# Patient Record
Sex: FEMALE | Race: WHITE | Employment: OTHER | URBAN - METROPOLITAN AREA
[De-identification: names, ages, dates, MRNs, and addresses within clinical notes are randomized per-mention and may not be internally consistent; named-entity substitution may affect disease eponyms.]

---

## 2018-05-23 ENCOUNTER — HOSPITAL ENCOUNTER (OUTPATIENT)
Dept: MRI IMAGING | Age: 73
Discharge: HOME | End: 2018-05-23
Attending: ORTHOPAEDIC SURGERY
Payer: MEDICARE

## 2018-05-23 DIAGNOSIS — M54.50 ACUTE BILATERAL LOW BACK PAIN: ICD-10-CM

## 2018-05-23 DIAGNOSIS — M54.10 RADICULOPATHY: ICD-10-CM

## 2018-05-23 PROCEDURE — 72148 MRI LUMBAR SPINE W/O DYE: CPT

## 2018-07-23 ENCOUNTER — OFFICE VISIT (OUTPATIENT)
Dept: ORTHOPEDIC SURGERY | Age: 73
End: 2018-07-23

## 2018-07-23 VITALS
WEIGHT: 148 LBS | TEMPERATURE: 98.1 F | HEIGHT: 65 IN | HEART RATE: 89 BPM | BODY MASS INDEX: 24.66 KG/M2 | RESPIRATION RATE: 16 BRPM | DIASTOLIC BLOOD PRESSURE: 85 MMHG | OXYGEN SATURATION: 95 % | SYSTOLIC BLOOD PRESSURE: 164 MMHG

## 2018-07-23 DIAGNOSIS — M47.816 LUMBAR FACET ARTHROPATHY: ICD-10-CM

## 2018-07-23 DIAGNOSIS — M48.062 SPINAL STENOSIS OF LUMBAR REGION WITH NEUROGENIC CLAUDICATION: Primary | ICD-10-CM

## 2018-07-23 DIAGNOSIS — M62.830 MUSCLE SPASM OF BACK: ICD-10-CM

## 2018-07-23 DIAGNOSIS — M43.25 FUSION OF SPINE, THORACOLUMBAR REGION: ICD-10-CM

## 2018-07-23 DIAGNOSIS — M79.2 NEURITIS: ICD-10-CM

## 2018-07-23 RX ORDER — CALCIUM CARBONATE 600 MG
600 TABLET ORAL DAILY
COMMUNITY
End: 2022-06-10

## 2018-07-23 RX ORDER — MELOXICAM 15 MG/1
TABLET ORAL
COMMUNITY
Start: 2018-06-15 | End: 2020-10-16 | Stop reason: ALTCHOICE

## 2018-07-23 RX ORDER — ATORVASTATIN CALCIUM 10 MG/1
10 TABLET, FILM COATED ORAL DAILY
COMMUNITY
Start: 2017-09-04

## 2018-07-23 RX ORDER — DICLOFENAC SODIUM 75 MG/1
75 TABLET, DELAYED RELEASE ORAL 2 TIMES DAILY
Qty: 60 TAB | Refills: 2 | Status: SHIPPED | OUTPATIENT
Start: 2018-07-23 | End: 2020-10-16 | Stop reason: ALTCHOICE

## 2018-07-23 RX ORDER — GABAPENTIN 300 MG/1
CAPSULE ORAL
Qty: 90 CAP | Refills: 1 | Status: SHIPPED | OUTPATIENT
Start: 2018-07-23 | End: 2018-10-15 | Stop reason: SDUPTHER

## 2018-07-23 RX ORDER — LOSARTAN POTASSIUM 25 MG/1
25 TABLET ORAL DAILY
COMMUNITY
Start: 2018-05-25 | End: 2022-06-17

## 2018-07-23 RX ORDER — OMEGA-3-ACID ETHYL ESTERS 1 G/1
1 CAPSULE, LIQUID FILLED ORAL
COMMUNITY
End: 2022-03-03 | Stop reason: ALTCHOICE

## 2018-07-23 RX ORDER — LORAZEPAM 1 MG/1
1 TABLET ORAL
COMMUNITY
Start: 2018-05-25

## 2018-07-23 RX ORDER — HYDROCHLOROTHIAZIDE 25 MG/1
25 TABLET ORAL DAILY
COMMUNITY
End: 2022-07-02

## 2018-07-23 RX ORDER — OMEPRAZOLE 40 MG/1
40 CAPSULE, DELAYED RELEASE ORAL DAILY
COMMUNITY
Start: 2018-07-09

## 2018-07-23 NOTE — PATIENT INSTRUCTIONS
Low Back Arthritis: Exercises  Your Care Instructions  Here are some examples of typical rehabilitation exercises for your condition. Start each exercise slowly. Ease off the exercise if you start to have pain. Your doctor or physical therapist will tell you when you can start these exercises and which ones will work best for you. When you are not being active, find a comfortable position for rest. Some people are comfortable on the floor or a medium-firm bed with a small pillow under their head and another under their knees. Some people prefer to lie on their side with a pillow between their knees. Don't stay in one position for too long. Take short walks (10 to 20 minutes) every 2 to 3 hours. Avoid slopes, hills, and stairs until you feel better. Walk only distances you can manage without pain, especially leg pain. How to do the exercises  Pelvic tilt    1. Lie on your back with your knees bent. 2. \"Brace\" your stomach-tighten your muscles by pulling in and imagining your belly button moving toward your spine. 3. Press your lower back into the floor. You should feel your hips and pelvis rock back. 4. Hold for 6 seconds while breathing smoothly. 5. Relax and allow your pelvis and hips to rock forward. 6. Repeat 8 to 12 times. Back stretches    1. Get down on your hands and knees on the floor. 2. Relax your head and allow it to droop. Round your back up toward the ceiling until you feel a nice stretch in your upper, middle, and lower back. Hold this stretch for as long as it feels comfortable, or about 15 to 30 seconds. 3. Return to the starting position with a flat back while you are on your hands and knees. 4. Let your back sway by pressing your stomach toward the floor. Lift your buttocks toward the ceiling. 5. Hold this position for 15 to 30 seconds. 6. Repeat 2 to 4 times. Follow-up care is a key part of your treatment and safety.  Be sure to make and go to all appointments, and call your doctor if you are having problems. It's also a good idea to know your test results and keep a list of the medicines you take. Where can you learn more? Go to http://maggie-rae.info/. Enter M504 in the search box to learn more about \"Low Back Arthritis: Exercises. \"  Current as of: November 29, 2017  Content Version: 11.7  © 8841-5477 [employer]. Care instructions adapted under license by [employer] (which disclaims liability or warranty for this information). If you have questions about a medical condition or this instruction, always ask your healthcare professional. [place] [address] any warranty or liability for your use of this information. Lumbar Spinal Stenosis: Care Instructions  Your Care Instructions    Stenosis in the spine is a narrowing of the canal that is around the spinal cord and nerve roots in your back. It can happen as part of aging. Sometimes bone and other tissue grow into this canal and press on the nerves that branch out from the spinal cord. This can cause pain, numbness, and weakness. When it happens in the lower part of your back, it is called lumbar spinal stenosis. It can cause problems in the legs, feet, and rear end (buttocks). You may be able to get relief from the symptoms of spinal stenosis by taking pain medicine. Your doctor may suggest physical therapy and exercises to keep your spine strong and flexible. Some people try steroid shots to reduce swelling. If pain and numbness in your legs are still so bad that you cannot do your normal activities, you may need surgery. Follow-up care is a key part of your treatment and safety. Be sure to make and go to all appointments, and call your doctor if you are having problems. It's also a good idea to know your test results and keep a list of the medicines you take. How can you care for yourself at home? · Take an over-the-counter pain medicine.  Nonsteroidal anti-inflammatory drugs (NSAIDs) such as ibuprofen or naproxen seem to work best. But if you can't take NSAIDs, you can try acetaminophen. Be safe with medicines. Read and follow all instructions on the label. · Do not take two or more pain medicines at the same time unless the doctor told you to. Many pain medicines have acetaminophen, which is Tylenol. Too much acetaminophen (Tylenol) can be harmful. · Stay at a healthy weight. Being overweight puts extra strain on your spine. · Change positions often when you sit or stand. This can ease pain. It may also reduce pressure on the spinal cord and its nerves. · Avoid doing things that make your symptoms worse. Walking downhill and standing for a long time may cause pain. · Stretch and strengthen your back muscles as your doctor or physical therapist recommends. If your doctor says it is okay to do them, these exercises may help. ¨ Lie on your back with your knees bent. Gently pull one bent knee to your chest. Put that foot back on the floor, and then pull the other knee to your chest.  ¨ Do pelvic tilts. Lie on your back with your knees bent. Tighten your stomach muscles. Pull your belly button (navel) in and up toward your ribs. You should feel like your back is pressing to the floor and your hips and pelvis are slightly lifting off the floor. Hold for 6 seconds while breathing smoothly. ¨ Stand with your back flat against a wall. Slowly slide down until your knees are slightly bent. Hold for 10 seconds, then slide back up the wall. · Remove or change anything in your house that may cause you to fall. Keep walkways clear of clutter, electrical cords, and throw rugs. When should you call for help? Call 911 anytime you think you may need emergency care. For example, call if:    · You are unable to move a leg at all.   [facility] your doctor now or seek immediate medical care if:    · You have new or worse symptoms in your legs, belly, or buttocks.  Symptoms may include:  ¨ Numbness or tingling. ¨ Weakness. ¨ Pain.     · You lose bladder or bowel control.    Watch closely for changes in your health, and be sure to contact your doctor if:    · You have a fever, lose weight, or don't feel well.     · You are not getting better as expected. Where can you learn more? Go to http://maggie-rae.info/. George Alvarez in the search box to learn more about \"Lumbar Spinal Stenosis: Care Instructions. \"  Current as of: November 29, 2017  Content Version: 11.7  © 9767-3612 [email or web address]. Care instructions adapted under license by [employer] (which disclaims liability or warranty for this information). If you have questions about a medical condition or this instruction, always ask your healthcare professional. [address] any warranty or liability for your use of this information.

## 2018-07-23 NOTE — PROGRESS NOTES
[facility]  [address] [place], [address]  Phone: [phone]  Fax: [phone]    NEW PATIENT  Pt's YOB: 1945    ASSESSMENT   Diagnoses and all orders for this visit:    1. Spinal stenosis of lumbar region with neurogenic claudication  -     gabapentin (NEURONTIN) 300 mg capsule; 1 in the am and 2 in the evening  Indications: NEUROPATHIC PAIN    2. Lumbar facet arthropathy (HCC)  -     diclofenac EC (VOLTAREN) 75 mg EC tablet; Take 1 Tab by mouth two (2) times a day. 3. Muscle spasm of back    4. Fusion of spine, thoracolumbar region    5. Neuritis  -     gabapentin (NEURONTIN) 300 mg capsule; 1 in the am and 2 in the evening  Indications: NEUROPATHIC PAIN       IMPRESSION AND PLAN:  Tu Bennett is a 68 y.o. female with history of low back pain x 1 year. She complains of progressive pain in the lower back, right hip, and right thigh since 07/2017. Of note, she fractured her back 30 years ago and had a Moreland geoff placed. She has tried physical therapy. 1) Pt was given information on lumbar arthritis exercises and spinal stenosis. 2) Discussed treatment options with the Pt, including medication, aqua physical therapy, steroid injections, and surgical intervention. 3) Pt was prescribed Neurontin 300 mg 1 tab QAM and 2 tabs QHS, tapering up as directed. 4) She was offered a referral to aqua physical therapy but she wishes to proceed with home exercises first. Pt will contact the office if she wishes to try aqua physical therapy. 5) Pt was prescribed Voltaren 75 mg 1 tab BID prn with food. 6) Ms. Halima Huggins has a reminder for a \"due or due soon\" health maintenance. I have asked that she contact her primary care provider, Lety Saab MD, for follow-up on this health maintenance. 7)  demonstrated consistency with prescribing. 8) Pt will follow-up in 4-6 weeks or sooner if needed.       HISTORY OF PRESENT ILLNESS:  Ekta Gimenez is a 68 y.o. female with history of low back pain x 1 year. Pt presents to the office today as a new patient. She complains of progressive pain in the lower back, right hip, and right thigh since 07/2017. Pt denies any weakness but admits to increased pain when climbing stairs. She notes that she woke up with right hip and thigh pain this morning. Pt states that she generally puts an ice pack on her right thigh when she comes down to make coffee in the morning. Of note, she fractured her back 30 years ago and had a Moreland geoff placed. She tried land physical therapy and notes that it was suggested she go to pain management. She states she had deferred getting treatment for herself because her  had cardiac surgery  She takes ibuprofen as needed and has taken Mobic in the past as prescribed by her podiatrist. She would prefer non-surgical and non-invasive options at this time. Pt at this time desires to proceed with medication evaluation. Pt has a history of acid reflux and hypertension. She denies any history of diabetes. 45 minutes of face to face contact was spent with the patient and her  discussing her previous surgery (thoracolumbar fusion), his previous lumbar problems and subsequent surgery, medications, therapy, injections, and indications for surgery during today's office visit. Pain Scale: 7/10     PCP: Deetta Primrose, MD    Past Medical History:   Diagnosis Date    Acid reflux     Hypertension         Social History     Social History    Marital status:      Spouse name: N/A    Number of children: N/A    Years of education: N/A     Occupational History    Not on file.      Social History Main Topics    Smoking status: Never Smoker    Smokeless tobacco: Never Used    Alcohol use 0.6 oz/week     1 Glasses of wine per week    Drug use: No    Sexual activity: Yes     Partners: Male     Birth control/ protection: None     Other Topics Concern  Not on file     Social History Narrative    No narrative on file           No Known Allergies    REVIEW OF SYSTEMS    Constitutional: Negative for fever, chills, or weight change. Respiratory: Negative for cough or shortness of breath. Cardiovascular: Negative for chest pain or palpitations. Gastrointestinal: Negative for acid reflux, change in bowel habits, or constipation. Genitourinary: Negative for dysuria and flank pain. Musculoskeletal: Positive for lumbar pain. Skin: Negative for rash. Neurological: Negative for headaches, dizziness, or numbness. Endo/Heme/Allergies: Negative for increased bruising. Psychiatric/Behavioral: Negative for difficulty with sleep. PHYSICAL EXAMINATION  Visit Vitals    /85 (BP 1 Location: Left arm, BP Patient Position: Sitting)    Pulse 89    Temp 98.1 °F (36.7 °C) (Oral)    Resp 16    Ht 5' 5\" (1.651 m)    Wt 148 lb (67.1 kg)    SpO2 95%    BMI 24.63 kg/m2       Constitutional: Awake, alert, and in no acute distress. HEENT: Normocephalic. Atraumatic. Oropharynx is moist and clear. PERRL. EOMI. Sclerae are nonicteric  Heart: Regular rate and rhythm  Lungs: Clear to auscultation bilaterally  Abdomen: Soft and nontender. Bowel sounds are present  Neurological: 1+ symmetrical DTRs in the upper extremities. 1+ symmetrical DTRs in the lower extremities. Sensation to light touch is intact. Negative Alfonzo's sign bilaterally. Skin: warm, dry, and intact. Musculoskeletal: Decreased range of motion with side to side cervical flexion. Minimal tenderness to palpation in the lower lumbar region and over the sacroiliac joints. Moderate pain with extension and axial loading. No pain with internal or external rotation of her hips. Negative straight leg raise bilaterally.        Biceps  Triceps Deltoids Wrist Ext Wrist Flex Hand Intrin   Right +4/5 +4/5 +4/5 +4/5 +4/5 +4/5   Left +4/5 +4/5 +4/5 +4/5 +4/5 +4/5      Hip Flex  Quads Hamstrings Ankle DF EHL Ankle PF   Right +4/5 +4/5 +4/5 +4/5 +4/5 +4/5   Left +4/5 +4/5 +4/5 +4/5 +4/5 +4/5     IMAGING:     Lumbar spine MRI from 05/23/2018 was personally reviewed with the patient and demonstrated:    Results from [place] encounter on 05/23/18   MRI LUMB SPINE WO CONT   Narrative History: Remote placement of Moreland rods due to traumatic injury, with  approximately one year of back and right leg pain    Prior studies not available for comparison    PROCEDURE:    Sagittal spin echo T1 and T2 weighted and STIR sequences, and axial spin echo T1  and NASH T2 weighted sequences were obtained of the lumbar spine without  gadolinium. FINDINGS:      There is chronic appearing superior endplate fracture A93 vertebral body with  slight posterior chronic retropulsion but no significant stenosis. No other  evidence of fracture. Asymmetric right-sided degenerative endplate marrow edema  N2/9. No other evidence of marrow edema or neoplastic marrow signal. Extensive  artifact from posterior instrumentation from the visualized lower thoracic level  down to the L2 level. The conus medullaris is located at the L1 level and has a  normal appearance and signal.     Visualized retroperitoneum and sacrum unremarkable. L1/2 level: Dorsal artifact but no evidence of significant stenosis or disc  abnormality. L2/3 level: Extensive dorsal artifact especially on axial sequences. There is  disc bulge and suggestion of moderate to severe canal stenosis with AP diameter  canal 5 mm, limited evaluation. L3/4 level: Degenerative spondylosis asymmetric to the right with disc bulge and  right posterior paracentral disc extrusion extending cephalad in the right  anterior epidural space and extending into the proximal foramen. There is  prominent dorsal epidural fat with AP diameter canal 4 mm with minimal residual  CSF surrounding crowded cauda equina.  Severe right lateral recess stenosis and  moderate to severe right-sided subarticular foraminal stenosis with flattening  right foraminal L3 nerve root. L4/5 level: Moderate to severe bilateral facet arthropathy and hypertrophy with  small right-sided facet joint effusion. Mild disc bulge and prominent dorsal  epidural fat with AP diameter canal 6 mm but with plenty of CSF surrounding  cauda equina, without significant lateral recess stenosis. There is small  synovial cyst along the ventral aspect right-sided facet joint which contributes  to flattening and mild anterior displacement of the right foraminal L4 nerve  root. Mild to moderate degenerative left foraminal stenosis. L5/S1 level: Mild bilateral facet arthropathy with no disc abnormality or  significant stenosis. Impression IMPRESSION:    1. Previous dorsal instrumentation lower thoracic down to the L2 level causing  associated artifact. Chronic appearing superior endplate fracture with mild loss  of height T12 vertebral body. 2. Asymmetric right-sided degenerative spondylosis L3/4 with right posterior and  foraminal disc extrusion with severe canal, severe right lateral recess, and  moderate to severe right foraminal stenosis. 3. Moderate to severe facet arthropathy with small right-sided facet joint  effusion L4/5, with small ventrally located right-sided facet synovial cyst  contributing to flattening and possible impingement of the right foraminal L4  nerve root. Mild canal stenosis. 4. Disc bulge L2/3 with possible moderate to severe canal stenosis at this level  but suboptimal evaluation of the canal at this level due to artifact. Written by Dave Lockett, as dictated by Chandler Grajeda MD.  I, Dr. Chandler Grajeda confirm that all documentation is accurate.

## 2018-07-23 NOTE — MR AVS SNAPSHOT
[address] [address] [address] [place] [address] 
[phone] Patient: Fanny Pulido MRN: [id number] GBI:[id number] Visit Information Date & Time Provider Department Dept. Phone Encounter #  
 7/23/2018 11:00 AM Leonides Hester, 27 [facility] [phone] [id number] Follow-up Instructions Return in about 6 weeks (around 9/3/2018) for Medication follow up. Upcoming Health Maintenance Date Due Hepatitis C Screening 1945 DTaP/Tdap/Td series (1 - Tdap) 4/18/1966 FOBT Q 1 YEAR AGE 50-75 4/18/1995 ZOSTER VACCINE AGE 60> 2/18/2005 GLAUCOMA SCREENING Q2Y 4/18/2010 Pneumococcal 65+ Low/Medium Risk (1 of 2 - PCV13) 4/18/2010 BREAST CANCER SCRN MAMMOGRAM 6/1/2013 MEDICARE YEARLY EXAM 5/17/2018 Influenza Age 5 to Adult 8/1/2018 Allergies as of 7/23/2018  Review Complete On: 7/23/2018 By: Kylie Cerda LPN No Known Allergies Current Immunizations  Never Reviewed No immunizations on file. Not reviewed this visit You Were Diagnosed With   
  
 Codes Comments Spinal stenosis of lumbar region with neurogenic claudication    -  Primary ICD-10-CM: B11.411 
ICD-9-CM: 724.03 Lumbar facet arthropathy (HCC)     ICD-10-CM: M46.96 
ICD-9-CM: 721.3 Muscle spasm of back     ICD-10-CM: J46.582 ICD-9-CM: 724.8 Neuritis     ICD-10-CM: M79.2 ICD-9-CM: 729.2 Vitals BP Pulse Temp Resp Height(growth percentile) Weight(growth percentile) 164/85 (BP 1 Location: Left arm, BP Patient Position: Sitting) 89 98.1 °F (36.7 °C) (Oral) 16 5' 5\" (1.651 m) 148 lb (67.1 kg) SpO2 BMI OB Status Smoking Status 95% 24.63 kg/m2 Postmenopausal Never Smoker BMI and BSA Data Body Mass Index Body Surface Area  
 24.63 kg/m 2 1.75 m 2 Your Updated Medication List  
  
   
 This list is accurate as of 18 12:12 PM.  Always use your most recent med list.  
  
  
  
  
 atorvastatin 10 mg tablet Commonly known as:  LIPITOR Take  by mouth.  
  
 calcium carbonate 600 mg calcium (1,500 mg) tablet Commonly known as:  Eleanora Salk Take  by mouth. diclofenac EC 75 mg EC tablet Commonly known as:  VOLTAREN Take 1 Tab by mouth two (2) times a day.  
  
 gabapentin 300 mg capsule Commonly known as:  NEURONTIN  
1 in the am and 2 in the evening  Indications: NEUROPATHIC PAIN  
  
 hydroCHLOROthiazide 25 mg tablet Commonly known as:  HYDRODIURIL Take 25 mg by mouth. LORazepam 1 mg tablet Commonly known as:  ATIVAN  
  
 losartan 50 mg tablet Commonly known as:  COZAAR  
  
 meloxicam 15 mg tablet Commonly known as:  MOBIC  
  
 MULTIVITAMIN PO Take  by mouth. omega-3 acid ethyl esters 1 gram capsule Commonly known as:  Zenaida Beaverdam Take  by mouth. omeprazole 40 mg capsule Commonly known as:  PRILOSEC Prescriptions Printed Refills  
 gabapentin (NEURONTIN) 300 mg capsule 1 Si in the am and 2 in the evening  Indications: NEUROPATHIC PAIN Class: Print  
 diclofenac EC (VOLTAREN) 75 mg EC tablet 2 Sig: Take 1 Tab by mouth two (2) times a day. Class: Print Route: Oral  
  
Follow-up Instructions Return in about 6 weeks (around 9/3/2018) for Medication follow up. Patient Instructions Low Back Arthritis: Exercises Your Care Instructions Here are some examples of typical rehabilitation exercises for your condition. Start each exercise slowly. Ease off the exercise if you start to have pain. Your doctor or physical therapist will tell you when you can start these exercises and which ones will work best for you.  
When you are not being active, find a comfortable position for rest. Some people are comfortable on the floor or a medium-firm bed with a small pillow under their head and another under their knees. Some people prefer to lie on their side with a pillow between their knees. Don't stay in one position for too long. Take short walks (10 to 20 minutes) every 2 to 3 hours. Avoid slopes, hills, and stairs until you feel better. Walk only distances you can manage without pain, especially leg pain. How to do the exercises Pelvic tilt 1. Lie on your back with your knees bent. 2. \"Brace\" your stomach-tighten your muscles by pulling in and imagining your belly button moving toward your spine. 3. Press your lower back into the floor. You should feel your hips and pelvis rock back. 4. Hold for 6 seconds while breathing smoothly. 5. Relax and allow your pelvis and hips to rock forward. 6. Repeat 8 to 12 times. Back stretches 1. Get down on your hands and knees on the floor. 2. Relax your head and allow it to droop. Round your back up toward the ceiling until you feel a nice stretch in your upper, middle, and lower back. Hold this stretch for as long as it feels comfortable, or about 15 to 30 seconds. 3. Return to the starting position with a flat back while you are on your hands and knees. 4. Let your back sway by pressing your stomach toward the floor. Lift your buttocks toward the ceiling. 5. Hold this position for 15 to 30 seconds. 6. Repeat 2 to 4 times. Follow-up care is a key part of your treatment and safety. Be sure to make and go to all appointments, and call your doctor if you are having problems. It's also a good idea to know your test results and keep a list of the medicines you take. Where can you learn more? Go to http://maggie-rae.info/. Enter Z380 in the search box to learn more about \"Low Back Arthritis: Exercises. \" Current as of: November 29, 2017 Content Version: 11.7 © 8977-3344 [employer].  Care instructions adapted under license by [address] (which disclaims liability or warranty for this information). If you have questions about a medical condition or this instruction, always ask your healthcare professional. [address] any warranty or liability for your use of this information. Lumbar Spinal Stenosis: Care Instructions Your Care Instructions Stenosis in the spine is a narrowing of the canal that is around the spinal cord and nerve roots in your back. It can happen as part of aging. Sometimes bone and other tissue grow into this canal and press on the nerves that branch out from the spinal cord. This can cause pain, numbness, and weakness. When it happens in the lower part of your back, it is called lumbar spinal stenosis. It can cause problems in the legs, feet, and rear end (buttocks). You may be able to get relief from the symptoms of spinal stenosis by taking pain medicine. Your doctor may suggest physical therapy and exercises to keep your spine strong and flexible. Some people try steroid shots to reduce swelling. If pain and numbness in your legs are still so bad that you cannot do your normal activities, you may need surgery. Follow-up care is a key part of your treatment and safety. Be sure to make and go to all appointments, and call your doctor if you are having problems. It's also a good idea to know your test results and keep a list of the medicines you take. How can you care for yourself at home? · Take an over-the-counter pain medicine. Nonsteroidal anti-inflammatory drugs (NSAIDs) such as ibuprofen or naproxen seem to work best. But if you can't take NSAIDs, you can try acetaminophen. Be safe with medicines. Read and follow all instructions on the label. · Do not take two or more pain medicines at the same time unless the doctor told you to. Many pain medicines have acetaminophen, which is Tylenol. Too much acetaminophen (Tylenol) can be harmful. · Stay at a healthy weight. Being overweight puts extra strain on your spine. · Change positions often when you sit or stand. This can ease pain. It may also reduce pressure on the spinal cord and its nerves. · Avoid doing things that make your symptoms worse. Walking downhill and standing for a long time may cause pain. · Stretch and strengthen your back muscles as your doctor or physical therapist recommends. If your doctor says it is okay to do them, these exercises may help. ¨ Lie on your back with your knees bent. Gently pull one bent knee to your chest. Put that foot back on the floor, and then pull the other knee to your chest. 
¨ Do pelvic tilts. Lie on your back with your knees bent. Tighten your stomach muscles. Pull your belly button (navel) in and up toward your ribs. You should feel like your back is pressing to the floor and your hips and pelvis are slightly lifting off the floor. Hold for 6 seconds while breathing smoothly. ¨ Stand with your back flat against a wall. Slowly slide down until your knees are slightly bent. Hold for 10 seconds, then slide back up the wall. · Remove or change anything in your house that may cause you to fall. Keep walkways clear of clutter, electrical cords, and throw rugs. When should you call for help? Call 911 anytime you think you may need emergency care. For example, call if: 
  · You are unable to move a leg at all.  
[facility] your doctor now or seek immediate medical care if: 
  · You have new or worse symptoms in your legs, belly, or buttocks. Symptoms may include: ¨ Numbness or tingling. ¨ Weakness. ¨ Pain.  
  · You lose bladder or bowel control.  
 Watch closely for changes in your health, and be sure to contact your doctor if: 
  · You have a fever, lose weight, or don't feel well.  
  · You are not getting better as expected. Where can you learn more? Go to http://maggie-rae.info/. Tashi Sullivan in the search box to learn more about \"Lumbar Spinal Stenosis: Care Instructions. \" Current as of: November 29, 2017 Content Version: 11.7 © 0270-2927 [employer]. Care instructions adapted under license by [employer] (which disclaims liability or warranty for this information). If you have questions about a medical condition or this instruction, always ask your healthcare professional. [address] any warranty or liability for your use of this information. Introducing [facility]! [facility] introduces OurStory patient portal. Now you can access parts of your medical record, email your doctor's office, and request medication refills online. 1. In your internet browser, go to [email or web address]. [employer] 2. Click on the First Time User? Click Here link in the Sign In box. You will see the New Member Sign Up page. 3. Enter your OurStory Access Code exactly as it appears below. You will not need to use this code after youve completed the sign-up process. If you do not sign up before the expiration date, you must request a new code. · OurStory Access Code: [email or web address] Expires: 8/15/2018 12:04 PM 
 
4. Enter the last four digits of your Social Security Number (xxxx) and Date of Birth (mm/dd/yyyy) as indicated and click Submit. You will be taken to the next sign-up page. 5. Create a OurStory ID. This will be your OurStory login ID and cannot be changed, so think of one that is secure and easy to remember. 6. Create a OurStory password. You can change your password at any time. 7. Enter your Password Reset Question and Answer. This can be used at a later time if you forget your password. 8. Enter your e-mail address. You will receive e-mail notification when new information is available in [address] 9. Click Sign Up. You can now view and download portions of your medical record. 10. Click the Download Summary menu link to download a portable copy of your medical information. If you have questions, please visit the Frequently Asked Questions section of the [employer] website. Remember, [employer] is NOT to be used for urgent needs. For medical emergencies, dial 911. Now available from your iPhone and Android! Please provide this summary of care documentation to your next provider. Your primary care clinician is listed as Glenn Cruz. If you have any questions after today's visit, please call [phone].

## 2018-08-01 ENCOUNTER — TELEPHONE (OUTPATIENT)
Dept: ORTHOPEDIC SURGERY | Age: 73
End: 2018-08-01

## 2018-08-01 NOTE — TELEPHONE ENCOUNTER
Patient states the Gabapentin Dr. Cliffton Rubinstein gave her is too strong. She states she is still having the leg pain and inflammation and wanted to get something else called in. Patient can be reached at [phone] and would like it to be called in to [facility] [address] at [phone]. I added it to her pharmacies.

## 2018-08-01 NOTE — TELEPHONE ENCOUNTER
Please verify she does not have a history of glaucoma or kidney stones. We could try 25 mg Topamax 2 tabs QHS tapering up, #60 if she would like.

## 2018-08-02 RX ORDER — TOPIRAMATE 25 MG/1
50 TABLET ORAL
Qty: 60 TAB | Refills: 1 | Status: SHIPPED | OUTPATIENT
Start: 2018-08-02 | End: 2021-05-26

## 2018-08-02 NOTE — TELEPHONE ENCOUNTER
Returned call to patient, verified , informed patient of below. Patient denies having a history of kidney stones or glaucoma. Patient instructed how to slowly taper up on medication. Med sent to pharmacy. No further action required at this time.

## 2018-09-18 ENCOUNTER — OFFICE VISIT (OUTPATIENT)
Dept: ORTHOPEDIC SURGERY | Age: 73
End: 2018-09-18

## 2018-09-18 VITALS
BODY MASS INDEX: 23.99 KG/M2 | OXYGEN SATURATION: 98 % | SYSTOLIC BLOOD PRESSURE: 135 MMHG | HEIGHT: 65 IN | TEMPERATURE: 97.3 F | WEIGHT: 144 LBS | HEART RATE: 86 BPM | DIASTOLIC BLOOD PRESSURE: 81 MMHG | RESPIRATION RATE: 16 BRPM

## 2018-09-18 DIAGNOSIS — M48.062 SPINAL STENOSIS OF LUMBAR REGION WITH NEUROGENIC CLAUDICATION: Primary | ICD-10-CM

## 2018-09-18 DIAGNOSIS — M54.16 RIGHT LUMBAR RADICULOPATHY: ICD-10-CM

## 2018-09-18 DIAGNOSIS — M47.816 LUMBAR FACET ARTHROPATHY: ICD-10-CM

## 2018-09-18 NOTE — PATIENT INSTRUCTIONS

## 2018-09-18 NOTE — PROGRESS NOTES
Azra Drummond [facility] [address]. 
[address] [place], [address] [place] [address] Street Phone: [phone] Fax: [phone] Pt's YOB: 1945 ASSESSMENT Diagnoses and all orders for this visit: 1. Spinal stenosis of lumbar region with neurogenic claudication 2. Right lumbar radiculopathy 3. Lumbar facet arthropathy ([address] IMPRESSION AND PLAN: 
Asim Freeman is a 68 y.o. female with history of lumbar pain. She complains of intermittent pain in the lower back that radiates down the right thigh. Pt notes that her lower back pain is severe in the morning and when changing positions after sitting for prolonged periods of time. She reports lightheadedness with Neurontin 300 mg and experienced minimal relief with Voltaren 75 mg. 
 
1) Pt was given information on lumbar arthritis exercises. 2) Discussed treatment options with the Pt, including medication and steroid injections, and surgical intervention. 3) She will try Neurontin 300 mg regularly for 1 week. Pt may then try Topamax 25 mg as prescribed if she cannot tolerate the Neurontin. 4) I would recommend a right L3 and right L4 SNRB if needed. 5) I recommended the patient try chair yoga, water exercise, and brett chi. 6) Discussed trying Lyrica if needed. 7) Ms. Ramila Jones has a reminder for a \"due or due soon\" health maintenance. I have asked that she contact her primary care provider, Yessy Brandon MD, for follow-up on this health maintenance. 8)  demonstrated consistency with prescribing. 9) Pt will follow-up in 3 weeks or sooner if needed. HISTORY OF PRESENT ILLNESS: 
Asim Freeman is a 68 y.o. female with history of lumbar pain. She complains of intermittent pain in the lower back that radiates down the right thigh. Pt notes that her lower back pain is severe in the morning and when changing positions after sitting for prolonged periods of time. She notes that she generally uses an ice pack on the right leg when she has difficulty getting out of bed or transitioning from sit to stand. Pt notes that she has not been taking the Neurontin 300 mg regularly since she experienced lightheadedness with the medication. She states that she was taking Voltaren 75 mg but discontinued this when she had a renal infection and was placed on Cipro. Pt states that she has not experienced a significant change in her symptoms since discontinuing the Voltaren. She denies any history or renal stones or glaucoma. Pt admits that she has experienced recurring UTI's and notes that her PCP will refer her to the nephrologist if she has another UTI. She contacted the office when she could not tolerate the Neurontin and was prescribed Topamax 25 mg 2 tabs QHS by AMAIRANI Wilhelm. Pt denies previous steroid injections and had a prior thoracolumbar fusion. Of note, she is not diabetic. Pt at this time desires to proceed with medication evaluation. 25 minutes of face to face contact was spent with the patient during today's office visit extensively discussing her symptoms and treatment plan. Pain Scale: 5/10 PCP: Gold Veliz MD 
  
Past Medical History:  
Diagnosis Date  Acid reflux  Hypertension Social History Social History  Marital status:  Spouse name: N/A  
 Number of children: N/A  
 Years of education: N/A Occupational History  Not on file. Social History Main Topics  Smoking status: Never Smoker  Smokeless tobacco: Never Used  Alcohol use 0.6 oz/week 1 Glasses of wine per week  Drug use: No  
 Sexual activity: Yes  
  Partners: Male Birth control/ protection: None Other Topics Concern  Not on file Social History Narrative Current Outpatient Prescriptions Medication Sig Dispense Refill  omeprazole (PRILOSEC) 40 mg capsule  losartan (COZAAR) 50 mg tablet  hydroCHLOROthiazide (HYDRODIURIL) 25 mg tablet Take 25 mg by mouth.  LORazepam (ATIVAN) 1 mg tablet  atorvastatin (LIPITOR) 10 mg tablet Take  by mouth.  MULTIVITAMIN PO Take  by mouth.  calcium carbonate (CALTREX) 600 mg calcium (1,500 mg) tablet Take  by mouth.  omega-3 acid ethyl esters (LOVAZA) 1 gram capsule Take  by mouth.  diclofenac EC (VOLTAREN) 75 mg EC tablet Take 1 Tab by mouth two (2) times a day. 60 Tab 2  
 topiramate (TOPAMAX) 25 mg tablet Take 2 Tabs by mouth nightly. 60 Tab 1  
 meloxicam (MOBIC) 15 mg tablet  gabapentin (NEURONTIN) 300 mg capsule 1 in the am and 2 in the evening  Indications: NEUROPATHIC PAIN 90 Cap 1 No Known Allergies REVIEW OF SYSTEMS Constitutional: Negative for fever, chills, or weight change. Respiratory: Negative for cough or shortness of breath. Cardiovascular: Negative for chest pain or palpitations. Gastrointestinal: Negative for acid reflux, change in bowel habits, or constipation. Genitourinary: Negative for dysuria and flank pain. Musculoskeletal: Positive for lumbar pain. Skin: Negative for rash. Neurological: Negative for headaches, dizziness, or numbness. Endo/Heme/Allergies: Negative for increased bruising. Psychiatric/Behavioral: Negative for difficulty with sleep. PHYSICAL EXAMINATION Visit Vitals  /81 (BP 1 Location: Left arm, BP Patient Position: Sitting)  Pulse 86  Temp 97.3 °F (36.3 °C) (Oral)  Resp 16  
 Ht 5' 5\" (1.651 m)  Wt 144 lb (65.3 kg)  SpO2 98%  BMI 23.96 kg/m2 Constitutional: Awake, alert, and in no acute distress. Neurological: 1+ symmetrical DTRs in the upper extremities. 1+ symmetrical DTRs in the lower extremities. Sensation to light touch is intact. Negative Alfonzo's sign bilaterally. Skin: warm, dry, and intact. Musculoskeletal: Tenderness to palpation in the lower lumbar region. Moderate pain with extension and axial loading. Improvement with forward flexion. No pain with internal or external rotation of her hips. Negative straight leg raise bilaterally. Biceps  Triceps Deltoids Wrist Ext Wrist Flex Hand Intrin Right +4/5 +4/5 +4/5 +4/5 +4/5 +4/5 Left +4/5 +4/5 +4/5 +4/5 +4/5 +4/5 Hip Flex  Quads Hamstrings Ankle DF EHL Ankle PF Right +4/5 +4/5 +4/5 +4/5 +4/5 +4/5 Left +4/5 +4/5 +4/5 +4/5 +4/5 +4/5 IMAGING: 
 
Lumbar spine MRI from 05/23/2018 was personally reviewed with the patient and demonstrated:  
Results from [facility] on 05/23/18 MRI LUMB SPINE WO CONT 
  Narrative History: Remote placement of [place] rods due to traumatic injury, with 
approximately one year of back and right leg pain Prior studies not available for comparison PROCEDURE: 
 
Sagittal spin echo T1 and T2 weighted and STIR sequences, and axial spin echo T1 
and NASH T2 weighted sequences were obtained of the lumbar spine without 
gadolinium. FINDINGS:   
 
There is chronic appearing superior endplate fracture K15 vertebral body with 
slight posterior chronic retropulsion but no significant stenosis. No other 
evidence of fracture. Asymmetric right-sided degenerative endplate marrow edema L3/4. No other evidence of marrow edema or neoplastic marrow signal. Extensive 
artifact from posterior instrumentation from the visualized lower thoracic level 
down to the L2 level. The conus medullaris is located at the L1 level and has a 
normal appearance and signal.  
 
Visualized retroperitoneum and sacrum unremarkable. L1/2 level: Dorsal artifact but no evidence of significant stenosis or disc 
abnormality. L2/3 level: Extensive dorsal artifact especially on axial sequences. There is 
disc bulge and suggestion of moderate to severe canal stenosis with AP diameter 
canal 5 mm, limited evaluation.  
 
L3/4 level: Degenerative spondylosis asymmetric to the right with disc bulge and 
right posterior paracentral disc extrusion extending cephalad in the right 
anterior epidural space and extending into the proximal foramen. There is 
prominent dorsal epidural fat with AP diameter canal 4 mm with minimal residual 
CSF surrounding crowded cauda equina. Severe right lateral recess stenosis and 
moderate to severe right-sided subarticular foraminal stenosis with flattening 
right foraminal L3 nerve root. L4/5 level: Moderate to severe bilateral facet arthropathy and hypertrophy with 
small right-sided facet joint effusion. Mild disc bulge and prominent dorsal 
epidural fat with AP diameter canal 6 mm but with plenty of CSF surrounding 
cauda equina, without significant lateral recess stenosis. There is small 
synovial cyst along the ventral aspect right-sided facet joint which contributes 
to flattening and mild anterior displacement of the right foraminal L4 nerve 
root. Mild to moderate degenerative left foraminal stenosis. L5/S1 level: Mild bilateral facet arthropathy with no disc abnormality or 
significant stenosis.    
  
  Impression IMPRESSION: 
 
1. Previous dorsal instrumentation lower thoracic down to the L2 level causing 
associated artifact. Chronic appearing superior endplate fracture with mild loss 
of height T12 vertebral body. 2. Asymmetric right-sided degenerative spondylosis L3/4 with right posterior and 
foraminal disc extrusion with severe canal, severe right lateral recess, and 
moderate to severe right foraminal stenosis. 3. Moderate to severe facet arthropathy with small right-sided facet joint 
effusion L4/5, with small ventrally located right-sided facet synovial cyst 
contributing to flattening and possible impingement of the right foraminal L4 
nerve root. Mild canal stenosis. 4. Disc bulge L2/3 with possible moderate to severe canal stenosis at this level 
but suboptimal evaluation of the canal at this level due to artifact. Written by Raman Chahal, as dictated by Rodrigo Bajwa MD. 
I, Dr. Rodrigo Bajwa confirm that all documentation is accurate.

## 2018-10-15 ENCOUNTER — OFFICE VISIT (OUTPATIENT)
Dept: ORTHOPEDIC SURGERY | Age: 73
End: 2018-10-15

## 2018-10-15 VITALS
SYSTOLIC BLOOD PRESSURE: 122 MMHG | TEMPERATURE: 97.7 F | OXYGEN SATURATION: 94 % | RESPIRATION RATE: 16 BRPM | WEIGHT: 149.8 LBS | DIASTOLIC BLOOD PRESSURE: 81 MMHG | HEIGHT: 65 IN | BODY MASS INDEX: 24.96 KG/M2 | HEART RATE: 44 BPM

## 2018-10-15 DIAGNOSIS — M48.061 LUMBAR FORAMINAL STENOSIS: Primary | ICD-10-CM

## 2018-10-15 DIAGNOSIS — M48.062 SPINAL STENOSIS OF LUMBAR REGION WITH NEUROGENIC CLAUDICATION: ICD-10-CM

## 2018-10-15 DIAGNOSIS — M79.2 NEURITIS: ICD-10-CM

## 2018-10-15 DIAGNOSIS — M54.16 LUMBAR RADICULAR PAIN: ICD-10-CM

## 2018-10-15 RX ORDER — GABAPENTIN 300 MG/1
CAPSULE ORAL
Qty: 90 CAP | Refills: 3 | Status: SHIPPED | OUTPATIENT
Start: 2018-10-15 | End: 2018-12-18 | Stop reason: SDUPTHER

## 2018-10-15 NOTE — PATIENT INSTRUCTIONS
Low Back Arthritis: Exercises  Your Care Instructions  Here are some examples of typical rehabilitation exercises for your condition. Start each exercise slowly. Ease off the exercise if you start to have pain. Your doctor or physical therapist will tell you when you can start these exercises and which ones will work best for you. When you are not being active, find a comfortable position for rest. Some people are comfortable on the floor or a medium-firm bed with a small pillow under their head and another under their knees. Some people prefer to lie on their side with a pillow between their knees. Don't stay in one position for too long. Take short walks (10 to 20 minutes) every 2 to 3 hours. Avoid slopes, hills, and stairs until you feel better. Walk only distances you can manage without pain, especially leg pain. How to do the exercises  Pelvic tilt    1. Lie on your back with your knees bent. 2. \"Brace\" your stomach--tighten your muscles by pulling in and imagining your belly button moving toward your spine. 3. Press your lower back into the floor. You should feel your hips and pelvis rock back. 4. Hold for 6 seconds while breathing smoothly. 5. Relax and allow your pelvis and hips to rock forward. 6. Repeat 8 to 12 times. Back stretches    1. Get down on your hands and knees on the floor. 2. Relax your head and allow it to droop. Round your back up toward the ceiling until you feel a nice stretch in your upper, middle, and lower back. Hold this stretch for as long as it feels comfortable, or about 15 to 30 seconds. 3. Return to the starting position with a flat back while you are on your hands and knees. 4. Let your back sway by pressing your stomach toward the floor. Lift your buttocks toward the ceiling. 5. Hold this position for 15 to 30 seconds. 6. Repeat 2 to 4 times. Follow-up care is a key part of your treatment and safety.  Be sure to make and go to all appointments, and call your doctor if you are having problems. It's also a good idea to know your test results and keep a list of the medicines you take. Where can you learn more? Go to http://maggie-rae.info/. Enter O150 in the search box to learn more about \"Low Back Arthritis: Exercises. \"  Current as of: November 29, 2017  Content Version: 11.8  © 3285-0180 [email or web address]. Care instructions adapted under license by [employer] (which disclaims liability or warranty for this information). If you have questions about a medical condition or this instruction, always ask your healthcare professional. [address] any warranty or liability for your use of this information.

## 2018-10-15 NOTE — MR AVS SNAPSHOT
[address] [place] [address] [address] [place] [address] 
[phone] Patient: Victor Hugo Craft MRN: [id number] RFO:[id number] Visit Information Date & Time Provider Department Dept. Phone Encounter #  
 10/15/2018 12:50 PM Cally Guzman MD [facility] [phone] Follow-up Instructions Return in about 3 months (around 1/15/2019) for Medication follow up. Upcoming Health Maintenance Date Due Hepatitis C Screening 1945 DTaP/Tdap/Td series (1 - Tdap) 4/18/1966 Shingrix Vaccine Age 50> (1 of 2) 4/18/1995 FOBT Q 1 YEAR AGE 50-75 4/18/1995 GLAUCOMA SCREENING Q2Y 4/18/2010 Pneumococcal 65+ Low/Medium Risk (1 of 2 - PCV13) 4/18/2010 BREAST CANCER SCRN MAMMOGRAM 6/1/2013 MEDICARE YEARLY EXAM 5/17/2018 Influenza Age 5 to Adult 8/1/2018 Allergies as of 10/15/2018  Review Complete On: 10/15/2018 By: Cally Guzman MD  
 No Known Allergies Current Immunizations  Never Reviewed No immunizations on file. Not reviewed this visit You Were Diagnosed With   
  
 Codes Comments Lumbar foraminal stenosis    -  Primary ICD-10-CM: M99.83 ICD-9-CM: 724.02 Spinal stenosis of lumbar region with neurogenic claudication     ICD-10-CM: M48.062 
ICD-9-CM: 724.03 Lumbar radicular pain     ICD-10-CM: M54.16 
ICD-9-CM: 724.4 Neuritis     ICD-10-CM: M79.2 ICD-9-CM: 729.2 Vitals BP Pulse Temp Resp Height(growth percentile) Weight(growth percentile) 122/81 (!) 44 97.7 °F (36.5 °C) (Oral) 16 5' 5\" (1.651 m) 149 lb 12.8 oz (67.9 kg) SpO2 BMI OB Status Smoking Status 94% 24.93 kg/m2 Postmenopausal Never Smoker Vitals History BMI and BSA Data Body Mass Index Body Surface Area 24.93 kg/m 2 1.76 m 2 Preferred Pharmacy Pharmacy Name Phone [phone] [place] [address] [phone] Your Updated Medication List  
  
   
This list is accurate as of 10/15/18  1:46 PM.  Always use your most recent med list.  
  
  
  
  
 atorvastatin 10 mg tablet Commonly known as:  LIPITOR Take  by mouth.  
  
 calcium carbonate 600 mg calcium (1,500 mg) tablet Commonly known as:  Candi Starling Take  by mouth. diclofenac EC 75 mg EC tablet Commonly known as:  VOLTAREN Take 1 Tab by mouth two (2) times a day.  
  
 gabapentin 300 mg capsule Commonly known as:  NEURONTIN  
1 in the am and 2 in the evening  Indications: NEUROPATHIC PAIN  
  
 hydroCHLOROthiazide 25 mg tablet Commonly known as:  HYDRODIURIL Take 25 mg by mouth. LORazepam 1 mg tablet Commonly known as:  ATIVAN  
  
 losartan 50 mg tablet Commonly known as:  COZAAR  
  
 meloxicam 15 mg tablet Commonly known as:  MOBIC  
  
 MULTIVITAMIN PO Take  by mouth. omega-3 acid ethyl esters 1 gram capsule Commonly known as:  Delta Raghav Take  by mouth. omeprazole 40 mg capsule Commonly known as:  PRILOSEC  
  
 topiramate 25 mg tablet Commonly known as:  TOPAMAX Take 2 Tabs by mouth nightly. Prescriptions Printed Refills  
 gabapentin (NEURONTIN) 300 mg capsule 3 Si in the am and 2 in the evening  Indications: NEUROPATHIC PAIN Class: Print Follow-up Instructions Return in about 3 months (around 1/15/2019) for Medication follow up. Patient Instructions Low Back Arthritis: Exercises Your Care Instructions Here are some examples of typical rehabilitation exercises for your condition. Start each exercise slowly. Ease off the exercise if you start to have pain. Your doctor or physical therapist will tell you when you can start these exercises and which ones will work best for you.  
When you are not being active, find a comfortable position for rest. Some people are comfortable on the floor or a medium-firm bed with a small pillow under their head and another under their knees. Some people prefer to lie on their side with a pillow between their knees. Don't stay in one position for too long. Take short walks (10 to 20 minutes) every 2 to 3 hours. Avoid slopes, hills, and stairs until you feel better. Walk only distances you can manage without pain, especially leg pain. How to do the exercises Pelvic tilt 1. Lie on your back with your knees bent. 2. \"Brace\" your stomachtighten your muscles by pulling in and imagining your belly button moving toward your spine. 3. Press your lower back into the floor. You should feel your hips and pelvis rock back. 4. Hold for 6 seconds while breathing smoothly. 5. Relax and allow your pelvis and hips to rock forward. 6. Repeat 8 to 12 times. Back stretches 1. Get down on your hands and knees on the floor. 2. Relax your head and allow it to droop. Round your back up toward the ceiling until you feel a nice stretch in your upper, middle, and lower back. Hold this stretch for as long as it feels comfortable, or about 15 to 30 seconds. 3. Return to the starting position with a flat back while you are on your hands and knees. 4. Let your back sway by pressing your stomach toward the floor. Lift your buttocks toward the ceiling. 5. Hold this position for 15 to 30 seconds. 6. Repeat 2 to 4 times. Follow-up care is a key part of your treatment and safety. Be sure to make and go to all appointments, and call your doctor if you are having problems. It's also a good idea to know your test results and keep a list of the medicines you take. Where can you learn more? Go to http://maggie-rae.info/. Enter M181 in the search box to learn more about \"Low Back Arthritis: Exercises. \" Current as of: November 29, 2017 Content Version: 11.8 © 1806-8730 [employer].  Care instructions adapted under license by [address] (which disclaims liability or warranty for this information). If you have questions about a medical condition or this instruction, always ask your healthcare professional. [address] any warranty or liability for your use of this information. Introducing \[facility]\"" & HEALTH SERVICES! Edi Eli introduces [employer] patient portal. Now you can access parts of your medical record, email your doctor's office, and request medication refills online. 1. In your internet browser, go to [email or web address] 2. Click on the First Time User? Click Here link in the Sign In box. You will see the New Member Sign Up page. 3. Enter your [employer] Access Code exactly as it appears below. You will not need to use this code after youve completed the sign-up process. If you do not sign up before the expiration date, you must request a new code. · [employer] Access Code: [email or web address] Expires: 12/17/2018 10:46 AM 
 
4. Enter the last four digits of your Social Security Number (xxxx) and Date of Birth (mm/dd/yyyy) as indicated and click Submit. You will be taken to the next sign-up page. 5. Create a [employer] ID. This will be your [employer] login ID and cannot be changed, so think of one that is secure and easy to remember. 6. Create a [employer] password. You can change your password at any time. 7. Enter your Password Reset Question and Answer. This can be used at a later time if you forget your password. 8. Enter your e-mail address. You will receive e-mail notification when new information is available in [address] 9. Click Sign Up. You can now view and download portions of your medical record. 10. Click the Download Summary menu link to download a portable copy of your medical information. If you have questions, please visit the Frequently Asked Questions section of the [employer] website.  Remember, [employer] is NOT to be used for urgent needs. For medical emergencies, dial 911. Now available from your iPhone and Android! Please provide this summary of care documentation to your next provider. Your primary care clinician is listed as Otilia Enrique. If you have any questions after today's visit, please call [phone].

## 2018-10-15 NOTE — PROGRESS NOTES
[facility]  [address] [place], [facility] [address]  Phone: [phone]  Fax: [phone]    Pt's YOB: 1945    ASSESSMENT   Diagnoses and all orders for this visit:    1. Lumbar foraminal stenosis    2. Spinal stenosis of lumbar region with neurogenic claudication  -     gabapentin (NEURONTIN) 300 mg capsule; 1 in the am and 2 in the evening  Indications: NEUROPATHIC PAIN    3. Lumbar radicular pain    4. Neuritis  -     gabapentin (NEURONTIN) 300 mg capsule; 1 in the am and 2 in the evening  Indications: NEUROPATHIC PAIN         IMPRESSION AND PLAN:  Rebecca Mack is a 68 y.o. female with history of lumbar pain. Pt complains of pain in the lower back that radiates down the lateral aspect of the right thigh. She has been prescribed Neurontin 300 mg and takes 1 tab after dinner with minimal relief. 1) Pt was given information on lumbar arthritis exercises. 2) She will increase her Neurontin 300 mg 1 tab QAM and 2 tabs QHS to better manage her symptoms. 3) Pt will try increasing her Neurontin first and when she returns home from her trip she will contact the office if she wishes to schedule an injection. I recommend a right L3 SNRB if her symptoms do not improve. 4) Ms. Rowan Cowden has a reminder for a \"due or due soon\" health maintenance. I have asked that she contact her primary care provider, Issac Killian MD, for follow-up on this health maintenance. 5)  demonstrated consistency with prescribing. 6) Pt will follow-up in 3 months or sooner if needed. HISTORY OF PRESENT ILLNESS:  Rebecca Mack is a 68 y.o. female with history of lumbar pain. Pt complains of pain in the lower back that radiates down the lateral aspect of the right thigh. . She admits that recently her pian has started to radiate down the left thigh but denies any pain radiating past the knees.  Pt has been prescribed Neurontin 300 mg and takes 1 tab after dinner with minimal relief. She reports lightheadedness when taking Neurontin in the past but states that this has improved. Pt notes that occasionally her pian wakes her up at night but this improves when she uses a heating pad. She is not currently on blood thinners. She takes ibuprofen as needed. Of note, she is going on a trip for Thanksgiving and is hesitant about trying steroid injections prior to the trip. Pt at this time desires to proceed with medication evaluation. More than half of the 25 minutes+ of face to face contact was spent with the patient during today's office visit extensively discussing her symptoms, medications, spinal injections and treatment plan. Pain Scale: 6/10    PCP: Baltazar Meadows MD     Past Medical History:   Diagnosis Date    Acid reflux     Hypertension         Social History     Social History    Marital status:      Spouse name: N/A    Number of children: N/A    Years of education: N/A     Occupational History    Not on file. Social History Main Topics    Smoking status: Never Smoker    Smokeless tobacco: Never Used    Alcohol use 0.6 oz/week     1 Glasses of wine per week    Drug use: No    Sexual activity: Yes     Partners: Male     Birth control/ protection: None     Other Topics Concern    Not on file     Social History Narrative       Current Outpatient Prescriptions   Medication Sig Dispense Refill    gabapentin (NEURONTIN) 300 mg capsule 1 in the am and 2 in the evening  Indications: NEUROPATHIC PAIN 90 Cap 3    omeprazole (PRILOSEC) 40 mg capsule       losartan (COZAAR) 50 mg tablet       hydroCHLOROthiazide (HYDRODIURIL) 25 mg tablet Take 25 mg by mouth.  LORazepam (ATIVAN) 1 mg tablet       atorvastatin (LIPITOR) 10 mg tablet Take  by mouth.  MULTIVITAMIN PO Take  by mouth.  calcium carbonate (CALTREX) 600 mg calcium (1,500 mg) tablet Take  by mouth.       omega-3 acid ethyl esters (LOVAZA) 1 gram capsule Take  by mouth.      diclofenac EC (VOLTAREN) 75 mg EC tablet Take 1 Tab by mouth two (2) times a day. 60 Tab 2    topiramate (TOPAMAX) 25 mg tablet Take 2 Tabs by mouth nightly. (Patient not taking: Reported on 10/15/2018) 60 Tab 1    meloxicam (MOBIC) 15 mg tablet          No Known Allergies      REVIEW OF SYSTEMS    Constitutional: Negative for fever, chills, or weight change. Respiratory: Negative for cough or shortness of breath. Cardiovascular: Negative for chest pain or palpitations. Gastrointestinal: Negative for acid reflux, change in bowel habits, or constipation. Genitourinary: Negative for dysuria and flank pain. Musculoskeletal: Positive for lumbar pain. Skin: Negative for rash. Neurological: Negative for headaches, dizziness, or numbness. Endo/Heme/Allergies: Negative for increased bruising. Psychiatric/Behavioral: Negative for difficulty with sleep. PHYSICAL EXAMINATION  Visit Vitals    /81    Pulse (!) 44    Temp 97.7 °F (36.5 °C) (Oral)    Resp 16    Ht 5' 5\" (1.651 m)    Wt 149 lb 12.8 oz (67.9 kg)    SpO2 94%    BMI 24.93 kg/m2       Constitutional: Awake, alert, and in no acute distress. Neurological: 1+ symmetrical DTRs in the lower extremities. Sensation to light touch is intact. Skin: warm, dry, and intact. Musculoskeletal: Tenderness to palpation in the lower lumbar region. Moderate pain with extension and axial loading. Improved with forward flexion. Moderate tenderness over the right greater trochanter. No pain with internal or external rotation of her hips. Negative straight leg raise bilaterally.      Hip Flex  Quads Hamstrings Ankle DF EHL Ankle PF   Right +4/5 +4/5 +4/5 +4/5 +4/5 +4/5   Left +4/5 +4/5 +4/5 +4/5 +4/5 +4/5     IMAGING:    Lumbar spine MRI from 05/23/2018 was personally reviewed with the patient and demonstrated:    Results from [place] encounter on 05/23/18   MRI LUMB SPINE WO CONT   Narrative History: Remote placement of [place] rods due to traumatic injury, with  approximately one year of back and right leg pain    Prior studies not available for comparison    PROCEDURE:    Sagittal spin echo T1 and T2 weighted and STIR sequences, and axial spin echo T1  and NASH T2 weighted sequences were obtained of the lumbar spine without  gadolinium. FINDINGS:      There is chronic appearing superior endplate fracture O33 vertebral body with  slight posterior chronic retropulsion but no significant stenosis. No other  evidence of fracture. Asymmetric right-sided degenerative endplate marrow edema  L1/4. No other evidence of marrow edema or neoplastic marrow signal. Extensive  artifact from posterior instrumentation from the visualized lower thoracic level  down to the L2 level. The conus medullaris is located at the L1 level and has a  normal appearance and signal.     Visualized retroperitoneum and sacrum unremarkable. L1/2 level: Dorsal artifact but no evidence of significant stenosis or disc  abnormality. L2/3 level: Extensive dorsal artifact especially on axial sequences. There is  disc bulge and suggestion of moderate to severe canal stenosis with AP diameter  canal 5 mm, limited evaluation. L3/4 level: Degenerative spondylosis asymmetric to the right with disc bulge and  right posterior paracentral disc extrusion extending cephalad in the right  anterior epidural space and extending into the proximal foramen. There is  prominent dorsal epidural fat with AP diameter canal 4 mm with minimal residual  CSF surrounding crowded cauda equina. Severe right lateral recess stenosis and  moderate to severe right-sided subarticular foraminal stenosis with flattening  right foraminal L3 nerve root. L4/5 level: Moderate to severe bilateral facet arthropathy and hypertrophy with  small right-sided facet joint effusion.  Mild disc bulge and prominent dorsal  epidural fat with AP diameter canal 6 mm but with plenty of CSF surrounding  cauda equina, without significant lateral recess stenosis. There is small  synovial cyst along the ventral aspect right-sided facet joint which contributes  to flattening and mild anterior displacement of the right foraminal L4 nerve  root. Mild to moderate degenerative left foraminal stenosis. L5/S1 level: Mild bilateral facet arthropathy with no disc abnormality or  significant stenosis. Impression IMPRESSION:    1. Previous dorsal instrumentation lower thoracic down to the L2 level causing  associated artifact. Chronic appearing superior endplate fracture with mild loss  of height T12 vertebral body. 2. Asymmetric right-sided degenerative spondylosis L3/4 with right posterior and  foraminal disc extrusion with severe canal, severe right lateral recess, and  moderate to severe right foraminal stenosis. 3. Moderate to severe facet arthropathy with small right-sided facet joint  effusion L4/5, with small ventrally located right-sided facet synovial cyst  contributing to flattening and possible impingement of the right foraminal L4  nerve root. Mild canal stenosis. 4. Disc bulge L2/3 with possible moderate to severe canal stenosis at this level  but suboptimal evaluation of the canal at this level due to artifact. Written by Meredith Bourgeois, as dictated by Daniel Herrera MD.  I, Dr. Daniel Herrera confirm that all documentation is accurate.

## 2018-12-18 DIAGNOSIS — M48.062 SPINAL STENOSIS OF LUMBAR REGION WITH NEUROGENIC CLAUDICATION: ICD-10-CM

## 2018-12-18 DIAGNOSIS — M79.2 NEURITIS: ICD-10-CM

## 2018-12-18 RX ORDER — GABAPENTIN 300 MG/1
CAPSULE ORAL
Qty: 90 CAP | Refills: 0 | Status: SHIPPED | OUTPATIENT
Start: 2018-12-18 | End: 2019-02-20

## 2018-12-18 NOTE — TELEPHONE ENCOUNTER
The patient is unable to locate the prescription for Neurontin. Bernadine Acuna, with [facility] reports the patient does not have a prescription/refills on file.      Last Visit: 10/15/2018 with MD Juan Carlos Hassan  Next Appointment: 2019 with MD Juan Carlos Hassan    Requested Prescriptions     Pending Prescriptions Disp Refills    gabapentin (NEURONTIN) 300 mg capsule 90 Cap 0     Si in the am and 2 in the evening

## 2019-02-20 ENCOUNTER — OFFICE VISIT (OUTPATIENT)
Dept: ORTHOPEDIC SURGERY | Age: 74
End: 2019-02-20

## 2019-02-20 VITALS
TEMPERATURE: 97.9 F | HEIGHT: 65 IN | DIASTOLIC BLOOD PRESSURE: 74 MMHG | WEIGHT: 147.8 LBS | HEART RATE: 79 BPM | SYSTOLIC BLOOD PRESSURE: 124 MMHG | OXYGEN SATURATION: 94 % | BODY MASS INDEX: 24.62 KG/M2 | RESPIRATION RATE: 18 BRPM

## 2019-02-20 DIAGNOSIS — M48.061 LUMBAR FORAMINAL STENOSIS: ICD-10-CM

## 2019-02-20 DIAGNOSIS — M79.2 NEURITIS: Primary | ICD-10-CM

## 2019-02-20 DIAGNOSIS — M47.816 LUMBAR FACET ARTHROPATHY: ICD-10-CM

## 2019-02-20 DIAGNOSIS — M48.062 SPINAL STENOSIS OF LUMBAR REGION WITH NEUROGENIC CLAUDICATION: ICD-10-CM

## 2019-02-20 DIAGNOSIS — M54.16 LUMBAR RADICULAR PAIN: ICD-10-CM

## 2019-02-20 RX ORDER — PREGABALIN 75 MG/1
75 CAPSULE ORAL 2 TIMES DAILY
Qty: 28 CAP | Refills: 0 | Status: SHIPPED | OUTPATIENT
Start: 2019-02-20 | End: 2019-04-03 | Stop reason: ALTCHOICE

## 2019-02-20 RX ORDER — METHYLPREDNISOLONE 4 MG/1
TABLET ORAL
Qty: 1 DOSE PACK | Refills: 1 | Status: SHIPPED | OUTPATIENT
Start: 2019-02-20 | End: 2019-04-03 | Stop reason: ALTCHOICE

## 2019-02-20 RX ORDER — GABAPENTIN 300 MG/1
CAPSULE ORAL
Qty: 120 CAP | Refills: 2 | Status: SHIPPED | OUTPATIENT
Start: 2019-02-20 | End: 2019-05-15 | Stop reason: SDUPTHER

## 2019-02-20 NOTE — PROGRESS NOTES
[address] 
[address] Phone: [phone] Fax: [phone] Pt's YOB: 1945 ASSESSMENT Diagnoses and all orders for this visit: 1. Neuritis 
-     gabapentin (NEURONTIN) 300 mg capsule; 1 in the am 1 in the afternoon and 2 in the evening -     pregabalin (LYRICA) 75 mg capsule; Take 1 Cap by mouth two (2) times a day. Max Daily Amount: 150 mg. 
-     SCHEDULE SURGERY 2. Spinal stenosis of lumbar region with neurogenic claudication 
-     gabapentin (NEURONTIN) 300 mg capsule; 1 in the am 1 in the afternoon and 2 in the evening 
-     SCHEDULE SURGERY 3. Lumbar radicular pain 
-     gabapentin (NEURONTIN) 300 mg capsule; 1 in the am 1 in the afternoon and 2 in the evening -     pregabalin (LYRICA) 75 mg capsule; Take 1 Cap by mouth two (2) times a day. Max Daily Amount: 150 mg. 
-     SCHEDULE SURGERY 4. Lumbar foraminal stenosis 
-     gabapentin (NEURONTIN) 300 mg capsule; 1 in the am 1 in the afternoon and 2 in the evening 5. Lumbar facet arthropathy 
-     methylPREDNISolone (MEDROL DOSEPACK) 4 mg tablet; Per dose pack instructions IMPRESSION AND PLAN: 
Myriam Jordan is a 68 y.o. female with history of lumbar pain. She reports increased lower back and right leg pain since her last office visit. Pt also complains of burning/tingling pain in the anterior aspect of the right thigh which wakes her up at night. She reports no relief with Neurontin 300 mg 2 tabs QHS. 1) Pt was given information on lumbar arthritis exercises. 2) She was prescribed a Medrol Dosepak. 3) Pt will increase her Neurontin 300 mg to 1 tab QAM, 1 tab in the afternoon, and 2 tabs QHS to better manage her symptoms. 4) If the Neurontin is not effective she will discontinue the medication and start Lyrica 75 mg 1 tab QAM and 2 tabs QHS, tapering up as directed. 5) Pt was scheduled for a right L2 and right L3 SNRB. 6) Ms. Marisol Harkins has a reminder for a \"due or due soon\" health maintenance. I have asked that she contact her primary care provider, Holden Lee MD, for follow-up on this health maintenance. 7)  demonstrated consistency with prescribing. 8) Pt will follow-up in 1 month or sooner if needed. 9) Recommend surgical evaluation for worsening or persistent symptoms HISTORY OF PRESENT ILLNESS: 
Binu Davenport is a 68 y.o. female with history of lumbar pain and presents to the office today for follow up. She reports increased lower back and right leg pain since her last office visit. Pt also reports burning/tingling pain in the anterior aspect of the right thigh which wakes her up at night. She admits to occasional sleepless nights due to pain. Pt denies any difficulty climbing stairs but admits that she often leans forward when walking. She reports some difficulty transitioning from sit to stand. Pt started Neurontin 300 mg 2 tabs QHS but reports no relief in her pain or tingling with the medication. She admits that she ran out of the Neurontin yesterday. Pt also takes antiinflammatories during the day. Pt did not tolerate Topamax but notes that she has never tried Lyrica. She reports no relief when taking Tylenol and has not recently had oral steroids. Pt at this time desires to proceed with medication evaluation. Of note, she is not diabetic. 30 minutes of face to face contact was spent with the patient during today's office visit extensively discussing her symptoms, medications, injections, therapy, indications for surgery and treatment plan. Pain Scale: 7/10 PCP: Holden Lee MD 
  
Past Medical History:  
Diagnosis Date  Acid reflux  Hypertension Social History Socioeconomic History  Marital status:  Spouse name: Not on file  Number of children: Not on file  Years of education: Not on file  Highest education level: Not on file Social Needs  Financial resource strain: Not on file  Food insecurity - worry: Not on file  Food insecurity - inability: Not on file  Transportation needs - medical: Not on file  Transportation needs - non-medical: Not on file Occupational History  Not on file Tobacco Use  Smoking status: Never Smoker  Smokeless tobacco: Never Used Substance and Sexual Activity  Alcohol use: Yes Alcohol/week: 0.6 oz Types: 1 Glasses of wine per week  Drug use: No  
 Sexual activity: Yes  
  Partners: Male Birth control/protection: None Other Topics Concern  Not on file Social History Narrative  Not on file Current Outpatient Medications Medication Sig Dispense Refill  gabapentin (NEURONTIN) 300 mg capsule 1 in the am 1 in the afternoon and 2 in the evening 120 Cap 2  
 methylPREDNISolone (MEDROL DOSEPACK) 4 mg tablet Per dose pack instructions 1 Dose Pack 1  pregabalin (LYRICA) 75 mg capsule Take 1 Cap by mouth two (2) times a day. Max Daily Amount: 150 mg. 28 Cap 0  
 omeprazole (PRILOSEC) 40 mg capsule  losartan (COZAAR) 50 mg tablet  hydroCHLOROthiazide (HYDRODIURIL) 25 mg tablet Take 25 mg by mouth.  LORazepam (ATIVAN) 1 mg tablet  atorvastatin (LIPITOR) 10 mg tablet Take  by mouth.  MULTIVITAMIN PO Take  by mouth.  calcium carbonate (CALTREX) 600 mg calcium (1,500 mg) tablet Take  by mouth.  omega-3 acid ethyl esters (LOVAZA) 1 gram capsule Take  by mouth.  diclofenac EC (VOLTAREN) 75 mg EC tablet Take 1 Tab by mouth two (2) times a day. 60 Tab 2  
 MAGNESIUM OXIDE PO Take  by Mouth.  topiramate (TOPAMAX) 25 mg tablet Take 2 Tabs by mouth nightly. (Patient not taking: Reported on 10/15/2018) 60 Tab 1  
 meloxicam (MOBIC) 15 mg tablet No Known Allergies REVIEW OF SYSTEMS Constitutional: Negative for fever, chills, or weight change. Respiratory: Negative for cough or shortness of breath. Cardiovascular: Negative for chest pain or palpitations. Gastrointestinal: Negative for acid reflux, change in bowel habits, or constipation. Genitourinary: Negative for dysuria and flank pain. Musculoskeletal: Positive for lumbar pain. Skin: Negative for rash. Neurological: Negative for headaches or dizziness. Positive for numbness in the right thigh. Endo/Heme/Allergies: Negative for increased bruising. Psychiatric/Behavioral: Positive for difficulty with sleep secondary to pain. PHYSICAL EXAMINATION Visit Vitals /74 (BP 1 Location: Left arm, BP Patient Position: Sitting) Pulse 79 Temp 97.9 °F (36.6 °C) (Oral) Resp 18 Ht 5' 5\" (1.651 m) Wt 147 lb 12.8 oz (67 kg) LMP  (Exact Date) SpO2 94% BMI 24.60 kg/m² Constitutional: Awake, alert, and in no acute distress. Neurological: 1+ symmetrical DTRs in the lower extremities. Sensation to light touch is intact. Skin: warm, dry, and intact. Musculoskeletal: Tenderness to palpation in the lower lumbar region. Moderate pain with extension and axial loading. Improved with forward flexion. Tenderness to palpation over the right greater trochanter. No pain with internal or external rotation of her hips. Negative straight leg raise bilaterally. Hip Flex  Quads Hamstrings Ankle DF EHL Ankle PF Right  4/5  4/5 +4/5 +4/5 +4/5 +4/5 Left +4/5 +4/5 +4/5 +4/5 +4/5 +4/5 IMAGING: 
 
Lumbar spine MRI from 05/23/2018 was personally reviewed with the patient and demonstrated:  
Results from [facility] on 05/23/18 MRI LUMB SPINE WO CONT 
  Narrative History: Remote placement of [place] rods due to traumatic injury, with 
approximately one year of back and right leg pain Prior studies not available for comparison PROCEDURE: 
 
Sagittal spin echo T1 and T2 weighted and STIR sequences, and axial spin echo T1 
 and NASH T2 weighted sequences were obtained of the lumbar spine without 
gadolinium. FINDINGS:   
 
There is chronic appearing superior endplate fracture K82 vertebral body with 
slight posterior chronic retropulsion but no significant stenosis. No other 
evidence of fracture. Asymmetric right-sided degenerative endplate marrow edema L3/4. No other evidence of marrow edema or neoplastic marrow signal. Extensive 
artifact from posterior instrumentation from the visualized lower thoracic level 
down to the L2 level. The conus medullaris is located at the L1 level and has a 
normal appearance and signal.  
 
Visualized retroperitoneum and sacrum unremarkable. L1/2 level: Dorsal artifact but no evidence of significant stenosis or disc 
abnormality. L2/3 level: Extensive dorsal artifact especially on axial sequences. There is 
disc bulge and suggestion of moderate to severe canal stenosis with AP diameter 
canal 5 mm, limited evaluation. L3/4 level: Degenerative spondylosis asymmetric to the right with disc bulge and 
right posterior paracentral disc extrusion extending cephalad in the right 
anterior epidural space and extending into the proximal foramen. There is 
prominent dorsal epidural fat with AP diameter canal 4 mm with minimal residual 
CSF surrounding crowded cauda equina. Severe right lateral recess stenosis and 
moderate to severe right-sided subarticular foraminal stenosis with flattening 
right foraminal L3 nerve root. L4/5 level: Moderate to severe bilateral facet arthropathy and hypertrophy with 
small right-sided facet joint effusion. Mild disc bulge and prominent dorsal 
epidural fat with AP diameter canal 6 mm but with plenty of CSF surrounding 
cauda equina, without significant lateral recess stenosis. There is small 
synovial cyst along the ventral aspect right-sided facet joint which contributes 
to flattening and mild anterior displacement of the right foraminal L4 nerve root. Mild to moderate degenerative left foraminal stenosis. L5/S1 level: Mild bilateral facet arthropathy with no disc abnormality or 
significant stenosis.      
  
  Impression IMPRESSION: 
 
1. Previous dorsal instrumentation lower thoracic down to the L2 level causing 
associated artifact. Chronic appearing superior endplate fracture with mild loss 
of height T12 vertebral body. 2. Asymmetric right-sided degenerative spondylosis L3/4 with right posterior and 
foraminal disc extrusion with severe canal, severe right lateral recess, and 
moderate to severe right foraminal stenosis. 3. Moderate to severe facet arthropathy with small right-sided facet joint 
effusion L4/5, with small ventrally located right-sided facet synovial cyst 
contributing to flattening and possible impingement of the right foraminal L4 
nerve root. Mild canal stenosis. 4. Disc bulge L2/3 with possible moderate to severe canal stenosis at this level 
but suboptimal evaluation of the canal at this level due to artifact. Written by Virginia Carmichael MD, [address], as dictated by Jocelyn Ervin MD. 
I, Dr. Jocelyn Ervin confirm that all documentation is accurate.

## 2019-02-20 NOTE — PATIENT INSTRUCTIONS
Low Back Arthritis: Exercises Your Care Instructions Here are some examples of typical rehabilitation exercises for your condition. Start each exercise slowly. Ease off the exercise if you start to have pain. Your doctor or physical therapist will tell you when you can start these exercises and which ones will work best for you. When you are not being active, find a comfortable position for rest. Some people are comfortable on the floor or a medium-firm bed with a small pillow under their head and another under their knees. Some people prefer to lie on their side with a pillow between their knees. Don't stay in one position for too long. Take short walks (10 to 20 minutes) every 2 to 3 hours. Avoid slopes, hills, and stairs until you feel better. Walk only distances you can manage without pain, especially leg pain. How to do the exercises Pelvic tilt 1. Lie on your back with your knees bent. 2. \"Brace\" your stomachtighten your muscles by pulling in and imagining your belly button moving toward your spine. 3. Press your lower back into the floor. You should feel your hips and pelvis rock back. 4. Hold for 6 seconds while breathing smoothly. 5. Relax and allow your pelvis and hips to rock forward. 6. Repeat 8 to 12 times. Back stretches 1. Get down on your hands and knees on the floor. 2. Relax your head and allow it to droop. Round your back up toward the ceiling until you feel a nice stretch in your upper, middle, and lower back. Hold this stretch for as long as it feels comfortable, or about 15 to 30 seconds. 3. Return to the starting position with a flat back while you are on your hands and knees. 4. Let your back sway by pressing your stomach toward the floor. Lift your buttocks toward the ceiling. 5. Hold this position for 15 to 30 seconds. 6. Repeat 2 to 4 times. Follow-up care is a key part of your treatment and safety.  Be sure to make and go to all appointments, and call your doctor if you are having problems. It's also a good idea to know your test results and keep a list of the medicines you take. Where can you learn more? Go to http://maggie-rae.info/. Enter E498 in the search box to learn more about \"Low Back Arthritis: Exercises. \" Current as of: September 20, 2018 Content Version: 11.9 © 0242-1219 [employer]. Care instructions adapted under license by [employer] (which disclaims liability or warranty for this information). If you have questions about a medical condition or this instruction, always ask your healthcare professional. [address] any warranty or liability for your use of this information.

## 2019-03-06 ENCOUNTER — HOSPITAL ENCOUNTER (OUTPATIENT)
Age: 74
Setting detail: OUTPATIENT SURGERY
Discharge: HOME | End: 2019-03-06
Attending: PHYSICAL MEDICINE & REHABILITATION | Admitting: PHYSICAL MEDICINE & REHABILITATION
Payer: MEDICARE

## 2019-03-06 ENCOUNTER — APPOINTMENT (OUTPATIENT)
Dept: GENERAL RADIOLOGY | Age: 74
End: 2019-03-06
Attending: PHYSICAL MEDICINE & REHABILITATION
Payer: MEDICARE

## 2019-03-06 VITALS
HEIGHT: 65 IN | TEMPERATURE: 98 F | DIASTOLIC BLOOD PRESSURE: 72 MMHG | SYSTOLIC BLOOD PRESSURE: 142 MMHG | OXYGEN SATURATION: 99 % | HEART RATE: 85 BPM | RESPIRATION RATE: 16 BRPM | BODY MASS INDEX: 24.49 KG/M2 | WEIGHT: 147 LBS

## 2019-03-06 PROBLEM — M48.062 SPINAL STENOSIS OF LUMBAR REGION WITH NEUROGENIC CLAUDICATION: Status: ACTIVE | Noted: 2019-03-06

## 2019-03-06 PROBLEM — M47.816 LUMBAR FACET ARTHROPATHY: Status: ACTIVE | Noted: 2019-03-06

## 2019-03-06 PROCEDURE — 74011250636 HC RX REV CODE- 250/636: Performed by: PHYSICAL MEDICINE & REHABILITATION

## 2019-03-06 PROCEDURE — 74011250637 HC RX REV CODE- 250/637: Performed by: PHYSICAL MEDICINE & REHABILITATION

## 2019-03-06 PROCEDURE — 77030003676 HC NDL SPN MPRI -A: Performed by: PHYSICAL MEDICINE & REHABILITATION

## 2019-03-06 PROCEDURE — 77030039433 HC TY MYLEOGRAM BD -B: Performed by: PHYSICAL MEDICINE & REHABILITATION

## 2019-03-06 PROCEDURE — 77030003669 HC NDL SPN COOK -B: Performed by: PHYSICAL MEDICINE & REHABILITATION

## 2019-03-06 PROCEDURE — 74011636320 HC RX REV CODE- 636/320: Performed by: PHYSICAL MEDICINE & REHABILITATION

## 2019-03-06 PROCEDURE — 76010000009 HC PAIN MGT 0 TO 30 MIN PROC: Performed by: PHYSICAL MEDICINE & REHABILITATION

## 2019-03-06 RX ORDER — LIDOCAINE HYDROCHLORIDE 10 MG/ML
INJECTION, SOLUTION EPIDURAL; INFILTRATION; INTRACAUDAL; PERINEURAL AS NEEDED
Status: DISCONTINUED | OUTPATIENT
Start: 2019-03-06 | End: 2019-03-06 | Stop reason: HOSPADM

## 2019-03-06 RX ORDER — DEXAMETHASONE SODIUM PHOSPHATE 100 MG/10ML
INJECTION INTRAMUSCULAR; INTRAVENOUS AS NEEDED
Status: DISCONTINUED | OUTPATIENT
Start: 2019-03-06 | End: 2019-03-06 | Stop reason: HOSPADM

## 2019-03-06 RX ORDER — DIAZEPAM 5 MG/1
5-20 TABLET ORAL ONCE
Status: COMPLETED | OUTPATIENT
Start: 2019-03-06 | End: 2019-03-06

## 2019-03-06 RX ADMIN — DIAZEPAM 10 MG: 5 TABLET ORAL at 13:04

## 2019-03-06 NOTE — H&P
Date of Surgery Update:  Melvi Navarro was seen and examined. History and physical has been reviewed. The patient has been examined. There have been no significant clinical changes since the last office visit.       Signed By: Sara Houser MD     March 6, 2019 1:23 PM

## 2019-03-06 NOTE — DISCHARGE INSTRUCTIONS
[facility]   ([place])  Dr. Geoff Ramirez, Dr. Huston Or, Dr. Kathe Mendieta not drive a car, operate heavy machinery or dangerous equipment for 24 hours. * Activity as tolerated; rest for the remainder of the day. * Resume pre-block medications including those for your family doctor. * Do not drink alcoholic beverages for 24 hours. Alcohol and the medications you have received may interact and cause an adverse reaction. * You may feel better this evening and worse tomorrow, as the numbing medications wears off and the steroid has yet to begin to work. After 48 hrs the steroid should begin to release bringing you relief. * You may shower this evening and remove any bandages. * Avoid hot tubs and heating pads for 24 hours. You may use cold packs on the procedure site as tolerated for the first 24 hours. * If a headache develops, drink plenty of fluids and rest.  Take over the counter medications for headache if needed. If the headache continues longer than 24 hours, call MD at the [address] [place] [address]. [phone]    * Continue taking pain medications as needed. * You may resume your regular diet if tolerated. Otherwise, start with sips of water and advance slowly. * If Diabetic: check your blood sugar three times a day for the next 3 days. If your sugar is greater than 300 call your family doctor. If your sugar is greater than 400, have someone transport you to the nearest Emergency Room. * If you experience any of the following problems, Please Call the [address] [place] [address] at [phone].         * Shortness of Breath    * Fever of 101 or higher    * Nausea / Vomiting    * Severe Headache    * Weakness or numbness in arms or legs that is not      resolving    * Prolonged increase in pain greater than 4 days      DISCHARGE SUMMARY from Nurse      PATIENT INSTRUCTIONS:    After oral sedation, for 24 hours or while taking prescription Narcotics:  · Limit your activities  · Do not drive and operate hazardous machinery  · Do not make important personal or business decisions  · Do  not drink alcoholic beverages  · If you have not urinated within 8 hours after discharge, please contact your surgeon on call. Report the following to your surgeon:  · Excessive pain, swelling, redness or odor of or around the surgical area  · Temperature over 101  · Nausea and vomiting lasting longer than 4 hours or if unable to take medications  · Any signs of decreased circulation or nerve impairment to extremity: change in color, persistent  numbness, tingling, coldness or increase pain  · Any questions            What to do at Home:  Recommended activity: Activity as tolerated, NO DRIVING FOR 12 Hours post injection          *  Please give a list of your current medications to your Primary Care Provider. *  Please update this list whenever your medications are discontinued, doses are      changed, or new medications (including over-the-counter products) are added. *  Please carry medication information at all times in case of emergency situations. These are general instructions for a healthy lifestyle:    No smoking/ No tobacco products/ Avoid exposure to second hand smoke    Surgeon General's Warning:  Quitting smoking now greatly reduces serious risk to your health. Obesity, smoking, and sedentary lifestyle greatly increases your risk for illness    A healthy diet, regular physical exercise & weight monitoring are important for maintaining a healthy lifestyle    You may be retaining fluid if you have a history of heart failure or if you experience any of the following symptoms:  Weight gain of 3 pounds or more overnight or 5 pounds in a week, increased swelling in our hands or feet or shortness of breath while lying flat in bed.   Please call your doctor as soon as you notice any of these symptoms; do not wait until your next office visit. Recognize signs and symptoms of STROKE:    F-face looks uneven    A-arms unable to move or move unevenly    S-speech slurred or non-existent    T-time-call 911 as soon as signs and symptoms begin-DO NOT go       Back to bed or wait to see if you get better-TIME IS BRAIN.

## 2019-03-06 NOTE — PROCEDURES
PROCEDURE NOTE      Patient Name: Mike Thomas    Date of Procedure: March 6, 2019    Preoperative Diagnosis:  SPINAL STENOSIS OF LUMBAR REGION WITH NEUROGENIC CLAUDICATION, LUMBAR RADICULOPATHY, NEURITIS    Post Operative Diagnosis:  SPINAL STENOSIS OF LUMBAR REGION WITH NEUROGENIC CLAUDICATION, LUMBAR RADICULOPATHY, NEURITIS    Procedure :    right L2 Selective Nerve Root Block  right L3 Selective Nerve Root Block    Consent:  Informed consent was obtained prior to the procedure. The patient was given the opportunity to ask questions regarding the procedure and its associated risks. In addition to the potential risks associated with the procedure itself, the patient was informed both verbally and in writing of the potential side effects of the use of glucocorticoid. The patient appeared to comprehend the informed consent and desired to have the procedure performed. Procedure: The patient was placed in the prone position on the fluoroscopy table and the back was prepped and draped in the usual sterile manner. The exact spinal level was  identified using fluoroscopy, and Lidocaine 1 % was injected locally, a # 22 gauge spinal needle was passed to the transverse process. The depth was noted and the needle redirected to pass inferior and approximately one cm anterior to the transverse process. YES  1 cc of Isovue M-200 was used to verify positioning in the epidural and paravertebral space and outlined the course of the spinal nerve into the epidural space. The same procedure was repeated at each spinal level indicated above. A total of 30 mg of preservative free Dexamethasone and 5 cc of Lidocaine was slowly injected. The patient tolerated the procedure well. The injection area was cleaned and bandaids applied. Not excessive bleeding was noted. Patient dressed and discharged to home with instructions. Discussion: The patient tolerated the procedure well. Severino Cui MD  March 6, 2019

## 2019-04-03 ENCOUNTER — OFFICE VISIT (OUTPATIENT)
Dept: ORTHOPEDIC SURGERY | Age: 74
End: 2019-04-03

## 2019-04-03 VITALS
HEIGHT: 65 IN | WEIGHT: 147.8 LBS | DIASTOLIC BLOOD PRESSURE: 80 MMHG | SYSTOLIC BLOOD PRESSURE: 144 MMHG | OXYGEN SATURATION: 96 % | TEMPERATURE: 97.7 F | BODY MASS INDEX: 24.62 KG/M2 | HEART RATE: 84 BPM | RESPIRATION RATE: 17 BRPM

## 2019-04-03 DIAGNOSIS — M48.062 SPINAL STENOSIS OF LUMBAR REGION WITH NEUROGENIC CLAUDICATION: Primary | ICD-10-CM

## 2019-04-03 DIAGNOSIS — M54.16 LUMBAR RADICULAR PAIN: ICD-10-CM

## 2019-04-03 DIAGNOSIS — M47.816 LUMBAR FACET ARTHROPATHY: ICD-10-CM

## 2019-04-03 DIAGNOSIS — M48.061 LUMBAR FORAMINAL STENOSIS: ICD-10-CM

## 2019-04-03 RX ORDER — METHYLPREDNISOLONE 4 MG/1
TABLET ORAL
Qty: 1 DOSE PACK | Refills: 0 | Status: SHIPPED | OUTPATIENT
Start: 2019-04-03 | End: 2019-05-15 | Stop reason: ALTCHOICE

## 2019-04-03 NOTE — PROGRESS NOTES
[address] 
[address] [place], [address] [place] [address] Street Phone: [phone] Fax: [phone] Pt's YOB: 1945 ASSESSMENT Diagnoses and all orders for this visit: 1. Spinal stenosis of lumbar region with neurogenic claudication 
-     REFERRAL TO PHYSICAL THERAPY 2. Lumbar radicular pain 
-     REFERRAL TO PHYSICAL THERAPY 3. Lumbar foraminal stenosis -     REFERRAL TO PHYSICAL THERAPY 4. Lumbar facet arthropathy 
-     methylPREDNISolone (MEDROL DOSEPACK) 4 mg tablet; Per dose pack instructions 
-     REFERRAL TO PHYSICAL THERAPY IMPRESSION AND PLAN: 
Fanny Pulido is a 68 y.o. female with history of lumbar pain. Pt complains of burning/tingling pain in the anterior aspect of the right thigh which wakes her up at night. She notes relief lasting only 1 day with a right L2 and right L3 SNRB on 03/06/2019. Pt reports minimal relief with Lyrica 75 mg and restarted Neurontin 300 mg and is taking 1 tab QAM, 1 tab in the afternoon, and 2-3 tabs QHS. She reports significant with a Medrol Dosepak. 1) Pt was given information on lumbar arthritis exercises. 2) I recommended the Pt try water exercise. 3) She was prescribed a Medrol Dosepak. 4) Pt was referred to [facility] physical therapy. 5) Ms. Yevgeniy Portillo has a reminder for a \"due or due soon\" health maintenance. I have asked that she contact her primary care provider, Philippe Meza MD, for follow-up on this health maintenance. 6)  demonstrated consistency with prescribing. 7) Pt will follow-up in 6 weeks or sooner if needed. HISTORY OF PRESENT ILLNESS: 
Fanny Pulido is a 68 y.o. female with history of lumbar pain and presents to the office today for follow up. Pt complains of burning/tingling pain in the anterior aspect of the right thigh which wakes her up at night.  Pt admits to some weakness in the legs when climbing stairs. Her symptoms are worse when she is more active and by the end of the day. She notes relief lasting only 1 day with a right L2 and right L3 SNRB on 03/06/2019. Pt started Lyrica 75 mg since her last office visit but notes minimal improvement with the medication. She notes that a refill of the Lyrica would cost her $270+ so she discontinued the medication. Pt restarted Neurontin 300 mg and is taking 1 tab QAM, 1 tab in the afternoon, and 2-3 tabs QHS. She notes significant relief with a Medrol Dosepak. Pt at this time desires to proceed with aqua physical therapy. Pain Scale: 7/10 PCP: Moraima Braswell MD 
  
Past Medical History:  
Diagnosis Date  Acid reflux  Hypertension Social History Socioeconomic History  Marital status:  Spouse name: Not on file  Number of children: Not on file  Years of education: Not on file  Highest education level: Not on file Occupational History  Not on file Social Needs  Financial resource strain: Not on file  Food insecurity:  
  Worry: Not on file Inability: Not on file  Transportation needs:  
  Medical: Not on file Non-medical: Not on file Tobacco Use  Smoking status: Never Smoker  Smokeless tobacco: Never Used Substance and Sexual Activity  Alcohol use: Yes Alcohol/week: 0.6 oz Types: 1 Glasses of wine per week  Drug use: No  
 Sexual activity: Yes  
  Partners: Male Birth control/protection: None Lifestyle  Physical activity:  
  Days per week: Not on file Minutes per session: Not on file  Stress: Not on file Relationships  Social connections:  
  Talks on phone: Not on file Gets together: Not on file Attends Advent service: Not on file Active member of club or organization: Not on file Attends meetings of clubs or organizations: Not on file Relationship status: Not on file  Intimate partner violence: Fear of current or ex partner: Not on file Emotionally abused: Not on file Physically abused: Not on file Forced sexual activity: Not on file Other Topics Concern  Not on file Social History Narrative  Not on file Current Outpatient Medications Medication Sig Dispense Refill  methylPREDNISolone (MEDROL DOSEPACK) 4 mg tablet Per dose pack instructions 1 Dose Pack 0  
 MAGNESIUM OXIDE PO Take  by Mouth.  gabapentin (NEURONTIN) 300 mg capsule 1 in the am 1 in the afternoon and 2 in the evening 120 Cap 2  
 topiramate (TOPAMAX) 25 mg tablet Take 2 Tabs by mouth nightly. 60 Tab 1  
 omeprazole (PRILOSEC) 40 mg capsule  losartan (COZAAR) 50 mg tablet  hydroCHLOROthiazide (HYDRODIURIL) 25 mg tablet Take 25 mg by mouth.  LORazepam (ATIVAN) 1 mg tablet  atorvastatin (LIPITOR) 10 mg tablet Take  by mouth.  MULTIVITAMIN PO Take  by mouth.  calcium carbonate (CALTREX) 600 mg calcium (1,500 mg) tablet Take  by mouth.  meloxicam (MOBIC) 15 mg tablet  omega-3 acid ethyl esters (LOVAZA) 1 gram capsule Take  by mouth.  diclofenac EC (VOLTAREN) 75 mg EC tablet Take 1 Tab by mouth two (2) times a day. 60 Tab 2 No Known Allergies REVIEW OF SYSTEMS Constitutional: Negative for fever, chills, or weight change. Respiratory: Negative for cough or shortness of breath. Cardiovascular: Negative for chest pain or palpitations. Gastrointestinal: Negative for acid reflux, change in bowel habits, or constipation. Genitourinary: Negative for dysuria and flank pain. Musculoskeletal: Positive for lumbar pain. Skin: Negative for rash. Neurological: Negative for headaches or dizziness. Positive for numbness in the right leg. Endo/Heme/Allergies: Negative for increased bruising. Psychiatric/Behavioral: Negative for difficulty with sleep. PHYSICAL EXAMINATION Visit Vitals /80 Pulse 84 Temp 97.7 °F (36.5 °C) (Oral) Resp 17 Ht 5' 5\" (1.651 m) Wt 147 lb 12.8 oz (67 kg) SpO2 96% BMI 24.60 kg/m² Constitutional: Awake, alert, and in no acute distress. Neurological: 1+ symmetrical DTRs in the lower extremities. Sensation to light touch is intact. Skin: warm, dry, and intact. Musculoskeletal: Tenderness to palpation in the lower lumbar region. Moderate pain with extension and axial loading. Improved with forward flexion. No tenderness to palpation over the greater trochanter bilaterally. No pain with internal or external rotation of her hips. Mild decreased range of motion with internal rotation of the left hip. Negative straight leg raise bilaterally. Hip Flex  Quads Hamstrings Ankle DF EHL Ankle PF Right +4/5 +4/5 +4/5 +4/5 +4/5 +4/5 Left +4/5 +4/5 +4/5 +4/5 +4/5 +4/5 IMAGING: 
 
Lumbar spine MRI from 05/23/2018 was personally reviewed with the patient and demonstrated:  
Results from [facility] on 05/23/18 MRI LUMB SPINE WO CONT 
  Narrative History: Remote placement of Moreland rods due to traumatic injury, with 
approximately one year of back and right leg pain Prior studies not available for comparison PROCEDURE: 
 
Sagittal spin echo T1 and T2 weighted and STIR sequences, and axial spin echo T1 
and NASH T2 weighted sequences were obtained of the lumbar spine without 
gadolinium. FINDINGS:   
 
There is chronic appearing superior endplate fracture L08 vertebral body with 
slight posterior chronic retropulsion but no significant stenosis. No other 
evidence of fracture. Asymmetric right-sided degenerative endplate marrow edema L3/4. No other evidence of marrow edema or neoplastic marrow signal. Extensive 
artifact from posterior instrumentation from the visualized lower thoracic level 
down to the L2 level. The conus medullaris is located at the L1 level and has a 
normal appearance and signal.  
 
Visualized retroperitoneum and sacrum unremarkable. L1/2 level: Dorsal artifact but no evidence of significant stenosis or disc 
abnormality. L2/3 level: Extensive dorsal artifact especially on axial sequences. There is 
disc bulge and suggestion of moderate to severe canal stenosis with AP diameter 
canal 5 mm, limited evaluation. L3/4 level: Degenerative spondylosis asymmetric to the right with disc bulge and 
right posterior paracentral disc extrusion extending cephalad in the right 
anterior epidural space and extending into the proximal foramen. There is 
prominent dorsal epidural fat with AP diameter canal 4 mm with minimal residual 
CSF surrounding crowded cauda equina. Severe right lateral recess stenosis and 
moderate to severe right-sided subarticular foraminal stenosis with flattening 
right foraminal L3 nerve root. L4/5 level: Moderate to severe bilateral facet arthropathy and hypertrophy with 
small right-sided facet joint effusion. Mild disc bulge and prominent dorsal 
epidural fat with AP diameter canal 6 mm but with plenty of CSF surrounding 
cauda equina, without significant lateral recess stenosis. There is small 
synovial cyst along the ventral aspect right-sided facet joint which contributes 
to flattening and mild anterior displacement of the right foraminal L4 nerve 
root. Mild to moderate degenerative left foraminal stenosis. L5/S1 level: Mild bilateral facet arthropathy with no disc abnormality or 
significant stenosis.      
  
  Impression IMPRESSION: 
 
1. Previous dorsal instrumentation lower thoracic down to the L2 level causing 
associated artifact. Chronic appearing superior endplate fracture with mild loss 
of height T12 vertebral body. 2. Asymmetric right-sided degenerative spondylosis L3/4 with right posterior and 
foraminal disc extrusion with severe canal, severe right lateral recess, and 
moderate to severe right foraminal stenosis. 3. Moderate to severe facet arthropathy with small right-sided facet joint effusion L4/5, with small ventrally located right-sided facet synovial cyst 
contributing to flattening and possible impingement of the right foraminal L4 
nerve root. Mild canal stenosis. 4. Disc bulge L2/3 with possible moderate to severe canal stenosis at this level 
but suboptimal evaluation of the canal at this level due to artifact.  
  
 
Written by Fer Santana, as dictated by Bella Pulido MD. 
I, Dr. Bella Pulido confirm that all documentation is accurate.

## 2019-04-03 NOTE — LETTER
4/8/19 Patient: Jaelyn March YOB: 1945 Date of Visit: 4/3/2019 Nahum Hope MD 
[address] [facility] [address] [facility] [address] [id number] [place] [address] VIA Facsimile: [phone] Dear Nahum Hope MD, Thank you for referring Ms. Christy Geller to [address] [facility] for evaluation. My notes for this consultation are attached. If you have questions, please do not hesitate to call me. I look forward to following your patient along with you. Sincerely, Sherron Banuelos MD

## 2019-04-03 NOTE — PATIENT INSTRUCTIONS
Low Back Arthritis: Exercises Your Care Instructions Here are some examples of typical rehabilitation exercises for your condition. Start each exercise slowly. Ease off the exercise if you start to have pain. Your doctor or physical therapist will tell you when you can start these exercises and which ones will work best for you. When you are not being active, find a comfortable position for rest. Some people are comfortable on the floor or a medium-firm bed with a small pillow under their head and another under their knees. Some people prefer to lie on their side with a pillow between their knees. Don't stay in one position for too long. Take short walks (10 to 20 minutes) every 2 to 3 hours. Avoid slopes, hills, and stairs until you feel better. Walk only distances you can manage without pain, especially leg pain. How to do the exercises Pelvic tilt 1. Lie on your back with your knees bent. 2. \"Brace\" your stomachtighten your muscles by pulling in and imagining your belly button moving toward your spine. 3. Press your lower back into the floor. You should feel your hips and pelvis rock back. 4. Hold for 6 seconds while breathing smoothly. 5. Relax and allow your pelvis and hips to rock forward. 6. Repeat 8 to 12 times. Back stretches 1. Get down on your hands and knees on the floor. 2. Relax your head and allow it to droop. Round your back up toward the ceiling until you feel a nice stretch in your upper, middle, and lower back. Hold this stretch for as long as it feels comfortable, or about 15 to 30 seconds. 3. Return to the starting position with a flat back while you are on your hands and knees. 4. Let your back sway by pressing your stomach toward the floor. Lift your buttocks toward the ceiling. 5. Hold this position for 15 to 30 seconds. 6. Repeat 2 to 4 times. Follow-up care is a key part of your treatment and safety.  Be sure to make and go to all appointments, and call your doctor if you are having problems. It's also a good idea to know your test results and keep a list of the medicines you take. Where can you learn more? Go to http://maggie-rae.info/. Enter X620 in the search box to learn more about \"Low Back Arthritis: Exercises. \" Current as of: September 20, 2018 Content Version: 11.9 © 5940-1532 [employer]. Care instructions adapted under license by [facility] (which disclaims liability or warranty for this information). If you have questions about a medical condition or this instruction, always ask your healthcare professional. [address] any warranty or liability for your use of this information.

## 2019-04-10 ENCOUNTER — HOSPITAL ENCOUNTER (OUTPATIENT)
Dept: PHYSICAL THERAPY | Age: 74
Discharge: HOME | End: 2019-04-10
Payer: MEDICARE

## 2019-04-10 PROCEDURE — 97162 PT EVAL MOD COMPLEX 30 MIN: CPT

## 2019-04-10 PROCEDURE — 97110 THERAPEUTIC EXERCISES: CPT

## 2019-04-10 NOTE — PROGRESS NOTES
PT DAILY TREATMENT NOTE 10-18    Patient Name: Camille Grossman  Date:4/10/2019  : 1945  [x]  Patient  Verified  Payor: [place] MEDICARE / Plan: [place] MEDICARE PART A & B / Product Type: Medicare /    In time:9:51am  Out time:10:30am  Total Treatment Time (min): 39  Visit #: 1 of 16    Medicare/BCBS Only   Total Timed Codes (min):  39 1:1 Treatment Time:  44       Treatment Area: Spinal stenosis, lumbar region with neurogenic claudication [M48.062]  Radiculopathy, lumbar region [M54.16]  Other biomechanical lesions of lumbar region [M99.83]  Spondylosis without myelopathy or radiculopathy, lumbar region [M47.816]  Neuralgia and neuritis, unspecified [M79.2]    SUBJECTIVE  Pain Level (0-10 scale): 10  Any medication changes, allergies to medications, adverse drug reactions, diagnosis change, or new procedure performed?: [x] No    [] Yes (see summary sheet for update)  Subjective functional status/changes:   [] No changes reported      OBJECTIVE    29 min [x]Eval                  []Re-Eval       10 min Therapeutic Exercise:  [] See flow sheet : HEP   Rationale: increase ROM and increase strength to improve the patients ability to perform ADLs and functional mobility tasks with improved ease and decreased pain. With   [] TE   [] TA   [] neuro   [] other: Patient Education: [x] Review HEP    [] Progressed/Changed HEP based on:   [] positioning   [] body mechanics   [] transfers   [] heat/ice application    [] other:      Other Objective/Functional Measures:      Pain Level (0-10 scale) post treatment: 5/10    ASSESSMENT/Changes in Function:     Patient will continue to benefit from skilled PT services to modify and progress therapeutic interventions, address functional mobility deficits, address ROM deficits, address strength deficits, analyze and cue movement patterns, analyze and modify body mechanics/ergonomics and assess and modify postural abnormalities to attain remaining goals.      [x]  See Plan of Care  []  See progress note/recertification  []  See Discharge Summary         Progress towards goals / Updated goals:  Per POC    PLAN  []  Upgrade activities as tolerated     []  Continue plan of care  []  Update interventions per flow sheet       []  Discharge due to:_  []  Other:_      Bakari Yan, PT 4/10/2019  1:55 PM    Future Appointments   Date Time Provider Sean Kaur   4/30/2019 12:45 PM Chuyita Cortez, PT [facility]   5/2/2019  9:45 AM Chuyita Cortez, PT [facility]   5/7/2019  9:45 AM Chuyita Cortez, PT [facility]   5/9/2019 11:15 AM Chuyita Cortez, PT [facility]   5/14/2019 10:30 AM Chuyita Cortez, PT [facility]   5/15/2019  9:15 AM Solange Pimentel  [address]   5/16/2019 10:30 AM Chuyita Cortez, PT [facility]   5/21/2019 10:30 AM Chuyita Cortez, PT [facility]

## 2019-04-10 NOTE — PROGRESS NOTES
[facility]  [address] [facility] [address]  [place], [address]  [phone] fax    Plan of Care/ Statement of Necessity for Physical Therapy Services    Patient name: Rashid Wright Start of Care: 4/10/2019   Referral source: Mary Saldana MD : 1945    Medical Diagnosis: Spinal stenosis, lumbar region with neurogenic claudication [M48.062]  Radiculopathy, lumbar region [M54.16]  Other biomechanical lesions of lumbar region [M99.83]  Spondylosis without myelopathy or radiculopathy, lumbar region [M47.816]  Neuralgia and neuritis, unspecified [M79.2]  Payor: [place] MEDICARE / Plan: VA MEDICARE PART A & B / Product Type: Medicare /  Onset Date:2 years ago    Treatment Diagnosis: LB pain   Prior Hospitalization: see medical history Provider#: [id number]   Medications: Verified on Patient summary List    Comorbidities: arthritis, HTN, back surgery 30 years ago   Prior Level of Function: Able to perform all ADLs and functional mobility tasks without pain. The Plan of Care and following information is based on the information from the initial evaluation. Assessment/ key information: Pt is a 67 y/o female who presents with c/o right LB pain and right lateral thigh pain. She states that approximately 2 years ago she started having \"aching\" in her back that felt like a pulled muscle and this has progressively become worse since that time. Pt states that 30 years ago, she fell and \"broke\" her back and had surgery (Moreland rods put in) and \"I was told that I'd have arthritis in my back later in life\". Pt has not had any PT treatment for her back. She had a MRI approximately one year ago and was told she has arthritis in her spine. She had a nerve block a few weeks ago and states \"it did no good\". Pt lives with her spouse in a 2 story home (bedroom/bathroom on second level) but states she doesn't have difficulty with stairs.   She states she doesn't feel \"limited\" in doing things because of her pain, but states \"I used to be able to do things without pain\". Pt presents with decreased trunk flexion, decreased strength, occasional difficulty with transfers and limited activity tolerance. Pt would benefit from skilled PT to address the aforementioned impairments. Plan to begin with aquatic therapy and transition to land-based PT if/when appropriate. Evaluation Complexity History MEDIUM  Complexity : 1-2 comorbidities / personal factors will impact the outcome/ POC ; Examination MEDIUM Complexity : 3 Standardized tests and measures addressing body structure, function, activity limitation and / or participation in recreation  ;Presentation LOW Complexity : Stable, uncomplicated  ;Clinical Decision Making MEDIUM Complexity : FOTO score of 26-74  Overall Complexity Rating: MEDIUM  Problem List: pain affecting function, decrease ROM, decrease strength, decrease ADL/ functional abilitiies, decrease activity tolerance, decrease flexibility/ joint mobility and decrease transfer abilities   Treatment Plan may include any combination of the following: Therapeutic exercise, Therapeutic activities, Neuromuscular re-education, Physical agent/modality, Manual therapy, Aquatic therapy, Patient education and Functional mobility training  Patient / Family readiness to learn indicated by: interest  Persons(s) to be included in education: patient (P)  Barriers to Learning/Limitations: None  Patient Goal (s): less discomfort  Patient Self Reported Health Status: fair  Rehabilitation Potential: good    Short Term Goals: To be accomplished in 2 weeks:   1. Pt will be independent and compliant with HEP to maximize therapeutic benefit. 2. Pt will tolerate at least 30 minutes of aquatic exercise void of pain increase, to improve activity tolerance. Long Term Goals: To be accomplished in 8 weeks:   1.  Pt will have improved FOTO score to 68 or greater, to indicate improved functional activity tolerance. 2. Pt will demonstrate increased B LE strength by 1/2-1 MMT grade grossly throughout to improve standing activity tolerance. 3. Pt will perform 10 sit <-> stand transfers in 30 seconds without UE assist, to indicate improved LE strength. 4. Pt will report having no difficulty performing usual housework duties. Frequency / Duration: Patient to be seen 2 times per week for 8 weeks. Patient/ Caregiver education and instruction: Diagnosis, prognosis, exercises   [x]  Plan of care has been reviewed with PTA    Certification Period: 4/10/19 - 6/9/19    Mercedez Kimball, PT 4/10/2019 2:37 PM    ________________________________________________________________________    I certify that the above Therapy Services are being furnished while the patient is under my care. I agree with the treatment plan and certify that this therapy is necessary.     [de-identified] Signature:____________________  Date:____________Time: _________    Please sign and return to [facility]  [address]  [place], [address]  [phone] fax

## 2019-04-30 ENCOUNTER — HOSPITAL ENCOUNTER (OUTPATIENT)
Dept: PHYSICAL THERAPY | Age: 74
Discharge: HOME | End: 2019-04-30
Payer: MEDICARE

## 2019-04-30 PROCEDURE — 97113 AQUATIC THERAPY/EXERCISES: CPT

## 2019-04-30 NOTE — PROGRESS NOTES
PT DAILY TREATMENT NOTE 10-18    Patient Name: Ayaka Mejía   Date:2019  : 1945  [x]  Patient  Verified  Payor: [place] MEDICARE / Plan: [place] MEDICARE PART A & B / Product Type: Medicare /    In time:12:45pm  Out time:1:29pm  Total Treatment Time (min): 44  Visit #: 2 of 16    Medicare/BCBS Only   Total Timed Codes (min):  44 1:1 Treatment Time:  44       Treatment Area: Low back pain [M54.5]    SUBJECTIVE  Pain Level (0-10 scale): 0/10  Any medication changes, allergies to medications, adverse drug reactions, diagnosis change, or new procedure performed?: [x] No    [] Yes (see summary sheet for update)  Subjective functional status/changes:   [] No changes reported  Pt states she's \"feeling good\" and has been doing her HEP daily and has walked with her  3 days (approximately 2 miles). She states, \"It's not really my back it's more my right leg\". OBJECTIVE      44 min Therapeutic Exercise:  [x] See flow sheet : Aquatic therapy    Rationale: increase ROM and increase strength to improve the patients ability to perform ADLs and functional mobility tasks with improved ease and decreased pain. With   [] TE   [] TA   [] neuro   [] other: Patient Education: [x] Review HEP    [] Progressed/Changed HEP based on:   [] positioning   [] body mechanics   [] transfers   [] heat/ice application    [] other:      Other Objective/Functional Measures: Initiated aquatic exercises (first f/u visit). Pain Level (0-10 scale) post treatment: 0/10    ASSESSMENT/Changes in Function: Pt tolerated first aquatic session well. Required max VCs for proper form/postural awareness throughout all exercises.       Patient will continue to benefit from skilled PT services to modify and progress therapeutic interventions, address functional mobility deficits, address ROM deficits, address strength deficits, analyze and cue movement patterns, analyze and modify body mechanics/ergonomics and assess and modify postural abnormalities to attain remaining goals. []  See Plan of Care  []  See progress note/recertification   []  See Discharge Summary         Progress towards goals / Updated goals:  Short Term Goals: To be accomplished in 2 weeks:              1. Pt will be independent and compliant with HEP to maximize therapeutic benefit. Goal met: Pt reports daily compliance with HEP. (4/30/19). 2. Pt will tolerate at least 30 minutes of aquatic exercise void of pain increase, to improve activity tolerance. Long Term Goals: To be accomplished in 8 weeks:              1. Pt will have improved FOTO score to 68 or greater, to indicate improved functional activity tolerance. 2. Pt will demonstrate increased B LE strength by 1/2-1 MMT grade grossly throughout to improve standing activity tolerance. 3. Pt will perform 10 sit <-> stand transfers in 30 seconds without UE assist, to indicate improved LE strength. 4. Pt will report having no difficulty performing usual housework duties.          PLAN  []  Upgrade activities as tolerated     [x]  Continue plan of care  []  Update interventions per flow sheet       []  Discharge due to:_  []  Other:_      Carrie Contreras, PT 4/30/2019  12:57 PM    Future Appointments   Date Time Provider Sean Kaur   5/2/2019  9:45 AM Odelia Claude, PT [facility]   5/7/2019  9:45 AM Odelia Claude, PT [facility]   5/9/2019 11:15 AM Odelia Claude, PT [facility]   5/15/2019  9:15 AM Alejandra Perez  [address]   5/16/2019 10:30 AM Odelia Claude, PT [facility]   5/21/2019 10:30 AM Odelia Claude, PT [facility]   5/23/2019 10:30 AM Odelia Claude, PT [facility]

## 2019-05-02 ENCOUNTER — HOSPITAL ENCOUNTER (OUTPATIENT)
Dept: PHYSICAL THERAPY | Age: 74
Discharge: HOME | End: 2019-05-02
Payer: MEDICARE

## 2019-05-02 PROCEDURE — 97113 AQUATIC THERAPY/EXERCISES: CPT

## 2019-05-02 NOTE — PROGRESS NOTES
PT DAILY TREATMENT NOTE 10-18    Patient Name: Cayetano Alves  Date:2019  : 1945  [x]  Patient  Verified  Payor: [place] MEDICARE / Plan: VA MEDICARE PART A & B / Product Type: Medicare /    In time:9:45am  Out time:10:30am  Total Treatment Time (min): 45  Visit #: 3 of 16    Medicare/BCBS Only   Total Timed Codes (min):  45 1:1 Treatment Time:  45       Treatment Area: Low back pain [M54.5]    SUBJECTIVE  Pain Level (0-10 scale): 5/10  Any medication changes, allergies to medications, adverse drug reactions, diagnosis change, or new procedure performed?: [x] No    [] Yes (see summary sheet for update)  Subjective functional status/changes:   [] No changes reported  Pt states, \"I feel stiff in the mornings but it feels better once I move around\". She states she continues to be compliant with HEP and states, \"I'm trying to walk more too\". OBJECTIVE      45 min Therapeutic Exercise:  [x] See flow sheet : Aquatic therapy    Rationale: increase ROM and increase strength to improve the patients ability to perform ADLs and functional mobility tasks with improved ease and decreased pain. With   [] TE   [] TA   [] neuro   [] other: Patient Education: [x] Review HEP    [] Progressed/Changed HEP based on:   [] positioning   [] body mechanics   [] transfers   [] heat/ice application    [] other:      Other Objective/Functional Measures: Added LE circles (CW/CCW) and forward step ups. Pain Level (0-10 scale) post treatment: 0/10    ASSESSMENT/Changes in Function: Pt reports no pain during or following treatment and reports feeling \"less stiff\" following aquatic exercises. Pt requires max VCs and demonstration for each exercise and has difficulty maintaining proper form with each exercise.       Patient will continue to benefit from skilled PT services to modify and progress therapeutic interventions, address functional mobility deficits, address ROM deficits, address strength deficits, analyze and cue movement patterns, analyze and modify body mechanics/ergonomics and assess and modify postural abnormalities to attain remaining goals. []  See Plan of Care  []  See progress note/recertification  []  See Discharge Summary         Progress towards goals / Updated goals:  Short Term Goals: To be accomplished in 2 weeks:              1. Pt will be independent and compliant with HEP to maximize therapeutic benefit.  Goal met: Pt reports daily compliance with HEP. (4/30/19).             2. Pt will tolerate at least 30 minutes of aquatic exercise void of pain increase, to improve activity tolerance. Goal met: Pt reports no pain during or following aquatic exercises and is able to tolerate 45 minutes of exercise without pain. (5/2/19). Long Term Goals: To be accomplished in 8 weeks:              1.  Pt will have improved FOTO score to 68 or greater, to indicate improved functional activity tolerance.              2. Pt will demonstrate increased B LE strength by 1/2-1 MMT grade grossly throughout to improve standing activity tolerance.               3. Pt will perform 10 sit <-> stand transfers in 30 seconds without UE assist, to indicate improved LE strength.              4. Pt will report having no difficulty performing usual housework duties.          PLAN  [x]  Upgrade activities as tolerated     [x]  Continue plan of care  []  Update interventions per flow sheet       []  Discharge due to:_  []  Other:_      Curry Wang, PT 5/2/2019  10:36 AM    Future Appointments   Date Time Provider Sean Kaur   5/7/2019  9:45 AM [facility]   5/9/2019 11:15 AM Shemar Chong PT [facility]   5/15/2019  9:15 AM Easton Sparrow  [address]   5/16/2019 10:30 AM Shemar Chong PT [facility]   5/21/2019 10:30 AM Shemar Chong PT [facility]   5/23/2019 10:30 AM [facility]

## 2019-05-07 ENCOUNTER — HOSPITAL ENCOUNTER (OUTPATIENT)
Dept: PHYSICAL THERAPY | Age: 74
Discharge: HOME | End: 2019-05-07
Payer: MEDICARE

## 2019-05-07 PROCEDURE — 97113 AQUATIC THERAPY/EXERCISES: CPT

## 2019-05-07 NOTE — PROGRESS NOTES
PT DAILY TREATMENT NOTE 10-18    Patient Name: Jaelyn March  Date:2019  : 1945  [x]  Patient  Verified  Payor: VA MEDICARE / Plan: [place] MEDICARE PART A & B / Product Type: Medicare /    In time:9:45am  Out time:10:30am  Total Treatment Time (min): 45  Visit #: 4 of 16    Medicare/BCBS Only   Total Timed Codes (min):  45 1:1 Treatment Time:  45       Treatment Area: Low back pain [M54.5]    SUBJECTIVE  Pain Level (0-10 scale): 5/10  Any medication changes, allergies to medications, adverse drug reactions, diagnosis change, or new procedure performed?: [x] No    [] Yes (see summary sheet for update)  Subjective functional status/changes:   [] No changes reported  Pt states that she had pain since Thursday after PT and had  \"rough weekend\". \"I think I had a stomach bug too so maybe that was why I had pain\". OBJECTIVE      45 min Therapeutic Exercise:  [x] See flow sheet : Aquatic therapy    Rationale: increase ROM and increase strength to improve the patients ability to perform ADLs and functional mobility tasks with improved ease and decreased pain. With   [] TE   [] TA   [] neuro   [] other: Patient Education: [x] Review HEP    [] Progressed/Changed HEP based on:   [] positioning   [] body mechanics   [] transfers   [] heat/ice application    [] other:      Other Objective/Functional Measures: Increased bilateral forward step ups to 10 reps. Pain Level (0-10 scale) post treatment: 0/10    ASSESSMENT/Changes in Function: Pt reports no pain during or immediately following treatment session. Patient will continue to benefit from skilled PT services to modify and progress therapeutic interventions, address functional mobility deficits, address ROM deficits, address strength deficits, analyze and cue movement patterns, analyze and modify body mechanics/ergonomics and assess and modify postural abnormalities to attain remaining goals.      []  See Plan of Care  []  See progress note/recertification  []  See Discharge Summary         Progress towards goals / Updated goals:  Short Term Goals: To be accomplished in 2 weeks:              0. Pt will be independent and compliant with HEP to maximize therapeutic benefit.  Goal met: Pt reports daily compliance with HEP. (4/30/19).                 2. Pt will tolerate at least 30 minutes of aquatic exercise void of pain increase, to improve activity tolerance. Goal met: Pt reports no pain during or following aquatic exercises and is able to tolerate 45 minutes of exercise without pain. (5/2/19). Long Term Goals: To be accomplished in 8 weeks:              1.  Pt will have improved FOTO score to 68 or greater, to indicate improved functional activity tolerance.              2. Pt will demonstrate increased B LE strength by 1/2-1 MMT grade grossly throughout to improve standing activity tolerance.               3. Pt will perform 10 sit <-> stand transfers in 30 seconds without UE assist, to indicate improved LE strength.              4. Pt will report having no difficulty performing usual housework duties.         PLAN  []  Upgrade activities as tolerated     [x]  Continue plan of care  []  Update interventions per flow sheet       []  Discharge due to:_  []  Other:_      Meri Bonner, PT 5/7/2019  9:49 AM    Future Appointments   Date Time Provider Sean Kaur   5/9/2019 11:15 AM Des Ellis PT [facility]   5/15/2019  9:15 AM Lavern Kovacs  [address]   5/16/2019 10:30 AM Des Ellis PT [facility]   5/21/2019 10:30 AM Des Ellis PT [facility]   5/23/2019 10:30 AM Des Ellis, PT [facility]

## 2019-05-09 ENCOUNTER — HOSPITAL ENCOUNTER (OUTPATIENT)
Dept: PHYSICAL THERAPY | Age: 74
Discharge: HOME | End: 2019-05-09
Payer: MEDICARE

## 2019-05-09 PROCEDURE — 97113 AQUATIC THERAPY/EXERCISES: CPT

## 2019-05-09 NOTE — PROGRESS NOTES
PT DAILY TREATMENT NOTE 10-18    Patient Name: Judge Simmons  Date:2019  : 1945  [x]  Patient  Verified  Payor: [place] MEDICARE / Plan: [place] MEDICARE PART A & B / Product Type: Medicare /    In time:11:20am  Out time:12:01pm  Total Treatment Time (min): 41  Visit #: 5 of 16    Medicare/BCBS Only   Total Timed Codes (min):  41 1:1 Treatment Time:  41       Treatment Area: Low back pain [M54.5]    SUBJECTIVE  Pain Level (0-10 scale): 0/10  Any medication changes, allergies to medications, adverse drug reactions, diagnosis change, or new procedure performed?: [x] No    [] Yes (see summary sheet for update)  Subjective functional status/changes:   [] No changes reported  Pt reports feeling \"50% better\" since starting PT. She states she's walking more and \"this has helped me to start exercising regularly\". OBJECTIVE      41 min Therapeutic Exercise:  [x] See flow sheet : Aquatic therapy   Rationale: increase ROM and increase strength to improve the patients ability to perform ADLs and functional mobility. With   [] TE   [] TA   [] neuro   [] other: Patient Education: [x] Review HEP    [] Progressed/Changed HEP based on:   [] positioning   [] body mechanics   [] transfers   [] heat/ice application    [] other:      Other Objective/Functional Measures: FOTO score of 70. MMT: B hip flexion 4-/5, B knee flex/ext 5/5. Pain Level (0-10 scale) post treatment: 0/10    ASSESSMENT/Changes in Function: Pt progressing well with aquatic therapy. FOTO goal met. Improved LE strength, but continues to have B hip weakness. Patient will continue to benefit from skilled PT services to modify and progress therapeutic interventions, address functional mobility deficits, address ROM deficits, address strength deficits, analyze and cue movement patterns, analyze and modify body mechanics/ergonomics and assess and modify postural abnormalities to attain remaining goals.      []  See Plan of Care  [x]  See progress note/recertification  []  See Discharge Summary         Progress towards goals / Updated goals:  Short Term Goals: To be accomplished in 2 weeks:              3. Pt will be independent and compliant with HEP to maximize therapeutic benefit.  Goal met: Pt reports daily compliance with HEP. (4/30/19).                 2. Pt will tolerate at least 30 minutes of aquatic exercise void of pain increase, to improve activity tolerance. Goal met: Pt reports no pain during or following aquatic exercises and is able to tolerate 45 minutes of exercise without pain. (5/2/19).    Long Term Goals: To be accomplished in 8 weeks:              1. Pt will have improved FOTO score to 68 or greater, to indicate improved functional activity tolerance. Goal met: FOTO score of 70. (5/9/19).             2. Pt will demonstrate increased B LE strength by 1/2-1 MMT grade grossly throughout to improve standing activity tolerance. Progressing: MMT: B hip flexion 4-/5, B knee flex/ext 5/5.  (5/9/19).                 3. Pt will perform 10 sit <-> stand transfers in 30 seconds without UE assist, to indicate improved LE strength.              4. Pt will report having no difficulty performing usual housework duties. Goal met: Pt reports having no difficulty performing usual housework. (5/9/19).           PLAN  [x]  Upgrade activities as tolerated     [x]  Continue plan of care  []  Update interventions per flow sheet       []  Discharge due to:_  []  Other:_      Mercedez Kimball, PT 5/9/2019  11:34 AM    Future Appointments   Date Time Provider Sean Kaur   5/15/2019  9:15 AM Leandra Hough  [address]   5/16/2019 10:30 AM Ольга Vidal, PT [facility]   5/21/2019 10:30 AM Ольга Vidal, PT [facility]   5/23/2019 10:30 AM Ольга Vidal, PT [facility]

## 2019-05-09 NOTE — PROGRESS NOTES
[facility]  [address]  [place], [address]  [phone] fax    Medicare Progress Report    Patient name: Rashid Wright Start of Care: 4/10/2019   Referral source: Mary Saldana MD : 1945               Medical Diagnosis: Spinal stenosis, lumbar region with neurogenic claudication [M48.062]  Radiculopathy, lumbar region [M54.16]  Other biomechanical lesions of lumbar region [M99.83]  Spondylosis without myelopathy or radiculopathy, lumbar region [M47.816]  Neuralgia and neuritis, unspecified [M79.2]  Payor: VA MEDICARE / Plan: VA MEDICARE PART A & B / Product Type: Medicare /  Onset Date:2 years ago               Treatment Diagnosis: LB pain   Prior Hospitalization: see medical history Provider#: [id number]   Medications: Verified on Patient summary List    Comorbidities: arthritis, HTN, back surgery 30 years ago   Prior Level of Function: Able to perform all ADLs and functional mobility tasks without pain. Visits from Start of Care: 5    Missed Visits: 0    Reporting Period: 4/10/19 to 19    Subjective Reports: Pt reports feeling \"50% better\" since starting PT. She states she's walking more and \"this has helped me to start exercising regularly\". Progress towards goals:   Short Term Goals:               1. Pt will be independent and compliant with HEP to maximize therapeutic benefit.  Goal met: Pt reports daily compliance with HEP. (19).                 2. Pt will tolerate at least 30 minutes of aquatic exercise void of pain increase, to improve activity tolerance. Goal met: Pt reports no pain during or following aquatic exercises and is able to tolerate 45 minutes of exercise without pain. (19).    Long Term Goals:               1. Pt will have improved FOTO score to 68 or greater, to indicate improved functional activity tolerance. Goal met: FOTO score of 70. (19).                 2. Pt will demonstrate increased B LE strength by 1/2-1 MMT grade grossly throughout to improve standing activity tolerance. Progressing: MMT: B hip flexion 4-/5, B knee flex/ext 5/5.  (5/9/19).             3. Pt will perform 10 sit <-> stand transfers in 30 seconds without UE assist, to indicate improved LE strength.              4. Pt will report having no difficulty performing usual housework duties. Goal met: Pt reports having no difficulty performing usual housework. (5/9/19).         Key functional changes: FOTO goal met. Improved quad/HS strength. Problems/ barriers to goal attainment: None      Assessment / Recommendations:Pt progressing well with aquatic therapy. FOTO goal met. Improved LE strength, but continues to have B hip weakness. Plan to continue aquatic therapy for 2 weeks to focus on LE strength.        Problem List: pain affecting function, decrease ROM, decrease strength, decrease ADL/ functional abilitiies, decrease activity tolerance and decrease flexibility/ joint mobility   Treatment Plan: Therapeutic exercise, Therapeutic activities, Neuromuscular re-education, Physical agent/modality, Manual therapy, Aquatic therapy, Patient education and Functional mobility training      Updated Goals to be accomplished in 2 weeks:               1. Pt will demonstrate increased B LE strength by 1/2-1 MMT grade grossly throughout to improve standing activity tolerance. Progressing: MMT: B hip flexion 4-/5, B knee flex/ext 5/5.  (5/9/19).                 2. Pt will perform 10 sit <-> stand transfers in 30 seconds without UE assist, to indicate improved LE strength.                  Frequency / Duration: Patient to be seen 2 times per week for 2 weeks:      Max Congress, PT 5/9/2019 11:45 AM

## 2019-05-14 ENCOUNTER — APPOINTMENT (OUTPATIENT)
Dept: PHYSICAL THERAPY | Age: 74
End: 2019-05-14
Payer: MEDICARE

## 2019-05-15 ENCOUNTER — OFFICE VISIT (OUTPATIENT)
Dept: ORTHOPEDIC SURGERY | Age: 74
End: 2019-05-15

## 2019-05-15 VITALS
HEART RATE: 79 BPM | HEIGHT: 65 IN | SYSTOLIC BLOOD PRESSURE: 102 MMHG | DIASTOLIC BLOOD PRESSURE: 61 MMHG | TEMPERATURE: 98.1 F | OXYGEN SATURATION: 96 % | BODY MASS INDEX: 24.12 KG/M2 | RESPIRATION RATE: 14 BRPM | WEIGHT: 144.8 LBS

## 2019-05-15 DIAGNOSIS — M54.16 LUMBAR RADICULAR PAIN: ICD-10-CM

## 2019-05-15 DIAGNOSIS — M48.061 LUMBAR FORAMINAL STENOSIS: ICD-10-CM

## 2019-05-15 DIAGNOSIS — M79.2 NEURITIS: ICD-10-CM

## 2019-05-15 DIAGNOSIS — M48.062 SPINAL STENOSIS OF LUMBAR REGION WITH NEUROGENIC CLAUDICATION: ICD-10-CM

## 2019-05-15 RX ORDER — GABAPENTIN 300 MG/1
CAPSULE ORAL
Qty: 360 CAP | Refills: 1 | Status: SHIPPED | OUTPATIENT
Start: 2019-05-15 | End: 2019-07-23 | Stop reason: SDUPTHER

## 2019-05-15 RX ORDER — METHYLPREDNISOLONE 4 MG/1
TABLET ORAL
Qty: 1 DOSE PACK | Refills: 0 | Status: SHIPPED | OUTPATIENT
Start: 2019-05-15 | End: 2019-06-10 | Stop reason: ALTCHOICE

## 2019-05-15 NOTE — PATIENT INSTRUCTIONS
Low Back Arthritis: Exercises Your Care Instructions Here are some examples of typical rehabilitation exercises for your condition. Start each exercise slowly. Ease off the exercise if you start to have pain. Your doctor or physical therapist will tell you when you can start these exercises and which ones will work best for you. When you are not being active, find a comfortable position for rest. Some people are comfortable on the floor or a medium-firm bed with a small pillow under their head and another under their knees. Some people prefer to lie on their side with a pillow between their knees. Don't stay in one position for too long. Take short walks (10 to 20 minutes) every 2 to 3 hours. Avoid slopes, hills, and stairs until you feel better. Walk only distances you can manage without pain, especially leg pain. How to do the exercises Pelvic tilt 1. Lie on your back with your knees bent. 2. \"Brace\" your stomachtighten your muscles by pulling in and imagining your belly button moving toward your spine. 3. Press your lower back into the floor. You should feel your hips and pelvis rock back. 4. Hold for 6 seconds while breathing smoothly. 5. Relax and allow your pelvis and hips to rock forward. 6. Repeat 8 to 12 times. Back stretches 1. Get down on your hands and knees on the floor. 2. Relax your head and allow it to droop. Round your back up toward the ceiling until you feel a nice stretch in your upper, middle, and lower back. Hold this stretch for as long as it feels comfortable, or about 15 to 30 seconds. 3. Return to the starting position with a flat back while you are on your hands and knees. 4. Let your back sway by pressing your stomach toward the floor. Lift your buttocks toward the ceiling. 5. Hold this position for 15 to 30 seconds. 6. Repeat 2 to 4 times. Follow-up care is a key part of your treatment and safety.  Be sure to make and go to all appointments, and call your doctor if you are having problems. It's also a good idea to know your test results and keep a list of the medicines you take. Where can you learn more? Go to http://maggie-rae.info/. Enter V621 in the search box to learn more about \"Low Back Arthritis: Exercises. \" Current as of: September 20, 2018 Content Version: 11.9 © 7867-0573 [employer], [employer]. Care instructions adapted under license by [employer] (which disclaims liability or warranty for this information). If you have questions about a medical condition or this instruction, always ask your healthcare professional. [address] any warranty or liability for your use of this information.

## 2019-05-15 NOTE — LETTER
5/16/19 Patient: Tu Bennett YOB: 1945 Date of Visit: 5/15/2019 Adeola Jay MD 
[address] [facility] [address] [id number] [place] [address] VIA Facsimile: [phone] Dear Adeola Jay MD, Thank you for referring Ms. Summer Blankenship to [address] [facility] for evaluation. My notes for this consultation are attached. If you have questions, please do not hesitate to call me. I look forward to following your patient along with you. Sincerely, Lisbeth Fernandez MD

## 2019-05-15 NOTE — PROGRESS NOTES
[address] 
[address] Phone: [phone] Fax: [phone] Pt's YOB: 1945 ASSESSMENT Diagnoses and all orders for this visit: 1. Spinal stenosis of lumbar region with neurogenic claudication 
-     gabapentin (NEURONTIN) 300 mg capsule; 1 in the am 1 in the afternoon and 2 in the evening  Indications: Neuropathic Pain 
-     methylPREDNISolone (MEDROL DOSEPACK) 4 mg tablet; Per dose pack instructions -     MRI LUMB SPINE W WO CONT; Future 2. Neuritis 
-     gabapentin (NEURONTIN) 300 mg capsule; 1 in the am 1 in the afternoon and 2 in the evening  Indications: Neuropathic Pain -     MRI LUMB SPINE W WO CONT; Future 3. Lumbar radicular pain 
-     gabapentin (NEURONTIN) 300 mg capsule; 1 in the am 1 in the afternoon and 2 in the evening  Indications: Neuropathic Pain -     MRI LUMB SPINE W WO CONT; Future 4. Lumbar foraminal stenosis 
-     gabapentin (NEURONTIN) 300 mg capsule; 1 in the am 1 in the afternoon and 2 in the evening  Indications: Neuropathic Pain -     MRI LUMB SPINE W WO CONT; Future IMPRESSION AND PLAN: 
Judge Simmons is a 76 y.o. female with history of lumbar pain. She complains of burning/tingling pain in the anterior aspect of the right thigh which wakes her up at night. Pt notes minimal improvement with physical therapy. She takes Neurontin 300 mg 1 tab QAM, 1 tab in the afternoon, and 2 tabs QHS and notes improvement with a Medrol Dosepak. 1) Pt was given information on lumbar arthritis exercises. 2) She received a refill of Neurontin 300 mg 1 tab QAM, 1 tab in the afternoon, and 2 tabs QHS. 3) A lumbar MRI was ordered. She has progressive right radicular symptoms emperatriz history of spinal stenosis. 4) I encouraged the patient to exercise regularly. 5) Ms. Ingris Villarreal has a reminder for a \"due or due soon\" health maintenance.  I have asked that she contact her primary care provider, Beatrice Swanson MD, for follow-up on this health maintenance. 6)  demonstrated consistency with prescribing. 7) Pt will follow-up in 3 weeks or sooner if needed. HISTORY OF PRESENT ILLNESS: 
Abbie Ryan is a 76 y.o. female with history of lumbar pain and presents to the office today for follow up. She complains of burning/tingling pain in the anterior aspect of the right thigh which wakes her up at night. Pt denies any pain in the lower back at this time. She notes minimal improvement since attending physical therapy. Pt has started to perform exercises at home and states that she has 3 sessions of physical therapy left. Pt notes that she generally uses heat when her right thigh pain wakes her up at night. She denies any weakness in the right leg at this time. Pt reports minimal relief with a right L2-3 SNRB on 03/06/2019. Of note, she had a previous lumbar fusion about 30 years ago. Pt takes Neurontin 300 mg 1 tab QAM, 1 tab in the afternoon, and 2 tabs QHS. She notes improvement with a Medrol Dosepak. Pt at this time desires to proceed with a lumbar MRI. Pain Scale: /10 PCP: Beatrice Swanson MD 
  
Past Medical History:  
Diagnosis Date  Acid reflux  Hypertension Social History Socioeconomic History  Marital status:  Spouse name: Not on file  Number of children: Not on file  Years of education: Not on file  Highest education level: Not on file Occupational History  Not on file Social Needs  Financial resource strain: Not on file  Food insecurity:  
  Worry: Not on file Inability: Not on file  Transportation needs:  
  Medical: Not on file Non-medical: Not on file Tobacco Use  Smoking status: Never Smoker  Smokeless tobacco: Never Used Substance and Sexual Activity  Alcohol use: Yes Alcohol/week: 0.6 oz Types: 1 Glasses of wine per week  Drug use: No  
 Sexual activity: Yes  
  Partners: Male Birth control/protection: None Lifestyle  Physical activity:  
  Days per week: Not on file Minutes per session: Not on file  Stress: Not on file Relationships  Social connections:  
  Talks on phone: Not on file Gets together: Not on file Attends Denominational service: Not on file Active member of club or organization: Not on file Attends meetings of clubs or organizations: Not on file Relationship status: Not on file  Intimate partner violence:  
  Fear of current or ex partner: Not on file Emotionally abused: Not on file Physically abused: Not on file Forced sexual activity: Not on file Other Topics Concern  Not on file Social History Narrative  Not on file Current Outpatient Medications Medication Sig Dispense Refill  gabapentin (NEURONTIN) 300 mg capsule 1 in the am 1 in the afternoon and 2 in the evening  Indications: Neuropathic Pain 360 Cap 1  
 methylPREDNISolone (MEDROL DOSEPACK) 4 mg tablet Per dose pack instructions 1 Dose Pack 0  
 MAGNESIUM OXIDE PO Take  by Mouth.  omeprazole (PRILOSEC) 40 mg capsule  losartan (COZAAR) 50 mg tablet  hydroCHLOROthiazide (HYDRODIURIL) 25 mg tablet Take 25 mg by mouth.  LORazepam (ATIVAN) 1 mg tablet  atorvastatin (LIPITOR) 10 mg tablet Take  by mouth.  MULTIVITAMIN PO Take  by mouth.  calcium carbonate (CALTREX) 600 mg calcium (1,500 mg) tablet Take  by mouth.  omega-3 acid ethyl esters (LOVAZA) 1 gram capsule Take  by mouth.  topiramate (TOPAMAX) 25 mg tablet Take 2 Tabs by mouth nightly. (Patient not taking: Reported on 5/15/2019) 60 Tab 1  
 meloxicam (MOBIC) 15 mg tablet  diclofenac EC (VOLTAREN) 75 mg EC tablet Take 1 Tab by mouth two (2) times a day. (Patient not taking: Reported on 5/15/2019) 60 Tab 2 No Known Allergies REVIEW OF SYSTEMS 
 
 Constitutional: Negative for fever, chills, or weight change. Respiratory: Negative for cough or shortness of breath. Cardiovascular: Negative for chest pain or palpitations. Gastrointestinal: Negative for acid reflux, change in bowel habits, or constipation. Genitourinary: Negative for dysuria and flank pain. Musculoskeletal: Positive for lumbar pain. Skin: Negative for rash. Neurological: Negative for headaches, dizziness, or numbness. Endo/Heme/Allergies: Negative for increased bruising. Psychiatric/Behavioral: Negative for difficulty with sleep. PHYSICAL EXAMINATION Visit Vitals /61 Pulse 79 Temp 98.1 °F (36.7 °C) (Oral) Resp 14 Ht 5' 5\" (1.651 m) Wt 144 lb 12.8 oz (65.7 kg) SpO2 96% BMI 24.10 kg/m² Constitutional: Awake, alert, and in no acute distress. Neurological: 1+ symmetrical DTRs in the upper extremities. 1+ symmetrical DTRs in the lower extremities. Sensation to light touch is intact. Negative Alfonzo's sign bilaterally. Skin: warm, dry, and intact. Musculoskeletal: No tenderness to palpation in the lower lumbar region. Moderate pain with extension and axial loading. Improvement with forward flexion. No pain with internal or external rotation of her hips. Mild decreased range of motion with internal rotation of the left hip. Negative straight leg raise bilaterally. Biceps  Triceps Deltoids Wrist Ext Wrist Flex Hand Intrin Right +4/5 +4/5 +4/5 +4/5 +4/5 +4/5 Left +4/5 +4/5 +4/5 +4/5 +4/5 +4/5 Hip Flex  Quads Hamstrings Ankle DF EHL Ankle PF Right +4/5 +4/5 +4/5 +4/5 +4/5 +4/5 Left +4/5 +4/5 +4/5 +4/5 +4/5 +4/5 IMAGING: 
 
Lumbar spine MRI from 05/23/2018 was personally reviewed with the patient and demonstrated:  
Results from [facility] on 05/23/18 MRI LUMB SPINE WO CONT 
  Narrative History: Remote placement of [place] rods due to traumatic injury, with 
approximately one year of back and right leg pain Prior studies not available for comparison PROCEDURE: 
 
Sagittal spin echo T1 and T2 weighted and STIR sequences, and axial spin echo T1 
and NASH T2 weighted sequences were obtained of the lumbar spine without 
gadolinium. FINDINGS:   
 
There is chronic appearing superior endplate fracture V93 vertebral body with 
slight posterior chronic retropulsion but no significant stenosis. No other 
evidence of fracture. Asymmetric right-sided degenerative endplate marrow edema L3/4. No other evidence of marrow edema or neoplastic marrow signal. Extensive 
artifact from posterior instrumentation from the visualized lower thoracic level 
down to the L2 level. The conus medullaris is located at the L1 level and has a 
normal appearance and signal.  
 
Visualized retroperitoneum and sacrum unremarkable. L1/2 level: Dorsal artifact but no evidence of significant stenosis or disc 
abnormality. L2/3 level: Extensive dorsal artifact especially on axial sequences. There is 
disc bulge and suggestion of moderate to severe canal stenosis with AP diameter 
canal 5 mm, limited evaluation. L3/4 level: Degenerative spondylosis asymmetric to the right with disc bulge and 
right posterior paracentral disc extrusion extending cephalad in the right 
anterior epidural space and extending into the proximal foramen. There is 
prominent dorsal epidural fat with AP diameter canal 4 mm with minimal residual 
CSF surrounding crowded cauda equina. Severe right lateral recess stenosis and 
moderate to severe right-sided subarticular foraminal stenosis with flattening 
right foraminal L3 nerve root. L4/5 level: Moderate to severe bilateral facet arthropathy and hypertrophy with 
small right-sided facet joint effusion. Mild disc bulge and prominent dorsal 
epidural fat with AP diameter canal 6 mm but with plenty of CSF surrounding 
cauda equina, without significant lateral recess stenosis. There is small synovial cyst along the ventral aspect right-sided facet joint which contributes 
to flattening and mild anterior displacement of the right foraminal L4 nerve 
root. Mild to moderate degenerative left foraminal stenosis. L5/S1 level: Mild bilateral facet arthropathy with no disc abnormality or 
significant stenosis.      
  
  Impression IMPRESSION: 
 
1. Previous dorsal instrumentation lower thoracic down to the L2 level causing 
associated artifact. Chronic appearing superior endplate fracture with mild loss 
of height T12 vertebral body. 2. Asymmetric right-sided degenerative spondylosis L3/4 with right posterior and 
foraminal disc extrusion with severe canal, severe right lateral recess, and 
moderate to severe right foraminal stenosis. 3. Moderate to severe facet arthropathy with small right-sided facet joint 
effusion L4/5, with small ventrally located right-sided facet synovial cyst 
contributing to flattening and possible impingement of the right foraminal L4 
nerve root. Mild canal stenosis. 4. Disc bulge L2/3 with possible moderate to severe canal stenosis at this level 
but suboptimal evaluation of the canal at this level due to artifact. Written by Curtis Givens, as dictated by Douglas Jacques MD. 
I, Dr. Douglas Jacques confirm that all documentation is accurate.

## 2019-05-16 ENCOUNTER — HOSPITAL ENCOUNTER (OUTPATIENT)
Dept: PHYSICAL THERAPY | Age: 74
Discharge: HOME | End: 2019-05-16
Payer: MEDICARE

## 2019-05-16 PROCEDURE — 97113 AQUATIC THERAPY/EXERCISES: CPT

## 2019-05-16 NOTE — PROGRESS NOTES
PT DAILY TREATMENT NOTE 10-18    Patient Name: Oneyda Ivan   Date:2019  : 1945  [x]  Patient  Verified  [place]or: [place] MED[place]ARE / Plan: [place] MEDICARE PART [place] & B / Product Type: Medicare /    In time:10:30am  Out time:11:13am  Total Treatment Time (min): 43  Visit #: 1 of 4    Medicare/BCBS Only   Total Timed Codes (min):  43 1:1 Treatment Time:  43       Treatment Area: Low back pain [M54.5]    SUBJECTIVE  Pain Level (0-10 scale): 0/10  Any medication changes, allergies to medications, adverse drug reactions, diagnosis change, or new procedure performed?: [x] No    [] Yes (see summary sheet for update)  Subjective functional status/changes:   [] No changes reported  Pt states she saw Dr. Kasia Jennings yesterday \"and it was very discouraging\". The doctor ordered an MRI (pt is waiting to hear from the ) and states that she was told she will probably need surgery. She has a f/u appt with her doctor to further discuss this on 6/10/19. Pt states that she's not interested in joining a community pool or fitness center to continue aquatic exercises on her own; \"I'll just walk\". OBJECTIVE      43 min Therapeutic Exercise:  [x] See flow sheet : Aquatic therapy   Rationale: increase ROM and increase strength to improve the patients ability to perform ADLs and functional mobility tasks with improved ease and decreased pain. With   [] TE   [] TA   [] neuro   [] other: Patient Education: [x] Review HEP    [] Progressed/Changed HEP based on:   [] positioning   [] body mechanics   [] transfers   [] heat/ice application    [] other:      Other Objective/Functional Measures: 30 second sit <-> stand test: 11 reps without UE use. Pain Level (0-10 scale) post treatment: 0/10    ASSESSMENT/Changes in Function: Improved B LE strength noted as evidenced by improved sit <-> stand ability.      Patient will continue to benefit from skilled PT services to modify and progress therapeutic interventions, address functional mobility deficits, address ROM deficits, address strength deficits, analyze and cue movement patterns, analyze and modify body mechanics/ergonomics and assess and modify postural abnormalities to attain remaining goals. []  See Plan of Care  []  See progress note/recertification  []  See Discharge Summary         Progress towards goals / Updated goals:  Updated Goals to be accomplished in 2 weeks:               5. Pt will demonstrate increased B LE strength by 1/2-1 MMT grade grossly throughout to improve standing activity tolerance.  Progressing: MMT: B hip flexion 4-/5, B knee flex/ext 5/5.  (5/9/19).                2. Pt will perform 10 sit <-> stand transfers in 30 seconds without UE assist, to indicate improved LE strength. Goal met: Pt performs 11 sit <-> stand transfers in 30 seconds without UE assist. (5/16/19).                    PLAN  []  Upgrade activities as tolerated     [x]  Continue plan of care  []  Update interventions per flow sheet       []  Discharge due to:_  []  Other:_      Collin Gomez PT 5/16/2019  10:35 AM    Future Appointments   Date Time Provider Sean Kaur   5/21/2019 10:30 AM Jose Luis Siddiqui, PT [facility]   5/23/2019 10:30 AM Jose Luis Siddiqui PT [facility]   6/10/2019 10:45 AM Malcolm Garcia  [address]

## 2019-05-21 ENCOUNTER — HOSPITAL ENCOUNTER (OUTPATIENT)
Dept: PHYSICAL THERAPY | Age: 74
Discharge: HOME | End: 2019-05-21
Payer: MEDICARE

## 2019-05-21 PROCEDURE — 97113 AQUATIC THERAPY/EXERCISES: CPT

## 2019-05-21 NOTE — PROGRESS NOTES
PT DAILY TREATMENT NOTE 10-18    Patient Name: Fanny Pulido  Date:2019  : 1945  [x]  Patient  Verified  Payor: Barrington Donnelly / Plan: [place] MEDICARE PART A & B / Product Type: Medicare /    In time:10:20am  Out time:11:06am  Total Treatment Time (min): 55  Visit #: 2 of 4    Medicare/BCBS Only   Total Timed Codes (min):  46 1:1 Treatment Time:  46       Treatment Area: Low back pain [M54.5]    SUBJECTIVE  Pain Level (0-10 scale): 3-4/10  Any medication changes, allergies to medications, adverse drug reactions, diagnosis change, or new procedure performed?: [x] No    [] Yes (see summary sheet for update)  Subjective functional status/changes:   [] No changes reported  Pt states that she has an MRI scheduled for 19. She c/o right hip pain today and states that she walked a mile yesterday and is trying to walk a mile several times per week. OBJECTIVE      46 min Therapeutic Exercise:  [x] See flow sheet : Aquatic therapy    Rationale: increase ROM and increase strength to improve the patients ability to perform ADLs and functional mobility tasks with improved ease and decreased pain. With   [] TE   [] TA   [] neuro   [] other: Patient Education: [x] Review HEP    [] Progressed/Changed HEP based on:   [] positioning   [] body mechanics   [] transfers   [] heat/ice application    [] other:      Other Objective/Functional Measures: Added 1 lb ankle weights to all LE exercises. Increased squats to 15 reps. Pain Level (0-10 scale) post treatment: 0/10    ASSESSMENT/Changes in Function: Pt tolerated all aquatic exercises void of pain. Patient will continue to benefit from skilled PT services to modify and progress therapeutic interventions, address functional mobility deficits, address ROM deficits, address strength deficits, analyze and cue movement patterns, analyze and modify body mechanics/ergonomics and assess and modify postural abnormalities to attain remaining goals.      [] See Plan of Care  []  See progress note/recertification  []  See Discharge Summary         Progress towards goals / Updated goals:  Updated Goals to be accomplished in 2 weeks:               1. Pt will demonstrate increased B LE strength by 1/2-1 MMT grade grossly throughout to improve standing activity tolerance.  Progressing: MMT: B hip flexion 4-/5, B knee flex/ext 5/5.  (5/9/19).                2. Pt will perform 10 sit <-> stand transfers in 30 seconds without UE assist, to indicate improved LE strength. Goal met: Pt performs 11 sit <-> stand transfers in 30 seconds without UE assist. (5/16/19).                     PLAN  []  Upgrade activities as tolerated     [x]  Continue plan of care  []  Update interventions per flow sheet       []  Discharge due to:_  []  Other:_      Flaco Calabrese, PT 5/21/2019  10:24 AM    Future Appointments   Date Time Provider Sean Kaur   5/21/2019 10:30 AM Roxanna Blandon PT [facility]   5/23/2019 10:30 AM Roxanna Blandon PT [facility]   5/29/2019 11:45 AM [facility] RM 2 [facility]   6/10/2019 10:45 AM Misha Garner  [address]

## 2019-05-23 ENCOUNTER — HOSPITAL ENCOUNTER (OUTPATIENT)
Dept: PHYSICAL THERAPY | Age: 74
Discharge: HOME | End: 2019-05-23
Payer: MEDICARE

## 2019-05-23 PROCEDURE — 97113 AQUATIC THERAPY/EXERCISES: CPT

## 2019-05-23 NOTE — PROGRESS NOTES
PT DAILY TREATMENT NOTE 10-18    Patient Name: Jaelyn March  Date:2019  : 1945  [x]  Patient  Verified  Payor: [place] MEDICARE / Plan: [place] MEDICARE PART A & B / Product Type: Medicare /    In time:10:32am  Out time:11:17am  Total Treatment Time (min): 45  Visit #: 3 of 4    Medicare/BCBS Only   Total Timed Codes (min):  45 1:1 Treatment Time:  45       Treatment Area: Low back pain [M54.5]    SUBJECTIVE  Pain Level (0-10 scale): 0/10  Any medication changes, allergies to medications, adverse drug reactions, diagnosis change, or new procedure performed?: [x] No    [] Yes (see summary sheet for update)  Subjective functional status/changes:   [] No changes reported  Pt reports feeling 50% better overall since beginning PT. She states, \"This has been good for me mentally too and has gotten me walking again and doing some exercise\". OBJECTIVE      45 min Therapeutic Exercise:  [x] See flow sheet : Aquatic therapy    Rationale: increase ROM and increase strength to improve the patients ability to perform ADLs and functional mobility tasks with improved ease and decreased pain. With   [] TE   [] TA   [] neuro   [] other: Patient Education: [x] Review HEP    [] Progressed/Changed HEP based on:   [] positioning   [] body mechanics   [] transfers   [] heat/ice application    [] other:      Other Objective/Functional Measures: MMT: B hip flexion 4+/5, B knee flex/ext 5/5. Issued pt additional HEP with use of theraband. Pain Level (0-10 scale) post treatment: 0/10    ASSESSMENT/Changes in Function: Pt has met goals and is independent with HEP to continue for self-management. []  See Plan of Care  []  See progress note/recertification  [x]  See Discharge Summary         Progress towards goals / Updated goals:  Updated Goals to be accomplished in 2 weeks:               1.  Pt will demonstrate increased B LE strength by 1/2-1 MMT grade grossly throughout to improve standing activity tolerance.  Progressing: MMT: B hip flexion 4-/5, B knee flex/ext 5/5.  (5/9/19).  Goal met: MMT: B hip flexion 4+/5, B knee flex/ext 5/5              2. Pt will perform 10 sit <-> stand transfers in 30 seconds without UE assist, to indicate improved LE strength. Goal met: Pt performs 11 sit <-> stand transfers in 30 seconds without UE assist. (5/16/19).                     PLAN  []  Upgrade activities as tolerated     []  Continue plan of care  []  Update interventions per flow sheet       [x]  Discharge due to:_goals met  []  Other:_      Leonidas Grider, PT 5/23/2019  10:36 AM    Future Appointments   Date Time Provider Sean Kaur   5/29/2019 11:45 AM [facility] RM 2 [facility]   6/10/2019 10:45 AM Nunu Ontiveros  [address]

## 2019-05-23 NOTE — PROGRESS NOTES
[facility]  [address]  [place], [address]  [phone] fax    Physical Therapy Discharge Summary    Patient name: Tra Dexter Start of Care: 4/10/2019   Referral source: Alejandra Perez MD : 1945               Medical Diagnosis: Spinal stenosis, lumbar region with neurogenic claudication [M48.062]  Radiculopathy, lumbar region [M54.16]  Other biomechanical lesions of lumbar region [M99.83]  Spondylosis without myelopathy or radiculopathy, lumbar region [M47.816]  Neuralgia and neuritis, unspecified [M79.2]  Payor: [place] MEDICARE / Plan: [place] MEDICARE PART A & B / Product Type: Medicare /  Onset Date:2 years ago               Treatment Diagnosis: LB pain   Prior Hospitalization: see medical history Provider#: [id number]   Medications: Verified on Patient summary List    Comorbidities: arthritis, HTN, back surgery 30 years ago   Prior Level of Function: Able to perform all ADLs and functional mobility tasks without pain. Visits from Start of Care: 8    Missed Visits: 0  Reporting Period : 4/10/19 to 19    Summary of Care: Pt reports feeling 50% better overall since beginning PT. She states, \"This has been good for me mentally too and has gotten me walking again and doing some exercise\". Progress towards goals:               1. Pt will demonstrate increased B LE strength by 1/2-1 MMT grade grossly throughout to improve standing activity tolerance.  Progressing: MMT: B hip flexion 4-/5, B knee flex/ext 5/5.  (19).  Goal met: MMT: B hip flexion 4+/5, B knee flex/ext 5/5              2. Pt will perform 10 sit <-> stand transfers in 30 seconds without UE assist, to indicate improved LE strength. Goal met: Pt performs 11 sit <-> stand transfers in 30 seconds without UE assist. (19).                   ASSESSMENT/RECOMMENDATIONS:  Pt has met goals and is independent with HEP to continue for self-management. [x]Discontinue therapy: [x]Patient has reached or is progressing toward set goals      []Patient is non-compliant or has abdicated      []Due to lack of appreciable progress towards set Sean Avila PT 5/23/2019 10:57 AM

## 2019-05-29 ENCOUNTER — HOSPITAL ENCOUNTER (OUTPATIENT)
Age: 74
Discharge: HOME | End: 2019-05-29
Attending: PHYSICAL MEDICINE & REHABILITATION
Payer: MEDICARE

## 2019-05-29 DIAGNOSIS — M54.16 LUMBAR RADICULAR PAIN: ICD-10-CM

## 2019-05-29 DIAGNOSIS — M48.062 SPINAL STENOSIS OF LUMBAR REGION WITH NEUROGENIC CLAUDICATION: ICD-10-CM

## 2019-05-29 DIAGNOSIS — M48.061 LUMBAR FORAMINAL STENOSIS: ICD-10-CM

## 2019-05-29 DIAGNOSIS — M79.2 NEURITIS: ICD-10-CM

## 2019-05-29 LAB — CREAT UR-MCNC: 0.8 MG/DL (ref 0.6–1.3)

## 2019-05-29 PROCEDURE — A9575 INJ GADOTERATE MEGLUMI 0.1ML: HCPCS | Performed by: PHYSICAL MEDICINE & REHABILITATION

## 2019-05-29 PROCEDURE — 72158 MRI LUMBAR SPINE W/O & W/DYE: CPT

## 2019-05-29 PROCEDURE — 82565 ASSAY OF CREATININE: CPT

## 2019-05-29 PROCEDURE — 74011636320 HC RX REV CODE- 636/320: Performed by: PHYSICAL MEDICINE & REHABILITATION

## 2019-05-29 RX ADMIN — GADOTERATE MEGLUMINE 15 ML: 376.9 INJECTION INTRAVENOUS at 13:00

## 2019-06-10 ENCOUNTER — OFFICE VISIT (OUTPATIENT)
Dept: ORTHOPEDIC SURGERY | Age: 74
End: 2019-06-10

## 2019-06-10 VITALS
HEIGHT: 65 IN | HEART RATE: 73 BPM | DIASTOLIC BLOOD PRESSURE: 63 MMHG | SYSTOLIC BLOOD PRESSURE: 126 MMHG | BODY MASS INDEX: 23.66 KG/M2 | WEIGHT: 142 LBS

## 2019-06-10 DIAGNOSIS — M54.16 LUMBAR RADICULAR PAIN: ICD-10-CM

## 2019-06-10 DIAGNOSIS — M79.2 NEURITIS: ICD-10-CM

## 2019-06-10 DIAGNOSIS — G47.9 SLEEP DISORDER: ICD-10-CM

## 2019-06-10 DIAGNOSIS — M70.61 TROCHANTERIC BURSITIS, RIGHT HIP: ICD-10-CM

## 2019-06-10 DIAGNOSIS — M48.062 SPINAL STENOSIS OF LUMBAR REGION WITH NEUROGENIC CLAUDICATION: Primary | ICD-10-CM

## 2019-06-10 RX ORDER — NORTRIPTYLINE HYDROCHLORIDE 25 MG/1
25-50 CAPSULE ORAL
Qty: 60 CAP | Refills: 1 | Status: SHIPPED | OUTPATIENT
Start: 2019-06-10 | End: 2020-10-21 | Stop reason: DRUGHIGH

## 2019-06-10 NOTE — PROGRESS NOTES
[facility]  [address], [facility] [address]  Phone: [phone]  Fax: [phone]    Pt's YOB: 1945    ASSESSMENT   Diagnoses and all orders for this visit:    1. Spinal stenosis of lumbar region with neurogenic claudication    2. Lumbar radicular pain  -     nortriptyline (PAMELOR) 25 mg capsule; Take 1-2 Caps by mouth nightly. 3. Trochanteric bursitis, right hip    4. Neuritis  -     nortriptyline (PAMELOR) 25 mg capsule; Take 1-2 Caps by mouth nightly. 5. Sleep disorder         IMPRESSION AND PLAN:  Dayana Goncalves is a 76 y.o. female with history of lumbar pain. She complains of burning/tingling pain in the anterior aspect of the right thigh which wakes her up at night. Pt attended [facility] physical therapy and admits that she has been more consistent with her exercises since completed sessions. Pt takes Neurontin 300 mg 1 tab QAM, 1 tab in the afternoon, and 2 tabs QHS. 1) Pt was given information on lumbar arthritis and trochanteric bursitis exercises. 2) She was prescribed Pamelor 25 mg 1-2 tabs QHS to help with sleep. 3) Pt will discuss starting sleep medication with her PCP, Juve Esquviel MD.  4) Ms. Marybel Charlton has a reminder for a \"due or due soon\" health maintenance. I have asked that she contact her primary care provider, Juve Esquivel MD, for follow-up on this health maintenance. 5)  demonstrated consistency with prescribing. 6) Pt offered a trochanteric bursa injection but defers at this time and will try the Pamelor  Follow-up and Dispositions    · Return in about 6 weeks (around 7/22/2019) for Medication follow up. HISTORY OF PRESENT ILLNESS:  Dayana Goncalves is a 76 y.o. female with history of lumbar pain and presents to the office today for MRI follow up. She complains of burning/tingling pain in the anterior aspect of the right thigh which wakes her up at night.  Pt notes that she uses a heating pad and tries to sleep with her leg elevated. She denies any weakness in the legs at this time. Pt notes that she stays active and likes to walk for exercise. She attended [facility] physical therapy and admits that she has been more consistent with her exercises since completed sessions. Pt has been prescribed Neurontin 300 mg and takes 1 tab QAM, 1 tab in the afternoon, and 2 tabs QHS. She notes minimal relief when she increases the  Neurontin 300 mg to 3 tabs QHS. Pt wishes to try a sleep medication at this time. She states she has used her 's Ambien in the past and this has been helpful. She notes minimal improvement with trazodone and denies ever taking Pamelor. Pt notes benefit when using Vicks Vapor Rub on her right thigh. Pt at this time desires to proceed with medication evaluation. Pain Scale: 5/10    PCP: Cailin Daniel MD     Past Medical History:   Diagnosis Date    Acid reflux     Hypertension         Social History     Socioeconomic History    Marital status:      Spouse name: Not on file    Number of children: Not on file    Years of education: Not on file    Highest education level: Not on file   Occupational History    Not on file   Social Needs    Financial resource strain: Not on file    Food insecurity:     Worry: Not on file     Inability: Not on file    Transportation needs:     Medical: Not on file     Non-medical: Not on file   Tobacco Use    Smoking status: Never Smoker    Smokeless tobacco: Never Used   Substance and Sexual Activity    Alcohol use:  Yes     Alcohol/week: 0.6 oz     Types: 1 Glasses of wine per week    Drug use: No    Sexual activity: Yes     Partners: Male     Birth control/protection: None   Lifestyle    Physical activity:     Days per week: Not on file     Minutes per session: Not on file    Stress: Not on file   Relationships    Social connections:     Talks on phone: Not on file     Gets together: Not on file Attends Sikh service: Not on file     Active member of club or organization: Not on file     Attends meetings of clubs or organizations: Not on file     Relationship status: Not on file    Intimate partner violence:     Fear of current or ex partner: Not on file     Emotionally abused: Not on file     Physically abused: Not on file     Forced sexual activity: Not on file   Other Topics Concern    Not on file   Social History Narrative    Not on file       Current Outpatient Medications   Medication Sig Dispense Refill    nortriptyline (PAMELOR) 25 mg capsule Take 1-2 Caps by mouth nightly. 60 Cap 1    gabapentin (NEURONTIN) 300 mg capsule 1 in the am 1 in the afternoon and 2 in the evening  Indications: Neuropathic Pain 360 Cap 1    MAGNESIUM OXIDE PO Take  by Mouth.  omeprazole (PRILOSEC) 40 mg capsule       losartan (COZAAR) 50 mg tablet       hydroCHLOROthiazide (HYDRODIURIL) 25 mg tablet Take 25 mg by mouth.  LORazepam (ATIVAN) 1 mg tablet       atorvastatin (LIPITOR) 10 mg tablet Take  by mouth.  MULTIVITAMIN PO Take  by mouth.  calcium carbonate (CALTREX) 600 mg calcium (1,500 mg) tablet Take  by mouth.  omega-3 acid ethyl esters (LOVAZA) 1 gram capsule Take  by mouth.  topiramate (TOPAMAX) 25 mg tablet Take 2 Tabs by mouth nightly. (Patient not taking: Reported on 5/15/2019) 60 Tab 1    meloxicam (MOBIC) 15 mg tablet       diclofenac EC (VOLTAREN) 75 mg EC tablet Take 1 Tab by mouth two (2) times a day. (Patient not taking: Reported on 5/15/2019) 60 Tab 2       No Known Allergies      REVIEW OF SYSTEMS    Constitutional: Negative for fever, chills, or weight change. Respiratory: Negative for cough or shortness of breath. Cardiovascular: Negative for chest pain or palpitations. Gastrointestinal: Negative for acid reflux, change in bowel habits, or constipation. Genitourinary: Negative for dysuria and flank pain.    Musculoskeletal: Positive for lumbar and right leg pain. Skin: Negative for rash. Neurological: Negative for headaches, dizziness, or numbness. Endo/Heme/Allergies: Negative for increased bruising. Psychiatric/Behavioral: Positive for difficulty with sleep. PHYSICAL EXAMINATION  Visit Vitals  /63   Pulse 73   Ht 5' 5\" (1.651 m)   Wt 142 lb (64.4 kg)   BMI 23.63 kg/m²       Constitutional: Awake, alert, and in no acute distress. Neurological: 1+ symmetrical DTRs in the upper extremities. 1+ symmetrical DTRs in the lower extremities. Sensation to light touch is intact. Negative Alfonzo's sign bilaterally. Skin: warm, dry, and intact. Musculoskeletal: No tenderness to palpation in the lower lumbar region. Moderate pain with extension and axial loading. Improvement with forward flexion. Tenderness to palpation over the right greater trochanter. No pain with internal or external rotation of her hips. Mild decreased range of motion with internal rotation of the left hip. Negative straight leg raise bilaterally. Biceps  Triceps Deltoids Wrist Ext Wrist Flex Hand Intrin   Right +4/5 +4/5 +4/5 +4/5 +4/5 +4/5   Left +4/5 +4/5 +4/5 +4/5 +4/5 +4/5      Hip Flex  Quads Hamstrings Ankle DF EHL Ankle PF   Right +4/5 +4/5 +4/5 +4/5 +4/5 +4/5   Left +4/5 +4/5 +4/5 +4/5 +4/5 +4/5     IMAGING:    Lumbar spine MRI from 05/2/2019 was personally reviewed with the patient and demonstrated:  Results from [place] encounter on 05/29/19   MRI LUMB SPINE W WO CONT    Narrative EXAMINATION: MRI lumbar spine with and without contrast    INDICATION: History of lumbar fusion, right lower extremity radiculopathy,  spinal canal stenosis    COMPARISON: 5/23/2018    TECHNIQUE: Sagittal and axial multiecho MRI sequences of the lumbar spine  performed before and after 15 cc IV Dotarem, including utilization of hardware  artifact limiting techniques.     FINDINGS:    Postsurgical: Status post posterior fixation/stabilization hardware spanning at  least levels T10-L2, but superior extent not completely clear. Extensive  hardware-related artifact limits evaluation. No obvious postoperative fluid  collection in the imaged levels. Mild lower lumbar paraspinous fatty  infiltration. General: Mild T12 superior endplate depression, chronic appearing. Suggestion of  T10 and T11 congenital interbody fusion, evaluated sagittal plane only. Advanced  disc space loss at L3-L4 eccentric to the right with chronic-appearing endplate  changes. Elsewhere disc space heights are preserved. No focal listhesis. Lumbar  lordosis maintained. Conus ends at roughly L1 level. Distal cord or evaluated  due to artifact. No obvious suspicious epidural collection. L3-L4 endplate  edema, likely due to active disc disease. No suspicious marrow signal.    Miscellaneous: Cholelithiasis noted. Sigmoid diverticulosis. Levels:    T8-T9, T9-T10, T10-T11: Evaluated sagittal plane only. Evaluation essentially  nondiagnostic due to artifact. T10-T11 likely congenital interbody fusion level. T11-T12: Artifact limits evaluation. No significant posterior disc herniation. Facets not well evaluated. Spinal canal felt to be largely patent, but not  optimally evaluated. Left foramen patent. Right foramen poorly evaluated. T12-L1: Artifact limits evaluation. No significant disc herniation. Facets  poorly evaluated. Spinal canal patent. Left foramen patent. Right foramen  probably patent, but not adequately evaluated. L1-L2: Artifact limits evaluation. No significant disc herniation. Facets not  assessable. Spinal canal poorly assessed, felt to be largely patent. Left  foramen patent. Right foramen poorly assessed. L2-L3: Evaluation is essentially nondiagnostic at this level due to significant  artifact. L3-L4 level: Artifact limits evaluation. Eccentric to the left moderate  disc/osteophyte bulge. Moderate facet arthropathy ligamentum flavum bulge.   Suspected moderate spinal canal stenosis with possible abutment of the crossing  right L4 nerve. Moderate right and mild left foraminal stenoses with probable  abutment of the exiting right L3 nerve. L4-L5: Trace anterolisthesis with mild to moderate disc bulge. Severe facet  arthropathy with ligamentum flavum bulge. Moderate spinal canal stenosis with  possible abutment of the crossing L5 nerves. Moderate bilateral foraminal  stenoses with probable abutment of bilateral exiting L4 nerves. L5-S1: Right lateral focal disc/osteophyte protrusion. Moderate bilateral facet  arthropathy. Spinal canal patent. Foramina patent. Imaged sacrum: Unremarkable. Impression IMPRESSION:    Posterior fixation/stabilization hardware from L2 level to at least T10 level.  -Hardware-related artifact significantly limits evaluation as detailed above. Artifact related limitations are most pronounced at T9-T11, and L2-L3. At L3-L4 level, degenerative changes characterized by a notable disc/osteophyte  bulge results in moderate spinal canal stenosis and mild to moderate foraminal  stenoses.  -At L4-L5 level, degenerative changes characterized by severe facet arthropathy  and mild anterolisthesis results in moderate spinal canal and foraminal  stenoses. -Degenerative change and stenosis at these levels are similar to 2018 MRI. See additional details above. Cholelithiasis and sigmoid diverticulosis  incidentally noted. Written by Josephine Hyde, as dictated by Roberto Montgomery MD.  I, Dr. Roberto Montgomery confirm that all documentation is accurate.

## 2019-06-10 NOTE — PATIENT INSTRUCTIONS
Low Back Arthritis: Exercises  Your Care Instructions  Here are some examples of typical rehabilitation exercises for your condition. Start each exercise slowly. Ease off the exercise if you start to have pain. Your doctor or physical therapist will tell you when you can start these exercises and which ones will work best for you. When you are not being active, find a comfortable position for rest. Some people are comfortable on the floor or a medium-firm bed with a small pillow under their head and another under their knees. Some people prefer to lie on their side with a pillow between their knees. Don't stay in one position for too long. Take short walks (10 to 20 minutes) every 2 to 3 hours. Avoid slopes, hills, and stairs until you feel better. Walk only distances you can manage without pain, especially leg pain. How to do the exercises  Pelvic tilt    1. Lie on your back with your knees bent. 2. \"Brace\" your stomach--tighten your muscles by pulling in and imagining your belly button moving toward your spine. 3. Press your lower back into the floor. You should feel your hips and pelvis rock back. 4. Hold for 6 seconds while breathing smoothly. 5. Relax and allow your pelvis and hips to rock forward. 6. Repeat 8 to 12 times. Back stretches    1. Get down on your hands and knees on the floor. 2. Relax your head and allow it to droop. Round your back up toward the ceiling until you feel a nice stretch in your upper, middle, and lower back. Hold this stretch for as long as it feels comfortable, or about 15 to 30 seconds. 3. Return to the starting position with a flat back while you are on your hands and knees. 4. Let your back sway by pressing your stomach toward the floor. Lift your buttocks toward the ceiling. 5. Hold this position for 15 to 30 seconds. 6. Repeat 2 to 4 times. Follow-up care is a key part of your treatment and safety.  Be sure to make and go to all appointments, and call your doctor if you are having problems. It's also a good idea to know your test results and keep a list of the medicines you take. Where can you learn more? Go to http://maggie-rae.info/. Enter V703 in the search box to learn more about \"Low Back Arthritis: Exercises. \"  Current as of: September 20, 2018  Content Version: 11.9  © 1893-2410 [employer]. Care instructions adapted under license by [employer] (which disclaims liability or warranty for this information). If you have questions about a medical condition or this instruction, always ask your healthcare professional. [address] any warranty or liability for your use of this information. Trochanteric Bursitis: Exercises  Your Care Instructions  Here are some examples of typical rehabilitation exercises for your condition. Start each exercise slowly. Ease off the exercise if you start to have pain. Your doctor or physical therapist will tell you when you can start these exercises and which ones will work best for you. How to do the exercises  Hamstring wall stretch    7. Lie on your back in a doorway, with your good leg through the open door. 8. Slide your affected leg up the wall to straighten your knee. You should feel a gentle stretch down the back of your leg. 1. Do not arch your back. 2. Do not bend either knee. 3. Keep one heel touching the floor and the other heel touching the wall. Do not point your toes. 9. Hold the stretch for at least 1 minute to begin. Then try to lengthen the time you hold the stretch to as long as 6 minutes. 10. Repeat 2 to 4 times. 11. If you do not have a place to do this exercise in a doorway, there is another way to do it:  12. Lie on your back, and bend the knee of your affected leg. 13. Loop a towel under the ball and toes of that foot, and hold the ends of the towel in your hands.   14. Straighten your knee, and slowly pull back on the towel. You should feel a gentle stretch down the back of your leg. 15. Hold the stretch for 15 to 30 seconds. Or even better, hold the stretch for 1 minute if you can. 16. Repeat 2 to 4 times. Straight-leg raises to the outside    7. Lie on your side, with your affected leg on top. 8. Tighten the front thigh muscles of your top leg to keep your knee straight. 9. Keep your hip and your leg straight in line with the rest of your body, and keep your knee pointing forward. Do not drop your hip back. 10. Lift your top leg straight up toward the ceiling, about 12 inches off the floor. Hold for about 6 seconds, then slowly lower your leg. 11. Repeat 8 to 12 times. Clamshell    1. Lie on your side, with your affected leg on top and your head propped on a pillow. Keep your feet and knees together and your knees bent. 2. Raise your top knee, but keep your feet together. Do not let your hips roll back. Your legs should open up like a clamshell. 3. Hold for 6 seconds. 4. Slowly lower your knee back down. Rest for 10 seconds. 5. Repeat 8 to 12 times. Standing quadriceps stretch    1. If you are not steady on your feet, hold on to a chair, counter, or wall. You can also lie on your stomach or your side to do this exercise. 2. Bend the knee of the leg you want to stretch, and reach behind you to grab the front of your foot or ankle with the hand on the same side. For example, if you are stretching your right leg, use your right hand. 3. Keeping your knees next to each other, pull your foot toward your buttock until you feel a gentle stretch across the front of your hip and down the front of your thigh. Your knee should be pointed directly to the ground, and not out to the side. 4. Hold the stretch for 15 to 30 seconds. 5. Repeat 2 to 4 times. Piriformis stretch    1. Lie on your back with your legs straight. 2. Lift your affected leg and bend your knee.  With your opposite hand, reach across your body, and then gently pull your knee toward your opposite shoulder. 3. Hold the stretch for 15 to 30 seconds. 4. Repeat 2 to 4 times. Double knee-to-chest    1. Lie on your back with your knees bent and your feet flat on the floor. You can put a small pillow under your head and neck if it is more comfortable. 2. Bring both knees to your chest.  3. Keep your lower back pressed to the floor. Hold for 15 to 30 seconds. 4. Relax, and lower your knees to the starting position. 5. Repeat 2 to 4 times. Follow-up care is a key part of your treatment and safety. Be sure to make and go to all appointments, and call your doctor if you are having problems. It's also a good idea to know your test results and keep a list of the medicines you take. Where can you learn more? Go to http://maggie-rae.info/. Enter W386 in the search box to learn more about \"Trochanteric Bursitis: Exercises. \"  Current as of: September 20, 2018  Content Version: 11.9  © 7970-7146 [employer]. Care instructions adapted under license by [employer] (which disclaims liability or warranty for this information). If you have questions about a medical condition or this instruction, always ask your healthcare professional. [address] any warranty or liability for your use of this information.

## 2019-06-10 NOTE — LETTER
6/10/19 Patient: Ashli James YOB: 1945 Date of Visit: 6/10/2019 Esperanza Celis MD 
[address] [facility], [facility] [id number] [place] [address] VIA Facsimile: [phone] Dear Esperanza Celis MD, Thank you for referring Ms. Jose Rafael Dunn to [address] [facility] for evaluation. My notes for this consultation are attached. If you have questions, please do not hesitate to call me. I look forward to following your patient along with you. Sincerely, Es Robertson MD

## 2019-07-23 ENCOUNTER — OFFICE VISIT (OUTPATIENT)
Dept: ORTHOPEDIC SURGERY | Age: 74
End: 2019-07-23

## 2019-07-23 VITALS
HEIGHT: 65 IN | RESPIRATION RATE: 14 BRPM | SYSTOLIC BLOOD PRESSURE: 115 MMHG | DIASTOLIC BLOOD PRESSURE: 67 MMHG | BODY MASS INDEX: 23.13 KG/M2 | TEMPERATURE: 97.6 F | OXYGEN SATURATION: 96 % | HEART RATE: 75 BPM | WEIGHT: 138.8 LBS

## 2019-07-23 DIAGNOSIS — M48.062 SPINAL STENOSIS OF LUMBAR REGION WITH NEUROGENIC CLAUDICATION: Primary | ICD-10-CM

## 2019-07-23 DIAGNOSIS — G47.9 SLEEP DISORDER: ICD-10-CM

## 2019-07-23 DIAGNOSIS — M54.16 LUMBAR RADICULAR PAIN: ICD-10-CM

## 2019-07-23 DIAGNOSIS — M48.061 LUMBAR FORAMINAL STENOSIS: ICD-10-CM

## 2019-07-23 DIAGNOSIS — M79.2 NEURITIS: ICD-10-CM

## 2019-07-23 RX ORDER — GABAPENTIN 300 MG/1
CAPSULE ORAL
Qty: 450 CAP | Refills: 1 | Status: SHIPPED | OUTPATIENT
Start: 2019-07-23 | End: 2020-01-22 | Stop reason: SDUPTHER

## 2019-07-23 RX ORDER — ZOLPIDEM TARTRATE 10 MG/1
5-10 TABLET ORAL
Qty: 30 TAB | Refills: 0 | Status: SHIPPED | OUTPATIENT
Start: 2019-07-23 | End: 2021-05-26

## 2019-07-23 RX ORDER — GLUCOSAMINE SULFATE 1500 MG
1 POWDER IN PACKET (EA) ORAL DAILY
COMMUNITY
End: 2022-06-10

## 2019-07-23 RX ORDER — ZOLPIDEM TARTRATE 10 MG/1
5-10 TABLET ORAL
Qty: 30 TAB | Refills: 1 | Status: SHIPPED | OUTPATIENT
Start: 2019-07-23 | End: 2019-07-23 | Stop reason: SDUPTHER

## 2019-07-23 NOTE — PROGRESS NOTES
Francisco Javier Ramos presents today for   Chief Complaint   Patient presents with    Back Pain     lower    Follow-up       Is someone accompanying this pt? no    Is the patient using any DME equipment during OV? no    Depression Screening:  3 most recent PHQ Screens 7/23/2019   Little interest or pleasure in doing things Not at all   Feeling down, depressed, irritable, or hopeless Not at all   Total Score PHQ 2 0       Learning Assessment:  Learning Assessment 7/23/2019   PRIMARY LEARNER Patient   HIGHEST LEVEL OF EDUCATION - PRIMARY LEARNER  GRADUATED HIGH SCHOOL OR GED   BARRIERS PRIMARY LEARNER NONE   CO-LEARNER CAREGIVER No   PRIMARY LANGUAGE ENGLISH   LEARNER PREFERENCE PRIMARY DEMONSTRATION     READING     VIDEOS   ANSWERED BY patient   RELATIONSHIP SELF       Abuse Screening:  Abuse Screening Questionnaire 7/23/2019   Do you ever feel afraid of your partner? N   Are you in a relationship with someone who physically or mentally threatens you? N   Is it safe for you to go home? Y       Fall Risk  Fall Risk Assessment, last 12 mths 7/23/2019   Able to walk? Yes   Fall in past 12 months? No       OPIOID RISK TOOL  No flowsheet data found. Pt currently taking Antiplatelet therapy? no    Coordination of Care:  1. Have you been to the ER, urgent care clinic since your last visit? no  Hospitalized since your last visit? no    2. Have you seen or consulted any other health care providers outside of the [address] [place] [address] since your last visit? no Include any pap smears or colon screening.  no    Last  Checked 07/23/19

## 2019-07-23 NOTE — LETTER
7/25/19 Patient: Hermes Reeder YOB: 1945 Date of Visit: 7/23/2019 Matthias Rolle MD 
[address] [facility] [address] [address] [place] [address] VIA Facsimile: [phone] Dear Matthias Rolle MD, Thank you for referring Ms. Brisa Chou to [address] [facility] for evaluation. My notes for this consultation are attached. If you have questions, please do not hesitate to call me. I look forward to following your patient along with you. Sincerely, Re Devine MD

## 2019-07-23 NOTE — PROGRESS NOTES
[facility]  [address] [place] [address], [facility] [address]  Phone: [phone]  Fax: [phone]    Pt's YOB: 1945    ASSESSMENT   Diagnoses and all orders for this visit:    1. Spinal stenosis of lumbar region with neurogenic claudication  -     gabapentin (NEURONTIN) 300 mg capsule; 1 in the am, 1 in the afternoon, and 3 in the evening as directed  Indications: Neuropathic Pain    2. Neuritis  -     gabapentin (NEURONTIN) 300 mg capsule; 1 in the am, 1 in the afternoon, and 3 in the evening as directed  Indications: Neuropathic Pain    3. Lumbar radicular pain  -     gabapentin (NEURONTIN) 300 mg capsule; 1 in the am, 1 in the afternoon, and 3 in the evening as directed  Indications: Neuropathic Pain    4. Lumbar foraminal stenosis  -     gabapentin (NEURONTIN) 300 mg capsule; 1 in the am, 1 in the afternoon, and 3 in the evening as directed  Indications: Neuropathic Pain    5. Sleep disorder  -     zolpidem (AMBIEN) 10 mg tablet; Take 0.5-1 Tabs by mouth nightly as needed for Sleep. Max Daily Amount: 10 mg.         IMPRESSION AND PLAN:  Olman Judd is a 76 y.o. female with history of lumbar pain. She notes improvement since her last office visit. Pt reports benefit when taking Neurontin 300 mg 1 tab QAM, 1 tab in the afternoon, and 3 tabs QHS. She notes minimal relief when taking Pamelor 25 mg 1-2 tabs QHS. 1) Pt was given information on lumbar arthritis exercises. 2) She received a refill of Neurontin 300 mg 1 tab QAM, 1 tab in the afternoon, and 3 tabs QHS. 3) Pt was prescribed Ambien 10 mg 1/2-1 tab at night as needed-- pt reports this medication is used intermittently and helps her to be able to sleep at least 3-4 x / week when she takes this;.   4) Ms. Brittany Yarbrough has a reminder for a \"due or due soon\" health maintenance.  I have asked that she contact her primary care provider, Altagracia Hammonds MD, for follow-up on this health maintenance; pt also encouraged to discuss sleep issues with Dr. Coby Desir for further evaluation. ; she also was given information about sleep evaluation at [facility]  5)  demonstrated consistency with prescribing. Follow-up and Dispositions    · Return in about 5 months (around 12/23/2019) for Medication follow up. HISTORY OF PRESENT ILLNESS:  Jake Hoang is a 76 y.o. female with history of lumbar pain and presents to the office today for follow up. She notes improvement since her last office visit. Pt reports benefit when taking Neurontin 300 mg 1 tab QAM, 1 tab in the afternoon, and 3 tabs QHS. She notes that her pain is most severe at night. Pt notes minimal relief when taking Pamelor 25 mg 1-2 tabs QHS. She admits to difficulty with sleep and reports that she sleeps better at night when taking Ambien 10 mg. Pt notes that she has never undergone a sleep study. She admits that she has lost 10 lbs since her last office visit and notes that she has cut back on how much she is eating. Pt at this time desires to proceed with medication evaluation. Pain Scale: /10    PCP: Tyler Scott MD     Past Medical History:   Diagnosis Date    Acid reflux     Hypertension         Social History     Socioeconomic History    Marital status:      Spouse name: Not on file    Number of children: Not on file    Years of education: Not on file    Highest education level: Not on file   Occupational History    Not on file   Social Needs    Financial resource strain: Not on file    Food insecurity:     Worry: Not on file     Inability: Not on file    Transportation needs:     Medical: Not on file     Non-medical: Not on file   Tobacco Use    Smoking status: Never Smoker    Smokeless tobacco: Never Used   Substance and Sexual Activity    Alcohol use:  Yes     Alcohol/week: 1.0 standard drinks     Types: 1 Glasses of wine per week    Drug use: No    Sexual activity: Yes     Partners: Male     Birth control/protection: None   Lifestyle    Physical activity:     Days per week: Not on file     Minutes per session: Not on file    Stress: Not on file   Relationships    Social connections:     Talks on phone: Not on file     Gets together: Not on file     Attends Latter-day service: Not on file     Active member of club or organization: Not on file     Attends meetings of clubs or organizations: Not on file     Relationship status: Not on file    Intimate partner violence:     Fear of current or ex partner: Not on file     Emotionally abused: Not on file     Physically abused: Not on file     Forced sexual activity: Not on file   Other Topics Concern    Not on file   Social History Narrative    Not on file       Current Outpatient Medications   Medication Sig Dispense Refill    cholecalciferol (VITAMIN D3) 1,000 unit cap Take 1 Cap by mouth daily.  gabapentin (NEURONTIN) 300 mg capsule 1 in the am, 1 in the afternoon, and 3 in the evening as directed  Indications: Neuropathic Pain 450 Cap 1    zolpidem (AMBIEN) 10 mg tablet Take 0.5-1 Tabs by mouth nightly as needed for Sleep. Max Daily Amount: 10 mg. 30 Tab 0    MAGNESIUM OXIDE PO Take 1 Tab by mouth daily.  omeprazole (PRILOSEC) 40 mg capsule Take 40 mg by mouth daily.  losartan (COZAAR) 50 mg tablet Take 25 mg by mouth daily.  hydroCHLOROthiazide (HYDRODIURIL) 25 mg tablet Take 25 mg by mouth daily.  LORazepam (ATIVAN) 1 mg tablet Take 1 mg by mouth every eight (8) hours as needed.  atorvastatin (LIPITOR) 10 mg tablet Take 10 mg by mouth daily.  calcium carbonate (CALTREX) 600 mg calcium (1,500 mg) tablet Take 600 mg by mouth daily.  omega-3 acid ethyl esters (LOVAZA) 1 gram capsule Take 1 g by mouth daily (with breakfast).  nortriptyline (PAMELOR) 25 mg capsule Take 1-2 Caps by mouth nightly.  (Patient not taking: Reported on 7/23/2019) 60 Cap 1    topiramate (TOPAMAX) 25 mg tablet Take 2 Tabs by mouth nightly. (Patient not taking: Reported on 5/15/2019) 60 Tab 1    meloxicam (MOBIC) 15 mg tablet       diclofenac EC (VOLTAREN) 75 mg EC tablet Take 1 Tab by mouth two (2) times a day. (Patient not taking: Reported on 5/15/2019) 60 Tab 2       No Known Allergies      REVIEW OF SYSTEMS    Constitutional: Negative for fever or chills. Positive for 10 lb weight loss. Respiratory: Negative for cough or shortness of breath. Cardiovascular: Negative for chest pain or palpitations. Gastrointestinal: Negative for acid reflux, change in bowel habits, or constipation. Genitourinary: Negative for dysuria and flank pain. Musculoskeletal: Positive for lumbar pain. Skin: Negative for rash. Neurological: Negative for headaches, dizziness, or numbness. Endo/Heme/Allergies: Negative for increased bruising. Psychiatric/Behavioral: Positive for difficulty with sleep. PHYSICAL EXAMINATION  Visit Vitals  /67   Pulse 75   Temp 97.6 °F (36.4 °C) (Oral)   Resp 14   Ht 5' 5\" (1.651 m)   Wt 138 lb 12.8 oz (63 kg)   SpO2 96%   BMI 23.10 kg/m²       Constitutional: Awake, alert, and in no acute distress. Neurological: 1+ symmetrical DTRs in the upper extremities. 1+ symmetrical DTRs in the lower extremities. Sensation to light touch is intact. Negative Mckeon's sign bilaterally. Skin: warm, dry, and intact. Musculoskeletal: Tenderness to palpation in the lower lumbar region. Moderate pain with extension and axial loading. No pain with internal or external rotation of her hips. Negative straight leg raise bilaterally.      Hip Flex  Quads Hamstrings Ankle DF EHL Ankle PF   Right +4/5 +4/5 +4/5 +4/5 +4/5 +4/5   Left +4/5 +4/5 +4/5 +4/5 +4/5 +4/5     IMAGING:    Lumbar spine MRI from 05/2/2019 was personally reviewed with the patient and demonstrated:       Results from [facility] on 05/29/19   MRI LUMB SPINE W WO CONT     Narrative EXAMINATION: MRI lumbar spine with and without contrast     INDICATION: History of lumbar fusion, right lower extremity radiculopathy,  spinal canal stenosis     COMPARISON: 5/23/2018     TECHNIQUE: Sagittal and axial multiecho MRI sequences of the lumbar spine  performed before and after 15 cc IV Dotarem, including utilization of hardware  artifact limiting techniques.     FINDINGS:     Postsurgical: Status post posterior fixation/stabilization hardware spanning at  least levels T10-L2, but superior extent not completely clear. Extensive  hardware-related artifact limits evaluation. No obvious postoperative fluid  collection in the imaged levels. Mild lower lumbar paraspinous fatty  infiltration.     General: Mild T12 superior endplate depression, chronic appearing. Suggestion of  T10 and T11 congenital interbody fusion, evaluated sagittal plane only. Advanced  disc space loss at L3-L4 eccentric to the right with chronic-appearing endplate  changes. Elsewhere disc space heights are preserved. No focal listhesis. Lumbar  lordosis maintained. Conus ends at roughly L1 level. Distal cord or evaluated  due to artifact. No obvious suspicious epidural collection. L3-L4 endplate  edema, likely due to active disc disease. No suspicious marrow signal.     Miscellaneous: Cholelithiasis noted. Sigmoid diverticulosis.     Levels:     T8-T9, T9-T10, T10-T11: Evaluated sagittal plane only. Evaluation essentially  nondiagnostic due to artifact. T10-T11 likely congenital interbody fusion level.     T11-T12: Artifact limits evaluation. No significant posterior disc herniation. Facets not well evaluated. Spinal canal felt to be largely patent, but not  optimally evaluated. Left foramen patent. Right foramen poorly evaluated.     T12-L1: Artifact limits evaluation. No significant disc herniation. Facets  poorly evaluated. Spinal canal patent. Left foramen patent. Right foramen  probably patent, but not adequately evaluated.     L1-L2: Artifact limits evaluation. No significant disc herniation.  Facets not  assessable. Spinal canal poorly assessed, felt to be largely patent. Left  foramen patent. Right foramen poorly assessed.     L2-L3: Evaluation is essentially nondiagnostic at this level due to significant  artifact.     L3-L4 level: Artifact limits evaluation. Eccentric to the left moderate  disc/osteophyte bulge. Moderate facet arthropathy ligamentum flavum bulge. Suspected moderate spinal canal stenosis with possible abutment of the crossing  right L4 nerve. Moderate right and mild left foraminal stenoses with probable  abutment of the exiting right L3 nerve.     L4-L5: Trace anterolisthesis with mild to moderate disc bulge. Severe facet  arthropathy with ligamentum flavum bulge. Moderate spinal canal stenosis with  possible abutment of the crossing L5 nerves. Moderate bilateral foraminal  stenoses with probable abutment of bilateral exiting L4 nerves.     L5-S1: Right lateral focal disc/osteophyte protrusion. Moderate bilateral facet  arthropathy. Spinal canal patent. Foramina patent.     Imaged sacrum: Unremarkable.        Impression IMPRESSION:     Posterior fixation/stabilization hardware from L2 level to at least T10 level.  -Hardware-related artifact significantly limits evaluation as detailed above. Artifact related limitations are most pronounced at T9-T11, and L2-L3.     At L3-L4 level, degenerative changes characterized by a notable disc/osteophyte  bulge results in moderate spinal canal stenosis and mild to moderate foraminal  stenoses.  -At L4-L5 level, degenerative changes characterized by severe facet arthropathy  and mild anterolisthesis results in moderate spinal canal and foraminal  stenoses. -Degenerative change and stenosis at these levels are similar to 2018 MRI.     See additional details above. Cholelithiasis and sigmoid diverticulosis  incidentally noted.        Written by Kate Zavala, as dictated by Zoila Her MD.  I, Dr. Zoila Her confirm that all documentation is accurate.

## 2019-07-23 NOTE — PATIENT INSTRUCTIONS
Low Back Arthritis: Exercises  Introduction  Here are some examples of typical rehabilitation exercises for your condition. Start each exercise slowly. Ease off the exercise if you start to have pain. Your doctor or physical therapist will tell you when you can start these exercises and which ones will work best for you. When you are not being active, find a comfortable position for rest. Some people are comfortable on the floor or a medium-firm bed with a small pillow under their head and another under their knees. Some people prefer to lie on their side with a pillow between their knees. Don't stay in one position for too long. Take short walks (10 to 20 minutes) every 2 to 3 hours. Avoid slopes, hills, and stairs until you feel better. Walk only distances you can manage without pain, especially leg pain. How to do the exercises  Pelvic tilt    1. Lie on your back with your knees bent. 2. \"Brace\" your stomach--tighten your muscles by pulling in and imagining your belly button moving toward your spine. 3. Press your lower back into the floor. You should feel your hips and pelvis rock back. 4. Hold for 6 seconds while breathing smoothly. 5. Relax and allow your pelvis and hips to rock forward. 6. Repeat 8 to 12 times. Back stretches    1. Get down on your hands and knees on the floor. 2. Relax your head and allow it to droop. Round your back up toward the ceiling until you feel a nice stretch in your upper, middle, and lower back. Hold this stretch for as long as it feels comfortable, or about 15 to 30 seconds. 3. Return to the starting position with a flat back while you are on your hands and knees. 4. Let your back sway by pressing your stomach toward the floor. Lift your buttocks toward the ceiling. 5. Hold this position for 15 to 30 seconds. 6. Repeat 2 to 4 times. Follow-up care is a key part of your treatment and safety.  Be sure to make and go to all appointments, and call your doctor if you are having problems. It's also a good idea to know your test results and keep a list of the medicines you take. Where can you learn more? Go to http://maggie-rae.info/. Enter Q583 in the search box to learn more about \"Low Back Arthritis: Exercises. \"  Current as of: September 20, 2018  Content Version: 12.1  © 6988-9282 [employer], [employer]. Care instructions adapted under license by [employer] (which disclaims liability or warranty for this information). If you have questions about a medical condition or this instruction, always ask your healthcare professional. [address] any warranty or liability for your use of this information.

## 2019-07-24 ENCOUNTER — TELEPHONE (OUTPATIENT)
Dept: ORTHOPEDIC SURGERY | Age: 74
End: 2019-07-24

## 2019-07-24 NOTE — TELEPHONE ENCOUNTER
PA submitted  The patients plan does not cover Ambien. Ama Matias Melton: [id number] - PA Case ID: [id number] - Rx #: [id number] Need help?  Call us at [phone]   Status   Sent to [facility]   DrugZolpidem Tartrate 10MG tablets   [employer] Electronic PA Form   Original Claim [id number] IF LEVEL OF CARE CHANGE, CALL HELP DESK   Health Plans Preferred Product   ZALEPLON

## 2019-07-24 NOTE — TELEPHONE ENCOUNTER
Please use Key: [id number] to initiate a prior authorization for Zolpidem through Cover My Meds or contact the pharmacy to change the medication.

## 2019-07-25 NOTE — TELEPHONE ENCOUNTER
PA DENIED see below for preferred    Anna Pruitt Key: [id number] - PA Case ID: [id number] - Rx #: [id number] Need help? Call us at [phone]   Outcome   Deniedtoday   CaseId:[id number];Status:Denied; Review Type:Prior Auth; Appeal Information: [address] [place] [address] [id number]. Lendia Balloon Fax:[phone] WebAddress:[email or web address];   DrugZolpidem Tartrate 10MG tablets   [employer] Electronic PA Form   Original Claim [id number] IF LEVEL OF CARE CHANGE, CALL HELP DESK   Health Plans Preferred Product   ZALEPLON

## 2020-01-22 DIAGNOSIS — M48.062 SPINAL STENOSIS OF LUMBAR REGION WITH NEUROGENIC CLAUDICATION: ICD-10-CM

## 2020-01-22 DIAGNOSIS — M48.061 LUMBAR FORAMINAL STENOSIS: ICD-10-CM

## 2020-01-22 DIAGNOSIS — M79.2 NEURITIS: ICD-10-CM

## 2020-01-22 DIAGNOSIS — M54.16 LUMBAR RADICULAR PAIN: ICD-10-CM

## 2020-01-22 RX ORDER — GABAPENTIN 300 MG/1
CAPSULE ORAL
Qty: 20 CAP | Refills: 0 | Status: SHIPPED | OUTPATIENT
Start: 2020-01-24 | End: 2020-01-28 | Stop reason: SDUPTHER

## 2020-01-22 RX ORDER — GABAPENTIN 300 MG/1
CAPSULE ORAL
Qty: 450 CAP | Refills: 1 | OUTPATIENT
Start: 2020-01-22

## 2020-01-22 NOTE — TELEPHONE ENCOUNTER
Patient called and made appt for the first available w/ Dr. Mir Can not till 01/28/2020. Patient only has enough for the next 3 day. Could a short term prescription be called in till patient is seen next Tues 01/28 as she does not to have any withdrawal side affects from not taking the Gabapentin? Please call patient back at [phone].  Her pharmacy is "[facility] [phone]

## 2020-01-22 NOTE — TELEPHONE ENCOUNTER
Last Visit: 19 with MD Kasia Jennings  Next Appointment: return in 5 months  Previous Refill Encounter(s): 19 #450 with 1 refill    Requested Prescriptions     Pending Prescriptions Disp Refills    gabapentin (NEURONTIN) 300 mg capsule 450 Cap 1     Si in the am, 1 in the afternoon, and 3 in the evening as directed  Indications: neuropathic pain

## 2020-01-22 NOTE — TELEPHONE ENCOUNTER
Returned call to patient, Reached voicemail identified as \"Iliana\", left message, identified myself/facility/callback number, informed of 4 days worth of refill sent to pharmacy to get patient through to next appointment, requested return call to facility with any further questions/concerns. No further action required at this time.

## 2020-01-27 NOTE — PROGRESS NOTES
[facility]  Susan Thurman[address] [place], [facility] [address]  Phone: [phone]  Fax: [phone]    Pt's YOB: 1945    ASSESSMENT   Diagnoses and all orders for this visit:    1. Spinal stenosis of lumbar region with neurogenic claudication  -     gabapentin (NEURONTIN) 300 mg capsule; 1 in the am, 1 in the afternoon, and 3 in the evening as directed  Indications: neuropathic pain    2. Neuritis  -     gabapentin (NEURONTIN) 300 mg capsule; 1 in the am, 1 in the afternoon, and 3 in the evening as directed  Indications: neuropathic pain    3. Lumbar radicular pain  -     gabapentin (NEURONTIN) 300 mg capsule; 1 in the am, 1 in the afternoon, and 3 in the evening as directed  Indications: neuropathic pain    4. Lumbar foraminal stenosis  -     gabapentin (NEURONTIN) 300 mg capsule; 1 in the am, 1 in the afternoon, and 3 in the evening as directed  Indications: neuropathic pain         IMPRESSION AND PLAN:  Tu Bennett is a 76 y.o. female with history of lumbar pain. She denies any change in her symptoms since her last office visit. Pt continues to take Neurontin 300 mg 1 tab QAM, 1 tab in the afternoon, and 3 tabs QHS. 1) Pt was given information on lumbar arthritis exercises. 2) She received a refill of Neurontin 300 mg 1 tab QAM, 1 tab in the afternoon, and 3 tabs QHS. 3) I recommended the patient try water exercise, brett chi, and yoga. Jimi Flowers 4) Ms. Halima Huggins has a reminder for a \"due or due soon\" health maintenance. I have asked that she contact her primary care provider, Mliler Abad MD, for follow-up on this health maintenance. 5)  demonstrated consistency with prescribing. Follow-up and Dispositions    · Return in about 24 weeks (around 7/14/2020) for Medication follow up. HISTORY OF PRESENT ILLNESS:  Tu Bennett is a 76 y.o. female with history of lumbar pain and presents to the office today for follow up.  Pt denies any change in her symptoms since her last office visit. She reports continued soreness in the right knee. Pt continues to take Neurontin 300 mg 1 tab QAM, 1 tab in the afternoon, and 3 tabs QHS. She admits to significant relief when taking the Neurontin. She does not have any side effects with the medication and she is more functional with it. Pt at this time desires to continue with current care. Pt notes that her  fell last week when he was playing with his daughter's dog on the patio. She reports that he has a black eye but luckily he did not fracture anything. Pain Scale: 0 - No pain/10    PCP: Zoila Morillo MD     Past Medical History:   Diagnosis Date    Acid reflux     Hypertension         Social History     Socioeconomic History    Marital status:      Spouse name: Not on file    Number of children: Not on file    Years of education: Not on file    Highest education level: Not on file   Occupational History    Not on file   Social Needs    Financial resource strain: Not on file    Food insecurity:     Worry: Not on file     Inability: Not on file    Transportation needs:     Medical: Not on file     Non-medical: Not on file   Tobacco Use    Smoking status: Never Smoker    Smokeless tobacco: Never Used   Substance and Sexual Activity    Alcohol use:  Yes     Alcohol/week: 1.0 standard drinks     Types: 1 Glasses of wine per week    Drug use: No    Sexual activity: Yes     Partners: Male     Birth control/protection: None   Lifestyle    Physical activity:     Days per week: Not on file     Minutes per session: Not on file    Stress: Not on file   Relationships    Social connections:     Talks on phone: Not on file     Gets together: Not on file     Attends Christianity service: Not on file     Active member of club or organization: Not on file     Attends meetings of clubs or organizations: Not on file     Relationship status: Not on file    Intimate partner violence:     Fear of current or ex partner: Not on file     Emotionally abused: Not on file     Physically abused: Not on file     Forced sexual activity: Not on file   Other Topics Concern    Not on file   Social History Narrative    Not on file       Current Outpatient Medications   Medication Sig Dispense Refill    gabapentin (NEURONTIN) 300 mg capsule 1 in the am, 1 in the afternoon, and 3 in the evening as directed  Indications: neuropathic pain 450 Cap 1    cholecalciferol (VITAMIN D3) 1,000 unit cap Take 1 Cap by mouth daily.  MAGNESIUM OXIDE PO Take 1 Tab by mouth daily.  omeprazole (PRILOSEC) 40 mg capsule Take 40 mg by mouth daily.  losartan (COZAAR) 50 mg tablet Take 25 mg by mouth daily.  hydroCHLOROthiazide (HYDRODIURIL) 25 mg tablet Take 25 mg by mouth daily.  LORazepam (ATIVAN) 1 mg tablet Take 1 mg by mouth every eight (8) hours as needed.  atorvastatin (LIPITOR) 10 mg tablet Take 10 mg by mouth daily.  calcium carbonate (CALTREX) 600 mg calcium (1,500 mg) tablet Take 600 mg by mouth daily.  zolpidem (AMBIEN) 10 mg tablet Take 0.5-1 Tabs by mouth nightly as needed for Sleep. Max Daily Amount: 10 mg. 30 Tab 0    nortriptyline (PAMELOR) 25 mg capsule Take 1-2 Caps by mouth nightly. (Patient not taking: Reported on 7/23/2019) 60 Cap 1    topiramate (TOPAMAX) 25 mg tablet Take 2 Tabs by mouth nightly. (Patient not taking: Reported on 5/15/2019) 60 Tab 1    meloxicam (MOBIC) 15 mg tablet       omega-3 acid ethyl esters (LOVAZA) 1 gram capsule Take 1 g by mouth daily (with breakfast).  diclofenac EC (VOLTAREN) 75 mg EC tablet Take 1 Tab by mouth two (2) times a day. (Patient not taking: Reported on 5/15/2019) 60 Tab 2       No Known Allergies      REVIEW OF SYSTEMS    Constitutional: Negative for fever, chills, or weight change. Respiratory: Negative for cough or shortness of breath.      Cardiovascular: Negative for chest pain or palpitations. Gastrointestinal: Negative for acid reflux, change in bowel habits, or constipation. Genitourinary: Negative for dysuria and flank pain. Musculoskeletal: Positive for lumbar pain. Skin: Negative for rash. Neurological: Negative for headaches, dizziness, or numbness. Endo/Heme/Allergies: Negative for increased bruising. Psychiatric/Behavioral: Negative for difficulty with sleep. PHYSICAL EXAMINATION  Visit Vitals  /70   Pulse 78   Temp 98.2 °F (36.8 °C)   Resp 18   Ht 5' 5\" (1.651 m)   Wt 134 lb (60.8 kg)   SpO2 96%   BMI 22.30 kg/m²       Constitutional: Awake, alert, and in no acute distress. Neurological: 1+ symmetrical DTRs in the lower extremities. Sensation to light touch is intact. Negative Mckeon's sign bilaterally. Skin: warm, dry, and intact. Musculoskeletal: Tenderness to palpation in the lower lumbar region. Moderate pain with extension and axial loading. No pain with internal or external rotation of her hips. Negative straight leg raise bilaterally. Hip Flex  Quads Hamstrings Ankle DF EHL Ankle PF   Right +4/5 +4/5 +4/5 +4/5 +4/5 +4/5   Left +4/5 +4/5 +4/5 +4/5 +4/5 +4/5     IMAGING:    Lumbar spine MRI from 05/2/2019 was personally reviewed with the patient and demonstrated:          Results from [facility] on 05/29/19   MRI LUMB SPINE W WO CONT     Narrative EXAMINATION: MRI lumbar spine with and without contrast     INDICATION: History of lumbar fusion, right lower extremity radiculopathy,  spinal canal stenosis     COMPARISON: 5/23/2018     TECHNIQUE: Sagittal and axial multiecho MRI sequences of the lumbar spine  performed before and after 15 cc IV Dotarem, including utilization of hardware  artifact limiting techniques.     FINDINGS:     Postsurgical: Status post posterior fixation/stabilization hardware spanning at  least levels T10-L2, but superior extent not completely clear. Extensive  hardware-related artifact limits evaluation.  No obvious postoperative fluid  collection in the imaged levels. Mild lower lumbar paraspinous fatty  infiltration.     General: Mild T12 superior endplate depression, chronic appearing. Suggestion of  T10 and T11 congenital interbody fusion, evaluated sagittal plane only. Advanced  disc space loss at L3-L4 eccentric to the right with chronic-appearing endplate  changes. Elsewhere disc space heights are preserved. No focal listhesis. Lumbar  lordosis maintained. Conus ends at roughly L1 level. Distal cord or evaluated  due to artifact. No obvious suspicious epidural collection. L3-L4 endplate  edema, likely due to active disc disease. No suspicious marrow signal.     Miscellaneous: Cholelithiasis noted. Sigmoid diverticulosis.     Levels:     T8-T9, T9-T10, T10-T11: Evaluated sagittal plane only. Evaluation essentially  nondiagnostic due to artifact. T10-T11 likely congenital interbody fusion level.     T11-T12: Artifact limits evaluation. No significant posterior disc herniation. Facets not well evaluated. Spinal canal felt to be largely patent, but not  optimally evaluated. Left foramen patent. Right foramen poorly evaluated.     T12-L1: Artifact limits evaluation. No significant disc herniation. Facets  poorly evaluated. Spinal canal patent. Left foramen patent. Right foramen  probably patent, but not adequately evaluated.     L1-L2: Artifact limits evaluation. No significant disc herniation. Facets not  assessable. Spinal canal poorly assessed, felt to be largely patent. Left  foramen patent. Right foramen poorly assessed.     L2-L3: Evaluation is essentially nondiagnostic at this level due to significant  artifact.     L3-L4 level: Artifact limits evaluation. Eccentric to the left moderate  disc/osteophyte bulge. Moderate facet arthropathy ligamentum flavum bulge. Suspected moderate spinal canal stenosis with possible abutment of the crossing  right L4 nerve.  Moderate right and mild left foraminal stenoses with probable  abutment of the exiting right L3 nerve.     L4-L5: Trace anterolisthesis with mild to moderate disc bulge. Severe facet  arthropathy with ligamentum flavum bulge. Moderate spinal canal stenosis with  possible abutment of the crossing L5 nerves. Moderate bilateral foraminal  stenoses with probable abutment of bilateral exiting L4 nerves.     L5-S1: Right lateral focal disc/osteophyte protrusion. Moderate bilateral facet  arthropathy. Spinal canal patent. Foramina patent.     Imaged sacrum: Unremarkable.        Impression IMPRESSION:     Posterior fixation/stabilization hardware from L2 level to at least T10 level.  -Hardware-related artifact significantly limits evaluation as detailed above. Artifact related limitations are most pronounced at T9-T11, and L2-L3.     At L3-L4 level, degenerative changes characterized by a notable disc/osteophyte  bulge results in moderate spinal canal stenosis and mild to moderate foraminal  stenoses.  -At L4-L5 level, degenerative changes characterized by severe facet arthropathy  and mild anterolisthesis results in moderate spinal canal and foraminal  stenoses. -Degenerative change and stenosis at these levels are similar to 2018 MRI.     See additional details above. Cholelithiasis and sigmoid diverticulosis  incidentally noted. Written by Dave Lockett, as dictated by Chandler Grajeda MD.  I, Dr. Chandler Grajeda confirm that all documentation is accurate.

## 2020-01-28 ENCOUNTER — OFFICE VISIT (OUTPATIENT)
Dept: ORTHOPEDIC SURGERY | Age: 75
End: 2020-01-28

## 2020-01-28 VITALS
OXYGEN SATURATION: 96 % | RESPIRATION RATE: 18 BRPM | BODY MASS INDEX: 22.33 KG/M2 | WEIGHT: 134 LBS | SYSTOLIC BLOOD PRESSURE: 116 MMHG | DIASTOLIC BLOOD PRESSURE: 70 MMHG | HEART RATE: 78 BPM | HEIGHT: 65 IN | TEMPERATURE: 98.2 F

## 2020-01-28 DIAGNOSIS — M79.2 NEURITIS: ICD-10-CM

## 2020-01-28 DIAGNOSIS — M48.062 SPINAL STENOSIS OF LUMBAR REGION WITH NEUROGENIC CLAUDICATION: ICD-10-CM

## 2020-01-28 DIAGNOSIS — M54.16 LUMBAR RADICULAR PAIN: ICD-10-CM

## 2020-01-28 DIAGNOSIS — M48.061 LUMBAR FORAMINAL STENOSIS: ICD-10-CM

## 2020-01-28 RX ORDER — GABAPENTIN 300 MG/1
CAPSULE ORAL
Qty: 450 CAP | Refills: 1 | Status: SHIPPED | OUTPATIENT
Start: 2020-01-28 | End: 2020-07-14 | Stop reason: SDUPTHER

## 2020-01-28 NOTE — PATIENT INSTRUCTIONS
Low Back Arthritis: Exercises  Introduction  Here are some examples of typical rehabilitation exercises for your condition. Start each exercise slowly. Ease off the exercise if you start to have pain. Your doctor or physical therapist will tell you when you can start these exercises and which ones will work best for you. When you are not being active, find a comfortable position for rest. Some people are comfortable on the floor or a medium-firm bed with a small pillow under their head and another under their knees. Some people prefer to lie on their side with a pillow between their knees. Don't stay in one position for too long. Take short walks (10 to 20 minutes) every 2 to 3 hours. Avoid slopes, hills, and stairs until you feel better. Walk only distances you can manage without pain, especially leg pain. How to do the exercises  Pelvic tilt    1. Lie on your back with your knees bent. 2. \"Brace\" your stomach--tighten your muscles by pulling in and imagining your belly button moving toward your spine. 3. Press your lower back into the floor. You should feel your hips and pelvis rock back. 4. Hold for 6 seconds while breathing smoothly. 5. Relax and allow your pelvis and hips to rock forward. 6. Repeat 8 to 12 times. Back stretches    1. Get down on your hands and knees on the floor. 2. Relax your head and allow it to droop. Round your back up toward the ceiling until you feel a nice stretch in your upper, middle, and lower back. Hold this stretch for as long as it feels comfortable, or about 15 to 30 seconds. 3. Return to the starting position with a flat back while you are on your hands and knees. 4. Let your back sway by pressing your stomach toward the floor. Lift your buttocks toward the ceiling. 5. Hold this position for 15 to 30 seconds. 6. Repeat 2 to 4 times. Follow-up care is a key part of your treatment and safety.  Be sure to make and go to all appointments, and call your doctor if you are having problems. It's also a good idea to know your test results and keep a list of the medicines you take. Where can you learn more? Go to http://maggie-rae.info/. Enter E571 in the search box to learn more about \"Low Back Arthritis: Exercises. \"  Current as of: June 26, 2019  Content Version: 12.2  © 0333-9506 [employer]. Care instructions adapted under license by [employer] (which disclaims liability or warranty for this information). If you have questions about a medical condition or this instruction, always ask your healthcare professional. [address] any warranty or liability for your use of this information.

## 2020-01-28 NOTE — LETTER
1/29/20 Patient: Tu Bennett YOB: 1945 Date of Visit: 1/28/2020 Adeola Jay MD 
[address] [facility] [address] [id number] [place] [address] VIA Facsimile: [phone] Dear Adeola Jay MD, Thank you for referring Ms. Summer Blankenship to [address] [facility] for evaluation. My notes for this consultation are attached. If you have questions, please do not hesitate to call me. I look forward to following your patient along with you. Sincerely, Lisbeth Fernandez MD

## 2020-07-14 ENCOUNTER — OFFICE VISIT (OUTPATIENT)
Dept: ORTHOPEDIC SURGERY | Age: 75
End: 2020-07-14

## 2020-07-14 VITALS
WEIGHT: 137.2 LBS | DIASTOLIC BLOOD PRESSURE: 62 MMHG | TEMPERATURE: 97.8 F | RESPIRATION RATE: 14 BRPM | BODY MASS INDEX: 22.86 KG/M2 | SYSTOLIC BLOOD PRESSURE: 109 MMHG | HEIGHT: 65 IN | HEART RATE: 85 BPM | OXYGEN SATURATION: 97 %

## 2020-07-14 DIAGNOSIS — M48.061 LUMBAR FORAMINAL STENOSIS: ICD-10-CM

## 2020-07-14 DIAGNOSIS — M48.062 SPINAL STENOSIS OF LUMBAR REGION WITH NEUROGENIC CLAUDICATION: ICD-10-CM

## 2020-07-14 DIAGNOSIS — M79.2 NEURITIS: ICD-10-CM

## 2020-07-14 DIAGNOSIS — M54.16 LUMBAR RADICULAR PAIN: ICD-10-CM

## 2020-07-14 RX ORDER — GABAPENTIN 300 MG/1
CAPSULE ORAL
Qty: 360 CAP | Refills: 1 | Status: SHIPPED | OUTPATIENT
Start: 2020-07-14 | End: 2021-01-27 | Stop reason: SDUPTHER

## 2020-07-14 NOTE — PROGRESS NOTES
[facility]  [address]man[address], [facility] [address]  Phone: [phone]  Fax: [phone]    Pt's YOB: 1945    ASSESSMENT   Diagnoses and all orders for this visit:    1. Spinal stenosis of lumbar region with neurogenic claudication  -     gabapentin (Neurontin) 300 mg capsule; 1 in the am and 3 in the evening as directed  Indications: neuropathic pain    2. Neuritis  -     gabapentin (Neurontin) 300 mg capsule; 1 in the am and 3 in the evening as directed  Indications: neuropathic pain    3. Lumbar radicular pain  -     gabapentin (Neurontin) 300 mg capsule; 1 in the am and 3 in the evening as directed  Indications: neuropathic pain    4. Lumbar foraminal stenosis  -     gabapentin (Neurontin) 300 mg capsule; 1 in the am and 3 in the evening as directed  Indications: neuropathic pain         IMPRESSION AND PLAN:  Sabrina Albarran is a 76 y.o. female with history of lumbar pain. She denies any change in symptoms since her last office visit. Pt takes Neurontin 300 mg 1 cap QAM and 3 caps QHS with benefit. 1) Pt was given information on lumbar arthritis exercises. 2) She received a refill of Neurontin 300 mg 1 cap QAM and 3 caps QHS. 3) I recommended the patient try Walk Away the Pounds exercise videos. 4) Ms. Frandy Muhammad has a reminder for a \"due or due soon\" health maintenance. I have asked that she contact her primary care provider, Bernadette Ray MD, for follow-up on this health maintenance. 5)  demonstrated consistency with prescribing. Follow-up and Dispositions    · Return in about 6 months (around 1/14/2021) for Medication follow up. HISTORY OF PRESENT ILLNESS:  Sabrina Albarran is a 76 y.o. female with history of lumbar pain and presents to the office today for follow up. She denies any change in symptoms since her last office visit.  Pt has been prescribed Neurontin 300 mg and takes 1 cap QAM and 3 caps QHS with benefit. Pt notes that she no longer experiences pain in her right leg at night  She reports relief when she previously attended physical therapy. Pt at this time desires to continue with current care. Pain Scale: 0 - No pain/10    PCP: Kami Cazares MD     Past Medical History:   Diagnosis Date    Acid reflux     Hypertension         Social History     Socioeconomic History    Marital status:      Spouse name: Not on file    Number of children: Not on file    Years of education: Not on file    Highest education level: Not on file   Occupational History    Not on file   Social Needs    Financial resource strain: Not on file    Food insecurity     Worry: Not on file     Inability: Not on file    Transportation needs     Medical: Not on file     Non-medical: Not on file   Tobacco Use    Smoking status: Never Smoker    Smokeless tobacco: Never Used   Substance and Sexual Activity    Alcohol use:  Yes     Alcohol/week: 1.0 standard drinks     Types: 1 Glasses of wine per week    Drug use: No    Sexual activity: Yes     Partners: Male     Birth control/protection: None   Lifestyle    Physical activity     Days per week: Not on file     Minutes per session: Not on file    Stress: Not on file   Relationships    Social connections     Talks on phone: Not on file     Gets together: Not on file     Attends Anabaptism service: Not on file     Active member of club or organization: Not on file     Attends meetings of clubs or organizations: Not on file     Relationship status: Not on file    Intimate partner violence     Fear of current or ex partner: Not on file     Emotionally abused: Not on file     Physically abused: Not on file     Forced sexual activity: Not on file   Other Topics Concern    Not on file   Social History Narrative    Not on file       Current Outpatient Medications   Medication Sig Dispense Refill    gabapentin (Neurontin) 300 mg capsule 1 in the am and 3 in the evening as directed  Indications: neuropathic pain 360 Cap 1    cholecalciferol (VITAMIN D3) 1,000 unit cap Take 1 Cap by mouth daily.  zolpidem (AMBIEN) 10 mg tablet Take 0.5-1 Tabs by mouth nightly as needed for Sleep. Max Daily Amount: 10 mg. 30 Tab 0    MAGNESIUM OXIDE PO Take 1 Tab by mouth daily.  omeprazole (PRILOSEC) 40 mg capsule Take 40 mg by mouth daily.  losartan (COZAAR) 50 mg tablet Take 25 mg by mouth daily.  hydroCHLOROthiazide (HYDRODIURIL) 25 mg tablet Take 25 mg by mouth daily.  LORazepam (ATIVAN) 1 mg tablet Take 1 mg by mouth every eight (8) hours as needed.  atorvastatin (LIPITOR) 10 mg tablet Take 10 mg by mouth daily.  calcium carbonate (CALTREX) 600 mg calcium (1,500 mg) tablet Take 600 mg by mouth daily.  meloxicam (MOBIC) 15 mg tablet       omega-3 acid ethyl esters (LOVAZA) 1 gram capsule Take 1 g by mouth daily (with breakfast).  nortriptyline (PAMELOR) 25 mg capsule Take 1-2 Caps by mouth nightly. (Patient not taking: Reported on 7/23/2019) 60 Cap 1    topiramate (TOPAMAX) 25 mg tablet Take 2 Tabs by mouth nightly. (Patient not taking: Reported on 5/15/2019) 60 Tab 1    diclofenac EC (VOLTAREN) 75 mg EC tablet Take 1 Tab by mouth two (2) times a day. (Patient not taking: Reported on 5/15/2019) 60 Tab 2       No Known Allergies      REVIEW OF SYSTEMS    Constitutional: Negative for fever, chills, or weight change. Respiratory: Negative for cough or shortness of breath. Cardiovascular: Negative for chest pain or palpitations. Gastrointestinal: Negative for acid reflux, change in bowel habits, or constipation. Genitourinary: Negative for dysuria and flank pain. Musculoskeletal: Positive for lumbar and leg pain. Neurological: Negative for headaches, dizziness, or numbness. Psychiatric/Behavioral: Negative for difficulty with sleep.    As per HPI     PHYSICAL EXAMINATION  Visit Vitals  /62   Pulse 85   Temp 97.8 °F (36.6 °C) (Oral) Resp 14   Ht 5' 5\" (1.651 m)   Wt 137 lb 3.2 oz (62.2 kg)   SpO2 97%   BMI 22.83 kg/m²       Constitutional: Awake, alert, and in no acute distress. Neurological: 1+ symmetrical DTRs in the lower extremities. Sensation to light touch is intact. Skin: warm, dry, and intact. Musculoskeletal: Tenderness to palpation in the lower lumbar region. No pain with extension, axial loading, or forward flexion. No pain with internal or external rotation of her hips. Negative straight leg raise bilaterally. Hip Flex  Quads Hamstrings Ankle DF EHL Ankle PF   Right +4/5 +4/5 +4/5 +4/5 +4/5 +4/5   Left +4/5 +4/5 +4/5 +4/5 +4/5 +4/5     IMAGING:    Lumbar spine MRI from 05/2/2019 was personally reviewed with the patient and demonstrated:          Results from [facility] [place] on 05/29/19   MRI LUMB SPINE W WO CONT     Narrative EXAMINATION: MRI lumbar spine with and without contrast     INDICATION: History of lumbar fusion, right lower extremity radiculopathy,  spinal canal stenosis     COMPARISON: 5/23/2018     TECHNIQUE: Sagittal and axial multiecho MRI sequences of the lumbar spine  performed before and after 15 cc IV Dotarem, including utilization of hardware  artifact limiting techniques.     FINDINGS:     Postsurgical: Status post posterior fixation/stabilization hardware spanning at  least levels T10-L2, but superior extent not completely clear. Extensive  hardware-related artifact limits evaluation. No obvious postoperative fluid  collection in the imaged levels. Mild lower lumbar paraspinous fatty  infiltration.     General: Mild T12 superior endplate depression, chronic appearing. Suggestion of  T10 and T11 congenital interbody fusion, evaluated sagittal plane only. Advanced  disc space loss at L3-L4 eccentric to the right with chronic-appearing endplate  changes. Elsewhere disc space heights are preserved. No focal listhesis. Lumbar  lordosis maintained. Conus ends at roughly L1 level.  Distal cord or evaluated  due to artifact. No obvious suspicious epidural collection. L3-L4 endplate  edema, likely due to active disc disease. No suspicious marrow signal.     Miscellaneous: Cholelithiasis noted. Sigmoid diverticulosis.     Levels:     T8-T9, T9-T10, T10-T11: Evaluated sagittal plane only. Evaluation essentially  nondiagnostic due to artifact. T10-T11 likely congenital interbody fusion level.     T11-T12: Artifact limits evaluation. No significant posterior disc herniation. Facets not well evaluated. Spinal canal felt to be largely patent, but not  optimally evaluated. Left foramen patent. Right foramen poorly evaluated.     T12-L1: Artifact limits evaluation. No significant disc herniation. Facets  poorly evaluated. Spinal canal patent. Left foramen patent. Right foramen  probably patent, but not adequately evaluated.     L1-L2: Artifact limits evaluation. No significant disc herniation. Facets not  assessable. Spinal canal poorly assessed, felt to be largely patent. Left  foramen patent. Right foramen poorly assessed.     L2-L3: Evaluation is essentially nondiagnostic at this level due to significant  artifact.     L3-L4 level: Artifact limits evaluation. Eccentric to the left moderate  disc/osteophyte bulge. Moderate facet arthropathy ligamentum flavum bulge. Suspected moderate spinal canal stenosis with possible abutment of the crossing  right L4 nerve. Moderate right and mild left foraminal stenoses with probable  abutment of the exiting right L3 nerve.     L4-L5: Trace anterolisthesis with mild to moderate disc bulge. Severe facet  arthropathy with ligamentum flavum bulge. Moderate spinal canal stenosis with  possible abutment of the crossing L5 nerves. Moderate bilateral foraminal  stenoses with probable abutment of bilateral exiting L4 nerves.     L5-S1: Right lateral focal disc/osteophyte protrusion. Moderate bilateral facet  arthropathy. Spinal canal patent.  Foramina patent.     Imaged sacrum: Unremarkable.        Impression IMPRESSION:     Posterior fixation/stabilization hardware from L2 level to at least T10 level.  -Hardware-related artifact significantly limits evaluation as detailed above. Artifact related limitations are most pronounced at T9-T11, and L2-L3.     At L3-L4 level, degenerative changes characterized by a notable disc/osteophyte  bulge results in moderate spinal canal stenosis and mild to moderate foraminal  stenoses.  -At L4-L5 level, degenerative changes characterized by severe facet arthropathy  and mild anterolisthesis results in moderate spinal canal and foraminal  stenoses. -Degenerative change and stenosis at these levels are similar to 2018 MRI.     See additional details above. Cholelithiasis and sigmoid diverticulosis  incidentally noted. Written by Gisselle Moody, as dictated by Buzz Payton MD.  I, Dr. Buzz Payton confirm that all documentation is accurate.

## 2020-07-14 NOTE — LETTER
7/15/20 Patient: Asia Carreno YOB: 1945 Date of Visit: 7/14/2020 Mic Anand MD 
[address] [facility] [address] [facility] [id number] [place] [address] VIA Facsimile: [phone] Dear Mic Anand MD, Thank you for referring Ms. Xavi Garcia to [address] [facility] for evaluation. My notes for this consultation are attached. If you have questions, please do not hesitate to call me. I look forward to following your patient along with you. Sincerely, Abena Mclain MD

## 2020-07-14 NOTE — PATIENT INSTRUCTIONS
Low Back Arthritis: Exercises  Introduction  Here are some examples of typical rehabilitation exercises for your condition. Start each exercise slowly. Ease off the exercise if you start to have pain. Your doctor or physical therapist will tell you when you can start these exercises and which ones will work best for you. When you are not being active, find a comfortable position for rest. Some people are comfortable on the floor or a medium-firm bed with a small pillow under their head and another under their knees. Some people prefer to lie on their side with a pillow between their knees. Don't stay in one position for too long. Take short walks (10 to 20 minutes) every 2 to 3 hours. Avoid slopes, hills, and stairs until you feel better. Walk only distances you can manage without pain, especially leg pain. How to do the exercises  Pelvic tilt   1. Lie on your back with your knees bent. 2. \"Brace\" your stomachtighten your muscles by pulling in and imagining your belly button moving toward your spine. 3. Press your lower back into the floor. You should feel your hips and pelvis rock back. 4. Hold for 6 seconds while breathing smoothly. 5. Relax and allow your pelvis and hips to rock forward. 6. Repeat 8 to 12 times. Back stretches   1. Get down on your hands and knees on the floor. 2. Relax your head and allow it to droop. Round your back up toward the ceiling until you feel a nice stretch in your upper, middle, and lower back. Hold this stretch for as long as it feels comfortable, or about 15 to 30 seconds. 3. Return to the starting position with a flat back while you are on your hands and knees. 4. Let your back sway by pressing your stomach toward the floor. Lift your buttocks toward the ceiling. 5. Hold this position for 15 to 30 seconds. 6. Repeat 2 to 4 times. Follow-up care is a key part of your treatment and safety.  Be sure to make and go to all appointments, and call your doctor if you are having problems. It's also a good idea to know your test results and keep a list of the medicines you take. Where can you learn more? Go to http://maggie-rae.info/  Enter T094 in the search box to learn more about \"Low Back Arthritis: Exercises. \"  Current as of: March 2, 2020               Content Version: 12.5  © 9265-2213 [employer], [employer]. Care instructions adapted under license by [employer] (which disclaims liability or warranty for this information). If you have questions about a medical condition or this instruction, always ask your healthcare professional. [place] [address] any warranty or liability for your use of this information.

## 2020-10-14 ENCOUNTER — TELEPHONE (OUTPATIENT)
Dept: ORTHOPEDIC SURGERY | Age: 75
End: 2020-10-14

## 2020-10-14 NOTE — TELEPHONE ENCOUNTER
Patient called and said that this past Monday she was walking from the store and her leg , and her back is hurting. That she can hardly walk and has had to use a cane. Patient is asking if their is any way  could see her , and if possible give her an injection. Patient  tel. [phone]. Note : patient requested to be seen only by Aung Lubin. Last seen 07/14/2020.

## 2020-10-16 ENCOUNTER — OFFICE VISIT (OUTPATIENT)
Dept: ORTHOPEDIC SURGERY | Age: 75
End: 2020-10-16
Payer: MEDICARE

## 2020-10-16 VITALS
SYSTOLIC BLOOD PRESSURE: 135 MMHG | BODY MASS INDEX: 23.63 KG/M2 | TEMPERATURE: 97.7 F | HEART RATE: 92 BPM | WEIGHT: 142 LBS | DIASTOLIC BLOOD PRESSURE: 87 MMHG | RESPIRATION RATE: 15 BRPM

## 2020-10-16 DIAGNOSIS — M48.062 SPINAL STENOSIS OF LUMBAR REGION WITH NEUROGENIC CLAUDICATION: Primary | ICD-10-CM

## 2020-10-16 DIAGNOSIS — M47.816 LUMBAR FACET ARTHROPATHY: ICD-10-CM

## 2020-10-16 PROCEDURE — G8536 NO DOC ELDER MAL SCRN: HCPCS | Performed by: NURSE PRACTITIONER

## 2020-10-16 PROCEDURE — 99213 OFFICE O/P EST LOW 20 MIN: CPT | Performed by: NURSE PRACTITIONER

## 2020-10-16 PROCEDURE — G8420 CALC BMI NORM PARAMETERS: HCPCS | Performed by: NURSE PRACTITIONER

## 2020-10-16 PROCEDURE — 3017F COLORECTAL CA SCREEN DOC REV: CPT | Performed by: NURSE PRACTITIONER

## 2020-10-16 PROCEDURE — G8427 DOCREV CUR MEDS BY ELIG CLIN: HCPCS | Performed by: NURSE PRACTITIONER

## 2020-10-16 PROCEDURE — G8432 DEP SCR NOT DOC, RNG: HCPCS | Performed by: NURSE PRACTITIONER

## 2020-10-16 PROCEDURE — G8399 PT W/DXA RESULTS DOCUMENT: HCPCS | Performed by: NURSE PRACTITIONER

## 2020-10-16 PROCEDURE — 1101F PT FALLS ASSESS-DOCD LE1/YR: CPT | Performed by: NURSE PRACTITIONER

## 2020-10-16 PROCEDURE — 1090F PRES/ABSN URINE INCON ASSESS: CPT | Performed by: NURSE PRACTITIONER

## 2020-10-16 PROCEDURE — 96372 THER/PROPH/DIAG INJ SC/IM: CPT | Performed by: NURSE PRACTITIONER

## 2020-10-16 RX ORDER — KETOROLAC TROMETHAMINE 30 MG/ML
30 INJECTION, SOLUTION INTRAMUSCULAR; INTRAVENOUS
Status: COMPLETED | OUTPATIENT
Start: 2020-10-16 | End: 2020-10-16

## 2020-10-16 RX ORDER — METHYLPREDNISOLONE 4 MG/1
TABLET ORAL
Qty: 1 DOSE PACK | Refills: 0 | Status: SHIPPED | OUTPATIENT
Start: 2020-10-16 | End: 2020-11-23 | Stop reason: ALTCHOICE

## 2020-10-16 RX ADMIN — KETOROLAC TROMETHAMINE 30 MG: 30 INJECTION, SOLUTION INTRAMUSCULAR; INTRAVENOUS at 10:55

## 2020-10-16 NOTE — PATIENT INSTRUCTIONS
Sciatica: Exercises Introduction Here are some examples of typical rehabilitation exercises for your condition. Start each exercise slowly. Ease off the exercise if you start to have pain. Your doctor or physical therapist will tell you when you can start these exercises and which ones will work best for you. When you are not being active, find a comfortable position for rest. Some people are comfortable on the floor or a medium-firm bed with a small pillow under their head and another under their knees. Some people prefer to lie on their side with a pillow between their knees. Don't stay in one position for too long. Take short walks (10 to 20 minutes) every 2 to 3 hours. Avoid slopes, hills, and stairs until you feel better. Walk only distances you can manage without pain, especially leg pain. How to do the exercises Back stretches 1. Get down on your hands and knees on the floor. 2. Relax your head and allow it to droop. Round your back up toward the ceiling until you feel a nice stretch in your upper, middle, and lower back. Hold this stretch for as long as it feels comfortable, or about 15 to 30 seconds. 3. Return to the starting position with a flat back while you are on your hands and knees. 4. Let your back sway by pressing your stomach toward the floor. Lift your buttocks toward the ceiling. 5. Hold this position for 15 to 30 seconds. 6. Repeat 2 to 4 times. Follow-up care is a key part of your treatment and safety. Be sure to make and go to all appointments, and call your doctor if you are having problems. It's also a good idea to know your test results and keep a list of the medicines you take. Where can you learn more? Go to http://www.gray.com/ Enter P906 in the search box to learn more about \"Sciatica: Exercises. \" Current as of: March 2, 2020               Content Version: 12.6 © 9543-8784 [employer]. Care instructions adapted under license by [employer] (which disclaims liability or warranty for this information). If you have questions about a medical condition or this instruction, always ask your healthcare professional. [address] any warranty or liability for your use of this information.

## 2020-10-16 NOTE — PROGRESS NOTES
Consent explained to and signed by the pt. No questions or concerns from the pt at this time. Pt given 30mg/1ml of toradol IM in right gluteus. No sign or symptoms of infection noted at injection site. There was no bleeding, swelling or leaking noted after injection. Pt handed injection information sheet to take home. Mrs. Melinda Vizcarra did not want to wait in the exam room for 10 minutes after the injection for observation. She states that her  is waiting for her in the car. She tolerated the injection well and ambulated to check out.

## 2020-10-16 NOTE — PROGRESS NOTES
[facility]  [address] [facility], [facility] [address]  Phone: [phone]  Fax: [phone]  PROGRESS NOTE  Patient: Lady Knight                MRN: [id number]       SSN: [id number]  YOB: 1945        AGE: 76 y.o. SEX: female  Body mass index is 23.63 kg/m². PCP: Clayborn Sever, MD  10/16/20    No chief complaint on file. HISTORY OF PRESENT ILLNESS:  Lady Knight is a 76 y.o.  female with history of chronic back pain. Prior history of back problems: previous spinal surgery - Thoracic to lumbar fusion w/ More[place] geoff 40 yrs ago from a big injury from a fall. Last L MRI 2019: Multi-level degenerative changes w/ moderate canal stenosis. . At last OV she saw Dr. Hailey Ramirez and was doing well. . Today, she comes in some acute R-sided sciatica pain in the L4-5 distribution going down the entire leg that started a wk ago w/out any reported injuries. Her pain is aching, burning and numbing, pain is worse with walking and affects sleep and recreational activities. Pain is better with nothing. She has good strength on exam and normal reflexes. Denies bladder/bowel dysfunction, saddle paresthesia, weakness, gait disturbance, or other neurological deficits. Medications: Gabapentin 300/900 with minimal, moderate, benefit    PMHx: healthy      ASSESSMENT   Diagnoses and all orders for this visit:    1. Spinal stenosis of lumbar region with neurogenic claudication  -     ketorolac (TORADOL) injection 30 mg  -     methylPREDNISolone (Medrol, Raghu,) 4 mg tablet; Per dose pack instructions    2. Lumbar facet arthropathy  -     ketorolac (TORADOL) injection 30 mg  -     methylPREDNISolone (Medrol, Raghu,) 4 mg tablet; Per dose pack instructions         IMPRESSION AND PLAN:  This is a pt with stenosis who is having a flare of some R-sided sciatica. She is miserable, she maintains good strength.  We discussed options and she would like to try a Toradol inj foll by a MDP.      > Pt was given information on [facility]ICA   > Toradol foll MDP  > Increase Gabapentin 600/900  > May need SNRB if not improved  > Ms. Leatha Al has a reminder for a \"due or due soon\" health maintenance. I have asked that she contact her primary care provider, Dwight Stover MD, for follow-up on this health maintenance.  > We have informed patient to notify us for immediate appointment if he has any worsening neurogical symptoms or if an emergency situation presents, then call 911  >  has been reviewed and is appropriate  > Pt will follow-up in 2-3 wks w/ Dr. Judith Barrera or me. Subjective  Pain Scale: 8/10    Pain Assessment  10/16/2020   Location of Pain Leg;Back   Location Modifiers Right   Severity of Pain 8   Quality of Pain Aching   Quality of Pain Comment -   Duration of Pain Persistent   Frequency of Pain Constant   Aggravating Factors Walking;Standing   Aggravating Factors Comment -   Limiting Behavior Some   Relieving Factors Nothing   Relieving Factors Comment -   Result of Injury -         REVIEW OF SYSTEMS  Constitutional: Negative for fever, chills, or weight change. Respiratory: Negative for cough or shortness of breath. Cardiovascular: Negative for chest pain or palpitations. Gastrointestinal: Negative for incontinence, acid reflux, change in bowel habits, or constipation. Genitourinary: Negative for incontinence, dysuria and flank pain. Musculoskeletal: Positive for back and RLE pain. See HPI. Skin: Negative for rash. Neurological:RLE  radiculopathy. See HPI. Endo/Heme/Allergies: Negative. Psychiatric/Behavioral: Negative. PHYSICAL EXAMINATION  Visit Vitals  /87 (BP 1 Location: Left arm, BP Patient Position: Sitting)   Pulse 92   Temp 97.7 °F (36.5 °C) (Temporal)   Resp 15   Wt 142 lb (64.4 kg)   BMI 23.63 kg/m²         Accompanied by Self. Constitutional:  Well developed, well nourished, in no acute distress.    Psychiatric: Affect and mood are appropriate. Integumentary: No rashes or abrasions noted on exposed areas. Cardiovascular/Peripheral Vascular: +2 radial & pedal pulses. No peripheral edema is noted. Lymphatic:  No evidence of lymphedema. No cervical lymphadenopathy. SPINE/MUSCULOSKELETAL EXAM     Lumbar spine:  No rash, ecchymosis, or gross obliquity. No fasciculations. No focal atrophy is noted. Range of motion is decreased, no pain with flexion, extension. Tenderness to palpation none. No tenderness to palpation at the sciatic notch. SI joints non-tender. Trochanters non tender. Straight leg raise neg  Hip Impingement neg    Sensation grossly intact to light touch. MOTOR:     Hip Flex Quads Hamstrings Ankle DF EHL Ankle PF   Right +4/5 +4/5 +4/5 +4/5 +4/5 +4/5   Left +4/5 +4/5 +4/5 +4/5 +4/5 +4/5        Ambulation without assistive device. FWB. RLE limp        PAST MEDICAL HISTORY   Past Medical History:   Diagnosis Date    Acid reflux     Hypertension        Past Surgical History:   Procedure Laterality Date    HX BACK SURGERY     . MEDICATIONS      Current Outpatient Medications   Medication Sig Dispense Refill    methylPREDNISolone (Medrol, Raghu,) 4 mg tablet Per dose pack instructions 1 Dose Pack 0    gabapentin (Neurontin) 300 mg capsule 1 in the am and 3 in the evening as directed  Indications: neuropathic pain 360 Cap 1    cholecalciferol (VITAMIN D3) 1,000 unit cap Take 1 Cap by mouth daily.  zolpidem (AMBIEN) 10 mg tablet Take 0.5-1 Tabs by mouth nightly as needed for Sleep. Max Daily Amount: 10 mg. 30 Tab 0    MAGNESIUM OXIDE PO Take 1 Tab by mouth daily.  omeprazole (PRILOSEC) 40 mg capsule Take 40 mg by mouth daily.  losartan (COZAAR) 50 mg tablet Take 25 mg by mouth daily.  hydroCHLOROthiazide (HYDRODIURIL) 25 mg tablet Take 25 mg by mouth daily.  LORazepam (ATIVAN) 1 mg tablet Take 1 mg by mouth every eight (8) hours as needed.       atorvastatin (LIPITOR) 10 mg tablet Take 10 mg by mouth daily.  calcium carbonate (CALTREX) 600 mg calcium (1,500 mg) tablet Take 600 mg by mouth daily.  omega-3 acid ethyl esters (LOVAZA) 1 gram capsule Take 1 g by mouth daily (with breakfast).  nortriptyline (PAMELOR) 25 mg capsule Take 1-2 Caps by mouth nightly. (Patient not taking: Reported on 7/23/2019) 60 Cap 1    topiramate (TOPAMAX) 25 mg tablet Take 2 Tabs by mouth nightly. (Patient not taking: Reported on 5/15/2019) 60 Tab 1     Current Facility-Administered Medications   Medication Dose Route Frequency Provider Last Rate Last Dose    ketorolac (TORADOL) injection 30 mg  30 mg IntraMUSCular NOW Farrel Clause, NP            ALLERGIES  No Known Allergies       SOCIAL HISTORY    Social History     Socioeconomic History    Marital status:      Spouse name: Not on file    Number of children: Not on file    Years of education: Not on file    Highest education level: Not on file   Occupational History    Not on file   Social Needs    Financial resource strain: Not on file    Food insecurity     Worry: Not on file     Inability: Not on file    Transportation needs     Medical: Not on file     Non-medical: Not on file   Tobacco Use    Smoking status: Never Smoker    Smokeless tobacco: Never Used   Substance and Sexual Activity    Alcohol use:  Yes     Alcohol/week: 1.0 standard drinks     Types: 1 Glasses of wine per week    Drug use: No    Sexual activity: Yes     Partners: Male     Birth control/protection: None   Lifestyle    Physical activity     Days per week: Not on file     Minutes per session: Not on file    Stress: Not on file   Relationships    Social connections     Talks on phone: Not on file     Gets together: Not on file     Attends Yarsanism service: Not on file     Active member of club or organization: Not on file     Attends meetings of clubs or organizations: Not on file     Relationship status: Not on file    Intimate partner violence     Fear of current or ex partner: Not on file     Emotionally abused: Not on file     Physically abused: Not on file     Forced sexual activity: Not on file   Other Topics Concern    Not on file   Social History Narrative    Not on file       FAMILY HISTORY    Family History   Problem Relation Age of Onset    Heart Attack Mother     Cancer Father          Otto Spivey, AMAIRANI

## 2020-10-20 ENCOUNTER — TELEPHONE (OUTPATIENT)
Dept: ORTHOPEDIC SURGERY | Age: 75
End: 2020-10-20

## 2020-10-20 NOTE — TELEPHONE ENCOUNTER
Patient called and is asking for a refill on Methyl Prednisolone Raghu from [facility]. Shiv Parkinson on [address] [facility] [address]   Tel. [phone]. Patient tel. [phone].

## 2020-10-20 NOTE — TELEPHONE ENCOUNTER
I would not recommend a repeat of this as she may need an injection later at the hospital, I would increase the Gabapentin

## 2020-10-21 RX ORDER — NORTRIPTYLINE HYDROCHLORIDE 10 MG/1
40 CAPSULE ORAL
Qty: 120 CAP | Refills: 1 | Status: SHIPPED | OUTPATIENT
Start: 2020-10-21 | End: 2020-12-14 | Stop reason: SDUPTHER

## 2020-10-21 NOTE — TELEPHONE ENCOUNTER
Attempted to contact the pt. She was not able to be reached. Message could not be left. Will attempt to call pt tomorrow.

## 2020-10-21 NOTE — TELEPHONE ENCOUNTER
Rx sent order for R L4-5 SNRB placed, Annie pls assist w/ getting this scheduled w/ Dr Judith Barrera

## 2020-10-21 NOTE — TELEPHONE ENCOUNTER
Ms. Shelby Sorensen was contacted. She was given the provider's recommendations for her current pain. The pt still does not want to increase her gabapentin. She would like to try the pamelor and get the nerve block. She would like Dr. Radha Madsen to do it since she has had one by her before. Please send medication to [facility] on [place], [place]. Pt will be contacted once provider responds to know what is being ordered.

## 2020-10-21 NOTE — TELEPHONE ENCOUNTER
I called and spoke to Ms. Tse. The pt was identified using 2 pt identifiers. She was notified of the provider's response to her recent request. The pt states that she does not think that the increase in gabapentin is going to help. She is on a lot of pain and does not think she can go without anything until she can have her injection. She would like to know if there is anything else that can be suggested for her pain. Message will be routed to provider for review. Pt will be contacted once provider has a chance to review message. Pt aware.

## 2020-10-21 NOTE — TELEPHONE ENCOUNTER
Her pain is radicular, I would increase Gabapentin or Pamelor.  We can order a nerve block for her leg pain if no recent infections, BP and BS are ok

## 2020-10-22 NOTE — TELEPHONE ENCOUNTER
Pt was contacted and notified of the new orders. The pt was identified using 2 pt identifiers. She was also made aware of which pharmacy the medication was sent to. She verbalized understanding and will contact the office if she has any issues.

## 2020-11-04 ENCOUNTER — APPOINTMENT (OUTPATIENT)
Dept: GENERAL RADIOLOGY | Age: 75
End: 2020-11-04
Attending: PHYSICAL MEDICINE & REHABILITATION
Payer: MEDICARE

## 2020-11-04 ENCOUNTER — HOSPITAL ENCOUNTER (OUTPATIENT)
Age: 75
Setting detail: OUTPATIENT SURGERY
Discharge: HOME | End: 2020-11-04
Attending: PHYSICAL MEDICINE & REHABILITATION | Admitting: PHYSICAL MEDICINE & REHABILITATION
Payer: MEDICARE

## 2020-11-04 VITALS
DIASTOLIC BLOOD PRESSURE: 70 MMHG | OXYGEN SATURATION: 94 % | HEART RATE: 94 BPM | TEMPERATURE: 97.7 F | SYSTOLIC BLOOD PRESSURE: 115 MMHG | RESPIRATION RATE: 20 BRPM

## 2020-11-04 PROCEDURE — 77030039433 HC TY MYLEOGRAM BD -B: Performed by: PHYSICAL MEDICINE & REHABILITATION

## 2020-11-04 PROCEDURE — 2709999900 HC NON-CHARGEABLE SUPPLY: Performed by: PHYSICAL MEDICINE & REHABILITATION

## 2020-11-04 PROCEDURE — 74011000250 HC RX REV CODE- 250: Performed by: PHYSICAL MEDICINE & REHABILITATION

## 2020-11-04 PROCEDURE — 76010000009 HC PAIN MGT 0 TO 30 MIN PROC: Performed by: PHYSICAL MEDICINE & REHABILITATION

## 2020-11-04 PROCEDURE — 74011250636 HC RX REV CODE- 250/636: Performed by: PHYSICAL MEDICINE & REHABILITATION

## 2020-11-04 PROCEDURE — 77030003669 HC NDL SPN COOK -B: Performed by: PHYSICAL MEDICINE & REHABILITATION

## 2020-11-04 PROCEDURE — 74011000636 HC RX REV CODE- 636: Performed by: PHYSICAL MEDICINE & REHABILITATION

## 2020-11-04 PROCEDURE — 74011250637 HC RX REV CODE- 250/637: Performed by: PHYSICAL MEDICINE & REHABILITATION

## 2020-11-04 RX ORDER — DEXAMETHASONE SODIUM PHOSPHATE 100 MG/10ML
INJECTION INTRAMUSCULAR; INTRAVENOUS AS NEEDED
Status: DISCONTINUED | OUTPATIENT
Start: 2020-11-04 | End: 2020-11-04 | Stop reason: HOSPADM

## 2020-11-04 RX ORDER — DIAZEPAM 5 MG/1
5-20 TABLET ORAL ONCE
Status: COMPLETED | OUTPATIENT
Start: 2020-11-04 | End: 2020-11-04

## 2020-11-04 RX ORDER — LIDOCAINE HYDROCHLORIDE 10 MG/ML
INJECTION, SOLUTION EPIDURAL; INFILTRATION; INTRACAUDAL; PERINEURAL AS NEEDED
Status: DISCONTINUED | OUTPATIENT
Start: 2020-11-04 | End: 2020-11-04 | Stop reason: HOSPADM

## 2020-11-04 RX ADMIN — DIAZEPAM 10 MG: 5 TABLET ORAL at 13:30

## 2020-11-04 NOTE — PROCEDURES
PROCEDURE NOTE      Patient Name: Britton Munguia    Date of Procedure: November 4, 2020    Preoperative Diagnosis:  Spinal stenosis, lumbar region with neurogenic claudication [M48.062]    Post Operative Diagnosis:  Spinal stenosis, lumbar region with neurogenic claudication [M48.062]    Procedure :    right L4 Selective Nerve Root Block  right L5 Selective Nerve Root Block    Consent:  Informed consent was obtained prior to the procedure. The patient was given the opportunity to ask questions regarding the procedure and its associated risks. In addition to the potential risks associated with the procedure itself, the patient was informed both verbally and in writing of the potential side effects of the use of glucocorticoid. The patient appeared to comprehend the informed consent and desired to have the procedure performed. Procedure: The patient was placed in the prone position on the fluoroscopy table and the back was prepped and draped in the usual sterile manner. The exact spinal level was  identified using fluoroscopy, and Lidocaine 1 % was injected locally, a # 22 gauge spinal needle was passed to the transverse process. The depth was noted and the needle redirected to pass inferior and approximately one cm anterior to the transverse process. YES  1 cc of Isovue M-200 was used to verify positioning in the epidural and paravertebral space and outlined the course of the spinal nerve into the epidural space. The same procedure was repeated at each spinal level indicated above. A total of 10 mg of preservative free Dexamethasone and 5 cc of Lidocaine was slowly injected. The patient tolerated the procedure well. The injection area was cleaned and bandaids applied. Not excessive bleeding was noted. Patient dressed and discharged to home with instructions. Discussion: The patient tolerated the procedure well.                                               Kevin Cosby MD  November 4, 2020

## 2020-11-04 NOTE — H&P
Date of Surgery Update:  Cande Grimm was seen and examined. History and physical has been reviewed. The patient has been examined. There have been no significant clinical changes since the last office visit. The patient was counseled at length about the risks of jesus Covid-19 during their perioperative period and any recovery window from their procedure. The patient was made aware that jesus Covid-19  may worsen their prognosis for recovering from their procedure and lend to a higher morbidity and/or mortality risk. All material risks, benefits, and reasonable alternatives including postponing the procedure were discussed. The patient does  wish to proceed with the procedure at this time.         Signed By: Hazel Arambula MD     November 4, 2020 1:47 PM

## 2020-11-04 NOTE — DISCHARGE INSTRUCTIONS
[facility]   ([place])  Dr. Francy Blackman, Dr. Quinton Gomez, Dr. Too Botello Spinal Procedure (Block) Instructions    * Do not drive a car, operate heavy machinery or dangerous equipment, or make important decisions for 12-24 hours. * Light activity as tolerated; may rest for the remainder of the day. * Resume pre-block medications including those from your other doctors. * Do not drink alcoholic beverages for 24 hours. Alcohol and the medications you have received may interact and cause an adverse reaction. * You may feel better this evening and worse tomorrow, as the numbing medications wears off and the steroid has yet to begin to work. After 48-72 hrs the steroid should begin to release bringing you relief. If you had a medial branch block, no steroids were used. The medial branch block is a test to see if you are a candidate for radiofrequency ablation (RFA). The anesthetic (numbing medicine)  will wear off by the next day. * You may shower this evening and remove any bandages. * Avoid hot tubs/pools/tub soaks and heating pads for 24 hours. You may use cold packs on the procedure site as tolerated for the first 24 hours. * If a headache develops, drink plenty of fluids and rest.  Take over the counter medications for headache if needed. If the headache continues longer than 24 hours, call MD at the [address] [place] [address] Avenue. [phone]    * Continue taking pain medications as needed. * You may resume your regular diet if tolerated. Otherwise, start with sips of water and advance slowly. * If Diabetic: check your blood sugar three times a day for the next 3 days. If your sugar is greater than 300 call your family doctor. If your sugar is greater than 400, have someone transport you to the nearest Emergency Room. * If you experience any of the following problems, Please Call the [address] [place] [address] Avenue at [phone].         * Excessive pain, swelling, redness or odor at or around the surgical area    * Fever of 101 or higher    * Nausea / Vomiting lasting longer than 4 hours or if unable to take medications. * Severe Headache    * Weakness or numbness in arms or legs that is not      resolving   * Any NEW signs of decreased circulation or nerve impairment in leg: change in color, swelling, persistent numbness, tingling                    * Prolonged increase in pain greater than 4 days      PATIENT INSTRUCTIONS:    After oral sedation, for 12-24 hours or while taking prescription Narcotics:  · Limit your activities  · Do not drive and operate hazardous machinery  · Do not make important personal or business decisions  · Do  not drink alcoholic beverages  · If you have not urinated within 8 hours after discharge, please contact your surgeon on call. *  Please give a list of your current medications to your Primary Care Provider. *  Please update this list whenever your medications are discontinued, doses are      changed, or new medications (including over-the-counter products) are added. *  Please carry medication information at all times in case of emergency situations. These are general instructions for a healthy lifestyle:    No smoking/ No tobacco products/ Avoid exposure to second hand smoke    Surgeon General's Warning:  Quitting smoking now greatly reduces serious risk to your health. Obesity, smoking, and sedentary lifestyle greatly increases your risk for illness    A healthy diet, regular physical exercise & weight monitoring are important for maintaining a healthy lifestyle    You may be retaining fluid if you have a history of heart failure or if you experience any of the following symptoms:  Weight gain of 3 pounds or more overnight or 5 pounds in a week, increased swelling in our hands or feet or shortness of breath while lying flat in bed.   Please call your doctor as soon as you notice any of these symptoms; do not wait until your next office visit. Recognize signs and symptoms of STROKE:    F-face looks uneven    A-arms unable to move or move unevenly    S-speech slurred or non-existent    T-time-call 911 as soon as signs and symptoms begin-DO NOT go       Back to bed or wait to see if you get better-TIME IS BRAIN.

## 2020-11-11 ENCOUNTER — TELEPHONE (OUTPATIENT)
Dept: ORTHOPEDIC SURGERY | Age: 75
End: 2020-11-11

## 2020-11-11 DIAGNOSIS — M47.816 LUMBAR FACET ARTHROPATHY: Primary | ICD-10-CM

## 2020-11-11 RX ORDER — METHYLPREDNISOLONE 4 MG/1
TABLET ORAL
Qty: 1 DOSE PACK | Refills: 0 | Status: SHIPPED | OUTPATIENT
Start: 2020-11-11 | End: 2020-11-23 | Stop reason: ALTCHOICE

## 2020-11-11 NOTE — TELEPHONE ENCOUNTER
Patient called stating the injection she received on 11/04 is not working, and she is still in pain. She is requesting a refill for rx methylPREDNISolone (Medrol, Raghu,) 4 mg tablet until her follow up on 11/23. She is also requesting a callback for advice on what to do for the pain until her follow up. Please advise patient: [phone].

## 2020-11-23 ENCOUNTER — OFFICE VISIT (OUTPATIENT)
Dept: ORTHOPEDIC SURGERY | Age: 75
End: 2020-11-23
Payer: MEDICARE

## 2020-11-23 VITALS
TEMPERATURE: 97.7 F | DIASTOLIC BLOOD PRESSURE: 61 MMHG | OXYGEN SATURATION: 96 % | WEIGHT: 147.8 LBS | SYSTOLIC BLOOD PRESSURE: 95 MMHG | RESPIRATION RATE: 16 BRPM | BODY MASS INDEX: 23.75 KG/M2 | HEIGHT: 66 IN | HEART RATE: 93 BPM

## 2020-11-23 DIAGNOSIS — M43.25 FUSION OF SPINE OF THORACOLUMBAR REGION: ICD-10-CM

## 2020-11-23 DIAGNOSIS — M47.816 LUMBAR FACET ARTHROPATHY: ICD-10-CM

## 2020-11-23 DIAGNOSIS — R29.898 RIGHT LEG WEAKNESS: ICD-10-CM

## 2020-11-23 DIAGNOSIS — M54.50 LUMBAR PAIN: ICD-10-CM

## 2020-11-23 DIAGNOSIS — M54.6 MIDLINE THORACIC BACK PAIN, UNSPECIFIED CHRONICITY: ICD-10-CM

## 2020-11-23 DIAGNOSIS — M54.16 LUMBAR RADICULOPATHY: ICD-10-CM

## 2020-11-23 DIAGNOSIS — R29.898 RIGHT LEG WEAKNESS: Primary | ICD-10-CM

## 2020-11-23 PROCEDURE — G8510 SCR DEP NEG, NO PLAN REQD: HCPCS | Performed by: PHYSICAL MEDICINE & REHABILITATION

## 2020-11-23 PROCEDURE — 99214 OFFICE O/P EST MOD 30 MIN: CPT | Performed by: PHYSICAL MEDICINE & REHABILITATION

## 2020-11-23 PROCEDURE — G8427 DOCREV CUR MEDS BY ELIG CLIN: HCPCS | Performed by: PHYSICAL MEDICINE & REHABILITATION

## 2020-11-23 PROCEDURE — 1101F PT FALLS ASSESS-DOCD LE1/YR: CPT | Performed by: PHYSICAL MEDICINE & REHABILITATION

## 2020-11-23 PROCEDURE — 1090F PRES/ABSN URINE INCON ASSESS: CPT | Performed by: PHYSICAL MEDICINE & REHABILITATION

## 2020-11-23 PROCEDURE — 72110 X-RAY EXAM L-2 SPINE 4/>VWS: CPT | Performed by: PHYSICAL MEDICINE & REHABILITATION

## 2020-11-23 PROCEDURE — 3017F COLORECTAL CA SCREEN DOC REV: CPT | Performed by: PHYSICAL MEDICINE & REHABILITATION

## 2020-11-23 PROCEDURE — G8420 CALC BMI NORM PARAMETERS: HCPCS | Performed by: PHYSICAL MEDICINE & REHABILITATION

## 2020-11-23 PROCEDURE — 72070 X-RAY EXAM THORAC SPINE 2VWS: CPT | Performed by: PHYSICAL MEDICINE & REHABILITATION

## 2020-11-23 PROCEDURE — G8399 PT W/DXA RESULTS DOCUMENT: HCPCS | Performed by: PHYSICAL MEDICINE & REHABILITATION

## 2020-11-23 PROCEDURE — G8536 NO DOC ELDER MAL SCRN: HCPCS | Performed by: PHYSICAL MEDICINE & REHABILITATION

## 2020-11-23 RX ORDER — METHYLPREDNISOLONE 4 MG/1
TABLET ORAL
Qty: 1 DOSE PACK | Refills: 0 | Status: SHIPPED | OUTPATIENT
Start: 2020-11-23 | End: 2020-12-14 | Stop reason: ALTCHOICE

## 2020-11-23 RX ORDER — ACETAMINOPHEN AND CODEINE PHOSPHATE 300; 30 MG/1; MG/1
1 TABLET ORAL
Qty: 21 TAB | Refills: 0 | Status: SHIPPED | OUTPATIENT
Start: 2020-11-23 | End: 2020-11-30

## 2020-11-23 RX ORDER — CEPHRADINE 500 MG
1 CAPSULE ORAL DAILY
COMMUNITY
End: 2022-06-10

## 2020-11-23 NOTE — PROGRESS NOTES
[facility]  [address], [facility] [address]  Phone: [phone]  Fax: [phone]    Pt's YOB: 1945    ASSESSMENT   Diagnoses and all orders for this visit:    1. Right leg weakness  -     MRI LUMB SPINE WO CONT; Future  -     CREATININE; Future  -     BUN; Future    2. Lumbar radiculopathy  -     MRI LUMB SPINE WO CONT; Future  -     CREATININE; Future  -     BUN; Future    3. Lumbar facet arthropathy  -     MRI LUMB SPINE WO CONT; Future  -     acetaminophen-codeine (Tylenol-Codeine #3) 300-30 mg per tablet; Take 1 Tab by mouth every eight (8) hours as needed for Pain for up to 7 days. Max Daily Amount: 3 Tabs. -     methylPREDNISolone (MEDROL DOSEPACK) 4 mg tablet; Per dose pack instructions  -     CREATININE; Future  -     BUN; Future    4. Fusion of spine of thoracolumbar region  -     MRI LUMB SPINE WO CONT; Future  -     CREATININE; Future  -     BUN; Future    5. Lumbar pain  -     methylPREDNISolone (MEDROL DOSEPACK) 4 mg tablet; Per dose pack instructions  -     AMB POC XRAY, SPINE, LUMBOSACRAL; 4+  -     CREATININE; Future  -     BUN; Future    6. Midline thoracic back pain, unspecified chronicity  -     methylPREDNISolone (MEDROL DOSEPACK) 4 mg tablet; Per dose pack instructions  -     AMB POC XRAY, SPINE; THORACIC, 2 VIEW  -     CREATININE; Future  -     BUN; Future         IMPRESSION AND PLAN:  Parrish Zacarias is a 76 y.o. female with history of lumbar pain. Pt complains of progressive pain that radiates down the right leg. She notes that she often leads with the left leg when climbing stairs and reports difficulty with right hip flexion. Pt reports minimal relief with a right L4-5 SNRB on 11/4/2020. He also takes Neurontin 300 mg 1 cap QAM and 3 caps QHS and Pamelor 10 mg 4 caps QHS which helps with sleep. 1) Pt was given information on lumbar arthritis exercises.    2) She was prescribed Tylenol #3 1 tab Q8 hours prn pain. 3) Pt will continue taking Neurontin 300 mg 1 cap QAM and 3 caps QHS. 4) A lumbar MRI was ordered. She has progressive lumbar pain with right radicular symptoms and leg weakness despite NSAID's and prednisone. 5) Ms. Trena Gregory has a reminder for a \"due or due soon\" health maintenance. I have asked that she contact her primary care provider, Latricia Zacarias MD, for follow-up on this health maintenance. 6)  demonstrated consistency with prescribing. Follow-up and Dispositions    · Return in about 3 weeks (around 12/14/2020) for Diagnostic Test follow up, Medication follow up. HISTORY OF PRESENT ILLNESS:  Leslie Shah is a 76 y.o. female with history of lumbar pain and presents to the office today for follow up. She complains of progressive pain that radiates down the right leg. Pt reports that she was walking out of [employer] at the end of 10/2020 when her right leg gave out. She notes that she often leads with the left leg when climbing stairs and reports difficulty with right hip flexion. Pt denies dragging the right foot with ambulation. She reports minimal relief with a right L4-5 SNRB on 11/4/2020. Pt has been prescribed Neurontin 300 mg and takes 1 cap QAM and 3 caps QHS. She was prescribed Pamelor 10 mg at her last office visit with Marisol Basilio NP, and takes 4 caps QHS. Pt admits to difficulty with sleep but notes that she sleeps better when taking the Pamelor and Neurontin. Pt notes temporary relief when taking Medrol Dosepak since her last office visit. She takes Advil during the day as needed. Pt at this time desires to proceed with a lumbar MRI and medication evaluation.     Pain Scale: 7/10    PCP: Latricia Zacarias MD     Past Medical History:   Diagnosis Date    Acid reflux     Hypertension         Social History     Socioeconomic History    Marital status:      Spouse name: Not on file    Number of children: Not on file    Years of education: Not on file    Highest education level: Not on file   Occupational History    Not on file   Social Needs    Financial resource strain: Not on file    Food insecurity     Worry: Not on file     Inability: Not on file    Transportation needs     Medical: Not on file     Non-medical: Not on file   Tobacco Use    Smoking status: Never Smoker    Smokeless tobacco: Never Used   Substance and Sexual Activity    Alcohol use: Yes     Alcohol/week: 1.0 standard drinks     Types: 1 Glasses of wine per week    Drug use: No    Sexual activity: Yes     Partners: Male     Birth control/protection: None   Lifestyle    Physical activity     Days per week: Not on file     Minutes per session: Not on file    Stress: Not on file   Relationships    Social connections     Talks on phone: Not on file     Gets together: Not on file     Attends Spiritism service: Not on file     Active member of club or organization: Not on file     Attends meetings of clubs or organizations: Not on file     Relationship status: Not on file    Intimate partner violence     Fear of current or ex partner: Not on file     Emotionally abused: Not on file     Physically abused: Not on file     Forced sexual activity: Not on file   Other Topics Concern    Not on file   Social History Narrative    Not on file       Current Outpatient Medications   Medication Sig Dispense Refill    alpha lipoic acid 200 mg cap Take 1 Cap by mouth daily.  docosahexanoic acid-eicosapent 120-180 mg cap Take 1 Cap by mouth daily.  acetaminophen-codeine (Tylenol-Codeine #3) 300-30 mg per tablet Take 1 Tab by mouth every eight (8) hours as needed for Pain for up to 7 days. Max Daily Amount: 3 Tabs. 21 Tab 0    methylPREDNISolone (MEDROL DOSEPACK) 4 mg tablet Per dose pack instructions 1 Dose Pack 0    nortriptyline (PAMELOR) 10 mg capsule Take 4 Caps by mouth nightly.  120 Cap 1    gabapentin (Neurontin) 300 mg capsule 1 in the am and 3 in the evening as directed Indications: neuropathic pain 360 Cap 1    cholecalciferol (VITAMIN D3) 1,000 unit cap Take 1 Cap by mouth daily.  MAGNESIUM OXIDE PO Take 1 Tab by mouth daily.  omeprazole (PRILOSEC) 40 mg capsule Take 40 mg by mouth daily.  losartan (COZAAR) 50 mg tablet Take 25 mg by mouth daily.  hydroCHLOROthiazide (HYDRODIURIL) 25 mg tablet Take 25 mg by mouth daily.  LORazepam (ATIVAN) 1 mg tablet Take 1 mg by mouth every eight (8) hours as needed.  atorvastatin (LIPITOR) 10 mg tablet Take 10 mg by mouth daily.  calcium carbonate (CALTREX) 600 mg calcium (1,500 mg) tablet Take 600 mg by mouth daily.  omega-3 acid ethyl esters (LOVAZA) 1 gram capsule Take 1 g by mouth daily (with breakfast).  zolpidem (AMBIEN) 10 mg tablet Take 0.5-1 Tabs by mouth nightly as needed for Sleep. Max Daily Amount: 10 mg. (Patient not taking: Reported on 11/23/2020) 30 Tab 0    topiramate (TOPAMAX) 25 mg tablet Take 2 Tabs by mouth nightly. (Patient not taking: Reported on 5/15/2019) 60 Tab 1       No Known Allergies      REVIEW OF SYSTEMS    Constitutional: Negative for fever, chills, or weight change. Respiratory: Negative for cough or shortness of breath. Cardiovascular: Negative for chest pain or palpitations. Gastrointestinal: Negative for acid reflux, change in bowel habits, or constipation. Genitourinary: Negative for dysuria and flank pain. Musculoskeletal: Positive for lumbar pain and leg weakness  Neurological: Negative for headaches, dizziness, or numbness. Psychiatric/Behavioral: Positive for difficulty with sleep. As per HPI    PHYSICAL EXAMINATION  Visit Vitals  BP 95/61   Pulse 93   Temp 97.7 °F (36.5 °C) (Temporal)   Resp 16   Ht 5' 5.5\" (1.664 m)   Wt 147 lb 12.8 oz (67 kg)   SpO2 96%   BMI 24.22 kg/m²       Constitutional: Awake, alert, and in no acute distress. Neurological: 1+ symmetrical DTRs in the upper extremities. 1+ symmetrical DTRs in the lower extremities. Sensation to light touch is intact. Negative Mckeon's sign bilaterally. Skin: warm, dry, and intact. Musculoskeletal: Tenderness to palpation in the lower lumbar region. Moderate pain with extension and axial loading. No pain with internal or external rotation of her hips. Negative straight leg raise bilaterally. Hip Flex  Quads Hamstrings Ankle DF EHL Ankle PF   Right +4/5 +4/5 +4/5 +4/5 +4/5 +4/5   Left +4/5 +4/5 +4/5 +4/5 +4/5 +4/5     IMAGING:    Thoracic spine 2V x-rays from 11/23/2020 were personally reviewed with the patient and demonstrated:  Thoracolumbar fusion. Lumbar spine 4V x-rays from 11/23/2020 were personally reviewed with the patient and demonstrated:  Thoracolumbar fusion. Multilevel degenerative facets. Severe degenerative disc at L3-4. Listhesis at L4-5. Degenerative disc at L2-3    Lumbar spine MRI from 05/2/2019 was personally reviewed with the patient and demonstrated:          Results from [facility] on 05/29/19   MRI LUMB SPINE W WO CONT     Narrative EXAMINATION: MRI lumbar spine with and without contrast     INDICATION: History of lumbar fusion, right lower extremity radiculopathy,  spinal canal stenosis     COMPARISON: 5/23/2018     TECHNIQUE: Sagittal and axial multiecho MRI sequences of the lumbar spine  performed before and after 15 cc IV Dotarem, including utilization of hardware  artifact limiting techniques.     FINDINGS:     Postsurgical: Status post posterior fixation/stabilization hardware spanning at  least levels T10-L2, but superior extent not completely clear. Extensive  hardware-related artifact limits evaluation. No obvious postoperative fluid  collection in the imaged levels. Mild lower lumbar paraspinous fatty  infiltration.     General: Mild T12 superior endplate depression, chronic appearing. Suggestion of  T10 and T11 congenital interbody fusion, evaluated sagittal plane only.  Advanced  disc space loss at L3-L4 eccentric to the right with chronic-appearing endplate  changes. Elsewhere disc space heights are preserved. No focal listhesis. Lumbar  lordosis maintained. Conus ends at roughly L1 level. Distal cord or evaluated  due to artifact. No obvious suspicious epidural collection. L3-L4 endplate  edema, likely due to active disc disease. No suspicious marrow signal.     Miscellaneous: Cholelithiasis noted. Sigmoid diverticulosis.     Levels:     T8-T9, T9-T10, T10-T11: Evaluated sagittal plane only. Evaluation essentially  nondiagnostic due to artifact. T10-T11 likely congenital interbody fusion level.     T11-T12: Artifact limits evaluation. No significant posterior disc herniation. Facets not well evaluated. Spinal canal felt to be largely patent, but not  optimally evaluated. Left foramen patent. Right foramen poorly evaluated.     T12-L1: Artifact limits evaluation. No significant disc herniation. Facets  poorly evaluated. Spinal canal patent. Left foramen patent. Right foramen  probably patent, but not adequately evaluated.     L1-L2: Artifact limits evaluation. No significant disc herniation. Facets not  assessable. Spinal canal poorly assessed, felt to be largely patent. Left  foramen patent. Right foramen poorly assessed.     L2-L3: Evaluation is essentially nondiagnostic at this level due to significant  artifact.     L3-L4 level: Artifact limits evaluation. Eccentric to the left moderate  disc/osteophyte bulge. Moderate facet arthropathy ligamentum flavum bulge. Suspected moderate spinal canal stenosis with possible abutment of the crossing  right L4 nerve. Moderate right and mild left foraminal stenoses with probable  abutment of the exiting right L3 nerve.     L4-L5: Trace anterolisthesis with mild to moderate disc bulge. Severe facet  arthropathy with ligamentum flavum bulge. Moderate spinal canal stenosis with  possible abutment of the crossing L5 nerves.  Moderate bilateral foraminal  stenoses with probable abutment of bilateral exiting L4 nerves.     L5-S1: Right lateral focal disc/osteophyte protrusion. Moderate bilateral facet  arthropathy. Spinal canal patent. Foramina patent.     Imaged sacrum: Unremarkable.        Impression IMPRESSION:     Posterior fixation/stabilization hardware from L2 level to at least T10 level.  -Hardware-related artifact significantly limits evaluation as detailed above. Artifact related limitations are most pronounced at T9-T11, and L2-L3.     At L3-L4 level, degenerative changes characterized by a notable disc/osteophyte  bulge results in moderate spinal canal stenosis and mild to moderate foraminal  stenoses.  -At L4-L5 level, degenerative changes characterized by severe facet arthropathy  and mild anterolisthesis results in moderate spinal canal and foraminal  stenoses. -Degenerative change and stenosis at these levels are similar to 2018 MRI.     See additional details above. Cholelithiasis and sigmoid diverticulosis  incidentally noted. Written by John Thakur, as dictated by Maddie Ashley MD.  I, Dr. Maddie Ashley confirm that all documentation is accurate.

## 2020-11-23 NOTE — PATIENT INSTRUCTIONS
Low Back Arthritis: Exercises  Introduction  Here are some examples of typical rehabilitation exercises for your condition. Start each exercise slowly. Ease off the exercise if you start to have pain. Your doctor or physical therapist will tell you when you can start these exercises and which ones will work best for you. When you are not being active, find a comfortable position for rest. Some people are comfortable on the floor or a medium-firm bed with a small pillow under their head and another under their knees. Some people prefer to lie on their side with a pillow between their knees. Don't stay in one position for too long. Take short walks (10 to 20 minutes) every 2 to 3 hours. Avoid slopes, hills, and stairs until you feel better. Walk only distances you can manage without pain, especially leg pain. How to do the exercises  Pelvic tilt   1. Lie on your back with your knees bent. 2. \"Brace\" your stomach--tighten your muscles by pulling in and imagining your belly button moving toward your spine. 3. Press your lower back into the floor. You should feel your hips and pelvis rock back. 4. Hold for 6 seconds while breathing smoothly. 5. Relax and allow your pelvis and hips to rock forward. 6. Repeat 8 to 12 times. Back stretches   1. Get down on your hands and knees on the floor. 2. Relax your head and allow it to droop. Round your back up toward the ceiling until you feel a nice stretch in your upper, middle, and lower back. Hold this stretch for as long as it feels comfortable, or about 15 to 30 seconds. 3. Return to the starting position with a flat back while you are on your hands and knees. 4. Let your back sway by pressing your stomach toward the floor. Lift your buttocks toward the ceiling. 5. Hold this position for 15 to 30 seconds. 6. Repeat 2 to 4 times. Follow-up care is a key part of your treatment and safety.  Be sure to make and go to all appointments, and call your doctor if you are having problems. It's also a good idea to know your test results and keep a list of the medicines you take. Where can you learn more? Go to http://www.[employer].com/  Enter T094 in the search box to learn more about \"Low Back Arthritis: Exercises. \"  Current as of: March 2, 2020               Content Version: 12.6  © 6326-1519 [employer], [employer]. Care instructions adapted under license by [employer] (which disclaims liability or warranty for this information). If you have questions about a medical condition or this instruction, always ask your healthcare professional. [place] [address] any warranty or liability for your use of this information.

## 2020-11-23 NOTE — LETTER
11/24/20 Patient: Norm Mckeon YOB: 1945 Date of Visit: 11/23/2020 Alden Harada, MD 
[address] [facility] [id number] [place] [address] VIA Facsimile: [phone] Dear Alden Harada, MD, Thank you for referring Ms. Jac Carvalho to [address] [facility] for evaluation. My notes for this consultation are attached. If you have questions, please do not hesitate to call me. I look forward to following your patient along with you. Sincerely, Lydia Ang MD

## 2020-11-23 NOTE — PROGRESS NOTES
Melanie Calderon presents today for   Chief Complaint   Patient presents with    LOW BACK PAIN    Leg Pain     right    Follow-up     right L4/L5 Selective Nerve Root Block on 11/04/2020       Is someone accompanying this pt? no    Is the patient using any DME equipment during [address]? no    Depression Screening:  3 most recent PHQ Screens 11/23/2020   Little interest or pleasure in doing things Not at all   Feeling down, depressed, irritable, or hopeless Not at all   Total Score PHQ 2 0       Learning Assessment:  Learning Assessment 7/23/2019   PRIMARY LEARNER Patient   HIGHEST LEVEL OF EDUCATION - PRIMARY LEARNER  GRADUATED HIGH SCHOOL OR GED   BARRIERS PRIMARY LEARNER NONE   CO-LEARNER CAREGIVER No   PRIMARY LANGUAGE ENGLISH   LEARNER PREFERENCE PRIMARY DEMONSTRATION     READING     VIDEOS   ANSWERED BY patient   RELATIONSHIP SELF       Abuse Screening:  Abuse Screening Questionnaire 11/23/2020   Do you ever feel afraid of your partner? N   Are you in a relationship with someone who physically or mentally threatens you? N   Is it safe for you to go home? Y       Fall Risk  Fall Risk Assessment, last 12 mths 11/23/2020   Able to walk? Yes   Fall in past 12 months? No       OPIOID RISK TOOL  No flowsheet data found. Pt currently taking Antiplatelet therapy? no    Coordination of Care:  1. Have you been to the ER, urgent care clinic since your last visit? no  Hospitalized since your last visit? no    2. Have you seen or consulted any other health care providers outside of the [address] [place] [address] since your last visit? no Include any pap smears or colon screening.  no

## 2020-11-30 DIAGNOSIS — M47.816 LUMBAR FACET ARTHROPATHY: ICD-10-CM

## 2020-11-30 DIAGNOSIS — R29.898 RIGHT LEG WEAKNESS: ICD-10-CM

## 2020-11-30 DIAGNOSIS — M43.25 FUSION OF SPINE OF THORACOLUMBAR REGION: ICD-10-CM

## 2020-11-30 DIAGNOSIS — M54.16 LUMBAR RADICULOPATHY: ICD-10-CM

## 2020-12-10 ENCOUNTER — HOSPITAL ENCOUNTER (OUTPATIENT)
Dept: LAB | Age: 75
Discharge: HOME | End: 2020-12-10
Payer: MEDICARE

## 2020-12-10 ENCOUNTER — HOSPITAL ENCOUNTER (OUTPATIENT)
Age: 75
End: 2020-12-10
Attending: PHYSICAL MEDICINE & REHABILITATION
Payer: MEDICARE

## 2020-12-10 ENCOUNTER — HOSPITAL ENCOUNTER (OUTPATIENT)
Age: 75
Discharge: HOME | End: 2020-12-10
Attending: PHYSICAL MEDICINE & REHABILITATION
Payer: MEDICARE

## 2020-12-10 DIAGNOSIS — M47.816 LUMBAR FACET ARTHROPATHY: ICD-10-CM

## 2020-12-10 DIAGNOSIS — M43.25 FUSION OF SPINE OF THORACOLUMBAR REGION: ICD-10-CM

## 2020-12-10 DIAGNOSIS — R29.898 RIGHT LEG WEAKNESS: ICD-10-CM

## 2020-12-10 DIAGNOSIS — M54.16 LUMBAR RADICULOPATHY: ICD-10-CM

## 2020-12-10 LAB
BUN SERPL-MCNC: 18 MG/DL (ref 7–18)
CREAT SERPL-MCNC: 0.74 MG/DL (ref 0.6–1.3)

## 2020-12-10 PROCEDURE — 72148 MRI LUMBAR SPINE W/O DYE: CPT

## 2020-12-10 PROCEDURE — 84520 ASSAY OF UREA NITROGEN: CPT

## 2020-12-10 PROCEDURE — 36415 COLL VENOUS BLD VENIPUNCTURE: CPT

## 2020-12-10 PROCEDURE — 82565 ASSAY OF CREATININE: CPT

## 2020-12-10 NOTE — PROGRESS NOTES
[facility]  Susan Thurman., [address] [place] [address], [facility] [address]  Phone: [phone]  Fax: [phone]    Pt's YOB: 1945    ASSESSMENT   Diagnoses and all orders for this visit:    1. Spinal stenosis of lumbar region with neurogenic claudication  -     nortriptyline (PAMELOR) 10 mg capsule; Take 4 Caps by mouth nightly.  -     SCHEDULE SURGERY    2. Right leg weakness  -     SCHEDULE SURGERY    3. Lumbar radiculopathy  -     nortriptyline (PAMELOR) 10 mg capsule; Take 4 Caps by mouth nightly.  -     SCHEDULE SURGERY    4. Lumbar facet arthropathy  -     SCHEDULE SURGERY  -     predniSONE (DELTASONE) 10 mg tablet; 2 pills in am for 3 days, then 1 pill in am for 3 days and 1/2 pill in am for remainder    5. Fusion of spine of thoracolumbar region         IMPRESSION AND PLAN:  Margarita Singleton is a 76 y.o. female with history of lumbar pain. She complains of progressive burning and stabbing pain that radiates down the right leg with weakness. Pt takes Neurontin 300 mg 1 cap QAM and 3 caps QHS and  Pamelor 10 mg 4 tabs QHS with benefit. 1) Pt was given information on lumbar arthritis exercises. 2) She received a refill of Elavil 10 mg 4 caps QHS. 3) Pt will continue taking Neurontin 300 mg as prescribed and does not need a refill at this time. 4) She was scheduled for a right L3 and right L4 SNRB,   5) Discussed referral to Dr. Anabella Pineda for surgical evaluation at her next office visit if needed. 6) Pt was prescribed a small prednisone taper. 7) Ms. Emiliano Yan has a reminder for a \"due or due soon\" health maintenance. I have asked that she contact her primary care provider, Luciano Li MD, for follow-up on this health maintenance. 8)  demonstrated consistency with prescribing. Follow-up and Dispositions    · Return in about 6 weeks (around 1/25/2021) for Medication follow up and injection follow up.              HISTORY OF PRESENT ILLNESS:  Stephanie Steward Flash Juárez is a 76 y.o. female with history of lumbar pain and presents to the office today for follow up. Pt complains of progressive burning and stabbing pain that radiates down the right leg to the knee. She also reports weakness in the right leg and notes that she generally leads with her left leg when climbing stairs. Pt admits to increased pain when changing positions and when she is more active. She reports relief in her pain when taking prednisone. Pt takes Neurontin 300 mg 1 cap QAM and 3 caps QHS. She also takes Pamelor 10 mg 4 tabs QHS with benefit. Pt notes that her pain was well managed for some time with medication until it recently worsened. She does have more pain also at night and some difficulty with sleeping at times. She has considered surgical intervention. Pt at this time desires to proceed with steroid injections. Pain Scale: 8/10    PCP: Popeye Marie MD     Past Medical History:   Diagnosis Date    Acid reflux     Hypertension         Social History     Socioeconomic History    Marital status:      Spouse name: Not on file    Number of children: Not on file    Years of education: Not on file    Highest education level: Not on file   Occupational History    Not on file   Social Needs    Financial resource strain: Not on file    Food insecurity     Worry: Not on file     Inability: Not on file    Transportation needs     Medical: Not on file     Non-medical: Not on file   Tobacco Use    Smoking status: Never Smoker    Smokeless tobacco: Never Used   Substance and Sexual Activity    Alcohol use:  Yes     Alcohol/week: 1.0 standard drinks     Types: 1 Glasses of wine per week    Drug use: No    Sexual activity: Yes     Partners: Male     Birth control/protection: None   Lifestyle    Physical activity     Days per week: Not on file     Minutes per session: Not on file    Stress: Not on file   Relationships    Social connections     Talks on phone: Not on file Gets together: Not on file     Attends Jainism service: Not on file     Active member of club or organization: Not on file     Attends meetings of clubs or organizations: Not on file     Relationship status: Not on file    Intimate partner violence     Fear of current or ex partner: Not on file     Emotionally abused: Not on file     Physically abused: Not on file     Forced sexual activity: Not on file   Other Topics Concern    Not on file   Social History Narrative    Not on file       Current Outpatient Medications   Medication Sig Dispense Refill    nortriptyline (PAMELOR) 10 mg capsule Take 4 Caps by mouth nightly. 120 Cap 2    predniSONE (DELTASONE) 10 mg tablet 2 pills in am for 3 days, then 1 pill in am for 3 days and 1/2 pill in am for remainder 11 Tab 0    alpha lipoic acid 200 mg cap Take 1 Cap by mouth daily.  docosahexanoic acid-eicosapent 120-180 mg cap Take 1 Cap by mouth daily.  gabapentin (Neurontin) 300 mg capsule 1 in the am and 3 in the evening as directed  Indications: neuropathic pain 360 Cap 1    cholecalciferol (VITAMIN D3) 1,000 unit cap Take 1 Cap by mouth daily.  MAGNESIUM OXIDE PO Take 1 Tab by mouth daily.  omeprazole (PRILOSEC) 40 mg capsule Take 40 mg by mouth daily.  losartan (COZAAR) 25 mg tablet Take 25 mg by mouth daily.  hydroCHLOROthiazide (HYDRODIURIL) 25 mg tablet Take 25 mg by mouth daily.  LORazepam (ATIVAN) 1 mg tablet Take 1 mg by mouth every eight (8) hours as needed.  atorvastatin (LIPITOR) 10 mg tablet Take 10 mg by mouth daily.  calcium carbonate (CALTREX) 600 mg calcium (1,500 mg) tablet Take 600 mg by mouth daily.  omega-3 acid ethyl esters (LOVAZA) 1 gram capsule Take 1 g by mouth daily (with breakfast).  zolpidem (AMBIEN) 10 mg tablet Take 0.5-1 Tabs by mouth nightly as needed for Sleep. Max Daily Amount: 10 mg.  (Patient not taking: Reported on 11/23/2020) 30 Tab 0    topiramate (TOPAMAX) 25 mg tablet Take 2 Tabs by mouth nightly. (Patient not taking: Reported on 12/14/2020) 60 Tab 1       No Known Allergies      REVIEW OF SYSTEMS    Constitutional: Negative for fever, chills, or weight change. Respiratory: Negative for cough or shortness of breath. Cardiovascular: Negative for chest pain or palpitations. Gastrointestinal: Negative for acid reflux, change in bowel habits, or constipation. Genitourinary: Negative for dysuria and flank pain. Musculoskeletal: Positive for lumbar pain and right leg weakness. Neurological: Negative for headaches, dizziness; positive for numbness. Psychiatric/Behavioral: Positive for difficulty with sleep. As per HPI    PHYSICAL EXAMINATION  Visit Vitals  /71   Pulse 92   Temp 97.4 °F (36.3 °C) (Temporal)   Resp 18   Ht 5' 5\" (1.651 m)   Wt 145 lb 12.8 oz (66.1 kg)   SpO2 95%   BMI 24.26 kg/m²       Constitutional: Awake, alert, and in no acute distress. Neurological: 1+ symmetrical DTRs in the upper extremities. 1+ symmetrical DTRs in the lower extremities. Sensation to light touch is intact. Negative Mckeon's sign bilaterally. Skin: warm, dry, and intact. Musculoskeletal: Tenderness to palpation in the lower lumbar region. Moderate pain with extension and axial loading. Improvement with forward flexion. No pain with internal or external rotation of her hips. Negative straight leg raise bilaterally.      Hip Flex  Quads Hamstrings Ankle DF EHL Ankle PF   Right  4/5  4/5  4/5  4/5  4/5 +4/5   Left +4/5 +4/5 +4/5 +4/5 +4/5 +4/5     IMAGING:    Lumbar spine MRI from 12/20/2020 was personally reviewed with the patient and demonstrated:  Results from [place] encounter on 12/10/20   MRI LUMB SPINE WO CONT    Narrative EXAM: MRI LUMB SPINE WO CONT    CLINICAL INDICATIONS/HISTORY: Right leg pain walking difficulties prior back  surgeries    COMPARISON: MRI performed May, 2019    Technique: Multi-sequence multiplanar T1, T2, STIR imaging with and without fat  saturation obtained centered on the lumbar spine. FINDINGS:   Large metal susceptibility artifacts at T10 and L2 consistent with extensive  posterior spinal fixation at these levels. Alignment: Intact lordosis  Vertebral body height: Normal  Marrow signal: Unremarkable  Disc spaces: Preserved height and signal intensity  Conus: Terminates at T12-L1    Axial imaging correlation:        L1-2: Patent canal and foramina. L2-3: There is a mild annular bulge across the midline. The level is severely  affected by susceptibility artifact secondary to posterior fixation devices. Doubt any high-grade stenosis is present. L3-4: Central moderate sized protrusion herniation extends into the caudal  aspect of the neuroforamina. There is compression of the exiting right L3 nerve  root. Central canal is borderline stenotic 0.94 cm. Left neuroforamina nonstenotic. Facets reflect mild arthritic changes more  accelerated on the right than left side. L4-5: High-grade canal stenosis AP dimension of the canal 0.78 cm secondary to  facet degenerative changes and hypertrophic response. There is also subluxation  of L4 on 5 of about 5 mm. There is encroachment into the right neuroforamina because of the hypertrophic  changes in the facet. Similar changes seen on the left. Exiting L4 roots may be  intermittently compressed. The anterior subluxation has increased in severity at  this level from the 2019 exam    L5-S1: Patent canal and foramina. Other structures: Unremarkable. Impression IMPRESSION:    There is central canal stenosis at L4-5. Some of the degenerative change  including anterior subluxation has progressed from 2019    L3-4 demonstrates herniation with borderline central canal stenosis  Right neuroforamina demonstrate compression of the exiting L4 and L3 nerve roots           Thoracic spine 2V x-rays from 11/23/2020 were personally reviewed with the patient and demonstrated:  Thoracolumbar fusion. Written by Jose Hummel, as dictated by Sharon Hess MD.  I, Dr. Sharon Hess confirm that all documentation is accurate.

## 2020-12-14 ENCOUNTER — OFFICE VISIT (OUTPATIENT)
Dept: ORTHOPEDIC SURGERY | Age: 75
End: 2020-12-14
Payer: MEDICARE

## 2020-12-14 VITALS
SYSTOLIC BLOOD PRESSURE: 113 MMHG | TEMPERATURE: 97.4 F | DIASTOLIC BLOOD PRESSURE: 71 MMHG | BODY MASS INDEX: 24.29 KG/M2 | RESPIRATION RATE: 18 BRPM | HEART RATE: 92 BPM | WEIGHT: 145.8 LBS | OXYGEN SATURATION: 95 % | HEIGHT: 65 IN

## 2020-12-14 DIAGNOSIS — R29.898 RIGHT LEG WEAKNESS: ICD-10-CM

## 2020-12-14 DIAGNOSIS — M48.062 SPINAL STENOSIS OF LUMBAR REGION WITH NEUROGENIC CLAUDICATION: Primary | ICD-10-CM

## 2020-12-14 DIAGNOSIS — M47.816 LUMBAR FACET ARTHROPATHY: ICD-10-CM

## 2020-12-14 DIAGNOSIS — M54.16 LUMBAR RADICULOPATHY: ICD-10-CM

## 2020-12-14 DIAGNOSIS — M43.25 FUSION OF SPINE OF THORACOLUMBAR REGION: ICD-10-CM

## 2020-12-14 PROCEDURE — 1090F PRES/ABSN URINE INCON ASSESS: CPT | Performed by: PHYSICAL MEDICINE & REHABILITATION

## 2020-12-14 PROCEDURE — G8427 DOCREV CUR MEDS BY ELIG CLIN: HCPCS | Performed by: PHYSICAL MEDICINE & REHABILITATION

## 2020-12-14 PROCEDURE — 3017F COLORECTAL CA SCREEN DOC REV: CPT | Performed by: PHYSICAL MEDICINE & REHABILITATION

## 2020-12-14 PROCEDURE — 99214 OFFICE O/P EST MOD 30 MIN: CPT | Performed by: PHYSICAL MEDICINE & REHABILITATION

## 2020-12-14 PROCEDURE — G8510 SCR DEP NEG, NO PLAN REQD: HCPCS | Performed by: PHYSICAL MEDICINE & REHABILITATION

## 2020-12-14 PROCEDURE — G8399 PT W/DXA RESULTS DOCUMENT: HCPCS | Performed by: PHYSICAL MEDICINE & REHABILITATION

## 2020-12-14 PROCEDURE — 1101F PT FALLS ASSESS-DOCD LE1/YR: CPT | Performed by: PHYSICAL MEDICINE & REHABILITATION

## 2020-12-14 PROCEDURE — G8536 NO DOC ELDER MAL SCRN: HCPCS | Performed by: PHYSICAL MEDICINE & REHABILITATION

## 2020-12-14 PROCEDURE — G8420 CALC BMI NORM PARAMETERS: HCPCS | Performed by: PHYSICAL MEDICINE & REHABILITATION

## 2020-12-14 RX ORDER — PREDNISONE 10 MG/1
TABLET ORAL
Qty: 11 TAB | Refills: 0 | Status: SHIPPED | OUTPATIENT
Start: 2020-12-14 | End: 2021-01-27 | Stop reason: SDUPTHER

## 2020-12-14 RX ORDER — NORTRIPTYLINE HYDROCHLORIDE 10 MG/1
40 CAPSULE ORAL
Qty: 120 CAP | Refills: 2 | Status: SHIPPED | OUTPATIENT
Start: 2020-12-14 | End: 2021-02-15 | Stop reason: SDUPTHER

## 2020-12-14 NOTE — PATIENT INSTRUCTIONS
Low Back Arthritis: Exercises Introduction Here are some examples of typical rehabilitation exercises for your condition. Start each exercise slowly. Ease off the exercise if you start to have pain. Your doctor or physical therapist will tell you when you can start these exercises and which ones will work best for you. When you are not being active, find a comfortable position for rest. Some people are comfortable on the floor or a medium-firm bed with a small pillow under their head and another under their knees. Some people prefer to lie on their side with a pillow between their knees. Don't stay in one position for too long. Take short walks (10 to 20 minutes) every 2 to 3 hours. Avoid slopes, hills, and stairs until you feel better. Walk only distances you can manage without pain, especially leg pain. How to do the exercises Pelvic tilt 1. Lie on your back with your knees bent. 2. \"Brace\" your stomachtighten your muscles by pulling in and imagining your belly button moving toward your spine. 3. Press your lower back into the floor. You should feel your hips and pelvis rock back. 4. Hold for 6 seconds while breathing smoothly. 5. Relax and allow your pelvis and hips to rock forward. 6. Repeat 8 to 12 times. Back stretches 1. Get down on your hands and knees on the floor. 2. Relax your head and allow it to droop. Round your back up toward the ceiling until you feel a nice stretch in your upper, middle, and lower back. Hold this stretch for as long as it feels comfortable, or about 15 to 30 seconds. 3. Return to the starting position with a flat back while you are on your hands and knees. 4. Let your back sway by pressing your stomach toward the floor. Lift your buttocks toward the ceiling. 5. Hold this position for 15 to 30 seconds. 6. Repeat 2 to 4 times. Follow-up care is a key part of your treatment and safety.  Be sure to make and go to all appointments, and call your doctor if you are having problems. It's also a good idea to know your test results and keep a list of the medicines you take. Where can you learn more? Go to http://www.gray.com/ Enter T013 in the search box to learn more about \"Low Back Arthritis: Exercises. \" Current as of: March 2, 2020               Content Version: 12.6 © 2006-2020 [employer]. Care instructions adapted under license by [employer] (which disclaims liability or warranty for this information). If you have questions about a medical condition or this instruction, always ask your healthcare professional. [address] any warranty or liability for your use of this information.

## 2020-12-14 NOTE — PROGRESS NOTES
Charlotte Valle presents today for   Chief Complaint   Patient presents with    LOW BACK PAIN    Leg Pain     right, anterior    Follow-up     MRI Lumbar @ [facility]       Is someone accompanying this pt? no    Is the patient using any DME equipment during OV? no    Depression Screening:  3 most recent PHQ Screens 12/14/2020   Little interest or pleasure in doing things Not at all   Feeling down, depressed, irritable, or hopeless Not at all   Total Score PHQ 2 0       Learning Assessment:  Learning Assessment 7/23/2019   PRIMARY LEARNER Patient   HIGHEST LEVEL OF EDUCATION - PRIMARY LEARNER  GRADUATED HIGH SCHOOL OR GED   BARRIERS PRIMARY LEARNER NONE   CO-LEARNER CAREGIVER No   PRIMARY LANGUAGE ENGLISH   LEARNER PREFERENCE PRIMARY DEMONSTRATION     READING     VIDEOS   ANSWERED BY patient   RELATIONSHIP SELF       Abuse Screening:  Abuse Screening Questionnaire 11/23/2020   Do you ever feel afraid of your partner? N   Are you in a relationship with someone who physically or mentally threatens you? N   Is it safe for you to go home? Y       Fall Risk  Fall Risk Assessment, last 12 mths 12/14/2020   Able to walk? Yes   Fall in past 12 months? No       OPIOID RISK TOOL  No flowsheet data found. Pt currently taking Antiplatelet therapy? no    Coordination of Care:  1. Have you been to the ER, urgent care clinic since your last visit? no  Hospitalized since your last visit? no    2. Have you seen or consulted any other health care providers outside of the [address] [place] [address] since your last visit? no Include any pap smears or colon screening.  no

## 2020-12-14 NOTE — LETTER
12/14/20 Patient: Melanie Calderon YOB: 1945 Date of Visit: 12/14/2020 Nyla Silverman MD 
[facility] [address] [id number] [place] [address] VIA Facsimile: [phone] Dear Nyla Silverman MD, Thank you for referring Ms. Breanna Sosa to [address] [facility] for evaluation. My notes for this consultation are attached. If you have questions, please do not hesitate to call me. I look forward to following your patient along with you. Sincerely, Almita Mejia MD

## 2021-01-06 ENCOUNTER — APPOINTMENT (OUTPATIENT)
Dept: GENERAL RADIOLOGY | Age: 76
End: 2021-01-06
Attending: PHYSICAL MEDICINE & REHABILITATION
Payer: MEDICARE

## 2021-01-06 ENCOUNTER — HOSPITAL ENCOUNTER (OUTPATIENT)
Age: 76
Setting detail: OUTPATIENT SURGERY
Discharge: HOME | End: 2021-01-06
Attending: PHYSICAL MEDICINE & REHABILITATION | Admitting: PHYSICAL MEDICINE & REHABILITATION
Payer: MEDICARE

## 2021-01-06 VITALS
DIASTOLIC BLOOD PRESSURE: 80 MMHG | OXYGEN SATURATION: 99 % | HEART RATE: 96 BPM | TEMPERATURE: 98.4 F | SYSTOLIC BLOOD PRESSURE: 120 MMHG | RESPIRATION RATE: 18 BRPM

## 2021-01-06 DIAGNOSIS — M48.062 SPINAL STENOSIS OF LUMBAR REGION WITH NEUROGENIC CLAUDICATION: ICD-10-CM

## 2021-01-06 PROCEDURE — 76010000009 HC PAIN MGT 0 TO 30 MIN PROC: Performed by: PHYSICAL MEDICINE & REHABILITATION

## 2021-01-06 PROCEDURE — 74011250636 HC RX REV CODE- 250/636: Performed by: PHYSICAL MEDICINE & REHABILITATION

## 2021-01-06 PROCEDURE — 77030039433 HC TY MYLEOGRAM BD -B: Performed by: PHYSICAL MEDICINE & REHABILITATION

## 2021-01-06 PROCEDURE — 77030003676 HC NDL SPN MPRI -A: Performed by: PHYSICAL MEDICINE & REHABILITATION

## 2021-01-06 PROCEDURE — 64483 NJX AA&/STRD TFRM EPI L/S 1: CPT | Performed by: PHYSICAL MEDICINE & REHABILITATION

## 2021-01-06 PROCEDURE — 77030003669 HC NDL SPN COOK -B: Performed by: PHYSICAL MEDICINE & REHABILITATION

## 2021-01-06 PROCEDURE — 74011250637 HC RX REV CODE- 250/637: Performed by: PHYSICAL MEDICINE & REHABILITATION

## 2021-01-06 PROCEDURE — 74011000636 HC RX REV CODE- 636: Performed by: PHYSICAL MEDICINE & REHABILITATION

## 2021-01-06 PROCEDURE — 74011000250 HC RX REV CODE- 250: Performed by: PHYSICAL MEDICINE & REHABILITATION

## 2021-01-06 PROCEDURE — 64484 NJX AA&/STRD TFRM EPI L/S EA: CPT | Performed by: PHYSICAL MEDICINE & REHABILITATION

## 2021-01-06 PROCEDURE — 2709999900 HC NON-CHARGEABLE SUPPLY: Performed by: PHYSICAL MEDICINE & REHABILITATION

## 2021-01-06 RX ORDER — DIAZEPAM 5 MG/1
5-20 TABLET ORAL ONCE
Status: COMPLETED | OUTPATIENT
Start: 2021-01-06 | End: 2021-01-06

## 2021-01-06 RX ORDER — LIDOCAINE HYDROCHLORIDE 10 MG/ML
INJECTION, SOLUTION EPIDURAL; INFILTRATION; INTRACAUDAL; PERINEURAL AS NEEDED
Status: DISCONTINUED | OUTPATIENT
Start: 2021-01-06 | End: 2021-01-06 | Stop reason: HOSPADM

## 2021-01-06 RX ORDER — DEXAMETHASONE SODIUM PHOSPHATE 100 MG/10ML
INJECTION INTRAMUSCULAR; INTRAVENOUS AS NEEDED
Status: DISCONTINUED | OUTPATIENT
Start: 2021-01-06 | End: 2021-01-06 | Stop reason: HOSPADM

## 2021-01-06 RX ADMIN — DIAZEPAM 10 MG: 5 TABLET ORAL at 13:10

## 2021-01-06 NOTE — H&P
Date of Surgery Update:  Yaritza Martinez was seen and examined. History and physical has been reviewed. The patient has been examined. There have been no significant clinical changes since the last office visit. The patient was counseled at length about the risks of jesus Covid-19 during their perioperative period and any recovery window from their procedure. The patient was made aware that jesus Covid-19  may worsen their prognosis for recovering from their procedure and lend to a higher morbidity and/or mortality risk. All material risks, benefits, and reasonable alternatives including postponing the procedure were discussed. The patient does  wish to proceed with the procedure at this time.         Signed By: Rajesh Garber MD     January 6, 2021 12:42 PM

## 2021-01-06 NOTE — PROCEDURES
PROCEDURE NOTE      Patient Name: Fuad Walker    Date of Procedure: January 6, 2021    Preoperative Diagnosis:  Lumbar radiculopathy [M54.16]    Post Operative Diagnosis:  Lumbar radiculopathy [M54.16]    Procedure :    right L3 Selective Nerve Root Block  right L4 Selective Nerve Root Block    Consent:  Informed consent was obtained prior to the procedure. The patient was given the opportunity to ask questions regarding the procedure and its associated risks. In addition to the potential risks associated with the procedure itself, the patient was informed both verbally and in writing of the potential side effects of the use of glucocorticoid. The patient appeared to comprehend the informed consent and desired to have the procedure performed. Procedure: The patient was placed in the prone position on the fluoroscopy table and the back was prepped and draped in the usual sterile manner. The exact spinal level was  identified using fluoroscopy, and Lidocaine 1 % was injected locally, a # 22 gauge spinal needle was passed to the transverse process. The depth was noted and the needle redirected to pass inferior and approximately one cm anterior to the transverse process. YES  1 cc of Isovue M-200 was used to verify positioning in the epidural and paravertebral space and outlined the course of the spinal nerve into the epidural space. The same procedure was repeated at each spinal level indicated above. A total of 10 mg of preservative free Dexamethasone and 5 cc of Lidocaine was slowly injected. The patient tolerated the procedure well. The injection area was cleaned and bandaids applied. Not excessive bleeding was noted. Patient dressed and discharged to home with instructions. Discussion: The patient tolerated the procedure well.                                               Aston Emanuel MD  January 6, 2021

## 2021-01-06 NOTE — DISCHARGE INSTRUCTIONS
[facility]   ([place])  Dr. Jose Villar, Dr. Arianne Platt, Dr. Tam Bryant Spinal Procedure (Block) Instructions    * Do not drive a car, operate heavy machinery or dangerous equipment, or make important decisions for 12-24 hours. * Light activity as tolerated; may rest for the remainder of the day. * Resume pre-block medications including those from your other doctors. * Do not drink alcoholic beverages for 24 hours. Alcohol and the medications you have received may interact and cause an adverse reaction. * You may feel better this evening and worse tomorrow, as the numbing medications wears off and the steroid has yet to begin to work. After 48-72 hrs the steroid should begin to release bringing you relief. If you had a medial branch block, no steroids were used. The medial branch block is a test to see if you are a candidate for radiofrequency ablation (RFA). The anesthetic (numbing medicine)  will wear off by the next day. * You may shower this evening and remove any bandages. * Avoid hot tubs/pools/tub soaks and heating pads for 24 hours. You may use cold packs on the procedure site as tolerated for the first 24 hours. * If a headache develops, drink plenty of fluids and rest.  Take over the counter medications for headache if needed. If the headache continues longer than 24 hours, call MD at the [address] [place] [address] Avenue. [phone]    * Continue taking pain medications as needed. * You may resume your regular diet if tolerated. Otherwise, start with sips of water and advance slowly. * If Diabetic: check your blood sugar three times a day for the next 3 days. If your sugar is greater than 300 call your family doctor. If your sugar is greater than 400, have someone transport you to the nearest Emergency Room. * If you experience any of the following problems, Please Call the [address] [place] [address] Avenue at [phone].         * Excessive pain, swelling, redness or odor at or around the surgical area    * Fever of 101 or higher    * Nausea / Vomiting lasting longer than 4 hours or if unable to take medications. * Severe Headache    * Weakness or numbness in arms or legs that is not      resolving   * Any NEW signs of decreased circulation or nerve impairment in leg: change in color, swelling, persistent numbness, tingling                    * Prolonged increase in pain greater than 4 days      PATIENT INSTRUCTIONS:    After oral sedation, for 12-24 hours or while taking prescription Narcotics:  · Limit your activities  · Do not drive and operate hazardous machinery  · Do not make important personal or business decisions  · Do  not drink alcoholic beverages  · If you have not urinated within 8 hours after discharge, please contact your surgeon on call. *  Please give a list of your current medications to your Primary Care Provider. *  Please update this list whenever your medications are discontinued, doses are      changed, or new medications (including over-the-counter products) are added. *  Please carry medication information at all times in case of emergency situations. These are general instructions for a healthy lifestyle:    No smoking/ No tobacco products/ Avoid exposure to second hand smoke    Surgeon General's Warning:  Quitting smoking now greatly reduces serious risk to your health. Obesity, smoking, and sedentary lifestyle greatly increases your risk for illness    A healthy diet, regular physical exercise & weight monitoring are important for maintaining a healthy lifestyle    You may be retaining fluid if you have a history of heart failure or if you experience any of the following symptoms:  Weight gain of 3 pounds or more overnight or 5 pounds in a week, increased swelling in our hands or feet or shortness of breath while lying flat in bed.   Please call your doctor as soon as you notice any of these symptoms; do not wait until your next office visit. Recognize signs and symptoms of STROKE:    F-face looks uneven    A-arms unable to move or move unevenly    S-speech slurred or non-existent    T-time-call 911 as soon as signs and symptoms begin-DO NOT go       Back to bed or wait to see if you get better-TIME IS BRAIN.

## 2021-01-06 NOTE — PERIOP NOTES
Patient tolerated procedure well. No complications noted. VS WNL. No redness, swelling, or bleeding from injection site. Dressing dry and intact. Armband removed and shredded. Patient ambulated to main entrance and discharged alive and well, in stable condition.

## 2021-01-25 ENCOUNTER — TELEPHONE (OUTPATIENT)
Dept: ORTHOPEDIC SURGERY | Age: 76
End: 2021-01-25

## 2021-01-25 NOTE — TELEPHONE ENCOUNTER
Patient is requesting a refill for Gabapentin and Prednisone. Please advise.       [address] [facility]  Patient [phone]

## 2021-01-27 DIAGNOSIS — M48.061 LUMBAR FORAMINAL STENOSIS: ICD-10-CM

## 2021-01-27 DIAGNOSIS — M79.2 NEURITIS: ICD-10-CM

## 2021-01-27 DIAGNOSIS — M47.816 LUMBAR FACET ARTHROPATHY: ICD-10-CM

## 2021-01-27 DIAGNOSIS — M54.16 LUMBAR RADICULAR PAIN: ICD-10-CM

## 2021-01-27 DIAGNOSIS — M48.062 SPINAL STENOSIS OF LUMBAR REGION WITH NEUROGENIC CLAUDICATION: ICD-10-CM

## 2021-01-27 RX ORDER — GABAPENTIN 300 MG/1
CAPSULE ORAL
Qty: 360 CAP | Refills: 1 | Status: SHIPPED | OUTPATIENT
Start: 2021-01-27 | End: 2021-05-24 | Stop reason: SDUPTHER

## 2021-01-27 RX ORDER — PREDNISONE 10 MG/1
TABLET ORAL
Qty: 11 TAB | Refills: 0 | Status: SHIPPED | OUTPATIENT
Start: 2021-01-27 | End: 2021-02-15 | Stop reason: ALTCHOICE

## 2021-02-15 ENCOUNTER — OFFICE VISIT (OUTPATIENT)
Dept: ORTHOPEDIC SURGERY | Age: 76
End: 2021-02-15
Payer: MEDICARE

## 2021-02-15 VITALS
OXYGEN SATURATION: 96 % | HEART RATE: 68 BPM | DIASTOLIC BLOOD PRESSURE: 60 MMHG | TEMPERATURE: 96.9 F | HEIGHT: 65 IN | BODY MASS INDEX: 24.62 KG/M2 | SYSTOLIC BLOOD PRESSURE: 96 MMHG | RESPIRATION RATE: 16 BRPM | WEIGHT: 147.8 LBS

## 2021-02-15 DIAGNOSIS — M47.816 LUMBAR FACET ARTHROPATHY: ICD-10-CM

## 2021-02-15 DIAGNOSIS — R29.898 RIGHT LEG WEAKNESS: ICD-10-CM

## 2021-02-15 DIAGNOSIS — M48.062 SPINAL STENOSIS OF LUMBAR REGION WITH NEUROGENIC CLAUDICATION: Primary | ICD-10-CM

## 2021-02-15 DIAGNOSIS — M43.25 FUSION OF SPINE OF THORACOLUMBAR REGION: ICD-10-CM

## 2021-02-15 DIAGNOSIS — M54.16 LUMBAR RADICULOPATHY: ICD-10-CM

## 2021-02-15 PROCEDURE — 3017F COLORECTAL CA SCREEN DOC REV: CPT | Performed by: PHYSICAL MEDICINE & REHABILITATION

## 2021-02-15 PROCEDURE — G8420 CALC BMI NORM PARAMETERS: HCPCS | Performed by: PHYSICAL MEDICINE & REHABILITATION

## 2021-02-15 PROCEDURE — 1090F PRES/ABSN URINE INCON ASSESS: CPT | Performed by: PHYSICAL MEDICINE & REHABILITATION

## 2021-02-15 PROCEDURE — G8536 NO DOC ELDER MAL SCRN: HCPCS | Performed by: PHYSICAL MEDICINE & REHABILITATION

## 2021-02-15 PROCEDURE — 1101F PT FALLS ASSESS-DOCD LE1/YR: CPT | Performed by: PHYSICAL MEDICINE & REHABILITATION

## 2021-02-15 PROCEDURE — G8432 DEP SCR NOT DOC, RNG: HCPCS | Performed by: PHYSICAL MEDICINE & REHABILITATION

## 2021-02-15 PROCEDURE — G8399 PT W/DXA RESULTS DOCUMENT: HCPCS | Performed by: PHYSICAL MEDICINE & REHABILITATION

## 2021-02-15 PROCEDURE — 99214 OFFICE O/P EST MOD 30 MIN: CPT | Performed by: PHYSICAL MEDICINE & REHABILITATION

## 2021-02-15 PROCEDURE — G8427 DOCREV CUR MEDS BY ELIG CLIN: HCPCS | Performed by: PHYSICAL MEDICINE & REHABILITATION

## 2021-02-15 RX ORDER — NORTRIPTYLINE HYDROCHLORIDE 10 MG/1
40 CAPSULE ORAL
Qty: 120 CAP | Refills: 3 | Status: SHIPPED | OUTPATIENT
Start: 2021-02-15 | End: 2021-05-24 | Stop reason: SDUPTHER

## 2021-02-15 NOTE — LETTER
2/16/2021 Patient: Esther Santillan YOB: 1945 Date of Visit: 2/15/2021 Ely Schuster MD 
[address] [facility], [facility] [address] [address] [place] [address] Via Fax: [phone] Dear Ely Schuster MD, Thank you for referring Ms. Demetra Pereyra to [address] [facility] for evaluation. My notes for this consultation are attached. If you have questions, please do not hesitate to call me. I look forward to following your patient along with you. Sincerely, Ingrid Skiff, MD

## 2021-02-15 NOTE — PROGRESS NOTES
Judith Kaur presents today for   Chief Complaint   Patient presents with    LOW BACK PAIN    Buttocks pain     right    Leg Pain     right    Follow-up     right L3 & L4 Selective Nerve Root Block on 01/06/21       Is someone accompanying this pt? no    Is the patient using any DME equipment during [address]? no    Depression Screening:  3 most recent PHQ Screens 12/14/2020   Little interest or pleasure in doing things Not at all   Feeling down, depressed, irritable, or hopeless Not at all   Total Score PHQ 2 0       Learning Assessment:  Learning Assessment 7/23/2019   PRIMARY LEARNER Patient   HIGHEST LEVEL OF EDUCATION - PRIMARY LEARNER  GRADUATED HIGH SCHOOL OR GED   BARRIERS PRIMARY LEARNER NONE   CO-LEARNER CAREGIVER No   PRIMARY LANGUAGE ENGLISH   LEARNER PREFERENCE PRIMARY DEMONSTRATION     READING     VIDEOS   ANSWERED BY patient   RELATIONSHIP SELF       Abuse Screening:  Abuse Screening Questionnaire 11/23/2020   Do you ever feel afraid of your partner? N   Are you in a relationship with someone who physically or mentally threatens you? N   Is it safe for you to go home? Y       Fall Risk  Fall Risk Assessment, last 12 mths 2/15/2021   Able to walk? Yes   Fall in past 12 months? 0   Do you feel unsteady? 0   Are you worried about falling 0       OPIOID RISK TOOL  No flowsheet data found. Pt currently taking Antiplatelet therapy? No    Coordination of Care:  1. Have you been to the ER, urgent care clinic since your last visit? no  Hospitalized since your last visit? no    2. Have you seen or consulted any other health care providers outside of the [address] [place] [address] since your last visit? no Include any pap smears or colon screening.  no

## 2021-02-15 NOTE — PROGRESS NOTES
[facility]  [address] [place], [address]  Phone: [phone]  Fax: [phone]    Pt's YOB: 1945    ASSESSMENT   Diagnoses and all orders for this visit:    1. Spinal stenosis of lumbar region with neurogenic claudication  -     REFERRAL TO SPINE SURGERY  -     nortriptyline (PAMELOR) 10 mg capsule; Take 4 Caps by mouth nightly. 2. Right leg weakness  -     REFERRAL TO SPINE SURGERY    3. Lumbar radiculopathy  -     REFERRAL TO SPINE SURGERY  -     nortriptyline (PAMELOR) 10 mg capsule; Take 4 Caps by mouth nightly. 4. Lumbar facet arthropathy  -     REFERRAL TO SPINE SURGERY    5. Fusion of spine of thoracolumbar region  -     REFERRAL TO SPINE SURGERY         IMPRESSION AND PLAN:  Tee Corley is a 76 y.o. female with history of lumbar pain and spinal stenosis. She complains of progressive burning and stabbing pain that radiates down the right leg with weakness. Pt reports minimal relief with a right L3 and right L4 SNRB on 1/6/2021. She continues to take Pamelor 10 mg 4 caps QHS which helps with sleep. 1) Pt was given information on lumbar arthritis exercises. 2) She was referred to Dr. Rhea Jin for surgical evaluation of her spinal stenosis, chronic pain, and leg weakness. 3) Pt received a refill of Pamelor 10 mg 4 caps QHS. 4) She will continue taking Neurontin 300 mg as prescribed and does not need a refill at this time. 5) Ms. Haresh Rodriguez has a reminder for a \"due or due soon\" health maintenance. I have asked that she contact her primary care provider, Meryle Daft, MD, for follow-up on this health maintenance. 6)  demonstrated consistency with prescribing. Follow-up and Dispositions    · Return in about 3 months (around 5/15/2021) for Medication follow up.              HISTORY OF PRESENT ILLNESS:  Tee Corley is a 76 y.o. female with history of lumbar pain and presents to the office today for follow up. She complains of progressive burning and stabbing pain that radiates down the right leg with weakness. Pt reports minimal relief with a right L3 and right L4 SNRB on 1/6/2021. She notes after the injection she started experiencing aching pain in the left shin, but notes that this does not interfere with ambulation. Pt takes Neurontin 300 mg 1 cap QAM and 3 caps QHS and Pamelor 10 mg and takes 4 caps QHS which helps with sleep. She notes that she occasionally wakes up with pain in the right leg. Pt reports temporary relief when taking a large prednisone taper since her last office visit. Pt notes that she received her first dose of the COVID-19 vaccine a couple weeks ago. Pt at this time desires to proceed with surgical evaluation by Dr. Tin Headley. Pain Scale: 5/10    PCP: Lou Doherty MD     Past Medical History:   Diagnosis Date    Acid reflux     Hypertension         Social History     Socioeconomic History    Marital status:      Spouse name: Not on file    Number of children: Not on file    Years of education: Not on file    Highest education level: Not on file   Occupational History    Not on file   Social Needs    Financial resource strain: Not on file    Food insecurity     Worry: Not on file     Inability: Not on file    Transportation needs     Medical: Not on file     Non-medical: Not on file   Tobacco Use    Smoking status: Never Smoker    Smokeless tobacco: Never Used   Substance and Sexual Activity    Alcohol use:  Yes     Alcohol/week: 1.0 standard drinks     Types: 1 Glasses of wine per week    Drug use: No    Sexual activity: Yes     Partners: Male     Birth control/protection: None   Lifestyle    Physical activity     Days per week: Not on file     Minutes per session: Not on file    Stress: Not on file   Relationships    Social connections     Talks on phone: Not on file     Gets together: Not on file     Attends Anabaptism service: Not on file     Active member of club or organization: Not on file     Attends meetings of clubs or organizations: Not on file     Relationship status: Not on file    Intimate partner violence     Fear of current or ex partner: Not on file     Emotionally abused: Not on file     Physically abused: Not on file     Forced sexual activity: Not on file   Other Topics Concern    Not on file   Social History Narrative    Not on file       Current Outpatient Medications   Medication Sig Dispense Refill    nortriptyline (PAMELOR) 10 mg capsule Take 4 Caps by mouth nightly. 120 Cap 3    gabapentin (Neurontin) 300 mg capsule 1 in the am and 3 in the evening as directed  Indications: neuropathic pain 360 Cap 1    alpha lipoic acid 200 mg cap Take 1 Cap by mouth daily.  docosahexanoic acid-eicosapent 120-180 mg cap Take 1 Cap by mouth daily.  cholecalciferol (VITAMIN D3) 1,000 unit cap Take 1 Cap by mouth daily.  MAGNESIUM OXIDE PO Take 1 Tab by mouth daily.  omeprazole (PRILOSEC) 40 mg capsule Take 40 mg by mouth daily.  losartan (COZAAR) 25 mg tablet Take 25 mg by mouth daily.  hydroCHLOROthiazide (HYDRODIURIL) 25 mg tablet Take 25 mg by mouth daily.  LORazepam (ATIVAN) 1 mg tablet Take 1 mg by mouth every eight (8) hours as needed.  atorvastatin (LIPITOR) 10 mg tablet Take 10 mg by mouth daily.  calcium carbonate (CALTREX) 600 mg calcium (1,500 mg) tablet Take 600 mg by mouth daily.  omega-3 acid ethyl esters (LOVAZA) 1 gram capsule Take 1 g by mouth daily (with breakfast).  zolpidem (AMBIEN) 10 mg tablet Take 0.5-1 Tabs by mouth nightly as needed for Sleep. Max Daily Amount: 10 mg. (Patient not taking: Reported on 11/23/2020) 30 Tab 0    topiramate (TOPAMAX) 25 mg tablet Take 2 Tabs by mouth nightly. (Patient not taking: Reported on 12/14/2020) 60 Tab 1       No Known Allergies      REVIEW OF SYSTEMS    Constitutional: Negative for fever, chills, or weight change.    Respiratory: Negative for cough or shortness of breath. Cardiovascular: Negative for chest pain or palpitations. Gastrointestinal: Negative for acid reflux, change in bowel habits, or constipation. Genitourinary: Negative for dysuria and flank pain. Musculoskeletal: Positive for lumbar pain and right leg weakness  Neurological: Negative for headaches or dizziness. Positive for numbness. Endo/Heme/Allergies: Negative for increased bruising. Psychiatric/Behavioral: Positive for difficulty with sleep. As per HPI    PHYSICAL EXAMINATION  Visit Vitals  BP 96/60   Pulse 68   Temp 96.9 °F (36.1 °C) (Temporal)   Resp 16   Ht 5' 5\" (1.651 m)   Wt 147 lb 12.8 oz (67 kg)   SpO2 96%   BMI 24.60 kg/m²       Constitutional: Awake, alert, and in no acute distress. Neurological: 1+ symmetrical DTRs in the upper extremities. 1+ symmetrical DTRs in the lower extremities. Sensation to light touch is intact. Negative Mckeon's sign bilaterally. Skin: warm, dry, and intact. Musculoskeletal: No tenderness to palpation in the lower lumbar region. Moderate pain with extension and axial loading. Improvement with forward flexion. No pain with internal or external rotation of her hips. Negative straight leg raise bilaterally.      Hip Flex  Quads Hamstrings Ankle DF EHL Ankle PF   Right  4/5  4/5  4/5 +4/5 +4/5 +4/5   Left +4/5 +4/5 +4/5 +4/5 +4/5 +4/5     IMAGING:    Lumbar spine MRI from 12/20/2020 was personally reviewed with the patient and demonstrated:       Results from [place] encounter on 12/10/20   MRI LUMB SPINE WO CONT     Narrative EXAM: MRI LUMB SPINE WO CONT     CLINICAL INDICATIONS/HISTORY: Right leg pain walking difficulties prior back  surgeries     COMPARISON: MRI performed May, 2019     Technique: Multi-sequence multiplanar T1, T2, STIR imaging with and without fat  saturation obtained centered on the lumbar spine.      FINDINGS:   Large metal susceptibility artifacts at T10 and L2 consistent with extensive  posterior spinal fixation at these levels. Alignment: Intact lordosis  Vertebral body height: Normal  Marrow signal: Unremarkable  Disc spaces: Preserved height and signal intensity  Conus: Terminates at T12-L1     Axial imaging correlation:           L1-2: Patent canal and foramina.     L2-3: There is a mild annular bulge across the midline. The level is severely  affected by susceptibility artifact secondary to posterior fixation devices. Doubt any high-grade stenosis is present.     L3-4: Central moderate sized protrusion herniation extends into the caudal  aspect of the neuroforamina. There is compression of the exiting right L3 nerve  root. Central canal is borderline stenotic 0.94 cm. Left neuroforamina nonstenotic. Facets reflect mild arthritic changes more  accelerated on the right than left side.     L4-5: High-grade canal stenosis AP dimension of the canal 0.78 cm secondary to  facet degenerative changes and hypertrophic response. There is also subluxation  of L4 on 5 of about 5 mm. There is encroachment into the right neuroforamina because of the hypertrophic  changes in the facet. Similar changes seen on the left. Exiting L4 roots may be  intermittently compressed. The anterior subluxation has increased in severity at  this level from the 2019 exam     L5-S1: Patent canal and foramina.     Other structures: Unremarkable.           Impression IMPRESSION:     There is central canal stenosis at L4-5. Some of the degenerative change  including anterior subluxation has progressed from 2019     L3-4 demonstrates herniation with borderline central canal stenosis  Right neuroforamina demonstrate compression of the exiting L4 and L3 nerve roots               Thoracic spine 2V x-rays from 11/23/2020 were personally reviewed with the patient and demonstrated:  Thoracolumbar fusion.      Written by Jessica Bui, as dictated by Stone Wells MD.  I, Dr. Stone Wells confirm that all documentation is accurate.

## 2021-05-24 ENCOUNTER — OFFICE VISIT (OUTPATIENT)
Dept: ORTHOPEDIC SURGERY | Age: 76
End: 2021-05-24
Payer: MEDICARE

## 2021-05-24 VITALS
BODY MASS INDEX: 24.3 KG/M2 | WEIGHT: 151.2 LBS | DIASTOLIC BLOOD PRESSURE: 62 MMHG | HEIGHT: 66 IN | OXYGEN SATURATION: 96 % | HEART RATE: 108 BPM | SYSTOLIC BLOOD PRESSURE: 125 MMHG | RESPIRATION RATE: 16 BRPM | TEMPERATURE: 97.1 F

## 2021-05-24 DIAGNOSIS — M48.062 SPINAL STENOSIS OF LUMBAR REGION WITH NEUROGENIC CLAUDICATION: Primary | ICD-10-CM

## 2021-05-24 DIAGNOSIS — M54.16 LUMBAR RADICULOPATHY: ICD-10-CM

## 2021-05-24 DIAGNOSIS — R26.9 GAIT ABNORMALITY: ICD-10-CM

## 2021-05-24 DIAGNOSIS — M48.061 LUMBAR FORAMINAL STENOSIS: ICD-10-CM

## 2021-05-24 DIAGNOSIS — M43.25 FUSION OF SPINE OF THORACOLUMBAR REGION: ICD-10-CM

## 2021-05-24 DIAGNOSIS — R29.898 RIGHT LEG WEAKNESS: ICD-10-CM

## 2021-05-24 DIAGNOSIS — M47.816 LUMBAR FACET ARTHROPATHY: ICD-10-CM

## 2021-05-24 PROBLEM — M54.31 SCIATICA OF RIGHT SIDE: Status: ACTIVE | Noted: 2018-02-07

## 2021-05-24 PROBLEM — D03.61 MELANOMA IN SITU OF RIGHT UPPER ARM (HCC): Status: ACTIVE | Noted: 2017-02-10

## 2021-05-24 PROBLEM — K58.0 IRRITABLE BOWEL SYNDROME WITH DIARRHEA: Status: ACTIVE | Noted: 2017-09-26

## 2021-05-24 PROBLEM — K21.9 GASTROESOPHAGEAL REFLUX DISEASE WITHOUT ESOPHAGITIS: Status: ACTIVE | Noted: 2017-02-10

## 2021-05-24 PROCEDURE — 99213 OFFICE O/P EST LOW 20 MIN: CPT | Performed by: PHYSICAL MEDICINE & REHABILITATION

## 2021-05-24 PROCEDURE — G8754 DIAS BP LESS 90: HCPCS | Performed by: PHYSICAL MEDICINE & REHABILITATION

## 2021-05-24 PROCEDURE — G8420 CALC BMI NORM PARAMETERS: HCPCS | Performed by: PHYSICAL MEDICINE & REHABILITATION

## 2021-05-24 PROCEDURE — G8427 DOCREV CUR MEDS BY ELIG CLIN: HCPCS | Performed by: PHYSICAL MEDICINE & REHABILITATION

## 2021-05-24 PROCEDURE — G8432 DEP SCR NOT DOC, RNG: HCPCS | Performed by: PHYSICAL MEDICINE & REHABILITATION

## 2021-05-24 PROCEDURE — G8536 NO DOC ELDER MAL SCRN: HCPCS | Performed by: PHYSICAL MEDICINE & REHABILITATION

## 2021-05-24 PROCEDURE — 1090F PRES/ABSN URINE INCON ASSESS: CPT | Performed by: PHYSICAL MEDICINE & REHABILITATION

## 2021-05-24 PROCEDURE — 1101F PT FALLS ASSESS-DOCD LE1/YR: CPT | Performed by: PHYSICAL MEDICINE & REHABILITATION

## 2021-05-24 PROCEDURE — G8399 PT W/DXA RESULTS DOCUMENT: HCPCS | Performed by: PHYSICAL MEDICINE & REHABILITATION

## 2021-05-24 PROCEDURE — G8752 SYS BP LESS 140: HCPCS | Performed by: PHYSICAL MEDICINE & REHABILITATION

## 2021-05-24 RX ORDER — NORTRIPTYLINE HYDROCHLORIDE 10 MG/1
40 CAPSULE ORAL
Qty: 120 CAPSULE | Refills: 3 | Status: SHIPPED | OUTPATIENT
Start: 2021-05-24 | End: 2021-10-11 | Stop reason: SDUPTHER

## 2021-05-24 RX ORDER — GABAPENTIN 300 MG/1
CAPSULE ORAL
Qty: 360 CAPSULE | Refills: 1 | Status: SHIPPED | OUTPATIENT
Start: 2021-07-25 | End: 2021-07-23 | Stop reason: SDUPTHER

## 2021-05-24 RX ORDER — METHYLPREDNISOLONE 4 MG/1
TABLET ORAL
Qty: 1 DOSE PACK | Refills: 1 | Status: SHIPPED | OUTPATIENT
Start: 2021-05-24 | End: 2021-08-06 | Stop reason: ALTCHOICE

## 2021-05-24 NOTE — PROGRESS NOTES
[facility]  [address] [place] [address], [facility] [address]  Phone: [phone]  Fax: [phone]    Pt's YOB: 1945    ASSESSMENT   Diagnoses and all orders for this visit:    1. Spinal stenosis of lumbar region with neurogenic claudication  -     nortriptyline (PAMELOR) 10 mg capsule; Take 4 Capsules by mouth nightly. -     methylPREDNISolone (MEDROL DOSEPACK) 4 mg tablet; Per dose pack instructions  -     gabapentin (Neurontin) 300 mg capsule; 1 in the am and 3 in the evening as directed  Indications: neuropathic pain    2. Lumbar radiculopathy  -     nortriptyline (PAMELOR) 10 mg capsule; Take 4 Capsules by mouth nightly. -     methylPREDNISolone (MEDROL DOSEPACK) 4 mg tablet; Per dose pack instructions    3. Lumbar facet arthropathy  -     methylPREDNISolone (MEDROL DOSEPACK) 4 mg tablet; Per dose pack instructions    4. Lumbar foraminal stenosis  -     gabapentin (Neurontin) 300 mg capsule; 1 in the am and 3 in the evening as directed  Indications: neuropathic pain    5. Gait abnormality    6. Right leg weakness    7. Fusion of spine of thoracolumbar region         IMPRESSION AND PLAN:  Symone Alberto is a 76 y.o. female with history of lumbar pain and presents to the office today for follow up. Pt takes Neurontin 300 mg 1 cap QAM and 3 caps QHS and Pamelor 10 mg and takes 4 caps QHS which helps with sleep. 1) Pt was given information on low back exercises. 2) I recommended the patient use a single-point cane or walking sticks to give her more stability  3) I Will refill her Pamelor and Gabapentin for neuropathic symptoms and prescribe her a MDP for inflammatory symptoms -- she is inquiring about taking prednisone on a daily basis. 4) Ms. Nathaly Prieto has a reminder for a \"due or due soon\" health maintenance. I have asked that she contact her primary care provider, Chad Bowen MD, for follow-up on this health maintenance.   5)  demonstrated consistency with prescribing. Follow-up and Dispositions    · Return in about 3 months (around 8/24/2021) for Medication follow up. HISTORY OF PRESENT ILLNESS:  Humza Moreno is a 76 y.o. female with history of lumbar pain and presents to the office today for follow up. She complains of progressive burning and stabbing lower back pain. She denies back pain radiating down the right leg. Pt reports minimal relief with a right L3 and right L4 SNRB on 1/6/2021. She notes after the injection she started experiencing aching pain in the left shin, but notes that this does not interfere with ambulation. Pt takes Neurontin 300 mg 1 cap QAM and 3 caps QHS and Pamelor 10 mg and takes 4 caps QHS which helps with sleep. She notes that she occasionally wakes up with pain in the right leg. Pt reports temporary relief when taking a large prednisone taper since her last office visit. Pt notes that she received her first dose of the COVID-19 vaccine a couple weeks ago. Pt was seen for surgical evaluation by Dr. Yadira Ball; pt states she is not sure about having surgery at this time and he stated she would probably need another diagnostic test (possible CT myelogram) to better visualize the spine due to artifact from the previous thoracolumbar fusion. She does try to walk regularly and has difficulty with this but does not use any type of assistive device. Pt at this time desires to continue with current care with the addition of a MDP.     Pain Scale: 6/10    PCP: Miah Rosado MD     Past Medical History:   Diagnosis Date    Acid reflux     Hypertension         Social History     Socioeconomic History    Marital status:      Spouse name: Not on file    Number of children: Not on file    Years of education: Not on file    Highest education level: Not on file   Occupational History    Not on file   Tobacco Use    Smoking status: Never Smoker    Smokeless tobacco: Never Used   Substance and Sexual Activity    Alcohol use: Yes     Alcohol/week: 1.0 standard drinks     Types: 1 Glasses of wine per week    Drug use: No    Sexual activity: Yes     Partners: Male     Birth control/protection: None   Other Topics Concern    Not on file   Social History Narrative    Not on file     Social Determinants of Health     Financial Resource Strain:     Difficulty of Paying Living Expenses:    Food Insecurity:     Worried About Running Out of Food in the Last Year:     [address] in the Last Year:    Transportation Needs:     Lack of Transportation (Medical):  Lack of Transportation (Non-Medical):    Physical Activity:     Days of Exercise per Week:     Minutes of Exercise per Session:    Stress:     Feeling of Stress :    Social Connections:     Frequency of Communication with Friends and Family:     Frequency of Social Gatherings with Friends and Family:     Attends Alevism Services:     Active Member of Clubs or Organizations:     Attends Club or Organization Meetings:     Marital Status:    Intimate Partner Violence:     Fear of Current or Ex-Partner:     Emotionally Abused:     Physically Abused:     Sexually Abused:        Current Outpatient Medications   Medication Sig Dispense Refill    nortriptyline (PAMELOR) 10 mg capsule Take 4 Capsules by mouth nightly. 120 Capsule 3    methylPREDNISolone (MEDROL DOSEPACK) 4 mg tablet Per dose pack instructions 1 Dose Pack 1    [START ON 7/25/2021] gabapentin (Neurontin) 300 mg capsule 1 in the am and 3 in the evening as directed  Indications: neuropathic pain 360 Capsule 1    alpha lipoic acid 200 mg cap Take 1 Cap by mouth daily.  docosahexanoic acid-eicosapent 120-180 mg cap Take 1 Cap by mouth daily.  cholecalciferol (VITAMIN D3) 1,000 unit cap Take 1 Cap by mouth daily.  MAGNESIUM OXIDE PO Take 1 Tab by mouth daily.  omeprazole (PRILOSEC) 40 mg capsule Take 40 mg by mouth daily.       losartan (COZAAR) 25 mg tablet Take 25 mg by mouth daily.  hydroCHLOROthiazide (HYDRODIURIL) 25 mg tablet Take 25 mg by mouth daily.  LORazepam (ATIVAN) 1 mg tablet Take 1 mg by mouth every eight (8) hours as needed.  atorvastatin (LIPITOR) 10 mg tablet Take 10 mg by mouth daily.  calcium carbonate (CALTREX) 600 mg calcium (1,500 mg) tablet Take 600 mg by mouth daily.  omega-3 acid ethyl esters (LOVAZA) 1 gram capsule Take 1 g by mouth daily (with breakfast).  zolpidem (AMBIEN) 10 mg tablet Take 0.5-1 Tabs by mouth nightly as needed for Sleep. Max Daily Amount: 10 mg. (Patient not taking: Reported on 5/24/2021) 30 Tab 0    topiramate (TOPAMAX) 25 mg tablet Take 2 Tabs by mouth nightly. (Patient not taking: Reported on 12/14/2020) 60 Tab 1       No Known Allergies      REVIEW OF SYSTEMS    Constitutional: Negative for fever, chills, or weight change. Respiratory: Negative for cough or shortness of breath. Cardiovascular: Negative for chest pain or palpitations. Gastrointestinal: Negative for acid reflux, change in bowel habits, or constipation. Genitourinary: Negative for dysuria and flank pain. Musculoskeletal: Positive for low back and leg pain. Neurological: Negative for headaches, dizziness, or numbness. Endo/Heme/Allergies: Negative for increased bruising. Psychiatric/Behavioral: Negative for difficulty with sleep. As per HPI    PHYSICAL EXAMINATION  Visit Vitals  /62   Pulse (!) 108   Temp 97.1 °F (36.2 °C) (Temporal)   Resp 16   Ht 5' 5.5\" (1.664 m)   Wt 151 lb 3.2 oz (68.6 kg)   SpO2 96%   BMI 24.78 kg/m²       Constitutional: Awake, alert, and in no acute distress. Neurological: 1+ symmetrical DTRs in the upper extremities. 1+ symmetrical DTRs in the lower extremities. Sensation to light touch is intact. Negative Mckeon's sign bilaterally. Skin: warm, dry, and intact. Musculoskeletal: No pain with extension, axial loading, or forward flexion.  No pain with internal or external rotation of her hips. Negative straight leg raise bilaterally. Hip Flex  Quads Hamstrings Ankle DF EHL Ankle PF   Right +4/5 +4/5 +4/5 +4/5 +4/5 +4/5   Left +4/5 +4/5 +4/5 +4/5 +4/5 +4/5     IMAGING:    Lumbar spine MRI from 12/20/2020 was personally reviewed with the patient and demonstrated:          Results from [place] encounter on 12/10/20   MRI LUMB SPINE WO CONT     Narrative EXAM: MRI LUMB SPINE WO CONT     CLINICAL INDICATIONS/HISTORY: Right leg pain walking difficulties prior back  surgeries     COMPARISON: MRI performed May, 2019     Technique: Multi-sequence multiplanar T1, T2, STIR imaging with and without fat  saturation obtained centered on the lumbar spine.      FINDINGS:   Large metal susceptibility artifacts at T10 and L2 consistent with extensive  posterior spinal fixation at these levels. Alignment: Intact lordosis  Vertebral body height: Normal  Marrow signal: Unremarkable  Disc spaces: Preserved height and signal intensity  Conus: Terminates at T12-L1     Axial imaging correlation:           L1-2: Patent canal and foramina.     L2-3: There is a mild annular bulge across the midline. The level is severely  affected by susceptibility artifact secondary to posterior fixation devices. Doubt any high-grade stenosis is present.     L3-4: Central moderate sized protrusion herniation extends into the caudal  aspect of the neuroforamina. There is compression of the exiting right L3 nerve  root. Central canal is borderline stenotic 0.94 cm. Left neuroforamina nonstenotic. Facets reflect mild arthritic changes more  accelerated on the right than left side.     L4-5: High-grade canal stenosis AP dimension of the canal 0.78 cm secondary to  facet degenerative changes and hypertrophic response. There is also subluxation  of L4 on 5 of about 5 mm. There is encroachment into the right neuroforamina because of the hypertrophic  changes in the facet. Similar changes seen on the left. Exiting L4 roots may be  intermittently compressed. The anterior subluxation has increased in severity at  this level from the 2019 exam     L5-S1: Patent canal and foramina.     Other structures: Unremarkable.           Impression IMPRESSION:     There is central canal stenosis at L4-5. Some of the degenerative change  including anterior subluxation has progressed from 2019     L3-4 demonstrates herniation with borderline central canal stenosis  Right neuroforamina demonstrate compression of the exiting L4 and L3 nerve roots               Thoracic spine 2V x-rays from 11/23/2020 were personally reviewed with the patient and demonstrated:  Thoracolumbar fusion.     Written by Maxine Hernandez, as dictated by Shirlene Bernard MD.  I, Dr. Shirlene Bernard confirm that all documentation is accurate.

## 2021-05-24 NOTE — PATIENT INSTRUCTIONS
Low Back Arthritis: Exercises Introduction Here are some examples of typical rehabilitation exercises for your condition. Start each exercise slowly. Ease off the exercise if you start to have pain. Your doctor or physical therapist will tell you when you can start these exercises and which ones will work best for you. When you are not being active, find a comfortable position for rest. Some people are comfortable on the floor or a medium-firm bed with a small pillow under their head and another under their knees. Some people prefer to lie on their side with a pillow between their knees. Don't stay in one position for too long. Take short walks (10 to 20 minutes) every 2 to 3 hours. Avoid slopes, hills, and stairs until you feel better. Walk only distances you can manage without pain, especially leg pain. How to do the exercises Pelvic tilt 1. Lie on your back with your knees bent. 2. \"Brace\" your stomachtighten your muscles by pulling in and imagining your belly button moving toward your spine. 3. Press your lower back into the floor. You should feel your hips and pelvis rock back. 4. Hold for 6 seconds while breathing smoothly. 5. Relax and allow your pelvis and hips to rock forward. 6. Repeat 8 to 12 times. Back stretches 1. Get down on your hands and knees on the floor. 2. Relax your head and allow it to droop. Round your back up toward the ceiling until you feel a nice stretch in your upper, middle, and lower back. Hold this stretch for as long as it feels comfortable, or about 15 to 30 seconds. 3. Return to the starting position with a flat back while you are on your hands and knees. 4. Let your back sway by pressing your stomach toward the floor. Lift your buttocks toward the ceiling. 5. Hold this position for 15 to 30 seconds. 6. Repeat 2 to 4 times. Follow-up care is a key part of your treatment and safety.  Be sure to make and go to all appointments, and call your doctor if you are having problems. It's also a good idea to know your test results and keep a list of the medicines you take. Where can you learn more? Go to http://www.gray.com/ Enter M269 in the search box to learn more about \"Low Back Arthritis: Exercises. \" Current as of: November 16, 2020               Content Version: 12.8 © 2006-2021 [employer]. Care instructions adapted under license by [employer] (which disclaims liability or warranty for this information). If you have questions about a medical condition or this instruction, always ask your healthcare professional. [place] [address] any warranty or liability for your use of this information.

## 2021-07-23 DIAGNOSIS — M48.062 SPINAL STENOSIS OF LUMBAR REGION WITH NEUROGENIC CLAUDICATION: ICD-10-CM

## 2021-07-23 DIAGNOSIS — M48.061 LUMBAR FORAMINAL STENOSIS: ICD-10-CM

## 2021-07-23 RX ORDER — GABAPENTIN 300 MG/1
CAPSULE ORAL
Qty: 360 CAPSULE | Refills: 1 | Status: SHIPPED | OUTPATIENT
Start: 2021-07-25 | End: 2022-02-02 | Stop reason: SDUPTHER

## 2021-07-23 NOTE — TELEPHONE ENCOUNTER
Patient called and said she is almost out of the Gabapentin 300 mg medication. Patient is requesting a refill from [address] [place] [address]. Teena Paz [address] [facility]. [phone]      Patient tel. [phone].

## 2021-07-28 ENCOUNTER — TELEPHONE (OUTPATIENT)
Dept: ORTHOPEDIC SURGERY | Age: 76
End: 2021-07-28

## 2021-07-28 NOTE — TELEPHONE ENCOUNTER
Patient called in to inquire about sooner appointment. States she is in severe pain and can not wait to see provider on 9/8/21. Requesting call back to see if any courtesy can be extended.      Patient: Viktoriya Blanchard #: [phone]

## 2021-07-28 NOTE — TELEPHONE ENCOUNTER
Returned call to patient, verified Name/, patient was scheduled for Tuesday August 3 @ 0915. Pt aware of [place] location arrive 15 mins early, pt aware of already full schedule, please be patient with our office as we may be running a little behind. Patient verbalized agreement/understanding. No further action required at this time.

## 2021-08-03 ENCOUNTER — OFFICE VISIT (OUTPATIENT)
Dept: ORTHOPEDIC SURGERY | Age: 76
End: 2021-08-03
Payer: MEDICARE

## 2021-08-03 VITALS
TEMPERATURE: 97 F | SYSTOLIC BLOOD PRESSURE: 131 MMHG | HEART RATE: 100 BPM | WEIGHT: 145 LBS | DIASTOLIC BLOOD PRESSURE: 72 MMHG | BODY MASS INDEX: 24.16 KG/M2 | OXYGEN SATURATION: 98 % | RESPIRATION RATE: 14 BRPM | HEIGHT: 65 IN

## 2021-08-03 DIAGNOSIS — M47.816 LUMBAR FACET ARTHROPATHY: ICD-10-CM

## 2021-08-03 DIAGNOSIS — M48.062 SPINAL STENOSIS OF LUMBAR REGION WITH NEUROGENIC CLAUDICATION: ICD-10-CM

## 2021-08-03 DIAGNOSIS — M79.605 ACUTE LEG PAIN, LEFT: Primary | ICD-10-CM

## 2021-08-03 DIAGNOSIS — R29.898 LEFT LEG WEAKNESS: ICD-10-CM

## 2021-08-03 DIAGNOSIS — M79.2 NEURITIS: ICD-10-CM

## 2021-08-03 PROCEDURE — 96372 THER/PROPH/DIAG INJ SC/IM: CPT | Performed by: PHYSICAL MEDICINE & REHABILITATION

## 2021-08-03 PROCEDURE — G8754 DIAS BP LESS 90: HCPCS | Performed by: PHYSICAL MEDICINE & REHABILITATION

## 2021-08-03 PROCEDURE — G8536 NO DOC ELDER MAL SCRN: HCPCS | Performed by: PHYSICAL MEDICINE & REHABILITATION

## 2021-08-03 PROCEDURE — 99214 OFFICE O/P EST MOD 30 MIN: CPT | Performed by: PHYSICAL MEDICINE & REHABILITATION

## 2021-08-03 PROCEDURE — G8399 PT W/DXA RESULTS DOCUMENT: HCPCS | Performed by: PHYSICAL MEDICINE & REHABILITATION

## 2021-08-03 PROCEDURE — 1090F PRES/ABSN URINE INCON ASSESS: CPT | Performed by: PHYSICAL MEDICINE & REHABILITATION

## 2021-08-03 PROCEDURE — 1101F PT FALLS ASSESS-DOCD LE1/YR: CPT | Performed by: PHYSICAL MEDICINE & REHABILITATION

## 2021-08-03 PROCEDURE — G8752 SYS BP LESS 140: HCPCS | Performed by: PHYSICAL MEDICINE & REHABILITATION

## 2021-08-03 PROCEDURE — G8420 CALC BMI NORM PARAMETERS: HCPCS | Performed by: PHYSICAL MEDICINE & REHABILITATION

## 2021-08-03 PROCEDURE — G8432 DEP SCR NOT DOC, RNG: HCPCS | Performed by: PHYSICAL MEDICINE & REHABILITATION

## 2021-08-03 PROCEDURE — G8427 DOCREV CUR MEDS BY ELIG CLIN: HCPCS | Performed by: PHYSICAL MEDICINE & REHABILITATION

## 2021-08-03 RX ORDER — KETOROLAC TROMETHAMINE 30 MG/ML
30 INJECTION, SOLUTION INTRAMUSCULAR; INTRAVENOUS
Status: COMPLETED | OUTPATIENT
Start: 2021-08-03 | End: 2021-08-03

## 2021-08-03 RX ORDER — PREDNISONE 10 MG/1
TABLET ORAL
Qty: 32 TABLET | Refills: 0 | Status: SHIPPED | OUTPATIENT
Start: 2021-08-03 | End: 2021-09-08 | Stop reason: ALTCHOICE

## 2021-08-03 RX ADMIN — KETOROLAC TROMETHAMINE 30 MG: 30 INJECTION, SOLUTION INTRAMUSCULAR; INTRAVENOUS at 09:55

## 2021-08-03 NOTE — PROGRESS NOTES
[facility]  Susan Thurman., [address] [place] [address], [facility] [address]  Phone: [phone]  Fax: [phone]    Pt's YOB: 1945    ASSESSMENT   Diagnoses and all orders for this visit:    1. Acute leg pain, left  -     EMG TWO EXTREMITIES LOWER; Future  -     predniSONE (DELTASONE) 10 mg tablet; 6 pills Day 1, 5 pills Day 2, 4 pills Day 3&4, 3 pills Day 5&6, 2 pills Day 7&8, 1 pill Day 9&10, 1/2 pill Day 11&12  -     THER/PROPH/DIAG INJECTION, SUBCUT/IM  -     ketorolac (TORADOL) injection 30 mg    2. Left leg weakness  -     EMG TWO EXTREMITIES LOWER; Future    3. Spinal stenosis of lumbar region with neurogenic claudication    4. Lumbar facet arthropathy  -     predniSONE (DELTASONE) 10 mg tablet; 6 pills Day 1, 5 pills Day 2, 4 pills Day 3&4, 3 pills Day 5&6, 2 pills Day 7&8, 1 pill Day 9&10, 1/2 pill Day 11&12  -     THER/PROPH/DIAG INJECTION, SUBCUT/IM  -     ketorolac (TORADOL) injection 30 mg    5. Neuritis         IMPRESSION AND PLAN:  Lyric Thompson is a 68 y.o. female with history of lumbar pain. She reports increased lower back pain since her last office visit. About 1 month ago her symptoms changed and she started to experience pain radiating down the left leg to the ankle. She continues to take Neurontin 300 mg 1 cap QAM and 3 caps QHS and Pamelor 10 mg 4 caps QHS which helps with sleep. 1) Pt was given information on lumbar arthritis exercises. 2) She will continue taking Pamelor 10 mg and Neurontin 300 mg as prescribed and does not need a refill at this time. 3) Pt was prescribed a large prednisone taper. 4) A lower extremity EMG was ordered to assess for radiculopathy, neuropathy, and entrapment. 5) I recommended the patient use an OTC intermittently as needed when she is more active. 6) Pt received a 30 mg Toradol injection in the office today. 7) Ms. Roberto Guevara has a reminder for a \"due or due soon\" health maintenance.  I have asked that she contact her primary care provider, Ciera Olivia MD, for follow-up on this health maintenance. 8)  demonstrated consistency with prescribing. Follow-up and Dispositions    · Return in about 3 weeks (around 8/24/2021) for Medication follow up, Diagnostic Test follow up.       30 minutes of face to face contact was spent with the patient during today's office visit extensively discussing her symptoms and treatment plan. HISTORY OF PRESENT ILLNESS:  Melanie Kelly is a 68 y.o. female with history of lumbar pain and presents to the office today for follow up. She reports increased lower back pain since her last office visit. About 1 month ago her symptoms changed and she started to experience pain radiating down the left leg to the ankle. She previously experienced pain radiating down the right leg. Pt notes that she now frequently wakes up with pain in the right hip. She reports that her pain interferes with activity/ADL's and she notes improvement with sitting. Pt followed up with Dr. Mary Ramirez and was told that she was not a candidate for surgery at that time. She continues to take Neurontin 300 mg 1 cap QAM and 3 caps QHS and Pamelor 10 mg 4 caps QHS which helps with sleep. Pt notes relief when she last took prednisone a few months ago. She reports she received both doses of the COVID-19 vaccination. Pt at this time desires to proceed with medication evaluation. Pain Scale: 9/10    PCP: Ciera Olivia MD     Past Medical History:   Diagnosis Date    Acid reflux     Hypertension         Social History     Socioeconomic History    Marital status:      Spouse name: Not on file    Number of children: Not on file    Years of education: Not on file    Highest education level: Not on file   Occupational History    Not on file   Tobacco Use    Smoking status: Never Smoker    Smokeless tobacco: Never Used   Substance and Sexual Activity    Alcohol use:  Yes     Alcohol/week: 1.0 standard drinks     Types: 1 Glasses of wine per week    Drug use: No    Sexual activity: Yes     Partners: Male     Birth control/protection: None   Other Topics Concern    Not on file   Social History Narrative    Not on file     Social Determinants of Health     Financial Resource Strain:     Difficulty of Paying Living Expenses:    Food Insecurity:     Worried About Running Out of Food in the Last Year:     [address] in the Last Year:    Transportation Needs:     Lack of Transportation (Medical):  Lack of Transportation (Non-Medical):    Physical Activity:     Days of Exercise per Week:     Minutes of Exercise per Session:    Stress:     Feeling of Stress :    Social Connections:     Frequency of Communication with Friends and Family:     Frequency of Social Gatherings with Friends and Family:     Attends Mosque Services:     Active Member of Clubs or Organizations:     Attends Club or Organization Meetings:     Marital Status:    Intimate Partner Violence:     Fear of Current or Ex-Partner:     Emotionally Abused:     Physically Abused:     Sexually Abused:        Current Outpatient Medications   Medication Sig Dispense Refill    predniSONE (DELTASONE) 10 mg tablet 6 pills Day 1, 5 pills Day 2, 4 pills Day 3&4, 3 pills Day 5&6, 2 pills Day 7&8, 1 pill Day 9&10, 1/2 pill Day 11&12 32 Tablet 0    gabapentin (Neurontin) 300 mg capsule 1 in the am and 3 in the evening as directed  Indications: neuropathic pain 360 Capsule 1    nortriptyline (PAMELOR) 10 mg capsule Take 4 Capsules by mouth nightly. 120 Capsule 3    alpha lipoic acid 200 mg cap Take 1 Cap by mouth daily.  docosahexanoic acid-eicosapent 120-180 mg cap Take 1 Cap by mouth daily.  cholecalciferol (VITAMIN D3) 1,000 unit cap Take 1 Cap by mouth daily.  MAGNESIUM OXIDE PO Take 1 Tab by mouth daily.  omeprazole (PRILOSEC) 40 mg capsule Take 40 mg by mouth daily.       losartan (COZAAR) 25 mg tablet Take 25 mg by mouth daily.  hydroCHLOROthiazide (HYDRODIURIL) 25 mg tablet Take 25 mg by mouth daily.  LORazepam (ATIVAN) 1 mg tablet Take 1 mg by mouth every eight (8) hours as needed.  atorvastatin (LIPITOR) 10 mg tablet Take 10 mg by mouth daily.  calcium carbonate (CALTREX) 600 mg calcium (1,500 mg) tablet Take 600 mg by mouth daily.  omega-3 acid ethyl esters (LOVAZA) 1 gram capsule Take 1 g by mouth daily (with breakfast).  methylPREDNISolone (MEDROL DOSEPACK) 4 mg tablet Per dose pack instructions (Patient not taking: Reported on 8/3/2021) 1 Dose Pack 1       No Known Allergies      REVIEW OF SYSTEMS    Constitutional: Negative for fever, chills, or weight change. Respiratory: Negative for cough or shortness of breath. Cardiovascular: Negative for chest pain or palpitations. Gastrointestinal: Negative for acid reflux, change in bowel habits, or constipation. Genitourinary: Negative for dysuria and flank pain. Musculoskeletal: Positive for lumbar pain and left leg weakness. Neurological: Negative for headaches, dizziness; positive for numbness. Endo/Heme/Allergies: Negative for increased bruising. Psychiatric/Behavioral: Positive for difficulty with sleep. As per HPI    PHYSICAL EXAMINATION  Visit Vitals  /72   Pulse 100   Temp 97 °F (36.1 °C) (Tympanic)   Resp 14   Ht 5' 5\" (1.651 m)   Wt 145 lb (65.8 kg)   SpO2 98%   BMI 24.13 kg/m²       Constitutional: Awake, alert, and in no acute distress. Neurological: Decreased sensation to light touch in the left leg; otherwise sensation is intact. Negative Mckeon's sign bilaterally. Skin: warm, dry, and intact. Musculoskeletal: No pain with extension, axial loading, or forward flexion. No pain with internal or external rotation of her hips. Negative straight leg raise bilaterally.      Hip Flex  Quads Hamstrings Ankle DF EHL Ankle PF   Right +4/5 +4/5 +4/5 +4/5 +4/5 +4/5   Left +4/5 +4/5 +4/5 +4/5  4/5 +4/5 TORADOL INJECTION:  Administrations This Visit     ketorolac (TORADOL) injection 30 mg     Admin Date  08/03/2021  09:55 Action  Given Dose  30 mg Route  IntraMUSCular Site  Right Gluteus Salvador Administered By  Patria Valenzuela LPN    NDC: 49468-551-08    Patient Supplied?: No                IMAGING:    Lumbar spine MRI from 12/20/2020 was personally reviewed with the patient and demonstrated:          Results from [place] encounter on 12/10/20   MRI LUMB SPINE WO CONT     Narrative EXAM: MRI LUMB SPINE WO CONT     CLINICAL INDICATIONS/HISTORY: Right leg pain walking difficulties prior back  surgeries     COMPARISON: MRI performed May, 2019     Technique: Multi-sequence multiplanar T1, T2, STIR imaging with and without fat  saturation obtained centered on the lumbar spine.      FINDINGS:   Large metal susceptibility artifacts at T10 and L2 consistent with extensive  posterior spinal fixation at these levels. Alignment: Intact lordosis  Vertebral body height: Normal  Marrow signal: Unremarkable  Disc spaces: Preserved height and signal intensity  Conus: Terminates at T12-L1     Axial imaging correlation:           L1-2: Patent canal and foramina.     L2-3: There is a mild annular bulge across the midline. The level is severely  affected by susceptibility artifact secondary to posterior fixation devices. Doubt any high-grade stenosis is present.     L3-4: Central moderate sized protrusion herniation extends into the caudal  aspect of the neuroforamina. There is compression of the exiting right L3 nerve  root. Central canal is borderline stenotic 0.94 cm. Left neuroforamina nonstenotic. Facets reflect mild arthritic changes more  accelerated on the right than left side.     L4-5: High-grade canal stenosis AP dimension of the canal 0.78 cm secondary to  facet degenerative changes and hypertrophic response. There is also subluxation  of L4 on 5 of about 5 mm.   There is encroachment into the right neuroforamina because of the hypertrophic  changes in the facet. Similar changes seen on the left. Exiting L4 roots may be  intermittently compressed. The anterior subluxation has increased in severity at  this level from the 2019 exam     L5-S1: Patent canal and foramina.     Other structures: Unremarkable.           Impression IMPRESSION:     There is central canal stenosis at L4-5. Some of the degenerative change  including anterior subluxation has progressed from 2019     L3-4 demonstrates herniation with borderline central canal stenosis  Right neuroforamina demonstrate compression of the exiting L4 and L3 nerve roots               Thoracic spine 2V x-rays from 11/23/2020 were personally reviewed with the patient and demonstrated:  Thoracolumbar fusion.     Written by Mike Wilkinson, as dictated by Berna Jain MD.  I, Dr. Berna Jain confirm that all documentation is accurate.

## 2021-08-03 NOTE — LETTER
8/6/2021    Patient: Christina Schafer   YOB: 1945   Date of Visit: 8/3/2021     Ting Schaffer MD  [address] [facility] [place] [address] [address]  Via Fax: [phone]    Dear Ting Schaffer MD,      Thank you for referring Ms. Chula Moore to [address] [facility] for evaluation. My notes for this consultation are attached. If you have questions, please do not hesitate to call me. I look forward to following your patient along with you.       Sincerely,    Sarai Urias MD

## 2021-08-03 NOTE — PATIENT INSTRUCTIONS
Low Back Arthritis: Exercises  Introduction  Here are some examples of typical rehabilitation exercises for your condition. Start each exercise slowly. Ease off the exercise if you start to have pain. Your doctor or physical therapist will tell you when you can start these exercises and which ones will work best for you. When you are not being active, find a comfortable position for rest. Some people are comfortable on the floor or a medium-firm bed with a small pillow under their head and another under their knees. Some people prefer to lie on their side with a pillow between their knees. Don't stay in one position for too long. Take short walks (10 to 20 minutes) every 2 to 3 hours. Avoid slopes, hills, and stairs until you feel better. Walk only distances you can manage without pain, especially leg pain. How to do the exercises  Pelvic tilt   1. Lie on your back with your knees bent. 2. \"Brace\" your stomachtighten your muscles by pulling in and imagining your belly button moving toward your spine. 3. Press your lower back into the floor. You should feel your hips and pelvis rock back. 4. Hold for 6 seconds while breathing smoothly. 5. Relax and allow your pelvis and hips to rock forward. 6. Repeat 8 to 12 times. Back stretches   1. Get down on your hands and knees on the floor. 2. Relax your head and allow it to droop. Round your back up toward the ceiling until you feel a nice stretch in your upper, middle, and lower back. Hold this stretch for as long as it feels comfortable, or about 15 to 30 seconds. 3. Return to the starting position with a flat back while you are on your hands and knees. 4. Let your back sway by pressing your stomach toward the floor. Lift your buttocks toward the ceiling. 5. Hold this position for 15 to 30 seconds. 6. Repeat 2 to 4 times. Follow-up care is a key part of your treatment and safety.  Be sure to make and go to all appointments, and call your doctor if you are having problems. It's also a good idea to know your test results and keep a list of the medicines you take. Where can you learn more? Go to http://www.[employer].com/  Enter T094 in the search box to learn more about \"Low Back Arthritis: Exercises. \"  Current as of: November 16, 2020               Content Version: 12.8  © 0796-4607 [employer]. Care instructions adapted under license by [employer] (which disclaims liability or warranty for this information). If you have questions about a medical condition or this instruction, always ask your healthcare professional. [place] [address] any warranty or liability for your use of this information.

## 2021-08-04 DIAGNOSIS — R29.898 LEFT LEG WEAKNESS: ICD-10-CM

## 2021-08-04 DIAGNOSIS — M79.605 ACUTE LEG PAIN, LEFT: ICD-10-CM

## 2021-08-18 ENCOUNTER — OFFICE VISIT (OUTPATIENT)
Dept: ORTHOPEDIC SURGERY | Age: 76
End: 2021-08-18
Payer: MEDICARE

## 2021-08-18 VITALS
WEIGHT: 143 LBS | HEART RATE: 100 BPM | SYSTOLIC BLOOD PRESSURE: 121 MMHG | OXYGEN SATURATION: 96 % | HEIGHT: 65 IN | RESPIRATION RATE: 16 BRPM | TEMPERATURE: 97.8 F | DIASTOLIC BLOOD PRESSURE: 65 MMHG | BODY MASS INDEX: 23.82 KG/M2

## 2021-08-18 DIAGNOSIS — R20.0 NUMBNESS AND TINGLING OF LEFT LOWER EXTREMITY: ICD-10-CM

## 2021-08-18 DIAGNOSIS — R94.131 ABNORMAL EMG: ICD-10-CM

## 2021-08-18 DIAGNOSIS — R20.2 NUMBNESS AND TINGLING OF LEFT LOWER EXTREMITY: ICD-10-CM

## 2021-08-18 DIAGNOSIS — R20.2 NUMBNESS AND TINGLING OF LEFT LOWER EXTREMITY: Primary | ICD-10-CM

## 2021-08-18 DIAGNOSIS — R20.0 NUMBNESS AND TINGLING OF LEFT LOWER EXTREMITY: Primary | ICD-10-CM

## 2021-08-18 PROCEDURE — 95886 MUSC TEST DONE W/N TEST COMP: CPT | Performed by: PHYSICAL MEDICINE & REHABILITATION

## 2021-08-18 PROCEDURE — 95910 NRV CNDJ TEST 7-8 STUDIES: CPT | Performed by: PHYSICAL MEDICINE & REHABILITATION

## 2021-08-18 NOTE — PROGRESS NOTES
[address]  [address], [address]  [facility], [address]  Phone: [phone]  Fax: [phone]        Herman Navarro  : [id number]  PCP: Adella Kanner, MD  2021    ELECTROMYOGRAPHY AND NERVE CONDUCTION STUDIES    Lucy Maya was referred by Dr. Darlene Banuelos for BLE EMG electrodiagnostic evaluation of LLE paraesthesia. NCV & EMG Findings:  Evaluation of the left Fibular motor nerve showed reduced amplitude (0.8 mV) and decreased conduction velocity (B Fib-Ankle, 32 m/s). The left tibial motor nerve showed prolonged distal onset latency (6.3 ms) and decreased conduction velocity (Knee-Ankle, 31 m/s). The right tibial motor nerve showed reduced amplitude (2.3 mV) and decreased conduction velocity (Knee-Ankle, 35 m/s). The left Sup Fibular sensory and the right Sup Fibular sensory nerves showed prolonged distal peak latency (L4.8, R4.5 ms) and decreased conduction velocity (14 cm-Ant Lat Mall, L29, R31 m/s). The left sural sensory and the right sural sensory nerves showed no response (Calf). All remaining nerves (as indicated in the following tables) were within normal limits. Left vs. Right side comparison data for the tibial motor nerve indicates abnormal L-R amplitude difference (69.7 %). All remaining left vs. right side differences were within normal limits. Needle evaluation of the right vastus medialis, the right anterior tibialis, and the left anterior tibialis muscles showed increased motor unit amplitude. All remaining muscles (as indicated in the following table) showed no evidence of electrical instability. INTERPRETATION  This is an abnormal electrodiagnostic examination. These findings may be consistent with:   1. Sensorimotor peripheral polyneuropathy - this is based on scattered abnormalities throughout the NCS bilaterally affecting both sensory and motor nerves.     2. Chronic bilateral L4 radiculopathy - this is based on right sided signs of remote chronicity in the tibialis anterior and vastus medialis. On the left side there are findings only in the tibialis anterior. CLINICAL INTERPRETATION  Her electrodiagnostic findings of peripheral neuropathy and chronic lumbar radiculopathy appear consistent with her bilateral leg symptoms. HISTORY OF PRESENT ILLNESS  Christina Schafer is a 68 y.o. female. Pt presents today for BLE EMG evaluation of LLE paraesthesia. She reports improvement with a Prednisone dose pack provided by Dr. Edda Baum. Pt notes that her symptoms originally began in the RLE, and she had injections with improvement. Then her symptoms began in the LLE. Of note, she has a Moreland geoff from when she fell off of a   her 42's. PAST MEDICAL HISTORY   Past Medical History:   Diagnosis Date    Acid reflux     Hypertension        Past Surgical History:   Procedure Laterality Date    HX BACK SURGERY     . MEDICATIONS    Current Outpatient Medications   Medication Sig Dispense Refill    predniSONE (DELTASONE) 10 mg tablet 6 pills Day 1, 5 pills Day 2, 4 pills Day 3&4, 3 pills Day 5&6, 2 pills Day 7&8, 1 pill Day 9&10, 1/2 pill Day 11&12 32 Tablet 0    gabapentin (Neurontin) 300 mg capsule 1 in the am and 3 in the evening as directed  Indications: neuropathic pain 360 Capsule 1    nortriptyline (PAMELOR) 10 mg capsule Take 4 Capsules by mouth nightly. 120 Capsule 3    alpha lipoic acid 200 mg cap Take 1 Cap by mouth daily.  docosahexanoic acid-eicosapent 120-180 mg cap Take 1 Cap by mouth daily.  cholecalciferol (VITAMIN D3) 1,000 unit cap Take 1 Cap by mouth daily.  MAGNESIUM OXIDE PO Take 1 Tab by mouth daily.  omeprazole (PRILOSEC) 40 mg capsule Take 40 mg by mouth daily.  losartan (COZAAR) 25 mg tablet Take 25 mg by mouth daily.  hydroCHLOROthiazide (HYDRODIURIL) 25 mg tablet Take 25 mg by mouth daily.       LORazepam (ATIVAN) 1 mg tablet Take 1 mg by mouth every eight (8) hours as needed.  atorvastatin (LIPITOR) 10 mg tablet Take 10 mg by mouth daily.  calcium carbonate (CALTREX) 600 mg calcium (1,500 mg) tablet Take 600 mg by mouth daily.  omega-3 acid ethyl esters (LOVAZA) 1 gram capsule Take 1 g by mouth daily (with breakfast). ALLERGIES  No Known Allergies       SOCIAL HISTORY    Social History     Socioeconomic History    Marital status:      Spouse name: Not on file    Number of children: Not on file    Years of education: Not on file    Highest education level: Not on file   Tobacco Use    Smoking status: Never Smoker    Smokeless tobacco: Never Used   Substance and Sexual Activity    Alcohol use: Yes     Alcohol/week: 1.0 standard drinks     Types: 1 Glasses of wine per week    Drug use: No    Sexual activity: Yes     Partners: Male     Birth control/protection: None     Social Determinants of Health     Financial Resource Strain:     Difficulty of Paying Living Expenses:    Food Insecurity:     Worried About Running Out of Food in the Last Year:     [address] in the Last Year:    Transportation Needs:     Lack of Transportation (Medical):      Lack of Transportation (Non-Medical):    Physical Activity:     Days of Exercise per Week:     Minutes of Exercise per Session:    Stress:     Feeling of Stress :    Social Connections:     Frequency of Communication with Friends and Family:     Frequency of Social Gatherings with Friends and Family:     Attends Shinto Services:     Active Member of Clubs or Organizations:     Attends Club or Organization Meetings:     Marital Status:        FAMILY HISTORY  Family History   Problem Relation Age of Onset    Heart Attack Mother     Cancer Father          PHYSICAL EXAMINATION  Visit Vitals  /65   Pulse 100   Temp 97.8 °F (36.6 °C) (Tympanic)   Resp 16   Ht 5' 5\" (1.651 m)   Wt 143 lb (64.9 kg)   SpO2 96%   BMI 23.80 kg/m²       Pain Assessment 8/3/2021   Location of Pain Back;Leg   Location Modifiers Left   Severity of Pain 9   Quality of Pain Aching; Thermon Banister; Throbbing;Burning   Quality of Pain Comment -   Duration of Pain Persistent   Frequency of Pain Constant   Aggravating Factors -   Aggravating Factors Comment -   Limiting Behavior Yes   Relieving Factors -   Relieving Factors Comment -   Result of Injury No           Constitutional:  Well developed, well nourished, in no acute distress. Psychiatric: Affect and mood are appropriate. Integumentary: No rashes or abrasions noted on exposed areas. SPINE/MUSCULOSKELETAL EXAM    On brief examination: Decreased sensation in legs slightly proximal to the ankle.       NCV & EMG Findings:  Nerve Conduction Studies  Anti Sensory Summary Table     Stim Site NR Peak (ms) Norm Peak (ms) O-P Amp (µV) Norm O-P Amp Site1 Site2 Delta-P (ms) Dist (cm) Jas (m/s) Norm Jas (m/s)   Left Sup Fibular Anti Sensory (Ant Lat Mall)   14 cm    4.8 <4.4 6.2 >5.0 14 cm Ant Lat Mall 4.8 14.0 29 >32   Right Sup Fibular Anti Sensory (Ant Lat Mall)   14 cm    4.5 <4.4 5.4 >5.0 14 cm Ant Lat Mall 4.5 14.0 31 >32   Left Sural Anti Sensory (Lat Mall)   Calf NR  <4.5  >4.0 Calf Lat Mall  14.0  >35   Right Sural Anti Sensory (Lat Mall)   Calf NR  <4.5  >4.0 Calf Lat Mall  14.0  >35     Motor Summary Table     Stim Site NR Onset (ms) Norm Onset (ms) O-P Amp (mV) Norm O-P Amp Site1 Site2 Delta-0 (ms) Dist (cm) Jas (m/s) Norm Jas (m/s)   Left Fibular Motor (Ext Dig Brev)   Ankle    6.2 <6.5 0.8 >1.3 B Fib Ankle 9.7 31.0 32 >38   B Fib    15.9  0.7  Poplt B Fib 1.4 6.0 43 >40   Poplt    17.3  0.8          Right Fibular Motor (Ext Dig Brev)   Ankle    5.2 <6.5 1.7 >1.3 B Fib Ankle 8.7 34.0 39 >38   B Fib    13.9  1.6  Poplt B Fib 1.3 6.0 46 >40   Poplt    15.2  1.5          Left Tibial Motor (Abd Guardado Brev)   Ankle    6.3 <6.1 7.6 >4.4 Knee Ankle 11.8 36.0 31 >39   Knee    18.1  3.3          Right Tibial Motor (Abd Guardado Brev)   Ankle 5. 2 <6.1 2.3 >4.4 Knee Ankle 10.6 37.0 35 >39   Knee    15.8  2.1            EMG     Side Muscle Nerve Root Ins Act Fibs Psw Amp Dur Poly Recrt Int Crys [place] Comment   Right VastusMed Femoral L2-4 Nml Nml Nml Incr Nml 0 Nml Nml    Right AntTibialis Dp Br Fibular L4-5 Nml Nml Nml Incr Nml 0 Nml Nml    Right Gastroc Tibial S1-2 Nml Nml Nml Nml Nml 0 Nml Nml    Left TensorFascLat SupGluteal L4-5, S1 Nml Nml Nml Nml Nml 0 Nml Nml    Left VastusMed Femoral L2-4 Nml Nml Nml Nml Nml 0 Nml Nml    Left AntTibialis Dp Br Fibular L4-5 Nml Nml Nml Incr Nml 0 Nml Nml    Left Gastroc Tibial S1-2 Nml Nml Nml Nml Nml 0 Nml Nml    Right TensorFascLat SupGluteal L4-5, S1 Nml Nml Nml Nml Nml 0 Nml Nml    Left AdductorLong Obturator L2-4 Nml Nml Nml Nml Nml 0 Nml Nml    Right AdductorLong Obturator L2-4 Nml Nml Nml Nml Nml 0 Nml Nml        Nerve Conduction Studies  Anti Sensory Left/Right Comparison     Stim Site L Lat (ms) R Lat (ms) L-R Lat (ms) L Amp (µV) R Amp (µV) L-R Amp (%) Site1 Site2 L Jas (m/s) R Jas (m/s) L-R Jas (m/s)   Sup Fibular Anti Sensory (Ant Lat Mall)   14 cm 4.8 4.5 0.3 6.2 5.4 12.9 14 cm Ant Lat Mall 29 31 2   Sural Anti Sensory (Lat Mall)   Calf       Calf Lat Mall        Motor Left/Right Comparison     Stim Site L Lat (ms) R Lat (ms) L-R Lat (ms) L Amp (mV) R Amp (mV) L-R Amp (%) Site1 Site2 L Jas (m/s) R Jas (m/s) L-R Jas (m/s)   Fibular Motor (Ext Dig Brev)   Ankle 6.2 5.2 1.0 0.8 1.7 52.9 B Fib Ankle 32 39 7   B Fib 15.9 13.9 2.0 0.7 1.6 56.3 Poplt B Fib 43 46 3   Poplt 17.3 15.2 2.1 0.8 1.5 46.7        Tibial Motor (Abd Guardado Brev)   Ankle 6.3 5.2 1.1 7.6 2.3 69.7 Knee Ankle 31 35 4   Knee 18.1 15.8 2.3 3.3 2.1 36.4              Waveforms:                            [facility]  OFFICE PROCEDURE PROGRESS NOTE        Chart reviewed for the following:   I, Jomar Buckner, have reviewed the History, Physical and updated the Allergic reactions for Radha Summers     TIME OUT performed immediately prior to start of procedure:   Jarvis ARREOLA, have performed the following reviews on Elliott Morris prior to the start of the procedure:            * Patient was identified by name and date of birth   * Agreement on procedure being performed was verified  * Risks and Benefits explained to the patient  * Procedure site verified and marked as necessary  * Patient was positioned for comfort  * Consent was signed and verified     Time: 2:17 PM    Date of procedure: 8/18/2021    Procedure performed by:  Poppy Amato MD    Provider accompanied by: Scribe. Patient accompanied by: Self.     How tolerated by patient: tolerated the procedure well with no complications    Post Procedural Pain Scale: 0 - No Hurt    Comments: none    Written by Yon Velazquez, [address] as dictated by Jarvis Pulliam MD

## 2021-08-23 DIAGNOSIS — M47.816 LUMBAR FACET ARTHROPATHY: Primary | ICD-10-CM

## 2021-08-23 RX ORDER — METHYLPREDNISOLONE 4 MG/1
TABLET ORAL
Qty: 1 DOSE PACK | Refills: 0 | Status: SHIPPED | OUTPATIENT
Start: 2021-08-23 | End: 2021-09-08 | Stop reason: ALTCHOICE

## 2021-09-08 ENCOUNTER — OFFICE VISIT (OUTPATIENT)
Dept: ORTHOPEDIC SURGERY | Age: 76
End: 2021-09-08
Payer: MEDICARE

## 2021-09-08 VITALS
DIASTOLIC BLOOD PRESSURE: 69 MMHG | BODY MASS INDEX: 23.8 KG/M2 | HEART RATE: 96 BPM | WEIGHT: 143 LBS | OXYGEN SATURATION: 98 % | RESPIRATION RATE: 18 BRPM | SYSTOLIC BLOOD PRESSURE: 121 MMHG | TEMPERATURE: 97.6 F

## 2021-09-08 DIAGNOSIS — M47.816 LUMBAR FACET ARTHROPATHY: Primary | ICD-10-CM

## 2021-09-08 DIAGNOSIS — M79.2 NEURITIS: ICD-10-CM

## 2021-09-08 DIAGNOSIS — M54.16 LUMBAR RADICULOPATHY: ICD-10-CM

## 2021-09-08 DIAGNOSIS — R94.131 ABNORMAL EMG: ICD-10-CM

## 2021-09-08 DIAGNOSIS — R26.9 GAIT ABNORMALITY: ICD-10-CM

## 2021-09-08 PROCEDURE — 1101F PT FALLS ASSESS-DOCD LE1/YR: CPT | Performed by: PHYSICAL MEDICINE & REHABILITATION

## 2021-09-08 PROCEDURE — 99214 OFFICE O/P EST MOD 30 MIN: CPT | Performed by: PHYSICAL MEDICINE & REHABILITATION

## 2021-09-08 PROCEDURE — 1090F PRES/ABSN URINE INCON ASSESS: CPT | Performed by: PHYSICAL MEDICINE & REHABILITATION

## 2021-09-08 PROCEDURE — G8427 DOCREV CUR MEDS BY ELIG CLIN: HCPCS | Performed by: PHYSICAL MEDICINE & REHABILITATION

## 2021-09-08 PROCEDURE — 96372 THER/PROPH/DIAG INJ SC/IM: CPT | Performed by: PHYSICAL MEDICINE & REHABILITATION

## 2021-09-08 PROCEDURE — G8399 PT W/DXA RESULTS DOCUMENT: HCPCS | Performed by: PHYSICAL MEDICINE & REHABILITATION

## 2021-09-08 PROCEDURE — G8752 SYS BP LESS 140: HCPCS | Performed by: PHYSICAL MEDICINE & REHABILITATION

## 2021-09-08 PROCEDURE — G8536 NO DOC ELDER MAL SCRN: HCPCS | Performed by: PHYSICAL MEDICINE & REHABILITATION

## 2021-09-08 PROCEDURE — G8420 CALC BMI NORM PARAMETERS: HCPCS | Performed by: PHYSICAL MEDICINE & REHABILITATION

## 2021-09-08 PROCEDURE — G8754 DIAS BP LESS 90: HCPCS | Performed by: PHYSICAL MEDICINE & REHABILITATION

## 2021-09-08 PROCEDURE — G8432 DEP SCR NOT DOC, RNG: HCPCS | Performed by: PHYSICAL MEDICINE & REHABILITATION

## 2021-09-08 RX ORDER — PREDNISONE 10 MG/1
TABLET ORAL
Qty: 32 TABLET | Refills: 0 | Status: SHIPPED | OUTPATIENT
Start: 2021-09-08 | End: 2021-10-20 | Stop reason: ALTCHOICE

## 2021-09-08 RX ORDER — KETOROLAC TROMETHAMINE 30 MG/ML
30 INJECTION, SOLUTION INTRAMUSCULAR; INTRAVENOUS
Status: COMPLETED | OUTPATIENT
Start: 2021-09-08 | End: 2021-09-08

## 2021-09-08 RX ADMIN — KETOROLAC TROMETHAMINE 30 MG: 30 INJECTION, SOLUTION INTRAMUSCULAR; INTRAVENOUS at 16:53

## 2021-09-08 NOTE — PROGRESS NOTES
[facility]  [address] [place], [facility] [address]  Phone: [phone]  Fax: [phone]    Pt's YOB: 1945    ASSESSMENT   Diagnoses and all orders for this visit:    1. Lumbar facet arthropathy  -     ketorolac (TORADOL) injection 30 mg  -     THER/PROPH/DIAG INJECTION, SUBCUT/IM  -     predniSONE (DELTASONE) 10 mg tablet; 6 pills Day 1, 5 pills Day 2, 4 pills Day 3&4, 3 pills Day 5&6, 2 pills Day 7&8, 1 pill Day 9&10, 1/2 pill Day 11&12    2. Lumbar radiculopathy    3. Abnormal EMG    4. Gait abnormality    5. Neuritis         IMPRESSION AND PLAN:  Huseyin Moon is a 68 y.o. female with history of lumbar pain. Pt complains of pain in the lower back that radiates down the left leg to the ankle. She previously experienced pain radiating down the right leg. She takes Neurontin 300 mg 1 cap QAM and 3 caps QHS and Pamelor 10 mg 4 caps QHS which helps with sleep. Pt notes significant relief when taking a large prednisone taper. 1) Pt was given information on lumbar arthritis exercises. 2) Discussed treatment options with the patient including medication and steroid injections. 3) She will adjust her Neurontin 300 mg to 1 cap QAM, 1 cap in the afternoon, and 2 caps with dinner to better manage her neuropathic symptoms. 4) Pt will continue with Pamelor 10 mg 4 caps QHS. 5) She received a 30 mg Toradol injection in the office today. 6) Pt was prescribed a large prednisone taper. 7) Discussed referral to Dr. Trinity Marino for surgical evaluation if needed. 8) Ms. Carmine Escobar has a reminder for a \"due or due soon\" health maintenance. I have asked that she contact her primary care provider, Faith Gipson MD, for follow-up on this health maintenance. 9)  demonstrated consistency with prescribing. Follow-up and Dispositions    · Return in about 6 weeks (around 10/20/2021) for Medication follow up.              HISTORY OF PRESENT ILLNESS:  Renato Buck is a 68 y.o. female with history of lumbar pain and presents to the office today for EMG follow up. Pt complains of pain in the lower back that radiates down the left leg to the ankle. She previously experienced pain radiating down the right leg but denies any leg weakness or dragging her feet. Pt notes that her pain progressively worsens throughout the day and by dinner time, she has difficulty cooking secondary to pain. She admits that her pain is affecting her quality of life. Pt followed up with Dr. Consuelo Sloan and was told that she was not a candidate for surgery at that time. She underwent a bilateral lower extremity EMG on 8/18/2021 by Dr. Judith Joiner which was consistent with sensorimotor peripheral polyneuropathy and a chronic bilateral L4 radiculopathy. She continues to take Neurontin 300 mg 1 cap QAM and 3 caps QHS and Pamelor 10 mg 4 caps QHS which helps with sleep. She notes significant relief x 1.5 weeks when taking a large prednisone taper since her last office visit but notes minimal relief when taking a Medrol Dosepak. Pt at this time desires to proceed with medication evaluation. More than 30 minutes was spent with pt discussing her pain, EMG report and treatment plan    Pain Scale: 8/10    PCP: Maureen Weinstein MD     Past Medical History:   Diagnosis Date    Acid reflux     Hypertension         Social History     Socioeconomic History    Marital status:      Spouse name: Not on file    Number of children: Not on file    Years of education: Not on file    Highest education level: Not on file   Occupational History    Not on file   Tobacco Use    Smoking status: Never Smoker    Smokeless tobacco: Never Used   Substance and Sexual Activity    Alcohol use:  Yes     Alcohol/week: 1.0 standard drinks     Types: 1 Glasses of wine per week    Drug use: No    Sexual activity: Yes     Partners: Male     Birth control/protection: None   Other Topics Concern    Not on file   Social History Narrative    Not on file     Social Determinants of Health     Financial Resource Strain:     Difficulty of Paying Living Expenses:    Food Insecurity:     Worried About Running Out of Food in the Last Year:     [address] in the Last Year:    Transportation Needs:     Lack of Transportation (Medical):  Lack of Transportation (Non-Medical):    Physical Activity:     Days of Exercise per Week:     Minutes of Exercise per Session:    Stress:     Feeling of Stress :    Social Connections:     Frequency of Communication with Friends and Family:     Frequency of Social Gatherings with Friends and Family:     Attends Hoahaoism Services:     Active Member of Clubs or Organizations:     Attends Club or Organization Meetings:     Marital Status:    Intimate Partner Violence:     Fear of Current or Ex-Partner:     Emotionally Abused:     Physically Abused:     Sexually Abused:        Current Outpatient Medications   Medication Sig Dispense Refill    predniSONE (DELTASONE) 10 mg tablet 6 pills Day 1, 5 pills Day 2, 4 pills Day 3&4, 3 pills Day 5&6, 2 pills Day 7&8, 1 pill Day 9&10, 1/2 pill Day 11&12 32 Tablet 0    gabapentin (Neurontin) 300 mg capsule 1 in the am and 3 in the evening as directed  Indications: neuropathic pain 360 Capsule 1    nortriptyline (PAMELOR) 10 mg capsule Take 4 Capsules by mouth nightly. 120 Capsule 3    alpha lipoic acid 200 mg cap Take 1 Cap by mouth daily.  docosahexanoic acid-eicosapent 120-180 mg cap Take 1 Cap by mouth daily.  cholecalciferol (VITAMIN D3) 1,000 unit cap Take 1 Cap by mouth daily.  MAGNESIUM OXIDE PO Take 1 Tab by mouth daily.  omeprazole (PRILOSEC) 40 mg capsule Take 40 mg by mouth daily.  losartan (COZAAR) 25 mg tablet Take 25 mg by mouth daily.  hydroCHLOROthiazide (HYDRODIURIL) 25 mg tablet Take 25 mg by mouth daily.       LORazepam (ATIVAN) 1 mg tablet Take 1 mg by mouth every eight (8) hours as needed.  atorvastatin (LIPITOR) 10 mg tablet Take 10 mg by mouth daily.  calcium carbonate (CALTREX) 600 mg calcium (1,500 mg) tablet Take 600 mg by mouth daily.  omega-3 acid ethyl esters (LOVAZA) 1 gram capsule Take 1 g by mouth daily (with breakfast). No Known Allergies      REVIEW OF SYSTEMS    Constitutional: Negative for fever, chills, or weight change. Respiratory: Negative for cough or shortness of breath. Cardiovascular: Negative for chest pain or palpitations. Gastrointestinal: Negative for acid reflux, change in bowel habits, or constipation. Genitourinary: Negative for dysuria and flank pain. Musculoskeletal: Positive for lumbar pain. Skin: Negative for rash. Neurological: Negative for headaches, dizziness; positive for numbness  Endo/Heme/Allergies: Negative for increased bruising. Psychiatric/Behavioral: Positive for difficulty with sleep. As per HPI    PHYSICAL EXAMINATION  Visit Vitals  /69 (BP 1 Location: Right upper arm)   Pulse 96   Temp 97.6 °F (36.4 °C)   Resp 18   Wt 143 lb (64.9 kg)   SpO2 98%   BMI 23.80 kg/m²       Constitutional: Awake, alert, and in no acute distress. Neurological: 1+ symmetrical DTRs in the upper extremities. 1+ symmetrical DTRs in the lower extremities. Sensation to light touch is intact. Negative Mckeon's sign bilaterally. Skin: warm, dry, and intact. Musculoskeletal: Tenderness to palpation in the lumbar region. Moderate pain with extension and axial loading. No pain with internal or external rotation of her hips. Negative straight leg raise bilaterally.      Hip Flex  Quads Hamstrings Ankle DF EHL Ankle PF   Right +4/5 +4/5 +4/5 +4/5 +4/5 +4/5   Left +4/5 +4/5 +4/5 +4/5 +4/5 +4/5     TORADOL INJECTION:  Administrations This Visit     ketorolac (TORADOL) injection 30 mg     Admin Date  09/08/2021  16:53 Action  Given Dose  30 mg Route  IntraMUSCular Site  Left Gluteus Salvador Administered By  Melissa Paige LPN    NDC: 61869-901-86    Patient Supplied?: No                IMAGING:    Lumbar spine MRI from 12/20/2020 was personally reviewed with the patient and demonstrated:           Results from [place] encounter on 12/10/20   MRI LUMB SPINE WO CONT     Narrative EXAM: MRI LUMB SPINE WO CONT     CLINICAL INDICATIONS/HISTORY: Right leg pain walking difficulties prior back  surgeries     COMPARISON: MRI performed May, 2019     Technique: Multi-sequence multiplanar T1, T2, STIR imaging with and without fat  saturation obtained centered on the lumbar spine.      FINDINGS:   Large metal susceptibility artifacts at T10 and L2 consistent with extensive  posterior spinal fixation at these levels. Alignment: Intact lordosis  Vertebral body height: Normal  Marrow signal: Unremarkable  Disc spaces: Preserved height and signal intensity  Conus: Terminates at T12-L1     Axial imaging correlation:           L1-2: Patent canal and foramina.     L2-3: There is a mild annular bulge across the midline. The level is severely  affected by susceptibility artifact secondary to posterior fixation devices. Doubt any high-grade stenosis is present.     L3-4: Central moderate sized protrusion herniation extends into the caudal  aspect of the neuroforamina. There is compression of the exiting right L3 nerve  root. Central canal is borderline stenotic 0.94 cm. Left neuroforamina nonstenotic. Facets reflect mild arthritic changes more  accelerated on the right than left side.     L4-5: High-grade canal stenosis AP dimension of the canal 0.78 cm secondary to  facet degenerative changes and hypertrophic response. There is also subluxation  of L4 on 5 of about 5 mm. There is encroachment into the right neuroforamina because of the hypertrophic  changes in the facet. Similar changes seen on the left. Exiting L4 roots may be  intermittently compressed.  The anterior subluxation has increased in severity at  this level from the 2019 exam     L5-S1: Patent canal and foramina.     Other structures: Unremarkable.           Impression IMPRESSION:     There is central canal stenosis at L4-5. Some of the degenerative change  including anterior subluxation has progressed from 2019     L3-4 demonstrates herniation with borderline central canal stenosis  Right neuroforamina demonstrate compression of the exiting L4 and L3 nerve roots               Thoracic spine 2V x-rays from 11/23/2020 were personally reviewed with the patient and demonstrated:  Thoracolumbar fusion.          Bilateral lower extremity EMG from 8/18/2021 was personally reviewed with the patient and demonstrated:  NCV & EMG Findings:  Evaluation of the left Fibular motor nerve showed reduced amplitude (0.8 mV) and decreased conduction velocity (B Fib-Ankle, 32 m/s). The left tibial motor nerve showed prolonged distal onset latency (6.3 ms) and decreased conduction velocity (Knee-Ankle, 31 m/s). The right tibial motor nerve showed reduced amplitude (2.3 mV) and decreased conduction velocity (Knee-Ankle, 35 m/s). The left Sup Fibular sensory and the right Sup Fibular sensory nerves showed prolonged distal peak latency (L4.8, R4.5 ms) and decreased conduction velocity (14 cm-Ant Lat Mall, L29, R31 m/s). The left sural sensory and the right sural sensory nerves showed no response (Calf). All remaining nerves (as indicated in the following tables) were within normal limits. Left vs. Right side comparison data for the tibial motor nerve indicates abnormal L-R amplitude difference (69.7 %). All remaining left vs. right side differences were within normal limits.       Needle evaluation of the right vastus medialis, the right anterior tibialis, and the left anterior tibialis muscles showed increased motor unit amplitude.   All remaining muscles (as indicated in the following table) showed no evidence of electrical instability.       INTERPRETATION  This is an abnormal electrodiagnostic examination. These findings may be consistent with:   1. Sensorimotor peripheral polyneuropathy - this is based on scattered abnormalities throughout the NCS bilaterally affecting both sensory and motor nerves. 2. Chronic bilateral L4 radiculopathy - this is based on right sided signs of remote chronicity in the tibialis anterior and vastus medialis. On the left side there are findings only in the tibialis anterior.      CLINICAL INTERPRETATION  Her electrodiagnostic findings of peripheral neuropathy and chronic lumbar radiculopathy appear consistent with her bilateral leg symptoms.      Written by Jona Galvez, as dictated by Charlotte Strauss MD.  I, Dr. Charlotte Strauss confirm that all documentation is accurate.

## 2021-09-08 NOTE — LETTER
9/8/2021    Patient: Lyric Thompson   YOB: 1945   Date of Visit: 9/8/2021     Daniel Michel MD  [address]  Via Fax: [phone]    Dear Daniel Michel MD,      Thank you for referring Ms. Janiya Bartholomew to [address] [facility] for evaluation. My notes for this consultation are attached. If you have questions, please do not hesitate to call me. I look forward to following your patient along with you.       Sincerely,    Hubert Hall MD

## 2021-09-08 NOTE — PATIENT INSTRUCTIONS
Low Back Arthritis: Exercises  Introduction  Here are some examples of typical rehabilitation exercises for your condition. Start each exercise slowly. Ease off the exercise if you start to have pain. Your doctor or physical therapist will tell you when you can start these exercises and which ones will work best for you. When you are not being active, find a comfortable position for rest. Some people are comfortable on the floor or a medium-firm bed with a small pillow under their head and another under their knees. Some people prefer to lie on their side with a pillow between their knees. Don't stay in one position for too long. Take short walks (10 to 20 minutes) every 2 to 3 hours. Avoid slopes, hills, and stairs until you feel better. Walk only distances you can manage without pain, especially leg pain. How to do the exercises  Pelvic tilt   1. Lie on your back with your knees bent. 2. \"Brace\" your stomachtighten your muscles by pulling in and imagining your belly button moving toward your spine. 3. Press your lower back into the floor. You should feel your hips and pelvis rock back. 4. Hold for 6 seconds while breathing smoothly. 5. Relax and allow your pelvis and hips to rock forward. 6. Repeat 8 to 12 times. Back stretches   1. Get down on your hands and knees on the floor. 2. Relax your head and allow it to droop. Round your back up toward the ceiling until you feel a nice stretch in your upper, middle, and lower back. Hold this stretch for as long as it feels comfortable, or about 15 to 30 seconds. 3. Return to the starting position with a flat back while you are on your hands and knees. 4. Let your back sway by pressing your stomach toward the floor. Lift your buttocks toward the ceiling. 5. Hold this position for 15 to 30 seconds. 6. Repeat 2 to 4 times. Follow-up care is a key part of your treatment and safety.  Be sure to make and go to all appointments, and call your doctor if you are having problems. It's also a good idea to know your test results and keep a list of the medicines you take. Where can you learn more? Go to http://www.WooMe.com/  Enter T094 in the search box to learn more about \"Low Back Arthritis: Exercises. \"  Current as of: November 16, 2020               Content Version: 12.8  © 5014-1144 [employer]. Care instructions adapted under license by [employer] (which disclaims liability or warranty for this information). If you have questions about a medical condition or this instruction, always ask your healthcare professional. [place] [address] any warranty or liability for your use of this information.

## 2021-09-20 ENCOUNTER — TELEPHONE (OUTPATIENT)
Dept: ORTHOPEDIC SURGERY | Age: 76
End: 2021-09-20

## 2021-09-20 NOTE — TELEPHONE ENCOUNTER
Patient called in stating she diogenes like a refill of the medication Prednisone and she would like a medication to help with pain.     Patient is requesting a call back at [phone]

## 2021-09-22 NOTE — TELEPHONE ENCOUNTER
Patient called again and said that she has checked with her pharmacy and nothing has been called in yet. Patient is asking that the Prednisone medication and the Tylenol #3 medication be called into her pharmacy. [facility] [address] [facility]. [phone]. Patient tel. [phone].

## 2021-09-22 NOTE — TELEPHONE ENCOUNTER
Spoke with patient and she would like to try the lower dose of Prednisone and also she stated you mentioned that she could get a small amount of Tylenol # 3 for when she is in terrible pain to use sparingly. She would like some of that called in to [facility].

## 2021-09-23 DIAGNOSIS — M47.816 LUMBAR FACET ARTHROPATHY: Primary | ICD-10-CM

## 2021-09-23 DIAGNOSIS — M54.50 LUMBAR PAIN: ICD-10-CM

## 2021-09-23 RX ORDER — PREDNISONE 10 MG/1
TABLET ORAL
Qty: 30 TABLET | Refills: 0 | Status: SHIPPED | OUTPATIENT
Start: 2021-09-23 | End: 2021-10-20 | Stop reason: ALTCHOICE

## 2021-09-23 RX ORDER — ACETAMINOPHEN AND CODEINE PHOSPHATE 300; 30 MG/1; MG/1
1 TABLET ORAL
Qty: 28 TABLET | Refills: 0 | Status: SHIPPED | OUTPATIENT
Start: 2021-09-23 | End: 2021-09-26

## 2021-10-11 DIAGNOSIS — M54.16 LUMBAR RADICULOPATHY: ICD-10-CM

## 2021-10-11 DIAGNOSIS — M48.062 SPINAL STENOSIS OF LUMBAR REGION WITH NEUROGENIC CLAUDICATION: ICD-10-CM

## 2021-10-11 RX ORDER — NORTRIPTYLINE HYDROCHLORIDE 10 MG/1
40 CAPSULE ORAL
Qty: 120 CAPSULE | Refills: 3 | Status: SHIPPED | OUTPATIENT
Start: 2021-10-11 | End: 2022-02-02 | Stop reason: SDUPTHER

## 2021-10-11 NOTE — TELEPHONE ENCOUNTER
Patient requests refill of rx nortriptyline (PAMELOR) 10 mg capsule called in to [address] [place] [address] on file.

## 2021-10-11 NOTE — TELEPHONE ENCOUNTER
Last Visit: 9/8/21 with MD Reta Wilson  Next Appointment: 10/20/21 with MD Reta Wilson  Previous Refill Encounter(s): 5/24/21 #120 with 3 refills    Requested Prescriptions     Pending Prescriptions Disp Refills    nortriptyline (PAMELOR) 10 mg capsule 120 Capsule 3     Sig: Take 4 Capsules by mouth nightly.

## 2021-10-13 ENCOUNTER — TELEPHONE (OUTPATIENT)
Dept: ORTHOPEDIC SURGERY | Age: 76
End: 2021-10-13

## 2021-10-13 NOTE — TELEPHONE ENCOUNTER
Patient called to request refill for nortriptyline (PAMELOR) 10 mg capsule     Confirmed pharmacy: [facility]    Requesting call back: [phone]

## 2021-10-14 NOTE — TELEPHONE ENCOUNTER
Patient called in regards to the previous message. Patient stated she is unable to wait until 10/20 for the medication refill.     Patient's contact is [phone]

## 2021-10-20 ENCOUNTER — OFFICE VISIT (OUTPATIENT)
Dept: ORTHOPEDIC SURGERY | Age: 76
End: 2021-10-20
Payer: MEDICARE

## 2021-10-20 VITALS
BODY MASS INDEX: 25.16 KG/M2 | RESPIRATION RATE: 16 BRPM | TEMPERATURE: 94.7 F | HEIGHT: 65 IN | HEART RATE: 96 BPM | WEIGHT: 151 LBS | SYSTOLIC BLOOD PRESSURE: 135 MMHG | DIASTOLIC BLOOD PRESSURE: 68 MMHG | OXYGEN SATURATION: 96 %

## 2021-10-20 DIAGNOSIS — M47.816 LUMBAR FACET ARTHROPATHY: ICD-10-CM

## 2021-10-20 DIAGNOSIS — M54.16 LUMBAR RADICULOPATHY: ICD-10-CM

## 2021-10-20 DIAGNOSIS — M62.838 MUSCLE SPASM: ICD-10-CM

## 2021-10-20 DIAGNOSIS — M48.062 SPINAL STENOSIS OF LUMBAR REGION WITH NEUROGENIC CLAUDICATION: Primary | ICD-10-CM

## 2021-10-20 DIAGNOSIS — M79.2 NEURITIS: ICD-10-CM

## 2021-10-20 PROCEDURE — G8427 DOCREV CUR MEDS BY ELIG CLIN: HCPCS | Performed by: PHYSICAL MEDICINE & REHABILITATION

## 2021-10-20 PROCEDURE — G8752 SYS BP LESS 140: HCPCS | Performed by: PHYSICAL MEDICINE & REHABILITATION

## 2021-10-20 PROCEDURE — 1090F PRES/ABSN URINE INCON ASSESS: CPT | Performed by: PHYSICAL MEDICINE & REHABILITATION

## 2021-10-20 PROCEDURE — G8432 DEP SCR NOT DOC, RNG: HCPCS | Performed by: PHYSICAL MEDICINE & REHABILITATION

## 2021-10-20 PROCEDURE — G8536 NO DOC ELDER MAL SCRN: HCPCS | Performed by: PHYSICAL MEDICINE & REHABILITATION

## 2021-10-20 PROCEDURE — 1101F PT FALLS ASSESS-DOCD LE1/YR: CPT | Performed by: PHYSICAL MEDICINE & REHABILITATION

## 2021-10-20 PROCEDURE — G8399 PT W/DXA RESULTS DOCUMENT: HCPCS | Performed by: PHYSICAL MEDICINE & REHABILITATION

## 2021-10-20 PROCEDURE — 99213 OFFICE O/P EST LOW 20 MIN: CPT | Performed by: PHYSICAL MEDICINE & REHABILITATION

## 2021-10-20 PROCEDURE — G8754 DIAS BP LESS 90: HCPCS | Performed by: PHYSICAL MEDICINE & REHABILITATION

## 2021-10-20 PROCEDURE — G8419 CALC BMI OUT NRM PARAM NOF/U: HCPCS | Performed by: PHYSICAL MEDICINE & REHABILITATION

## 2021-10-20 RX ORDER — PREDNISONE 5 MG/1
5 TABLET ORAL DAILY
Qty: 30 TABLET | Refills: 1 | Status: SHIPPED | OUTPATIENT
Start: 2021-10-20 | End: 2021-12-15 | Stop reason: SDUPTHER

## 2021-10-20 NOTE — PROGRESS NOTES
[facility]  [address] [place] [address], [facility] [place] [address]  Phone: [phone]  Fax: [phone]    Pt's YOB: 1945    ASSESSMENT   Diagnoses and all orders for this visit:    1. Spinal stenosis of lumbar region with neurogenic claudication    2. Lumbar facet arthropathy  -     predniSONE (DELTASONE) 5 mg tablet; Take 1 Tablet by mouth daily. 3. Muscle spasm    4. Lumbar radiculopathy    5. Neuritis         IMPRESSION AND PLAN:  Reina Flores is a 68 y.o. female with history of lumbar pain. Pt notes that her symptoms are well managed with medication. She takes Neurontin 300 mg 1 cap QAM, 2 caps in the late afternoon, and 1 cap QHS, Pamelor 10 mg 4 caps QHS, and prednisone 5 mg daily. 1) Pt was given information on lumbar arthritis exercises. 2) She will continue taking Neurontin 300 mg and Pamelor 10 mg as prescribed and does not need a refill at this time. 3) Pt received a refill of prednisone 5 mg daily for inflammatory pain - will still try to taper as her symptoms allow. 4) Ms. Naima Chow has a reminder for a \"due or due soon\" health maintenance. I have asked that she contact her primary care provider, Emma Joyce MD, for follow-up on this health maintenance. 5)  demonstrated consistency with prescribing. Follow-up and Dispositions    · Return in about 2 months (around 12/20/2021) for Medication follow up. HISTORY OF PRESENT ILLNESS:  Reina Flores is a 68 y.o. female with history of lumbar pain and presents to the office today for follow up. Pt complains of pain in the lower back that radiates down the left leg but notes that her symptoms are well managed with medication. She admits that she is able to sleep better at night. Pt states that she adjusted her Neurontin 300 mg and is now taking 1 cap QAM, 2 caps in the late afternoon (around 3 PM), and 1 cap QHS. She also takes Pamelor 10 mg 4 caps QHS.  Pt reports relief when taking prednisone 5 mg but denies any relief when taking 2.5 mg. Pt at this time desires to continue with current care. Pain Scale: 6/10    PCP: Gurmeet Dunlap MD     Past Medical History:   Diagnosis Date    Acid reflux     Hypertension         Social History     Socioeconomic History    Marital status:      Spouse name: Not on file    Number of children: Not on file    Years of education: Not on file    Highest education level: Not on file   Occupational History    Not on file   Tobacco Use    Smoking status: Never Smoker    Smokeless tobacco: Never Used   Substance and Sexual Activity    Alcohol use: Yes     Alcohol/week: 1.0 standard drinks     Types: 1 Glasses of wine per week    Drug use: No    Sexual activity: Yes     Partners: Male     Birth control/protection: None   Other Topics Concern    Not on file   Social History Narrative    Not on file     Social Determinants of Health     Financial Resource Strain:     Difficulty of Paying Living Expenses:    Food Insecurity:     Worried About Running Out of Food in the Last Year:     [address] in the Last Year:    Transportation Needs:     Lack of Transportation (Medical):  Lack of Transportation (Non-Medical):    Physical Activity:     Days of Exercise per Week:     Minutes of Exercise per Session:    Stress:     Feeling of Stress :    Social Connections:     Frequency of Communication with Friends and Family:     Frequency of Social Gatherings with Friends and Family:     Attends Jain Services:     Active Member of Clubs or Organizations:     Attends Club or Organization Meetings:     Marital Status:    Intimate Partner Violence:     Fear of Current or Ex-Partner:     Emotionally Abused:     Physically Abused:     Sexually Abused:        Current Outpatient Medications   Medication Sig Dispense Refill    predniSONE (DELTASONE) 5 mg tablet Take 1 Tablet by mouth daily.  30 Tablet 1    nortriptyline (PAMELOR) 10 mg capsule Take 4 Capsules by mouth nightly. 120 Capsule 3    gabapentin (Neurontin) 300 mg capsule 1 in the am and 3 in the evening as directed  Indications: neuropathic pain 360 Capsule 1    alpha lipoic acid 200 mg cap Take 1 Cap by mouth daily.  docosahexanoic acid-eicosapent 120-180 mg cap Take 1 Cap by mouth daily.  cholecalciferol (VITAMIN D3) 1,000 unit cap Take 1 Cap by mouth daily.  MAGNESIUM OXIDE PO Take 1 Tab by mouth daily.  omeprazole (PRILOSEC) 40 mg capsule Take 40 mg by mouth daily.  losartan (COZAAR) 25 mg tablet Take 25 mg by mouth daily.  hydroCHLOROthiazide (HYDRODIURIL) 25 mg tablet Take 25 mg by mouth daily.  LORazepam (ATIVAN) 1 mg tablet Take 1 mg by mouth every eight (8) hours as needed.  atorvastatin (LIPITOR) 10 mg tablet Take 10 mg by mouth daily.  calcium carbonate (CALTREX) 600 mg calcium (1,500 mg) tablet Take 600 mg by mouth daily.  omega-3 acid ethyl esters (LOVAZA) 1 gram capsule Take 1 g by mouth daily (with breakfast). No Known Allergies      REVIEW OF SYSTEMS    Constitutional: Negative for fever, chills, or weight change. Respiratory: Negative for cough or shortness of breath. Cardiovascular: Negative for chest pain or palpitations. Gastrointestinal: Negative for acid reflux, change in bowel habits, or constipation. Genitourinary: Negative for dysuria and flank pain. Musculoskeletal: Positive for lumbar pain. Skin: Negative for rash. Neurological: Negative for headaches, dizziness, or numbness. Endo/Heme/Allergies: Negative for increased bruising. Psychiatric/Behavioral: Negative for difficulty with sleep.     As per HPI    PHYSICAL EXAMINATION  Visit Vitals  /68 (BP 1 Location: Right arm, BP Patient Position: Sitting, BP Cuff Size: Adult)   Pulse 96   Temp (!) 94.7 °F (34.8 °C) (Temporal)   Resp 16   Ht 5' 5\" (1.651 m)   Wt 151 lb (68.5 kg)   SpO2 96%   BMI 25.13 kg/m² Constitutional: Awake, alert, and in no acute distress. Neurological: Sensation to light touch is intact. Skin: warm, dry, and intact. Musculoskeletal: Tenderness to palpation int he lower lumbar region. No pain with internal or external rotation of her hips. Negative straight leg raise bilaterally. Hip Flex  Quads Hamstrings Ankle DF EHL Ankle PF   Right +4/5 +4/5 +4/5 +4/5 +4/5 +4/5   Left +4/5 +4/5 +4/5 +4/5 +4/5 +4/5     IMAGING:    Lumbar spine MRI from 12/20/2020 was personally reviewed with the patient and demonstrated:           Results from [place] encounter on 12/10/20   MRI LUMB SPINE WO CONT     Narrative EXAM: MRI LUMB SPINE WO CONT     CLINICAL INDICATIONS/HISTORY: Right leg pain walking difficulties prior back  surgeries     COMPARISON: MRI performed May, 2019     Technique: Multi-sequence multiplanar T1, T2, STIR imaging with and without fat  saturation obtained centered on the lumbar spine.      FINDINGS:   Large metal susceptibility artifacts at T10 and L2 consistent with extensive  posterior spinal fixation at these levels. Alignment: Intact lordosis  Vertebral body height: Normal  Marrow signal: Unremarkable  Disc spaces: Preserved height and signal intensity  Conus: Terminates at T12-L1     Axial imaging correlation:           L1-2: Patent canal and foramina.     L2-3: There is a mild annular bulge across the midline. The level is severely  affected by susceptibility artifact secondary to posterior fixation devices. Doubt any high-grade stenosis is present.     L3-4: Central moderate sized protrusion herniation extends into the caudal  aspect of the neuroforamina. There is compression of the exiting right L3 nerve  root. Central canal is borderline stenotic 0.94 cm. Left neuroforamina nonstenotic.  Facets reflect mild arthritic changes more  accelerated on the right than left side.     L4-5: High-grade canal stenosis AP dimension of the canal 0.78 cm secondary to  facet degenerative changes and hypertrophic response. There is also subluxation  of L4 on 5 of about 5 mm. There is encroachment into the right neuroforamina because of the hypertrophic  changes in the facet. Similar changes seen on the left. Exiting L4 roots may be  intermittently compressed. The anterior subluxation has increased in severity at  this level from the 2019 exam     L5-S1: Patent canal and foramina.     Other structures: Unremarkable.           Impression IMPRESSION:     There is central canal stenosis at L4-5. Some of the degenerative change  including anterior subluxation has progressed from 2019     L3-4 demonstrates herniation with borderline central canal stenosis  Right neuroforamina demonstrate compression of the exiting L4 and L3 nerve roots               Thoracic spine 2V x-rays from 11/23/2020 were personally reviewed with the patient and demonstrated:  Thoracolumbar fusion.            Bilateral lower extremity EMG from 8/18/2021 was personally reviewed with the patient and demonstrated:  NCV & EMG Findings:  Evaluation of the left Fibular motor nerve showed reduced amplitude (0.8 mV) and decreased conduction velocity (B Fib-Ankle, 32 m/s).  The left tibial motor nerve showed prolonged distal onset latency (6.3 ms) and decreased conduction velocity (Knee-Ankle, 31 m/s).  The right tibial motor nerve showed reduced amplitude (2.3 mV) and decreased conduction velocity (Knee-Ankle, 35 m/s).  The left Sup Fibular sensory and the right Sup Fibular sensory nerves showed prolonged distal peak latency (L4.8, R4.5 ms) and decreased conduction velocity (14 cm-Ant Lat Mall, L29, R31 m/s).   The left sural sensory and the right sural sensory nerves showed no response (Calf).  All remaining nerves (as indicated in the following tables) were within normal limits.  Left vs. Right side comparison data for the tibial motor nerve indicates abnormal L-R amplitude difference (69.7 %).  All remaining left vs. right side differences were within normal limits.       Needle evaluation of the right vastus medialis, the right anterior tibialis, and the left anterior tibialis muscles showed increased motor unit amplitude.  All remaining muscles (as indicated in the following table) showed no evidence of electrical instability.       INTERPRETATION  This is an abnormal electrodiagnostic examination. These findings may be consistent with:   1. Sensorimotor peripheral polyneuropathy - this is based on scattered abnormalities throughout the NCS bilaterally affecting both sensory and motor nerves.    2. Chronic bilateral L4 radiculopathy - this is based on right sided signs of remote chronicity in the tibialis anterior and vastus medialis. On the left side there are findings only in the tibialis anterior.      CLINICAL INTERPRETATION  Her electrodiagnostic findings of peripheral neuropathy and chronic lumbar radiculopathy appear consistent with her bilateral leg symptoms.     Written by Dennis Lance, as dictated by Sydnee Cline MD.  I, Dr. Sydnee Cline confirm that all documentation is accurate.

## 2021-10-20 NOTE — PATIENT INSTRUCTIONS
Low Back Arthritis: Exercises  Introduction  Here are some examples of typical rehabilitation exercises for your condition. Start each exercise slowly. Ease off the exercise if you start to have pain. Your doctor or physical therapist will tell you when you can start these exercises and which ones will work best for you. When you are not being active, find a comfortable position for rest. Some people are comfortable on the floor or a medium-firm bed with a small pillow under their head and another under their knees. Some people prefer to lie on their side with a pillow between their knees. Don't stay in one position for too long. Take short walks (10 to 20 minutes) every 2 to 3 hours. Avoid slopes, hills, and stairs until you feel better. Walk only distances you can manage without pain, especially leg pain. How to do the exercises  Pelvic tilt    1. Lie on your back with your knees bent. 2. \"Brace\" your stomachtighten your muscles by pulling in and imagining your belly button moving toward your spine. 3. Press your lower back into the floor. You should feel your hips and pelvis rock back. 4. Hold for 6 seconds while breathing smoothly. 5. Relax and allow your pelvis and hips to rock forward. 6. Repeat 8 to 12 times. Back stretches    1. Get down on your hands and knees on the floor. 2. Relax your head and allow it to droop. Round your back up toward the ceiling until you feel a nice stretch in your upper, middle, and lower back. Hold this stretch for as long as it feels comfortable, or about 15 to 30 seconds. 3. Return to the starting position with a flat back while you are on your hands and knees. 4. Let your back sway by pressing your stomach toward the floor. Lift your buttocks toward the ceiling. 5. Hold this position for 15 to 30 seconds. 6. Repeat 2 to 4 times. Follow-up care is a key part of your treatment and safety.  Be sure to make and go to all appointments, and call your doctor if you are having problems. It's also a good idea to know your test results and keep a list of the medicines you take. Where can you learn more? Go to http://www.MadeiraMadeira.com/  Enter T094 in the search box to learn more about \"Low Back Arthritis: Exercises. \"  Current as of: July 1, 2021               Content Version: 13.0  © 3393-7550 [employer]. Care instructions adapted under license by [employer] (which disclaims liability or warranty for this information). If you have questions about a medical condition or this instruction, always ask your healthcare professional. [place] [address] any warranty or liability for your use of this information.

## 2021-10-20 NOTE — LETTER
10/20/2021    Patient: Luma Hickey   YOB: 1945   Date of Visit: 10/20/2021     Branden Castro MD  [address] [place] [facility] [address]  Via Fax: [phone]    Dear Branden Castro MD,      Thank you for referring Ms. Brunilda Molina to [address] [facility] for evaluation. My notes for this consultation are attached. If you have questions, please do not hesitate to call me. I look forward to following your patient along with you.       Sincerely,    Demarcus Tinajero MD

## 2021-10-20 NOTE — PROGRESS NOTES
Chief Complaint   Patient presents with    Follow-up       Pt preferred language for health care discussion is english. Is someone accompanying this pt? no    Is the patient using any DME equipment during OV? no    Depression Screening:  3 most recent [facility] Screens 12/14/2020 11/23/2020 10/16/2020 10/16/2020 7/14/2020 7/23/2019 5/15/2019   Little interest or pleasure in doing things Not at all Not at all Not at all Not at all Not at all Not at all Not at all   Feeling down, depressed, irritable, or hopeless Not at all Not at all Not at all Not at all Not at all Not at all Not at all   Total Score PHQ 2 0 0 0 0 0 0 0       Learning Assessment:  Learning Assessment 7/23/2019   PRIMARY LEARNER Patient   HIGHEST LEVEL OF EDUCATION - PRIMARY LEARNER  GRADUATED HIGH SCHOOL OR GED   BARRIERS PRIMARY LEARNER NONE   CO-LEARNER CAREGIVER No   PRIMARY LANGUAGE ENGLISH   LEARNER PREFERENCE PRIMARY DEMONSTRATION     READING     VIDEOS   ANSWERED BY patient   RELATIONSHIP SELF       Abuse Screening:  Abuse Screening Questionnaire 11/23/2020 7/23/2019   Do you ever feel afraid of your partner? N N   Are you in a relationship with someone who physically or mentally threatens you? N N   Is it safe for you to go home? Y Y       Fall Risk  Fall Risk Assessment, last 12 mths 2/15/2021   Able to walk? Yes   Fall in past 12 months? 0   Do you feel unsteady? 0   Are you worried about falling 0           Advance Directive:  1. Do you have an advance directive in place? Patient Reply:no    2. If not, would you like material regarding how to put one in place? Patient Reply: no    2. Per patient no changes to their ACP contact no. Coordination of Care:  1. Have you been to the ER, urgent care clinic since your last visit? Hospitalized since your last visit? Yes urgent [facility] for a sore throat     2. Have you seen or consulted any other health care providers outside of the [address] [place] [address] since your last visit?  Include any pap smears or colon screening.  no

## 2021-12-14 ENCOUNTER — TELEPHONE (OUTPATIENT)
Dept: ORTHOPEDIC SURGERY | Age: 76
End: 2021-12-14

## 2021-12-14 NOTE — TELEPHONE ENCOUNTER
Patient called to request refill for predniSONE (DELTASONE) 5 mg tablet       Conf[facility]: [facility]

## 2021-12-15 DIAGNOSIS — M47.816 LUMBAR FACET ARTHROPATHY: ICD-10-CM

## 2021-12-15 RX ORDER — PREDNISONE 5 MG/1
5 TABLET ORAL DAILY
Qty: 30 TABLET | Refills: 1 | Status: SHIPPED | OUTPATIENT
Start: 2021-12-15 | End: 2022-02-02 | Stop reason: SDUPTHER

## 2022-02-02 ENCOUNTER — OFFICE VISIT (OUTPATIENT)
Dept: ORTHOPEDIC SURGERY | Age: 77
End: 2022-02-02
Payer: MEDICARE

## 2022-02-02 VITALS
HEART RATE: 95 BPM | HEIGHT: 65 IN | WEIGHT: 156 LBS | OXYGEN SATURATION: 95 % | BODY MASS INDEX: 25.99 KG/M2 | TEMPERATURE: 94.9 F | RESPIRATION RATE: 16 BRPM

## 2022-02-02 DIAGNOSIS — M48.062 SPINAL STENOSIS OF LUMBAR REGION WITH NEUROGENIC CLAUDICATION: Primary | ICD-10-CM

## 2022-02-02 DIAGNOSIS — M62.838 MUSCLE SPASM: ICD-10-CM

## 2022-02-02 DIAGNOSIS — M47.816 LUMBAR FACET ARTHROPATHY: ICD-10-CM

## 2022-02-02 DIAGNOSIS — M48.061 LUMBAR FORAMINAL STENOSIS: ICD-10-CM

## 2022-02-02 DIAGNOSIS — M54.16 LUMBAR RADICULOPATHY: ICD-10-CM

## 2022-02-02 PROCEDURE — G8419 CALC BMI OUT NRM PARAM NOF/U: HCPCS | Performed by: PHYSICAL MEDICINE & REHABILITATION

## 2022-02-02 PROCEDURE — 1090F PRES/ABSN URINE INCON ASSESS: CPT | Performed by: PHYSICAL MEDICINE & REHABILITATION

## 2022-02-02 PROCEDURE — G8536 NO DOC ELDER MAL SCRN: HCPCS | Performed by: PHYSICAL MEDICINE & REHABILITATION

## 2022-02-02 PROCEDURE — G8756 NO BP MEASURE DOC: HCPCS | Performed by: PHYSICAL MEDICINE & REHABILITATION

## 2022-02-02 PROCEDURE — G8399 PT W/DXA RESULTS DOCUMENT: HCPCS | Performed by: PHYSICAL MEDICINE & REHABILITATION

## 2022-02-02 PROCEDURE — G8432 DEP SCR NOT DOC, RNG: HCPCS | Performed by: PHYSICAL MEDICINE & REHABILITATION

## 2022-02-02 PROCEDURE — G8427 DOCREV CUR MEDS BY ELIG CLIN: HCPCS | Performed by: PHYSICAL MEDICINE & REHABILITATION

## 2022-02-02 PROCEDURE — 1101F PT FALLS ASSESS-DOCD LE1/YR: CPT | Performed by: PHYSICAL MEDICINE & REHABILITATION

## 2022-02-02 PROCEDURE — 99214 OFFICE O/P EST MOD 30 MIN: CPT | Performed by: PHYSICAL MEDICINE & REHABILITATION

## 2022-02-02 RX ORDER — GABAPENTIN 300 MG/1
CAPSULE ORAL
Qty: 360 CAPSULE | Refills: 1 | Status: SHIPPED
Start: 2022-02-05 | End: 2022-07-02

## 2022-02-02 RX ORDER — PREDNISONE 5 MG/1
5 TABLET ORAL DAILY
Qty: 30 TABLET | Refills: 2 | Status: SHIPPED | OUTPATIENT
Start: 2022-02-02 | End: 2022-04-26 | Stop reason: SDUPTHER

## 2022-02-02 RX ORDER — NORTRIPTYLINE HYDROCHLORIDE 10 MG/1
40 CAPSULE ORAL
Qty: 360 CAPSULE | Refills: 1 | Status: SHIPPED | OUTPATIENT
Start: 2022-02-02 | End: 2022-08-08 | Stop reason: SDUPTHER

## 2022-02-02 NOTE — PROGRESS NOTES
Chief Complaint   Patient presents with    Follow-up       Pt preferred language for health care discussion is english. Is someone accompanying this pt? no    Is the patient using any DME equipment during OV? no    Depression Screening:  3 most recent [facility] Screens 12/14/2020 11/23/2020 10/16/2020 10/16/2020 7/14/2020 7/23/2019 5/15/2019   Little interest or pleasure in doing things Not at all Not at all Not at all Not at all Not at all Not at all Not at all   Feeling down, depressed, irritable, or hopeless Not at all Not at all Not at all Not at all Not at all Not at all Not at all   Total Score PHQ 2 0 0 0 0 0 0 0       Learning Assessment:  Learning Assessment 7/23/2019   PRIMARY LEARNER Patient   HIGHEST LEVEL OF EDUCATION - PRIMARY LEARNER  GRADUATED HIGH SCHOOL OR GED   BARRIERS PRIMARY LEARNER NONE   CO-LEARNER CAREGIVER No   PRIMARY LANGUAGE ENGLISH   LEARNER PREFERENCE PRIMARY DEMONSTRATION     READING     VIDEOS   ANSWERED BY patient   RELATIONSHIP SELF       Abuse Screening:  Abuse Screening Questionnaire 11/23/2020 7/23/2019   Do you ever feel afraid of your partner? N N   Are you in a relationship with someone who physically or mentally threatens you? N N   Is it safe for you to go home? Y Y       Fall Risk  Fall Risk Assessment, last 12 mths 2/15/2021   Able to walk? Yes   Fall in past 12 months? 0   Do you feel unsteady? 0   Are you worried about falling 0           Advance Directive:  1. Do you have an advance directive in place? Patient Reply:no    2. If not, would you like material regarding how to put one in place? Patient Reply: no    2. Per patient no changes to their ACP contact no. Coordination of Care:  1. Have you been to the ER, urgent care clinic since your last visit? Hospitalized since your last visit? no    2. Have you seen or consulted any other health care providers outside of the [address] [place] [address] since your last visit? Include any pap smears or colon screening.  no

## 2022-02-02 NOTE — PROGRESS NOTES
[facility]  [address] [place], [facility] [address]  Phone: [phone]  Fax: [phone]    Pt's YOB: 1945    ASSESSMENT   Diagnoses and all orders for this visit:    1. Spinal stenosis of lumbar region with neurogenic claudication  -     gabapentin (Neurontin) 300 mg capsule; 1 in the am and 3 in the evening as directed  Indications: neuropathic pain  -     nortriptyline (PAMELOR) 10 mg capsule; Take 4 Capsules by mouth nightly. 2. Lumbar foraminal stenosis  -     gabapentin (Neurontin) 300 mg capsule; 1 in the am and 3 in the evening as directed  Indications: neuropathic pain    3. Lumbar radiculopathy  -     nortriptyline (PAMELOR) 10 mg capsule; Take 4 Capsules by mouth nightly. 4. Lumbar facet arthropathy  -     predniSONE (DELTASONE) 5 mg tablet; Take 1 Tablet by mouth daily. 5. Muscle spasm         IMPRESSION AND PLAN:  Kaitlin Harden is a 68 y.o. female with history of lumbar pain. She complains of swelling and contusion in the right knee x a few weeks with no known inciting injury and continued lumbar pain. Pt is taking  prednisone 5 mg daily with relief, Neurontin 300 mg 1 cap QAM, 2 caps before dinner, and 1 cap QHS, and Pamelor 10 mg 4 caps QHS. 1) Pt was given information on lumbar arthritis exercises. 2) Pt received refills of Neurontin 300 mg, Pamelor 10 mg, and prednisone 5 mg; pt also encouraged to decrease total daily dose of gabapentin as her neuropathic symptoms permit. 3) I recommended the pt increase her activity gradually and use a seated pedal exerciser as part of her home exercise program.  4) Ms. Oliver López has a reminder for a \"due or due soon\" health maintenance. I have asked that she contact her primary care provider, Alec Jauregui MD, for follow-up on this health maintenance. 5)  demonstrated consistency with prescribing.    6) Recommend decreasing prednisone as her pain permits (side effects for long term prednisone reviewed)  Follow-up and Dispositions    · Return in about 3 months (around 5/2/2022) for Medication follow up. HISTORY OF PRESENT ILLNESS:  Cliff Ly is a 68 y.o. female with history of lumbar pain and presents to the office today for follow up. Pt complains of swelling and contusion in the right knee x a few weeks with no known inciting injury and continued lumbar pain. She reports improvement in her pain since her last office visit but notes continued difficulty with climbing stairs. Pt denies any exacerbation of her lumbar pain with the colder weather. She also admits to weight gain, stating she has gained 19 lb in 2 years. Pt is taking prednisone 5 mg daily with significant relief of her inflammatory pain, Neurontin 300 mg 1 cap QAM, 2 caps before dinner, and 1 cap QHS, and Pamelor 10 mg 4 caps QHS. Pt has been evaluated by Dr Nohemi Vance previously and he did not recommend any surgery. Pt at this time desires to continue with current care. Pt's  will be having lumbar surgery with Dr Nohemi Vance in the next few weeks. Pain Scale: 0 - No pain/10    PCP: Richardson Gonsales MD     Past Medical History:   Diagnosis Date    Acid reflux     Hypertension         Social History     Socioeconomic History    Marital status:      Spouse name: Not on file    Number of children: Not on file    Years of education: Not on file    Highest education level: Not on file   Occupational History    Not on file   Tobacco Use    Smoking status: Never Smoker    Smokeless tobacco: Never Used   Substance and Sexual Activity    Alcohol use:  Yes     Alcohol/week: 1.0 standard drink     Types: 1 Glasses of wine per week    Drug use: No    Sexual activity: Yes     Partners: Male     Birth control/protection: None   Other Topics Concern    Not on file   Social History Narrative    Not on file     Social Determinants of Health     Financial Resource Strain:     Difficulty of Paying Living Expenses: Not on file   Food Insecurity:     Worried About Running Out of Food in the Last Year: Not on file    Ran Out of Food in the Last Year: Not on file   Transportation Needs:     Lack of Transportation (Medical): Not on file    Lack of Transportation (Non-Medical): Not on file   Physical Activity:     Days of Exercise per Week: Not on file    Minutes of Exercise per Session: Not on file   Stress:     Feeling of Stress : Not on file   Social Connections:     Frequency of Communication with Friends and Family: Not on file    Frequency of Social Gatherings with Friends and Family: Not on file    Attends Restorationist Services: Not on file    Active Member of [address] [place] [address] [employer] or Organizations: Not on file    Attends Club or Organization Meetings: Not on file    Marital Status: Not on file   Intimate Partner Violence:     Fear of Current or Ex-Partner: Not on file    Emotionally Abused: Not on file    Physically Abused: Not on file    Sexually Abused: Not on file   Housing Stability:     Unable to Pay for Housing in the Last Year: Not on file    Number of [place] in the Last Year: Not on file    Unstable Housing in the Last Year: Not on file       Current Outpatient Medications   Medication Sig Dispense Refill    [START ON 2/5/2022] gabapentin (Neurontin) 300 mg capsule 1 in the am and 3 in the evening as directed  Indications: neuropathic pain 360 Capsule 1    nortriptyline (PAMELOR) 10 mg capsule Take 4 Capsules by mouth nightly. 360 Capsule 1    predniSONE (DELTASONE) 5 mg tablet Take 1 Tablet by mouth daily. 30 Tablet 2    alpha lipoic acid 200 mg cap Take 1 Cap by mouth daily.  cholecalciferol (VITAMIN D3) 1,000 unit cap Take 1 Cap by mouth daily.  MAGNESIUM OXIDE PO Take 1 Tab by mouth daily.  omeprazole (PRILOSEC) 40 mg capsule Take 40 mg by mouth daily.  losartan (COZAAR) 25 mg tablet Take 25 mg by mouth daily.       hydroCHLOROthiazide (HYDRODIURIL) 25 mg tablet Take 25 mg by mouth daily.  LORazepam (ATIVAN) 1 mg tablet Take 1 mg by mouth every eight (8) hours as needed.  calcium carbonate (CALTREX) 600 mg calcium (1,500 mg) tablet Take 600 mg by mouth daily.  omega-3 acid ethyl esters (LOVAZA) 1 gram capsule Take 1 g by mouth daily (with breakfast).  docosahexanoic acid-eicosapent 120-180 mg cap Take 1 Cap by mouth daily.  atorvastatin (LIPITOR) 10 mg tablet Take 10 mg by mouth daily. No Known Allergies      REVIEW OF SYSTEMS    Constitutional: Negative for fever, chills. Positive for 13 lb unintentional weight gain since her last office visit. Respiratory: Negative for cough or shortness of breath. Cardiovascular: Negative for chest pain or palpitations. Gastrointestinal: Negative for acid reflux, change in bowel habits, or constipation. Genitourinary: Negative for dysuria and flank pain. Musculoskeletal: Positive for lumbar pain and right knee swelling. Skin: Negative for rash. Positive for right knee contusion. Neurological: Negative for headaches, dizziness, or numbness  Endo/Heme/Allergies: Negative for increased bruising. Psychiatric/Behavioral: Negative for difficulty with sleep. As per HPI    PHYSICAL EXAMINATION  Visit Vitals  Pulse 95   Temp (!) 94.9 °F (34.9 °C) (Tympanic)   Resp 16   Ht 5' 5\" (1.651 m)   Wt 156 lb (70.8 kg)   SpO2 95%   BMI 25.96 kg/m²       Constitutional: Awake, alert, and in no acute distress. Neurological: Sensation to light touch is intact. Skin: warm, dry, and intact. Musculoskeletal: Mild pain with extension, axial loading, and less with forward flexion. No pain with internal or external rotation of her hips. Negative straight leg raise bilaterally. Diffficulty transitioning sit to stand     Hip Flex  Quads Hamstrings Ankle DF EHL Ankle PF   Right +4/5 +4/5 +4/5 +4/5 +4/5 +4/5   Left +4/5 +4/5 +4/5 +4/5 +4/5 +4/5     IMAGING:    Lumbar spine MRI from 12/20/2020 was personally reviewed with the patient and demonstrated:           Results from [place] encounter on 12/10/20   MRI LUMB SPINE WO CONT     Narrative EXAM: MRI LUMB SPINE WO CONT     CLINICAL INDICATIONS/HISTORY: Right leg pain walking difficulties prior back  surgeries     COMPARISON: MRI performed May, 2019     Technique: Multi-sequence multiplanar T1, T2, STIR imaging with and without fat  saturation obtained centered on the lumbar spine.      FINDINGS:   Large metal susceptibility artifacts at T10 and L2 consistent with extensive  posterior spinal fixation at these levels. Alignment: Intact lordosis  Vertebral body height: Normal  Marrow signal: Unremarkable  Disc spaces: Preserved height and signal intensity  Conus: Terminates at T12-L1     Axial imaging correlation:           L1-2: Patent canal and foramina.     L2-3: There is a mild annular bulge across the midline. The level is severely  affected by susceptibility artifact secondary to posterior fixation devices. Doubt any high-grade stenosis is present.     L3-4: Central moderate sized protrusion herniation extends into the caudal  aspect of the neuroforamina. There is compression of the exiting right L3 nerve  root. Central canal is borderline stenotic 0.94 cm. Left neuroforamina nonstenotic. Facets reflect mild arthritic changes more  accelerated on the right than left side.     L4-5: High-grade canal stenosis AP dimension of the canal 0.78 cm secondary to  facet degenerative changes and hypertrophic response. There is also subluxation  of L4 on 5 of about 5 mm. There is encroachment into the right neuroforamina because of the hypertrophic  changes in the facet. Similar changes seen on the left. Exiting L4 roots may be  intermittently compressed.  The anterior subluxation has increased in severity at  this level from the 2019 exam     L5-S1: Patent canal and foramina.     Other structures: Unremarkable.           Impression IMPRESSION:     There is central canal stenosis at L4-5. Some of the degenerative change  including anterior subluxation has progressed from 2019     L3-4 demonstrates herniation with borderline central canal stenosis  Right neuroforamina demonstrate compression of the exiting L4 and L3 nerve roots               Thoracic spine 2V x-rays from 11/23/2020 were personally reviewed with the patient and demonstrated:  Thoracolumbar fusion.      Bilateral lower extremity EMG from 8/18/2021 was personally reviewed with the patient and demonstrated:  NCV & EMG Findings:  Evaluation of the left Fibular motor nerve showed reduced amplitude (0.8 mV) and decreased conduction velocity (B Fib-Ankle, 32 m/s).  The left tibial motor nerve showed prolonged distal onset latency (6.3 ms) and decreased conduction velocity (Knee-Ankle, 31 m/s).  The right tibial motor nerve showed reduced amplitude (2.3 mV) and decreased conduction velocity (Knee-Ankle, 35 m/s).  The left Sup Fibular sensory and the right Sup Fibular sensory nerves showed prolonged distal peak latency (L4.8, R4.5 ms) and decreased conduction velocity (14 cm-Ant Lat Mall, L29, R31 m/s).  The left sural sensory and the right sural sensory nerves showed no response (Calf).  All remaining nerves (as indicated in the following tables) were within normal limits.  Left vs. Right side comparison data for the tibial motor nerve indicates abnormal L-R amplitude difference (69.7 %).  All remaining left vs. right side differences were within normal limits.       Needle evaluation of the right vastus medialis, the right anterior tibialis, and the left anterior tibialis muscles showed increased motor unit amplitude.  All remaining muscles (as indicated in the following table) showed no evidence of electrical instability.       INTERPRETATION  This is an abnormal electrodiagnostic examination.  These findings may be consistent with:   1. Sensorimotor peripheral polyneuropathy - this is based on scattered abnormalities throughout the NCS bilaterally affecting both sensory and motor nerves.    2. Chronic bilateral L4 radiculopathy - this is based on right sided signs of remote chronicity in the tibialis anterior and vastus medialis. On the left side there are findings only in the tibialis anterior.      CLINICAL INTERPRETATION  Her electrodiagnostic findings of peripheral neuropathy and chronic lumbar radiculopathy appear consistent with her bilateral leg symptoms.       Written by Fatmata Peters, as dictated by Myriam Sanchez MD.  I, Dr. Myriam Sanchez confirm that all documentation is accurate.

## 2022-02-02 NOTE — PATIENT INSTRUCTIONS
Low Back Arthritis: Exercises  Introduction  Here are some examples of typical rehabilitation exercises for your condition. Start each exercise slowly. Ease off the exercise if you start to have pain. Your doctor or physical therapist will tell you when you can start these exercises and which ones will work best for you. When you are not being active, find a comfortable position for rest. Some people are comfortable on the floor or a medium-firm bed with a small pillow under their head and another under their knees. Some people prefer to lie on their side with a pillow between their knees. Don't stay in one position for too long. Take short walks (10 to 20 minutes) every 2 to 3 hours. Avoid slopes, hills, and stairs until you feel better. Walk only distances you can manage without pain, especially leg pain. How to do the exercises  Pelvic tilt    1. Lie on your back with your knees bent. 2. \"Brace\" your stomachtighten your muscles by pulling in and imagining your belly button moving toward your spine. 3. Press your lower back into the floor. You should feel your hips and pelvis rock back. 4. Hold for 6 seconds while breathing smoothly. 5. Relax and allow your pelvis and hips to rock forward. 6. Repeat 8 to 12 times. Back stretches    1. Get down on your hands and knees on the floor. 2. Relax your head and allow it to droop. Round your back up toward the ceiling until you feel a nice stretch in your upper, middle, and lower back. Hold this stretch for as long as it feels comfortable, or about 15 to 30 seconds. 3. Return to the starting position with a flat back while you are on your hands and knees. 4. Let your back sway by pressing your stomach toward the floor. Lift your buttocks toward the ceiling. 5. Hold this position for 15 to 30 seconds. 6. Repeat 2 to 4 times. Follow-up care is a key part of your treatment and safety.  Be sure to make and go to all appointments, and call your doctor if you are having problems. It's also a good idea to know your test results and keep a list of the medicines you take. Where can you learn more? Go to http://www.[employer].com/  Enter T094 in the search box to learn more about \"Low Back Arthritis: Exercises. \"  Current as of: July 1, 2021               Content Version: 13.0  © 8118-0703 [employer]. Care instructions adapted under license by [employer] (which disclaims liability or warranty for this information). If you have questions about a medical condition or this instruction, always ask your healthcare professional. [place] [address] any warranty or liability for your use of this information.

## 2022-03-03 ENCOUNTER — OFFICE VISIT (OUTPATIENT)
Dept: ORTHOPEDIC SURGERY | Age: 77
End: 2022-03-03
Payer: MEDICARE

## 2022-03-03 ENCOUNTER — TELEPHONE (OUTPATIENT)
Dept: ORTHOPEDIC SURGERY | Age: 77
End: 2022-03-03

## 2022-03-03 VITALS
WEIGHT: 158 LBS | BODY MASS INDEX: 26.29 KG/M2 | HEART RATE: 96 BPM | OXYGEN SATURATION: 96 % | TEMPERATURE: 97.3 F | RESPIRATION RATE: 19 BRPM

## 2022-03-03 DIAGNOSIS — Z98.1 HISTORY OF THORACIC SPINAL FUSION: ICD-10-CM

## 2022-03-03 DIAGNOSIS — M48.062 SPINAL STENOSIS OF LUMBAR REGION WITH NEUROGENIC CLAUDICATION: ICD-10-CM

## 2022-03-03 DIAGNOSIS — R26.9 GAIT ABNORMALITY: ICD-10-CM

## 2022-03-03 DIAGNOSIS — M62.838 MUSCLE SPASM: ICD-10-CM

## 2022-03-03 DIAGNOSIS — M54.50 ACUTE MIDLINE LOW BACK PAIN WITHOUT SCIATICA: Primary | ICD-10-CM

## 2022-03-03 DIAGNOSIS — M51.36 DDD (DEGENERATIVE DISC DISEASE), LUMBAR: ICD-10-CM

## 2022-03-03 PROCEDURE — G8427 DOCREV CUR MEDS BY ELIG CLIN: HCPCS | Performed by: PHYSICAL MEDICINE & REHABILITATION

## 2022-03-03 PROCEDURE — G8536 NO DOC ELDER MAL SCRN: HCPCS | Performed by: PHYSICAL MEDICINE & REHABILITATION

## 2022-03-03 PROCEDURE — 99214 OFFICE O/P EST MOD 30 MIN: CPT | Performed by: PHYSICAL MEDICINE & REHABILITATION

## 2022-03-03 PROCEDURE — 72110 X-RAY EXAM L-2 SPINE 4/>VWS: CPT | Performed by: PHYSICAL MEDICINE & REHABILITATION

## 2022-03-03 PROCEDURE — G8419 CALC BMI OUT NRM PARAM NOF/U: HCPCS | Performed by: PHYSICAL MEDICINE & REHABILITATION

## 2022-03-03 PROCEDURE — 1101F PT FALLS ASSESS-DOCD LE1/YR: CPT | Performed by: PHYSICAL MEDICINE & REHABILITATION

## 2022-03-03 PROCEDURE — 1090F PRES/ABSN URINE INCON ASSESS: CPT | Performed by: PHYSICAL MEDICINE & REHABILITATION

## 2022-03-03 PROCEDURE — G8432 DEP SCR NOT DOC, RNG: HCPCS | Performed by: PHYSICAL MEDICINE & REHABILITATION

## 2022-03-03 PROCEDURE — G8399 PT W/DXA RESULTS DOCUMENT: HCPCS | Performed by: PHYSICAL MEDICINE & REHABILITATION

## 2022-03-03 PROCEDURE — G8756 NO BP MEASURE DOC: HCPCS | Performed by: PHYSICAL MEDICINE & REHABILITATION

## 2022-03-03 RX ORDER — HYDROCODONE BITARTRATE AND ACETAMINOPHEN 7.5; 325 MG/1; MG/1
1 TABLET ORAL
Qty: 28 TABLET | Refills: 0 | Status: SHIPPED | OUTPATIENT
Start: 2022-03-03 | End: 2022-03-10

## 2022-03-03 RX ORDER — HYDROCODONE BITARTRATE AND ACETAMINOPHEN 5; 325 MG/1; MG/1
1 TABLET ORAL
Qty: 28 TABLET | Refills: 0 | Status: CANCELLED | OUTPATIENT
Start: 2022-03-03 | End: 2022-03-10

## 2022-03-03 RX ORDER — FLUORIDE (SODIUM) 1.1 %
PASTE (ML) DENTAL
COMMUNITY
Start: 2022-01-26 | End: 2022-06-10

## 2022-03-03 NOTE — TELEPHONE ENCOUNTER
Patient states she woke up at the beginning of the week with horrible back pain. She has been using ice and its not helping. She was just seen on 2-2-22 and it is a lot worse. States her pain level is a 10.

## 2022-03-03 NOTE — PATIENT INSTRUCTIONS
Herniated Disc: Exercises  Introduction  Here are some examples of exercises for you to try. The exercises may be suggested for a condition or for rehabilitation. Start each exercise slowly. Ease off the exercises if you start to have pain. You will be told when to start these exercises and which ones will work best for you. How to stay safe  These exercises can help you move easier and feel better. But when you first start doing them, you may have more pain in your back. This is normal. But it is important to pay close attention to your pain during and after each exercise. · Keep doing these exercises if your pain stays the same or moves from your leg and buttock more toward the middle of your spine. Pain moving out of your leg and buttock is a good sign. · Stop doing these exercises if your pain gets worse in your leg and buttock. Stop if you start to have pain in your leg and buttock that you didn't have before. Be sure to do these exercises in the order they appear. Note how your pain changes before you move to the next one. If your pain is much worse right after exercise and stays worse the next day, do not do any of these exercises. How to do the exercises  1. Rest on belly    1. Lie on your stomach, with your head turned to the side. 2. Try to relax your lower back muscles as much as you can. 3. Continue to lie on your stomach for 2 minutes. · Keep your arms beside your body. · If that position bothers your neck, place your hands, one on top of the other, underneath your forehead. This will help support your head and neck. If lying in this position causes or increases pain down your leg, stop this exercise and do not do the next exercises. 2. Press-up back extension    1. Lie on your stomach, with your face down and your elbows tucked into your sides and under your shoulders. 2. Press your elbows down into the floor to raise your upper back.   3. Hold this position for 15 to 30 seconds. 4. Repeat 2 to 4 times. · As you do this, relax your stomach muscles and allow your back to arch without using your back muscles. · Let your low back relax completely as you arch up. Don't let your hips or pelvis come off the floor. Then relax, and return to the start position. Over time, work up to staying in the press-up position for up to 2 minutes. If lying in this position causes or increases pain down your leg, stop this exercise and do not do the next exercises. 3. Full press-up back extension    1. Lie on your stomach with your face down, keeping your elbows tucked into your sides and under your shoulders. 2. Straighten your elbows, and push your upper body up as far as you can. 3. Hold this position for 5 seconds, and then relax. 4. Repeat 10 times. Allow your lower back to sag. Keep your hips, pelvis, and legs relaxed. Each time, try to raise your upper body a little higher and hold your arms a bit straighter. If lying in this position causes or increases pain down your leg, stop this exercise and do not do the next exercises. If you can't do this exercise, you may instead try the backward bend exercise that follows. 4. Backward bend    1. Stand with your feet hip-width apart, and don't lock your knees. 2. Place your hands in the small of your back. 3. Bend backward as far as you can, keeping your knees straight. 4. Repeat 2 to 4 times. Your toes should be pointing forward. Hold this position for 2 to 3 seconds. Then return to your starting position. Each time, try to bend backward a little farther, until you bend backward as far as you can. If standing in this position causes or increases pain down your leg, stop this exercise. Follow-up care is a key part of your treatment and safety. Be sure to make and go to all appointments, and call your doctor if you are having problems. It's also a good idea to know your test results and keep a list of the medicines you take.   Where can you learn more? Go to http://www.gray.com/  Enter Z594 in the search box to learn more about \"Herniated Disc: Exercises. \"  Current as of: July 1, 2021               Content Version: 13.0  © 7069-2520 [employer]. Care instructions adapted under license by [employer] (which disclaims liability or warranty for this information). If you have questions about a medical condition or this instruction, always ask your healthcare professional. [place] [address] any warranty or liability for your use of this information.

## 2022-03-03 NOTE — TELEPHONE ENCOUNTER
Patient called stating that she would like the providers advice as to what to do with the back pain she woke up with. She asked if she needed to come in to see the provider, go to the er, or take medication.  Please advise patient at [phone]

## 2022-03-03 NOTE — PROGRESS NOTES
[facility]  [address] [facility], [facility]   Phone: [phone]  Fax: [phone]    Pt's YOB: 1945    ASSESSMENT   Diagnoses and all orders for this visit:    1. Acute midline low back pain without sciatica  -     AMB POC XRAY, SPINE, LUMBOSACRAL; 4+  -     MRI LUMB SPINE W CONT; Future  -     HYDROcodone-acetaminophen (Norco) 7.5-325 mg per tablet; Take 1 Tablet by mouth every six (6) hours as needed (severe pain) for up to 7 days. Max Daily Amount: 4 Tablets. 2. Spinal stenosis of lumbar region with neurogenic claudication  -     MRI LUMB SPINE W CONT; Future    3. Muscle spasm  -     MRI LUMB SPINE W CONT; Future    4. Gait abnormality  -     MRI LUMB SPINE W CONT; Future    5. DDD (degenerative disc disease), lumbar  -     MRI LUMB SPINE W CONT; Future    6. History of thoracic spinal fusion  -     MRI LUMB SPINE W CONT; Future         IMPRESSION AND PLAN:  Joselin Falcon is a 68 y.o. female with history of lumbar pain. Pt complains of severe diffuse lumbar pain, exacerbated with sitting, transitioning from sit to stand, and getting into bed and relieved with sitting in a recliner. Pt is taking Neurontin 300 mg 1 cap QAM and 3 caps QHS with relief, Pamelor 10 mg 4 caps QHS, and prednisone 5 mg 1 tab daily. 1) Pt was given information on herniated disc exercises. 2) Lumbar spine 4V x-rays were obtained at today's office visit and reviewed with the patient. 3) A lumbar MRI was ordered to assess for acute disc herniation. 4) Pt was prescribed an acute course of Norco 7.5-325 mg 1 tab every 6 hours as needed for severe pain. I recommended the pt take a stool softener to mitigate constipation with the medication. 5) Ms. Lina Reyes has a reminder for a \"due or due soon\" health maintenance. I have asked that she contact her primary care provider, Zainab Nava MD, for follow-up on this health maintenance.   6)  demonstrated consistency with prescribing. Follow-up and Dispositions    · Return in about 2 weeks (around 3/17/2022) for dignostic test and medication follow-up, Diagnostic Test follow up, Medication follow up. HISTORY OF PRESENT ILLNESS:  Lou Hernandez is a 68 y.o. female with history of lumbar pain and presents to the office today for medication follow up, accompanied by her . Pt complains of severe diffuse lumbar pain, exacerbated with sitting, transitioning from sit to stand, and getting into bed and relieved with sitting in a recliner. She denies any leg weakness or pain radiating down the legs at this time. She denies any loss of bowel/bladder control. Pt's  notes that pt recently slipped, but did not fall, on the steps while taking out her garbage can. She notes that she has used a topical cream (\"starts with C\") on her lower back with relief. Pt is taking Neurontin 300 mg 1 cap QAM and 3 caps QHS with relief, Pamelor 10 mg 4 caps QHS, and prednisone 5 mg 1 tab daily. She denies any medication allergies. Pt at this time desires to proceed with a lumbar MRI and medication evaluation. Of note, pt's  underwent L2-L5 fusion by Dr. Alysha Ball on 02/07/2022. Pain Scale: 5/10    PCP: Karen Zavala MD     Past Medical History:   Diagnosis Date    Acid reflux     Hypertension         Social History     Socioeconomic History    Marital status:      Spouse name: Not on file    Number of children: Not on file    Years of education: Not on file    Highest education level: Not on file   Occupational History    Not on file   Tobacco Use    Smoking status: Never Smoker    Smokeless tobacco: Never Used   Substance and Sexual Activity    Alcohol use:  Yes     Alcohol/week: 1.0 standard drink     Types: 1 Glasses of wine per week    Drug use: No    Sexual activity: Yes     Partners: Male     Birth control/protection: None   Other Topics Concern    Not on file   Social History Narrative    Not on file     Social Determinants of Health     Financial Resource Strain:     Difficulty of Paying Living Expenses: Not on file   Food Insecurity:     Worried About Running Out of Food in the Last Year: Not on file    Consuelo of Food in the Last Year: Not on file   Transportation Needs:     Lack of Transportation (Medical): Not on file    Lack of Transportation (Non-Medical): Not on file   Physical Activity:     Days of Exercise per Week: Not on file    Minutes of Exercise per Session: Not on file   Stress:     Feeling of Stress : Not on file   Social Connections:     Frequency of Communication with Friends and Family: Not on file    Frequency of Social Gatherings with Friends and Family: Not on file    Attends Tenriism Services: Not on file    Active Member of [address] [place] [address] [facility] or Organizations: Not on file    Attends Club or Organization Meetings: Not on file    Marital Status: Not on file   Intimate Partner Violence:     Fear of Current or Ex-Partner: Not on file    Emotionally Abused: Not on file    Physically Abused: Not on file    Sexually Abused: Not on file   Housing Stability:     Unable to Pay for Housing in the Last Year: Not on file    Number of [place] in the Last Year: Not on file    Unstable Housing in the Last Year: Not on file       Current Outpatient Medications   Medication Sig Dispense Refill    HYDROcodone-acetaminophen (Norco) 7.5-325 mg per tablet Take 1 Tablet by mouth every six (6) hours as needed (severe pain) for up to 7 days. Max Daily Amount: 4 Tablets. 28 Tablet 0    gabapentin (Neurontin) 300 mg capsule 1 in the am and 3 in the evening as directed  Indications: neuropathic pain 360 Capsule 1    nortriptyline (PAMELOR) 10 mg capsule Take 4 Capsules by mouth nightly. 360 Capsule 1    predniSONE (DELTASONE) 5 mg tablet Take 1 Tablet by mouth daily. 30 Tablet 2    alpha lipoic acid 200 mg cap Take 1 Cap by mouth daily.       cholecalciferol (VITAMIN D3) 1,000 unit cap Take 1 Cap by mouth daily.  MAGNESIUM OXIDE PO Take 1 Tab by mouth daily.  omeprazole (PRILOSEC) 40 mg capsule Take 40 mg by mouth daily.  losartan (COZAAR) 25 mg tablet Take 25 mg by mouth daily.  hydroCHLOROthiazide (HYDRODIURIL) 25 mg tablet Take 25 mg by mouth daily.  LORazepam (ATIVAN) 1 mg tablet Take 1 mg by mouth every eight (8) hours as needed.  atorvastatin (LIPITOR) 10 mg tablet Take 10 mg by mouth daily.  calcium carbonate (CALTREX) 600 mg calcium (1,500 mg) tablet Take 600 mg by mouth daily.  PreviDent 5000 Booster Plus 1.1 % pste          No Known Allergies      REVIEW OF SYSTEMS    Constitutional: Negative for fever, chills, or weight change. Respiratory: Negative for cough or shortness of breath. Cardiovascular: Negative for chest pain or palpitations. Gastrointestinal: Negative for acid reflux, change in bowel habits, or constipation. Genitourinary: Negative for dysuria and flank pain. Musculoskeletal: Positive for lumbar pain. Skin: Negative for rash. Neurological: Negative for headaches, dizziness, or numbness. Endo/Heme/Allergies: Negative for increased bruising. Psychiatric/Behavioral: Negative for difficulty with sleep. As per HPI    PHYSICAL EXAMINATION  Visit Vitals  Pulse 96   Temp 97.3 °F (36.3 °C)   Resp 19   Wt 158 lb (71.7 kg)   SpO2 96%   BMI 26.29 kg/m²       Constitutional: Awake, alert, and in no acute distress. Neurological:  Sensation to light touch is intact. Negative Mckeon's sign bilaterally. Skin: warm, dry, and intact. Musculoskeletal: Tenderness to palpation over the lower lumbar region diffusely. Improvement with extension. Midline upper lumbar pain with axial loading. Improvement with forward flexion. No pain with internal or external rotation of her hips. Negative straight leg raise bilaterally.      Hip Flex  Quads Hamstrings Ankle DF EHL Ankle PF   Right  +4/5 +4/5 +4/5 +4/5 +4/5 +4/5   Left +4/5 +4/5 +4/5 +4/5 +4/5 +4/5     IMAGING:    Lumbar spine 4V x-rays from 03/03/2022 were personally reviewed with the patient and demonstrated:    Thoracloumbar fusion. Multilevel degenerative fact. Severe degenerative disc L3-4, L2-3. Atherosclerosis.      Thoracic spine 2V x-rays from 11/23/2020 were personally reviewed with the patient and demonstrated:  Thoracolumbar fusion.        Lumbar spine MRI from 12/20/2020 was personally reviewed with the patient and demonstrated:           Results from [place] encounter on 12/10/20   MRI LUMB SPINE WO CONT     Narrative EXAM: MRI LUMB SPINE WO CONT     CLINICAL INDICATIONS/HISTORY: Right leg pain walking difficulties prior back  surgeries     COMPARISON: MRI performed May, 2019     Technique: Multi-sequence multiplanar T1, T2, STIR imaging with and without fat  saturation obtained centered on the lumbar spine.      FINDINGS:   Large metal susceptibility artifacts at T10 and L2 consistent with extensive  posterior spinal fixation at these levels. Alignment: Intact lordosis  Vertebral body height: Normal  Marrow signal: Unremarkable  Disc spaces: Preserved height and signal intensity  Conus: Terminates at T12-L1     Axial imaging correlation:           L1-2: Patent canal and foramina.     L2-3: There is a mild annular bulge across the midline. The level is severely  affected by susceptibility artifact secondary to posterior fixation devices. Doubt any high-grade stenosis is present.     L3-4: Central moderate sized protrusion herniation extends into the caudal  aspect of the neuroforamina. There is compression of the exiting right L3 nerve  root. Central canal is borderline stenotic 0.94 cm. Left neuroforamina nonstenotic. Facets reflect mild arthritic changes more  accelerated on the right than left side.     L4-5: High-grade canal stenosis AP dimension of the canal 0.78 cm secondary to  facet degenerative changes and hypertrophic response.  There is also subluxation  of L4 on 5 of about 5 mm. There is encroachment into the right neuroforamina because of the hypertrophic  changes in the facet. Similar changes seen on the left. Exiting L4 roots may be  intermittently compressed. The anterior subluxation has increased in severity at  this level from the 2019 exam     L5-S1: Patent canal and foramina.     Other structures: Unremarkable.           Impression IMPRESSION:     There is central canal stenosis at L4-5. Some of the degenerative change  including anterior subluxation has progressed from 2019     L3-4 demonstrates herniation with borderline central canal stenosis  Right neuroforamina demonstrate compression of the exiting L4 and L3 nerve roots                Written by Kelly Nicole, as dictated by Tom Dominguez MD.  I, Dr. Tom Dominguez confirm that all documentation is accurate.

## 2022-03-03 NOTE — TELEPHONE ENCOUNTER
Dr Candelario Noriega called patient and discussed this with her.   Patient has been added to today's schedule

## 2022-03-04 DIAGNOSIS — M54.50 ACUTE MIDLINE LOW BACK PAIN WITHOUT SCIATICA: ICD-10-CM

## 2022-03-04 DIAGNOSIS — M62.838 MUSCLE SPASM: ICD-10-CM

## 2022-03-04 DIAGNOSIS — Z98.1 HISTORY OF THORACIC SPINAL FUSION: ICD-10-CM

## 2022-03-04 DIAGNOSIS — M51.36 DDD (DEGENERATIVE DISC DISEASE), LUMBAR: ICD-10-CM

## 2022-03-04 DIAGNOSIS — R26.9 GAIT ABNORMALITY: ICD-10-CM

## 2022-03-07 ENCOUNTER — HOSPITAL ENCOUNTER (OUTPATIENT)
Age: 77
Discharge: HOME | End: 2022-03-07
Attending: PHYSICAL MEDICINE & REHABILITATION
Payer: MEDICARE

## 2022-03-07 DIAGNOSIS — Z98.1 HISTORY OF THORACIC SPINAL FUSION: ICD-10-CM

## 2022-03-07 DIAGNOSIS — M51.36 DDD (DEGENERATIVE DISC DISEASE), LUMBAR: ICD-10-CM

## 2022-03-07 DIAGNOSIS — M62.838 MUSCLE SPASM: ICD-10-CM

## 2022-03-07 DIAGNOSIS — M54.50 ACUTE MIDLINE LOW BACK PAIN WITHOUT SCIATICA: ICD-10-CM

## 2022-03-07 DIAGNOSIS — R26.9 GAIT ABNORMALITY: ICD-10-CM

## 2022-03-07 LAB — CREAT UR-MCNC: 0.8 MG/DL (ref 0.6–1.3)

## 2022-03-07 PROCEDURE — A9575 INJ GADOTERATE MEGLUMI 0.1ML: HCPCS | Performed by: PHYSICAL MEDICINE & REHABILITATION

## 2022-03-07 PROCEDURE — 74011250636 HC RX REV CODE- 250/636: Performed by: PHYSICAL MEDICINE & REHABILITATION

## 2022-03-07 PROCEDURE — 72158 MRI LUMBAR SPINE W/O & W/DYE: CPT

## 2022-03-07 PROCEDURE — 82565 ASSAY OF CREATININE: CPT

## 2022-03-07 RX ADMIN — GADOTERATE MEGLUMINE 15 ML: 376.9 INJECTION INTRAVENOUS at 08:16

## 2022-03-09 ENCOUNTER — OFFICE VISIT (OUTPATIENT)
Dept: ORTHOPEDIC SURGERY | Age: 77
End: 2022-03-09
Payer: MEDICARE

## 2022-03-09 VITALS
SYSTOLIC BLOOD PRESSURE: 116 MMHG | OXYGEN SATURATION: 97 % | RESPIRATION RATE: 16 BRPM | DIASTOLIC BLOOD PRESSURE: 63 MMHG | BODY MASS INDEX: 26.49 KG/M2 | TEMPERATURE: 96.8 F | HEART RATE: 102 BPM | WEIGHT: 159 LBS | HEIGHT: 65 IN

## 2022-03-09 DIAGNOSIS — M43.25 FUSION OF SPINE OF THORACOLUMBAR REGION: ICD-10-CM

## 2022-03-09 DIAGNOSIS — M47.816 LUMBAR FACET ARTHROPATHY: ICD-10-CM

## 2022-03-09 DIAGNOSIS — M62.838 MUSCLE SPASM: ICD-10-CM

## 2022-03-09 DIAGNOSIS — M48.062 SPINAL STENOSIS OF LUMBAR REGION WITH NEUROGENIC CLAUDICATION: ICD-10-CM

## 2022-03-09 DIAGNOSIS — M54.50 ACUTE MIDLINE LOW BACK PAIN WITHOUT SCIATICA: Primary | ICD-10-CM

## 2022-03-09 PROCEDURE — G8419 CALC BMI OUT NRM PARAM NOF/U: HCPCS | Performed by: PHYSICAL MEDICINE & REHABILITATION

## 2022-03-09 PROCEDURE — G8427 DOCREV CUR MEDS BY ELIG CLIN: HCPCS | Performed by: PHYSICAL MEDICINE & REHABILITATION

## 2022-03-09 PROCEDURE — G8752 SYS BP LESS 140: HCPCS | Performed by: PHYSICAL MEDICINE & REHABILITATION

## 2022-03-09 PROCEDURE — G8399 PT W/DXA RESULTS DOCUMENT: HCPCS | Performed by: PHYSICAL MEDICINE & REHABILITATION

## 2022-03-09 PROCEDURE — G8536 NO DOC ELDER MAL SCRN: HCPCS | Performed by: PHYSICAL MEDICINE & REHABILITATION

## 2022-03-09 PROCEDURE — 1090F PRES/ABSN URINE INCON ASSESS: CPT | Performed by: PHYSICAL MEDICINE & REHABILITATION

## 2022-03-09 PROCEDURE — 99214 OFFICE O/P EST MOD 30 MIN: CPT | Performed by: PHYSICAL MEDICINE & REHABILITATION

## 2022-03-09 PROCEDURE — G8432 DEP SCR NOT DOC, RNG: HCPCS | Performed by: PHYSICAL MEDICINE & REHABILITATION

## 2022-03-09 PROCEDURE — 1101F PT FALLS ASSESS-DOCD LE1/YR: CPT | Performed by: PHYSICAL MEDICINE & REHABILITATION

## 2022-03-09 PROCEDURE — G8754 DIAS BP LESS 90: HCPCS | Performed by: PHYSICAL MEDICINE & REHABILITATION

## 2022-03-09 RX ORDER — PREDNISONE 10 MG/1
TABLET ORAL
Qty: 32 TABLET | Refills: 0 | Status: SHIPPED
Start: 2022-03-09 | End: 2022-06-10

## 2022-03-09 RX ORDER — LIDOCAINE 50 MG/G
1 PATCH TOPICAL EVERY 24 HOURS
Qty: 30 EACH | Refills: 1 | OUTPATIENT
Start: 2022-03-09 | End: 2022-05-30

## 2022-03-09 NOTE — PROGRESS NOTES
Chief Complaint   Patient presents with    Follow-up     mri       Pt preferred language for health care discussion is english. Is someone accompanying this pt?     Is the patient using any DME equipment during [address]? no    Depression Screening:  3 most recent [facility] Screens 12/14/2020 11/23/2020 10/16/2020 10/16/2020 7/14/2020 7/23/2019 5/15/2019   Little interest or pleasure in doing things Not at all Not at all Not at all Not at all Not at all Not at all Not at all   Feeling down, depressed, irritable, or hopeless Not at all Not at all Not at all Not at all Not at all Not at all Not at all   Total Score PHQ 2 0 0 0 0 0 0 0       Learning Assessment:  Learning Assessment 7/23/2019   PRIMARY LEARNER Patient   HIGHEST LEVEL OF EDUCATION - PRIMARY LEARNER  GRADUATED HIGH SCHOOL OR GED   BARRIERS PRIMARY LEARNER NONE   CO-LEARNER CAREGIVER No   PRIMARY LANGUAGE ENGLISH   LEARNER PREFERENCE PRIMARY DEMONSTRATION     READING     VIDEOS   ANSWERED BY patient   RELATIONSHIP SELF       Abuse Screening:  Abuse Screening Questionnaire 11/23/2020 7/23/2019   Do you ever feel afraid of your partner? N N   Are you in a relationship with someone who physically or mentally threatens you? N N   Is it safe for you to go home? Y Y       Fall Risk  Fall Risk Assessment, last 12 mths 2/15/2021   Able to walk? Yes   Fall in past 12 months? 0   Do you feel unsteady? 0   Are you worried about falling 0           Advance Directive:  1. Do you have an advance directive in place? Patient Reply:no    2. If not, would you like material regarding how to put one in place? Patient Reply: no    2. Per patient no changes to their ACP contact no. Coordination of Care:  1. Have you been to the ER, urgent care clinic since your last visit? Hospitalized since your last visit? no    2. Have you seen or consulted any other health care providers outside of the [address] [place] [address] since your last visit?  Include any pap smears or colon screening.  no

## 2022-03-09 NOTE — PROGRESS NOTES
[facility]  [address] [place], [facility] [address]  Phone: [phone]  Fax: [phone]    Pt's YOB: 1945    ASSESSMENT   Diagnoses and all orders for this visit:    1. Acute midline low back pain without sciatica  -     predniSONE (DELTASONE) 10 mg tablet; 6 pills Day 1, 5 pills Day 2, 4 pills Day 3&4, 3 pills Day 5&6, 2 pills Day 7&8, 1 pill Day 9&10, 1/2 pill Day 11&12  -     lidocaine (LIDODERM) 5 %; 1 Patch by TransDERmal route every twenty-four (24) hours. Apply patch to the affected area for 12 hours a day and remove for 12 hours a day. 2. Lumbar facet arthropathy  -     lidocaine (LIDODERM) 5 %; 1 Patch by TransDERmal route every twenty-four (24) hours. Apply patch to the affected area for 12 hours a day and remove for 12 hours a day. 3. Spinal stenosis of lumbar region with neurogenic claudication    4. Muscle spasm    5. Fusion of spine of thoracolumbar region         IMPRESSION AND PLAN:  Rosalynd Krabbe is a 68 y.o. female with history of lumbar pain. She complains of lumbar pain exacerbated with rising from bed in the mornings. Pt is taking Neurontin 300 mg 1 cap QAM and 3 caps QHS with relief, Norco 5-325 mg 1 tab QAM and 1 tab QHS as needed with temporary relief, Pamelor 10 mg 4 caps QHS, and prednisone 5 mg 1 tab daily. 1) Pt was given information on lumbar arthritis and herniated disc exercises. 2) Discussed treatment options with the patient including medications, radiofrequency ablation, and surgical intervention. 3) Pt was prescribed a large prednisone taper for acute inflammatory pain. Pt was advised to discontinue her daily 5 mg prednisone while taking the 10 mg tabs. 4) Pt was prescribed lidocaine 5% patches 1 patch every 24 hours (12 hours on, 12 hours off). Pt was advised not to use heat and topical treatments on the same area concurrently. 5) Pt was given information on lumbar spinal stenosis.   6) I recommended the pt increase her physical activity as her symptoms allow and try a seated pedal exerciser. 7)  I advised the pt to wear a lumbar support at home. 8) I recommended the pt follow up with Dr. Nohemi Vance regarding her lumbar pain-- she defers at this time. 9) Ms. Carmella Piedra has a reminder for a \"due or due soon\" health maintenance. I have asked that she contact her primary care provider, Richardson Gonsales MD, for follow-up on this health maintenance. 10)  demonstrated consistency with prescribing. 11) Lifting and activities discussed extensively   Follow-up and Dispositions    · Return in about 4 weeks (around 4/6/2022) for Medication follow up. HISTORY OF PRESENT ILLNESS:  Cliff Ly is a 68 y.o. female with history of lumbar pain and presents to the office today for MRI follow up, accompanied by her . Pt complains of lumbar pain exacerbated with rising from bed in the mornings. Pt's  states that pt's pain is exacerbated to 9-10/10 with changing positions x 3-4 minutes. She confirms that her pain began while moving a large garbage can. Pt also notes that her  has told her she walks in a forward-flexed position. She reports that she previously enrolled in aquaticy physical therapy without relief. She also mentions that she has a lumbar support at home and uses a heating pad at home with relief. Pt is taking Neurontin 300 mg 1 cap QAM and 3 caps QHS with relief, Norco 5-325 mg 1 tab QAM and 1 tab QHS as needed with temporary relief, Pamelor 10 mg 4 caps QHS, and prednisone 5 mg 1 tab daily. She also reports total relief of her leg symptoms since beginning prednisone. She states she is not interested in surgical intervention at this time. Pt at this time desires to proceed with medication evaluation.     Pain Scale: 10 - Worst pain ever/10    PCP: Richardson Gonsales MD     Past Medical History:   Diagnosis Date    Acid reflux     Hypertension         Social History Socioeconomic History    Marital status:      Spouse name: Not on file    Number of children: Not on file    Years of education: Not on file    Highest education level: Not on file   Occupational History    Not on file   Tobacco Use    Smoking status: Never Smoker    Smokeless tobacco: Never Used   Substance and Sexual Activity    Alcohol use: Yes     Alcohol/week: 1.0 standard drink     Types: 1 Glasses of wine per week    Drug use: No    Sexual activity: Yes     Partners: Male     Birth control/protection: None   Other Topics Concern    Not on file   Social History Narrative    Not on file     Social Determinants of Health     Financial Resource Strain:     Difficulty of Paying Living Expenses: Not on file   Food Insecurity:     Worried About Running Out of Food in the Last Year: Not on file    Consuelo of Food in the Last Year: Not on file   Transportation Needs:     Lack of Transportation (Medical): Not on file    Lack of Transportation (Non-Medical):  Not on file   Physical Activity:     Days of Exercise per Week: Not on file    Minutes of Exercise per Session: Not on file   Stress:     Feeling of Stress : Not on file   Social Connections:     Frequency of Communication with Friends and Family: Not on file    Frequency of Social Gatherings with Friends and Family: Not on file    Attends Congregational Services: Not on file    Active Member of [address] [place] [address] [employer] or Organizations: Not on file    Attends Club or Organization Meetings: Not on file    Marital Status: Not on file   Intimate Partner Violence:     Fear of Current or Ex-Partner: Not on file    Emotionally Abused: Not on file    Physically Abused: Not on file    Sexually Abused: Not on file   Housing Stability:     Unable to Pay for Housing in the Last Year: Not on file    Number of [place] in the Last Year: Not on file    Unstable Housing in the Last Year: Not on file       Current Outpatient Medications   Medication Sig Dispense Refill    predniSONE (DELTASONE) 10 mg tablet 6 pills Day 1, 5 pills Day 2, 4 pills Day 3&4, 3 pills Day 5&6, 2 pills Day 7&8, 1 pill Day 9&10, 1/2 pill Day 11&12 32 Tablet 0    lidocaine (LIDODERM) 5 % 1 Patch by TransDERmal route every twenty-four (24) hours. Apply patch to the affected area for 12 hours a day and remove for 12 hours a day. 30 Each 1    PreviDent 5000 Booster Plus 1.1 % pste       gabapentin (Neurontin) 300 mg capsule 1 in the am and 3 in the evening as directed  Indications: neuropathic pain 360 Capsule 1    nortriptyline (PAMELOR) 10 mg capsule Take 4 Capsules by mouth nightly. 360 Capsule 1    predniSONE (DELTASONE) 5 mg tablet Take 1 Tablet by mouth daily. 30 Tablet 2    alpha lipoic acid 200 mg cap Take 1 Cap by mouth daily.  cholecalciferol (VITAMIN D3) 1,000 unit cap Take 1 Cap by mouth daily.  MAGNESIUM OXIDE PO Take 1 Tab by mouth daily.  omeprazole (PRILOSEC) 40 mg capsule Take 40 mg by mouth daily.  losartan (COZAAR) 25 mg tablet Take 25 mg by mouth daily.  hydroCHLOROthiazide (HYDRODIURIL) 25 mg tablet Take 25 mg by mouth daily.  LORazepam (ATIVAN) 1 mg tablet Take 1 mg by mouth every eight (8) hours as needed.  atorvastatin (LIPITOR) 10 mg tablet Take 10 mg by mouth daily.  calcium carbonate (CALTREX) 600 mg calcium (1,500 mg) tablet Take 600 mg by mouth daily. No Known Allergies      REVIEW OF SYSTEMS    Constitutional: Negative for fever, chills, or weight change. Respiratory: Negative for cough or shortness of breath. Cardiovascular: Negative for chest pain or palpitations. Gastrointestinal: Negative for acid reflux, change in bowel habits, or constipation. Genitourinary: Negative for dysuria and flank pain. Musculoskeletal: Positive for lumbar pain. Skin: Negative for rash. Neurological: Negative for headaches, dizziness, or numbness. Endo/Heme/Allergies: Negative for increased bruising. Psychiatric/Behavioral: Negative for difficulty with sleep. As per HPI    PHYSICAL EXAMINATION  Visit Vitals  /63 (BP 1 Location: Right arm, BP Patient Position: Sitting, BP Cuff Size: Adult)   Pulse (!) 102   Temp 96.8 °F (36 °C) (Tympanic)   Resp 16   Ht 5' 5\" (1.651 m)   Wt 159 lb (72.1 kg)   SpO2 97%   BMI 26.46 kg/m²       Constitutional: Awake, alert, and in no acute distress. Neurological: 1+ symmetrical DTRs in the upper extremities. 1+ symmetrical DTRs in the lower extremities. Sensation to light touch is intact. Negative Mckeon's sign bilaterally. Skin: warm, dry, and intact. Musculoskeletal: Midline inferior lumbar pain with transitioning from sit to stand. No pain with extension, axial loading, or forward flexion. No pain with internal or external rotation of her hips. Negative straight leg raise bilaterally. Hip Flex  Quads Hamstrings Ankle DF EHL Ankle PF   Right +4/5 +4/5 +4/5 +4/5 +4/5 +4/5   Left +4/5 +4/5 +4/5 +4/5 +4/5 +4/5     IMAGING:    Lumbar spine MRI from 03/07/2022 was personally reviewed with the patient and her  and demonstrated:    Severe hardware artifact at L2-L3, and lower thoracic segment. Images through  these levels are nondiagnostic.     Grade 1 anterolisthesis L4 on L5. No compression deformity.     Edema in the superior endplate of L3 and inferior endplate of L2.     B80-P5 and L1-L2: No suspected disc herniation or central stenosis. No foraminal  stenosis.     L2-L3: Nondiagnostic     L3-L4: Similar posterior disc bulge with endplate spondylosis. Facet and  ligamentous hypertrophy, worse on the right. Moderately severe central stenosis. AP canal measures 6 mm Severe right foraminal stenosis with compression of right  exiting L3 nerve root. Mild left foraminal stenosis.     L4-L5: Posterior disc bulge. More severe facet and ligamentous hypertrophy. Facet inflammation with left facet joint effusion. Severe central stenosis. AP  canal measures 4.3 mm. Severe bilateral foraminal stenosis with compression of  exiting L4 nerve roots bilaterally. Grossly stable     L5-S1: No disc herniation. No significant central stenosis. Mild facet  hypertrophy with no foraminal stenosis.     IMPRESSION  1. Central stenosis worst at L4-L5, followed by L3-L4: Posterior disc  bulge/protrusion and facet/ligamentous hypertrophy. 2. Bilateral L4 foraminal stenosis with compression of exiting L4 nerve roots. 3. Right L3 foraminal stenosis with compression of right exiting L3 nerve root. Lumbar spine 4V x-rays from 03/03/2022 were personally reviewed with the patient and demonstrated:     Thoracloumbar fusion. Multilevel degenerative fact. Severe degenerative disc L3-4, L2-3. Atherosclerosis.      Thoracic spine 2V x-rays from 11/23/2020 were personally reviewed with the patient and demonstrated:  Thoracolumbar fusion.     Written by Rachel Hameed, as dictated by Will Saldana MD.  I, Dr. Will Saldana confirm that all documentation is accurate.

## 2022-03-09 NOTE — PATIENT INSTRUCTIONS
Low Back Arthritis: Exercises  Introduction  Here are some examples of typical rehabilitation exercises for your condition. Start each exercise slowly. Ease off the exercise if you start to have pain. Your doctor or physical therapist will tell you when you can start these exercises and which ones will work best for you. When you are not being active, find a comfortable position for rest. Some people are comfortable on the floor or a medium-firm bed with a small pillow under their head and another under their knees. Some people prefer to lie on their side with a pillow between their knees. Don't stay in one position for too long. Take short walks (10 to 20 minutes) every 2 to 3 hours. Avoid slopes, hills, and stairs until you feel better. Walk only distances you can manage without pain, especially leg pain. How to do the exercises  Pelvic tilt    1. Lie on your back with your knees bent. 2. \"Brace\" your stomach--tighten your muscles by pulling in and imagining your belly button moving toward your spine. 3. Press your lower back into the floor. You should feel your hips and pelvis rock back. 4. Hold for 6 seconds while breathing smoothly. 5. Relax and allow your pelvis and hips to rock forward. 6. Repeat 8 to 12 times. Back stretches    1. Get down on your hands and knees on the floor. 2. Relax your head and allow it to droop. Round your back up toward the ceiling until you feel a nice stretch in your upper, middle, and lower back. Hold this stretch for as long as it feels comfortable, or about 15 to 30 seconds. 3. Return to the starting position with a flat back while you are on your hands and knees. 4. Let your back sway by pressing your stomach toward the floor. Lift your buttocks toward the ceiling. 5. Hold this position for 15 to 30 seconds. 6. Repeat 2 to 4 times. Follow-up care is a key part of your treatment and safety.  Be sure to make and go to all appointments, and call your doctor if you are having problems. It's also a good idea to know your test results and keep a list of the medicines you take. Where can you learn more? Go to http://www.DoctorBase.com/  Enter T094 in the search box to learn more about \"Low Back Arthritis: Exercises. \"  Current as of: July 1, 2021               Content Version: 13.2  © 2006-2022 Ass[employer]. Care instructions adapted under license by [employer] (which disclaims liability or warranty for this information). If you have questions about a medical condition or this instruction, always ask your healthcare professional. [place] [address] any warranty or liability for your use of this information. Herniated Disc: Exercises  Introduction  Here are some examples of exercises for you to try. The exercises may be suggested for a condition or for rehabilitation. Start each exercise slowly. Ease off the exercises if you start to have pain. You will be told when to start these exercises and which ones will work best for you. How to stay safe  These exercises can help you move easier and feel better. But when you first start doing them, you may have more pain in your back. This is normal. But it is important to pay close attention to your pain during and after each exercise. · Keep doing these exercises if your pain stays the same or moves from your leg and buttock more toward the middle of your spine. Pain moving out of your leg and buttock is a good sign. · Stop doing these exercises if your pain gets worse in your leg and buttock. Stop if you start to have pain in your leg and buttock that you didn't have before. Be sure to do these exercises in the order they appear. Note how your pain changes before you move to the next one. If your pain is much worse right after exercise and stays worse the next day, do not do any of these exercises. How to do the exercises  1. Rest on belly    1.  Lie on your stomach, with your head turned to the side. 2. Try to relax your lower back muscles as much as you can. 3. Continue to lie on your stomach for 2 minutes. · Keep your arms beside your body. · If that position bothers your neck, place your hands, one on top of the other, underneath your forehead. This will help support your head and neck. If lying in this position causes or increases pain down your leg, stop this exercise and do not do the next exercises. 2. Press-up back extension    1. Lie on your stomach, with your face down and your elbows tucked into your sides and under your shoulders. 2. Press your elbows down into the floor to raise your upper back. 3. Hold this position for 15 to 30 seconds. 4. Repeat 2 to 4 times. · As you do this, relax your stomach muscles and allow your back to arch without using your back muscles. · Let your low back relax completely as you arch up. Don't let your hips or pelvis come off the floor. Then relax, and return to the start position. Over time, work up to staying in the press-up position for up to 2 minutes. If lying in this position causes or increases pain down your leg, stop this exercise and do not do the next exercises. 3. Full press-up back extension    1. Lie on your stomach with your face down, keeping your elbows tucked into your sides and under your shoulders. 2. Straighten your elbows, and push your upper body up as far as you can. 3. Hold this position for 5 seconds, and then relax. 4. Repeat 10 times. Allow your lower back to sag. Keep your hips, pelvis, and legs relaxed. Each time, try to raise your upper body a little higher and hold your arms a bit straighter. If lying in this position causes or increases pain down your leg, stop this exercise and do not do the next exercises. If you can't do this exercise, you may instead try the backward bend exercise that follows. 4. Backward bend    1.  Stand with your feet hip-width apart, and don't lock your knees. 2. Place your hands in the small of your back. 3. Bend backward as far as you can, keeping your knees straight. 4. Repeat 2 to 4 times. Your toes should be pointing forward. Hold this position for 2 to 3 seconds. Then return to your starting position. Each time, try to bend backward a little farther, until you bend backward as far as you can. If standing in this position causes or increases pain down your leg, stop this exercise. Follow-up care is a key part of your treatment and safety. Be sure to make and go to all appointments, and call your doctor if you are having problems. It's also a good idea to know your test results and keep a list of the medicines you take. Where can you learn more? Go to http://www.gray.com/  Enter Z594 in the search box to learn more about \"Herniated Disc: Exercises. \"  Current as of: July 1, 2021               Content Version: 13.2  © 2006-2022 [employer]!. Care instructions adapted under license by [employer] (which disclaims liability or warranty for this information). If you have questions about a medical condition or this instruction, always ask your healthcare professional. [place] [address] any warranty or liability for your use of this information. Lumbar Spinal Stenosis: Care Instructions  Your Care Instructions     Stenosis in the spine is a narrowing of the canal that is around the spinal cord and nerve roots in your back. It can happen as part of aging. Sometimes bone and other tissue grow into this canal and press on the nerves that branch out from the spinal cord. This can cause pain, numbness, and weakness. When it happens in the lower part of your back, it is called lumbar spinal stenosis. It can cause problems in the legs, feet, and rear end (buttocks). You may be able to get relief from the symptoms of spinal stenosis by taking pain medicine.  Your doctor may suggest physical therapy and exercises to keep your spine strong and flexible. Some people try steroid shots to reduce swelling. If pain and numbness in your legs are still so bad that you cannot do your normal activities, you may need surgery. Follow-up care is a key part of your treatment and safety. Be sure to make and go to all appointments, and call your doctor if you are having problems. It's also a good idea to know your test results and keep a list of the medicines you take. How can you care for yourself at home? · Take an over-the-counter pain medicine. Nonsteroidal anti-inflammatory drugs (NSAIDs) such as ibuprofen or naproxen seem to work best. But if you can't take NSAIDs, you can try acetaminophen. Be safe with medicines. Read and follow all instructions on the label. · Do not take two or more pain medicines at the same time unless the doctor told you to. Many pain medicines have acetaminophen, which is Tylenol. Too much acetaminophen (Tylenol) can be harmful. · Stay at a healthy weight. Being overweight puts extra strain on your spine. · Change positions often when you sit or stand. This can ease pain. It may also reduce pressure on the spinal cord and its nerves. · Avoid doing things that make your symptoms worse. Walking downhill and standing for a long time may cause pain. · Stretch and strengthen your back muscles as your doctor or physical therapist recommends. If your doctor says it is okay to do them, these exercises may help. ? Lie on your back with your knees bent. Gently pull one bent knee to your chest. Put that foot back on the floor, and then pull the other knee to your chest.  ? Do pelvic tilts. Lie on your back with your knees bent. Tighten your stomach muscles. Pull your belly button (navel) in and up toward your ribs. You should feel like your back is pressing to the floor and your hips and pelvis are slightly lifting off the floor.  Hold for 6 seconds while breathing smoothly. ? Stand with your back flat against a wall. Slowly slide down until your knees are slightly bent. Hold for 10 seconds, then slide back up the wall. · Remove or change anything in your house that may cause you to fall. Keep walkways clear of clutter, electrical cords, and throw rugs. When should you call for help? Call 911 anytime you think you may need emergency care. For example, call if:    · You are unable to move a leg at all. Call your doctor now or seek immediate medical care if:    · You have new or worse symptoms in your legs, belly, or buttocks. Symptoms may include:  ? Numbness or tingling. ? Weakness. ? Pain.     · You lose bladder or bowel control. Watch closely for changes in your health, and be sure to contact your doctor if:    · You have a fever, lose weight, or don't feel well.     · You are not getting better as expected. Where can you learn more? Go to http://www.gray.com/  Enter X327 in the search box to learn more about \"Lumbar Spinal Stenosis: Care Instructions. \"  Current as of: July 1, 2021               Content Version: 13.2  © 8376-6516 "[employer]. Care instructions adapted under license by [employer] (which disclaims liability or warranty for this information). If you have questions about a medical condition or this instruction, always ask your healthcare professional. [place] [address] any warranty or liability for your use of this information.

## 2022-03-18 PROBLEM — M47.816 LUMBAR FACET ARTHROPATHY: Status: ACTIVE | Noted: 2019-03-06

## 2022-03-19 PROBLEM — K21.9 GASTROESOPHAGEAL REFLUX DISEASE WITHOUT ESOPHAGITIS: Status: ACTIVE | Noted: 2017-02-10

## 2022-03-19 PROBLEM — D03.61 MELANOMA IN SITU OF RIGHT UPPER ARM (HCC): Status: ACTIVE | Noted: 2017-02-10

## 2022-03-19 PROBLEM — M54.31 SCIATICA OF RIGHT SIDE: Status: ACTIVE | Noted: 2018-02-07

## 2022-03-19 PROBLEM — M48.062 LUMBAR STENOSIS WITH NEUROGENIC CLAUDICATION: Status: ACTIVE | Noted: 2018-07-24

## 2022-03-19 PROBLEM — K58.0 IRRITABLE BOWEL SYNDROME WITH DIARRHEA: Status: ACTIVE | Noted: 2017-09-26

## 2022-03-20 PROBLEM — M48.062 SPINAL STENOSIS OF LUMBAR REGION WITH NEUROGENIC CLAUDICATION: Status: ACTIVE | Noted: 2019-03-06

## 2022-04-06 NOTE — MR AVS SNAPSHOT
[address] [facility] 
 
 
 [address] [facility] [address] 
[phone] Patient: Ashli James MRN: [id number] ZGT:[id number] Visit Information Date & Time Provider Department Dept. Phone Encounter #  
 9/18/2018 10:30 AM Es Robertson MD [facility] [phone] [id number] Follow-up Instructions Return in about 3 weeks (around 10/9/2018) for Medication follow up. Your Appointments 10/15/2018 12:50 PM  
Follow Up with Es Robertson MD  
[address] [facility], [address] [facility] ONE [address] [facility]) Appt Note: 3 wk fu  
 [address] [facility] [address]  
[phone]  
  
   
 [address] [facility] [address] Upcoming Health Maintenance Date Due Hepatitis C Screening 1945 DTaP/Tdap/Td series (1 - Tdap) 4/18/1966 FOBT Q 1 YEAR AGE 50-75 4/18/1995 ZOSTER VACCINE AGE 60> 2/18/2005 GLAUCOMA SCREENING Q2Y 4/18/2010 Pneumococcal 65+ Low/Medium Risk (1 of 2 - PCV13) 4/18/2010 BREAST CANCER SCRN MAMMOGRAM 6/1/2013 MEDICARE YEARLY EXAM 5/17/2018 Influenza Age 5 to Adult 8/1/2018 Allergies as of 9/18/2018  Review Complete On: 9/18/2018 By: Maribel Hubbard LPN No Known Allergies Current Immunizations  Never Reviewed No immunizations on file. Not reviewed this visit You Were Diagnosed With   
  
 Codes Comments Spinal stenosis of lumbar region with neurogenic claudication    -  Primary ICD-10-CM: O45.861 
ICD-9-CM: 724.03 Right lumbar radiculopathy     ICD-10-CM: M54.16 
ICD-9-CM: 724.4 Lumbar facet arthropathy (HCC)     ICD-10-CM: M46.96 
ICD-9-CM: 721.3 Vitals BP Pulse Temp Resp Height(growth percentile) Weight(growth percentile) 135/81 (BP 1 Location: Left arm, BP Patient Position: Sitting) 86 97.3 °F (36.3 °C) (Oral) 16 5' 5\" (1.651 m) 144 lb (65.3 kg) No SpO2 BMI OB Status Smoking Status 98% 23.96 kg/m2 Postmenopausal Never Smoker BMI and BSA Data Body Mass Index Body Surface Area  
 23.96 kg/m 2 1.73 m 2 Preferred Pharmacy Pharmacy Name Phone [address] [facility] [place] [address] [phone] Your Updated Medication List  
  
   
This list is accurate as of 9/18/18 11:29 AM.  Always use your most recent med list.  
  
  
  
  
 atorvastatin 10 mg tablet Commonly known as:  LIPITOR Take  by mouth.  
  
 calcium carbonate 600 mg calcium (1,500 mg) tablet Commonly known as:  Jabari Agrawal Take  by mouth. diclofenac EC 75 mg EC tablet Commonly known as:  VOLTAREN Take 1 Tab by mouth two (2) times a day.  
  
 gabapentin 300 mg capsule Commonly known as:  NEURONTIN  
1 in the am and 2 in the evening  Indications: NEUROPATHIC PAIN  
  
 hydroCHLOROthiazide 25 mg tablet Commonly known as:  HYDRODIURIL Take 25 mg by mouth. LORazepam 1 mg tablet Commonly known as:  ATIVAN  
  
 losartan 50 mg tablet Commonly known as:  COZAAR  
  
 meloxicam 15 mg tablet Commonly known as:  MOBIC  
  
 MULTIVITAMIN PO Take  by mouth. omega-3 acid ethyl esters 1 gram capsule Commonly known as:  Carollynn Raw Take  by mouth. omeprazole 40 mg capsule Commonly known as:  PRILOSEC  
  
 topiramate 25 mg tablet Commonly known as:  TOPAMAX Take 2 Tabs by mouth nightly. Follow-up Instructions Return in about 3 weeks (around 10/9/2018) for Medication follow up. Patient Instructions Low Back Arthritis: Exercises Your Care Instructions Here are some examples of typical rehabilitation exercises for your condition. Start each exercise slowly. Ease off the exercise if you start to have pain. Your doctor or physical therapist will tell you when you can start these exercises and which ones will work best for you. When you are not being active, find a comfortable position for rest. Some people are comfortable on the floor or a medium-firm bed with a small pillow under their head and another under their knees. Some people prefer to lie on their side with a pillow between their knees. Don't stay in one position for too long. Take short walks (10 to 20 minutes) every 2 to 3 hours. Avoid slopes, hills, and stairs until you feel better. Walk only distances you can manage without pain, especially leg pain. How to do the exercises Pelvic tilt 1. Lie on your back with your knees bent. 2. \"Brace\" your stomach-tighten your muscles by pulling in and imagining your belly button moving toward your spine. 3. Press your lower back into the floor. You should feel your hips and pelvis rock back. 4. Hold for 6 seconds while breathing smoothly. 5. Relax and allow your pelvis and hips to rock forward. 6. Repeat 8 to 12 times. Back stretches 1. Get down on your hands and knees on the floor. 2. Relax your head and allow it to droop. Round your back up toward the ceiling until you feel a nice stretch in your upper, middle, and lower back. Hold this stretch for as long as it feels comfortable, or about 15 to 30 seconds. 3. Return to the starting position with a flat back while you are on your hands and knees. 4. Let your back sway by pressing your stomach toward the floor. Lift your buttocks toward the ceiling. 5. Hold this position for 15 to 30 seconds. 6. Repeat 2 to 4 times. Follow-up care is a key part of your treatment and safety. Be sure to make and go to all appointments, and call your doctor if you are having problems. It's also a good idea to know your test results and keep a list of the medicines you take. Where can you learn more? Go to http://maggie-rae.info/. Enter R154 in the search box to learn more about \"Low Back Arthritis: Exercises. \" Current as of: November 29, 2017 Content Version: 11.7 © 4531-6141 [employer]. Care instructions adapted under license by [employer] (which disclaims liability or warranty for this information). If you have questions about a medical condition or this instruction, always ask your healthcare professional. [address] any warranty or liability for your use of this information. Introducing [facility]! [facility] introduces VividCortex patient portal. Now you can access parts of your medical record, email your doctor's office, and request medication refills online. 1. In your internet browser, go to [email or web address] [employer]). [email or web address]/[email or web address]) 2. Click on the First Time User? Click Here link in the Sign In box. You will see the New Member Sign Up page. 3. Enter your VividCortex Access Code exactly as it appears below. You will not need to use this code after youve completed the sign-up process. If you do not sign up before the expiration date, you must request a new code. · VividCortex Access Code: [email or web address] Expires: 12/17/2018 10:46 AM 
 
4. Enter the last four digits of your Social Security Number (xxxx) and Date of Birth (mm/dd/yyyy) as indicated and click Submit. You will be taken to the next sign-up page. 5. Create a VividCortex ID. This will be your VividCortex login ID and cannot be changed, so think of one that is secure and easy to remember. 6. Create a VividCortex password. You can change your password at any time. 7. Enter your Password Reset Question and Answer. This can be used at a later time if you forget your password. 8. Enter your e-mail address. You will receive e-mail notification when new information is available in [address] 9. Click Sign Up. You can now view and download portions of your medical record. 10. Click the Download Summary menu link to download a portable copy of your medical information. If you have questions, please visit the Frequently Asked Questions section of the HiMomt website. Remember, E[employer]enta is NOT to be used for urgent needs. For medical emergencies, dial 911. Now available from your iPhone and Android! Please provide this summary of care documentation to your next provider. Your primary care clinician is listed as Caridad Jimenez. If you have any questions after today's visit, please call [phone].

## 2022-04-26 ENCOUNTER — OFFICE VISIT (OUTPATIENT)
Dept: ORTHOPEDIC SURGERY | Age: 77
End: 2022-04-26
Payer: MEDICARE

## 2022-04-26 VITALS
BODY MASS INDEX: 26.66 KG/M2 | OXYGEN SATURATION: 99 % | RESPIRATION RATE: 16 BRPM | TEMPERATURE: 95.6 F | HEIGHT: 65 IN | WEIGHT: 160 LBS | HEART RATE: 107 BPM

## 2022-04-26 DIAGNOSIS — M48.061 LUMBAR FORAMINAL STENOSIS: ICD-10-CM

## 2022-04-26 DIAGNOSIS — M54.50 ACUTE MIDLINE LOW BACK PAIN WITHOUT SCIATICA: Primary | ICD-10-CM

## 2022-04-26 DIAGNOSIS — M48.062 SPINAL STENOSIS OF LUMBAR REGION WITH NEUROGENIC CLAUDICATION: ICD-10-CM

## 2022-04-26 DIAGNOSIS — M47.816 LUMBAR FACET ARTHROPATHY: ICD-10-CM

## 2022-04-26 DIAGNOSIS — R26.9 GAIT ABNORMALITY: ICD-10-CM

## 2022-04-26 DIAGNOSIS — M54.16 LUMBAR RADICULOPATHY: ICD-10-CM

## 2022-04-26 PROCEDURE — G8432 DEP SCR NOT DOC, RNG: HCPCS | Performed by: PHYSICAL MEDICINE & REHABILITATION

## 2022-04-26 PROCEDURE — 1090F PRES/ABSN URINE INCON ASSESS: CPT | Performed by: PHYSICAL MEDICINE & REHABILITATION

## 2022-04-26 PROCEDURE — 99213 OFFICE O/P EST LOW 20 MIN: CPT | Performed by: PHYSICAL MEDICINE & REHABILITATION

## 2022-04-26 PROCEDURE — 1101F PT FALLS ASSESS-DOCD LE1/YR: CPT | Performed by: PHYSICAL MEDICINE & REHABILITATION

## 2022-04-26 PROCEDURE — G8536 NO DOC ELDER MAL SCRN: HCPCS | Performed by: PHYSICAL MEDICINE & REHABILITATION

## 2022-04-26 PROCEDURE — G8756 NO BP MEASURE DOC: HCPCS | Performed by: PHYSICAL MEDICINE & REHABILITATION

## 2022-04-26 PROCEDURE — G8419 CALC BMI OUT NRM PARAM NOF/U: HCPCS | Performed by: PHYSICAL MEDICINE & REHABILITATION

## 2022-04-26 PROCEDURE — G8427 DOCREV CUR MEDS BY ELIG CLIN: HCPCS | Performed by: PHYSICAL MEDICINE & REHABILITATION

## 2022-04-26 PROCEDURE — G8399 PT W/DXA RESULTS DOCUMENT: HCPCS | Performed by: PHYSICAL MEDICINE & REHABILITATION

## 2022-04-26 RX ORDER — NORTRIPTYLINE HYDROCHLORIDE 10 MG/1
40 CAPSULE ORAL
Qty: 360 CAPSULE | Refills: 1 | Status: CANCELLED | OUTPATIENT
Start: 2022-04-26

## 2022-04-26 RX ORDER — PREDNISONE 5 MG/1
TABLET ORAL
Qty: 45 TABLET | Refills: 0 | Status: SHIPPED | OUTPATIENT
Start: 2022-04-26 | End: 2022-05-23 | Stop reason: SDUPTHER

## 2022-04-26 RX ORDER — GABAPENTIN 300 MG/1
CAPSULE ORAL
Qty: 360 CAPSULE | Refills: 1 | Status: CANCELLED | OUTPATIENT
Start: 2022-04-26

## 2022-04-26 NOTE — PROGRESS NOTES
Chief Complaint   Patient presents with    Follow-up       Pt preferred language for health care discussion is english. Is someone accompanying this pt? NO    Is the patient using any DME equipment during OV? NO    Depression Screening:  3 most recent [facility] Screens 12/14/2020 11/23/2020 10/16/2020 10/16/2020 7/14/2020 7/23/2019 5/15/2019   Little interest or pleasure in doing things Not at all Not at all Not at all Not at all Not at all Not at all Not at all   Feeling down, depressed, irritable, or hopeless Not at all Not at all Not at all Not at all Not at all Not at all Not at all   Total Score PHQ 2 0 0 0 0 0 0 0       Learning Assessment:  Learning Assessment 7/23/2019   PRIMARY LEARNER Patient   HIGHEST LEVEL OF EDUCATION - PRIMARY LEARNER  [facility]  PRIMARY LEARNER NONE   CO-LEARNER CAREGIVER No   PRIMARY LANGUAGE ENGLISH   LEARNER PREFERENCE PRIMARY DEMONSTRATION     READING     VIDEOS   ANSWERED BY patient   RELATIONSHIP SELF       Abuse Screening:  Abuse Screening Questionnaire 11/23/2020 7/23/2019   Do you ever feel afraid of your partner? N N   Are you in a relationship with someone who physically or mentally threatens you? N N   Is it safe for you to go home? Y Y       Fall Risk  Fall Risk Assessment, last 12 mths 2/15/2021   Able to walk? Yes   Fall in past 12 months? 0   Do you feel unsteady? 0   Are you worried about falling 0           Advance Directive:  1. Do you have an advance directive in place? Patient Reply:NO    2. If not, would you like material regarding how to put one in place? Patient Reply: Cielo Cavazos    2.  Per patient no changes to their ACP contact NO. Coordination of Care:  1. Have you been to the ER, urgent care clinic since your last visit? Hospitalized since your last visit? NO    2. Have you seen or consulted any other health care providers outside of the [address] [place] [address] since your last visit? Include any pap smears or colon screening.  NO

## 2022-04-26 NOTE — PROGRESS NOTES
[facility]  [address] [place], [facility] [address]  Phone: [phone]  Fax: [phone]    Pt's YOB: 1945    ASSESSMENT   Diagnoses and all orders for this visit:    1. Acute midline low back pain without sciatica    2. Lumbar facet arthropathy  -     predniSONE (DELTASONE) 5 mg tablet; Take 2 tabs by mouth x 15 days, then 1 tab daily. 3. Spinal stenosis of lumbar region with neurogenic claudication    4. Gait abnormality    5. Lumbar foraminal stenosis    6. Lumbar radiculopathy         IMPRESSION AND PLAN:  Radha Summers is a 68 y.o. female with history of lumbar pain. She complains of difficulty with standing erect and walking with a forward-flexed position. Pt is taking Neurontin 300 mg 1 cap QAM and 3 caps QHS with relief, Norco 5-325 mg 1 tab QAM and 1 tab QHS as needed with temporary relief, Pamelor 10 mg 4 caps QHS with relief, and prednisone 5 mg 1 tab daily and reports major relief with the large prednisone taper prescribed at her last office visit. 1) Pt was given information on lumbar arthritis exercises. 2) Discussed with the patient lumbar spinal stenosis -- anatomic pathology, symptoms, indications for surgical intervention, and treatment options including physical therapy, medications, steroid injections, and surgical intervention. 3) I recommended the pt reduce her use of steroids as her symptoms allow. 4) Pt was prescribed prednisone 5 mg 2 tabs daily for 15 days, then 1 tab daily-- pt counseled about long term use of steroids . 5) Pt will continue to take Neurontin 300 mg 1 cap QAM and 3 caps QHS with relief, Norco 5-325 mg 1 tab QAM and 1 tab QHS as needed, and Pamelor 10 mg 4 caps QHS and does not require a refill at this time. 6) Ms. Greg Mcclure has a reminder for a \"due or due soon\" health maintenance.  I have asked that she contact her primary care provider, Robb Moore MD, for follow-up on this health maintenance. 7)  demonstrated consistency with prescribing. Follow-up and Dispositions    · Return in about 2 months (around 6/26/2022) for Medication follow up. HISTORY OF PRESENT ILLNESS:  Emma Leblanc is a 68 y.o. female with history of lumbar pain and presents to the office today for medication follow up. Pt complains of difficulty with standing erect and walking with a forward-flexed position. She admits to a positive grocery cart sign. Pt is taking Neurontin 300 mg 1 cap QAM and 3 caps QHS with relief, Norco 5-325 mg 1 tab QAM and 1 tab QHS as needed with temporary relief, Pamelor 10 mg 4 caps QHS with relief, and prednisone 5 mg 1 tab daily. She reports major relief with the large prednisone taper prescribed at her last office visit. She states she is not able to function / walk without use of the steroids as her pain markedly limits activity. She states that she has not followed up with Dr. Katy Mims since her last office visit and is not interested in surgical intervention at this time. However, she notes that her  has undergone a 4-level spinal fusion on 02/07/2022 by Dr. Katy Mims with sequelae of lumbar and lower extremity pain. Pt at this time desires to continue with current care. Pain Scale: 5/10    PCP: Sanam Coffey MD     Past Medical History:   Diagnosis Date    Acid reflux     Hypertension         Social History     Socioeconomic History    Marital status:      Spouse name: Not on file    Number of children: Not on file    Years of education: Not on file    Highest education level: Not on file   Occupational History    Not on file   Tobacco Use    Smoking status: Never Smoker    Smokeless tobacco: Never Used   Substance and Sexual Activity    Alcohol use:  Yes     Alcohol/week: 1.0 standard drink     Types: 1 Glasses of wine per week    Drug use: No    Sexual activity: Yes     Partners: Male     Birth control/protection: None   Other Topics Concern    Not on file   Social History Narrative    Not on file     Social Determinants of Health     Financial Resource Strain:     Difficulty of Paying Living Expenses: Not on file   Food Insecurity:     Worried About Running Out of Food in the Last Year: Not on file    Consuelo of Food in the Last Year: Not on file   Transportation Needs:     Lack of Transportation (Medical): Not on file    Lack of Transportation (Non-Medical): Not on file   Physical Activity:     Days of Exercise per Week: Not on file    Minutes of Exercise per Session: Not on file   Stress:     Feeling of Stress : Not on file   Social Connections:     Frequency of Communication with Friends and Family: Not on file    Frequency of Social Gatherings with Friends and Family: Not on file    Attends Christianity Services: Not on file    Active Member of [address] [place] [address] or Organizations: Not on file    Attends Club or Organization Meetings: Not on file    Marital Status: Not on file   Intimate Partner Violence:     Fear of Current or Ex-Partner: Not on file    Emotionally Abused: Not on file    Physically Abused: Not on file    Sexually Abused: Not on file   Housing Stability:     Unable to Pay for Housing in the Last Year: Not on file    Number of [place] in the Last Year: Not on file    Unstable Housing in the Last Year: Not on file       Current Outpatient Medications   Medication Sig Dispense Refill    predniSONE (DELTASONE) 5 mg tablet Take 2 tabs by mouth x 15 days, then 1 tab daily. 45 Tablet 0    lidocaine (LIDODERM) 5 % 1 Patch by TransDERmal route every twenty-four (24) hours. Apply patch to the affected area for 12 hours a day and remove for 12 hours a day. 30 Each 1    PreviDent 5000 Booster Plus 1.1 % pste       gabapentin (Neurontin) 300 mg capsule 1 in the am and 3 in the evening as directed  Indications: neuropathic pain 360 Capsule 1    nortriptyline (PAMELOR) 10 mg capsule Take 4 Capsules by mouth nightly. 360 Capsule 1    alpha lipoic acid 200 mg cap Take 1 Cap by mouth daily.  cholecalciferol (VITAMIN D3) 1,000 unit cap Take 1 Cap by mouth daily.  MAGNESIUM OXIDE PO Take 1 Tab by mouth daily.  omeprazole (PRILOSEC) 40 mg capsule Take 40 mg by mouth daily.  losartan (COZAAR) 25 mg tablet Take 25 mg by mouth daily.  hydroCHLOROthiazide (HYDRODIURIL) 25 mg tablet Take 25 mg by mouth daily.  atorvastatin (LIPITOR) 10 mg tablet Take 10 mg by mouth daily.  calcium carbonate (CALTREX) 600 mg calcium (1,500 mg) tablet Take 600 mg by mouth daily.  predniSONE (DELTASONE) 10 mg tablet 6 pills Day 1, 5 pills Day 2, 4 pills Day 3&4, 3 pills Day 5&6, 2 pills Day 7&8, 1 pill Day 9&10, 1/2 pill Day 11&12 (Patient not taking: Reported on 4/26/2022) 32 Tablet 0    LORazepam (ATIVAN) 1 mg tablet Take 1 mg by mouth every eight (8) hours as needed. (Patient not taking: Reported on 4/26/2022)         No Known Allergies      REVIEW OF SYSTEMS    Constitutional: Negative for fever, chills, or weight change. Respiratory: Negative for cough or shortness of breath. Cardiovascular: Negative for chest pain or palpitations. Gastrointestinal: Negative for acid reflux, change in bowel habits, or constipation. Genitourinary: Negative for dysuria and flank pain. Musculoskeletal: Positive for lumbar pain. Skin: Negative for rash. Neurological: Negative for headaches, dizziness, or numbness. Endo/Heme/Allergies: Negative for increased bruising. Psychiatric/Behavioral: Negative for difficulty with sleep. As per HPI    PHYSICAL EXAMINATION  Visit Vitals  Pulse (!) 107   Temp (!) 95.6 °F (35.3 °C) (Tympanic)   Resp 16   Ht 5' 5\" (1.651 m)   Wt 160 lb (72.6 kg)   SpO2 99%   BMI 26.63 kg/m²       Constitutional: Awake, alert, and in no acute distress. Neurological: Sensation to light touch is intact. Skin: warm, dry, and intact.    Musculoskeletal:  Mild-moderate pain with extension, axial loading, improved with forward flexion. No pain with internal or external rotation of her hips. Negative straight leg raise bilaterally. Hip Flex  Quads Hamstrings Ankle DF EHL Ankle PF   Right +4/5 +4/5 +4/5 +4/5 +4/5 +4/5   Left +4/5 +4/5 +4/5 +4/5 +4/5 +4/5     IMAGING:     Lumbar spine MRI from 03/07/2022 was personally reviewed with the patient and her  and demonstrated:     Severe hardware artifact at L2-L3, and lower thoracic segment. Images through  these levels are nondiagnostic.     Grade 1 anterolisthesis L4 on L5. No compression deformity.     Edema in the superior endplate of L3 and inferior endplate of L2.     J65-W6 and L1-L2: No suspected disc herniation or central stenosis. No foraminal  stenosis.     L2-L3: Nondiagnostic     L3-L4: Similar posterior disc bulge with endplate spondylosis. Facet and  ligamentous hypertrophy, worse on the right. Moderately severe central stenosis. AP canal measures 6 mm Severe right foraminal stenosis with compression of right  exiting L3 nerve root. Mild left foraminal stenosis.     L4-L5: Posterior disc bulge. More severe facet and ligamentous hypertrophy. Facet inflammation with left facet joint effusion. Severe central stenosis. AP  canal measures 4.3 mm. Severe bilateral foraminal stenosis with compression of  exiting L4 nerve roots bilaterally. Grossly stable     L5-S1: No disc herniation. No significant central stenosis. Mild facet  hypertrophy with no foraminal stenosis.     IMPRESSION  1. Central stenosis worst at L4-L5, followed by L3-L4: Posterior disc  bulge/protrusion and facet/ligamentous hypertrophy. 2. Bilateral L4 foraminal stenosis with compression of exiting L4 nerve roots. 3. Right L3 foraminal stenosis with compression of right exiting L3 nerve root.     Lumbar spine 4V x-rays from 03/03/2022 were personally reviewed with the patient and demonstrated:     Thoracolumbar fusion. Multilevel degenerative fact.  Severe degenerative disc L3-4, L2-3. Atherosclerosis.       Thoracic spine 2V x-rays from 11/23/2020 were personally reviewed with the patient and demonstrated:  Thoracolumbar fusion.     Written by Peter Dang, as dictated by Mimi Caldwell MD.  I, Dr. Mimi Caldwell confirm that all documentation is accurate.

## 2022-05-18 PROBLEM — F33.9 DEPRESSION, RECURRENT (HCC): Status: ACTIVE | Noted: 2022-03-04

## 2022-05-18 RX ORDER — GLUC/MSM/COLGN2/HYAL/ANTIARTH3 375-375-20
1 TABLET ORAL EVERY OTHER DAY
COMMUNITY
Start: 2022-04-14 | End: 2022-05-24

## 2022-05-23 ENCOUNTER — VIRTUAL VISIT (OUTPATIENT)
Dept: ORTHOPEDIC SURGERY | Age: 77
End: 2022-05-23
Payer: MEDICARE

## 2022-05-23 DIAGNOSIS — R29.898 RIGHT LEG WEAKNESS: ICD-10-CM

## 2022-05-23 DIAGNOSIS — M47.816 LUMBAR FACET ARTHROPATHY: ICD-10-CM

## 2022-05-23 DIAGNOSIS — M54.16 LUMBAR RADICULOPATHY: ICD-10-CM

## 2022-05-23 DIAGNOSIS — M62.838 MUSCLE SPASM: ICD-10-CM

## 2022-05-23 DIAGNOSIS — Z98.1 HISTORY OF THORACIC SPINAL FUSION: ICD-10-CM

## 2022-05-23 DIAGNOSIS — M48.062 SPINAL STENOSIS OF LUMBAR REGION WITH NEUROGENIC CLAUDICATION: Primary | ICD-10-CM

## 2022-05-23 PROCEDURE — 99442 PR PHYS/QHP TELEPHONE EVALUATION 11-20 MIN: CPT | Performed by: PHYSICAL MEDICINE & REHABILITATION

## 2022-05-23 RX ORDER — PREDNISONE 5 MG/1
TABLET ORAL
Qty: 45 TABLET | Refills: 0 | Status: SHIPPED
Start: 2022-05-23 | End: 2022-06-10

## 2022-05-23 RX ORDER — NORTRIPTYLINE HYDROCHLORIDE 10 MG/1
40 CAPSULE ORAL
Qty: 360 CAPSULE | Refills: 1 | Status: CANCELLED | OUTPATIENT
Start: 2022-05-23

## 2022-05-23 NOTE — PATIENT INSTRUCTIONS
Low Back Arthritis: Exercises  Introduction  Here are some examples of typical rehabilitation exercises for your condition. Start each exercise slowly. Ease off the exercise if you start to have pain. Your doctor or physical therapist will tell you when you can start these exercises and which ones will work best for you. When you are not being active, find a comfortable position for rest. Some people are comfortable on the floor or a medium-firm bed with a small pillow under their head and another under their knees. Some people prefer to lie on their side with a pillow between their knees. Don't stay in one position for too long. Take short walks (10 to 20 minutes) every 2 to 3 hours. Avoid slopes, hills, and stairs until you feel better. Walk only distances you can manage without pain, especially leg pain. How to do the exercises  Pelvic tilt    1. Lie on your back with your knees bent. 2. \"Brace\" your stomachtighten your muscles by pulling in and imagining your belly button moving toward your spine. 3. Press your lower back into the floor. You should feel your hips and pelvis rock back. 4. Hold for 6 seconds while breathing smoothly. 5. Relax and allow your pelvis and hips to rock forward. 6. Repeat 8 to 12 times. Back stretches    1. Get down on your hands and knees on the floor. 2. Relax your head and allow it to droop. Round your back up toward the ceiling until you feel a nice stretch in your upper, middle, and lower back. Hold this stretch for as long as it feels comfortable, or about 15 to 30 seconds. 3. Return to the starting position with a flat back while you are on your hands and knees. 4. Let your back sway by pressing your stomach toward the floor. Lift your buttocks toward the ceiling. 5. Hold this position for 15 to 30 seconds. 6. Repeat 2 to 4 times. Follow-up care is a key part of your treatment and safety.  Be sure to make and go to all appointments, and call your doctor if you are having problems. It's also a good idea to know your test results and keep a list of the medicines you take. Where can you learn more? Go to http://www.[employer].com/  Enter T094 in the search box to learn more about \"Low Back Arthritis: Exercises. \"  Current as of: July 1, 2021               Content Version: 13.2  © 2006-2022 [employer]. Care instructions adapted under license by [employer] (which disclaims liability or warranty for this information). If you have questions about a medical condition or this instruction, always ask your healthcare professional. [place] [address] any warranty or liability for your use of this information.

## 2022-05-23 NOTE — PROGRESS NOTES
[facility]  [address] [place], [facility] [address]  Phone: [phone]  Fax: [phone]    TELEPHONE VISIT  Pt's YOB: 1945    ASSESSMENT   Diagnoses and all orders for this visit:    1. Spinal stenosis of lumbar region with neurogenic claudication  -     REFERRAL TO SPINE SURGERY    2. Lumbar radiculopathy  -     REFERRAL TO SPINE SURGERY    3. Lumbar facet arthropathy  -     predniSONE (DELTASONE) 5 mg tablet; Take 2 tabs by mouth x 15 days, then 1 tab daily.  -     REFERRAL TO SPINE SURGERY    4. Right leg weakness  -     REFERRAL TO SPINE SURGERY    5. History of thoracic spinal fusion  -     REFERRAL TO SPINE SURGERY    6. Muscle spasm         IMPRESSION AND PLAN:  Johann Ge is a 68 y.o. female with history of lumbar pain. Pt complains of numbness and weakness in the right lower extremity and exacerbation of her pain x 1 week. Pt is taking Neurontin 300 mg 1 cap QAM and 3 caps QHS with relief, Norco 5-325 mg 1 tab QAM and 1 tab QHS as needed with temporary relief, Pamelor 10 mg 4 caps QHS with relief, and prednisone 5 mg daily. 1) Pt was given information on lumbar arthritis exercises. 2) Pt was prescribed prednisone 5 mg 2 tabs daily x 15 days, then 1 tab daily. Pt was counseled about the side effects of long-term use of steroids. 3) Pt will continue to take Neurontin 300 mg 1 cap QAM and 3 caps QHS with relief, Norco 5-325 mg 1 tab QAM and 1 tab QHS as needed, and Pamelor 10 mg 4 caps QHS and does not require a refill at this time. 4) Pt was referred to Dr. Emi Gutiérrez for surgical evaluation. 5) Ms. Kristie Haynes has a reminder for a \"due or due soon\" health maintenance. I have asked that she contact her primary care provider, Vi Judd MD, for follow-up on this health maintenance. 6)  demonstrated consistency with prescribing.      Follow-up and Dispositions    · Return in about 5 weeks (around 6/28/2022) for Pt has appt -- please keep. CPT Codes 02363-59431 for Established Patients may apply to this TeleHealth Visit. Time-based coding, delete if not needed: I spent 13 minutes and 12 seconds from 10:29a to 10:42a with this established patient, and >50% of the time was spent counseling and/or coordinating care regarding her symptoms, medications, and treatment options. Due to this being a TeleHealth evaluation, many elements of the physical examination are unable to be assessed. Pursuant to the emergency declaration under the [address] [place] [address], [facility] waiver authority and the [employer] Act, this telephone visit was conducted, with patient's consent, to reduce the patient's risk of exposure to COVID-19 and provide continuity of care for an established patient. Services were provided through a telephone discussion to substitute for in-person clinic visit. HISTORY OF PRESENT ILLNESS:  Juvenal Celaya is a 68 y.o. female with history of lumbar pain and was evaluated from her residence by telephone at the [facility] location on 5/23/2022 with her verbal consent for medication follow up. Pt complains of numbness and weakness in the right lower extremity and exacerbation of her pain x 1 week. She admits to great difficulty with moving her right lower extremity on the evening of 05/22/2022. Pt is taking Neurontin 300 mg 1 cap QAM and 3 caps QHS with relief, Norco 5-325 mg 1 tab QAM and 1 tab QHS as needed with temporary relief, Pamelor 10 mg 4 caps QHS with relief, and prednisone 5 mg. She attributes her recent exacerbation of pain to reducing her dosage of prednisone to 5 mg, further noting that 10 mg allowed her to remain functional. She mentions a prior spinal surgery by Dr. Yamile Soni and notes that she is willing to consider surgical intervention. Pt at this time desires to proceed with surgical evaluation.     Of note, pt will be traveling to her son's house in early 06/2022. Pain Scale: /10    PCP: Manolo Whittington MD     Past Medical History:   Diagnosis Date    Acid reflux     Hypertension         Social History     Socioeconomic History    Marital status:      Spouse name: Not on file    Number of children: Not on file    Years of education: Not on file    Highest education level: Not on file   Occupational History    Not on file   Tobacco Use    Smoking status: Never Smoker    Smokeless tobacco: Never Used   Substance and Sexual Activity    Alcohol use: Yes     Alcohol/week: 1.0 standard drink     Types: 1 Glasses of wine per week    Drug use: No    Sexual activity: Yes     Partners: Male     Birth control/protection: None   Other Topics Concern    Not on file   Social History Narrative    Not on file     Social Determinants of Health     Financial Resource Strain:     Difficulty of Paying Living Expenses: Not on file   Food Insecurity:     Worried About Running Out of Food in the Last Year: Not on file    Consuelo of Food in the Last Year: Not on file   Transportation Needs:     Lack of Transportation (Medical): Not on file    Lack of Transportation (Non-Medical):  Not on file   Physical Activity:     Days of Exercise per Week: Not on file    Minutes of Exercise per Session: Not on file   Stress:     Feeling of Stress : Not on file   Social Connections:     Frequency of Communication with Friends and Family: Not on file    Frequency of Social Gatherings with Friends and Family: Not on file    Attends Tenri[employer] Services: Not on file    Active Member of Clubs or Organizations: Not on file    Attends Club or Organization Meetings: Not on file    Marital Status: Not on file   Intimate Partner Violence:     Fear of Current or Ex-Partner: Not on file    Emotionally Abused: Not on file    Physically Abused: Not on file    Sexually Abused: Not on file   Housing Stability:     Unable to Pay for Housing in the Last Year: Not on file    Number of Places Lived in the Last Year: Not on file    Unstable Housing in the Last Year: Not on file       Current Outpatient Medications   Medication Sig Dispense Refill    predniSONE (DELTASONE) 5 mg tablet Take 2 tabs by mouth x 15 days, then 1 tab daily. 45 Tablet 0    PreviDent 5000 Booster Plus 1.1 % pste       gabapentin (Neurontin) 300 mg capsule 1 in the am and 3 in the evening as directed  Indications: neuropathic pain 360 Capsule 1    nortriptyline (PAMELOR) 10 mg capsule Take 4 Capsules by mouth nightly. 360 Capsule 1    alpha lipoic acid 200 mg cap Take 1 Cap by mouth daily.  cholecalciferol (VITAMIN D3) 1,000 unit cap Take 1 Cap by mouth daily.  MAGNESIUM OXIDE PO Take 1 Tab by mouth daily.  omeprazole (PRILOSEC) 40 mg capsule Take 40 mg by mouth daily.  losartan (COZAAR) 25 mg tablet Take 25 mg by mouth daily.  hydroCHLOROthiazide (HYDRODIURIL) 25 mg tablet Take 25 mg by mouth daily.  LORazepam (ATIVAN) 1 mg tablet Take 1 mg by mouth every eight (8) hours as needed.  atorvastatin (LIPITOR) 10 mg tablet Take 10 mg by mouth daily.  calcium carbonate (CALTREX) 600 mg calcium (1,500 mg) tablet Take 600 mg by mouth daily.  ferrous gluconate 236 mg (27 mg iron) tab Take 1 Tablet by mouth every other day. (Patient not taking: Reported on 5/23/2022)      predniSONE (DELTASONE) 10 mg tablet 6 pills Day 1, 5 pills Day 2, 4 pills Day 3&4, 3 pills Day 5&6, 2 pills Day 7&8, 1 pill Day 9&10, 1/2 pill Day 11&12 (Patient not taking: Reported on 4/26/2022) 32 Tablet 0    lidocaine (LIDODERM) 5 % 1 Patch by TransDERmal route every twenty-four (24) hours. Apply patch to the affected area for 12 hours a day and remove for 12 hours a day. (Patient not taking: Reported on 5/23/2022) 30 Each 1       No Known Allergies      REVIEW OF SYSTEMS    Constitutional: Negative for fever, chills, or weight change.    Respiratory: Negative for cough or shortness of breath. Cardiovascular: Negative for chest pain or palpitations. Gastrointestinal: Negative for acid reflux, change in bowel habits, or constipation. Genitourinary: Negative for dysuria and flank pain. Musculoskeletal: Positive for lumbar pain. Positive for right lower extremity weakness. Skin: Negative for rash. Neurological: Negative for headaches or dizziness. Positive for right lower extremity numbness. Endo/Heme/Allergies: Negative for increased bruising. Psychiatric/Behavioral: Negative for difficulty with sleep. IMAGING:    Lumbar spine MRI from 03/07/2022 was personally reviewed with the patient and her  and demonstrated:     Severe hardware artifact at L2-L3, and lower thoracic segment. Images through  these levels are nondiagnostic.     Grade 1 anterolisthesis L4 on L5. No compression deformity.     Edema in the superior endplate of L3 and inferior endplate of L2.     S59-L7 and L1-L2: No suspected disc herniation or central stenosis. No foraminal  stenosis.     L2-L3: Nondiagnostic     L3-L4: Similar posterior disc bulge with endplate spondylosis. Facet and  ligamentous hypertrophy, worse on the right. Moderately severe central stenosis. AP canal measures 6 mm Severe right foraminal stenosis with compression of right  exiting L3 nerve root. Mild left foraminal stenosis.     L4-L5: Posterior disc bulge. More severe facet and ligamentous hypertrophy. Facet inflammation with left facet joint effusion. Severe central stenosis. AP  canal measures 4.3 mm. Severe bilateral foraminal stenosis with compression of  exiting L4 nerve roots bilaterally. Grossly stable     L5-S1: No disc herniation. No significant central stenosis. Mild facet  hypertrophy with no foraminal stenosis.     IMPRESSION  1. Central stenosis worst at L4-L5, followed by L3-L4: Posterior disc  bulge/protrusion and facet/ligamentous hypertrophy.   2. Bilateral L4 foraminal stenosis with compression of exiting L4 nerve roots. 3. Right L3 foraminal stenosis with compression of right exiting L3 nerve root.     Lumbar spine 4V x-rays from 03/03/2022 were personally reviewed with the patient and demonstrated:     Thoracolumbar fusion. Multilevel degenerative fact. Severe degenerative disc L3-4, L2-3. Atherosclerosis.       Thoracic spine 2V x-rays from 11/23/2020 were personally reviewed with the patient and demonstrated:  Thoracolumbar fusion.     Written by Everton Oliva, as dictated by Juan Ramon St MD.  I, Dr. Juan Ramon St confirm that all documentation is accurate.

## 2022-05-23 NOTE — PROGRESS NOTES
Chief Complaint   Patient presents with    Follow-up       Pt preferred language for health care discussion is english. Is someone accompanying this pt? no    Is the patient using any DME equipment during OV? no    Depression Screening:  3 most recent [facility] Screens 12/14/2020 11/23/2020 10/16/2020 10/16/2020 7/14/2020 7/23/2019 5/15/2019   Little interest or pleasure in doing things Not at all Not at all Not at all Not at all Not at all Not at all Not at all   Feeling down, depressed, irritable, or hopeless Not at all Not at all Not at all Not at all Not at all Not at all Not at all   Total Score PHQ 2 0 0 0 0 0 0 0       Learning Assessment:  Learning Assessment 7/23/2019   PRIMARY LEARNER Patient   HIGHEST LEVEL OF EDUCATION - PRIMARY LEARNER  GRADUATED HIGH SCHOOL OR GED   BARRIERS PRIMARY LEARNER NONE   CO-LEARNER CAREGIVER No   PRIMARY LANGUAGE ENGLISH   LEARNER PREFERENCE PRIMARY DEMONSTRATION     READING     VIDEOS   ANSWERED BY patient   RELATIONSHIP SELF       Abuse Screening:  Abuse Screening Questionnaire 11/23/2020 7/23/2019   Do you ever feel afraid of your partner? N N   Are you in a relationship with someone who physically or mentally threatens you? N N   Is it safe for you to go home? Y Y       Fall Risk  Fall Risk Assessment, last 12 mths 2/15/2021   Able to walk? Yes   Fall in past 12 months? 0   Do you feel unsteady? 0   Are you worried about falling 0           Advance Directive:  1. Do you have an advance directive in place? Patient Reply:no    2. If not, would you like material regarding how to put one in place? Patient Reply: no    2. Per patient no changes to their ACP contact no. Coordination of Care:  1. Have you been to the ER, urgent care clinic since your last visit? Hospitalized since your last visit? no    2. Have you seen or consulted any other health care providers outside of the [address] [place] [address] since your last visit? Include any pap smears or colon screening.  no

## 2022-05-24 ENCOUNTER — OFFICE VISIT (OUTPATIENT)
Dept: PULMONOLOGY | Age: 77
End: 2022-05-24
Payer: MEDICARE

## 2022-05-24 VITALS
OXYGEN SATURATION: 95 % | BODY MASS INDEX: 26.69 KG/M2 | SYSTOLIC BLOOD PRESSURE: 127 MMHG | RESPIRATION RATE: 16 BRPM | TEMPERATURE: 97.6 F | HEART RATE: 105 BPM | HEIGHT: 65 IN | DIASTOLIC BLOOD PRESSURE: 68 MMHG | WEIGHT: 160.2 LBS

## 2022-05-24 DIAGNOSIS — R06.09 DYSPNEA ON EXERTION: Primary | ICD-10-CM

## 2022-05-24 DIAGNOSIS — R06.02 SHORTNESS OF BREATH: ICD-10-CM

## 2022-05-24 DIAGNOSIS — R53.81 PHYSICAL DECONDITIONING: ICD-10-CM

## 2022-05-24 DIAGNOSIS — G47.33 OSA (OBSTRUCTIVE SLEEP APNEA): ICD-10-CM

## 2022-05-24 DIAGNOSIS — R05.3 CHRONIC COUGH: ICD-10-CM

## 2022-05-24 PROCEDURE — 1101F PT FALLS ASSESS-DOCD LE1/YR: CPT | Performed by: INTERNAL MEDICINE

## 2022-05-24 PROCEDURE — G8536 NO DOC ELDER MAL SCRN: HCPCS | Performed by: INTERNAL MEDICINE

## 2022-05-24 PROCEDURE — G8417 CALC BMI ABV UP PARAM F/U: HCPCS | Performed by: INTERNAL MEDICINE

## 2022-05-24 PROCEDURE — G8752 SYS BP LESS 140: HCPCS | Performed by: INTERNAL MEDICINE

## 2022-05-24 PROCEDURE — G9717 DOC PT DX DEP/BP F/U NT REQ: HCPCS | Performed by: INTERNAL MEDICINE

## 2022-05-24 PROCEDURE — G8399 PT W/DXA RESULTS DOCUMENT: HCPCS | Performed by: INTERNAL MEDICINE

## 2022-05-24 PROCEDURE — 99204 OFFICE O/P NEW MOD 45 MIN: CPT | Performed by: INTERNAL MEDICINE

## 2022-05-24 PROCEDURE — G8754 DIAS BP LESS 90: HCPCS | Performed by: INTERNAL MEDICINE

## 2022-05-24 PROCEDURE — 1090F PRES/ABSN URINE INCON ASSESS: CPT | Performed by: INTERNAL MEDICINE

## 2022-05-24 PROCEDURE — G8427 DOCREV CUR MEDS BY ELIG CLIN: HCPCS | Performed by: INTERNAL MEDICINE

## 2022-05-24 NOTE — LETTER
5/24/2022    Patient: Johann Ge   YOB: 1945   Date of Visit: 5/24/2022     Erick Narvaez MD  [facility] [address]  Via Fax: [phone]    Dear Erick Narvaez MD,      Thank you for referring Ms. Yoana Ramirez to [address] [place] [address] for evaluation. My notes for this consultation are attached. If you have questions, please do not hesitate to call me. I look forward to following your patient along with you.       Sincerely,    Bernardo Suarez MD

## 2022-05-24 NOTE — PROGRESS NOTES
Shanta Paniagua presents today for   Chief Complaint   Patient presents with   [facility] Referral / Consult     referred by Dr. Irena Tirado for COPD        Is someone accompanying this pt? Yes. Spouse    Is the patient using any DME equipment during OV? No    -DME Company N/A    Depression Screening:  3 most recent PHQ Screens 5/24/2022   Little interest or pleasure in doing things Not at all   Feeling down, depressed, irritable, or hopeless Not at all   Total Score PHQ 2 0       Learning Assessment:  Learning Assessment 7/23/2019   PRIMARY LEARNER Patient   HIGHEST LEVEL OF EDUCATION - PRIMARY LEARNER  GRADUATED HIGH SCHOOL OR GED   BARRIERS PRIMARY LEARNER NONE   CO-LEARNER CAREGIVER No   PRIMARY LANGUAGE ENGLISH   LEARNER PREFERENCE PRIMARY DEMONSTRATION     READING     VIDEOS   ANSWERED BY patient   RELATIONSHIP SELF       Abuse Screening:  Abuse Screening Questionnaire 5/24/2022   Do you ever feel afraid of your partner? N   Are you in a relationship with someone who physically or mentally threatens you? N   Is it safe for you to go home? Y       Fall Risk  Fall Risk Assessment, last 12 mths 5/24/2022   Able to walk? Yes   Fall in past 12 months? 0   Do you feel unsteady? 1   Are you worried about falling 1   Is the gait abnormal? 0         Coordination of Care:  1. Have you been to the ER, urgent care clinic since your last visit? Hospitalized since your last visit? No    2. Have you seen or consulted any other health care providers outside of the [address] [place] [address] since your last visit? Include any pap smears or colon screening. Yes. Dr. Tamanna Li, PCP/referring provider    Per patient, does not receive influenza vaccine. COVID vaccine [place]) received from [facility] on 1/30/2021 (1st dose),  2/27/2021 (2nd dose) and from [address] [place] [address] on 11/19/2021 (3rd dose/booster) per vaccine card. Card copied and scanned into patient's chart. Immunization record updated.

## 2022-05-24 NOTE — PROGRESS NOTES
[address] [place] [address] [facility]  Pulmonary, Critical Care, and Sleep Medicine    Pulmonary Office Initial Consultation  Name: Curt Navarrete 68 y.o. female  MRN: [id number]  : 1945  Service Date: 22    Referring Provider: Alejandra Lorenzana MD  [address] [facility] [place]  [address] [facility],  [address]  Chief Complaint:   Chief Complaint   Patient presents with   Ta Referral / Consult     referred by Dr. Colette Parker for COPD        History of Present Illness:  (accompanied by  who provides some of hx)  Curt Navarrete is a 68 y.o. female, who presents to Pulmonary clinic referred for COPD. Pt reports having SOB for months -- \"a long time\"  Pt reports dyspnea after climbing 1 flight of steps. Reports sometimes at night. Wheezing occurs occasionally - occurs at rest, no provoking factors. Pt reports paroxysms of cough in February x 2 - went to her Urgent Care and pt had COVID testing and CXR. Pt reported that she was told she had bronchitis. Pt was given z-mauricio, cough medicine. Albuterol -- pt reported no help with that. Cough improved but occurs when she lays flat. Has not completely resolved  SOB has worsened since that time. No dyspnea or wheezing or cough to triggers such as fragrances, perfumes, pollen, smoke, chemicals, etc.  Pt on prednisone 10mg for spinal stenosis - 1 month courses, this is her 3rd course. Pt takes gabapentin 300mg QID.   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      Past Medical History:   Diagnosis Date    Acid reflux     Hypertension     Spinal stenosis      Past Surgical History:   Procedure Laterality Date    HX BACK SURGERY         Family History   Problem Relation Age of Onset    Heart Attack Mother     Cancer Father      Social History     Socioeconomic History    Marital status:      Spouse name: Not on file    Number of children: Not on file    Years of education: Not on file    Highest education level: Not on file   Occupational History    Not on file   Tobacco Use    Smoking status: Never Smoker    Smokeless tobacco: Never Used   Vaping Use    Vaping Use: Never used   Substance and Sexual Activity    Alcohol use: Yes     Alcohol/week: 1.0 standard drink     Types: 1 Glasses of wine per week    Drug use: No    Sexual activity: Yes     Partners: Male     Birth control/protection: None   Other Topics Concern    Not on file   Social History Narrative    Not on file     Social Determinants of Health     Financial Resource Strain:     Difficulty of Paying Living Expenses: Not on file   Food Insecurity:     Worried About Running Out of Food in the Last Year: Not on file    Consuelo of Food in the Last Year: Not on file   Transportation Needs:     Lack of Transportation (Medical): Not on file    Lack of Transportation (Non-Medical):  Not on file   Physical Activity:     Days of Exercise per Week: Not on file    Minutes of Exercise per Session: Not on file   Stress:     Feeling of Stress : Not on file   Social Connections:     Frequency of Communication with Friends and Family: Not on file    Frequency of Social Gatherings with Friends and Family: Not on file    Attends [employer] Services: Not on file    Active Member of [address] [place] [address] or Organizations: Not on file    Attends Club or Organization Meetings: Not on file    Marital Status: Not on file   Intimate Partner Violence:     Fear of Current or Ex-Partner: Not on file    Emotionally Abused: Not on file    Physically Abused: Not on file    Sexually Abused: Not on file   Housing Stability:     Unable to Pay for Housing in the Last Year: Not on file    Number of [place] in the Last Year: Not on file    Unstable Housing in the Last Year: Not on file     No Known Allergies    Prior to Admission medications    Medication Sig Start Date End Date Taking? Authorizing Provider   predniSONE (DELTASONE) 5 mg tablet Take 2 tabs by mouth x 15 days, then 1 tab daily. 5/23/22  Yes Allie Gupta MD   ferrous gluconate 236 mg (27 mg iron) tab Take 1 Tablet by mouth every other day. Patient not taking: Reported on 5/23/2022 4/14/22  Yes Provider, Historical   predniSONE (DELTASONE) 10 mg tablet 6 pills Day 1, 5 pills Day 2, 4 pills Day 3&4, 3 pills Day 5&6, 2 pills Day 7&8, 1 pill Day 9&10, 1/2 pill Day 11&12 3/9/22  Yes Allie Gupta MD   lidocaine (LIDODERM) 5 % 1 Patch by TransDERmal route every twenty-four (24) hours. Apply patch to the affected area for 12 hours a day and remove for 12 hours a day. Patient not taking: Reported on 5/23/2022 3/9/22   Allie Gupta MD   PreviDent 5000 Booster Plus 1.1 % pste  1/26/22   Provider, Historical   gabapentin (Neurontin) 300 mg capsule 1 in the am and 3 in the evening as directed  Indications: neuropathic pain 2/5/22   Allie Gupta MD   nortriptyline (PAMELOR) 10 mg capsule Take 4 Capsules by mouth nightly. 2/2/22   Allie Gupta MD   alpha lipoic acid 200 mg cap Take 1 Cap by mouth daily. Provider, Historical   cholecalciferol (VITAMIN D3) 1,000 unit cap Take 1 Cap by mouth daily. Provider, Historical   MAGNESIUM OXIDE PO Take 1 Tab by mouth daily. Provider, Historical   omeprazole (PRILOSEC) 40 mg capsule Take 40 mg by mouth daily. 7/9/18   Provider, Historical   losartan (COZAAR) 25 mg tablet Take 25 mg by mouth daily. 5/25/18   Provider, Historical   hydroCHLOROthiazide (HYDRODIURIL) 25 mg tablet Take 25 mg by mouth daily. Provider, Historical   LORazepam (ATIVAN) 1 mg tablet Take 1 mg by mouth every eight (8) hours as needed. 5/25/18   Provider, Historical   atorvastatin (LIPITOR) 10 mg tablet Take 10 mg by mouth daily.  9/4/17   Provider, Historical   calcium carbonate (CALTREX) 600 mg calcium (1,500 mg) tablet Take 600 mg by mouth daily. Provider, Historical     Immunization History   Administered Date(s) Administered    Pneumococcal Conjugate (PCV-13) 02/07/2018    Pneumococcal Polysaccharide (PPSV-23) 06/01/2012       Review of Systems:  A complete review of systems was performed as stated in the HPI, all others are negative. Objective:    Physical Exam:  /68 (BP 1 Location: Left upper arm, BP Patient Position: Sitting, BP Cuff Size: Adult)   Pulse (!) 105   Temp 97.6 °F (36.4 °C) (Temporal)   Resp 16   Ht 5' 5\" (1.651 m)   Wt 72.7 kg (160 lb 3.2 oz)   SpO2 95%   BMI 26.66 kg/m²   Vitals were personally reviewed  Gen: no acute distress, pleasant and cooperative, sitting up in chair, ambulates without difficulty  HEENT: normocephalic/atraumatic, no ocular drainage, EOMI, no scleral icterus, nasal bridge midline, unable to assess nasal and oral cavities due to patient wearing mask in setting of COVID-19 pandemic  Neck: supple, trachea midline, no JVD, no cervical and supraclavicular adenopathy  CVS: regular rate rhythm, S1/S2, no murmurs/rubs/gallops  Lungs: good air entry B/L, CTABL, no wheezes/rales/rhonchi  Psych: normal memory, thought content, and processing    Labs: I have reviewed the patient's available labs  No results found for: WBC, WBCLT, HGBPOC, HGB, HGBP, HCTPOC, HCT, PHCT, RBCH, PLT, MCV, HGBEXT, HCTEXT, PLTEXT  Lab Results   Component Value Date/Time    BUN 18 12/10/2020 09:45 AM    Creatinine 0.74 12/10/2020 09:45 AM    GFR est AA >60 12/10/2020 09:45 AM    GFR est non-AA >60 12/10/2020 09:45 AM       Outside records reviewed in clinic as follows:  -Last progress note by PCP, Dr. Skyla Vasquez from 4/5/2022 reports that patient was referred for PFTs, reports cough at night, mucus in the back of the throat, takes cough medicine, hears wheezing when she goes to bed [facility], COVID test was negative, normal chest x-ray, PFTs ordered but not scheduled. Patient on gabapentin/Pamelor and prednisone per [facility] for back pain, does have dry mouth. In addition patient uses Ativan twice daily, unable to wean off. Imaging:  I have personally reviewed patient's imaging -- none on file    PFTs:  None on file    TTE:  I have reviewed the patient's TTE results  No results found for this or any previous visit. Assessment and Plan:  68 y.o. female with:    Impression:  1. Dyspnea on exertion/shortness of breath: Etiology likely due to deconditioning. Patient does not appear to have any evidence of reactive airway disease. Patient is a lifelong non-smoker thus does NOT have COPD. Patient has had a 35 pound weight gain over the last year and is very sedentary given cervical stenosis. Underlying nighttime wheezing may indicate cardiomyopathy, however unlikely given lack of LE swelling and orthopnea  2. Chronic cough/wheezing: Occurs now nightly, initially due to episode of bronchitis in February. No evidence of reactive airway disease, no triggers, likely due to increased airway secretions in the evening, may have underlying postnasal drip although no evidence of underlying allergic rhinitis  3. Deconditioning: As noted above  4. Suspect MATT: Given snoring, excessive daytime sleepiness, nocturia, and weight gain    Plan:  -Reviewed etiologies of dyspnea noted above. Advised patient to follow-up with PM&R specialist for referral to physical therapy. Advised patient that she should engage in graded exercise to improve cardiovascular function and capacity. Counseled extensively  -Full PFTs at next visit  -Obtain transthoracic echo to rule out cardiomyopathy and pulmonary hypertension  -Offered polysomnography, patient declined. Patient is not interested in PAP therapy.   Avoid sleepy driving  -Weight loss  -Increase activity  -Immunizations reviewed, pneumococcal and COVID vaccinations up-to-date  -Counseled patient regarding lifestyle precautions in COVID-19 pandemic including wearing mask in public and confined spaces, social/physical distancing, frequent hand hygiene, etc.    Follow-up and Dispositions    · Return in about 3 months (around 8/24/2022). Orders Placed This Encounter    AMB POC PFT COMPLETE W/BRONCHODILATOR    AMB POC PFT COMPLETE W/O BRONCHODILATOR    GAS DILUT/WASHOUT LUNG VOL W/WO DISTRIB VENT&VOL    DIFFUSING CAPACITY    TTECHO     Total time spent in this patient encounter was 51 minutes which included time spent in preparing for the visit-review of history, tests done prior to arrival, additional time reviewing clinical data, imaging, outside records and test results as well as time spent in ordering tests, treatments, referring patient for further care, and time-counseling with patient and family members regarding care plan____________ minutes.       Kory Zarate MD/MPH     Pulmonary, Critical Care Medicine  [facility]

## 2022-05-27 DIAGNOSIS — M48.062 SPINAL STENOSIS OF LUMBAR REGION WITH NEUROGENIC CLAUDICATION: ICD-10-CM

## 2022-05-27 DIAGNOSIS — M54.16 LUMBAR RADICULOPATHY: ICD-10-CM

## 2022-05-30 ENCOUNTER — APPOINTMENT (OUTPATIENT)
Dept: VASCULAR SURGERY | Age: 77
End: 2022-05-30
Attending: PHYSICIAN ASSISTANT
Payer: MEDICARE

## 2022-05-30 ENCOUNTER — HOSPITAL ENCOUNTER (EMERGENCY)
Age: 77
Discharge: HOME | End: 2022-05-30
Attending: STUDENT IN AN ORGANIZED HEALTH CARE EDUCATION/TRAINING PROGRAM
Payer: MEDICARE

## 2022-05-30 ENCOUNTER — APPOINTMENT (OUTPATIENT)
Dept: GENERAL RADIOLOGY | Age: 77
End: 2022-05-30
Attending: PHYSICIAN ASSISTANT
Payer: MEDICARE

## 2022-05-30 VITALS
RESPIRATION RATE: 18 BRPM | BODY MASS INDEX: 26.66 KG/M2 | WEIGHT: 160 LBS | TEMPERATURE: 98.2 F | OXYGEN SATURATION: 97 % | HEART RATE: 99 BPM | HEIGHT: 65 IN | SYSTOLIC BLOOD PRESSURE: 123 MMHG | DIASTOLIC BLOOD PRESSURE: 67 MMHG

## 2022-05-30 DIAGNOSIS — M79.604 PAIN OF RIGHT LOWER EXTREMITY: Primary | ICD-10-CM

## 2022-05-30 PROCEDURE — 93971 EXTREMITY STUDY: CPT

## 2022-05-30 PROCEDURE — 99284 EMERGENCY DEPT VISIT MOD MDM: CPT

## 2022-05-30 PROCEDURE — 74011250637 HC RX REV CODE- 250/637: Performed by: PHYSICIAN ASSISTANT

## 2022-05-30 PROCEDURE — 74011000250 HC RX REV CODE- 250: Performed by: PHYSICIAN ASSISTANT

## 2022-05-30 PROCEDURE — 73552 X-RAY EXAM OF FEMUR 2/>: CPT

## 2022-05-30 RX ORDER — LIDOCAINE 4 G/100G
1 PATCH TOPICAL
Status: DISCONTINUED | OUTPATIENT
Start: 2022-05-30 | End: 2022-05-30 | Stop reason: HOSPADM

## 2022-05-30 RX ORDER — LIDOCAINE 50 MG/G
PATCH TOPICAL
Qty: 30 EACH | Refills: 0 | Status: SHIPPED
Start: 2022-05-30 | End: 2022-06-10

## 2022-05-30 RX ORDER — ACETAMINOPHEN 500 MG
1000 TABLET ORAL
Qty: 20 TABLET | Refills: 0 | Status: SHIPPED | OUTPATIENT
Start: 2022-05-30 | End: 2022-05-30 | Stop reason: ALTCHOICE

## 2022-05-30 RX ORDER — NORTRIPTYLINE HYDROCHLORIDE 10 MG/1
CAPSULE ORAL
Qty: 360 CAPSULE | Refills: 1 | OUTPATIENT
Start: 2022-05-30

## 2022-05-30 RX ORDER — NALOXONE HYDROCHLORIDE 2 MG/.4ML
2 INJECTION, SOLUTION INTRAMUSCULAR; SUBCUTANEOUS
Qty: 1 EACH | Refills: 0 | Status: SHIPPED | OUTPATIENT
Start: 2022-05-30 | End: 2022-05-30

## 2022-05-30 RX ORDER — NALOXONE HYDROCHLORIDE 4 MG/.1ML
SPRAY NASAL
Qty: 2 EACH | Refills: 0 | Status: SHIPPED | OUTPATIENT
Start: 2022-05-30

## 2022-05-30 RX ORDER — ACETAMINOPHEN 500 MG
1000 TABLET ORAL
Status: COMPLETED | OUTPATIENT
Start: 2022-05-30 | End: 2022-05-30

## 2022-05-30 RX ORDER — OXYCODONE AND ACETAMINOPHEN 5; 325 MG/1; MG/1
1 TABLET ORAL
Qty: 6 TABLET | Refills: 0 | Status: SHIPPED | OUTPATIENT
Start: 2022-05-30 | End: 2022-06-02

## 2022-05-30 RX ADMIN — ACETAMINOPHEN 1000 MG: 500 TABLET ORAL at 15:41

## 2022-05-30 NOTE — DISCHARGE INSTRUCTIONS
Take medication as prescribed. Follow-up with your orthopedic physician within 2 days for reassessment. Bring the results from this visit with you for their review. Return to the ED immediately for any new, worsening, or persistent symptoms, including leg swelling or discoloration.

## 2022-05-30 NOTE — ED PROVIDER NOTES
EMERGENCY DEPARTMENT HISTORY AND PHYSICAL EXAM    1:48 PM      Date: 5/30/2022  Patient Name: Nael Madrigal    History of Presenting Illness     Chief Complaint   Patient presents with    Leg Pain       History Provided By: Patient    Additional History (Context): Nael Madrigal is a 68 y.o. female with hx of HTN, lumbar stenosis, and other noted PMH who presents with complaint of right medial thigh pain x3 days. Patient notes pain is worse with movement and is associated with mild numbness and tingling. Patient notes history of lumbar stenosis, notes she is on Prednisone currently. Patient denies fever or chills, fall or trauma, leg swelling or discoloration. Denies history of DVT or PE, hemoptysis,  recent surgery or travel, leg swelling. Denies blood thinner use. PCP: Manolo Whittington MD    Current Facility-Administered Medications   Medication Dose Route Frequency Provider Last Rate Last Admin    lidocaine 4 % patch 1 Patch  1 Patch TransDERmal NOW Sonu Pollard   1 Patch at 05/30/22 1540     Current Outpatient Medications   Medication Sig Dispense Refill    lidocaine (Lidoderm) 5 % Apply patch to the affected area for 12 hours a day and remove for 12 hours a day. 30 Each 0    naloxone (NARCAN) 4 mg/actuation nasal spray Use 1 spray intranasally, then discard. Repeat with new spray every 2 min as needed for opioid overdose symptoms, alternating nostrils. 2 Each 0    naloxone (EVZIO) 2 mg/0.4 mL auto-injector 0.4 mL by IntraMUSCular route once as needed for Overdose for up to 1 dose. 1 Each 0    oxyCODONE-acetaminophen (Percocet) 5-325 mg per tablet Take 1 Tablet by mouth every six (6) hours as needed for Pain for up to 3 days. Max Daily Amount: 4 Tablets. 6 Tablet 0    predniSONE (DELTASONE) 5 mg tablet Take 2 tabs by mouth x 15 days, then 1 tab daily.  45 Tablet 0    predniSONE (DELTASONE) 10 mg tablet 6 pills Day 1, 5 pills Day 2, 4 pills Day 3&4, 3 pills Day 5&6, 2 pills Day 7&8, 1 pill Day 9&10, 1/2 pill Day 11&12 32 Tablet 0    PreviDent 5000 Booster Plus 1.1 % pste       gabapentin (Neurontin) 300 mg capsule 1 in the am and 3 in the evening as directed  Indications: neuropathic pain (Patient taking differently: Take 300 mg by mouth three (3) times daily. 1 in the am and 3 in the evening as directed  Indications: neuropathic pain) 360 Capsule 1    nortriptyline (PAMELOR) 10 mg capsule Take 4 Capsules by mouth nightly. 360 Capsule 1    alpha lipoic acid 200 mg cap Take 1 Cap by mouth daily.  cholecalciferol (VITAMIN D3) 1,000 unit cap Take 1 Cap by mouth daily.  MAGNESIUM OXIDE PO Take 1 Tab by mouth daily.  omeprazole (PRILOSEC) 40 mg capsule Take 40 mg by mouth daily.  losartan (COZAAR) 25 mg tablet Take 25 mg by mouth daily.  hydroCHLOROthiazide (HYDRODIURIL) 25 mg tablet Take 25 mg by mouth daily.  LORazepam (ATIVAN) 1 mg tablet Take 1 mg by mouth every eight (8) hours as needed.  atorvastatin (LIPITOR) 10 mg tablet Take 10 mg by mouth daily.  calcium carbonate (CALTREX) 600 mg calcium (1,500 mg) tablet Take 600 mg by mouth daily. Past History     Past Medical History:  Past Medical History:   Diagnosis Date    Acid reflux     Hypertension     Spinal stenosis        Past Surgical History:  Past Surgical History:   Procedure Laterality Date    HX BACK SURGERY         Family History:  Family History   Problem Relation Age of Onset    Heart Attack Mother     Cancer Father        Social History:  Social History     Tobacco Use    Smoking status: Never Smoker    Smokeless tobacco: Never Used   Vaping Use    Vaping Use: Never used   Substance Use Topics    Alcohol use: Yes     Alcohol/week: 1.0 standard drink     Types: 1 Glasses of wine per week    Drug use: No       Allergies:  No Known Allergies      Review of Systems       Review of Systems   Constitutional: Negative for chills and fever.    Respiratory: Negative for shortness of breath. Cardiovascular: Negative for chest pain. Gastrointestinal: Negative for abdominal pain, nausea and vomiting. Musculoskeletal: Positive for arthralgias and myalgias. Skin: Negative for rash. Neurological: Positive for numbness. Negative for weakness. All other systems reviewed and are negative. Physical Exam     Visit Vitals  /67 (BP 1 Location: Left upper arm, BP Patient Position: Sitting)   Pulse 99   Temp 98.2 °F (36.8 °C)   Resp 18   Ht 5' 5\" (1.651 m)   Wt 72.6 kg (160 lb)   SpO2 97%   BMI 26.63 kg/m²         Physical Exam  Vitals and nursing note reviewed. Constitutional:       General: She is not in acute distress. Appearance: Normal appearance. She is well-developed. She is not ill-appearing, toxic-appearing or diaphoretic. HENT:      Head: Normocephalic and atraumatic. Cardiovascular:      Rate and Rhythm: Normal rate and regular rhythm. Heart sounds: Normal heart sounds. No murmur heard. No friction rub. No gallop. Pulmonary:      Effort: Pulmonary effort is normal. No respiratory distress. Breath sounds: Normal breath sounds. No wheezing or rales. Abdominal:      General: Abdomen is flat. There is no distension. Palpations: Abdomen is soft. Tenderness: There is no abdominal tenderness. Musculoskeletal:         General: Normal range of motion. Cervical back: Normal range of motion and neck supple. Right hip: Normal.      Right upper leg: No swelling, edema, deformity, tenderness or bony tenderness. Right knee: Normal.      Comments: Full ROM and strength of LE, dorsalis pedis 2+    Skin:     General: Skin is warm. Findings: No rash. Neurological:      General: No focal deficit present. Mental Status: She is alert and oriented to person, place, and time. Cranial Nerves: No cranial nerve deficit. Sensory: No sensory deficit. Motor: No weakness.       Gait: Gait normal.           Diagnostic Study Results     Labs -  No results found for this or any previous visit (from the past 12 hour(s)). Radiologic Studies -   XR FEMUR RT 2 VS   Final Result      Negative femur. DUPLEX LOWER EXT VENOUS RIGHT    (Results Pending)     · No evidence of acute deep vein thrombosis in the right common femoral, femoral, popliteal, posterior tibial, and peroneal veins. The veins were imaged in the transverse and longitudinal planes. The vessels showed normal color filling and compressibility. Doppler interrogation showed phasic and spontaneous flow. Medical Decision Making   I am the first provider for this patient. I reviewed the vital signs, available nursing notes, past medical history, past surgical history, family history and social history. Vital Signs-Reviewed the patient's vital signs. Records Reviewed: Nursing Notes and Old Medical Records (Time of Review: 1:48 PM)    ED Course: Progress Notes, Reevaluation, and Consults:  4:40 PM:  Reviewed results and plan with patient and . Discussed need for close outpatient follow-up this week for reassessment. Discussed strict return precautions, including leg swelling, discoloration, or any other medical concerns. Requesting stronger pain medication, notes she has tolerated Percocet in the past without issue. Provider Notes (Medical Decision Making): 69 yo F who presents to the ED due to R medial thigh pain x 3 days. No fall or trauma, weakness of extremity. Extremity neurovascularly intact. X-ray negative for acute process, Doppler negative for DVT. Do not feel further labs or imaging are warranted. Patient is stable for discharge with symptomatic management, close outpatient follow-up for further assessment. Strict return precautions provided. Diagnosis     Clinical Impression:   1.  Pain of right lower extremity        Disposition: home     Follow-up Information     Follow up With Specialties Details Why Contact [facility] DE[facility] Emergency Medicine  If symptoms worsen [address] [facility] [address]  [phone]    Ricardo Adkins MD Family Medicine Schedule an appointment as soon as possible for a visit   [address] [place] [address] [phone]             Discharge Medication List as of 5/30/2022  4:49 PM      START taking these medications    Details   acetaminophen (Tylenol Extra Strength) 500 mg tablet Take 2 Tablets by mouth every six (6) hours as needed for Pain., Normal, Disp-20 Tablet, R-0         CONTINUE these medications which have CHANGED    Details   lidocaine (Lidoderm) 5 % Apply patch to the affected area for 12 hours a day and remove for 12 hours a day., Normal, Disp-30 Each, R-0         CONTINUE these medications which have NOT CHANGED    Details   ! ! predniSONE (DELTASONE) 5 mg tablet Take 2 tabs by mouth x 15 days, then 1 tab daily. , Normal, Disp-45 Tablet, R-0      !! predniSONE (DELTASONE) 10 mg tablet 6 pills Day 1, 5 pills Day 2, 4 pills Day 3&4, 3 pills Day 5&6, 2 pills Day 7&8, 1 pill Day 9&10, 1/2 pill Day 11&12, Normal, Disp-32 Tablet, R-0      PreviDent 5000 Booster Plus 1.1 % pste Historical Med, DONNIE      gabapentin (Neurontin) 300 mg capsule 1 in the am and 3 in the evening as directed  Indications: neuropathic pain, Normal, Disp-360 Capsule, R-1Begin 7/25      nortriptyline (PAMELOR) 10 mg capsule Take 4 Capsules by mouth nightly., Normal, Disp-360 Capsule, R-1      alpha lipoic acid 200 mg cap Take 1 Cap by mouth daily. , Historical Med      cholecalciferol (VITAMIN D3) 1,000 unit cap Take 1 Cap by mouth daily. , Historical Med      MAGNESIUM OXIDE PO Take 1 Tab by mouth daily. , Historical Med      omeprazole (PRILOSEC) 40 mg capsule Take 40 mg by mouth daily. , Historical Med      losartan (COZAAR) 25 mg tablet Take 25 mg by mouth daily. , Historical Med      hydroCHLOROthiazide (HYDRODIURIL) 25 mg tablet Take 25 mg by mouth daily. , Historical Med      LORazepam (ATIVAN) 1 mg tablet Take 1 mg by mouth every eight (8) hours as needed., Historical Med      atorvastatin (LIPITOR) 10 mg tablet Take 10 mg by mouth daily. , Historical Med      calcium carbonate (CALTREX) 600 mg calcium (1,500 mg) tablet Take 600 mg by mouth daily. , Historical Med       !! - Potential duplicate medications found. Please discuss with provider. Dictation disclaimer:  Please note that this dictation was completed with FinalCAD, the computer voice recognition software. Quite often unanticipated grammatical, syntax, homophones, and other interpretive errors are inadvertently transcribed by the computer software. Please disregard these errors. Please excuse any errors that have escaped final proofreading.

## 2022-05-31 NOTE — TELEPHONE ENCOUNTER
Patient called in and stated that she spent 5 hours in the ER yesterday. She can't walk and can barely use her right leg and is walking w/ a hill at the moment. She stated Dr. Mandi Woodruff referred her to Dr. Christal Burkett. She is requesting a callback asap and stated she cannot make it into the office today due to extreme pain.     Patients contact# [phone]

## 2022-06-01 NOTE — TELEPHONE ENCOUNTER
Patient hasn't been scheduled with Dr. Yamile Soni. Her records were faxed on 05/26/2022. She was instructed to call [phone] to self-schedule. She expressed her understanding.

## 2022-06-01 NOTE — TELEPHONE ENCOUNTER
Patient contacted and she states she has been taking her nortriptyline and gabapentin and its not helping. She wanted to see if we could contact Dr Gabino Yancey office and try to get her seen sooner. I told her that I would send this message to our  to what she can do.

## 2022-06-09 ENCOUNTER — HOSPITAL ENCOUNTER (EMERGENCY)
Age: 77
Discharge: HOME | DRG: 517 | End: 2022-06-09
Attending: STUDENT IN AN ORGANIZED HEALTH CARE EDUCATION/TRAINING PROGRAM
Payer: MEDICARE

## 2022-06-09 VITALS
HEART RATE: 87 BPM | OXYGEN SATURATION: 98 % | SYSTOLIC BLOOD PRESSURE: 162 MMHG | TEMPERATURE: 98.1 F | RESPIRATION RATE: 18 BRPM | DIASTOLIC BLOOD PRESSURE: 79 MMHG

## 2022-06-09 DIAGNOSIS — M48.00 SPINAL STENOSIS, UNSPECIFIED SPINAL REGION: Primary | ICD-10-CM

## 2022-06-09 DIAGNOSIS — M54.41 ACUTE BILATERAL LOW BACK PAIN WITH RIGHT-SIDED SCIATICA: ICD-10-CM

## 2022-06-09 DIAGNOSIS — E87.6 HYPOKALEMIA: ICD-10-CM

## 2022-06-09 LAB
ALBUMIN SERPL-MCNC: 3.7 G/DL (ref 3.4–5)
ALBUMIN/GLOB SERPL: 1 {RATIO} (ref 0.8–1.7)
ALP SERPL-CCNC: 256 U/L (ref 45–117)
ALT SERPL-CCNC: 23 U/L (ref 13–56)
ANION GAP SERPL CALC-SCNC: 5 MMOL/L (ref 3–18)
APPEARANCE UR: CLEAR
AST SERPL-CCNC: 21 U/L (ref 10–38)
BACTERIA URNS QL MICRO: ABNORMAL /HPF
BASOPHILS # BLD: 0 K/UL (ref 0–0.1)
BASOPHILS NFR BLD: 0 % (ref 0–2)
BILIRUB SERPL-MCNC: 0.7 MG/DL (ref 0.2–1)
BILIRUB UR QL: NEGATIVE
BUN SERPL-MCNC: 24 MG/DL (ref 7–18)
BUN/CREAT SERPL: 42 (ref 12–20)
CALCIUM SERPL-MCNC: 9.3 MG/DL (ref 8.5–10.1)
CHLORIDE SERPL-SCNC: 101 MMOL/L (ref 100–111)
CO2 SERPL-SCNC: 31 MMOL/L (ref 21–32)
COLOR UR: YELLOW
CREAT SERPL-MCNC: 0.57 MG/DL (ref 0.6–1.3)
DIFFERENTIAL METHOD BLD: ABNORMAL
EOSINOPHIL # BLD: 0.1 K/UL (ref 0–0.4)
EOSINOPHIL NFR BLD: 1 % (ref 0–5)
EPITH CASTS URNS QL MICRO: ABNORMAL /LPF (ref 0–5)
ERYTHROCYTE [DISTWIDTH] IN BLOOD BY AUTOMATED COUNT: 17.1 % (ref 11.6–14.5)
GLOBULIN SER CALC-MCNC: 3.6 G/DL (ref 2–4)
GLUCOSE SERPL-MCNC: 109 MG/DL (ref 74–99)
GLUCOSE UR STRIP.AUTO-MCNC: NEGATIVE MG/DL
HCT VFR BLD AUTO: 31.8 % (ref 35–45)
HGB BLD-MCNC: 9.7 G/DL (ref 12–16)
HGB UR QL STRIP: NEGATIVE
IMM GRANULOCYTES # BLD AUTO: 0 K/UL (ref 0–0.04)
IMM GRANULOCYTES NFR BLD AUTO: 1 % (ref 0–0.5)
KETONES UR QL STRIP.AUTO: NEGATIVE MG/DL
LEUKOCYTE ESTERASE UR QL STRIP.AUTO: ABNORMAL
LIPASE SERPL-CCNC: 55 U/L (ref 73–393)
LYMPHOCYTES # BLD: 1.1 K/UL (ref 0.9–3.6)
LYMPHOCYTES NFR BLD: 15 % (ref 21–52)
MAGNESIUM SERPL-MCNC: 2.1 MG/DL (ref 1.6–2.6)
MCH RBC QN AUTO: 21.8 PG (ref 24–34)
MCHC RBC AUTO-ENTMCNC: 30.5 G/DL (ref 31–37)
MCV RBC AUTO: 71.6 FL (ref 78–100)
MONOCYTES # BLD: 0.6 K/UL (ref 0.05–1.2)
MONOCYTES NFR BLD: 8 % (ref 3–10)
NEUTS SEG # BLD: 5.6 K/UL (ref 1.8–8)
NEUTS SEG NFR BLD: 75 % (ref 40–73)
NITRITE UR QL STRIP.AUTO: NEGATIVE
NRBC # BLD: 0 K/UL (ref 0–0.01)
NRBC BLD-RTO: 0 PER 100 WBC
PH UR STRIP: 7 [PH] (ref 5–8)
PLATELET # BLD AUTO: 266 K/UL (ref 135–420)
PMV BLD AUTO: 8.1 FL (ref 9.2–11.8)
POTASSIUM SERPL-SCNC: 2.8 MMOL/L (ref 3.5–5.5)
PROT SERPL-MCNC: 7.3 G/DL (ref 6.4–8.2)
PROT UR STRIP-MCNC: NEGATIVE MG/DL
RBC # BLD AUTO: 4.44 M/UL (ref 4.2–5.3)
RBC #/AREA URNS HPF: NEGATIVE /HPF (ref 0–5)
SODIUM SERPL-SCNC: 137 MMOL/L (ref 136–145)
SP GR UR REFRACTOMETRY: 1.01 (ref 1–1.03)
UROBILINOGEN UR QL STRIP.AUTO: 1 EU/DL (ref 0.2–1)
WBC # BLD AUTO: 7.5 K/UL (ref 4.6–13.2)
WBC URNS QL MICRO: ABNORMAL /HPF (ref 0–4)

## 2022-06-09 PROCEDURE — 81001 URINALYSIS AUTO W/SCOPE: CPT

## 2022-06-09 PROCEDURE — 74011250636 HC RX REV CODE- 250/636: Performed by: PHYSICIAN ASSISTANT

## 2022-06-09 PROCEDURE — 80053 COMPREHEN METABOLIC PANEL: CPT

## 2022-06-09 PROCEDURE — 85025 COMPLETE CBC W/AUTO DIFF WBC: CPT

## 2022-06-09 PROCEDURE — 87086 URINE CULTURE/COLONY COUNT: CPT

## 2022-06-09 PROCEDURE — 83690 ASSAY OF LIPASE: CPT

## 2022-06-09 PROCEDURE — 83735 ASSAY OF MAGNESIUM: CPT

## 2022-06-09 PROCEDURE — 74011250637 HC RX REV CODE- 250/637: Performed by: PHYSICIAN ASSISTANT

## 2022-06-09 RX ORDER — POTASSIUM CHLORIDE 7.45 MG/ML
10 INJECTION INTRAVENOUS
Status: DISPENSED | OUTPATIENT
Start: 2022-06-09 | End: 2022-06-09

## 2022-06-09 RX ORDER — POTASSIUM CHLORIDE 7.45 MG/ML
10 INJECTION INTRAVENOUS
Status: DISCONTINUED | OUTPATIENT
Start: 2022-06-09 | End: 2022-06-09

## 2022-06-09 RX ORDER — MORPHINE SULFATE 4 MG/ML
4 INJECTION INTRAVENOUS
Status: COMPLETED | OUTPATIENT
Start: 2022-06-09 | End: 2022-06-09

## 2022-06-09 RX ORDER — OXYCODONE AND ACETAMINOPHEN 5; 325 MG/1; MG/1
1 TABLET ORAL
Qty: 8 TABLET | Refills: 0 | Status: SHIPPED | OUTPATIENT
Start: 2022-06-09 | End: 2022-06-10

## 2022-06-09 RX ORDER — POTASSIUM CHLORIDE 20 MEQ/1
40 TABLET, EXTENDED RELEASE ORAL
Status: COMPLETED | OUTPATIENT
Start: 2022-06-09 | End: 2022-06-09

## 2022-06-09 RX ADMIN — POTASSIUM CHLORIDE 10 MEQ: 7.46 INJECTION, SOLUTION INTRAVENOUS at 14:56

## 2022-06-09 RX ADMIN — POTASSIUM CHLORIDE 10 MEQ: 7.46 INJECTION, SOLUTION INTRAVENOUS at 16:00

## 2022-06-09 RX ADMIN — POTASSIUM CHLORIDE 40 MEQ: 1500 TABLET, EXTENDED RELEASE ORAL at 14:52

## 2022-06-09 RX ADMIN — MORPHINE SULFATE 4 MG: 4 INJECTION, SOLUTION INTRAMUSCULAR; INTRAVENOUS at 13:43

## 2022-06-09 RX ADMIN — METHYLPREDNISOLONE SODIUM SUCCINATE 125 MG: 125 INJECTION, POWDER, FOR SOLUTION INTRAMUSCULAR; INTRAVENOUS at 13:40

## 2022-06-09 NOTE — ED TRIAGE NOTES
Per EMS, Patient  had recent back surgery. He can't lift the patient, so she called 911 to expedite the appointment that she has with Dr. Quiana Pope. He is hoping that he is here and can order test and pain medication for her.

## 2022-06-09 NOTE — ED NOTES
Both pt and pt's  insist that pt is unable to walk and do not think she should be discharged home. Pt and  visibly upset to be informed by provider that pt does not meet admission criteria. Pt transported to residence via [facility].

## 2022-06-09 NOTE — ED NOTES
Patient was primarily seen and evaluated by BAM Massey, please see her individual note for full details. In brief this is a 51-year-old female with a history of spinal stenosis awaiting a neurosurgical evaluation by Dr. Faiza Guerra tomorrow in the office who came in with worsening acute on chronic bilateral lower back pain. Patient's work-up here today is noted for hypokalemia which was repleted via IV and oral, she is not having any saddle anesthesia, bowel or bladder incontinence. She does say that there is a harder time walking, but only secondary to pain. Strength is 5/5 in bilateral lower extremities. I was called to the room to talk to family about a discharge and disposition plan. There is some hesitancy about being discharged due to a harder time caring for her at home. We have discussed with Dr. Faiza Guerra, he recommends that she needs to be seen in the office tomorrow. We have also talked to transportation, the plan is for them to go home tonight, and transportation will pick them up and take her to the doctor's office tomorrow. We discussed signs and symptoms that would prompt return to the emergency department. We offered hospitalization, but the patient prefers to be at home, she says she is \"losing her mind sitting in his hospital bed \". I think this is reasonable, however there is a chance that they may return in the near future for ongoing symptoms.     Nora Watts MD

## 2022-06-09 NOTE — ED PROVIDER NOTES
EMERGENCY DEPARTMENT HISTORY AND PHYSICAL EXAM    Date: 6/9/2022  Patient Name: Rut Spence    History of Presenting Illness     Chief Complaint   Patient presents with    Back Pain         History Provided By: Patient and     Chief Complaint: Lower back pain  Duration: Weeks however worsening in the past couple days  Timing: Worsening  Location: Bilateral lower back however worse on the right with radiation into the right buttock and right posterior thigh  Quality: Painful  Severity: Severe  Modifying Factors: Worse with movement  Associated Symptoms: none       Additional History (Context): Rut Spence is a 68 y.o. female with a history of hypertension, arthritis and spinal stenosis who presents today for issues listed above. Patient has known about her spinal stenosis and chronic back pain for some time however reports that it is gotten worse in the past couple days. Reports that she is typically able to get around the house and minimal pain with a walker however reports now her pain is more severe and she is having a hard time walking secondary to pain. Denies any urinary incontinence or bowel incontinence. Reports that it is hard for her to urinate secondary to it being painful to sit on the toilet. Reports that she has tried Percocet for her pain but states it does not help at all. Reports that she has been seen by Dr. Pedro Schaeffer in the past and has an appointment with him tomorrow. Patient's  reports he has had a hard time helping her around because he had a spinal fusion 4 months ago. Denies any history of cancer or IV drug abuse. Denies any recent fevers or chills. Denies any urinary complaints but does states she is known for urinary tract infections.       PCP: Laura Tinsley MD    Current Facility-Administered Medications   Medication Dose Route Frequency Provider Last Rate Last Admin    potassium chloride 10 mEq in 100 ml IVPB  10 mEq IntraVENous Q1H BAM Sherman 100 mL/hr at 06/09/22 1600 10 mEq at 06/09/22 1600     Current Outpatient Medications   Medication Sig Dispense Refill    oxyCODONE-acetaminophen (Percocet) 5-325 mg per tablet Take 1 Tablet by mouth every six (6) hours as needed for Pain for up to 3 days. Max Daily Amount: 4 Tablets. 8 Tablet 0    lidocaine (Lidoderm) 5 % Apply patch to the affected area for 12 hours a day and remove for 12 hours a day. 30 Each 0    naloxone (NARCAN) 4 mg/actuation nasal spray Use 1 spray intranasally, then discard. Repeat with new spray every 2 min as needed for opioid overdose symptoms, alternating nostrils. 2 Each 0    predniSONE (DELTASONE) 5 mg tablet Take 2 tabs by mouth x 15 days, then 1 tab daily. 45 Tablet 0    predniSONE (DELTASONE) 10 mg tablet 6 pills Day 1, 5 pills Day 2, 4 pills Day 3&4, 3 pills Day 5&6, 2 pills Day 7&8, 1 pill Day 9&10, 1/2 pill Day 11&12 32 Tablet 0    PreviDent 5000 Booster Plus 1.1 % pste       gabapentin (Neurontin) 300 mg capsule 1 in the am and 3 in the evening as directed  Indications: neuropathic pain (Patient taking differently: Take 300 mg by mouth three (3) times daily. 1 in the am and 3 in the evening as directed  Indications: neuropathic pain) 360 Capsule 1    nortriptyline (PAMELOR) 10 mg capsule Take 4 Capsules by mouth nightly. 360 Capsule 1    alpha lipoic acid 200 mg cap Take 1 Cap by mouth daily.  cholecalciferol (VITAMIN D3) 1,000 unit cap Take 1 Cap by mouth daily.  MAGNESIUM OXIDE PO Take 1 Tab by mouth daily.  omeprazole (PRILOSEC) 40 mg capsule Take 40 mg by mouth daily.  losartan (COZAAR) 25 mg tablet Take 25 mg by mouth daily.  hydroCHLOROthiazide (HYDRODIURIL) 25 mg tablet Take 25 mg by mouth daily.  LORazepam (ATIVAN) 1 mg tablet Take 1 mg by mouth every eight (8) hours as needed.  atorvastatin (LIPITOR) 10 mg tablet Take 10 mg by mouth daily.       calcium carbonate (CALTREX) 600 mg calcium (1,500 mg) tablet Take 600 mg by mouth daily. Past History     Past Medical History:  Past Medical History:   Diagnosis Date    Acid reflux     Hypertension     Spinal stenosis        Past Surgical History:  Past Surgical History:   Procedure Laterality Date    HX BACK SURGERY         Family History:  Family History   Problem Relation Age of Onset    Heart Attack Mother     Cancer Father        Social History:  Social History     Tobacco Use    Smoking status: Never Smoker    Smokeless tobacco: Never Used   Vaping Use    Vaping Use: Never used   Substance Use Topics    Alcohol use: Yes     Alcohol/week: 1.0 standard drink     Types: 1 Glasses of wine per week    Drug use: No       Allergies:  No Known Allergies      Review of Systems   Review of Systems   Constitutional: Negative for chills and fever. HENT: Negative for congestion, rhinorrhea and sore throat. Respiratory: Negative for cough and shortness of breath. Cardiovascular: Negative for chest pain. Gastrointestinal: Negative for abdominal pain, blood in stool, constipation, diarrhea, nausea and vomiting. Genitourinary: Negative for dysuria, frequency and hematuria. Musculoskeletal: Positive for back pain. Negative for myalgias. Skin: Negative for rash and wound. Neurological: Negative for dizziness and headaches. All other systems reviewed and are negative. All Other Systems Negative  Physical Exam     Vitals:    06/09/22 1243   BP: (!) 141/78   Pulse: 87   Resp: 18   Temp: 98.1 °F (36.7 °C)   SpO2: 99%     Physical Exam  Vitals and nursing note reviewed. Constitutional:       General: She is not in acute distress. Appearance: She is well-developed. She is not diaphoretic. Comments: Patient appears uncomfortable    HENT:      Head: Normocephalic and atraumatic. Eyes:      Conjunctiva/sclera: Conjunctivae normal.   Cardiovascular:      Rate and Rhythm: Normal rate and regular rhythm. Heart sounds: Normal heart sounds.    Pulmonary: Effort: Pulmonary effort is normal. No respiratory distress. Breath sounds: Normal breath sounds. Chest:      Chest wall: No tenderness. Abdominal:      General: Bowel sounds are normal. There is no distension. Palpations: Abdomen is soft. Tenderness: There is no abdominal tenderness. There is no guarding or rebound. Musculoskeletal:         General: No deformity. Cervical back: Normal range of motion and neck supple. Lumbar back: Tenderness present. No deformity, signs of trauma or spasms. Positive right straight leg raise test.      Comments: Right Lower  paralumbar reproducible tenderness to palpation. No Lumbar midline TTP. No other midline vertebral bony point tenderness or step-off. No foot drop. Distal sensation intact bilaterally, normal gait, no saddle anesthesia. Bilateral DP 3+. + right straight leg raise. Skin:     General: Skin is warm and dry. Neurological:      Mental Status: She is alert and oriented to person, place, and time. Deep Tendon Reflexes: Reflexes are normal and symmetric. Diagnostic Study Results     Labs -     Recent Results (from the past 12 hour(s))   CBC WITH AUTOMATED DIFF    Collection Time: 06/09/22  1:30 PM   Result Value Ref Range    WBC 7.5 4.6 - 13.2 K/uL    RBC 4.44 4.20 - 5.30 M/uL    HGB 9.7 (L) 12.0 - 16.0 g/dL    HCT 31.8 (L) 35.0 - 45.0 %    MCV 71.6 (L) 78.0 - 100.0 FL    MCH 21.8 (L) 24.0 - 34.0 PG    MCHC 30.5 (L) 31.0 - 37.0 g/dL    RDW 17.1 (H) 11.6 - 14.5 %    PLATELET 583 396 - 530 K/uL    MPV 8.1 (L) 9.2 - 11.8 FL    NRBC 0.0 0  WBC    ABSOLUTE NRBC 0.00 0.00 - 0.01 K/uL    NEUTROPHILS 75 (H) 40 - 73 %    LYMPHOCYTES 15 (L) 21 - 52 %    MONOCYTES 8 3 - 10 %    EOSINOPHILS 1 0 - 5 %    BASOPHILS 0 0 - 2 %    IMMATURE GRANULOCYTES 1 (H) 0.0 - 0.5 %    ABS. NEUTROPHILS 5.6 1.8 - 8.0 K/UL    ABS. LYMPHOCYTES 1.1 0.9 - 3.6 K/UL    ABS. MONOCYTES 0.6 0.05 - 1.2 K/UL    ABS.  EOSINOPHILS 0.1 0.0 - 0.4 K/UL ABS. BASOPHILS 0.0 0.0 - 0.1 K/UL    ABS. IMM. GRANS. 0.0 0.00 - 0.04 K/UL    DF AUTOMATED     METABOLIC PANEL, COMPREHENSIVE    Collection Time: 06/09/22  1:30 PM   Result Value Ref Range    Sodium 137 136 - 145 mmol/L    Potassium 2.8 (LL) 3.5 - 5.5 mmol/L    Chloride 101 100 - 111 mmol/L    CO2 31 21 - 32 mmol/L    Anion gap 5 3.0 - 18 mmol/L    Glucose 109 (H) 74 - 99 mg/dL    BUN 24 (H) 7.0 - 18 MG/DL    Creatinine 0.57 (L) 0.6 - 1.3 MG/DL    BUN/Creatinine ratio 42 (H) 12 - 20      GFR est AA >60 >60 ml/min/1.73m2    GFR est non-AA >60 >60 ml/min/1.73m2    Calcium 9.3 8.5 - 10.1 MG/DL    Bilirubin, total 0.7 0.2 - 1.0 MG/DL    ALT (SGPT) 23 13 - 56 U/L    AST (SGOT) 21 10 - 38 U/L    Alk. phosphatase 256 (H) 45 - 117 U/L    Protein, total 7.3 6.4 - 8.2 g/dL    Albumin 3.7 3.4 - 5.0 g/dL    Globulin 3.6 2.0 - 4.0 g/dL    A-G Ratio 1.0 0.8 - 1.7     MAGNESIUM    Collection Time: 06/09/22  1:30 PM   Result Value Ref Range    Magnesium 2.1 1.6 - 2.6 mg/dL   LIPASE    Collection Time: 06/09/22  1:30 PM   Result Value Ref Range    Lipase 55 (L) 73 - 393 U/L   URINALYSIS W/ RFLX MICROSCOPIC    Collection Time: 06/09/22  3:12 PM   Result Value Ref Range    Color YELLOW      Appearance CLEAR      Specific gravity 1.012 1.005 - 1.030      pH (UA) 7.0 5.0 - 8.0      Protein Negative NEG mg/dL    Glucose Negative NEG mg/dL    Ketone Negative NEG mg/dL    Bilirubin Negative NEG      Blood Negative NEG      Urobilinogen 1.0 0.2 - 1.0 EU/dL    Nitrites Negative NEG      Leukocyte Esterase SMALL (A) NEG         Radiologic Studies -   No orders to display     CT Results  (Last 48 hours)    None        CXR Results  (Last 48 hours)    None            Medical Decision Making   I am the first provider for this patient. I reviewed the vital signs, available nursing notes, past medical history, past surgical history, family history and social history. Vital Signs-Reviewed the patient's vital signs.       Records Reviewed: Nursing Notes and Old Medical Records     Procedures: None   Procedures    Provider Notes (Medical Decision Making):     Differential Diagnosis: Musculoskeletal pain, myofascial strain/sprain, muscle spasm, spondylolisthesis, spondylosis, DJD, OA, sciatica, cauda equina syndrome    Plan: Will order pain meds, labs and ua and will consult Dr. Papo Quinones. 1:09 PM   Dr. Papo Quinones agrees with current plan for pain control, labs and ua. Does not advise additional imaging at this time. ED Course as of 06/09/22 1621   Thu Jun 09, 2022   1418 Patient's potassium noted to be 2.8. Will order IV and oral potassium replacement. Pending UA at this time. [CS]      ED Course User Index  [CS] Sonu Moore     4:19 PM  Patient reports her pain has greatly improved. Bilateral leg strength is 5/5. We will continue to replace potassium prior to discharge. Patient states that Percocet is the only thing that has helped her in the past, have agreed to give a small amount however have given strict fall precautions. Have strongly encourage patient to keep her follow-up appointment with Dr. Papo Quinones for tomorrow. Return precautions have been given. Will discharge home. 6:25 PM  When it was time for transport to take the patient home the  refused to go home and asked to speak to Dr. Katalina Louie. Dr. Katalina Louie did discuss multiple options to include admission and out patient follow-up. Patient herself reports she would prefer to go home and will follow-up with Dr. Papo Quinones tomorrow. MED RECONCILIATION:  Current Facility-Administered Medications   Medication Dose Route Frequency    potassium chloride 10 mEq in 100 ml IVPB  10 mEq IntraVENous Q1H     Current Outpatient Medications   Medication Sig    oxyCODONE-acetaminophen (Percocet) 5-325 mg per tablet Take 1 Tablet by mouth every six (6) hours as needed for Pain for up to 3 days. Max Daily Amount: 4 Tablets.     lidocaine (Lidoderm) 5 % Apply patch to the affected area for 12 hours a day and remove for 12 hours a day.  naloxone (NARCAN) 4 mg/actuation nasal spray Use 1 spray intranasally, then discard. Repeat with new spray every 2 min as needed for opioid overdose symptoms, alternating nostrils.  predniSONE (DELTASONE) 5 mg tablet Take 2 tabs by mouth x 15 days, then 1 tab daily.  predniSONE (DELTASONE) 10 mg tablet 6 pills Day 1, 5 pills Day 2, 4 pills Day 3&4, 3 pills Day 5&6, 2 pills Day 7&8, 1 pill Day 9&10, 1/2 pill Day 11&12    PreviDent 5000 Booster Plus 1.1 % pste     gabapentin (Neurontin) 300 mg capsule 1 in the am and 3 in the evening as directed  Indications: neuropathic pain (Patient taking differently: Take 300 mg by mouth three (3) times daily. 1 in the am and 3 in the evening as directed  Indications: neuropathic pain)    nortriptyline (PAMELOR) 10 mg capsule Take 4 Capsules by mouth nightly.  alpha lipoic acid 200 mg cap Take 1 Cap by mouth daily.  cholecalciferol (VITAMIN D3) 1,000 unit cap Take 1 Cap by mouth daily.  MAGNESIUM OXIDE PO Take 1 Tab by mouth daily.  omeprazole (PRILOSEC) 40 mg capsule Take 40 mg by mouth daily.  losartan (COZAAR) 25 mg tablet Take 25 mg by mouth daily.  hydroCHLOROthiazide (HYDRODIURIL) 25 mg tablet Take 25 mg by mouth daily.  LORazepam (ATIVAN) 1 mg tablet Take 1 mg by mouth every eight (8) hours as needed.  atorvastatin (LIPITOR) 10 mg tablet Take 10 mg by mouth daily.  calcium carbonate (CALTREX) 600 mg calcium (1,500 mg) tablet Take 600 mg by mouth daily. Disposition:  Home     DISCHARGE NOTE:   Pt has been reexamined. Patient has no new complaints, changes, or physical findings. Care plan outlined and precautions discussed. Results of workup were reviewed with the patient. All medications were reviewed with the patient. All of pt's questions and concerns were addressed.  Patient was instructed and agrees to follow up with PCP/Spine as well as to return to the ED upon further deterioration. Patient is ready to go home. Follow-up Information     Follow up With Specialties Details Why Contact Info    [facility] EMERGENCY DEPT Emergency Medicine  As needed [address] [address] [facility]    Nathalia Davis MD Orthopedic Surgery In 1 day  No longer with Tila 05 [id number]            Current Discharge Medication List      START taking these medications    Details   oxyCODONE-acetaminophen (Percocet) 5-325 mg per tablet Take 1 Tablet by mouth every six (6) hours as needed for Pain for up to 3 days. Max Daily Amount: 4 Tablets. Qty: 8 Tablet, Refills: 0  Start date: 6/9/2022, End date: 6/12/2022    Associated Diagnoses: Spinal stenosis, unspecified spinal region                 Diagnosis     Clinical Impression:   1. Spinal stenosis, unspecified spinal region    2. Acute bilateral low back pain with right-sided sciatica    3. Hypokalemia          \"Please note that this dictation was completed with [employer], the computer voice recognition software. Quite often unanticipated grammatical, syntax, homophones, and other interpretive errors are inadvertently transcribed by the computer software. Please disregard these errors. Please excuse any errors that have escaped final proofreading. \"

## 2022-06-10 ENCOUNTER — APPOINTMENT (OUTPATIENT)
Dept: GENERAL RADIOLOGY | Age: 77
DRG: 517 | End: 2022-06-10
Attending: PHYSICIAN ASSISTANT
Payer: MEDICARE

## 2022-06-10 ENCOUNTER — APPOINTMENT (OUTPATIENT)
Dept: CT IMAGING | Age: 77
DRG: 517 | End: 2022-06-10
Attending: PHYSICIAN ASSISTANT
Payer: MEDICARE

## 2022-06-10 ENCOUNTER — HOSPITAL ENCOUNTER (INPATIENT)
Age: 77
LOS: 7 days | Discharge: NURSING, REHAB OR LONG-TERM CARE | DRG: 517 | End: 2022-06-17
Attending: STUDENT IN AN ORGANIZED HEALTH CARE EDUCATION/TRAINING PROGRAM | Admitting: INTERNAL MEDICINE
Payer: MEDICARE

## 2022-06-10 DIAGNOSIS — M48.00 SPINAL STENOSIS, UNSPECIFIED SPINAL REGION: Primary | ICD-10-CM

## 2022-06-10 DIAGNOSIS — M48.061 SPINAL STENOSIS OF LUMBAR REGION, UNSPECIFIED WHETHER NEUROGENIC CLAUDICATION PRESENT: ICD-10-CM

## 2022-06-10 DIAGNOSIS — M54.31 SCIATICA OF RIGHT SIDE: ICD-10-CM

## 2022-06-10 DIAGNOSIS — M62.81 MUSCLE WEAKNESS OF LOWER EXTREMITY: ICD-10-CM

## 2022-06-10 PROBLEM — R29.898 LEG WEAKNESS, BILATERAL: Status: ACTIVE | Noted: 2022-06-10

## 2022-06-10 LAB
ANION GAP SERPL CALC-SCNC: 5 MMOL/L (ref 3–18)
APTT PPP: 20.8 SEC (ref 23–36.4)
BACTERIA SPEC CULT: NORMAL
BASOPHILS # BLD: 0 K/UL (ref 0–0.1)
BASOPHILS NFR BLD: 0 % (ref 0–2)
BUN SERPL-MCNC: 31 MG/DL (ref 7–18)
BUN/CREAT SERPL: 41 (ref 12–20)
CALCIUM SERPL-MCNC: 9.9 MG/DL (ref 8.5–10.1)
CC UR VC: NORMAL
CHLORIDE SERPL-SCNC: 106 MMOL/L (ref 100–111)
CO2 SERPL-SCNC: 28 MMOL/L (ref 21–32)
COVID-19 RAPID TEST, COVR: NOT DETECTED
CREAT SERPL-MCNC: 0.75 MG/DL (ref 0.6–1.3)
DIFFERENTIAL METHOD BLD: ABNORMAL
EOSINOPHIL # BLD: 0 K/UL (ref 0–0.4)
EOSINOPHIL NFR BLD: 0 % (ref 0–5)
ERYTHROCYTE [DISTWIDTH] IN BLOOD BY AUTOMATED COUNT: 17.6 % (ref 11.6–14.5)
EST. AVERAGE GLUCOSE BLD GHB EST-MCNC: 131 MG/DL
FERRITIN SERPL-MCNC: 31 NG/ML (ref 8–388)
GLUCOSE SERPL-MCNC: 106 MG/DL (ref 74–99)
HBA1C MFR BLD: 6.2 % (ref 4.2–5.6)
HCT VFR BLD AUTO: 34.6 % (ref 35–45)
HGB BLD-MCNC: 10.6 G/DL (ref 12–16)
IMM GRANULOCYTES # BLD AUTO: 0.1 K/UL (ref 0–0.04)
IMM GRANULOCYTES NFR BLD AUTO: 0 % (ref 0–0.5)
INR PPP: 1 (ref 0.8–1.2)
IRON SATN MFR SERPL: 7 % (ref 20–50)
IRON SERPL-MCNC: 30 UG/DL (ref 50–175)
LYMPHOCYTES # BLD: 1.3 K/UL (ref 0.9–3.6)
LYMPHOCYTES NFR BLD: 10 % (ref 21–52)
MCH RBC QN AUTO: 21.8 PG (ref 24–34)
MCHC RBC AUTO-ENTMCNC: 30.6 G/DL (ref 31–37)
MCV RBC AUTO: 71 FL (ref 78–100)
MONOCYTES # BLD: 1.1 K/UL (ref 0.05–1.2)
MONOCYTES NFR BLD: 9 % (ref 3–10)
NEUTS SEG # BLD: 10.2 K/UL (ref 1.8–8)
NEUTS SEG NFR BLD: 80 % (ref 40–73)
NRBC # BLD: 0 K/UL (ref 0–0.01)
NRBC BLD-RTO: 0 PER 100 WBC
PLATELET # BLD AUTO: 407 K/UL (ref 135–420)
PMV BLD AUTO: 9.2 FL (ref 9.2–11.8)
POTASSIUM SERPL-SCNC: 3.6 MMOL/L (ref 3.5–5.5)
PROTHROMBIN TIME: 13.1 SEC (ref 11.5–15.2)
RBC # BLD AUTO: 4.87 M/UL (ref 4.2–5.3)
SERVICE CMNT-IMP: NORMAL
SODIUM SERPL-SCNC: 139 MMOL/L (ref 136–145)
SOURCE, COVRS: NORMAL
TIBC SERPL-MCNC: 413 UG/DL (ref 250–450)
TSH SERPL DL<=0.05 MIU/L-ACNC: 0.63 UIU/ML (ref 0.36–3.74)
WBC # BLD AUTO: 12.6 K/UL (ref 4.6–13.2)

## 2022-06-10 PROCEDURE — 74011000250 HC RX REV CODE- 250: Performed by: STUDENT IN AN ORGANIZED HEALTH CARE EDUCATION/TRAINING PROGRAM

## 2022-06-10 PROCEDURE — 74011250637 HC RX REV CODE- 250/637: Performed by: INTERNAL MEDICINE

## 2022-06-10 PROCEDURE — 74011250636 HC RX REV CODE- 250/636: Performed by: INTERNAL MEDICINE

## 2022-06-10 PROCEDURE — 65270000046 HC RM TELEMETRY

## 2022-06-10 PROCEDURE — 83036 HEMOGLOBIN GLYCOSYLATED A1C: CPT

## 2022-06-10 PROCEDURE — 87635 SARS-COV-2 COVID-19 AMP PRB: CPT

## 2022-06-10 PROCEDURE — 99285 EMERGENCY DEPT VISIT HI MDM: CPT

## 2022-06-10 PROCEDURE — 82728 ASSAY OF FERRITIN: CPT

## 2022-06-10 PROCEDURE — 80048 BASIC METABOLIC PNL TOTAL CA: CPT

## 2022-06-10 PROCEDURE — 96374 THER/PROPH/DIAG INJ IV PUSH: CPT

## 2022-06-10 PROCEDURE — 85730 THROMBOPLASTIN TIME PARTIAL: CPT

## 2022-06-10 PROCEDURE — 85025 COMPLETE CBC W/AUTO DIFF WBC: CPT

## 2022-06-10 PROCEDURE — 99222 1ST HOSP IP/OBS MODERATE 55: CPT | Performed by: INTERNAL MEDICINE

## 2022-06-10 PROCEDURE — B01B1ZZ FLUOROSCOPY OF SPINAL CORD USING LOW OSMOLAR CONTRAST: ICD-10-PCS | Performed by: RADIOLOGY

## 2022-06-10 PROCEDURE — 74011000250 HC RX REV CODE- 250: Performed by: INTERNAL MEDICINE

## 2022-06-10 PROCEDURE — 77030029970 XR MYELO LUMBAR

## 2022-06-10 PROCEDURE — 74011000636 HC RX REV CODE- 636: Performed by: STUDENT IN AN ORGANIZED HEALTH CARE EDUCATION/TRAINING PROGRAM

## 2022-06-10 PROCEDURE — 82607 VITAMIN B-12: CPT

## 2022-06-10 PROCEDURE — 84443 ASSAY THYROID STIM HORMONE: CPT

## 2022-06-10 PROCEDURE — 83540 ASSAY OF IRON: CPT

## 2022-06-10 PROCEDURE — 74011250636 HC RX REV CODE- 250/636: Performed by: PHYSICIAN ASSISTANT

## 2022-06-10 PROCEDURE — 72132 CT LUMBAR SPINE W/DYE: CPT

## 2022-06-10 PROCEDURE — 85610 PROTHROMBIN TIME: CPT

## 2022-06-10 RX ORDER — ENOXAPARIN SODIUM 100 MG/ML
40 INJECTION SUBCUTANEOUS DAILY
Status: DISCONTINUED | OUTPATIENT
Start: 2022-06-11 | End: 2022-06-17 | Stop reason: HOSPADM

## 2022-06-10 RX ORDER — HYDROMORPHONE HYDROCHLORIDE 1 MG/ML
0.5 INJECTION, SOLUTION INTRAMUSCULAR; INTRAVENOUS; SUBCUTANEOUS
Status: DISCONTINUED | OUTPATIENT
Start: 2022-06-10 | End: 2022-06-10

## 2022-06-10 RX ORDER — ONDANSETRON 2 MG/ML
4 INJECTION INTRAMUSCULAR; INTRAVENOUS
Status: DISCONTINUED | OUTPATIENT
Start: 2022-06-10 | End: 2022-06-17 | Stop reason: HOSPADM

## 2022-06-10 RX ORDER — LIDOCAINE HYDROCHLORIDE 10 MG/ML
6 INJECTION, SOLUTION EPIDURAL; INFILTRATION; INTRACAUDAL; PERINEURAL
Status: COMPLETED | OUTPATIENT
Start: 2022-06-10 | End: 2022-06-10

## 2022-06-10 RX ORDER — NALOXONE HYDROCHLORIDE 0.4 MG/ML
0.4 INJECTION, SOLUTION INTRAMUSCULAR; INTRAVENOUS; SUBCUTANEOUS
Status: DISCONTINUED | OUTPATIENT
Start: 2022-06-10 | End: 2022-06-17 | Stop reason: HOSPADM

## 2022-06-10 RX ORDER — SODIUM CHLORIDE, SODIUM LACTATE, POTASSIUM CHLORIDE, CALCIUM CHLORIDE 600; 310; 30; 20 MG/100ML; MG/100ML; MG/100ML; MG/100ML
1000 INJECTION, SOLUTION INTRAVENOUS CONTINUOUS
Status: DISCONTINUED | OUTPATIENT
Start: 2022-06-10 | End: 2022-06-16

## 2022-06-10 RX ORDER — POLYETHYLENE GLYCOL 3350 17 G/17G
17 POWDER, FOR SOLUTION ORAL DAILY PRN
Status: DISCONTINUED | OUTPATIENT
Start: 2022-06-10 | End: 2022-06-17 | Stop reason: HOSPADM

## 2022-06-10 RX ORDER — SODIUM CHLORIDE 0.9 % (FLUSH) 0.9 %
5-40 SYRINGE (ML) INJECTION EVERY 8 HOURS
Status: DISCONTINUED | OUTPATIENT
Start: 2022-06-10 | End: 2022-06-14

## 2022-06-10 RX ORDER — NORTRIPTYLINE HYDROCHLORIDE 10 MG/1
40 CAPSULE ORAL
Status: DISCONTINUED | OUTPATIENT
Start: 2022-06-10 | End: 2022-06-17 | Stop reason: HOSPADM

## 2022-06-10 RX ORDER — PROMETHAZINE HYDROCHLORIDE 12.5 MG/1
12.5 TABLET ORAL
Status: DISCONTINUED | OUTPATIENT
Start: 2022-06-10 | End: 2022-06-17 | Stop reason: HOSPADM

## 2022-06-10 RX ORDER — PANTOPRAZOLE SODIUM 40 MG/1
40 TABLET, DELAYED RELEASE ORAL
Status: DISCONTINUED | OUTPATIENT
Start: 2022-06-11 | End: 2022-06-17 | Stop reason: HOSPADM

## 2022-06-10 RX ORDER — HYDROMORPHONE HYDROCHLORIDE 1 MG/ML
0.5 INJECTION, SOLUTION INTRAMUSCULAR; INTRAVENOUS; SUBCUTANEOUS
Status: DISCONTINUED | OUTPATIENT
Start: 2022-06-10 | End: 2022-06-11

## 2022-06-10 RX ORDER — ACETAMINOPHEN 325 MG/1
650 TABLET ORAL
Status: DISCONTINUED | OUTPATIENT
Start: 2022-06-10 | End: 2022-06-17 | Stop reason: HOSPADM

## 2022-06-10 RX ORDER — HYDROMORPHONE HYDROCHLORIDE 1 MG/ML
1 INJECTION, SOLUTION INTRAMUSCULAR; INTRAVENOUS; SUBCUTANEOUS
Status: DISCONTINUED | OUTPATIENT
Start: 2022-06-10 | End: 2022-06-10

## 2022-06-10 RX ORDER — MORPHINE SULFATE 4 MG/ML
4 INJECTION INTRAVENOUS
Status: COMPLETED | OUTPATIENT
Start: 2022-06-10 | End: 2022-06-10

## 2022-06-10 RX ORDER — SODIUM CHLORIDE 0.9 % (FLUSH) 0.9 %
5-40 SYRINGE (ML) INJECTION AS NEEDED
Status: DISCONTINUED | OUTPATIENT
Start: 2022-06-10 | End: 2022-06-17 | Stop reason: HOSPADM

## 2022-06-10 RX ORDER — HYDROMORPHONE HYDROCHLORIDE 1 MG/ML
1 INJECTION, SOLUTION INTRAMUSCULAR; INTRAVENOUS; SUBCUTANEOUS
Status: DISCONTINUED | OUTPATIENT
Start: 2022-06-10 | End: 2022-06-11

## 2022-06-10 RX ORDER — ACETAMINOPHEN 500 MG
1000 TABLET ORAL EVERY 8 HOURS
Status: DISCONTINUED | OUTPATIENT
Start: 2022-06-10 | End: 2022-06-17 | Stop reason: HOSPADM

## 2022-06-10 RX ORDER — ATORVASTATIN CALCIUM 10 MG/1
10 TABLET, FILM COATED ORAL DAILY
Status: DISCONTINUED | OUTPATIENT
Start: 2022-06-11 | End: 2022-06-17 | Stop reason: HOSPADM

## 2022-06-10 RX ORDER — ACETAMINOPHEN 650 MG/1
650 SUPPOSITORY RECTAL
Status: DISCONTINUED | OUTPATIENT
Start: 2022-06-10 | End: 2022-06-17 | Stop reason: HOSPADM

## 2022-06-10 RX ORDER — OXYCODONE HYDROCHLORIDE 5 MG/1
5 TABLET ORAL
Status: DISCONTINUED | OUTPATIENT
Start: 2022-06-10 | End: 2022-06-16

## 2022-06-10 RX ORDER — HYDROMORPHONE HYDROCHLORIDE 1 MG/ML
.5-1 INJECTION, SOLUTION INTRAMUSCULAR; INTRAVENOUS; SUBCUTANEOUS
Status: DISCONTINUED | OUTPATIENT
Start: 2022-06-10 | End: 2022-06-10

## 2022-06-10 RX ORDER — LOSARTAN POTASSIUM 25 MG/1
25 TABLET ORAL DAILY
Status: DISCONTINUED | OUTPATIENT
Start: 2022-06-11 | End: 2022-06-16

## 2022-06-10 RX ADMIN — NORTRIPTYLINE HYDROCHLORIDE 40 MG: 10 CAPSULE ORAL at 22:11

## 2022-06-10 RX ADMIN — SODIUM CHLORIDE, PRESERVATIVE FREE 10 ML: 5 INJECTION INTRAVENOUS at 22:21

## 2022-06-10 RX ADMIN — ONDANSETRON 4 MG: 2 INJECTION INTRAMUSCULAR; INTRAVENOUS at 22:45

## 2022-06-10 RX ADMIN — SODIUM CHLORIDE, PRESERVATIVE FREE 40 ML: 5 INJECTION INTRAVENOUS at 16:08

## 2022-06-10 RX ADMIN — IOPAMIDOL 10 ML: 408 INJECTION, SOLUTION INTRATHECAL at 12:40

## 2022-06-10 RX ADMIN — OXYCODONE HYDROCHLORIDE 5 MG: 5 TABLET ORAL at 16:07

## 2022-06-10 RX ADMIN — OXYCODONE HYDROCHLORIDE 5 MG: 5 TABLET ORAL at 20:05

## 2022-06-10 RX ADMIN — SODIUM CHLORIDE, SODIUM LACTATE, POTASSIUM CHLORIDE, AND CALCIUM CHLORIDE 1000 ML: 600; 310; 30; 20 INJECTION, SOLUTION INTRAVENOUS at 15:03

## 2022-06-10 RX ADMIN — MORPHINE SULFATE 4 MG: 4 INJECTION, SOLUTION INTRAMUSCULAR; INTRAVENOUS at 11:41

## 2022-06-10 RX ADMIN — LIDOCAINE HYDROCHLORIDE 6 ML: 10 INJECTION, SOLUTION EPIDURAL; INFILTRATION; INTRACAUDAL; PERINEURAL at 12:40

## 2022-06-10 RX ADMIN — HYDROMORPHONE HYDROCHLORIDE 0.5 MG: 1 INJECTION, SOLUTION INTRAMUSCULAR; INTRAVENOUS; SUBCUTANEOUS at 22:11

## 2022-06-10 NOTE — CONSULTS
Consult    Patient: Mckenna Hollins MRN: [id number]  SSN: [id number]    YOB: 1945  Age: 68 y.o. Sex: female      Subjective:      Mckenna Hollins is a 68 y.o. female who is being seen for severe back pain. Patient presents to my office with a severe progression of back with right greater than left leg pain. Patient has a past history of a previous fracture treated years ago with locking geoff construct from T11-L2. Patient had several episodes of pain that dissipated but then returned more severe with a need to go to the emergency room for lack of ability to ambulate and function. She was sent home on stretcher. Pain is steadily worsened and she is capacitated. I had the patient go from my office to the emergency room. My concern at that time was that she had some junctional stenosis below her fusion and required a CT myelogram to verify what it was. How she is contacted by radiology. The studies demonstrate acute to subacute compression fracture of T11, acute sacral insufficiency fractures right greater than left. There is diffuse nonlateralizing stenosis at L2-3, L3-4, L4-5. I have contacted interventional radiology about performing cement augmentation. I believe it is far more likely that these acute fractures of the source of her acute pain than the chronic degenerative changes in her lumbar spine. Past Medical History:   Diagnosis Date    Acid reflux     Hypertension     Spinal stenosis      Past Surgical History:   Procedure Laterality Date    HX BACK SURGERY        Family History   Problem Relation Age of Onset    Heart Attack Mother     Cancer Father      Social History     Tobacco Use    Smoking status: Never Smoker    Smokeless tobacco: Never Used   Substance Use Topics    Alcohol use:  Yes     Alcohol/week: 1.0 standard drink     Types: 1 Glasses of wine per week      Current Facility-Administered Medications   Medication Dose Route Frequency Provider Last Rate Last Admin    [START ON 6/11/2022] atorvastatin (LIPITOR) tablet 10 mg  10 mg Oral DAILY Lamberto Villafana MD        [START ON 6/11/2022] losartan (COZAAR) tablet 25 mg  25 mg Oral DAILY Lamberto Villafana MD        nortriptyline (PAMELOR) capsule 40 mg  40 mg Oral QHS Lamberto Villafana MD        [START ON 6/11/2022] pantoprazole (PROTONIX) tablet 40 mg  40 mg Oral ACB Lamberto Villafana MD        sodium chloride (NS) flush 5-40 mL  5-40 mL IntraVENous Q8H Lamberto Villafana MD        sodium chloride (NS) flush 5-40 mL  5-40 mL IntraVENous PRN Lamberto Villafana MD        acetaminophen (TYLENOL) tablet 650 mg  650 mg Oral Q6H PRN Lamberto Villafana MD        Or   Ta acetaminophen (TYLENOL) suppository 650 mg  650 mg Rectal Q6H PRN Lamberto Villafana MD        polyethylene glycol (MIRALAX) packet 17 g  17 g Oral DAILY PRN Lamberto Villafana MD        promethazine (PHENERGAN) tablet 12.5 mg  12.5 mg Oral Q6H PRN Lamberto Villafana MD        Or    [facility] injection 4 mg  4 mg IntraVENous Q6H PRN Lamberto Villafana MD        [START ON 6/11/2022] enoxaparin (LOVENOX) injection 40 mg  40 mg SubCUTAneous DAILY Lamberto Villafana MD        lactated Ringers infusion 1,000 mL  1,000 mL IntraVENous CONTINUOUS Lamberto Villafana MD        acetaminophen (TYLENOL) tablet 1,000 mg  1,000 mg Oral Q8H Lamberto Villafana MD        oxyCODONE IR (ROXICODONE) tablet 5 mg  5 mg Oral Q4H PRN Lamberto Villafana MD         Current Outpatient Medications   Medication Sig Dispense Refill    docosahexaenoic acid/epa (FISH OIL PO) Take 1,000 mg by mouth daily.  naloxone (NARCAN) 4 mg/actuation nasal spray Use 1 spray intranasally, then discard. Repeat with new spray every 2 min as needed for opioid overdose symptoms, alternating nostrils. 2 Each 0    gabapentin (Neurontin) 300 mg capsule 1 in the am and 3 in the evening as directed  Indications: neuropathic pain (Patient taking differently: Take 300 mg by mouth three (3) times daily.  1 in the am and 3 in the evening as directed  Indications: neuropathic pain) 360 Capsule 1    nortriptyline (PAMELOR) 10 mg capsule Take 4 Capsules by mouth nightly. 360 Capsule 1    MAGNESIUM OXIDE PO Take 1 Tab by mouth daily.  omeprazole (PRILOSEC) 40 mg capsule Take 40 mg by mouth daily. Indications: gastroesophageal reflux disease      losartan (COZAAR) 25 mg tablet Take 25 mg by mouth daily. Indications: high blood pressure      hydroCHLOROthiazide (HYDRODIURIL) 25 mg tablet Take 25 mg by mouth daily. Indications: high blood pressure      LORazepam (ATIVAN) 1 mg tablet Take 1 mg by mouth every eight (8) hours as needed.  atorvastatin (LIPITOR) 10 mg tablet Take 10 mg by mouth daily. No Known Allergies    Review of Systems:  Pertinent items are noted in the History of Present Illness. Objective:     Vitals:    06/10/22 1146   BP: (!) 119/101   Pulse: 82   Resp: 18   Temp: 98.5 °F (36.9 °C)   SpO2: 99%        Physical Exam:  General:   Severe pain pain on any range of motion of her legs and back with pain in her buttock and legs. Eyes:  Conjunctivae/corneas clear. PERRL, EOMs intact. Fundi benign   Ears:  Normal TMs and external ear canals both ears. Nose: Nares normal. Septum midline. Mucosa normal. No drainage or sinus tenderness. Mouth/Throat: Lips, mucosa, and tongue normal. Teeth and gums normal.   Neck: Supple, symmetrical, trachea midline, no adenopathy, thyroid: no enlargment/tenderness/nodules, no carotid bruit and no JVD. Back:   Symmetric, no curvature. ROM normal. No CVA tenderness. Lungs:   Clear to auscultation bilaterally. Heart:  Regular rate and rhythm, S1, S2 normal, no murmur, click, rub or gallop. Abdomen:   Soft, non-tender. Bowel sounds normal. No masses,  No organomegaly. Extremities: Extremities normal, atraumatic, no cyanosis or edema. Pulses: 2+ and symmetric all extremities.    Skin: Skin color, texture, turgor normal. No rashes or lesions   Lymph nodes: Cervical, supraclavicular, and axillary nodes normal.   Neurologic: CNII-XII intact. Normal strength, sensation and reflexes throughout. CT myelogram with fracture of T11 multiple sacral insufficiency fractures greater on right than left degenerative stenosis none lateralized L2-L5  Assessment:       I believe the patient's symptoms are primarily due to her sacral insufficiency fractures. She may be symptomatic secondary to her T11 fracture. I have contacted and consulted interventional radiology to consider cement augmentation. Patient may have stenotic symptoms from the degenerative changes below her fusion. They do not appear to be lateralized I would not understand why it would give him much more right than left leg pain. And I do not believe they would generally show up acutely as would pain from the fractures. Plan:     Evaluation by interventional radiology for cement augmentation to her sacrum and thoracic spine. Mobilization as tolerated bed to chair. Patient may require an SNF or rehab unit given her immobility. If her pain is entirely on remedied by this cement augmentation 1 may consider a right sided laminectomy at 2334 and 4 5 to see if that addresses some of her right-sided leg pain. I would attempt to avoid true spinal surgery if possible given her age.     Signed By: Maximiliano Franklin MD     Donna 10, 2022

## 2022-06-10 NOTE — H&P
History & Physical    Patient: Carley Hutton MRN: [id number]  CSN: [id number]    YOB: 1945  Age: 68 y.o. Sex: female      DOA: 6/10/2022  CC:  Back pain, unable to walk    PCP: Jefferson Lovett MD       HPI:     Carley Hutton is a 68 y.o. female with medical co-morbidities including HTN, Hyperlipidemia, GERD, spinal stenosis, anxiety (on chronic Ativan prn), presented for further evaluation of her acute back pain and unable to maintain her mobility. She was seen in Dr Melisa Jacques office today and was referred to the ER for further evaluation. She was seen in the ER yesterday due to the worsened of her back pain with ambulation. She denied urinary or stool incontinence. No fever. She reported to have been more sedintary due to her back pain with ambulation. However, her pain seemed to have been worsened in the last few days. She has noted to gained weight over the last few months. She has underlying shortness of breath with exertion which she has seen Pulmonary. She was thought to be deconditioned at that time. In the ER, she had successful CT lumbar myelogram/ CT lumbar showed acute to subacute compression fracture of T11, acute sacral insufficiency fractures right greater than left. She required narcotic pain control. Dr Kisha Villanueva consulted and recommended IR for cement augmentation to her sacrum and thoracic spine. Review of Systems  GENERAL: No fever, No chill, +malaise   HEENT: No change in vision, no ear ache,no sore throat or sinus congestion. NECK: No pain or stiffness. PULMONARY: No shortness of breath, no cough or wheeze. Cardiovascular: no pnd / orthopnea, no Chest Pain  GASTROINTESTINAL: No abd pain, No nausea/vomiting, No diarrhea, No bright red blood per rectum. GENITOURINARY: No urinary frequency, No urgency or pain with urination. MUSCULOSKELETAL: ++joint or muscle pain, no back pain, no recent trauma.    DERMATOLOGIC: No rash, no itching, no lesions. ENDOCRINE: No polyuria, polydipsia,  +recent change in weight. HEMATOLOGICAL: No easy bruising or bleeding. NEUROLOGIC: No headache, No seizures, +generalized weakness         Past Medical History:   Diagnosis Date    Acid reflux     Hypertension     Spinal stenosis        Past Surgical History:   Procedure Laterality Date    HX BACK SURGERY         Family History   Problem Relation Age of Onset    Heart Attack Mother     Cancer Father        Social History     Socioeconomic History    Marital status:    Tobacco Use    Smoking status: Never Smoker    Smokeless tobacco: Never Used   Vaping Use    Vaping Use: Never used   Substance and Sexual Activity    Alcohol use: Yes     Alcohol/week: 1.0 standard drink     Types: 1 Glasses of wine per week    Drug use: No    Sexual activity: Yes     Partners: Male     Birth control/protection: None       Prior to Admission medications    Medication Sig Start Date End Date Taking? Authorizing Provider   oxyCODONE-acetaminophen (Percocet) 5-325 mg per tablet Take 1 Tablet by mouth every six (6) hours as needed for Pain for up to 3 days. Max Daily Amount: 4 Tablets. 6/9/22 6/12/22  BAM Osman   lidocaine (Lidoderm) 5 % Apply patch to the affected area for 12 hours a day and remove for 12 hours a day. 5/30/22   BAM Moreno   naloxone [facility]) 4 mg/actuation nasal spray Use 1 spray intranasally, then discard. Repeat with new spray every 2 min as needed for opioid overdose symptoms, alternating nostrils. 5/30/22   BAM Moreno   predniSONE (DELTASONE) 5 mg tablet Take 2 tabs by mouth x 15 days, then 1 tab daily.  5/23/22   Erick Luna MD   predniSONE (DELTASONE) 10 mg tablet 6 pills Day 1, 5 pills Day 2, 4 pills Day 3&4, 3 pills Day 5&6, 2 pills Day 7&8, 1 pill Day 9&10, 1/2 pill Day 11&12 3/9/22   Erick Luna MD   PreviDent 5000 Booster Plus 1.1 % pste  1/26/22   Provider, Historical   gabapentin (Neurontin) 300 mg capsule 1 in the am and 3 in the evening as directed  Indications: neuropathic pain  Patient taking differently: Take 300 mg by mouth three (3) times daily. 1 in the am and 3 in the evening as directed  Indications: neuropathic pain 2/5/22   Fannie Crigler, MD   nortriptyline (PAMELOR) 10 mg capsule Take 4 Capsules by mouth nightly. 2/2/22   Fannie Crigler, MD   alpha lipoic acid 200 mg cap Take 1 Cap by mouth daily. Provider, Historical   cholecalciferol (VITAMIN D3) 1,000 unit cap Take 1 Cap by mouth daily. Provider, Historical   MAGNESIUM OXIDE PO Take 1 Tab by mouth daily. Provider, Historical   omeprazole (PRILOSEC) 40 mg capsule Take 40 mg by mouth daily. 7/9/18   Provider, Historical   losartan (COZAAR) 25 mg tablet Take 25 mg by mouth daily. 5/25/18   Provider, Historical   hydroCHLOROthiazide (HYDRODIURIL) 25 mg tablet Take 25 mg by mouth daily. Provider, Historical   LORazepam (ATIVAN) 1 mg tablet Take 1 mg by mouth every eight (8) hours as needed. 5/25/18   Provider, Historical   atorvastatin (LIPITOR) 10 mg tablet Take 10 mg by mouth daily. 9/4/17   Provider, Historical   calcium carbonate (CALTREX) 600 mg calcium (1,500 mg) tablet Take 600 mg by mouth daily. Provider, Historical       No Known Allergies           Physical Exam:      Visit Vitals  BP (!) 119/101 (BP 1 Location: Left upper arm, BP Patient Position: At rest)   Pulse 82   Temp 98.5 °F (36.9 °C)   Resp 18   SpO2 99%       Physical Exam:  Tele:   General:  Cooperative, Not in acute distress, speaks in full sentence while in bed  HEENT: PERRL, EOMI, supple neck, no JVD, dry oral mucosa  Cardiovascular: S1S2 regular, no rub/gallop   Pulmonary: air entry bilaterally, no wheezing, no crackle  GI:  Soft, non tender, non distended, +bs, no guarding   Extremities:  No pedal edema, +distal pulses appreciated   Neuro: AOx3, moving all extremities, no gross deficit.    Lower back pain with passive ROM of her leg about her hip    Lab/Data Review:  Labs: Results:       Chemistry Recent Labs     06/10/22  1140 06/09/22  1330   * 109*    137   K 3.6 2.8*    101   CO2 28 31   BUN 31* 24*   CREA 0.75 0.57*   CA 9.9 9.3   AGAP 5 5   BUCR 41* 42*   AP  --  256*   TP  --  7.3   ALB  --  3.7   GLOB  --  3.6   AGRAT  --  1.0      CBC w/Diff Recent Labs     06/10/22  1140 06/09/22  1330   WBC 12.6 7.5   RBC 4.87 4.44   HGB 10.6* 9.7*   HCT 34.6* 31.8*    266   GRANS 80* 75*   LYMPH 10* 15*   EOS 0 1      Coagulation    Iron/Ferritin    BNP    Cardiac Enzymes    Liver Enzymes Recent Labs     06/09/22  1330   TP 7.3   ALB 3.7   *      Thyroid Studies No results found for: T4, T3U, TSH, TSHEXT       All Micro Results     None          Imaging Reviewed:  CT Results (most recent):  Results from Hospital Encounter encounter on 06/10/22    CT SPINE LUMB W CONT    Narrative  EXAM: CT SPINE LUMB W CONT    CLINICAL INDICATION/HISTORY: worsening back pain, described as severe ;  increased difficulty walking, leg weakness; planned surgery in the near term  -Patient with right-sided sciatica symptoms    TECHNIQUE: Contiguous axial images were performed through the lumbar spine. From these, sagittal and coronal reconstructions were generated. CT scans at this facility are performed using dose optimization technique as  appropriate with performed exam, to include automated exposure control,  adjustment of mA and/or kV according to patient's size (including appropriate  matching for site-specific examinations), or use of iterative reconstruction  technique. COMPARISON: Preceding myelogram images; MRI March 2022    FINDINGS:  There is diagnostic quality opacification of the intrathecal contents. Prior corrective surgery with rods spanning T10-L2; utilization of underhooks  atT10 and overhooks at L2. The hardware itself is intact, no dislodgment.     Alignment: There is moderately pronounced kyphotic angulation centered at  T10-11. Further discussed below. There is trace L4 anterolisthesis in the  presence of degenerative facet arthropathy. Vertebral body height: At T11, progression of now approximately 33% compression  fracture deformity involving central to anterior vertebral body. Only minor  compressive change posteriorly. No retropulsion. Curvilinear sclerosis and  subtle compression fracture plane of lucency within the vertebral body. No  propagation of the posterior elements. Mild biconcave morphology of T12 is unchanged. Axial imaging correlation:    T8-9: Patent canal and foramina    T9-10: Patent canal and foramina    T10-11: Moderately severe disc space narrowing. There is mild disc osteophyte  ridge formation. There is contact and slight flattening with contour deformity  of the distal thoracic cord. CSF remains evident posterior of the cord. There is  no gross cord expansion. There is moderate foraminal stenosis. T11-12: Patent canal and foramina. T12-L1: Conus terminates at this level, has normal morphology. No clumping of the cauda equina nerve roots. L1-2: Patent canal and foramina. L2-3: Moderate disc space narrowing with vacuum phenomenon. Endplate sclerosis  and cyst formation. There is disc osteophyte ridge formation. There is bulging  epidural lipomatosis posteriorly. There is moderate severity spinal stenosis. Sagittal image 41, and around axial image 67 (streak artifact). No high-grade  foraminal stenosis. L3-4: Moderate disc space narrowing and vacuum phenomenon. Lesser degree of  endplate sclerosis and subchondral cyst formation. Disc osteophyte ridge  formation. Also with some bulging epidural lipomatosis along with facet  arthropathy. Moderate spinal stenosis. Moderate right foraminal stenosis. L4-5: Slight uncovering of the disc. Mild broad-based disc protrusion. Mild  amount of vacuum disc phenomenon. Small locule of gas behind L4 likely related  to the myelogram access. Also with bulging epidural lipomatosis. Facet  arthropathy. Deformity of the thecal sac with overall moderate stenosis. Particular narrowing of the lateral recesses. Mild to moderate foraminal  stenosis. L5-S1: Facet arthropathy. Patent canal and foramina. There are bilateral sacral wing fractures, with fracture lucencies more readily  evident on the right. Includes fracture propagation through transitional  morphology right L5. Some cortical buckling on the left. Horizontal component  within the sacrum is not readily appreciated, but would be most suspicious at  upper S2 region. Other structures: Moderate to large hiatal hernia noted. Minor reticular  scarring/atelectasis within lower lungs. Atherosclerosis of normal caliber  aorta. Large bowel diverticulosis. Impression  :    1. Progression of subacute compression fracture at T11, at about 33% loss of  height.  -Resultant kyphosis  -mild cord contact    2. Acute/subacute sacral insufficiency fracture pattern predominating on the  right  -As outlined above  -Most likely source of reported right sciatica    3. Moderate severity stenosis at L2-3 from combined degenerative disc disease  and epidural lipomatosis  -Similar lesser degree of stenoses at L4-5 and L3-4 with same combination of  findings    4. Previous corrective geoff placement spanning T10-L2, hardware intact    5. Discussed by me with Dr. Sherry Barrera of orthopedic surgery 6/10/2022 at 1356 hours          Assessment:   Active Problems:  1. Acute to Subacute compression fracture of T11 with resultant kyphosis   2. Acute to subacute sacral insufficiency fracture on the right   3. Sciatica of right side  4. 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 Leg weakness, bilateral, due to pain at lumbar area. No evidence of neurological deficit   6. Hypertension   7. Hyperlipidemia   8. Normocytic anemia   9.   GERD with moderate to large Hiatal hernia     Plan:     Admit to medical floor for further pain management and PT/OT evaluation post   IR intervention. Orthospine surgery follows considering intervention if cement augmentation is not helpful. Add Tylenol 1000mg Q8hr, with oxycodone prn. She can resume her Gabapentin   She can resume lipitor, losartan, nortrityline, PPI  IV LR 1000mL for today. ICS  Check TSH, Iron profile for further care.    She will need DEXA outpatient for further evaluation and treatment as this is likely osteoporosis related       Risk of deterioration:  []Low    [x]Moderate  []High     Prophylaxis:  [x]Lovenox  []Coumadin  []Hep SQ  []SCDs  [x]H2B/PPI     Disposition:  []Home w/ Family   [x]HH PT,OT,RN   []SNF/LTC   []SAH/Rehab     Discussed Code Status:         [x]Full Code      []DNR         ___________________________________________________     Care Plan discussed with:    [x]Patient   []Family    []ED Care Manager  []ED Doc   [x]Specialist :  Total Time Coordinating Admission:   60   minutes    []Total Critical Care Time:       Michel Hernandez MD  6/10/2022, 1:29 PM Glo Goel MD

## 2022-06-10 NOTE — ED PROVIDER NOTES
EMERGENCY DEPARTMENT HISTORY AND PHYSICAL EXAM    Date: 6/10/2022  Patient Name: Denny Singh    History of Presenting Illness     Chief Complaint   Patient presents with    Back Pain         History Provided By: Patient, Dr. Zoë Olsen and has been    Chief Complaint: Worsening back pain, increased difficulty walking, leg weakness  Duration: Couple days  Timing: Gradually worsening  Location: Bilateral lower extremities, worse on the right than left  Quality: Weak  Severity: Moderate to severe  Modifying Factors: Worse when trying to ambulate at home  Associated Symptoms: none       Additional History (Context): Denny Singh is a 68 y.o. female with a history of hypertension and spinal stenosis. Patient has been seeing Dr. Zoë Olsen for this and reports that at her first visit she was not in that much pain so they plan to watch and wait before deciding to do anything drastic. Reports that she has been on gabapentin and Percocet in the past however reports it is not helping now. Denies any loss of bowel or bladder, history of cancer or IV drug abuse. Denies any recent illness, fevers or chills. Reports it has been increasingly hard for her to walk at home. States she has also been on steroids which she does not feel is helping. States she has been trying to use a walker and a wheelchair at home but reports it is difficult for her to get around because her  also recently had back surgery and he is not able to help as much is normal.      PCP: Claude Lawn, MD    Current Outpatient Medications   Medication Sig Dispense Refill    oxyCODONE-acetaminophen (Percocet) 5-325 mg per tablet Take 1 Tablet by mouth every six (6) hours as needed for Pain for up to 3 days. Max Daily Amount: 4 Tablets. 8 Tablet 0    lidocaine (Lidoderm) 5 % Apply patch to the affected area for 12 hours a day and remove for 12 hours a day.  30 Each 0    naloxone (NARCAN) 4 mg/actuation nasal spray Use 1 spray intranasally, then discard. Repeat with new spray every 2 min as needed for opioid overdose symptoms, alternating nostrils. 2 Each 0    predniSONE (DELTASONE) 5 mg tablet Take 2 tabs by mouth x 15 days, then 1 tab daily. 45 Tablet 0    predniSONE (DELTASONE) 10 mg tablet 6 pills Day 1, 5 pills Day 2, 4 pills Day 3&4, 3 pills Day 5&6, 2 pills Day 7&8, 1 pill Day 9&10, 1/2 pill Day 11&12 32 Tablet 0    PreviDent 5000 Booster Plus 1.1 % pste       gabapentin (Neurontin) 300 mg capsule 1 in the am and 3 in the evening as directed  Indications: neuropathic pain (Patient taking differently: Take 300 mg by mouth three (3) times daily. 1 in the am and 3 in the evening as directed  Indications: neuropathic pain) 360 Capsule 1    nortriptyline (PAMELOR) 10 mg capsule Take 4 Capsules by mouth nightly. 360 Capsule 1    alpha lipoic acid 200 mg cap Take 1 Cap by mouth daily.  cholecalciferol (VITAMIN D3) 1,000 unit cap Take 1 Cap by mouth daily.  MAGNESIUM OXIDE PO Take 1 Tab by mouth daily.  omeprazole (PRILOSEC) 40 mg capsule Take 40 mg by mouth daily.  losartan (COZAAR) 25 mg tablet Take 25 mg by mouth daily.  hydroCHLOROthiazide (HYDRODIURIL) 25 mg tablet Take 25 mg by mouth daily.  LORazepam (ATIVAN) 1 mg tablet Take 1 mg by mouth every eight (8) hours as needed.  atorvastatin (LIPITOR) 10 mg tablet Take 10 mg by mouth daily.  calcium carbonate (CALTREX) 600 mg calcium (1,500 mg) tablet Take 600 mg by mouth daily.          Past History     Past Medical History:  Past Medical History:   Diagnosis Date    Acid reflux     Hypertension     Spinal stenosis        Past Surgical History:  Past Surgical History:   Procedure Laterality Date    HX BACK SURGERY         Family History:  Family History   Problem Relation Age of Onset    Heart Attack Mother     Cancer Father        Social History:  Social History     Tobacco Use    Smoking status: Never Smoker    Smokeless tobacco: Never Used   Vaping Use    Vaping Use: Never used   Substance Use Topics    Alcohol use: Yes     Alcohol/week: 1.0 standard drink     Types: 1 Glasses of wine per week    Drug use: No       Allergies:  No Known Allergies      Review of Systems   Review of Systems   Constitutional: Negative for chills and fever. HENT: Negative for congestion, rhinorrhea and sore throat. Respiratory: Negative for cough and shortness of breath. Cardiovascular: Negative for chest pain. Gastrointestinal: Negative for abdominal pain, blood in stool, constipation, diarrhea, nausea and vomiting. Genitourinary: Negative for dysuria, frequency and hematuria. Musculoskeletal: Positive for back pain. Negative for myalgias. Skin: Negative for rash and wound. Neurological: Negative for dizziness and headaches. All other systems reviewed and are negative. All Other Systems Negative  Physical Exam     Vitals:    06/10/22 1146   BP: (!) 119/101   Pulse: 82   Resp: 18   Temp: 98.5 °F (36.9 °C)   SpO2: 99%     Physical Exam  Vitals and nursing note reviewed. Constitutional:       General: She is in acute distress (mild). Appearance: She is well-developed. She is not diaphoretic. HENT:      Head: Normocephalic and atraumatic. Eyes:      Conjunctiva/sclera: Conjunctivae normal.   Cardiovascular:      Rate and Rhythm: Normal rate and regular rhythm. Heart sounds: Normal heart sounds. Pulmonary:      Effort: Pulmonary effort is normal. No respiratory distress. Breath sounds: Normal breath sounds. Chest:      Chest wall: No tenderness. Abdominal:      General: Bowel sounds are normal. There is no distension. Palpations: Abdomen is soft. Tenderness: There is no abdominal tenderness. There is no guarding or rebound. Musculoskeletal:         General: No deformity. Cervical back: Normal range of motion and neck supple. Lumbar back: Tenderness and bony tenderness present.  No swelling, deformity or lacerations. Positive right straight leg raise test and positive left straight leg raise test.      Comments: Bilateral right worse than left Lower  paralumbar reproducible tenderness to palpation. Lumbar midline TTP. No other midline vertebral bony point tenderness or step-off. No foot drop. Distal sensation intact bilaterally, unable to ambulate without excruciating pain, Bilateral DP 3+. + bilateral straight leg raise. Right lower ext strength 2/5 and left 3/5. Skin:     General: Skin is warm and dry. Neurological:      Mental Status: She is alert and oriented to person, place, and time. GCS: GCS eye subscore is 4. GCS verbal subscore is 5. GCS motor subscore is 6. Cranial Nerves: Cranial nerves are intact. Sensory: Sensation is intact. Deep Tendon Reflexes: Reflexes are normal and symmetric. Diagnostic Study Results     Labs -     Recent Results (from the past 12 hour(s))   CBC WITH AUTOMATED DIFF    Collection Time: 06/10/22 11:40 AM   Result Value Ref Range    WBC 12.6 4.6 - 13.2 K/uL    RBC 4.87 4.20 - 5.30 M/uL    HGB 10.6 (L) 12.0 - 16.0 g/dL    HCT 34.6 (L) 35.0 - 45.0 %    MCV 71.0 (L) 78.0 - 100.0 FL    MCH 21.8 (L) 24.0 - 34.0 PG    MCHC 30.6 (L) 31.0 - 37.0 g/dL    RDW 17.6 (H) 11.6 - 14.5 %    PLATELET 111 268 - 630 K/uL    MPV 9.2 9.2 - 11.8 FL    NRBC 0.0 0  WBC    ABSOLUTE NRBC 0.00 0.00 - 0.01 K/uL    NEUTROPHILS 80 (H) 40 - 73 %    LYMPHOCYTES 10 (L) 21 - 52 %    MONOCYTES 9 3 - 10 %    EOSINOPHILS 0 0 - 5 %    BASOPHILS 0 0 - 2 %    IMMATURE GRANULOCYTES 0 0.0 - 0.5 %    ABS. NEUTROPHILS 10.2 (H) 1.8 - 8.0 K/UL    ABS. LYMPHOCYTES 1.3 0.9 - 3.6 K/UL    ABS. MONOCYTES 1.1 0.05 - 1.2 K/UL    ABS. EOSINOPHILS 0.0 0.0 - 0.4 K/UL    ABS. BASOPHILS 0.0 0.0 - 0.1 K/UL    ABS. IMM.  GRANS. 0.1 (H) 0.00 - 0.04 K/UL    DF AUTOMATED     METABOLIC PANEL, BASIC    Collection Time: 06/10/22 11:40 AM   Result Value Ref Range    Sodium 139 136 - 145 mmol/L    Potassium 3.6 3.5 - 5.5 mmol/L    Chloride 106 100 - 111 mmol/L    CO2 28 21 - 32 mmol/L    Anion gap 5 3.0 - 18 mmol/L    Glucose 106 (H) 74 - 99 mg/dL    BUN 31 (H) 7.0 - 18 MG/DL    Creatinine 0.75 0.6 - 1.3 MG/DL    BUN/Creatinine ratio 41 (H) 12 - 20      GFR est AA >60 >60 ml/min/1.73m2    GFR est non-AA >60 >60 ml/min/1.73m2    Calcium 9.9 8.5 - 10.1 MG/DL       Radiologic Studies -   CT SPINE LUMB W CONT    (Results Pending)   XR MYELO LUMBAR    (Results Pending)     CT Results  (Last 48 hours)    None        CXR Results  (Last 48 hours)    None            Medical Decision Making   I am the first provider for this patient. I reviewed the vital signs, available nursing notes, past medical history, past surgical history, family history and social history. Vital Signs-Reviewed the patient's vital signs. Records Reviewed: Nursing Notes and Old Medical Records     Procedures: None   Procedures    Provider Notes (Medical Decision Making):     Differential Diagnosis: Musculoskeletal pain, myofascial strain/sprain, muscle spasm, spondylolisthesis, spondylosis, DJD, OA, sciatica, cauda equina syndrome    Plan: Dr. Consuelo Sloan is requesting a lumbar myelogram.  Have cleared this with the radiologist who is agreeable. Have notified CT and x-ray. Will order basic labs and pain medication as well. Have discussed need for admission and possible surgery with both the patient and her , everyone is agreeable. 1:08 PM  Patient is getting imaging as we speak. 1:15 PM  Discussed case with Dr. Leigh Brink who agrees with need for admission. Have placed admission orders. MED RECONCILIATION:  No current facility-administered medications for this encounter. Current Outpatient Medications   Medication Sig    oxyCODONE-acetaminophen (Percocet) 5-325 mg per tablet Take 1 Tablet by mouth every six (6) hours as needed for Pain for up to 3 days. Max Daily Amount: 4 Tablets.     lidocaine (Lidoderm) 5 % Apply patch to the affected area for 12 hours a day and remove for 12 hours a day.  naloxone (NARCAN) 4 mg/actuation nasal spray Use 1 spray intranasally, then discard. Repeat with new spray every 2 min as needed for opioid overdose symptoms, alternating nostrils.  predniSONE (DELTASONE) 5 mg tablet Take 2 tabs by mouth x 15 days, then 1 tab daily.  predniSONE (DELTASONE) 10 mg tablet 6 pills Day 1, 5 pills Day 2, 4 pills Day 3&4, 3 pills Day 5&6, 2 pills Day 7&8, 1 pill Day 9&10, 1/2 pill Day 11&12    PreviDent 5000 Booster Plus 1.1 % pste     gabapentin (Neurontin) 300 mg capsule 1 in the am and 3 in the evening as directed  Indications: neuropathic pain (Patient taking differently: Take 300 mg by mouth three (3) times daily. 1 in the am and 3 in the evening as directed  Indications: neuropathic pain)    nortriptyline (PAMELOR) 10 mg capsule Take 4 Capsules by mouth nightly.  alpha lipoic acid 200 mg cap Take 1 Cap by mouth daily.  cholecalciferol (VITAMIN D3) 1,000 unit cap Take 1 Cap by mouth daily.  MAGNESIUM OXIDE PO Take 1 Tab by mouth daily.  omeprazole (PRILOSEC) 40 mg capsule Take 40 mg by mouth daily.  losartan (COZAAR) 25 mg tablet Take 25 mg by mouth daily.  hydroCHLOROthiazide (HYDRODIURIL) 25 mg tablet Take 25 mg by mouth daily.  LORazepam (ATIVAN) 1 mg tablet Take 1 mg by mouth every eight (8) hours as needed.  atorvastatin (LIPITOR) 10 mg tablet Take 10 mg by mouth daily.  calcium carbonate (CALTREX) 600 mg calcium (1,500 mg) tablet Take 600 mg by mouth daily. Disposition:  Admit           Diagnosis     Clinical Impression:   1. Spinal stenosis of lumbar region, unspecified whether neurogenic claudication present    2. Muscle weakness of lower extremity    3. Sciatica of right side          \"Please note that this dictation was completed with [employer], the [employer] voice recognition software.  Quite often unanticipated grammatical, syntax, homophones, and other interpretive errors are inadvertently transcribed by the computer software. Please disregard these errors. Please excuse any errors that have escaped final proofreading. \"

## 2022-06-10 NOTE — ED NOTES
TRANSFER - OUT REPORT:    Verbal report given to TD Corrales(name) on [facility]  being transferred to [address] [place] [address](unit) for routine progression of care       Report consisted of patients Situation, Background, Assessment and   Recommendations(SBAR). Information from the following report(s) SBAR, ED Summary, Procedure Summary, Intake/Output, MAR, Recent Results and Med Rec Status was reviewed with the receiving nurse. Lines:   Peripheral IV 06/10/22 Left Antecubital (Active)        Opportunity for questions and clarification was provided.       Patient transported with:   Monitor   IV  Patient belongings

## 2022-06-10 NOTE — PROCEDURES
RADIOLOGY POST PROCEDURE NOTE     Donna 10, 2022       12:53 PM     Preoperative Diagnosis:   Low back pain    Postoperative Diagnosis:  Same. :  BAM Garcia    Assistant:  None. Type of Anesthesia: 1% plain lidocaine    Procedure/Description:  Image guided lumbar myelogram injection    Findings:   Successful lumbar myelogram. Access achieved at L5-S1 with return of CSF. 10 cc contrast injected. Estimated blood Loss:  Minimal    Specimen Removed:   no    Blood transfusions:  None. Implants:  None.     Complications: None    Condition: Stable    Blood thinning medications: OK to resume     Discharge Plan:  continue present therapy    BAM Velazquez

## 2022-06-10 NOTE — PROGRESS NOTES
1920 report received from Oly Cobb 274 patient calling out and anxious. Talks about how no one is taking care of her although she admits that the day nurse today has been taking care of her. Is very worried that she is wet, despite the purewick. Will administer pain medication (she thinks this is giving her diarrhea) and change linens. 2200 patient anxious about purewick and using bedpan. Used bedpan and had gas, apparently purewick became kinked and \"sprayed\" on her while using bedpan. This has accentuated her anxiety about purewick. Patient states that yes, she takes Ativan 1mg BID for anxiety at home. 0532 patient has been a nervous wreck all night. Wants to keep the bedpan on her bed beside her in case she needs to have BM, has been having lots of gas. Is fearful to move her legs at all, believing it will dislodge her purewick. Has been retaining urine because she is both afraid that the purewick will leak and she also says she is too tense to pee anyway  Patient rambles when speaking to nursing staff.

## 2022-06-10 NOTE — PROGRESS NOTES
Interventional Radiology    Consultation received for patient with acute progressing T11 compression fracture and bilateral sacral insufficiency fractures with severe pain impacting her activities of daily living. A T11 kyphoplasty and bilateral sacroplasty will be planned for Monday afternoon 6/13/22 pending anesthesia availability. Please have NPO after midnight prior to procedure with any blood thinning medications held for 1 dose prior. Consent will be obtained prior to the procedure. Full consult note to follow.     Thank you,  Adrian Rivas, [facility] [address]

## 2022-06-10 NOTE — ED NOTES
Hospitalist paged three times. Waiting for return call to clarify pts NPO order and PO medication order.

## 2022-06-11 LAB
ANION GAP SERPL CALC-SCNC: 8 MMOL/L (ref 3–18)
BUN SERPL-MCNC: 30 MG/DL (ref 7–18)
BUN/CREAT SERPL: 58 (ref 12–20)
CALCIUM SERPL-MCNC: 9.2 MG/DL (ref 8.5–10.1)
CHLORIDE SERPL-SCNC: 104 MMOL/L (ref 100–111)
CO2 SERPL-SCNC: 25 MMOL/L (ref 21–32)
CREAT SERPL-MCNC: 0.52 MG/DL (ref 0.6–1.3)
ERYTHROCYTE [DISTWIDTH] IN BLOOD BY AUTOMATED COUNT: 17.6 % (ref 11.6–14.5)
GLUCOSE SERPL-MCNC: 103 MG/DL (ref 74–99)
HCT VFR BLD AUTO: 31.7 % (ref 35–45)
HGB BLD-MCNC: 9.7 G/DL (ref 12–16)
MAGNESIUM SERPL-MCNC: 2.1 MG/DL (ref 1.6–2.6)
MCH RBC QN AUTO: 21.8 PG (ref 24–34)
MCHC RBC AUTO-ENTMCNC: 30.6 G/DL (ref 31–37)
MCV RBC AUTO: 71.4 FL (ref 78–100)
NRBC # BLD: 0 K/UL (ref 0–0.01)
NRBC BLD-RTO: 0 PER 100 WBC
PLATELET # BLD AUTO: 335 K/UL (ref 135–420)
PMV BLD AUTO: 8.5 FL (ref 9.2–11.8)
POTASSIUM SERPL-SCNC: 3.2 MMOL/L (ref 3.5–5.5)
RBC # BLD AUTO: 4.44 M/UL (ref 4.2–5.3)
SODIUM SERPL-SCNC: 137 MMOL/L (ref 136–145)
VIT B12 SERPL-MCNC: >2000 PG/ML (ref 232–1245)
WBC # BLD AUTO: 8.3 K/UL (ref 4.6–13.2)

## 2022-06-11 PROCEDURE — 99233 SBSQ HOSP IP/OBS HIGH 50: CPT | Performed by: STUDENT IN AN ORGANIZED HEALTH CARE EDUCATION/TRAINING PROGRAM

## 2022-06-11 PROCEDURE — 80048 BASIC METABOLIC PNL TOTAL CA: CPT

## 2022-06-11 PROCEDURE — 74011250636 HC RX REV CODE- 250/636: Performed by: INTERNAL MEDICINE

## 2022-06-11 PROCEDURE — 65270000046 HC RM TELEMETRY

## 2022-06-11 PROCEDURE — 74011250637 HC RX REV CODE- 250/637: Performed by: INTERNAL MEDICINE

## 2022-06-11 PROCEDURE — 85027 COMPLETE CBC AUTOMATED: CPT

## 2022-06-11 PROCEDURE — 2709999900 HC NON-CHARGEABLE SUPPLY

## 2022-06-11 PROCEDURE — 74011000250 HC RX REV CODE- 250: Performed by: INTERNAL MEDICINE

## 2022-06-11 PROCEDURE — 83735 ASSAY OF MAGNESIUM: CPT

## 2022-06-11 PROCEDURE — 36415 COLL VENOUS BLD VENIPUNCTURE: CPT

## 2022-06-11 PROCEDURE — 77030038269 HC DRN EXT URIN PURWCK BARD -A

## 2022-06-11 RX ORDER — FERROUS GLUCONATE 324(37.5)
1 TABLET ORAL 2 TIMES DAILY WITH MEALS
Status: DISCONTINUED | OUTPATIENT
Start: 2022-06-12 | End: 2022-06-17 | Stop reason: HOSPADM

## 2022-06-11 RX ORDER — LORAZEPAM 1 MG/1
1 TABLET ORAL
Status: DISCONTINUED | OUTPATIENT
Start: 2022-06-11 | End: 2022-06-13

## 2022-06-11 RX ADMIN — SODIUM CHLORIDE, PRESERVATIVE FREE 10 ML: 5 INJECTION INTRAVENOUS at 16:02

## 2022-06-11 RX ADMIN — LORAZEPAM 1 MG: 1 TABLET ORAL at 05:55

## 2022-06-11 RX ADMIN — ATORVASTATIN CALCIUM 10 MG: 10 TABLET, FILM COATED ORAL at 09:19

## 2022-06-11 RX ADMIN — ENOXAPARIN SODIUM 40 MG: 100 INJECTION SUBCUTANEOUS at 09:19

## 2022-06-11 RX ADMIN — SODIUM CHLORIDE, PRESERVATIVE FREE 10 ML: 5 INJECTION INTRAVENOUS at 05:50

## 2022-06-11 RX ADMIN — OXYCODONE HYDROCHLORIDE 5 MG: 5 TABLET ORAL at 12:01

## 2022-06-11 RX ADMIN — SODIUM CHLORIDE, PRESERVATIVE FREE 10 ML: 5 INJECTION INTRAVENOUS at 00:40

## 2022-06-11 RX ADMIN — OXYCODONE HYDROCHLORIDE 5 MG: 5 TABLET ORAL at 21:09

## 2022-06-11 RX ADMIN — ONDANSETRON 4 MG: 2 INJECTION INTRAMUSCULAR; INTRAVENOUS at 21:19

## 2022-06-11 RX ADMIN — PANTOPRAZOLE SODIUM 40 MG: 40 TABLET, DELAYED RELEASE ORAL at 09:05

## 2022-06-11 RX ADMIN — LOSARTAN POTASSIUM 25 MG: 25 TABLET, FILM COATED ORAL at 09:19

## 2022-06-11 RX ADMIN — NORTRIPTYLINE HYDROCHLORIDE 40 MG: 10 CAPSULE ORAL at 22:00

## 2022-06-11 NOTE — PROGRESS NOTES
Pt has been refusing meals, states she does not want to eat because it will make her want to have a bowel movement, pt does not want to use bed pan because she feels she is left on too long. I explained to pt that she has not and will not be left on bed pan, nurse and CNA have attentive to patient all day. Pt also has been refusing scheduled tylenol, states tylenol does nothing for her.

## 2022-06-11 NOTE — PROGRESS NOTES
From: Lolis Mckeon  To: Megan Monroy  Sent: 9/15/2020 3:53 PM CDT  Subject: Other    Well i ended up at [facility] elevated Hr and low grade temp. Covid test done just waiting on the results. I still have the mucus in the back of my throat. Do she want me to still schedule a virtual appt   New order received for OT evaluation and chart reviewed. Nurse cleared patient for evaluation. Spoke with patient,  and son regarding scope and purpose of OT evaluation. Patient refused to participate, requesting to wait until after her upcoming spinal procedure. Educated patient on the benefit of participating in therapy and getting out of bed daily. Pt's  and son attempted to encourage her to participate and she continued to refuse. Will f/u later as schedule allows. Thank you.   Maria Elena Tapia OTD, OR/L

## 2022-06-11 NOTE — PROGRESS NOTES
PHYSICAL THERAPY consult received and chart reviewed. Patient contact made,  Mainor Drake present as well. Patient reports she is currently on bed pan and pain 7/10. Attempts for consult unsuccessful at this time. Both she and  feel it best to hold any attempts at PT consult for mobility needs until after she has planned Kyphoplasty/sacralplasty on Monday. Both state she is unable to move her legs, stand, walk. .. Plan to hold consult at this time at patient/spouse request and will continue to follow. Thank you,  [place] Croft.  Eliseo Cosby

## 2022-06-11 NOTE — PROGRESS NOTES
Problem: Pressure Injury - Risk of  Goal: *Prevention of pressure injury  Description: Document Valeriy Scale and appropriate interventions in the flowsheet. Outcome: Progressing Towards Goal  Note: Pressure Injury Interventions:       Moisture Interventions: Absorbent underpads    Activity Interventions: Pressure redistribution bed/mattress(bed type)    Mobility Interventions: HOB 30 degrees or less    Nutrition Interventions: Document food/fluid/supplement intake    Friction and Shear Interventions: Minimize layers                Problem: Patient Education: Go to Patient Education Activity  Goal: Patient/Family Education  Outcome: Progressing Towards Goal     Problem: Falls - Risk of  Goal: *Absence of Falls  Description: Document Yinka Fall Risk and appropriate interventions in the flowsheet. Outcome: Progressing Towards Goal  Note: Fall Risk Interventions:            Medication Interventions: Teach patient to arise slowly    Elimination Interventions:  Toileting schedule/hourly rounds

## 2022-06-11 NOTE — PROGRESS NOTES
conducted an initial consultation and Spiritual Assessment for Juvenal Celaya, who is a 68 y.o.,female. Patients Primary Language is: [place]. According to the patients EMR Baptism Affiliation is: Christian. The reason the Patient came to the hospital is:   Patient Active Problem List    Diagnosis Date Noted    Spinal stenosis 06/10/2022    Leg weakness, bilateral 06/10/2022    Depression, recurrent ([facility] [address] 03/04/2022    Spinal stenosis of lumbar region with neurogenic claudication 03/06/2019    Lumbar facet arthropathy 03/06/2019    Squamous cell carcinoma 02/28/2019    Lumbar stenosis with neurogenic claudication 07/24/2018    Sciatica of right side 02/07/2018    Irritable bowel syndrome with diarrhea 09/26/2017    Gastroesophageal reflux disease without esophagitis 02/10/2017    Melanoma in situ of right upper arm ([facility] [address] 02/10/2017    Disorder of bone and cartilage 01/29/2009    Diverticulosis of colon 01/29/2009    Dyslipidemia 01/29/2009    Essential hypertension, benign 01/29/2009    Generalized anxiety disorder 01/29/2009    Impaired fasting glucose 01/29/2009    Primary insomnia 01/29/2009        The  provided the following Interventions:  Initiated a relationship of care and support.  Letitia Brantley was present in the room. Explored issues of nika, belief, spirituality and Sikh/ritual needs while hospitalized. Listened empathically. Patient declined the offer to receive holy communion for now. Provided chaplaincy education. Provided information about Spiritual Care Services. Offered prayer and assurance of continued prayers on patient's behalf. Chart reviewed. The following outcomes where achieved:  Patient shared limited information about both her medical narrative and spiritual journey/beliefs.  confirmed Patient's Baptism Affiliation with [address] in [place], [facility]   Patient processed feeling about current hospitalization. Patient expressed gratitude for 's visit. Assessment:  Patient does not have any Yazdanism/cultural needs that will affect patients preferences in health care. There are no spiritual or Yazdanism issues which require intervention at this time. Plan:  Chaplains will continue to follow and will provide pastoral care on an as needed/requested basis.  recommends bedside caregivers page  on duty if patient shows signs of acute spiritual or emotional distress.     Bandar Kay 5 [phone]

## 2022-06-12 LAB
ANION GAP SERPL CALC-SCNC: 6 MMOL/L (ref 3–18)
BUN SERPL-MCNC: 21 MG/DL (ref 7–18)
BUN/CREAT SERPL: 43 (ref 12–20)
CALCIUM SERPL-MCNC: 8.9 MG/DL (ref 8.5–10.1)
CHLORIDE SERPL-SCNC: 104 MMOL/L (ref 100–111)
CO2 SERPL-SCNC: 28 MMOL/L (ref 21–32)
CREAT SERPL-MCNC: 0.49 MG/DL (ref 0.6–1.3)
ERYTHROCYTE [DISTWIDTH] IN BLOOD BY AUTOMATED COUNT: 17.3 % (ref 11.6–14.5)
GLUCOSE SERPL-MCNC: 99 MG/DL (ref 74–99)
HCT VFR BLD AUTO: 30.3 % (ref 35–45)
HGB BLD-MCNC: 9.3 G/DL (ref 12–16)
MAGNESIUM SERPL-MCNC: 2.1 MG/DL (ref 1.6–2.6)
MCH RBC QN AUTO: 22 PG (ref 24–34)
MCHC RBC AUTO-ENTMCNC: 30.7 G/DL (ref 31–37)
MCV RBC AUTO: 71.6 FL (ref 78–100)
NRBC # BLD: 0 K/UL (ref 0–0.01)
NRBC BLD-RTO: 0 PER 100 WBC
PLATELET # BLD AUTO: 280 K/UL (ref 135–420)
PMV BLD AUTO: 8.6 FL (ref 9.2–11.8)
POTASSIUM SERPL-SCNC: 3 MMOL/L (ref 3.5–5.5)
RBC # BLD AUTO: 4.23 M/UL (ref 4.2–5.3)
SODIUM SERPL-SCNC: 138 MMOL/L (ref 136–145)
WBC # BLD AUTO: 7.3 K/UL (ref 4.6–13.2)

## 2022-06-12 PROCEDURE — 74011000250 HC RX REV CODE- 250: Performed by: INTERNAL MEDICINE

## 2022-06-12 PROCEDURE — 65270000046 HC RM TELEMETRY

## 2022-06-12 PROCEDURE — 97162 PT EVAL MOD COMPLEX 30 MIN: CPT

## 2022-06-12 PROCEDURE — 85027 COMPLETE CBC AUTOMATED: CPT

## 2022-06-12 PROCEDURE — 74011250636 HC RX REV CODE- 250/636: Performed by: INTERNAL MEDICINE

## 2022-06-12 PROCEDURE — 80048 BASIC METABOLIC PNL TOTAL CA: CPT

## 2022-06-12 PROCEDURE — 99233 SBSQ HOSP IP/OBS HIGH 50: CPT | Performed by: STUDENT IN AN ORGANIZED HEALTH CARE EDUCATION/TRAINING PROGRAM

## 2022-06-12 PROCEDURE — 36415 COLL VENOUS BLD VENIPUNCTURE: CPT

## 2022-06-12 PROCEDURE — 74011250637 HC RX REV CODE- 250/637: Performed by: STUDENT IN AN ORGANIZED HEALTH CARE EDUCATION/TRAINING PROGRAM

## 2022-06-12 PROCEDURE — 74011250637 HC RX REV CODE- 250/637: Performed by: INTERNAL MEDICINE

## 2022-06-12 PROCEDURE — 83735 ASSAY OF MAGNESIUM: CPT

## 2022-06-12 PROCEDURE — 2709999900 HC NON-CHARGEABLE SUPPLY

## 2022-06-12 RX ADMIN — ATORVASTATIN CALCIUM 10 MG: 10 TABLET, FILM COATED ORAL at 09:11

## 2022-06-12 RX ADMIN — OXYCODONE HYDROCHLORIDE 5 MG: 5 TABLET ORAL at 09:21

## 2022-06-12 RX ADMIN — SODIUM CHLORIDE, SODIUM LACTATE, POTASSIUM CHLORIDE, AND CALCIUM CHLORIDE 1000 ML: 600; 310; 30; 20 INJECTION, SOLUTION INTRAVENOUS at 06:00

## 2022-06-12 RX ADMIN — SODIUM CHLORIDE, PRESERVATIVE FREE 10 ML: 5 INJECTION INTRAVENOUS at 06:10

## 2022-06-12 RX ADMIN — LORAZEPAM 1 MG: 1 TABLET ORAL at 21:12

## 2022-06-12 RX ADMIN — Medication 1 TABLET: at 09:11

## 2022-06-12 RX ADMIN — SODIUM CHLORIDE, PRESERVATIVE FREE 10 ML: 5 INJECTION INTRAVENOUS at 16:05

## 2022-06-12 RX ADMIN — ENOXAPARIN SODIUM 40 MG: 100 INJECTION SUBCUTANEOUS at 09:10

## 2022-06-12 RX ADMIN — SODIUM CHLORIDE, SODIUM LACTATE, POTASSIUM CHLORIDE, AND CALCIUM CHLORIDE 1000 ML: 600; 310; 30; 20 INJECTION, SOLUTION INTRAVENOUS at 16:23

## 2022-06-12 RX ADMIN — NORTRIPTYLINE HYDROCHLORIDE 40 MG: 10 CAPSULE ORAL at 21:12

## 2022-06-12 RX ADMIN — LOSARTAN POTASSIUM 25 MG: 25 TABLET, FILM COATED ORAL at 09:11

## 2022-06-12 RX ADMIN — OXYCODONE HYDROCHLORIDE 5 MG: 5 TABLET ORAL at 16:04

## 2022-06-12 RX ADMIN — LORAZEPAM 1 MG: 1 TABLET ORAL at 12:57

## 2022-06-12 RX ADMIN — OXYCODONE HYDROCHLORIDE 5 MG: 5 TABLET ORAL at 01:14

## 2022-06-12 RX ADMIN — PANTOPRAZOLE SODIUM 40 MG: 40 TABLET, DELAYED RELEASE ORAL at 09:11

## 2022-06-12 RX ADMIN — Medication 1 TABLET: at 16:04

## 2022-06-12 RX ADMIN — ACETAMINOPHEN 1000 MG: 325 TABLET ORAL at 21:12

## 2022-06-12 NOTE — PROGRESS NOTES
Patient continues to refuse meals because she wants to avoid having bowel movement, pt stated she does not want to eat in fear of being left on bed pan for hours. I reassured patient that she would be taken off of bed pan in a timely manner should she have to have a bowel movement. Patient was encouraged to eat and drink fluids, she was also educated on the side effects of pain meds (constipation). Pt's  and son was also at bedside encouraging patient. Will continue educate and encourage food/fluid intake.

## 2022-06-12 NOTE — PROGRESS NOTES
Problem: Pressure Injury - Risk of  Goal: *Prevention of pressure injury  Description: Document Valeriy Scale and appropriate interventions in the flowsheet. Outcome: Progressing Towards Goal  Note: Pressure Injury Interventions:       Moisture Interventions: Absorbent underpads    Activity Interventions: Pressure redistribution bed/mattress(bed type)    Mobility Interventions: Pressure redistribution bed/mattress (bed type),HOB 30 degrees or less    Nutrition Interventions: Document food/fluid/supplement intake,Offer support with meals,snacks and hydration    Friction and Shear Interventions: Minimize layers,HOB 30 degrees or less                Problem: Patient Education: Go to Patient Education Activity  Goal: Patient/Family Education  Outcome: Progressing Towards Goal     Problem: Falls - Risk of  Goal: *Absence of Falls  Description: Document Yinka Fall Risk and appropriate interventions in the flowsheet. Outcome: Progressing Towards Goal  Note: Fall Risk Interventions:            Medication Interventions: Teach patient to arise slowly    Elimination Interventions:  Toileting schedule/hourly rounds              Problem: Patient Education: Go to Patient Education Activity  Goal: Patient/Family Education  Outcome: Progressing Towards Goal

## 2022-06-12 NOTE — PROGRESS NOTES
Occupational Therapy Note:  Orders received, chart reviewed and this patient is refusing PT and OT until following her procedure which is scheduled for tomorrow. Will f/u as appropriate for this patient.  Ina Oquendo, OTR/L

## 2022-06-12 NOTE — PROGRESS NOTES
Progress Note  Hospitalist Service    Patient: Manuel Linares MRN: [id number]   SSN: [id number]  YOB: 1945   Age: 68 y.o. Sex: female      Admit Date: 6/10/2022    LOS: 1 day   Chief Complaint   Patient presents with    Back Pain       Subjective:     Patient endorsing back pain. Otherwise - multiple non-medical concerns. Review of Systems:  Patient Endorses   ---------------------------------------------------------------------------------  Constitutional: Negative for fever, chills and diaphoresis. Respiratory: Negative for cough and hemoptysis. Cardiovascular: Negative for chest pain and palpitations. Gastrointestinal: Negative for nausea and vomiting. Musculoskeletal: +back pain   Psychiatric: +anxiety     Objective:     Vitals:  Visit Vitals  BP (!) 181/89 (BP 1 Location: Left upper arm, BP Patient Position: At rest)   Pulse (!) 104   Temp 98.3 °F (36.8 °C)   Resp 20   SpO2 90%       Physical Exam:   General appearance: alert, cooperative, no distress, appears stated age  Lungs: clear to auscultation bilaterally  Heart: regular rate and rhythm, S1, S2 normal, no murmur, click, rub or gallop  Abdomen: soft, non-tender. Bowel sounds normal. No masses,  no organomegaly  Pulses: 2+ and symmetric  BACK: pain with passive movement   Skin: Skin color, texture, turgor normal. No rashes or lesions  Neuro:  normal without focal findings  mental status, speech normal, alert and oriented x iii  LUH  reflexes normal and symmetric    Intake and Output:  Current Shift: 06/11 1901 - 06/12 0700  In: -   Out: 700 [Urine:700]  Last three shifts: 06/10 0701 - 06/11 1900  In: 240 [P.O.:240]  Out: -     Lab/Data Review:  No results found for this or any previous visit (from the past 12 hour(s)). Key Findings or tests:       Telemetry NONE   Oxygen NONE     Assessment and Plan:     1. Acute to Subacute compression fracture of T11 with resultant kyphosis   2.    Acute to subacute sacral insufficiency fracture on the right   3. Sciatica of right side  4. 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 Leg weakness, bilateral, due to pain at lumbar area. No evidence of neurological deficit   6. Hypertension   7. Hyperlipidemia   8. Normocytic anemia   9. GERD with moderate to large Hiatal hernia     Patient refusing PT/OT until after surgery currently. IR intervention. Orthospine surgery following considering intervention if cement augmentation is not helpful. Add Tylenol 1000mg Q8hr, with oxycodone prn. She can resume her Gabapentin   She can resume lipitor, losartan, nortrityline, PPI  IV LR 1000mL for today. ICS  TSH normal.  Add oral iron.   She will need DEXA outpatient for further evaluation and treatment as this is likely osteoporosis related    Niles Geiger DO, hospitalist   June 11, 2022

## 2022-06-12 NOTE — PROGRESS NOTES
Problem: Mobility Impaired (Adult and Pediatric)  Goal: *Acute Goals and Plan of Care (Insert Text)  Description: Physical Therapy Goals  Initiated 6/12/2022 and to be accomplished within 7 day(s)  1. Patient will move from supine to sit and sit to supine  in bed with minimal assistance/contact guard assist.    2.  Patient will transfer from bed to chair and chair to bed with minimal assistance/contact guard assist using the least restrictive device. 3.  Patient will perform sit to stand with minimal assistance/contact guard assist.  4.  Patient will ambulate with minimal assistance/contact guard assist for 25 feet with the least restrictive device. 5.  Patient will ascend/descend 2 stairs with handrail(s) with minimal assistance/contact guard assist.    PLOF: Lives with spouse in two story home; 3-4 steps to enter. Unsure of handrail availability. Independent ambulator; has cane and ww. Outcome: Progressing Towards Goal   PHYSICAL THERAPY EVALUATION    Patient: Marlene Gallardo (62 y.o. female)  Date: 6/12/2022  Primary Diagnosis: Spinal stenosis [M48.00]  Leg weakness, bilateral [R29.898]  Sciatica of right side [M54.31]  Precautions: Fall,Spinal,Skin  ASSESSMENT :   Per ortho, mobilize patient bed to chair. Plans for procedure with interventional radiology tomorrow 6/13/2022 to address T11 and sacral insuffiencey fractures. Patient supine in bed with HOB elevated to 30 degrees. Anxious and demanding. Refusing mobility d/t pain, pending procedure, and jigar-wick and fear of urination/elimination. Educated patient on role of PT, role in discharge planning, and need for nutrition for healing (refsuing breakfast d/t fear of urination/eliminaitin). Declines trial of bed mobility for hygiene thoroughness and positioning. Denies numbness and tingling. Able to wiggle bilateral ankles. Able to flex knees less than 30 degrees d/t patient fear of movement.  Educated on benefits of bed level mobility to reduce stiffness and prepare for OOB activity. Son present mid-session; participated in education and voiced concerns with mobility and discharge planning. Remained supine in bed at patient request; RN Cassy present. Call dumas in reach. Plan to follow up post procedure for re-evaluation as patient agreeable. Patient will benefit from skilled intervention to address the above impairments. Patient's rehabilitation potential is considered to be Fair  Factors which may influence rehabilitation potential include:   []         None noted  []         Mental ability/status  [x]         Medical condition  []         Home/family situation and support systems  []         Safety awareness  [x]         Pain tolerance/management  []         Other:      PLAN :  Recommendations and Planned Interventions:   [x]           Bed Mobility Training             [x]    Neuromuscular Re-Education  [x]           Transfer Training                   []    Orthotic/Prosthetic Training  [x]           Gait Training                          []    Modalities  [x]           Therapeutic Exercises           []    Edema Management/Control  [x]           Therapeutic Activities            [x]    Family Training/Education  [x]           Patient Education  []           Other (comment):    Frequency/Duration: Patient will be followed by physical therapy 1-2 times per day/4-7 days per week to address goals.   Discharge Recommendations: Rehab  Further Equipment Recommendations for Discharge: TBD with OOB     SUBJECTIVE:   Patient stated I don't know what is going on.    OBJECTIVE DATA SUMMARY:     Past Medical History:   Diagnosis Date    Acid reflux     Hypertension     Spinal stenosis      Past Surgical History:   Procedure Laterality Date    HX BACK SURGERY       Barriers to Learning/Limitations: yes;  emotional  Compensate with: Visual Cues, Verbal Cues, Tactile Cues and Kinesthetic Cues    Home Situation:  Home Situation  Home Environment: Private residence  # Steps to Enter: 3  One/Two Story Residence: Two story  Living Alone: No  Support Systems: Spouse/Significant Other  Current DME Used/Available at Home: Cane, straight,Walker, rolling,Commode, bedside    Critical Behavior:  Neurologic State: Alert  Orientation Level: Oriented to person;Oriented to place  Cognition: Follows commands     Psychosocial  Patient Behaviors: Demanding; Anxious    Strength:    Grossly less than 3/5 BLE d/t poor participation    Range Of Motion:  BLE AROM, non-functional d/t participation  BLE PROM, refused    Pain:  Pain level pre-treatment: 8/10   Pain level post-treatment: 8/10     Activity Tolerance:   Poor     After treatment:   []         Patient left in no apparent distress sitting up in chair  [x]         Patient left in no apparent distress in bed  [x]         Call bell left within reach  [x]         Nursing notified  []         Caregiver present  []         Bed alarm activated  []         SCDs applied    COMMUNICATION/EDUCATION:   [x]         Role of physical therapy and plan of care in the acute care setting. [x]         Fall prevention education was provided and the patient/caregiver indicated understanding. [x]         Patient/family have participated as able in goal setting and plan of care. []         Patient/family agree to work toward stated goals and plan of care. []         Patient understands intent and goals of therapy, but is neutral about his/her participation. []         Patient is unable to participate in goal setting/plan of care: ongoing with therapy staff.     Thank you for this referral.  Elizabeth Valentin, PT   Time Calculation: 13 mins    Eval Complexity: History: MEDIUM  Complexity : 1-2 comorbidities / personal factors will impact the outcome/ POC Exam:MEDIUM Complexity : 3 Standardized tests and measures addressing body structure, function, activity limitation and / or participation in recreation  Presentation: MEDIUM Complexity : Evolving with changing characteristics  Clinical Decision Making:Medium Complexity   Clinical judgement; ROM, MMT, functional mobility Overall Complexity:MEDIUM

## 2022-06-12 NOTE — PROGRESS NOTES
Wound Prevention Checklist    Patient: Curt Navarrete (18 y.o. female)  Date: 6/12/2022  Diagnosis: Spinal stenosis [M48.00]  Leg weakness, bilateral [R29.898]  Sciatica of right side [M54.31] <principal problem not specified>    Precautions: Fall,Spinal,Skin       [x]  Heel prevention boots placed on patient    [x]  Patient turned q2h during shift    [x]  Lift team ordered    [x]  Patient on Madeline bed/Specialty bed    [x]  Each Wound is documented during shift (Stage, Color, drainage, odor, measurements, and dressings)    [x]  Dual skin checks done at bedside during shift report with TD Salazar RN

## 2022-06-13 ENCOUNTER — HOSPITAL ENCOUNTER (OUTPATIENT)
Dept: CT IMAGING | Age: 77
Discharge: HOME | DRG: 517 | End: 2022-06-13
Attending: PHYSICIAN ASSISTANT | Admitting: INTERNAL MEDICINE
Payer: MEDICARE

## 2022-06-13 ENCOUNTER — ANESTHESIA (OUTPATIENT)
Dept: CT IMAGING | Age: 77
DRG: 517 | End: 2022-06-13
Payer: MEDICARE

## 2022-06-13 ENCOUNTER — ANESTHESIA EVENT (OUTPATIENT)
Dept: CT IMAGING | Age: 77
DRG: 517 | End: 2022-06-13
Payer: MEDICARE

## 2022-06-13 VITALS
TEMPERATURE: 97.2 F | HEART RATE: 103 BPM | OXYGEN SATURATION: 92 % | DIASTOLIC BLOOD PRESSURE: 84 MMHG | SYSTOLIC BLOOD PRESSURE: 176 MMHG | RESPIRATION RATE: 27 BRPM

## 2022-06-13 VITALS — HEART RATE: 116 BPM | SYSTOLIC BLOOD PRESSURE: 168 MMHG | DIASTOLIC BLOOD PRESSURE: 92 MMHG | OXYGEN SATURATION: 98 %

## 2022-06-13 LAB
ANION GAP SERPL CALC-SCNC: 6 MMOL/L (ref 3–18)
ANION GAP SERPL CALC-SCNC: 8 MMOL/L (ref 3–18)
BUN SERPL-MCNC: 13 MG/DL (ref 7–18)
BUN SERPL-MCNC: 14 MG/DL (ref 7–18)
BUN/CREAT SERPL: 29 (ref 12–20)
BUN/CREAT SERPL: 35 (ref 12–20)
CALCIUM SERPL-MCNC: 8.4 MG/DL (ref 8.5–10.1)
CALCIUM SERPL-MCNC: 8.6 MG/DL (ref 8.5–10.1)
CHLORIDE SERPL-SCNC: 104 MMOL/L (ref 100–111)
CHLORIDE SERPL-SCNC: 105 MMOL/L (ref 100–111)
CO2 SERPL-SCNC: 27 MMOL/L (ref 21–32)
CO2 SERPL-SCNC: 27 MMOL/L (ref 21–32)
CREAT SERPL-MCNC: 0.4 MG/DL (ref 0.6–1.3)
CREAT SERPL-MCNC: 0.45 MG/DL (ref 0.6–1.3)
ERYTHROCYTE [DISTWIDTH] IN BLOOD BY AUTOMATED COUNT: 17 % (ref 11.6–14.5)
ERYTHROCYTE [DISTWIDTH] IN BLOOD BY AUTOMATED COUNT: 17 % (ref 11.6–14.5)
GLUCOSE SERPL-MCNC: 100 MG/DL (ref 74–99)
GLUCOSE SERPL-MCNC: 96 MG/DL (ref 74–99)
HCT VFR BLD AUTO: 28.2 % (ref 35–45)
HCT VFR BLD AUTO: 29.1 % (ref 35–45)
HGB BLD-MCNC: 8.8 G/DL (ref 12–16)
HGB BLD-MCNC: 8.9 G/DL (ref 12–16)
MAGNESIUM SERPL-MCNC: 2 MG/DL (ref 1.6–2.6)
MCH RBC QN AUTO: 21.5 PG (ref 24–34)
MCH RBC QN AUTO: 22.1 PG (ref 24–34)
MCHC RBC AUTO-ENTMCNC: 30.6 G/DL (ref 31–37)
MCHC RBC AUTO-ENTMCNC: 31.2 G/DL (ref 31–37)
MCV RBC AUTO: 70.3 FL (ref 78–100)
MCV RBC AUTO: 70.9 FL (ref 78–100)
NRBC # BLD: 0 K/UL (ref 0–0.01)
NRBC # BLD: 0 K/UL (ref 0–0.01)
NRBC BLD-RTO: 0 PER 100 WBC
NRBC BLD-RTO: 0 PER 100 WBC
PLATELET # BLD AUTO: 310 K/UL (ref 135–420)
PLATELET # BLD AUTO: 311 K/UL (ref 135–420)
PMV BLD AUTO: 8.2 FL (ref 9.2–11.8)
PMV BLD AUTO: 8.8 FL (ref 9.2–11.8)
POTASSIUM SERPL-SCNC: 2.9 MMOL/L (ref 3.5–5.5)
POTASSIUM SERPL-SCNC: 3.1 MMOL/L (ref 3.5–5.5)
RBC # BLD AUTO: 3.98 M/UL (ref 4.2–5.3)
RBC # BLD AUTO: 4.14 M/UL (ref 4.2–5.3)
SODIUM SERPL-SCNC: 137 MMOL/L (ref 136–145)
SODIUM SERPL-SCNC: 140 MMOL/L (ref 136–145)
WBC # BLD AUTO: 6 K/UL (ref 4.6–13.2)
WBC # BLD AUTO: 6.1 K/UL (ref 4.6–13.2)

## 2022-06-13 PROCEDURE — 2709999900 HC NON-CHARGEABLE SUPPLY

## 2022-06-13 PROCEDURE — 74011000258 HC RX REV CODE- 258: Performed by: NURSE ANESTHETIST, CERTIFIED REGISTERED

## 2022-06-13 PROCEDURE — 74011250636 HC RX REV CODE- 250/636: Performed by: NURSE ANESTHETIST, CERTIFIED REGISTERED

## 2022-06-13 PROCEDURE — 36415 COLL VENOUS BLD VENIPUNCTURE: CPT

## 2022-06-13 PROCEDURE — 74011250636 HC RX REV CODE- 250/636: Performed by: STUDENT IN AN ORGANIZED HEALTH CARE EDUCATION/TRAINING PROGRAM

## 2022-06-13 PROCEDURE — 74011000250 HC RX REV CODE- 250: Performed by: NURSE ANESTHETIST, CERTIFIED REGISTERED

## 2022-06-13 PROCEDURE — 0QS13ZZ REPOSITION SACRUM, PERCUTANEOUS APPROACH: ICD-10-PCS | Performed by: RADIOLOGY

## 2022-06-13 PROCEDURE — 85027 COMPLETE CBC AUTOMATED: CPT

## 2022-06-13 PROCEDURE — 74011250636 HC RX REV CODE- 250/636: Performed by: PHYSICIAN ASSISTANT

## 2022-06-13 PROCEDURE — 74011250637 HC RX REV CODE- 250/637: Performed by: INTERNAL MEDICINE

## 2022-06-13 PROCEDURE — 01942 ANES NEUROMD/NTRVRT LMBR/SAC: CPT | Performed by: NURSE ANESTHETIST, CERTIFIED REGISTERED

## 2022-06-13 PROCEDURE — 99100 ANES PT EXTEME AGE<1 YR&>70: CPT | Performed by: ANESTHESIOLOGY

## 2022-06-13 PROCEDURE — 80048 BASIC METABOLIC PNL TOTAL CA: CPT

## 2022-06-13 PROCEDURE — 83735 ASSAY OF MAGNESIUM: CPT

## 2022-06-13 PROCEDURE — 0QU13JZ SUPPLEMENT SACRUM WITH SYNTHETIC SUBSTITUTE, PERCUTANEOUS APPROACH: ICD-10-PCS | Performed by: RADIOLOGY

## 2022-06-13 PROCEDURE — 0PU43JZ SUPPLEMENT THORACIC VERTEBRA WITH SYNTHETIC SUBSTITUTE, PERCUTANEOUS APPROACH: ICD-10-PCS | Performed by: RADIOLOGY

## 2022-06-13 PROCEDURE — 77030040830 HC CATH URETH FOL MDII -A

## 2022-06-13 PROCEDURE — 74011000250 HC RX REV CODE- 250: Performed by: RADIOLOGY

## 2022-06-13 PROCEDURE — 77030021782 CT SACROPLASTY BI

## 2022-06-13 PROCEDURE — 99233 SBSQ HOSP IP/OBS HIGH 50: CPT | Performed by: STUDENT IN AN ORGANIZED HEALTH CARE EDUCATION/TRAINING PROGRAM

## 2022-06-13 PROCEDURE — 22510 PERQ CERVICOTHORACIC INJECT: CPT

## 2022-06-13 PROCEDURE — 76060000036 HC ANESTHESIA 2.5 TO 3 HR

## 2022-06-13 PROCEDURE — 74011000250 HC RX REV CODE- 250: Performed by: INTERNAL MEDICINE

## 2022-06-13 PROCEDURE — 74011250637 HC RX REV CODE- 250/637: Performed by: STUDENT IN AN ORGANIZED HEALTH CARE EDUCATION/TRAINING PROGRAM

## 2022-06-13 PROCEDURE — 74011250636 HC RX REV CODE- 250/636

## 2022-06-13 PROCEDURE — 74011250636 HC RX REV CODE- 250/636: Performed by: INTERNAL MEDICINE

## 2022-06-13 PROCEDURE — 74011250636 HC RX REV CODE- 250/636: Performed by: RADIOLOGY

## 2022-06-13 PROCEDURE — 74011250636 HC RX REV CODE- 250/636: Performed by: ANESTHESIOLOGY

## 2022-06-13 PROCEDURE — 99100 ANES PT EXTEME AGE<1 YR&>70: CPT | Performed by: NURSE ANESTHETIST, CERTIFIED REGISTERED

## 2022-06-13 PROCEDURE — 01942 ANES NEUROMD/NTRVRT LMBR/SAC: CPT | Performed by: ANESTHESIOLOGY

## 2022-06-13 PROCEDURE — 77030018842 HC SOL IRR SOD CL 9% BAXT -A

## 2022-06-13 PROCEDURE — 0PS43ZZ REPOSITION THORACIC VERTEBRA, PERCUTANEOUS APPROACH: ICD-10-PCS | Performed by: RADIOLOGY

## 2022-06-13 PROCEDURE — 65270000046 HC RM TELEMETRY

## 2022-06-13 RX ORDER — SODIUM CHLORIDE 0.9 % (FLUSH) 0.9 %
5-40 SYRINGE (ML) INJECTION EVERY 8 HOURS
Status: DISCONTINUED | OUTPATIENT
Start: 2022-06-13 | End: 2022-06-17 | Stop reason: HOSPADM

## 2022-06-13 RX ORDER — SODIUM CHLORIDE 0.9 % (FLUSH) 0.9 %
5-40 SYRINGE (ML) INJECTION EVERY 8 HOURS
Status: CANCELLED | OUTPATIENT
Start: 2022-06-13

## 2022-06-13 RX ORDER — SODIUM CHLORIDE 9 MG/ML
50 INJECTION, SOLUTION INTRAVENOUS CONTINUOUS
Status: DISCONTINUED | OUTPATIENT
Start: 2022-06-13 | End: 2022-06-14

## 2022-06-13 RX ORDER — LORAZEPAM 2 MG/ML
1 INJECTION, SOLUTION INTRAMUSCULAR; INTRAVENOUS ONCE
Status: COMPLETED | OUTPATIENT
Start: 2022-06-13 | End: 2022-06-13

## 2022-06-13 RX ORDER — FENTANYL CITRATE 50 UG/ML
INJECTION, SOLUTION INTRAMUSCULAR; INTRAVENOUS AS NEEDED
Status: DISCONTINUED | OUTPATIENT
Start: 2022-06-13 | End: 2022-06-13 | Stop reason: HOSPADM

## 2022-06-13 RX ORDER — HYDROMORPHONE HYDROCHLORIDE 1 MG/ML
0.5 INJECTION, SOLUTION INTRAMUSCULAR; INTRAVENOUS; SUBCUTANEOUS
Status: DISCONTINUED | OUTPATIENT
Start: 2022-06-13 | End: 2022-06-13

## 2022-06-13 RX ORDER — SODIUM CHLORIDE 0.9 % (FLUSH) 0.9 %
5-40 SYRINGE (ML) INJECTION EVERY 8 HOURS
Status: DISCONTINUED | OUTPATIENT
Start: 2022-06-13 | End: 2022-06-14

## 2022-06-13 RX ORDER — METOPROLOL TARTRATE 5 MG/5ML
5 INJECTION INTRAVENOUS
Status: ACTIVE | OUTPATIENT
Start: 2022-06-13 | End: 2022-06-14

## 2022-06-13 RX ORDER — HALOPERIDOL 5 MG/ML
2 INJECTION INTRAMUSCULAR ONCE
Status: COMPLETED | OUTPATIENT
Start: 2022-06-13 | End: 2022-06-13

## 2022-06-13 RX ORDER — SODIUM CHLORIDE 0.9 % (FLUSH) 0.9 %
5-40 SYRINGE (ML) INJECTION AS NEEDED
Status: DISCONTINUED | OUTPATIENT
Start: 2022-06-13 | End: 2022-06-14

## 2022-06-13 RX ORDER — MIDAZOLAM HYDROCHLORIDE 1 MG/ML
INJECTION, SOLUTION INTRAMUSCULAR; INTRAVENOUS AS NEEDED
Status: DISCONTINUED | OUTPATIENT
Start: 2022-06-13 | End: 2022-06-13 | Stop reason: HOSPADM

## 2022-06-13 RX ORDER — PROPOFOL 10 MG/ML
VIAL (ML) INTRAVENOUS
Status: DISCONTINUED | OUTPATIENT
Start: 2022-06-13 | End: 2022-06-13 | Stop reason: HOSPADM

## 2022-06-13 RX ORDER — SODIUM CHLORIDE 9 MG/ML
75 INJECTION, SOLUTION INTRAVENOUS CONTINUOUS
Status: DISCONTINUED | OUTPATIENT
Start: 2022-06-13 | End: 2022-06-16

## 2022-06-13 RX ORDER — MORPHINE SULFATE 2 MG/ML
2 INJECTION, SOLUTION INTRAMUSCULAR; INTRAVENOUS
Status: DISCONTINUED | OUTPATIENT
Start: 2022-06-13 | End: 2022-06-16

## 2022-06-13 RX ORDER — LIDOCAINE HYDROCHLORIDE 10 MG/ML
20 INJECTION INFILTRATION; PERINEURAL
Status: DISCONTINUED | OUTPATIENT
Start: 2022-06-13 | End: 2022-06-14 | Stop reason: HOSPADM

## 2022-06-13 RX ORDER — HYDRALAZINE HYDROCHLORIDE 20 MG/ML
25 INJECTION INTRAMUSCULAR; INTRAVENOUS
Status: DISCONTINUED | OUTPATIENT
Start: 2022-06-13 | End: 2022-06-16

## 2022-06-13 RX ORDER — SODIUM CHLORIDE, SODIUM LACTATE, POTASSIUM CHLORIDE, CALCIUM CHLORIDE 600; 310; 30; 20 MG/100ML; MG/100ML; MG/100ML; MG/100ML
25 INJECTION, SOLUTION INTRAVENOUS CONTINUOUS
Status: DISCONTINUED | OUTPATIENT
Start: 2022-06-13 | End: 2022-06-14

## 2022-06-13 RX ORDER — HYDROMORPHONE HYDROCHLORIDE 1 MG/ML
0.5 INJECTION, SOLUTION INTRAMUSCULAR; INTRAVENOUS; SUBCUTANEOUS
Status: CANCELLED | OUTPATIENT
Start: 2022-06-13

## 2022-06-13 RX ORDER — HYDROMORPHONE HYDROCHLORIDE 2 MG/ML
INJECTION, SOLUTION INTRAMUSCULAR; INTRAVENOUS; SUBCUTANEOUS
Status: COMPLETED
Start: 2022-06-13 | End: 2022-06-13

## 2022-06-13 RX ORDER — LIDOCAINE HYDROCHLORIDE 10 MG/ML
10 INJECTION, SOLUTION EPIDURAL; INFILTRATION; INTRACAUDAL; PERINEURAL
Status: DISCONTINUED | OUTPATIENT
Start: 2022-06-13 | End: 2022-06-13 | Stop reason: CLARIF

## 2022-06-13 RX ORDER — SODIUM CHLORIDE 0.9 % (FLUSH) 0.9 %
5-40 SYRINGE (ML) INJECTION AS NEEDED
Status: DISCONTINUED | OUTPATIENT
Start: 2022-06-13 | End: 2022-06-14 | Stop reason: SDUPTHER

## 2022-06-13 RX ORDER — FENTANYL CITRATE 50 UG/ML
25 INJECTION, SOLUTION INTRAMUSCULAR; INTRAVENOUS AS NEEDED
Status: DISCONTINUED | OUTPATIENT
Start: 2022-06-13 | End: 2022-06-13

## 2022-06-13 RX ORDER — POTASSIUM CHLORIDE 20 MEQ/1
40 TABLET, EXTENDED RELEASE ORAL ONCE
Status: COMPLETED | OUTPATIENT
Start: 2022-06-13 | End: 2022-06-13

## 2022-06-13 RX ORDER — HYDRALAZINE HYDROCHLORIDE 20 MG/ML
INJECTION INTRAMUSCULAR; INTRAVENOUS
Status: DISPENSED
Start: 2022-06-13 | End: 2022-06-14

## 2022-06-13 RX ORDER — LABETALOL HCL 20 MG/4 ML
10 SYRINGE (ML) INTRAVENOUS
Status: DISCONTINUED | OUTPATIENT
Start: 2022-06-13 | End: 2022-06-16

## 2022-06-13 RX ORDER — LORAZEPAM 2 MG/ML
1 INJECTION, SOLUTION INTRAMUSCULAR; INTRAVENOUS
Status: DISCONTINUED | OUTPATIENT
Start: 2022-06-13 | End: 2022-06-16

## 2022-06-13 RX ORDER — POTASSIUM CHLORIDE 7.45 MG/ML
10 INJECTION INTRAVENOUS ONCE
Status: COMPLETED | OUTPATIENT
Start: 2022-06-13 | End: 2022-06-13

## 2022-06-13 RX ORDER — LIDOCAINE HYDROCHLORIDE 10 MG/ML
INJECTION, SOLUTION EPIDURAL; INFILTRATION; INTRACAUDAL; PERINEURAL
Status: DISPENSED
Start: 2022-06-13 | End: 2022-06-14

## 2022-06-13 RX ORDER — SODIUM CHLORIDE 0.9 % (FLUSH) 0.9 %
5-40 SYRINGE (ML) INJECTION AS NEEDED
Status: CANCELLED | OUTPATIENT
Start: 2022-06-13

## 2022-06-13 RX ORDER — ONDANSETRON 2 MG/ML
4 INJECTION INTRAMUSCULAR; INTRAVENOUS ONCE
Status: CANCELLED | OUTPATIENT
Start: 2022-06-13 | End: 2022-06-13

## 2022-06-13 RX ORDER — HYDRALAZINE HYDROCHLORIDE 20 MG/ML
25 INJECTION INTRAMUSCULAR; INTRAVENOUS
Status: DISCONTINUED | OUTPATIENT
Start: 2022-06-13 | End: 2022-06-13

## 2022-06-13 RX ORDER — SODIUM CHLORIDE 9 MG/ML
75 INJECTION, SOLUTION INTRAVENOUS CONTINUOUS
Status: DISCONTINUED | OUTPATIENT
Start: 2022-06-13 | End: 2022-06-14

## 2022-06-13 RX ORDER — SODIUM CHLORIDE, SODIUM LACTATE, POTASSIUM CHLORIDE, CALCIUM CHLORIDE 600; 310; 30; 20 MG/100ML; MG/100ML; MG/100ML; MG/100ML
25 INJECTION, SOLUTION INTRAVENOUS CONTINUOUS
Status: CANCELLED | OUTPATIENT
Start: 2022-06-13

## 2022-06-13 RX ORDER — ONDANSETRON 2 MG/ML
4 INJECTION INTRAMUSCULAR; INTRAVENOUS ONCE
Status: DISPENSED | OUTPATIENT
Start: 2022-06-13 | End: 2022-06-14

## 2022-06-13 RX ORDER — FENTANYL CITRATE 50 UG/ML
25 INJECTION, SOLUTION INTRAMUSCULAR; INTRAVENOUS AS NEEDED
Status: CANCELLED | OUTPATIENT
Start: 2022-06-13

## 2022-06-13 RX ADMIN — SODIUM CHLORIDE, PRESERVATIVE FREE 10 ML: 5 INJECTION INTRAVENOUS at 19:06

## 2022-06-13 RX ADMIN — ACETAMINOPHEN 1000 MG: 325 TABLET ORAL at 22:00

## 2022-06-13 RX ADMIN — ONDANSETRON 4 MG: 2 INJECTION INTRAMUSCULAR; INTRAVENOUS at 19:48

## 2022-06-13 RX ADMIN — PANTOPRAZOLE SODIUM 40 MG: 40 TABLET, DELAYED RELEASE ORAL at 08:34

## 2022-06-13 RX ADMIN — SODIUM CHLORIDE 75 ML/HR: 900 INJECTION, SOLUTION INTRAVENOUS at 18:39

## 2022-06-13 RX ADMIN — LOSARTAN POTASSIUM 25 MG: 25 TABLET, FILM COATED ORAL at 08:32

## 2022-06-13 RX ADMIN — WATER 2 G: 1 INJECTION INTRAMUSCULAR; INTRAVENOUS; SUBCUTANEOUS at 13:28

## 2022-06-13 RX ADMIN — POTASSIUM CHLORIDE 10 MEQ: 7.46 INJECTION, SOLUTION INTRAVENOUS at 08:34

## 2022-06-13 RX ADMIN — SODIUM CHLORIDE, PRESERVATIVE FREE 10 ML: 5 INJECTION INTRAVENOUS at 08:33

## 2022-06-13 RX ADMIN — PROPOFOL 75 MCG/KG/MIN: 10 INJECTION, EMULSION INTRAVENOUS at 13:19

## 2022-06-13 RX ADMIN — FENTANYL CITRATE 25 MCG: 50 INJECTION, SOLUTION INTRAMUSCULAR; INTRAVENOUS at 13:36

## 2022-06-13 RX ADMIN — SODIUM CHLORIDE, PRESERVATIVE FREE 40 ML: 5 INJECTION INTRAVENOUS at 00:19

## 2022-06-13 RX ADMIN — POTASSIUM CHLORIDE 40 MEQ: 1500 TABLET, EXTENDED RELEASE ORAL at 08:32

## 2022-06-13 RX ADMIN — FENTANYL CITRATE 25 MCG: 50 INJECTION, SOLUTION INTRAMUSCULAR; INTRAVENOUS at 13:19

## 2022-06-13 RX ADMIN — FENTANYL CITRATE 25 MCG: 50 INJECTION, SOLUTION INTRAMUSCULAR; INTRAVENOUS at 15:14

## 2022-06-13 RX ADMIN — Medication 1 TABLET: at 18:41

## 2022-06-13 RX ADMIN — SODIUM CHLORIDE, PRESERVATIVE FREE 10 ML: 5 INJECTION INTRAVENOUS at 22:00

## 2022-06-13 RX ADMIN — MIDAZOLAM HYDROCHLORIDE 1 MG: 2 INJECTION, SOLUTION INTRAMUSCULAR; INTRAVENOUS at 13:15

## 2022-06-13 RX ADMIN — MORPHINE SULFATE 2 MG: 2 INJECTION, SOLUTION INTRAMUSCULAR; INTRAVENOUS at 22:00

## 2022-06-13 RX ADMIN — HALOPERIDOL LACTATE 2 MG: 5 INJECTION, SOLUTION INTRAMUSCULAR at 16:06

## 2022-06-13 RX ADMIN — FENTANYL CITRATE 25 MCG: 50 INJECTION, SOLUTION INTRAMUSCULAR; INTRAVENOUS at 13:22

## 2022-06-13 RX ADMIN — SODIUM CHLORIDE 5 MCG: 9 INJECTION, SOLUTION INTRAVENOUS at 13:38

## 2022-06-13 RX ADMIN — Medication 1 TABLET: at 08:32

## 2022-06-13 RX ADMIN — OXYCODONE HYDROCHLORIDE 5 MG: 5 TABLET ORAL at 01:21

## 2022-06-13 RX ADMIN — HYDROMORPHONE HYDROCHLORIDE 0.5 MG: 2 INJECTION, SOLUTION INTRAMUSCULAR; INTRAVENOUS; SUBCUTANEOUS at 16:02

## 2022-06-13 RX ADMIN — HYDROMORPHONE HYDROCHLORIDE 0.5 MG: 1 INJECTION, SOLUTION INTRAMUSCULAR; INTRAVENOUS; SUBCUTANEOUS at 16:28

## 2022-06-13 RX ADMIN — ACETAMINOPHEN 1000 MG: 325 TABLET ORAL at 08:33

## 2022-06-13 RX ADMIN — SODIUM CHLORIDE, PRESERVATIVE FREE 10 ML: 5 INJECTION INTRAVENOUS at 16:00

## 2022-06-13 RX ADMIN — SODIUM CHLORIDE, SODIUM LACTATE, POTASSIUM CHLORIDE, AND CALCIUM CHLORIDE 1000 ML: 600; 310; 30; 20 INJECTION, SOLUTION INTRAVENOUS at 12:24

## 2022-06-13 RX ADMIN — HYDRALAZINE HYDROCHLORIDE 25 MG: 20 INJECTION, SOLUTION INTRAMUSCULAR; INTRAVENOUS at 19:28

## 2022-06-13 RX ADMIN — MIDAZOLAM HYDROCHLORIDE 1 MG: 2 INJECTION, SOLUTION INTRAMUSCULAR; INTRAVENOUS at 13:19

## 2022-06-13 RX ADMIN — LORAZEPAM 1 MG: 2 INJECTION, SOLUTION INTRAMUSCULAR; INTRAVENOUS at 20:10

## 2022-06-13 RX ADMIN — FENTANYL CITRATE 25 MCG: 50 INJECTION, SOLUTION INTRAMUSCULAR; INTRAVENOUS at 13:30

## 2022-06-13 RX ADMIN — FENTANYL CITRATE 50 MCG: 50 INJECTION, SOLUTION INTRAMUSCULAR; INTRAVENOUS at 15:21

## 2022-06-13 RX ADMIN — SODIUM CHLORIDE 5 MCG: 9 INJECTION, SOLUTION INTRAVENOUS at 14:13

## 2022-06-13 RX ADMIN — ATORVASTATIN CALCIUM 10 MG: 10 TABLET, FILM COATED ORAL at 08:32

## 2022-06-13 RX ADMIN — FENTANYL CITRATE 50 MCG: 50 INJECTION, SOLUTION INTRAMUSCULAR; INTRAVENOUS at 15:05

## 2022-06-13 RX ADMIN — LORAZEPAM 1 MG: 2 INJECTION, SOLUTION INTRAMUSCULAR; INTRAVENOUS at 11:47

## 2022-06-13 RX ADMIN — FENTANYL CITRATE 25 MCG: 50 INJECTION, SOLUTION INTRAMUSCULAR; INTRAVENOUS at 14:09

## 2022-06-13 NOTE — PROGRESS NOTES
Bedside shift report received from [place]. Family present at bedside. Patient resting comfortably. NAD      2000  Patient restless, moaning and groaning. Patient asked multiple times if she is in pain or uncomfortable. Patient denied. Patient repeatedly stating she is wet. Nance catheter placement verified. Patient is clean and dry at this time. 2010 Patient given 1 mg ativan via IV    2100 Patient appears calm and asleep. Bed alarm active. Personal belongings and call bell at bedside. Family no longer present at bedside due to patient sleeping.  Will continue to monitor

## 2022-06-13 NOTE — PROGRESS NOTES
Called with reports that the patient's potassium was 2.9 today he has a normal magnesium. She has a procedure/kyphoplasty this morning within an hour or so per nursing.   Plan to give one-time oral dose 40 mEq potassium and 10 mEq of IV

## 2022-06-13 NOTE — CONSULTS
Interventional Radiology Consult Note  Patient: Denny Singh               Sex: female          DOA: 6/10/2022       YOB: 1945      Age:  68 y.o.        LOS:  LOS: 3 days              Assessment   T11 acute compression fracture  Bilateral sacral insufficiency fracture  Osteopenia  Severe back pain preventing activities of daily living    T11 kyphoplasty and bilateral sacroplasty is needed at this time to aid in stabilizing the fractured areas and assisting in pain management    Case and images reviewed by Dr. Kerline Garcia. Plan     1. Image guided T11 kyphoplasty and bilateral sacroplasty with anesthesia at 1pm  2. NPO since midnight with blood thinning medications held prior to procedure  3. From a procedure perspective, OK for discharge post procedure after 3 hours bedrest. Recommend PT/OT eval for walking up stairs to get into house prior to discharge    Thank you,  Sonu Quintanilla  1332    HPI:     Denny Singh is a 68 y.o. female who has been seen in evaluation of back pain secondary to acute compression fracture and bilateral sacral insufficiency fractures at the request of Dr. Zoë Olsen. Denny Singh presented to [facility] 6/10/22 for severe back pain. Imaging (CT 6/10/22) shows progressing acute compression fracture of T11 and bilateral sacral insufficiency fractures. The patient has PMHx significant for osteopenia (from DEXA at [facility] in April 2022) and prior T11-L2 back surgery (years prior) as well as anxiety. Today, the patient reports her back pain is localized both in her lower back and in her lower pelvis. She states her pain is 0/10 at rest lying still, escalated to 10/10 with twisting in bed or sitting on the toilet. The anticipated procedure was discussed in detail including risk of injury, infection, and bleeding. All questions were answered and concerns addressed. Informed consents were obtained.  The patient wishes to have a ellis catheter placed due to worries about the efficacy of her purewik and pain with lifting her pelvis for a bedpan. Past Medical History:   Diagnosis Date    Acid reflux     Hypertension     Spinal stenosis      Past Surgical History:   Procedure Laterality Date    HX BACK SURGERY       Family History   Problem Relation Age of Onset    Heart Attack Mother     Cancer Father      Social History     Socioeconomic History    Marital status:    Tobacco Use    Smoking status: Never Smoker    Smokeless tobacco: Never Used   Vaping Use    Vaping Use: Never used   Substance and Sexual Activity    Alcohol use: Yes     Alcohol/week: 1.0 standard drink     Types: 1 Glasses of wine per week    Drug use: No    Sexual activity: Yes     Partners: Male     Birth control/protection: None     Prior to Admission medications    Medication Sig Start Date End Date Taking? Authorizing Provider   docosahexaenoic acid/epa (FISH OIL PO) Take 1,000 mg by mouth daily. Yes Provider, Historical   naloxone (NARCAN) 4 mg/actuation nasal spray Use 1 spray intranasally, then discard. Repeat with new spray every 2 min as needed for opioid overdose symptoms, alternating nostrils. 5/30/22   BAM Modi   gabapentin (Neurontin) 300 mg capsule 1 in the am and 3 in the evening as directed  Indications: neuropathic pain  Patient taking differently: Take 300 mg by mouth three (3) times daily. 1 in the am and 3 in the evening as directed  Indications: neuropathic pain 2/5/22   Venecia Gomez MD   nortriptyline (PAMELOR) 10 mg capsule Take 4 Capsules by mouth nightly. 2/2/22   Venecia Gomez MD   MAGNESIUM OXIDE PO Take 1 Tab by mouth daily. Provider, Historical   omeprazole (PRILOSEC) 40 mg capsule Take 40 mg by mouth daily. Indications: gastroesophageal reflux disease 7/9/18   Provider, Historical   losartan (COZAAR) 25 mg tablet Take 25 mg by mouth daily.  Indications: high blood pressure 5/25/18   Provider, Historical   hydroCHLOROthiazide (HYDRODIURIL) 25 mg tablet Take 25 mg by mouth daily. Indications: high blood pressure    Provider, Historical   LORazepam (ATIVAN) 1 mg tablet Take 1 mg by mouth every eight (8) hours as needed. 5/25/18   Provider, Historical   atorvastatin (LIPITOR) 10 mg tablet Take 10 mg by mouth daily.  9/4/17   Provider, Historical     No Known Allergies  Review of Systems  As above    Physical Exam:      Visit Vitals  BP (!) 185/93 (BP 1 Location: Left upper arm, BP Patient Position: At rest)   Pulse 81   Temp 97.8 °F (36.6 °C)   Resp 18   SpO2 96%     Physical Exam:  Constitutional: Awake and alert and oriented x 4, NAD  Respiratory: Normal work of breathing, equal chest rise and fall  Cardiovascular: RRR  Gastrointestinal: Soft NT ND  Extremities: Skin warm and dry, moves all four     Labs Reviewed:  CMP:   Lab Results   Component Value Date/Time     06/13/2022 05:13 AM    K 2.9 (LL) 06/13/2022 05:13 AM     06/13/2022 05:13 AM    CO2 27 06/13/2022 05:13 AM    AGAP 6 06/13/2022 05:13 AM    GLU 96 06/13/2022 05:13 AM    BUN 14 06/13/2022 05:13 AM    CREA 0.40 (L) 06/13/2022 05:13 AM    GFRAA >60 06/13/2022 05:13 AM    GFRNA >60 06/13/2022 05:13 AM    CA 8.4 (L) 06/13/2022 05:13 AM    MG 2.0 06/13/2022 02:09 AM     CBC:   Lab Results   Component Value Date/Time    WBC 6.1 06/13/2022 05:13 AM    HGB 8.9 (L) 06/13/2022 05:13 AM    HCT 29.1 (L) 06/13/2022 05:13 AM     06/13/2022 05:13 AM     COAGS: INR 1.0    Blood Thinners:  lovenox - held

## 2022-06-13 NOTE — PERIOP NOTES
TRANSFER - OUT REPORT:    Verbal report given to Aissatou(name) on Brianna Solomon  being transferred to (unit) for routine post - op       Report consisted of patients Situation, Background, Assessment and   Recommendations(SBAR). Information from the following report(s) SBAR, Procedure Summary and MAR was reviewed with the receiving nurse. Lines:   Peripheral IV 06/10/22 Right;Upper Forearm (Active)   Site Assessment Clean, dry, & intact 06/13/22 0834   Phlebitis Assessment 0 06/13/22 0834   Infiltration Assessment 0 06/13/22 0834   Dressing Status Clean, dry, & intact 06/13/22 0834   Dressing Type Tape;Transparent 06/13/22 0834   Hub Color/Line Status End cap changed 06/13/22 0834   Action Taken Open ports on tubing capped 06/13/22 0834   Alcohol Cap Used Yes 06/13/22 0834        Opportunity for questions and clarification was provided.       Patient transported with:   O2 @ 4 liters

## 2022-06-13 NOTE — PROGRESS NOTES
Verbal bedside shift report received from [facility]. Pt resting comfortable with call bell in reach.

## 2022-06-13 NOTE — PROGRESS NOTES
Progress Note  Hospitalist Service    Patient: Emily Rasmussen MRN: [id number]   SSN: [id number]  YOB: 1945   Age: 68 y.o. Sex: female      Admit Date: 6/10/2022    LOS: 3 days   Chief Complaint   Patient presents with    Back Pain       Subjective:     Updated family.  angry as patient was in PACU for prolonged time where he states he was not updated. Per notes - patient became agitated following procedure and was given Dilaudid and Haldol. Attempted to answer questions to the best of my ability. However, as I was not alerted to any difficulties in PACU, I can only answer based on record. Review of Systems:  Patient lethargic and unable to answer questions. Objective:     Vitals:  Visit Vitals  BP (!) 171/100 (BP 1 Location: Left upper arm, BP Patient Position: At rest)   Pulse 85   Temp 97.9 °F (36.6 °C)   Resp 16   SpO2 96%       Physical Exam:   General appearance: lethargic, no distress, appears stated age  Lungs: clear to auscultation bilaterally  Heart: regular rate and rhythm, S1, S2 normal, no murmur, click, rub or gallop  Abdomen: soft, non-tender. Bowel sounds normal. No masses,  no organomegaly  Pulses: 2+ and symmetric  BACK: pain with passive movement   Skin: Skin color, texture, turgor normal. No rashes or lesions  Neuro:  normal without focal findings  mental status, speech normal, alert and oriented x iii  LUH  reflexes normal and symmetric    Intake and Output:  Current Shift: 06/13 0701 - 06/13 1900  In: 350 [P.O.:80; I.V.:270]  Out: -   Last three shifts: 06/11 1901 - 06/13 0700  In: 320 [P.O.:320]  Out: 700 [Urine:700]    Lab/Data Review:  No results found for this or any previous visit (from the past 12 hour(s)). Key Findings or tests:       Telemetry NONE   Oxygen NONE     Assessment and Plan:     1. Acute to Subacute compression fracture of T11 with resultant kyphosis   2. Acute to subacute sacral insufficiency fracture on the right   3.   Sciatica of right side  4. 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 Leg weakness, bilateral, due to pain at lumbar area. No evidence of neurological deficit   6. Hypertension   7. Hyperlipidemia   8. Normocytic anemia   9. GERD with moderate to large Hiatal hernia     Patient refusing PT/OT until after surgery currently. IR intervention. Orthospine surgery following considering intervention if cement augmentation is not helpful. Patient now s/p kyphoplasty, bilateral sacroplasty on 6/13 with interventional radiology. Tylenol 1000mg Q8hr, with oxycodone prn. She can resume her Gabapentin   She can resume lipitor, losartan, nortrityline, PPI  ICS  TSH normal.  Continue oral iron.   She will need DEXA outpatient for further evaluation and treatment as this is likely osteoporosis related    Olman Adler DO, hospitalist   June 13, 2022

## 2022-06-13 NOTE — PROGRESS NOTES
OT orders received and medical chart review completed. Pt has scheduled surgery for kyphoplasty this date, OT to follow up tomorrow.

## 2022-06-13 NOTE — PROCEDURES
RADIOLOGY POST PROCEDURE NOTE     June 13, 2022       3:31 PM     Preoperative Diagnosis:   T11 and bilateral sacral insufficiency fractures. Postoperative Diagnosis:  Same. :  Dr. Yin Dennison    Assistant:  None. Type of Anesthesia: 1% plain lidocaine and IV deep sedation per anaesthesia. Procedure/Description:  Image guided T11 kyphoplasty   Image guided bilateral sacroplasty. Findings:   No bleeding. Estimated blood Loss:  Minimal    Specimen Removed:   no    Blood transfusions:  None. Implants:  None.     Complications: None    Condition: Stable    Discharge Plan:  continue present therapy    Kris Adame MD

## 2022-06-13 NOTE — PROGRESS NOTES
Progress Note  Hospitalist Service    Patient: Viktor Curry MRN: [id number]   SSN: [id number]  YOB: 1945   Age: 68 y.o. Sex: female      Admit Date: 6/10/2022    LOS: 2 days   Chief Complaint   Patient presents with    Back Pain       Subjective:     Patient endorsing back pain.  currently in room. Explained to patient to importance of participating with PT/OT if she is interested in services after discharge. Asking questions concerning kyphoplasty, I have asked her to defer specific procedural questions to IR. Review of Systems:  Patient Endorses   ---------------------------------------------------------------------------------  Constitutional: Negative for fever, chills and diaphoresis. Respiratory: Negative for cough and hemoptysis. Cardiovascular: Negative for chest pain and palpitations. Gastrointestinal: Negative for nausea and vomiting. Musculoskeletal: +back pain   Psychiatric: +anxiety     Objective:     Vitals:  Visit Vitals  BP (!) 153/80 (BP 1 Location: Left upper arm, BP Patient Position: At rest)   Pulse 94   Temp 98.5 °F (36.9 °C)   Resp 18   SpO2 97%       Physical Exam:   General appearance: alert, cooperative, no distress, appears stated age  Lungs: clear to auscultation bilaterally  Heart: regular rate and rhythm, S1, S2 normal, no murmur, click, rub or gallop  Abdomen: soft, non-tender. Bowel sounds normal. No masses,  no organomegaly  Pulses: 2+ and symmetric  BACK: pain with passive movement   Skin: Skin color, texture, turgor normal. No rashes or lesions  Neuro:  normal without focal findings  mental status, speech normal, alert and oriented x iii  LUH  reflexes normal and symmetric    Intake and Output:  Current Shift: No intake/output data recorded. Last three shifts: 06/11 0701 - 06/12 1900  In: 560 [P.O.:560]  Out: 700 [Urine:700]    Lab/Data Review:  No results found for this or any previous visit (from the past 12 hour(s)).       Key Findings or tests:       Telemetry NONE   Oxygen NONE     Assessment and Plan:     1. Acute to Subacute compression fracture of T11 with resultant kyphosis   2. Acute to subacute sacral insufficiency fracture on the right   3. Sciatica of right side  4. 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 Leg weakness, bilateral, due to pain at lumbar area. No evidence of neurological deficit   6. Hypertension   7. Hyperlipidemia   8. Normocytic anemia   9. GERD with moderate to large Hiatal hernia     Patient refusing PT/OT until after surgery currently. IR intervention. Orthospine surgery following considering intervention if cement augmentation is not helpful. Patient to kyphoplasty tomorrow. NPO at midnight. Lovenox held after PM dose on 6/13. Tylenol 1000mg Q8hr, with oxycodone prn. She can resume her Gabapentin   She can resume lipitor, losartan, nortrityline, PPI  ICS  TSH normal.  Add oral iron.   She will need DEXA outpatient for further evaluation and treatment as this is likely osteoporosis related    Valarie Moya DO, hospitalist   June 12, 2022

## 2022-06-13 NOTE — PERIOP NOTES
1530: Pt arrived to PACU via bed accompanied by CRNA and RN. Pt very agitated and combative. Not following commands. CRNA remains at bedside administering medication. 1558: Patient kicking legs and restless. Telephone order for Haldol received from anesthesia.  3952: Pt still very restless and not following commands. Pt complaining of pain in her legs. Dilaudid given. Pharmacy preparing haldol.  5993: Haldol given. 1630: Pt A&Ox4 and able to follow commands.

## 2022-06-13 NOTE — PROGRESS NOTES
vss afeb  For cement augmentation today  Severe pain, refusing PT OT until after procedure. Explained that there is no surgical intervention I would reccomend until her fractures have healed. I do not believe here chronic spinal stenosis is the cause fo this acute pain syndrome,  Her fractures do not have a surgical solution, rather will require 6-12 weeks to heal.  Will require re-evaluation of osteoporosis.   PT OT after procedure

## 2022-06-13 NOTE — ANESTHESIA PREPROCEDURE EVALUATION
Relevant Problems   NEUROLOGY   (+) Depression, recurrent (HCC)   (+) Generalized anxiety disorder      CARDIOVASCULAR   (+) Essential hypertension, benign      GASTROINTESTINAL   (+) Gastroesophageal reflux disease without esophagitis      PERSONAL HX & FAMILY HX OF CANCER   (+) Squamous cell carcinoma       Anesthetic History   No history of anesthetic complications            Review of Systems / Medical History  Patient summary reviewed and pertinent labs reviewed    Pulmonary  Within defined limits                 Neuro/Psych         Psychiatric history     Cardiovascular    Hypertension: well controlled                   GI/Hepatic/Renal  Within defined limits              Endo/Other        Obesity and anemia     Other Findings              Physical Exam    Airway  Mallampati: II  TM Distance: 4 - 6 cm  Neck ROM: normal range of motion   Mouth opening: Normal     Cardiovascular               Dental         Pulmonary                 Abdominal  GI exam deferred       Other Findings            Anesthetic Plan    ASA: 3  Anesthesia type: MAC            Anesthetic plan and risks discussed with: Patient ILR

## 2022-06-13 NOTE — ANESTHESIA POSTPROCEDURE EVALUATION
* No procedures listed *. MAC    Anesthesia Post Evaluation      Multimodal analgesia: multimodal analgesia used between 6 hours prior to anesthesia start to PACU discharge  Patient location during evaluation: bedside  Patient participation: complete - patient participated  Level of consciousness: awake  Pain management: adequate  Airway patency: patent  Anesthetic complications: no  Cardiovascular status: stable  Respiratory status: acceptable  Hydration status: acceptable  Post anesthesia nausea and vomiting:  controlled      INITIAL Post-op Vital signs:   Vitals Value Taken Time   /80 06/13/22 1659   Temp     Pulse 97 06/13/22 1707   Resp 23 06/13/22 1707   SpO2 90 % 06/13/22 1707   Vitals shown include unvalidated device data.

## 2022-06-14 LAB
ANION GAP SERPL CALC-SCNC: 9 MMOL/L (ref 3–18)
BUN SERPL-MCNC: 21 MG/DL (ref 7–18)
BUN/CREAT SERPL: 43 (ref 12–20)
CALCIUM SERPL-MCNC: 8.3 MG/DL (ref 8.5–10.1)
CHLORIDE SERPL-SCNC: 108 MMOL/L (ref 100–111)
CO2 SERPL-SCNC: 22 MMOL/L (ref 21–32)
CREAT SERPL-MCNC: 0.49 MG/DL (ref 0.6–1.3)
ERYTHROCYTE [DISTWIDTH] IN BLOOD BY AUTOMATED COUNT: 17.4 % (ref 11.6–14.5)
GLUCOSE BLD STRIP.AUTO-MCNC: 114 MG/DL (ref 70–110)
GLUCOSE SERPL-MCNC: 105 MG/DL (ref 74–99)
HCT VFR BLD AUTO: 28.5 % (ref 35–45)
HGB BLD-MCNC: 8.8 G/DL (ref 12–16)
MCH RBC QN AUTO: 22.2 PG (ref 24–34)
MCHC RBC AUTO-ENTMCNC: 30.9 G/DL (ref 31–37)
MCV RBC AUTO: 71.8 FL (ref 78–100)
NRBC # BLD: 0 K/UL (ref 0–0.01)
NRBC BLD-RTO: 0 PER 100 WBC
PLATELET # BLD AUTO: 256 K/UL (ref 135–420)
PMV BLD AUTO: 8.9 FL (ref 9.2–11.8)
POTASSIUM SERPL-SCNC: 3.4 MMOL/L (ref 3.5–5.5)
RBC # BLD AUTO: 3.97 M/UL (ref 4.2–5.3)
SODIUM SERPL-SCNC: 139 MMOL/L (ref 136–145)
WBC # BLD AUTO: 15 K/UL (ref 4.6–13.2)

## 2022-06-14 PROCEDURE — 51798 US URINE CAPACITY MEASURE: CPT

## 2022-06-14 PROCEDURE — 80048 BASIC METABOLIC PNL TOTAL CA: CPT

## 2022-06-14 PROCEDURE — 99233 SBSQ HOSP IP/OBS HIGH 50: CPT | Performed by: STUDENT IN AN ORGANIZED HEALTH CARE EDUCATION/TRAINING PROGRAM

## 2022-06-14 PROCEDURE — 74011000250 HC RX REV CODE- 250: Performed by: INTERNAL MEDICINE

## 2022-06-14 PROCEDURE — 36415 COLL VENOUS BLD VENIPUNCTURE: CPT

## 2022-06-14 PROCEDURE — 97535 SELF CARE MNGMENT TRAINING: CPT

## 2022-06-14 PROCEDURE — 2709999900 HC NON-CHARGEABLE SUPPLY

## 2022-06-14 PROCEDURE — 74011250636 HC RX REV CODE- 250/636: Performed by: STUDENT IN AN ORGANIZED HEALTH CARE EDUCATION/TRAINING PROGRAM

## 2022-06-14 PROCEDURE — 97166 OT EVAL MOD COMPLEX 45 MIN: CPT

## 2022-06-14 PROCEDURE — 74011250637 HC RX REV CODE- 250/637: Performed by: INTERNAL MEDICINE

## 2022-06-14 PROCEDURE — 97530 THERAPEUTIC ACTIVITIES: CPT

## 2022-06-14 PROCEDURE — 85027 COMPLETE CBC AUTOMATED: CPT

## 2022-06-14 PROCEDURE — 77030038269 HC DRN EXT URIN PURWCK BARD -A

## 2022-06-14 PROCEDURE — 74011250637 HC RX REV CODE- 250/637: Performed by: STUDENT IN AN ORGANIZED HEALTH CARE EDUCATION/TRAINING PROGRAM

## 2022-06-14 PROCEDURE — 74011000250 HC RX REV CODE- 250: Performed by: RADIOLOGY

## 2022-06-14 PROCEDURE — 77030019905 HC CATH URETH INTMIT MDII -A

## 2022-06-14 PROCEDURE — 77030018842 HC SOL IRR SOD CL 9% BAXT -A

## 2022-06-14 PROCEDURE — 74011250636 HC RX REV CODE- 250/636: Performed by: RADIOLOGY

## 2022-06-14 PROCEDURE — 74011000250 HC RX REV CODE- 250: Performed by: NURSE ANESTHETIST, CERTIFIED REGISTERED

## 2022-06-14 PROCEDURE — 97164 PT RE-EVAL EST PLAN CARE: CPT

## 2022-06-14 PROCEDURE — 65270000046 HC RM TELEMETRY

## 2022-06-14 PROCEDURE — 82962 GLUCOSE BLOOD TEST: CPT

## 2022-06-14 RX ADMIN — ATORVASTATIN CALCIUM 10 MG: 10 TABLET, FILM COATED ORAL at 08:19

## 2022-06-14 RX ADMIN — SODIUM CHLORIDE 75 ML/HR: 900 INJECTION, SOLUTION INTRAVENOUS at 18:29

## 2022-06-14 RX ADMIN — SODIUM CHLORIDE, PRESERVATIVE FREE 10 ML: 5 INJECTION INTRAVENOUS at 00:47

## 2022-06-14 RX ADMIN — MORPHINE SULFATE 2 MG: 2 INJECTION, SOLUTION INTRAMUSCULAR; INTRAVENOUS at 08:20

## 2022-06-14 RX ADMIN — ACETAMINOPHEN 1000 MG: 325 TABLET ORAL at 21:00

## 2022-06-14 RX ADMIN — Medication 1 TABLET: at 18:08

## 2022-06-14 RX ADMIN — OXYCODONE HYDROCHLORIDE 5 MG: 5 TABLET ORAL at 20:08

## 2022-06-14 RX ADMIN — LOSARTAN POTASSIUM 25 MG: 25 TABLET, FILM COATED ORAL at 08:19

## 2022-06-14 RX ADMIN — Medication 1 TABLET: at 08:19

## 2022-06-14 RX ADMIN — MORPHINE SULFATE 2 MG: 2 INJECTION, SOLUTION INTRAMUSCULAR; INTRAVENOUS at 03:24

## 2022-06-14 RX ADMIN — SODIUM CHLORIDE, PRESERVATIVE FREE 10 ML: 5 INJECTION INTRAVENOUS at 22:15

## 2022-06-14 RX ADMIN — SODIUM CHLORIDE, PRESERVATIVE FREE 10 ML: 5 INJECTION INTRAVENOUS at 07:00

## 2022-06-14 RX ADMIN — ACETAMINOPHEN 1000 MG: 325 TABLET ORAL at 06:26

## 2022-06-14 RX ADMIN — MORPHINE SULFATE 2 MG: 2 INJECTION, SOLUTION INTRAMUSCULAR; INTRAVENOUS at 18:08

## 2022-06-14 RX ADMIN — SODIUM CHLORIDE, PRESERVATIVE FREE 10 ML: 5 INJECTION INTRAVENOUS at 14:07

## 2022-06-14 RX ADMIN — SODIUM CHLORIDE 75 ML/HR: 900 INJECTION, SOLUTION INTRAVENOUS at 06:29

## 2022-06-14 RX ADMIN — PANTOPRAZOLE SODIUM 40 MG: 40 TABLET, DELAYED RELEASE ORAL at 08:19

## 2022-06-14 RX ADMIN — LORAZEPAM 1 MG: 2 INJECTION, SOLUTION INTRAMUSCULAR; INTRAVENOUS at 06:26

## 2022-06-14 RX ADMIN — MORPHINE SULFATE 2 MG: 2 INJECTION, SOLUTION INTRAMUSCULAR; INTRAVENOUS at 14:13

## 2022-06-14 NOTE — PROGRESS NOTES
2200 Ellis catheter removed, pt connected to purwick and medicated with morphine 2 mg via PIV for pain 10/10    0600  Patient has not voided since removal of ellis catheter. Bladder scan 154 ml. Denies any discomfort     3870 Rodolfo Ponce MD aware.  Follow-up bladder scan in 2 to 3 hours if patient is unable to void spontaneously consider intermittent straight cath at that time

## 2022-06-14 NOTE — PROGRESS NOTES
Resting comfortably this morning  Pain appears improved  Would attempt to start pt ot  Depending patients functionality either home  Or to snf/ARU

## 2022-06-14 NOTE — PROGRESS NOTES
Problem: Mobility Impaired (Adult and Pediatric)  Goal: *Acute Goals and Plan of Care (Insert Text)  Description: Physical Therapy Goals  Initiated 6/12/2022, re-evaluated 6/14 s/p kyphoplasty/sacroplasty and to be accomplished within 7 day(s)  1. Patient will move from supine to sit and sit to supine  in bed with supervision. 2.  Patient will transfer from bed to chair and chair to bed with supervision using the least restrictive device. 3.  Patient will perform sit to stand with supervision. 4.  Patient will ambulate with minimal assistance/contact guard assist for 50 feet with the least restrictive device. 5.  Patient will ascend/descend 2 stairs with handrail(s) with minimal assistance/contact guard assist.    PLOF: Lives with spouse in two story home; 3-4 steps to enter. Unsure of handrail availability. Independent ambulator; has cane and ww. Outcome: Progressing Towards Goal   PHYSICAL THERAPY RE-EVALUATION    Patient: Santiago Bajwa (17 y.o. female)  Date: 6/14/2022  Primary Diagnosis: Spinal stenosis [M48.00]  Leg weakness, bilateral [R29.898]  Sciatica of right side [M54.31]        Precautions:  Fall,Spinal,Skin    ASSESSMENT :  Pt cleared to participate in PT session, pt received semi-reclined in bed and agreeable to therapy session. Completing with OT to maximize safety and mobility. Based on the objective data described below, the patient presents with decreased endurance, decreased strength, decreased balance reactions, gait deviations, increased pain, and decreased independence in functional mobility. Pt educated on log roll. ModA for rolling and sidelying to sit, Sitting on EOB with good balance. Pt reporting increased pain in back of R thigh and back. Pt standing with CGA to RW, ambulating with Larissa x3 feet to recliner with shortened shuffled steps. Educated OOB for at least 1 hour and to begin to utilize [facility] for toileting.  Pt positioned for comfort and educated to call for assist before getting up, pt verbalized understanding. Pt left with all needs met and call bell in reach. RN notified of position and participation. Patient will benefit from skilled intervention to address the above impairments. Patient's rehabilitation potential is considered to be Fair  Factors which may influence rehabilitation potential include:   []         None noted  []         Mental ability/status  [x]         Medical condition  []         Home/family situation and support systems  []         Safety awareness  [x]         Pain tolerance/management  []         Other:      PLAN :  Recommendations and Planned Interventions:   [x]           Bed Mobility Training             []    Neuromuscular Re-Education  [x]           Transfer Training                   []    Orthotic/Prosthetic Training  [x]           Gait Training                          []    Modalities  [x]           Therapeutic Exercises           []    Edema Management/Control  [x]           Therapeutic Activities            [x]    Family Training/Education  [x]           Patient Education  []           Other (comment):    Frequency/Duration: Patient will be followed by physical therapy 1-2 times per day/4-7 days per week to address goals. Discharge Recommendations: Rehab  Further Equipment Recommendations for Discharge: rolling walker     SUBJECTIVE:   Patient stated I have to lay down.     OBJECTIVE DATA SUMMARY:   Hospital course since last seen and reason for re-evaluation: Pt due for re-evaluaion, s/p kyphoplasty and sacroplasty. Pt improving in bed mobility and transfers but continues to report increased pain.  Pt would continue to benefit from skilled PT for progress towards goals  Past Medical History:   Diagnosis Date    Acid reflux     Hypertension     Spinal stenosis      Past Surgical History:   Procedure Laterality Date    HX BACK SURGERY       Barriers to Learning/Limitations: None  Compensate with: N/A  Home Situation:   [address] [facility] Environment: Private residence  # Steps to Enter: 3  One/Two Story Residence: Two story  Living Alone: No  Support Systems: Spouse/Significant Other  Patient Expects to be Discharged to[de-identified] Rehab hospital/unit acute  Current DME Used/Available at Home: unique Tobias,Walker, rolling,Commode, bedside  Critical Behavior:  Neurologic State: Alert  Orientation Level: Oriented to person;Oriented to place  Cognition: Follows commands  Safety/Judgement: Fall prevention  Psychosocial  Patient Behaviors: Cooperative  Family  Behaviors: Supportive  Visitor Behaviors: Supportive  Needs Expressed: Emotional;Educational  Purposeful Interaction: Yes  Pt Identified Daily Priority: Clinical issues (comment); Communication issues (comment)  Caritas Process: Nurture loving kindness;Establish trust  Caring Interventions: Reassure  Reassure: Caring rounds  Therapeutic Modalities: Deep breathing; Intentional therapeutic touch     Family  Behaviors: Supportive  Strength:    Strength: Generally decreased, functional  Tone & Sensation:   Tone: Normal    Sensation: Intact    Range Of Motion:  AROM: Within functional limits    Posture:  Posture (WDL): Within defined limits     Functional Mobility:  Bed Mobility:  Rolling: Moderate assistance  Supine to Sit: Moderate assistance     Scooting: Contact guard assistance  Transfers:  Sit to Stand: Contact guard assistance  Stand to Sit: Contact guard assistance    Balance:   Sitting: Intact  Standing: Impaired; With support  Standing - Static: Fair  Standing - Dynamic : Fair    Ambulation/Gait Training:  Distance (ft): 3 Feet (ft)  Assistive Device: Walker, rolling  Ambulation - Level of Assistance: Contact guard assistance     Gait Description (WDL): Exceptions to WDL  Gait Abnormalities: Decreased step clearance    Base of Support: Center of gravity altered;Narrowed     Speed/Aggie: Slow;Shuffled  Step Length: Right shortened;Left shortened    Pain:  Pain level pre-treatment: 10/10   Pain level post-treatment: 10/10   Pain Intervention(s) : Medication (see MAR); Rest, Repositioning  Response to intervention: Nurse notified    Activity Tolerance:   Fair activity tolerance, limited by pain     Please refer to the flowsheet for vital signs taken during this treatment. After treatment:   [x]         Patient left in no apparent distress sitting up in chair  []         Patient left in no apparent distress in bed  [x]         Call bell left within reach  [x]         Nursing notified  []         Caregiver present  []         Bed alarm activated  []         SCDs applied    COMMUNICATION/EDUCATION:   [x]         Role of Physical Therapy in the acute care setting. [x]         Fall prevention education was provided and the patient/caregiver indicated understanding. [x]         Patient/family have participated as able in goal setting and plan of care. [x]         Patient/family agree to work toward stated goals and plan of care. []         Patient understands intent and goals of therapy, but is neutral about his/her participation. []         Patient is unable to participate in goal setting/plan of care: ongoing with therapy staff.  []         Other:     Thank you for this referral.  Frederic Rodriguez, PT   Time Calculation: 27 mins

## 2022-06-14 NOTE — PROGRESS NOTES
Problem: Self Care Deficits Care Plan (Adult)  Goal: *Acute Goals and Plan of Care (Insert Text)  Description: Occupational Therapy Goals  Initiated 6/14/2022 within 7 day(s). 1.  Patient will perform bed mobility in preparation for selfcare with supervision/set-up. 2.  Patient will perform lower body dressing with supervision/set-up. 3.  Patient will perform functional activity standing > 3 minutes with supervision/set-up. F+ balance. 4.  Patient will perform toilet transfers with supervision/set-up. 5.  Patient will perform all aspects of toileting with supervision/set-up. 6.  Patient will participate in upper extremity therapeutic exercise/activities with modified independence for 8 minutes. 7.  Patient will utilize energy conservation techniques during functional activities with verbal cues. Prior Level of Function: Patient was independent with self-care and functional mobility PTA. Outcome: Progressing Towards Goal       OCCUPATIONAL THERAPY EVALUATION    Patient: Jerrod Marks (20 y.o. female)  Date: 6/14/2022  Primary Diagnosis: Spinal stenosis [M48.00]  Leg weakness, bilateral [R29.898]  Sciatica of right side [M54.31]  Precautions:  Skin (Spinal protective)      ASSESSMENT :  Patient cleared to participate in OT evaluation by RN. Upon entering the room, the patient was supine in bed, alert, and agreeable to participate in OT evaluation with family present. Protective spinal precautions reviewed and patient verbalized understanding, precautions written on board. Patient was seen with PT to maximize patient safety, participation, and functional mobility in preparation for self-care tasks. Patient instructed on logroll technique to side for comfort, hugging pillow if needed and able to complete task with moderate assistance. Patient moderate assist for lower body dressing, donning RLE sock only and contact guard assist for functional transfer to recliner simulating toileting transfer. Patient modified independent for grooming task, seated and left in recliner with all needs met and call bell in reach. Patient educated on adaptive equipment for lower body dressing and how to don/doff socks, pants, and underwear pending progress. May benefit from AE if pain does not improve. Patient encouraged to participate in OOB activity throughout the day with staff members to [facility] and sitting in up chair at least 3x/day to maximize PLOF as patient may progress to home with home health therapy. Based on the objective data described below, the patient presents with decreased strength, decreased independence, decreased activity tolerance / safety awareness, decreased functional balance, and decreased functional mobility, which impedes pt performance in basic self-care/ADL tasks. Patient would benefit from skilled OT to restore PLOF and maximize function. Patient will benefit from skilled intervention to address the above impairments.   Patient's rehabilitation potential is considered to be Fair  Factors which may influence rehabilitation potential include:   []             None noted  [x]             Mental ability/status  [x]             Medical condition  [x]             Home/family situation and support systems  [x]             Safety awareness  [x]             Pain tolerance/management  []             Other:      PLAN :  Recommendations and Planned Interventions:   [x]               Self Care Training                  [x]      Therapeutic Activities  [x]               Functional Mobility Training   []      Cognitive Retraining  [x]               Therapeutic Exercises           [x]      Endurance Activities  [x]               Balance Training                    [x]      Neuromuscular Re-Education  []               Visual/Perceptual Training     [x]      Home Safety Training  [x]               Patient Education                   [x]      Family Training/Education  []               Other (comment):    Frequency/Duration: Patient will be followed by occupational therapy 3 - 5 times a week to address goals. Discharge Recommendations: Rehab  Further Equipment Recommendations for Discharge: rolling walker, shower chair, bedside commode     SUBJECTIVE:   Patient stated I have to sit in this chair for an hour?!    OBJECTIVE DATA SUMMARY:     Past Medical History:   Diagnosis Date    Acid reflux     Hypertension     Spinal stenosis      Past Surgical History:   Procedure Laterality Date    HX BACK SURGERY       Barriers to Learning/Limitations: None  Compensate with: visual, verbal, tactile, kinesthetic cues/model    Home Situation:   Home Situation  Home Environment: Private residence  # Steps to Enter: 3  One/Two Story Residence: Two story  Living Alone: No  Support Systems: Spouse/Significant Other  Patient Expects to be Discharged to[de-identified] Rehab hospital/unit acute  Current DME Used/Available at Home: Cane, straight,Walker, rolling,Commode, bedside  []  Right hand dominant   []  Left hand dominant    Cognitive/Behavioral Status:  Neurologic State: Alert  Orientation Level: Oriented to person;Oriented to place  Cognition: Follows commands  Safety/Judgement: Fall prevention    Skin: Intact  Edema: None noted    Vision/Perceptual:    Acuity: Within Defined Limits      Coordination: BUE  Coordination: Generally decreased, functional  Fine Motor Skills-Upper: Left Intact; Right Intact    Gross Motor Skills-Upper: Left Intact; Right Intact    Balance:  Sitting: Intact  Standing: Impaired; With support  Standing - Static: Fair  Standing - Dynamic : Fair    Strength: BUE  Strength: Generally decreased, functional    Tone & Sensation: BUE  Tone: Normal  Sensation: Intact    Range of Motion: BUE  AROM: Within functional limits    Functional Mobility and Transfers for ADLs:  Bed Mobility:  Rolling:  Moderate assistance  Supine to Sit: Moderate assistance  Scooting: Contact guard assistance    Transfers:  Sit to Stand: Contact guard assistance  Stand to Sit: Contact guard assistance   Toilet Transfer : Contact guard assistance (simulation with recliner)     ADL Assessment:   Feeding: Independent    Oral Facial Hygiene/Grooming: Modified Independent    Bathing: Moderate assistance    Upper Body Dressing: Stand-by assistance    Lower Body Dressing: Moderate assistance    Toileting: Minimum assistance    ADL Intervention:  Grooming  Grooming Assistance: Modified independent  Position Performed: Seated edge of bed  Brushing/Combing Hair: Modified independent  Cues: Mirror for visual feedback    Upper Body Dressing Assistance  Dressing Assistance: Stand-by assistance  Hospital Gown: Stand-by assistance    Lower Body Dressing Assistance  Dressing Assistance: Moderate assistance  Socks: Moderate assistance (RLE only)  Leg Crossed Method Used: Yes (LLE only)    Cognitive Retraining  Safety/Judgement: Fall prevention    Pain:  Pain level pre-treatment: -/10   Pain level post-treatment: 10/10 , while ambulating; back  Pain Intervention(s): Medication (see MAR); Rest, Ice, Repositioning   Response to intervention: Nurse notified, See doc flow    Activity Tolerance:   Fair      Please refer to the flowsheet for vital signs taken during this treatment. After treatment:   [x] Patient left in no apparent distress sitting up in chair  [] Patient left in no apparent distress in bed  [x] Call bell left within reach  [x] Nursing notified  [] Caregiver present  [] Bed alarm activated    COMMUNICATION/EDUCATION:   [x] Role of Occupational Therapy in the acute care setting  [x] Home safety education was provided and the patient/caregiver indicated understanding. [x] Patient/family have participated as able in goal setting and plan of care. [x] Patient/family agree to work toward stated goals and plan of care. [] Patient understands intent and goals of therapy, but is neutral about his/her participation.   [] Patient is unable to participate in goal setting and plan of care. Thank you for this referral.  Regis Gowers, OTR/L  Time Calculation: 27 mins    Eval Complexity: History: MEDIUM Complexity : Expanded review of history including physical, cognitive and psychosocial  history ; Examination: MEDIUM Complexity : 3-5 performance deficits relating to physical, cognitive , or psychosocial skils that result in activity limitations and / or participation restrictions; Decision Making:MEDIUM Complexity : Patient may present with comorbidities that affect occupational performnce.  Miniml to moderate modification of tasks or assistance (eg, physical or verbal ) with assesment(s) is necessary to enable patient to complete evaluation

## 2022-06-14 NOTE — PROGRESS NOTES
Problem: Pressure Injury - Risk of  Goal: *Prevention of pressure injury  Description: Document Valeriy Scale and appropriate interventions in the flowsheet. Outcome: Progressing Towards Goal  Note: Pressure Injury Interventions:  Sensory Interventions: Assess changes in LOC,Check visual cues for pain    Moisture Interventions: Absorbent underpads    Activity Interventions: PT/OT evaluation    Mobility Interventions: HOB 30 degrees or less,Pressure redistribution bed/mattress (bed type)    Nutrition Interventions: Document food/fluid/supplement intake,Offer support with meals,snacks and hydration    Friction and Shear Interventions: HOB 30 degrees or less,Transferring/repositioning devices                Problem: Patient Education: Go to Patient Education Activity  Goal: Patient/Family Education  Outcome: Progressing Towards Goal     Problem: Falls - Risk of  Goal: *Absence of Falls  Description: Document Yinka Fall Risk and appropriate interventions in the flowsheet.   Outcome: Progressing Towards Goal  Note: Fall Risk Interventions:            Medication Interventions: Patient to call before getting OOB,Teach patient to arise slowly    Elimination Interventions: Bed/chair exit alarm,Call light in reach,Toileting schedule/hourly rounds    History of Falls Interventions: Evaluate medications/consider consulting pharmacy         Problem: Patient Education: Go to Patient Education Activity  Goal: Patient/Family Education  Outcome: Progressing Towards Goal     Problem: Patient Education: Go to Patient Education Activity  Goal: Patient/Family Education  Outcome: Progressing Towards Goal

## 2022-06-14 NOTE — PROGRESS NOTES
Progress Note  Hospitalist Service    Patient: Rut Spence MRN: [id number]   SSN: [id number]  YOB: 1945   Age: 68 y.o. Sex: female      Admit Date: 6/10/2022    LOS: 4 days   Chief Complaint   Patient presents with    Back Pain       Subjective:     Patient seen and examined in room. States pain is a bit better. Worked with PT. PT recommending rehab. Patient amiable. Objective:     Vitals:  Visit Vitals  BP (!) 152/71   Pulse 92   Temp 98.6 °F (37 °C)   Resp 21   SpO2 91%       Physical Exam:   General appearance: lethargic, no distress, appears stated age  Lungs: clear to auscultation bilaterally  Heart: regular rate and rhythm, S1, S2 normal, no murmur, click, rub or gallop  Abdomen: soft, non-tender. Bowel sounds normal. No masses,  no organomegaly  Pulses: 2+ and symmetric  BACK: pain with passive movement   Skin: Skin color, texture, turgor normal. No rashes or lesions  Neuro:  normal without focal findings  mental status, speech normal, alert and oriented x iii   LUH  reflexes normal and symmetric    Intake and Output:  Current Shift: 06/14 0701 - 06/14 1900  In: -   Out: 300 [Urine:300]  Last three shifts: 06/12 1901 - 06/14 0700  In: 410 [P.O.:140; I.V.:270]  Out: 800 [Urine:800]    Lab/Data Review:  No results found for this or any previous visit (from the past 12 hour(s)). Key Findings or tests:       Telemetry NONE   Oxygen NONE     Assessment and Plan:     1. Acute to Subacute compression fracture of T11 with resultant kyphosis   2. Acute to subacute sacral insufficiency fracture on the right   3. Sciatica of right side  4. 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 Leg weakness, bilateral, due to pain at lumbar area. No evidence of neurological deficit   6. Hypertension   7. Hyperlipidemia   8. Normocytic anemia   9. GERD with moderate to large Hiatal hernia     Patient worked with PT/OT.  PT/OT recommending rehab. IR intervention. Orthospine surgery following considering intervention if cement augmentation is not helpful. Patient now s/p kyphoplasty, bilateral sacroplasty on 6/13 with interventional radiology. Tylenol 1000mg Q8hr, with oxycodone prn. She can resume her Gabapentin   She can resume lipitor, losartan, nortrityline, PPI  ICS  TSH normal.  Continue oral iron.   She will need DEXA outpatient for further evaluation and treatment as this is likely osteoporosis related      Barbara Paul DO, hospitalist   June 14, 2022

## 2022-06-14 NOTE — PROGRESS NOTES
Pt has not voided since ellis cath removal 6/13/22.   Bladder scan 312,   Order placed for straight cath x1

## 2022-06-14 NOTE — PROGRESS NOTES
Bedside shift report received from [facility]   Pt is resting comfortably.

## 2022-06-14 NOTE — PROGRESS NOTES
Reason for Admission:  Spinal stenosis [M48.00]  Leg weakness, bilateral [R29.898]  Sciatica of right side [M54.31]                 RUR Score:   18%         Plan for utilizing home health:    No, rehab                      Likelihood of Readmission:   Moderate                         Do you (patient/family) have any concerns for transition/discharge?  no    Transition of Care Plan:       Initial assessment completed with patient and spouse/SO. Cognitive status of patient: oriented to time, place, person and situation. Face sheet information confirmed:  yes. The patient designates  Dede Warner to participate in her discharge plan and to receive any needed information. This patient lives in a single family home with . 3 stairs to enter garage. 8 stairs to enter front. Bedroom upstairs in house. Patient is not able to navigate steps as needed. Prior to hospitalization, patient was considered to be independent with ADLs/IADLS : yes . Patient has a current ACP document on file: no      Healthcare Decision Maker: The patient will need bLS to rehab if outpatient. The patient already has [facility], [facility],  medical equipment available in the home. Patient is not currently active with home health. Patient has not stayed in a skilled nursing facility or rehab. Was  stay within last 60 days : no. This patient is on dialysis :no    Currently, the discharge plan is Acute Rehab. The patient states that she can obtain her medications from the pharmacy, and take her medications as directed. Patient's current insurance is [employer] Medicare Part A &B and [facility]      Care Management Interventions  PCP Verified by CM:  Yes (virtual PCP visit 2 weeks ago)  Mode of Transport at Discharge: BLS  Transition of Care Consult (CM Consult): Acute Rehab  Physical Therapy Consult: Yes  Occupational Therapy Consult: Yes  Support Systems: Spouse/Significant Other  Confirm Follow Up Transport: Family  Discharge Location  Patient Expects to be Discharged to[de-identified] Rehab hospital/unit acute     COVID vaccinated x's 3       will continue to monitor and assist with transition of care needs.     EFRAIN RosarioN, RN  Care Management  Pager # [phone]

## 2022-06-14 NOTE — PROGRESS NOTES
INTERIM UPDATE - 2145 EST on 6/13/2022    Nursing Staff reports that Patient returned from Kyphoplasty and Sacroplasty today and has been anxious (requiring Lorazepam), hypertensive (192/104 mm Hg), and is currently tachycardic (133 bpm) despite being asleep. Nursing Staff requests additional orders to manage this Patient. Plan: Will order a ONE-TIME DOSE of PRN IV Metoprolol tartrate 5 mg should Heart Rate reach >140 bpm.    Revised PRN Antihypertensives to include: Topical Nitroglycerin (First Line), IV Hydralazine (Second Line), and Labetalol (if SBP >220 mm Hg or DBP >120 mm Hg)        INTERIM UPDATE - 0616 EST on 6/14/2022    Nursing Staff reports that Patient has only produced ~800 mL urine and Bladder Scan shows ~150 mL urine during the last 12 hours. Plan:  Recommend daytime Nursing Staff ask Patient to void later today at approximately 0800 to 0900 EST and, if Patient does not void, Bladder Scan to see if Nance Catheter should be replaced if Bladder retains >300 mL of urine.

## 2022-06-14 NOTE — PROGRESS NOTES
Pt continues to refuse meals and little fluids, nurse and  has been at the bedside all day encouraging patient to [address] pt continues to refuse, pt also has not voided spontaneously. Pt was bladder scanned and straight cathed today 310 bladder scan, 300 straight cath  Pt was up in chair with the assistance of physical therapy, pt requested to go back to bed 35 mins later.

## 2022-06-15 ENCOUNTER — APPOINTMENT (OUTPATIENT)
Dept: NON INVASIVE DIAGNOSTICS | Age: 77
DRG: 517 | End: 2022-06-15
Attending: STUDENT IN AN ORGANIZED HEALTH CARE EDUCATION/TRAINING PROGRAM
Payer: MEDICARE

## 2022-06-15 PROBLEM — I48.91 ATRIAL FIBRILLATION WITH RAPID VENTRICULAR RESPONSE (HCC): Status: ACTIVE | Noted: 2022-06-15

## 2022-06-15 LAB
AMORPH CRY URNS QL MICRO: ABNORMAL
ANION GAP SERPL CALC-SCNC: 10 MMOL/L (ref 3–18)
ANION GAP SERPL CALC-SCNC: 9 MMOL/L (ref 3–18)
ANION GAP SERPL CALC-SCNC: 9 MMOL/L (ref 3–18)
APPEARANCE UR: ABNORMAL
ATRIAL RATE: 150 BPM
ATRIAL RATE: 92 BPM
BACTERIA URNS QL MICRO: ABNORMAL /HPF
BILIRUB UR QL: NEGATIVE
BUN SERPL-MCNC: 14 MG/DL (ref 7–18)
BUN SERPL-MCNC: 15 MG/DL (ref 7–18)
BUN SERPL-MCNC: 15 MG/DL (ref 7–18)
BUN/CREAT SERPL: 39 (ref 12–20)
BUN/CREAT SERPL: 52 (ref 12–20)
BUN/CREAT SERPL: 61 (ref 12–20)
CALCIUM SERPL-MCNC: 8.3 MG/DL (ref 8.5–10.1)
CALCIUM SERPL-MCNC: 8.5 MG/DL (ref 8.5–10.1)
CALCIUM SERPL-MCNC: 8.7 MG/DL (ref 8.5–10.1)
CALCULATED P AXIS, ECG09: 7 DEGREES
CALCULATED R AXIS, ECG10: 41 DEGREES
CALCULATED R AXIS, ECG10: 50 DEGREES
CALCULATED T AXIS, ECG11: -31 DEGREES
CALCULATED T AXIS, ECG11: 45 DEGREES
CHLORIDE SERPL-SCNC: 107 MMOL/L (ref 100–111)
CO2 SERPL-SCNC: 21 MMOL/L (ref 21–32)
CO2 SERPL-SCNC: 21 MMOL/L (ref 21–32)
CO2 SERPL-SCNC: 22 MMOL/L (ref 21–32)
COLOR UR: YELLOW
CREAT SERPL-MCNC: 0.23 MG/DL (ref 0.6–1.3)
CREAT SERPL-MCNC: 0.29 MG/DL (ref 0.6–1.3)
CREAT SERPL-MCNC: 0.38 MG/DL (ref 0.6–1.3)
DIAGNOSIS, 93000: NORMAL
DIAGNOSIS, 93000: NORMAL
ECHO AO ASC DIAM: 3.6 CM
ECHO AO ASCENDING AORTA INDEX: 2 CM/M2
ECHO AO ROOT DIAM: 3.8 CM
ECHO AO ROOT INDEX: 2.11 CM/M2
ECHO EST RA PRESSURE: 8 MMHG
ECHO LA VOL 2C: 53 ML (ref 22–52)
ECHO LA VOL 4C: 37 ML (ref 22–52)
ECHO LA VOLUME AREA LENGTH: 50 ML
ECHO LA VOLUME INDEX A2C: 29 ML/M2 (ref 16–34)
ECHO LA VOLUME INDEX A4C: 21 ML/M2 (ref 16–34)
ECHO LA VOLUME INDEX AREA LENGTH: 28 ML/M2 (ref 16–34)
ECHO LV E' LATERAL VELOCITY: 12 CM/S
ECHO LV E' SEPTAL VELOCITY: 10 CM/S
ECHO LV FRACTIONAL SHORTENING: 32 % (ref 28–44)
ECHO LV INTERNAL DIMENSION DIASTOLE INDEX: 2.06 CM/M2
ECHO LV INTERNAL DIMENSION DIASTOLIC: 3.7 CM (ref 3.9–5.3)
ECHO LV INTERNAL DIMENSION SYSTOLIC INDEX: 1.39 CM/M2
ECHO LV INTERNAL DIMENSION SYSTOLIC: 2.5 CM
ECHO LV IVSD: 1 CM (ref 0.6–0.9)
ECHO LV MASS 2D: 112.5 G (ref 67–162)
ECHO LV MASS INDEX 2D: 62.5 G/M2 (ref 43–95)
ECHO LV POSTERIOR WALL DIASTOLIC: 1 CM (ref 0.6–0.9)
ECHO LV RELATIVE WALL THICKNESS RATIO: 0.54
ECHO LVOT AREA: 2.8 CM2
ECHO LVOT DIAM: 1.9 CM
ECHO LVOT MEAN GRADIENT: 3 MMHG
ECHO LVOT PEAK GRADIENT: 5 MMHG
ECHO LVOT PEAK VELOCITY: 1.1 M/S
ECHO LVOT STROKE VOLUME INDEX: 34.8 ML/M2
ECHO LVOT SV: 62.6 ML
ECHO LVOT VTI: 22.1 CM
ECHO MV A VELOCITY: 0.99 M/S
ECHO MV E DECELERATION TIME (DT): 152.3 MS
ECHO MV E VELOCITY: 0.64 M/S
ECHO MV E/A RATIO: 0.65
ECHO MV E/E' LATERAL: 5.33
ECHO MV E/E' RATIO (AVERAGED): 5.87
ECHO MV E/E' SEPTAL: 6.4
ECHO RIGHT VENTRICULAR SYSTOLIC PRESSURE (RVSP): 53 MMHG
ECHO RV FREE WALL PEAK S': 17 CM/S
ECHO RV TAPSE: 2.3 CM (ref 1.7–?)
ECHO TV REGURGITANT MAX VELOCITY: 3.36 M/S
ECHO TV REGURGITANT PEAK GRADIENT: 45 MMHG
EPITH CASTS URNS QL MICRO: NEGATIVE /LPF (ref 0–5)
ERYTHROCYTE [DISTWIDTH] IN BLOOD BY AUTOMATED COUNT: 17.8 % (ref 11.6–14.5)
GLUCOSE SERPL-MCNC: 111 MG/DL (ref 74–99)
GLUCOSE SERPL-MCNC: 89 MG/DL (ref 74–99)
GLUCOSE SERPL-MCNC: 94 MG/DL (ref 74–99)
GLUCOSE UR STRIP.AUTO-MCNC: NEGATIVE MG/DL
HCT VFR BLD AUTO: 29.1 % (ref 35–45)
HGB BLD-MCNC: 8.9 G/DL (ref 12–16)
HGB UR QL STRIP: ABNORMAL
KETONES UR QL STRIP.AUTO: >160 MG/DL
LEUKOCYTE ESTERASE UR QL STRIP.AUTO: NEGATIVE
MAGNESIUM SERPL-MCNC: 1.6 MG/DL (ref 1.6–2.6)
MAGNESIUM SERPL-MCNC: 1.7 MG/DL (ref 1.6–2.6)
MCH RBC QN AUTO: 22 PG (ref 24–34)
MCHC RBC AUTO-ENTMCNC: 30.6 G/DL (ref 31–37)
MCV RBC AUTO: 71.9 FL (ref 78–100)
MUCOUS THREADS URNS QL MICRO: ABNORMAL /LPF
NITRITE UR QL STRIP.AUTO: NEGATIVE
NRBC # BLD: 0 K/UL (ref 0–0.01)
NRBC BLD-RTO: 0 PER 100 WBC
P-R INTERVAL, ECG05: 144 MS
PH UR STRIP: 6 [PH] (ref 5–8)
PLATELET # BLD AUTO: 254 K/UL (ref 135–420)
PMV BLD AUTO: 8.8 FL (ref 9.2–11.8)
POTASSIUM SERPL-SCNC: 2.8 MMOL/L (ref 3.5–5.5)
POTASSIUM SERPL-SCNC: 3.7 MMOL/L (ref 3.5–5.5)
POTASSIUM SERPL-SCNC: 4.1 MMOL/L (ref 3.5–5.5)
PROT UR STRIP-MCNC: 30 MG/DL
Q-T INTERVAL, ECG07: 304 MS
Q-T INTERVAL, ECG07: 382 MS
QRS DURATION, ECG06: 70 MS
QRS DURATION, ECG06: 76 MS
QTC CALCULATION (BEZET), ECG08: 457 MS
QTC CALCULATION (BEZET), ECG08: 472 MS
RBC # BLD AUTO: 4.05 M/UL (ref 4.2–5.3)
RBC #/AREA URNS HPF: ABNORMAL /HPF (ref 0–5)
SODIUM SERPL-SCNC: 137 MMOL/L (ref 136–145)
SODIUM SERPL-SCNC: 138 MMOL/L (ref 136–145)
SODIUM SERPL-SCNC: 138 MMOL/L (ref 136–145)
SP GR UR REFRACTOMETRY: 1.02 (ref 1–1.03)
TROPONIN-HIGH SENSITIVITY: 294 NG/L (ref 0–54)
TROPONIN-HIGH SENSITIVITY: 311 NG/L (ref 0–54)
UROBILINOGEN UR QL STRIP.AUTO: 1 EU/DL (ref 0.2–1)
VENTRICULAR RATE, ECG03: 136 BPM
VENTRICULAR RATE, ECG03: 92 BPM
WBC # BLD AUTO: 13.9 K/UL (ref 4.6–13.2)
WBC URNS QL MICRO: ABNORMAL /HPF (ref 0–4)

## 2022-06-15 PROCEDURE — 84484 ASSAY OF TROPONIN QUANT: CPT

## 2022-06-15 PROCEDURE — 80048 BASIC METABOLIC PNL TOTAL CA: CPT

## 2022-06-15 PROCEDURE — 74011000250 HC RX REV CODE- 250: Performed by: RADIOLOGY

## 2022-06-15 PROCEDURE — 81001 URINALYSIS AUTO W/SCOPE: CPT

## 2022-06-15 PROCEDURE — 36415 COLL VENOUS BLD VENIPUNCTURE: CPT

## 2022-06-15 PROCEDURE — 93005 ELECTROCARDIOGRAM TRACING: CPT

## 2022-06-15 PROCEDURE — 74011250637 HC RX REV CODE- 250/637: Performed by: INTERNAL MEDICINE

## 2022-06-15 PROCEDURE — 74011250636 HC RX REV CODE- 250/636: Performed by: STUDENT IN AN ORGANIZED HEALTH CARE EDUCATION/TRAINING PROGRAM

## 2022-06-15 PROCEDURE — 83735 ASSAY OF MAGNESIUM: CPT

## 2022-06-15 PROCEDURE — 65270000046 HC RM TELEMETRY

## 2022-06-15 PROCEDURE — 93306 TTE W/DOPPLER COMPLETE: CPT

## 2022-06-15 PROCEDURE — 74011000250 HC RX REV CODE- 250: Performed by: INTERNAL MEDICINE

## 2022-06-15 PROCEDURE — 74011250637 HC RX REV CODE- 250/637: Performed by: STUDENT IN AN ORGANIZED HEALTH CARE EDUCATION/TRAINING PROGRAM

## 2022-06-15 PROCEDURE — 74011250636 HC RX REV CODE- 250/636: Performed by: INTERNAL MEDICINE

## 2022-06-15 PROCEDURE — 85027 COMPLETE CBC AUTOMATED: CPT

## 2022-06-15 PROCEDURE — 99233 SBSQ HOSP IP/OBS HIGH 50: CPT | Performed by: STUDENT IN AN ORGANIZED HEALTH CARE EDUCATION/TRAINING PROGRAM

## 2022-06-15 PROCEDURE — 2709999900 HC NON-CHARGEABLE SUPPLY

## 2022-06-15 RX ORDER — MORPHINE SULFATE 2 MG/ML
2 INJECTION, SOLUTION INTRAMUSCULAR; INTRAVENOUS ONCE
Status: COMPLETED | OUTPATIENT
Start: 2022-06-15 | End: 2022-06-15

## 2022-06-15 RX ORDER — POTASSIUM CHLORIDE 7.45 MG/ML
10 INJECTION INTRAVENOUS
Status: COMPLETED | OUTPATIENT
Start: 2022-06-15 | End: 2022-06-15

## 2022-06-15 RX ORDER — CHOLECALCIFEROL (VITAMIN D3) 125 MCG
5 CAPSULE ORAL
Status: DISCONTINUED | OUTPATIENT
Start: 2022-06-15 | End: 2022-06-17 | Stop reason: HOSPADM

## 2022-06-15 RX ORDER — MAGNESIUM SULFATE HEPTAHYDRATE 40 MG/ML
2 INJECTION, SOLUTION INTRAVENOUS ONCE
Status: COMPLETED | OUTPATIENT
Start: 2022-06-15 | End: 2022-06-15

## 2022-06-15 RX ORDER — METOPROLOL TARTRATE 5 MG/5ML
5 INJECTION INTRAVENOUS
Status: DISCONTINUED | OUTPATIENT
Start: 2022-06-15 | End: 2022-06-15

## 2022-06-15 RX ORDER — MORPHINE SULFATE 20 MG/ML
10 SOLUTION ORAL
Status: COMPLETED | OUTPATIENT
Start: 2022-06-15 | End: 2022-06-15

## 2022-06-15 RX ORDER — METOPROLOL TARTRATE 5 MG/5ML
5 INJECTION INTRAVENOUS
Status: COMPLETED | OUTPATIENT
Start: 2022-06-15 | End: 2022-06-15

## 2022-06-15 RX ORDER — DILTIAZEM HYDROCHLORIDE 5 MG/ML
10 INJECTION INTRAVENOUS ONCE
Status: COMPLETED | OUTPATIENT
Start: 2022-06-15 | End: 2022-06-15

## 2022-06-15 RX ADMIN — ATORVASTATIN CALCIUM 10 MG: 10 TABLET, FILM COATED ORAL at 08:01

## 2022-06-15 RX ADMIN — SODIUM CHLORIDE, PRESERVATIVE FREE 10 ML: 5 INJECTION INTRAVENOUS at 15:45

## 2022-06-15 RX ADMIN — OXYCODONE HYDROCHLORIDE 5 MG: 5 TABLET ORAL at 15:42

## 2022-06-15 RX ADMIN — LOSARTAN POTASSIUM 25 MG: 25 TABLET, FILM COATED ORAL at 08:01

## 2022-06-15 RX ADMIN — ONDANSETRON 4 MG: 2 INJECTION INTRAMUSCULAR; INTRAVENOUS at 00:35

## 2022-06-15 RX ADMIN — OXYCODONE HYDROCHLORIDE 5 MG: 5 TABLET ORAL at 01:16

## 2022-06-15 RX ADMIN — Medication 1 TABLET: at 08:01

## 2022-06-15 RX ADMIN — POTASSIUM CHLORIDE 10 MEQ: 7.46 INJECTION, SOLUTION INTRAVENOUS at 04:08

## 2022-06-15 RX ADMIN — DILTIAZEM HYDROCHLORIDE 10 MG: 5 INJECTION INTRAVENOUS at 02:57

## 2022-06-15 RX ADMIN — Medication 5 MG: at 01:16

## 2022-06-15 RX ADMIN — MORPHINE SULFATE 2 MG: 2 INJECTION, SOLUTION INTRAMUSCULAR; INTRAVENOUS at 13:55

## 2022-06-15 RX ADMIN — METOPROLOL TARTRATE 5 MG: 1 INJECTION, SOLUTION INTRAVENOUS at 01:16

## 2022-06-15 RX ADMIN — MORPHINE SULFATE 2 MG: 2 INJECTION, SOLUTION INTRAMUSCULAR; INTRAVENOUS at 00:00

## 2022-06-15 RX ADMIN — MORPHINE SULFATE 2 MG: 2 INJECTION, SOLUTION INTRAMUSCULAR; INTRAVENOUS at 17:47

## 2022-06-15 RX ADMIN — Medication 1 TABLET: at 17:24

## 2022-06-15 RX ADMIN — METOPROLOL TARTRATE 5 MG: 1 INJECTION, SOLUTION INTRAVENOUS at 02:01

## 2022-06-15 RX ADMIN — PANTOPRAZOLE SODIUM 40 MG: 40 TABLET, DELAYED RELEASE ORAL at 08:01

## 2022-06-15 RX ADMIN — METOPROLOL TARTRATE 5 MG: 1 INJECTION, SOLUTION INTRAVENOUS at 02:27

## 2022-06-15 RX ADMIN — SODIUM CHLORIDE, PRESERVATIVE FREE 10 ML: 5 INJECTION INTRAVENOUS at 06:36

## 2022-06-15 RX ADMIN — POTASSIUM CHLORIDE 10 MEQ: 7.46 INJECTION, SOLUTION INTRAVENOUS at 08:00

## 2022-06-15 RX ADMIN — OXYCODONE HYDROCHLORIDE 5 MG: 5 TABLET ORAL at 06:36

## 2022-06-15 RX ADMIN — ACETAMINOPHEN 1000 MG: 325 TABLET ORAL at 21:31

## 2022-06-15 RX ADMIN — LORAZEPAM 1 MG: 2 INJECTION, SOLUTION INTRAMUSCULAR; INTRAVENOUS at 15:42

## 2022-06-15 RX ADMIN — ACETAMINOPHEN 1000 MG: 325 TABLET ORAL at 06:35

## 2022-06-15 RX ADMIN — HYDRALAZINE HYDROCHLORIDE 25 MG: 20 INJECTION, SOLUTION INTRAMUSCULAR; INTRAVENOUS at 00:03

## 2022-06-15 RX ADMIN — MAGNESIUM SULFATE HEPTAHYDRATE 2 G: 40 INJECTION, SOLUTION INTRAVENOUS at 08:01

## 2022-06-15 RX ADMIN — LORAZEPAM 1 MG: 2 INJECTION, SOLUTION INTRAMUSCULAR; INTRAVENOUS at 00:35

## 2022-06-15 RX ADMIN — OXYCODONE HYDROCHLORIDE 5 MG: 5 TABLET ORAL at 19:55

## 2022-06-15 RX ADMIN — POTASSIUM CHLORIDE 10 MEQ: 7.46 INJECTION, SOLUTION INTRAVENOUS at 05:07

## 2022-06-15 RX ADMIN — ACETAMINOPHEN 1000 MG: 325 TABLET ORAL at 13:57

## 2022-06-15 RX ADMIN — MORPHINE SULFATE 2 MG: 2 INJECTION, SOLUTION INTRAMUSCULAR; INTRAVENOUS at 18:04

## 2022-06-15 RX ADMIN — MORPHINE SULFATE 10 MG: 10 SOLUTION ORAL at 23:38

## 2022-06-15 RX ADMIN — SODIUM CHLORIDE, PRESERVATIVE FREE 10 ML: 5 INJECTION INTRAVENOUS at 21:32

## 2022-06-15 RX ADMIN — POTASSIUM CHLORIDE 10 MEQ: 7.46 INJECTION, SOLUTION INTRAVENOUS at 06:36

## 2022-06-15 NOTE — PROGRESS NOTES
Progress Note  Hospitalist Service    Patient: Delroy Cadet MRN: [id number]   SSN: [id number]  YOB: 1945   Age: 68 y.o. Sex: female      Admit Date: 6/10/2022    LOS: 5 days   Chief Complaint   Patient presents with    Back Pain       Subjective:     Patient seen and examined in room. Has worked with PT today.  at bedside. Pain seemed well controlled on rounds. Paged by nursing at 6p concerning patient with uncontrolled pain. Objective:     Vitals:  Visit Vitals  BP (!) 175/87 (BP 1 Location: Left upper arm, BP Patient Position: At rest)   Pulse (!) 106   Temp 97.6 °F (36.4 °C)   Resp 17   Ht 5' 5\" (1.651 m)   Wt 72.6 kg (160 lb)   SpO2 95%   BMI 26.63 kg/m²       Physical Exam:   General appearance: lethargic, no distress, appears stated age  Lungs: clear to auscultation bilaterally  Heart: regular rate and rhythm, S1, S2 normal, no murmur, click, rub or gallop  Abdomen: soft, non-tender. Bowel sounds normal. No masses,  no organomegaly  Pulses: 2+ and symmetric  BACK: pain with passive movement. Able to sit up in bed.    Skin: Skin color, texture, turgor normal. No rashes or lesions  Neuro:  normal without focal findings  mental status, speech normal, alert and oriented x iii   LUH  reflexes normal and symmetric    Intake and Output:  Current Shift: 06/15 0701 - 06/15 1900  In: 400 [P.O.:400]  Out: 500 [Urine:500]  Last three shifts: 06/13 1901 - 06/15 0700  In: -   Out: 1100 [Urine:1100]    Lab/Data Review:  Recent Results (from the past 12 hour(s))   TROPONIN-HIGH SENSITIVITY    Collection Time: 06/15/22  7:14 AM   Result Value Ref Range    Troponin-High Sensitivity 311 (HH) 0 - 54 ng/L   MAGNESIUM    Collection Time: 06/15/22  7:14 AM   Result Value Ref Range    Magnesium 1.6 1.6 - 2.6 mg/dL   METABOLIC PANEL, BASIC    Collection Time: 06/15/22  7:14 AM   Result Value Ref Range    Sodium 137 136 - 145 mmol/L    Potassium 3.7 3.5 - 5.5 mmol/L    Chloride 107 100 - 111 mmol/L CO2 21 21 - 32 mmol/L    Anion gap 9 3.0 - 18 mmol/L    Glucose 94 74 - 99 mg/dL    BUN 15 7.0 - 18 MG/DL    Creatinine 0.38 (L) 0.6 - 1.3 MG/DL    BUN/Creatinine ratio 39 (H) 12 - 20      GFR est AA >60 >60 ml/min/1.73m2    GFR est non-AA >60 >60 ml/min/1.73m2    Calcium 8.7 8.5 - 10.1 MG/DL   EKG, 12 LEAD, SUBSEQUENT    Collection Time: 06/15/22  7:43 AM   Result Value Ref Range    Ventricular Rate 92 BPM    Atrial Rate 92 BPM    P-R Interval 144 ms    QRS Duration 76 ms    Q-T Interval 382 ms    QTC Calculation (Bezet) 472 ms    Calculated P Axis 7 degrees    Calculated R Axis 50 degrees    Calculated T Axis 45 degrees    Diagnosis       Normal sinus rhythm  Normal ECG  When compared with ECG of 15-MATTHEW-2022 01:44,  Sinus rhythm has replaced Atrial fibrillation  ST no longer depressed in Anterolateral leads  Nonspecific T wave abnormality no longer evident in Inferior leads     URINALYSIS W/ RFLX MICROSCOPIC    Collection Time: 06/15/22 11:40 AM   Result Value Ref Range    Color YELLOW      Appearance CLOUDY      Specific gravity 1.021 1.005 - 1.030      pH (UA) 6.0 5.0 - 8.0      Protein 30 (A) NEG mg/dL    Glucose Negative NEG mg/dL    Ketone >160 (A) NEG mg/dL    Bilirubin Negative NEG      Blood MODERATE (A) NEG      Urobilinogen 1.0 0.2 - 1.0 EU/dL    Nitrites Negative NEG      Leukocyte Esterase Negative NEG     URINE MICROSCOPIC ONLY    Collection Time: 06/15/22 11:40 AM   Result Value Ref Range    WBC 1 to 4 0 - 4 /hpf    RBC 20 to 25 0 - 5 /hpf    Epithelial cells Negative 0 - 5 /lpf    Bacteria FEW (A) NEG /hpf    Mucus 3+ (A) NEG /lpf    Amorphous Crystals 2+ (A) NEG   METABOLIC PANEL, BASIC    Collection Time: 06/15/22 11:45 AM   Result Value Ref Range    Sodium 138 136 - 145 mmol/L    Potassium 4.1 3.5 - 5.5 mmol/L    Chloride 107 100 - 111 mmol/L    CO2 22 21 - 32 mmol/L    Anion gap 9 3.0 - 18 mmol/L    Glucose 89 74 - 99 mg/dL    BUN 15 7.0 - 18 MG/DL    Creatinine 0.29 (L) 0.6 - 1.3 MG/DL BUN/Creatinine ratio 52 (H) 12 - 20      GFR est AA >60 >60 ml/min/1.73m2    GFR est non-AA >60 >60 ml/min/1.73m2    Calcium 8.5 8.5 - 10.1 MG/DL   TROPONIN-HIGH SENSITIVITY    Collection Time: 06/15/22  1:03 PM   Result Value Ref Range    Troponin-High Sensitivity 294 (HH) 0 - 54 ng/L   ECHO ADULT COMPLETE    Collection Time: 06/15/22  3:09 PM   Result Value Ref Range    Fractional Shortening 2D 32 28 - 44 %    LVIDd Index 2.06 cm/m2    LVIDs Index 1.39 cm/m2    LV RWT Ratio 0.54     LV Mass 2D 112.5 67 - 162 g    LV Mass 2D Index 62.5 43 - 95 g/m2    MV E/A 0.65     E/E' Ratio (Averaged) 5.87     E/E' Lateral 5.33     E/E' Septal 6.40     LA Volume Index A/L 28 16 - 34 mL/m2    LA Volume Index 2C 29 16 - 34 mL/m2    LA Volume Index 4C 21 16 - 34 mL/m2    Est. RA Pressure 8 mmHg    RVSP 53 mmHg    Ao Root Index 2.11 cm/m2    Ascending Aorta Index 2.00 cm/m2    IVSd 1.0 (A) 0.6 - 0.9 cm    LVIDd 3.7 (A) 3.9 - 5.3 cm    LVIDs 2.5 cm    LVOT Diameter 1.9 cm    LVPWd 1.0 (A) 0.6 - 0.9 cm    LVOT Peak Gradient 5 mmHg    LVOT Mean Gradient 3 mmHg    LVOT SV 60.4 ml    LVOT Peak Velocity 1.1 m/s    LVOT VTI 22.1 cm    RV Free Wall Peak S' 17 cm/s    LA Volume A/L 50 mL    LA Volume 2C 53 (A) 22 - 52 mL    LA Volume 4C 37 22 - 52 mL    MV A Velocity 0.99 m/s    MV E Wave Deceleration Time 152.3 ms    MV E Velocity 0.64 m/s    LV E' Lateral Velocity 12 cm/s    LV E' Septal Velocity 10 cm/s    TAPSE 2.3 1.7 cm    TR Peak Gradient 45 mmHg    TR Max Velocity 3.36 m/s    Ascending Aorta 3.6 cm    Aortic Root 3.8 cm         Key Findings or tests:       Telemetry NONE   Oxygen NONE     Assessment and Plan:     1. Acute to Subacute compression fracture of T11 with resultant kyphosis   2. Acute to subacute sacral insufficiency fracture on the right   3. Sciatica of right side  4. 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   Leg weakness, bilateral, due to pain at lumbar area. No evidence of neurological deficit   6. Hypertension   7. Hyperlipidemia   8. Normocytic anemia   9. GERD with moderate to large Hiatal hernia     Patient worked with PT/OT. PT/OT recommending rehab. Orthospine surgery following considering intervention if cement augmentation is not helpful. Patient still with pronounced pain. Will discuss with ortho in morning. Patient now s/p kyphoplasty, bilateral sacroplasty on 6/13 with interventional radiology. Tylenol 1000mg Q8hr, with oxycodone prn. She can resume her Gabapentin   She can resume lipitor, losartan, nortrityline, PPI  ICS  TSH normal.  Continue oral iron.   Patient states she had a recent DEXA scan    Mary Delaney DO, hospitalist   Donna 15, 2022

## 2022-06-15 NOTE — PROGRESS NOTES
Discharge/Transition Planning    0945: Sent Perfect Serve to Noel Fernandez in Acute Rehab to review patient     [phone]: Patient accepted to [facility] ARU tomorrow

## 2022-06-15 NOTE — PROGRESS NOTES
INTERIM UPDATE - 0851 EST on 6/15/2022    Nursing Staff reports that Patient has cardioverted into Atrial Fibrillation. This is a new diagnosis for this Patient and an EKG was obtained to confirm. Plan:  IV Metoprolol tartrate 5 mg pushes q20 minutes PRN Atrial Fibrillation/Flutter sustaining with Heart Rate >=125 bpm (HOLD for MAP <65) x3 doses maximum. If Atrial Fibrillation/Flutter RVR is refractory to the third dose of IV Metoprolol tartrate, plan to start IV rate-controlling agent gtt (and Transfer, if necessary). Will forego anticoagulation given that Patient has recently had spinal surgery. Consult Cardiological services in AM.        INTERIM UPDATE - 0250 EST on 6/15/2022    Patient failed rate-control with IV Metoprolol pushes x3. Plan:  Initiate Fixed-Rate Diltiazem gtt infusion. May need to manually be increased to rate-control Patient's Atrial Fibrillation with RVR. Should Patient's Blood Pressure become too marginal, may require Transfer to CVT Stepdown or CVT ICU. INTERIM UPDATE - [id number] EST on 6/15/2022    Nursing Staff reports Patient cardioverted with administration of IV Diltiazem 10 mg push. INTERIM UPDATE - 0973 EST on 6/15/2022    Nursing Staff called to report that Telemetrist noted a Run of Ventricular Tachycardia. Last K+ is 2.8 mmmol/L this AM and last Serum Magnesium is 1.7 mg/dL this AM.    Plan:  STAT EKG to assess QTc Interval, Troponin now and in 6 hours, and Magnesium sulfate 2 grams now and Potassium chloride PO Potasium chloride 40 mEq BID x2 doses.

## 2022-06-15 NOTE — REHAB NOTE
ARU/IPR REFERRAL CONTACT NOTE  [address] [facility]      Thank you for the opportunity to review this patient's case for admission to [address] [facility]. Based on our pre-admission screening:     [x ] Our Team/Medical Director is following this case. Comments:   Spoke with patient and spouse in the room. Both agreeable to ARU. Spouse came to unit to tour and discuss goals of care. Our medical director spoke with attending re: cardiac workup. Requested repeat troponin and ECHO. Pt can admit tomorrow 6/16 if medically stable. Again, Thank you for this referral. Should you have any questions please do not hesitate to call.      Sincerely,  Venu Jacobson [facility]  [phone]

## 2022-06-15 NOTE — PROGRESS NOTES
conducted a Follow up consultation and Spiritual Assessment for Ileana Perea, who is a 68 y.o.,female. The  provided the following Interventions:  Continued the relationship of care and support. Patient was just given morphine and was \"out\" at the time of this writer's visit. Listened empathically. Patient's  however, was present and expressed his frustration with the meds that seem not to work for his wife. Offered prayer and assurance of continued prayer on patient's behalf. Coping skills and self-care were discussed. Chart reviewed. The following outcomes were achieved:  Patient's  expressed gratitude for 's visit. Assessment:  There are no further spiritual or Oriental orthodox issues which require Spiritual Care Services interventions at this time. Plan:  Chaplains will continue to follow and will provide pastoral care on an as needed/requested basis.  recommends bedside caregivers page  on duty if patient shows signs of acute spiritual or emotional distress.      Bandar Ji5 [phone]

## 2022-06-15 NOTE — PROGRESS NOTES
2211 Patient has not voided. Bladder scan 656 ml    2230 Ismael Wood MD notified in regards to urinary retention. Ordered to place a ellis catheter if post void bladder scan > 300 ml    2300 Patient voided 300 spontaneously    2351 Post void bladder scan 321 ml    0025 16 FR 10 cc balloon ellis catheter placed. Allergies verified prior to placement. Sterile technique maintained. Patient tolerated insertion well. Ellis catheter secured to right thigh. 0003 Administered hydralazine 25 mg IV for /81,  + morphine 2 mg for pain 10/10    0035 Patient restless, tearful and anxious. Patient heart rate in 130 sinus tachycardia. Administered PRN ativan 1 mg. Morphine ineffective    0054 Patient converted to A-fib    0116 Heart rate 149, Administered metoprolol 5 mg via IV per order placed by Ismael Wood MD + PRN oxycodone 5 mg PO & melatonin 5 mg PO    0132 Heart rate 131, bp 113/63     0144 EKG obtained. Interpretation:  A-fib RVR at 136 bpm    0149 Ismael Wood MD made aware of the above. Ordered IV Metoprolol 5 mg pushes every 20 minutes PRN for Atrial Fibrillation/Flutter with sustaining heart Rate >=125 bpm (HOLD for MAP <65) x 3 doses maximum. 0201 Heart rate 145, bp 102/62 . Administered second dose metoprolol 5 mg IV    0227 Heart rate 140, bp 110/74 . Administered third dose metoprolol 5 mg IV    0253 x 1 dose of cardizem 10 mg IV ordered    0255  Ordered continuous cardizem 100 mg in 0.9% sodium chloride at 5 mg/ hr per Ismael Wood MD     3453 Heart rate 135, bp 109/62. Administered x 1 dose of cardizem 10 mg IV     0300 Patient Converted to Sinus rhythm. Cardizem injection effective. Ismael Wood MD aware. Cardizem drip held     0339 Heart rate 89 in sinus rhythm, bp 118/62. Patient resting comfortably.  Appears asleep    Potassium 2.8, ordered potassium chloride 10 mEq in 100 ml IVPB x 4 doses

## 2022-06-15 NOTE — PROGRESS NOTES
Interventional Radiology Progress Note    Patient: Johann Ge               Sex: female          DOA: 6/10/2022       YOB: 1945      Age:  68 y.o.        LOS:  LOS: 5 days       Assessment/Plan     Patient is POD#2 s/p bilateral sacroplasty and T11 kyphoplasty by Dr. Del Toro Pod. Somewhat improved pain post procedure, still with significant back pain with movement    From an IR standpoint, the patient is doing well without any apparent complications. Subjective: The patient endorses continued back pain with movement, although she can stand and walk now, which she was not doing prior. Improved mobility post procedure. The patient denies N/V, HA, dizziness, fever, chills, abdominal pain, dyspnea    Objective:      Visit Vitals  BP (!) 153/82   Pulse 98   Temp 97.1 °F (36.2 °C)   Resp 17   Ht 5' 5\" (1.651 m)   Wt 72.6 kg (160 lb)   SpO2 96%   BMI 26.63 kg/m²     Lab/Data Reviewed:  New leukocytosis post procedure likely reactive  H&H stable    Recent images:  Procedure images are available    Interval history:  The patient reports she has improved pain while standing and was able to walk with PT. Physical Exam:  Constitutional: NAD. A&Ox4. Respiratory: Normal respiratory effort. Symmetrical rise and fall of chest.    Cardiovascular: Regular rate. Gastrointestinal: Soft, NT, ND.   Procedure site: aTTP    Thank you,  Rahul Earl, [address]

## 2022-06-15 NOTE — PROGRESS NOTES
Patient complaining of 10/10 pain. 2mg IV morphine given at 1355. 5mg Oxycodone and 1mg IV ativan given at 1542. Morphine given again at 1747. Patient still very restless and moaning in pain. Per Jeny Pelayo MD give one time additional dose of 2mg IV morphine.

## 2022-06-16 LAB
ANION GAP SERPL CALC-SCNC: 11 MMOL/L (ref 3–18)
BUN SERPL-MCNC: 9 MG/DL (ref 7–18)
BUN/CREAT SERPL: 26 (ref 12–20)
CALCIUM SERPL-MCNC: 8.8 MG/DL (ref 8.5–10.1)
CHLORIDE SERPL-SCNC: 105 MMOL/L (ref 100–111)
CO2 SERPL-SCNC: 21 MMOL/L (ref 21–32)
CREAT SERPL-MCNC: 0.34 MG/DL (ref 0.6–1.3)
ERYTHROCYTE [DISTWIDTH] IN BLOOD BY AUTOMATED COUNT: 17.9 % (ref 11.6–14.5)
GLUCOSE SERPL-MCNC: 103 MG/DL (ref 74–99)
HCT VFR BLD AUTO: 28.5 % (ref 35–45)
HGB BLD-MCNC: 8.8 G/DL (ref 12–16)
MCH RBC QN AUTO: 22.3 PG (ref 24–34)
MCHC RBC AUTO-ENTMCNC: 30.9 G/DL (ref 31–37)
MCV RBC AUTO: 72.2 FL (ref 78–100)
NRBC # BLD: 0 K/UL (ref 0–0.01)
NRBC BLD-RTO: 0 PER 100 WBC
PLATELET # BLD AUTO: 295 K/UL (ref 135–420)
PMV BLD AUTO: 8.7 FL (ref 9.2–11.8)
POTASSIUM SERPL-SCNC: 3.8 MMOL/L (ref 3.5–5.5)
RBC # BLD AUTO: 3.95 M/UL (ref 4.2–5.3)
SODIUM SERPL-SCNC: 137 MMOL/L (ref 136–145)
TROPONIN-HIGH SENSITIVITY: 252 NG/L (ref 0–54)
WBC # BLD AUTO: 10.1 K/UL (ref 4.6–13.2)

## 2022-06-16 PROCEDURE — 99233 SBSQ HOSP IP/OBS HIGH 50: CPT | Performed by: STUDENT IN AN ORGANIZED HEALTH CARE EDUCATION/TRAINING PROGRAM

## 2022-06-16 PROCEDURE — 36415 COLL VENOUS BLD VENIPUNCTURE: CPT

## 2022-06-16 PROCEDURE — 80048 BASIC METABOLIC PNL TOTAL CA: CPT

## 2022-06-16 PROCEDURE — 85027 COMPLETE CBC AUTOMATED: CPT

## 2022-06-16 PROCEDURE — 74011250636 HC RX REV CODE- 250/636: Performed by: INTERNAL MEDICINE

## 2022-06-16 PROCEDURE — 74011000250 HC RX REV CODE- 250: Performed by: RADIOLOGY

## 2022-06-16 PROCEDURE — 74011250637 HC RX REV CODE- 250/637: Performed by: INTERNAL MEDICINE

## 2022-06-16 PROCEDURE — 84484 ASSAY OF TROPONIN QUANT: CPT

## 2022-06-16 PROCEDURE — 2709999900 HC NON-CHARGEABLE SUPPLY

## 2022-06-16 PROCEDURE — 74011250636 HC RX REV CODE- 250/636: Performed by: STUDENT IN AN ORGANIZED HEALTH CARE EDUCATION/TRAINING PROGRAM

## 2022-06-16 PROCEDURE — 77030038269 HC DRN EXT URIN PURWCK BARD -A

## 2022-06-16 PROCEDURE — 51798 US URINE CAPACITY MEASURE: CPT

## 2022-06-16 PROCEDURE — 74011250637 HC RX REV CODE- 250/637: Performed by: STUDENT IN AN ORGANIZED HEALTH CARE EDUCATION/TRAINING PROGRAM

## 2022-06-16 PROCEDURE — 65270000046 HC RM TELEMETRY

## 2022-06-16 RX ORDER — LORAZEPAM 1 MG/1
1 TABLET ORAL
Status: DISCONTINUED | OUTPATIENT
Start: 2022-06-16 | End: 2022-06-17 | Stop reason: HOSPADM

## 2022-06-16 RX ORDER — OXYCODONE HYDROCHLORIDE 10 MG/1
10 TABLET ORAL
Status: DISCONTINUED | OUTPATIENT
Start: 2022-06-16 | End: 2022-06-17 | Stop reason: HOSPADM

## 2022-06-16 RX ORDER — HYDRALAZINE HYDROCHLORIDE 10 MG/1
10 TABLET, FILM COATED ORAL 3 TIMES DAILY
Status: DISCONTINUED | OUTPATIENT
Start: 2022-06-16 | End: 2022-06-16

## 2022-06-16 RX ORDER — HYDRALAZINE HYDROCHLORIDE 25 MG/1
25 TABLET, FILM COATED ORAL 3 TIMES DAILY
Status: DISCONTINUED | OUTPATIENT
Start: 2022-06-17 | End: 2022-06-17 | Stop reason: HOSPADM

## 2022-06-16 RX ORDER — AMLODIPINE BESYLATE 10 MG/1
10 TABLET ORAL DAILY
Status: DISCONTINUED | OUTPATIENT
Start: 2022-06-16 | End: 2022-06-17 | Stop reason: HOSPADM

## 2022-06-16 RX ORDER — LOSARTAN POTASSIUM 50 MG/1
50 TABLET ORAL DAILY
Status: DISCONTINUED | OUTPATIENT
Start: 2022-06-17 | End: 2022-06-17 | Stop reason: HOSPADM

## 2022-06-16 RX ORDER — GABAPENTIN 300 MG/1
300 CAPSULE ORAL 3 TIMES DAILY
Status: DISCONTINUED | OUTPATIENT
Start: 2022-06-16 | End: 2022-06-17 | Stop reason: HOSPADM

## 2022-06-16 RX ADMIN — NITROGLYCERIN 1 INCH: 20 OINTMENT TOPICAL at 02:29

## 2022-06-16 RX ADMIN — LOSARTAN POTASSIUM 25 MG: 25 TABLET, FILM COATED ORAL at 08:28

## 2022-06-16 RX ADMIN — ONDANSETRON 4 MG: 2 INJECTION INTRAMUSCULAR; INTRAVENOUS at 18:16

## 2022-06-16 RX ADMIN — MORPHINE SULFATE 2 MG: 2 INJECTION, SOLUTION INTRAMUSCULAR; INTRAVENOUS at 15:36

## 2022-06-16 RX ADMIN — ACETAMINOPHEN 1000 MG: 325 TABLET ORAL at 22:50

## 2022-06-16 RX ADMIN — AMLODIPINE BESYLATE 10 MG: 10 TABLET ORAL at 18:11

## 2022-06-16 RX ADMIN — OXYCODONE HYDROCHLORIDE 10 MG: 10 TABLET ORAL at 20:57

## 2022-06-16 RX ADMIN — ATORVASTATIN CALCIUM 10 MG: 10 TABLET, FILM COATED ORAL at 08:27

## 2022-06-16 RX ADMIN — ACETAMINOPHEN 1000 MG: 325 TABLET ORAL at 15:36

## 2022-06-16 RX ADMIN — PANTOPRAZOLE SODIUM 40 MG: 40 TABLET, DELAYED RELEASE ORAL at 08:27

## 2022-06-16 RX ADMIN — OXYCODONE HYDROCHLORIDE 5 MG: 5 TABLET ORAL at 02:29

## 2022-06-16 RX ADMIN — SODIUM CHLORIDE, PRESERVATIVE FREE 10 ML: 5 INJECTION INTRAVENOUS at 22:51

## 2022-06-16 RX ADMIN — MORPHINE SULFATE 2 MG: 2 INJECTION, SOLUTION INTRAMUSCULAR; INTRAVENOUS at 04:27

## 2022-06-16 RX ADMIN — ONDANSETRON 4 MG: 2 INJECTION INTRAMUSCULAR; INTRAVENOUS at 08:28

## 2022-06-16 RX ADMIN — MORPHINE SULFATE 2 MG: 2 INJECTION, SOLUTION INTRAMUSCULAR; INTRAVENOUS at 08:29

## 2022-06-16 RX ADMIN — LORAZEPAM 1 MG: 2 INJECTION, SOLUTION INTRAMUSCULAR; INTRAVENOUS at 09:18

## 2022-06-16 RX ADMIN — OXYCODONE HYDROCHLORIDE 10 MG: 10 TABLET ORAL at 10:51

## 2022-06-16 RX ADMIN — HYDRALAZINE HYDROCHLORIDE 10 MG: 10 TABLET, FILM COATED ORAL at 18:11

## 2022-06-16 RX ADMIN — PROMETHAZINE HYDROCHLORIDE 12.5 MG: 12.5 TABLET ORAL at 04:02

## 2022-06-16 RX ADMIN — SODIUM CHLORIDE, PRESERVATIVE FREE 10 ML: 5 INJECTION INTRAVENOUS at 15:37

## 2022-06-16 RX ADMIN — ENOXAPARIN SODIUM 40 MG: 100 INJECTION SUBCUTANEOUS at 08:27

## 2022-06-16 RX ADMIN — HYDRALAZINE HYDROCHLORIDE 10 MG: 10 TABLET, FILM COATED ORAL at 20:57

## 2022-06-16 RX ADMIN — Medication 1 TABLET: at 08:28

## 2022-06-16 RX ADMIN — HYDRALAZINE HYDROCHLORIDE 25 MG: 20 INJECTION, SOLUTION INTRAMUSCULAR; INTRAVENOUS at 10:51

## 2022-06-16 RX ADMIN — GABAPENTIN 300 MG: 300 CAPSULE ORAL at 22:50

## 2022-06-16 RX ADMIN — Medication 1 TABLET: at 18:11

## 2022-06-16 RX ADMIN — GABAPENTIN 300 MG: 300 CAPSULE ORAL at 18:11

## 2022-06-16 RX ADMIN — SODIUM CHLORIDE, PRESERVATIVE FREE 10 ML: 5 INJECTION INTRAVENOUS at 05:35

## 2022-06-16 RX ADMIN — NORTRIPTYLINE HYDROCHLORIDE 40 MG: 10 CAPSULE ORAL at 22:50

## 2022-06-16 RX ADMIN — LORAZEPAM 1 MG: 1 TABLET ORAL at 22:50

## 2022-06-16 NOTE — PROGRESS NOTES
Bedside report given to Lenard Easley RN. Pt resting quietly in bed at this time, call bell and telephone within reach.

## 2022-06-16 NOTE — PROGRESS NOTES
6/16/2022  19:40- [facility]   20[facility]33-- MEWS=3 Re;jorge a to B/P and heart rate-- will administer scheduled hydralazine and prn pain med.

## 2022-06-16 NOTE — PROGRESS NOTES
INTERIM UPDATE - 1108 EST on 6/15/2022    Nursing Staff calls for Nursing who has Patient stating that Patient's  is at bedside and is angry and is stating that he will have Patient taken to another facility ostensibly due to loss of IV access on Patient and inability to administer IV pain controlling agents in 's presence upon 's demand (possibly there are more significant reasons or a more compelling and well-reasoned complaint, but this information was not made available to me given that a Nurse other than the Patient's Nurse was reporting the situation). Patient's Nurse is reportedly currently obtaining IV access so that IV pain control agents can be administered. INTERIM UPDATE - 3290 EST on 6/15/2022    Patient's Nurse reports that she cannot gain IV access and states that she will escalate Patient through protocols to achieve IV access this shift. In the meantime, Nursing Staff requests additional oral agents to control Patient's pain. Plan: Will add Oral Morphine ONE-TIME DOSE while IV access is pursued.

## 2022-06-16 NOTE — REHAB NOTE
ARU/IPR REFERRAL CONTACT NOTE  [address] [facility]      Thank you for the opportunity to review this patient's case for admission to [address] [facility]. Based on our pre-admission screening:     [ x] Our Team/Medical Director is following this case. Comments: Will continue to follow patient for admission to ARU. Pt required IV pain medications last night secondary to 10/10 severe pain. Spoke with pt's spouse and advised that the team would like to see how she tolerates oral pain medication in prep for ARU admission. Pt also has IV hydralazine and IV ativan on board and administered in the last 12hrs. When appropriate, can patient be converted to PO medications. Thank you. Again, Thank you for this referral. Should you have any questions please do not hesitate to call.      Sincerely,  Pal Gonsalez [facility]  [phone]

## 2022-06-16 NOTE — PROGRESS NOTES
Attempted pt for OT tx X2. Pt sound asleep with spouse present. Will continue to f/u as schedule allows.  Thank you  POLLY Wong/MARA

## 2022-06-16 NOTE — PROGRESS NOTES
Pt c/o 10/10 pain in R leg and appears very uncomfortable and is nauseated. Morphine and zofran given, incont care performed, Dr. Mayfield Flow paged, awaiting call back.

## 2022-06-16 NOTE — PROGRESS NOTES
6/15/2022  22:35-- IV access lost at this time- Patient is complaining of back and right leg pain- has prn IV morphine order- attempted x2 to obtain IV access but unsuccessful ( veins blew both times after IV catheter inserted )   Explained to the patient and  that the doctor will be notified to obtain a different or stronger PO pain med than what is already ordered as the patient reported very little pain relief from 5 mg roxicodone. 23:20- Dr Jaelyn Gardner notified regarding need for another pain med and unable to obtain IV access-- See new order. Morphine 10mg po given for back pain at 23:38 with fair relief. 6/16/2022  00:12- 24 G IV inserted by Sheng Peralta RN.   03:20- IV infiltrated- IV d/c'd   04:05- Med with phenergan 12.5 mg po for nausea. 04:20- 22 G IV inserted in right thumb area and 22 G IV inserted in left forearm vein. 04:27- Med with Morphine 2 mg IV for c/o right leg pain with fair results. Continue to monitor- Call light in reach at all times. Bed alarm set on for safety. 07:45- Bedside report given to oncoming nurse, Gilberto Carter RN.

## 2022-06-17 ENCOUNTER — HOSPITAL ENCOUNTER (INPATIENT)
Age: 77
LOS: 15 days | Discharge: HOME HEALTH | DRG: 561 | End: 2022-07-02
Attending: EMERGENCY MEDICINE | Admitting: EMERGENCY MEDICINE
Payer: MEDICARE

## 2022-06-17 VITALS
HEART RATE: 106 BPM | DIASTOLIC BLOOD PRESSURE: 79 MMHG | TEMPERATURE: 97.5 F | WEIGHT: 160 LBS | RESPIRATION RATE: 18 BRPM | HEIGHT: 65 IN | BODY MASS INDEX: 26.66 KG/M2 | SYSTOLIC BLOOD PRESSURE: 143 MMHG | OXYGEN SATURATION: 96 %

## 2022-06-17 DIAGNOSIS — M84.48XD BILATERAL SACRAL INSUFFICIENCY FRACTURE WITH ROUTINE HEALING: ICD-10-CM

## 2022-06-17 DIAGNOSIS — I10 ESSENTIAL HYPERTENSION, BENIGN: ICD-10-CM

## 2022-06-17 DIAGNOSIS — E87.6 HYPOKALEMIA: ICD-10-CM

## 2022-06-17 DIAGNOSIS — S22.080D COMPRESSION FRACTURE OF T11 VERTEBRA WITH ROUTINE HEALING: Primary | ICD-10-CM

## 2022-06-17 DIAGNOSIS — M48.062 SPINAL STENOSIS OF LUMBAR REGION WITH NEUROGENIC CLAUDICATION: ICD-10-CM

## 2022-06-17 PROBLEM — S22.000A COMPRESSION FRACTURE OF BODY OF THORACIC VERTEBRA (HCC): Status: ACTIVE | Noted: 2022-06-17

## 2022-06-17 LAB
ANION GAP SERPL CALC-SCNC: 9 MMOL/L (ref 3–18)
BUN SERPL-MCNC: 9 MG/DL (ref 7–18)
BUN/CREAT SERPL: 29 (ref 12–20)
CALCIUM SERPL-MCNC: 8.7 MG/DL (ref 8.5–10.1)
CHLORIDE SERPL-SCNC: 106 MMOL/L (ref 100–111)
CO2 SERPL-SCNC: 21 MMOL/L (ref 21–32)
CREAT SERPL-MCNC: 0.31 MG/DL (ref 0.6–1.3)
ERYTHROCYTE [DISTWIDTH] IN BLOOD BY AUTOMATED COUNT: 18.3 % (ref 11.6–14.5)
GLUCOSE SERPL-MCNC: 107 MG/DL (ref 74–99)
HCT VFR BLD AUTO: 27.8 % (ref 35–45)
HGB BLD-MCNC: 8.6 G/DL (ref 12–16)
MCH RBC QN AUTO: 22.1 PG (ref 24–34)
MCHC RBC AUTO-ENTMCNC: 30.9 G/DL (ref 31–37)
MCV RBC AUTO: 71.5 FL (ref 78–100)
NRBC # BLD: 0 K/UL (ref 0–0.01)
NRBC BLD-RTO: 0 PER 100 WBC
PLATELET # BLD AUTO: 310 K/UL (ref 135–420)
PMV BLD AUTO: 8.6 FL (ref 9.2–11.8)
POTASSIUM SERPL-SCNC: 3.1 MMOL/L (ref 3.5–5.5)
RBC # BLD AUTO: 3.89 M/UL (ref 4.2–5.3)
SODIUM SERPL-SCNC: 136 MMOL/L (ref 136–145)
WBC # BLD AUTO: 7.5 K/UL (ref 4.6–13.2)

## 2022-06-17 PROCEDURE — 97535 SELF CARE MNGMENT TRAINING: CPT

## 2022-06-17 PROCEDURE — 99239 HOSP IP/OBS DSCHRG MGMT >30: CPT | Performed by: STUDENT IN AN ORGANIZED HEALTH CARE EDUCATION/TRAINING PROGRAM

## 2022-06-17 PROCEDURE — 85027 COMPLETE CBC AUTOMATED: CPT

## 2022-06-17 PROCEDURE — 36415 COLL VENOUS BLD VENIPUNCTURE: CPT

## 2022-06-17 PROCEDURE — 97530 THERAPEUTIC ACTIVITIES: CPT

## 2022-06-17 PROCEDURE — 74011000250 HC RX REV CODE- 250: Performed by: RADIOLOGY

## 2022-06-17 PROCEDURE — 80048 BASIC METABOLIC PNL TOTAL CA: CPT

## 2022-06-17 PROCEDURE — 74011250637 HC RX REV CODE- 250/637: Performed by: STUDENT IN AN ORGANIZED HEALTH CARE EDUCATION/TRAINING PROGRAM

## 2022-06-17 PROCEDURE — 65310000000 HC RM PRIVATE REHAB

## 2022-06-17 PROCEDURE — 74011250637 HC RX REV CODE- 250/637: Performed by: INTERNAL MEDICINE

## 2022-06-17 PROCEDURE — 74011250636 HC RX REV CODE- 250/636: Performed by: INTERNAL MEDICINE

## 2022-06-17 PROCEDURE — 2709999900 HC NON-CHARGEABLE SUPPLY

## 2022-06-17 PROCEDURE — 74011250637 HC RX REV CODE- 250/637: Performed by: EMERGENCY MEDICINE

## 2022-06-17 PROCEDURE — 77030038269 HC DRN EXT URIN PURWCK BARD -A

## 2022-06-17 RX ORDER — HYDRALAZINE HYDROCHLORIDE 25 MG/1
25 TABLET, FILM COATED ORAL 3 TIMES DAILY
Qty: 90 TABLET | Refills: 0 | Status: SHIPPED | OUTPATIENT
Start: 2022-06-17

## 2022-06-17 RX ORDER — AMLODIPINE BESYLATE 10 MG/1
10 TABLET ORAL DAILY
Status: DISCONTINUED | OUTPATIENT
Start: 2022-06-18 | End: 2022-06-23

## 2022-06-17 RX ORDER — FERROUS GLUCONATE 324(37.5)
324 TABLET ORAL 2 TIMES DAILY WITH MEALS
Qty: 60 TABLET | Refills: 0 | Status: SHIPPED | OUTPATIENT
Start: 2022-06-17

## 2022-06-17 RX ORDER — PANTOPRAZOLE SODIUM 40 MG/1
40 TABLET, DELAYED RELEASE ORAL
Status: DISCONTINUED | OUTPATIENT
Start: 2022-06-18 | End: 2022-07-02 | Stop reason: HOSPADM

## 2022-06-17 RX ORDER — ATORVASTATIN CALCIUM 10 MG/1
10 TABLET, FILM COATED ORAL DAILY
Status: DISCONTINUED | OUTPATIENT
Start: 2022-06-18 | End: 2022-07-02 | Stop reason: HOSPADM

## 2022-06-17 RX ORDER — LOSARTAN POTASSIUM 50 MG/1
50 TABLET ORAL DAILY
Qty: 90 TABLET | Refills: 0 | Status: SHIPPED
Start: 2022-06-18 | End: 2022-07-02

## 2022-06-17 RX ORDER — OXYCODONE HYDROCHLORIDE 5 MG/1
10 TABLET ORAL
Status: DISCONTINUED | OUTPATIENT
Start: 2022-06-17 | End: 2022-06-17

## 2022-06-17 RX ORDER — POTASSIUM CHLORIDE 20 MEQ/1
40 TABLET, EXTENDED RELEASE ORAL 2 TIMES DAILY
Status: DISCONTINUED | OUTPATIENT
Start: 2022-06-17 | End: 2022-06-17 | Stop reason: HOSPADM

## 2022-06-17 RX ORDER — PROMETHAZINE HYDROCHLORIDE 12.5 MG/1
12.5 TABLET ORAL
Qty: 30 TABLET | Refills: 0 | Status: SHIPPED | OUTPATIENT
Start: 2022-06-17 | End: 2022-07-17

## 2022-06-17 RX ORDER — LOSARTAN POTASSIUM 50 MG/1
50 TABLET ORAL DAILY
Status: DISCONTINUED | OUTPATIENT
Start: 2022-06-18 | End: 2022-06-21

## 2022-06-17 RX ORDER — BISACODYL 5 MG
10 TABLET, DELAYED RELEASE (ENTERIC COATED) ORAL
Status: DISCONTINUED | OUTPATIENT
Start: 2022-06-17 | End: 2022-07-02 | Stop reason: HOSPADM

## 2022-06-17 RX ORDER — HYDRALAZINE HYDROCHLORIDE 25 MG/1
25 TABLET, FILM COATED ORAL 3 TIMES DAILY
Status: DISCONTINUED | OUTPATIENT
Start: 2022-06-17 | End: 2022-06-21

## 2022-06-17 RX ORDER — ACETAMINOPHEN 325 MG/1
650 TABLET ORAL EVERY 6 HOURS
Status: COMPLETED | OUTPATIENT
Start: 2022-06-18 | End: 2022-06-20

## 2022-06-17 RX ORDER — OXYCODONE HYDROCHLORIDE 5 MG/1
10 TABLET ORAL
Status: DISCONTINUED | OUTPATIENT
Start: 2022-06-17 | End: 2022-06-20

## 2022-06-17 RX ORDER — DOCUSATE SODIUM 100 MG/1
100 CAPSULE, LIQUID FILLED ORAL 2 TIMES DAILY
Status: DISCONTINUED | OUTPATIENT
Start: 2022-06-18 | End: 2022-07-02 | Stop reason: HOSPADM

## 2022-06-17 RX ORDER — FERROUS GLUCONATE 324(37.5)
324 TABLET ORAL 2 TIMES DAILY WITH MEALS
Status: DISCONTINUED | OUTPATIENT
Start: 2022-06-18 | End: 2022-07-02 | Stop reason: HOSPADM

## 2022-06-17 RX ORDER — AMLODIPINE BESYLATE 10 MG/1
10 TABLET ORAL DAILY
Qty: 90 TABLET | Refills: 0 | Status: SHIPPED
Start: 2022-06-18 | End: 2022-07-02

## 2022-06-17 RX ORDER — GABAPENTIN 300 MG/1
300 CAPSULE ORAL 3 TIMES DAILY
Status: DISCONTINUED | OUTPATIENT
Start: 2022-06-17 | End: 2022-06-20

## 2022-06-17 RX ORDER — OXYCODONE HYDROCHLORIDE 10 MG/1
10 TABLET ORAL
Qty: 24 TABLET | Refills: 0 | Status: SHIPPED
Start: 2022-06-17 | End: 2022-07-02

## 2022-06-17 RX ORDER — NALOXONE HYDROCHLORIDE 0.4 MG/ML
0.2 INJECTION, SOLUTION INTRAMUSCULAR; INTRAVENOUS; SUBCUTANEOUS AS NEEDED
Status: DISCONTINUED | OUTPATIENT
Start: 2022-06-17 | End: 2022-07-02 | Stop reason: HOSPADM

## 2022-06-17 RX ORDER — LANOLIN ALCOHOL/MO/W.PET/CERES
3 CREAM (GRAM) TOPICAL
Status: DISCONTINUED | OUTPATIENT
Start: 2022-06-17 | End: 2022-07-02 | Stop reason: HOSPADM

## 2022-06-17 RX ORDER — NORTRIPTYLINE HYDROCHLORIDE 10 MG/1
40 CAPSULE ORAL
Status: DISCONTINUED | OUTPATIENT
Start: 2022-06-17 | End: 2022-06-20

## 2022-06-17 RX ADMIN — HYDRALAZINE HYDROCHLORIDE 25 MG: 25 TABLET, FILM COATED ORAL at 17:05

## 2022-06-17 RX ADMIN — HYDRALAZINE HYDROCHLORIDE 25 MG: 25 TABLET, FILM COATED ORAL at 22:36

## 2022-06-17 RX ADMIN — PANTOPRAZOLE SODIUM 40 MG: 40 TABLET, DELAYED RELEASE ORAL at 08:06

## 2022-06-17 RX ADMIN — Medication 1 TABLET: at 08:06

## 2022-06-17 RX ADMIN — ACETAMINOPHEN 1000 MG: 325 TABLET ORAL at 14:21

## 2022-06-17 RX ADMIN — GABAPENTIN 300 MG: 300 CAPSULE ORAL at 22:16

## 2022-06-17 RX ADMIN — OXYCODONE HYDROCHLORIDE 10 MG: 10 TABLET ORAL at 12:10

## 2022-06-17 RX ADMIN — GABAPENTIN 300 MG: 300 CAPSULE ORAL at 08:06

## 2022-06-17 RX ADMIN — ENOXAPARIN SODIUM 40 MG: 100 INJECTION SUBCUTANEOUS at 08:06

## 2022-06-17 RX ADMIN — OXYCODONE HYDROCHLORIDE 10 MG: 10 TABLET ORAL at 08:06

## 2022-06-17 RX ADMIN — ATORVASTATIN CALCIUM 10 MG: 10 TABLET, FILM COATED ORAL at 08:06

## 2022-06-17 RX ADMIN — OXYCODONE HYDROCHLORIDE 10 MG: 5 TABLET ORAL at 22:10

## 2022-06-17 RX ADMIN — Medication 1 TABLET: at 17:05

## 2022-06-17 RX ADMIN — POTASSIUM CHLORIDE 40 MEQ: 1500 TABLET, EXTENDED RELEASE ORAL at 09:01

## 2022-06-17 RX ADMIN — OXYCODONE HYDROCHLORIDE 10 MG: 10 TABLET ORAL at 17:05

## 2022-06-17 RX ADMIN — HYDRALAZINE HYDROCHLORIDE 25 MG: 25 TABLET, FILM COATED ORAL at 08:06

## 2022-06-17 RX ADMIN — ACETAMINOPHEN 650 MG: 325 TABLET ORAL at 23:35

## 2022-06-17 RX ADMIN — SODIUM CHLORIDE, PRESERVATIVE FREE 10 ML: 5 INJECTION INTRAVENOUS at 14:19

## 2022-06-17 RX ADMIN — SODIUM CHLORIDE, PRESERVATIVE FREE 10 ML: 5 INJECTION INTRAVENOUS at 07:04

## 2022-06-17 RX ADMIN — AMLODIPINE BESYLATE 10 MG: 10 TABLET ORAL at 08:06

## 2022-06-17 RX ADMIN — OXYCODONE HYDROCHLORIDE 10 MG: 10 TABLET ORAL at 04:11

## 2022-06-17 RX ADMIN — POTASSIUM CHLORIDE 40 MEQ: 1500 TABLET, EXTENDED RELEASE ORAL at 17:05

## 2022-06-17 RX ADMIN — LOSARTAN POTASSIUM 50 MG: 50 TABLET, FILM COATED ORAL at 08:06

## 2022-06-17 RX ADMIN — ACETAMINOPHEN 1000 MG: 325 TABLET ORAL at 07:03

## 2022-06-17 RX ADMIN — GABAPENTIN 300 MG: 300 CAPSULE ORAL at 17:05

## 2022-06-17 NOTE — PROGRESS NOTES
Discharge instructions reviewed with patient, all questions answered. PIV and tele removed.  Report called to Brandon Lynch LPN

## 2022-06-17 NOTE — PROGRESS NOTES
Discharge/Transition Planning    Noted patient out of bed in chair. CM went in and spoke with patient and spouse. Patient stated she felt good working out of bed and feels confident she can do ARU therapy. Patient said she was dizzy a little. Explained importance of being out of bed and moving body; explained laying for so long and then getting up will cause body to adjust to sitting up again. Patient and spouse are very hopeful Inpatient Rehab will accept. They really are not interested in SNF. Sent Perfect serve to Maggy William in Inpatient Rehab and Dr Jim Rosado .         1450: Patient can go to room 188 at [facility] - [facility]

## 2022-06-17 NOTE — REHAB NOTE
ARU/IPR REFERRAL CONTACT NOTE  [id number] [place] [address] for Physical Rehabilitation      Thank you for the opportunity to review this patient's case for admission to [id number] [place] [address] Physical Rehabilitation. Based on our pre-admission screening:       [x ] This patient meets criteria for admission to [facility]. Pt is approved to admit to ARU room 188 @ 7:30pm.  Please complete discharge summary prior to admission. Report can be called to [phone]. Again, Thank you for this referral. Should you have any questions please do not hesitate to call.      Sincerely,  Oscar Post Liaison  [facility]  [phone]

## 2022-06-17 NOTE — PROGRESS NOTES
Progress Note  Hospitalist Service    Patient: Lucy Maya MRN: [id number]   SSN: [id number]  YOB: 1945   Age: 68 y.o. Sex: female      Admit Date: 6/10/2022    LOS: 6 days   Chief Complaint   Patient presents with    Back Pain       Subjective:     Patient seen and examined in room. Asleep but easily awoken. Pain seemed well controlled on rounds. Updated  over phone - attempted to answer all questions. Attempted to set expectations concerning pain level as Dr. Shayla Bosworth has as well. Objective:     Vitals:  Visit Vitals  BP (!) 159/89   Pulse (!) 103   Temp 99.2 °F (37.3 °C)   Resp 21   Ht 5' 5\" (1.651 m)   Wt 72.6 kg (160 lb)   SpO2 94%   BMI 26.63 kg/m²       Physical Exam:   General appearance: lethargic, no distress, appears stated age  Lungs: clear to auscultation bilaterally  Heart: regular rate and rhythm, S1, S2 normal, no murmur, click, rub or gallop  Abdomen: soft, non-tender. Bowel sounds normal. No masses,  no organomegaly  Pulses: 2+ and symmetric  BACK: pain with passive movement. Able to sit up in bed. Skin: Skin color, texture, turgor normal. No rashes or lesions  Neuro:  normal without focal findings  mental status, speech normal, alert and oriented x iii   LUH  reflexes normal and symmetric    Intake and Output:  Current Shift: No intake/output data recorded. Last three shifts: 06/15 0701 - 06/16 1900  In: 1920 [P.O.:520; I.V.:1400]  Out: 500 [Urine:500]    Lab/Data Review:  No results found for this or any previous visit (from the past 12 hour(s)). Key Findings or tests:       Telemetry NONE   Oxygen NONE     Assessment and Plan:     1. Acute to Subacute compression fracture of T11 with resultant kyphosis   2. Acute to subacute sacral insufficiency fracture on the right   3. Sciatica of right side  4. 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   Leg weakness, bilateral, due to pain at lumbar area. No evidence of neurological deficit   6. Hypertension   7. Hyperlipidemia   8. Normocytic anemia   9. GERD with moderate to large Hiatal hernia     Patient worked with PT/OT. PT/OT recommending rehab. Orthospine surgery following considering intervention if cement augmentation is not helpful. Patient still with pronounced pain. Ortho to see patient in AM.   Patient now s/p kyphoplasty, bilateral sacroplasty on 6/13 with interventional radiology. Tylenol 1000mg Q8hr, with oxycodone prn. She can resume her Gabapentin   She can resume lipitor, losartan, nortrityline, PPI  ICS  TSH normal.  Continue oral iron.   Patient states she had a recent DEXA scan that was normal.     Carmen Perez DO, hospitalist   June 16, 2022

## 2022-06-17 NOTE — DISCHARGE SUMMARY
Discharge Summary    Patient: Tiffany Chua MRN: [id number]  CSN: [id number]    YOB: 1945  Age: 68 y.o. Sex: female    DOA: 6/10/2022 LOS:  LOS: 7 days   Discharge Date:      Admission Diagnosis: Spinal stenosis [M48.00]  Leg weakness, bilateral [R29.898]  Sciatica of right side [M54.31]    Discharge Diagnosis:    Hospital Problems  Date Reviewed: 5/24/2022          Codes Class Noted POA    Atrial fibrillation with rapid ventricular response ([facility] [address] ICD-10-CM: I48.91  ICD-9-CM: 427.31  6/15/2022 No        Spinal stenosis ICD-10-CM: M48.00  ICD-9-CM: 724.00  6/10/2022 Unknown        Leg weakness, bilateral ICD-10-CM: R29.898  ICD-9-CM: 729.89  6/10/2022 Unknown        Sciatica of right side ICD-10-CM: M54.31  ICD-9-CM: 724.3  2/7/2018 Unknown        Essential hypertension, benign ICD-10-CM: I10  ICD-9-CM: 401.1  1/29/2009 Yes        Generalized anxiety disorder ICD-10-CM: F41.1  ICD-9-CM: 300.02  1/29/2009 Yes              Discharge Condition: Stable  Discharge Disposition: ARU     PHYSICAL EXAM  Visit Vitals  BP (!) 154/84 (BP 1 Location: Left upper arm, BP Patient Position: Semi fowlers; Lying)   Pulse (!) 108   Temp 98.2 °F (36.8 °C)   Resp 18   Ht 5' 5\" (1.651 m)   Wt 72.6 kg (160 lb)   SpO2 92%   BMI 26.63 kg/m²     General appearance: lethargic, no distress, appears stated age  Lungs: clear to auscultation bilaterally  Heart: regular rate and rhythm, S1, S2 normal, no murmur, click, rub or gallop  Abdomen: soft, non-tender. Bowel sounds normal. No masses,  no organomegaly  Pulses: 2+ and symmetric  BACK: pain with passive movement. Able to sit up in bed. Skin: Skin color, texture, turgor normal. No rashes or lesions  Neuro:  normal without focal findings  mental status, speech normal, alert and oriented x iii   LUH  reflexes normal and symmetric    Hospital Course:   1.   Acute to Subacute compression fracture of T11 with resultant kyphosis   2.   Acute to subacute sacral insufficiency fracture on the right   3.  Sciatica of right side  4.   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  Leg weakness, bilateral, due to pain at lumbar area. No evidence of neurological deficit    #1-5. Patient now s/p kyphoplasty, bilateral sacroplasty on 6/13 with interventional radiology. Patient's pain was difficult to control at first and required IV Morphine. Pain is currently tolerable on Tylenol 1000mg Q8hr, with oxycodone 10 mg Q4 hours PRN. We have resumed over gabapentin. 6.  Hypertension    #6. BP thought to be elevated about baseline secondary to pain. Patient currently better controlled on amlodipine, hydralazine, losartan. 7.  Hyperlipidemia   8.  Normocytic anemia   9.  GERD with moderate to large Hiatal hernia   10. Anxiety    #7-10. Home medications were continued. 11. Afib with RVR    #11. Patient had very transient atrial fibrillation following procedure likely secondary to hypokalemia. It was abated with metoprolol 5 mg IV pushes x3 and Diltiazem 10 mg push x1. Echo was ordered with no worrisome findings. Patient remained in sinus rhythm otherwise.      Consults:   IR  - Dr. Garza Massed      Significant Diagnostic Studies:   FINDINGS:      Bilateral insufficiency fractures were noted bilaterally left more than right. Good position of the guiding needles was noted. Good cement filling was achieved  bilaterally within sacral telly covering sacral insufficiency fractures  longitudinally.  Minimal cement extravasation right sacral telly.      IMPRESSION     1. Successful uncomplicated CT guided bilateral , balloon assisted, sacroplasty.     All CT scans at this facility are performed using dose optimization technique as  appropriate to a performed exam, to include automated exposure control,  adjustment of the mA and/or kV according to patient size (including appropriate  matching for site-specific examinations), or use of iterative reconstruction  technique. FINDINGS:      Compression deformity of the T11 vertebral body is noted with approximately 50%  of vertebral body height loss. Successful T11 kyphoplasty with bridging of  cement from the superior to inferior endplates and the left to right lateral  vertebral body margins. No cement extravasation was noted.     IMPRESSION   IMPRESSION:     1. Successful, CT-guided, balloon kyphoplasty of the T11 vertebral body without  immediate complication. Discharge Medications:     Current Discharge Medication List      START taking these medications    Details   amLODIPine (NORVASC) 10 mg tablet Take 1 Tablet by mouth daily. Qty: 90 Tablet, Refills: 0      ferrous gluconate 324 mg (37.5 mg iron) tablet Take 1 Tablet by mouth two (2) times daily (with meals). Qty: 60 Tablet, Refills: 0      hydrALAZINE (APRESOLINE) 25 mg tablet Take 1 Tablet by mouth three (3) times daily. Qty: 90 Tablet, Refills: 0      oxyCODONE IR (ROXICODONE) 10 mg tab immediate release tablet Take 1 Tablet by mouth every four (4) hours as needed for Pain for up to 4 days. Max Daily Amount: 60 mg.  Qty: 24 Tablet, Refills: 0    Associated Diagnoses: Spinal stenosis, unspecified spinal region      promethazine (PHENERGAN) 12.5 mg tablet Take 1 Tablet by mouth every six (6) hours as needed for Nausea for up to 30 days. Qty: 30 Tablet, Refills: 0         CONTINUE these medications which have NOT CHANGED    Details   docosahexaenoic acid/epa (FISH OIL PO) Take 1,000 mg by mouth daily. naloxone (NARCAN) 4 mg/actuation nasal spray Use 1 spray intranasally, then discard. Repeat with new spray every 2 min as needed for opioid overdose symptoms, alternating nostrils.   Qty: 2 Each, Refills: 0      gabapentin (Neurontin) 300 mg capsule 1 in the am and 3 in the evening as directed  Indications: neuropathic pain  Qty: 360 Capsule, Refills: 1    Comments: Begin 7/25  Associated Diagnoses: Spinal stenosis of lumbar region with neurogenic claudication; Lumbar foraminal stenosis      nortriptyline (PAMELOR) 10 mg capsule Take 4 Capsules by mouth nightly. Qty: 360 Capsule, Refills: 1    Associated Diagnoses: Spinal stenosis of lumbar region with neurogenic claudication; Lumbar radiculopathy      MAGNESIUM OXIDE PO Take 1 Tab by mouth daily. omeprazole (PRILOSEC) 40 mg capsule Take 40 mg by mouth daily. Indications: gastroesophageal reflux disease      losartan (COZAAR) 25 mg tablet Take 25 mg by mouth daily. Indications: high blood pressure      hydroCHLOROthiazide (HYDRODIURIL) 25 mg tablet Take 25 mg by mouth daily. Indications: high blood pressure      LORazepam (ATIVAN) 1 mg tablet Take 1 mg by mouth every eight (8) hours as needed. atorvastatin (LIPITOR) 10 mg tablet Take 10 mg by mouth daily.              Activity: activity as tolerated    Diet: Regular Diet    Wound Care: None needed    Follow-up: with PCP, Jeremias Garibay MD in 7-10days    Minutes spent on discharge: >30 minutes spent coordinating this discharge (review instructions/follow-up, prescriptions, preparing report for sign off)    Lugo Wai, DO

## 2022-06-17 NOTE — PROGRESS NOTES
Problem: Mobility Impaired (Adult and Pediatric)  Goal: *Acute Goals and Plan of Care (Insert Text)  Description: Physical Therapy Goals  Initiated 6/12/2022, re-evaluated 6/14 s/p kyphoplasty/sacroplasty and to be accomplished within 7 day(s)  1. Patient will move from supine to sit and sit to supine  in bed with supervision. 2.  Patient will transfer from bed to chair and chair to bed with supervision using the least restrictive device. 3.  Patient will perform sit to stand with supervision. 4.  Patient will ambulate with minimal assistance/contact guard assist for 50 feet with the least restrictive device. 5.  Patient will ascend/descend 2 stairs with handrail(s) with minimal assistance/contact guard assist.    PLOF: Lives with spouse in two story home; 3-4 steps to enter. Unsure of handrail availability. Independent ambulator; has cane and ww. Outcome: Progressing Towards Goal     PHYSICAL THERAPY TREATMENT    Patient: Santiago Bajwa (11 y.o. female)  Date: 6/17/2022  Diagnosis: Spinal stenosis [M48.00]  Leg weakness, bilateral [R29.898]  Sciatica of right side [M54.31] <principal problem not specified>       Precautions: Skin (Spinal protective)  PLOF: see above     ASSESSMENT:  Pt received in bed in NAD and agreeable with encouragement. Pt educated on log roll technique to EOB and completes with CGA. Pt then stands with min A and amb approx 5 ft to turn and sit with mod A (min A x 2) via HHA, slow shuffled gait noted with decreased step clearance. Pt HR increases as high as 133 bpm with activity this date. Pt is very anxious throughout session and fixated on \"getting kicked out of rehab\" if she does not participate and educated her that the process is being started to get her qualified to go and that she isnt getting kicked out of anywhere. Pt left up in recliner and encouraged to sit up in chair for one hour as well as educated on simple LE HEP to aid in decreasing stiffness and pain.  Will continue to follow, nursing updated. Progression toward goals:   [x]      Improving appropriately and progressing toward goals  []      Improving slowly and progressing toward goals  []      Not making progress toward goals and plan of care will be adjusted     PLAN:  Patient continues to benefit from skilled intervention to address the above impairments. Continue treatment per established plan of care. Discharge Recommendations:  Rehab  Further Equipment Recommendations for Discharge:  rolling walker     SUBJECTIVE:   Patient stated I dont want to lose my spot.     OBJECTIVE DATA SUMMARY:   Critical Behavior:  Neurologic State: Alert  Orientation Level: Oriented to person,Oriented to place  Cognition: Decreased attention/concentration,Impaired decision making  Safety/Judgement: Awareness of environment,Fall prevention  Functional Mobility Training:  Bed Mobility:     Supine to Sit: Contact guard assistance               Transfers:  Sit to Stand: Minimum assistance  Stand to Sit: Minimum assistance        Bed to Chair: Moderate assistance                    Balance:  Sitting: Intact  Standing: Impaired; With support  Standing - Static: Fair  Standing - Dynamic : Fair (minus)        Ambulation/Gait Training:  Distance (ft): 5 Feet (ft)  Assistive Device:  (B HHA )  Ambulation - Level of Assistance: Minimal assistance;Assist x2        Gait Abnormalities: Decreased step clearance        Base of Support: Center of gravity altered     Speed/Aggie: Slow;Shuffled  Step Length: Right shortened;Left shortened           Pain:  Pain level pre-treatment: 10/10  Pain level post-treatment: 10/10   Pain Intervention(s): Medication (see MAR); Rest, Ice, Repositioning   Response to intervention: Nurse notified, See doc flow    Activity Tolerance:   Good    Please refer to the flowsheet for vital signs taken during this treatment.   After treatment:   [] Patient left in no apparent distress sitting up in chair  [x] Patient left in no apparent distress in bed  [x] Call bell left within reach  [x] Nursing notified  [] Caregiver present  [] Bed alarm activated  [] SCDs applied      COMMUNICATION/EDUCATION:   [x]         Role of Physical Therapy in the acute care setting. [x]         Fall prevention education was provided and the patient/caregiver indicated understanding. [x]         Patient/family have participated as able in working toward goals and plan of care. [x]         Patient/family agree to work toward stated goals and plan of care. []         Patient understands intent and goals of therapy, but is neutral about his/her participation.   []         Patient is unable to participate in stated goals/plan of care: ongoing with therapy staff.  []         Other:        Farnaz Branham   Time Calculation: 24 mins

## 2022-06-17 NOTE — PROGRESS NOTES
Problem: Self Care Deficits Care Plan (Adult)  Goal: *Acute Goals and Plan of Care (Insert Text)  Description: Occupational Therapy Goals  Initiated 6/14/2022 within 7 day(s). 1.  Patient will perform bed mobility in preparation for selfcare with supervision/set-up. 2.  Patient will perform lower body dressing with supervision/set-up. 3.  Patient will perform functional activity standing > 3 minutes with supervision/set-up. F+ balance. 4.  Patient will perform toilet transfers with supervision/set-up. 5.  Patient will perform all aspects of toileting with supervision/set-up. 6.  Patient will participate in upper extremity therapeutic exercise/activities with modified independence for 8 minutes. 7.  Patient will utilize energy conservation techniques during functional activities with verbal cues. Prior Level of Function: Patient was independent with self-care and functional mobility PTA. Outcome: Progressing Towards Goal  OCCUPATIONAL THERAPY TREATMENT    Patient: Mckenna Hollins (15 y.o. female)  Date: 6/17/2022  Diagnosis: Spinal stenosis [M48.00]  Leg weakness, bilateral [R29.898]  Sciatica of right side [M54.31] <principal problem not specified>       Precautions: Skin (Spinal protective)    Chart, occupational therapy assessment, plan of care, and goals were reviewed. ASSESSMENT:  Pt initially declined to participate in OT in am due to increased pain levels, now agreeable with Min encouragement. Pt's spouse is present during part of the session and very supportive. Pt is seen with PT to increase safety of the pt and staff during functional mobility and ADLs. Pt educated on spinal protective precautions and how they relate to ADLs and functional mobility. Pt maneuvered to EOB with log roll technique with CGA, benefiting from VCs for step by step directions as pt is easily distracted during all tasks.  Pt required Mod A to re-adjust socks seated EOB with crossed LE method, would benefit from AE training to maximize her independence. Pt performed functional txfr to the recliner with Mod A using HHA x2 and VCs for safe sequencing. Pt left comfortable in the recliner, all needs met, RN notified. Progression toward goals:  []          Improving appropriately and progressing toward goals  [x]          Improving slowly and progressing toward goals  []          Not making progress toward goals and plan of care will be adjusted     PLAN:  Patient continues to benefit from skilled intervention to address the above impairments. Continue treatment per established plan of care. Discharge Recommendations:  Rehab  Further Equipment Recommendations for Discharge:  bedside commode, shower chair, and rolling walker     SUBJECTIVE:   Patient stated Willodean Mariner will try.     OBJECTIVE DATA SUMMARY:   Cognitive/Behavioral Status:  Neurologic State: Alert  Orientation Level: Oriented to person,Oriented to place  Cognition: Decreased attention/concentration,Impaired decision making  Safety/Judgement: Awareness of environment,Fall prevention    Functional Mobility and Transfers for ADLs:   Bed Mobility:     Supine to Sit: Contact guard assistance         Transfers:  Sit to Stand: Minimum assistance  Stand to Sit: Minimum assistance     Bed to Chair: Moderate assistance   Toilet Transfer : Moderate assistance (simulated with HHAx2)    Balance:  Sitting: Intact  Standing: Impaired; With support  Standing - Static: Fair  Standing - Dynamic : Fair (minus)    ADL Intervention:   Grooming  Grooming Assistance: Supervision  Position Performed: Seated in chair  [address] Gown: Stand-by assistance    Lower Body Dressing Assistance  Socks: Moderate assistance  Slip on Shoes with Back: Maximum assistance  Cognitive Retraining  Safety/Judgement: Awareness of environment; Fall prevention    Pain:  Pain level pre-treatment: 10/10   Pain level post-treatment: 10/10    Activity Tolerance:    Fair  Please refer to the flowsheet for vital signs taken during this treatment. After treatment:   [x]  Patient left in no apparent distress sitting up in chair  []  Patient left in no apparent distress in bed  [x]  Call bell left within reach  [x]  Nursing notified  [x]  Caregiver present  []  Bed alarm activated    COMMUNICATION/EDUCATION:   [x] Role of Occupational Therapy in the acute care setting  [x] Home safety education was provided and the patient/caregiver indicated understanding. [x] Patient/family have participated as able in working towards goals and plan of care. [] Patient/family agree to work toward stated goals and plan of care. [] Patient understands intent and goals of therapy, but is neutral about his/her participation. [] Patient is unable to participate in goal setting and plan of care.       Thank you for this referral.  Blanche Holt OTR/L  Time Calculation: 24 mins

## 2022-06-17 NOTE — DISCHARGE INSTRUCTIONS
Patient Education        Learning About Relief for Back Pain  What is back tension and strain? Back strain happens when you overstretch, or pull, a muscle in your back. You may hurt your back in an accident or when you exercise or lift something. Most back pain will get better with rest and time. You can take care of yourself at home to help your back heal.  What can you do first to relieve back pain? When you first feel back pain, try these steps:  · Walk. Take a short walk (10 to 20 minutes) on a level surface (no slopes, hills, or stairs) every 2 to 3 hours. Walk only distances you can manage without pain, especially leg pain. · Relax. Find a comfortable position for rest. Some people are comfortable on the floor or a medium-firm bed with a small pillow under their head and another under their knees. Some people prefer to lie on their side with a pillow between their knees. Don't stay in one position for too long. · Try heat or ice. Try using a heating pad on a low or medium setting, or take a warm shower, for 15 to 20 minutes every 2 to 3 hours. Or you can buy single-use heat wraps that last up to 8 hours. You can also try an ice pack for 10 to 15 minutes every 2 to 3 hours. You can use an ice pack or a bag of frozen vegetables wrapped in a thin towel. There is not strong evidence that either heat or ice will help, but you can try them to see if they help. You may also want to try switching between heat and cold. · Take pain medicine exactly as directed. ¨ If the doctor gave you a prescription medicine for pain, take it as prescribed. ¨ If you are not taking a prescription pain medicine, ask your doctor if you can take an over-the-counter medicine. What else can you do? · Stretch and exercise. Exercises that increase flexibility may relieve your pain and make it easier for your muscles to keep your spine in a good, neutral position. And don't forget to keep walking. · Do self-massage.  You can use self-massage to unwind after work or school or to energize yourself in the morning. You can easily massage your feet, hands, or neck. Self-massage works best if you are in comfortable clothes and are sitting or lying in a comfortable position. Use oil or lotion to massage bare skin. · Reduce stress. Back pain can lead to a vicious Habematolel: Distress about the pain tenses the muscles in your back, which in turn causes more pain. Learn how to relax your mind and your muscles to lower your stress. Where can you learn more? Go to http://www.gray.com/. Enter B921 in the search box to learn more about \"Learning About Relief for Back Pain. \"  Current as of: March 21, 2017  Content Version: 11.5  © 8098-9638 [employer]. Care instructions adapted under license by [employer] (which disclaims liability or warranty for this information). If you have questions about a medical condition or this instruction, always ask your healthcare professional. [place] [address] any warranty or liability for your use of this information. DISCHARGE SUMMARY from Nurse    PATIENT INSTRUCTIONS:    After general anesthesia or intravenous sedation, for 24 hours or while taking prescription Narcotics:  · Limit your activities  · Do not drive and operate hazardous machinery  · Do not make important personal or business decisions  · Do  not drink alcoholic beverages  · If you have not urinated within 8 hours after discharge, please contact your surgeon on call.     Report the following to your surgeon:  · Excessive pain, swelling, redness or odor of or around the surgical area  · Temperature over 100.5  · Nausea and vomiting lasting longer than 4 hours or if unable to take medications  · Any signs of decreased circulation or nerve impairment to extremity: change in color, persistent  numbness, tingling, coldness or increase pain  · Any questions    What to do at Home:  Recommended activity: {discharge activity:56052}, ***    If you experience any of the following symptoms ***, please follow up with ***. *  Please give a list of your current medications to your Primary Care Provider. *  Please update this list whenever your medications are discontinued, doses are      changed, or new medications (including over-the-counter products) are added. *  Please carry medication information at all times in case of emergency situations. These are general instructions for a healthy lifestyle:    No smoking/ No tobacco products/ Avoid exposure to second hand smoke  Surgeon General's Warning:  Quitting smoking now greatly reduces serious risk to your health. Obesity, smoking, and sedentary lifestyle greatly increases your risk for illness    A healthy diet, regular physical exercise & weight monitoring are important for maintaining a healthy lifestyle    You may be retaining fluid if you have a history of heart failure or if you experience any of the following symptoms:  Weight gain of 3 pounds or more overnight or 5 pounds in a week, increased swelling in our hands or feet or shortness of breath while lying flat in bed. Please call your doctor as soon as you notice any of these symptoms; do not wait until your next office visit. The discharge information has been reviewed with the {PATIENT PARENT GUARDIAN:53623}. The {PATIENT PARENT GUARDIAN:92428} verbalized understanding. Discharge medications reviewed with the {Dishcar meds reviewed ECNN:11405} and appropriate educational materials and side effects teaching were provided.   ___________________________________________________________________________________________________________________________________

## 2022-06-18 LAB
ANION GAP SERPL CALC-SCNC: 10 MMOL/L (ref 3–18)
BASOPHILS # BLD: 0 K/UL (ref 0–0.1)
BASOPHILS NFR BLD: 1 % (ref 0–2)
BUN SERPL-MCNC: 19 MG/DL (ref 7–18)
BUN/CREAT SERPL: 43 (ref 12–20)
CALCIUM SERPL-MCNC: 9.2 MG/DL (ref 8.5–10.1)
CHLORIDE SERPL-SCNC: 105 MMOL/L (ref 100–111)
CO2 SERPL-SCNC: 22 MMOL/L (ref 21–32)
CREAT SERPL-MCNC: 0.44 MG/DL (ref 0.6–1.3)
DIFFERENTIAL METHOD BLD: ABNORMAL
EOSINOPHIL # BLD: 0.2 K/UL (ref 0–0.4)
EOSINOPHIL NFR BLD: 4 % (ref 0–5)
ERYTHROCYTE [DISTWIDTH] IN BLOOD BY AUTOMATED COUNT: 18.5 % (ref 11.6–14.5)
GLUCOSE SERPL-MCNC: 88 MG/DL (ref 74–99)
HCT VFR BLD AUTO: 28.5 % (ref 35–45)
HGB BLD-MCNC: 8.7 G/DL (ref 12–16)
IMM GRANULOCYTES # BLD AUTO: 0.1 K/UL (ref 0–0.04)
IMM GRANULOCYTES NFR BLD AUTO: 1 % (ref 0–0.5)
LYMPHOCYTES # BLD: 1.6 K/UL (ref 0.9–3.6)
LYMPHOCYTES NFR BLD: 25 % (ref 21–52)
MAGNESIUM SERPL-MCNC: 1.8 MG/DL (ref 1.6–2.6)
MCH RBC QN AUTO: 21.9 PG (ref 24–34)
MCHC RBC AUTO-ENTMCNC: 30.5 G/DL (ref 31–37)
MCV RBC AUTO: 71.8 FL (ref 78–100)
MONOCYTES # BLD: 0.9 K/UL (ref 0.05–1.2)
MONOCYTES NFR BLD: 14 % (ref 3–10)
NEUTS SEG # BLD: 3.6 K/UL (ref 1.8–8)
NEUTS SEG NFR BLD: 56 % (ref 40–73)
NRBC # BLD: 0 K/UL (ref 0–0.01)
NRBC BLD-RTO: 0 PER 100 WBC
PLATELET # BLD AUTO: 310 K/UL (ref 135–420)
PMV BLD AUTO: 8.5 FL (ref 9.2–11.8)
POTASSIUM SERPL-SCNC: 3.2 MMOL/L (ref 3.5–5.5)
RBC # BLD AUTO: 3.97 M/UL (ref 4.2–5.3)
SODIUM SERPL-SCNC: 137 MMOL/L (ref 136–145)
WBC # BLD AUTO: 6.4 K/UL (ref 4.6–13.2)

## 2022-06-18 PROCEDURE — 65310000000 HC RM PRIVATE REHAB

## 2022-06-18 PROCEDURE — 85025 COMPLETE CBC W/AUTO DIFF WBC: CPT

## 2022-06-18 PROCEDURE — 74011250637 HC RX REV CODE- 250/637: Performed by: EMERGENCY MEDICINE

## 2022-06-18 PROCEDURE — 97535 SELF CARE MNGMENT TRAINING: CPT

## 2022-06-18 PROCEDURE — 97162 PT EVAL MOD COMPLEX 30 MIN: CPT

## 2022-06-18 PROCEDURE — 77030038269 HC DRN EXT URIN PURWCK BARD -A

## 2022-06-18 PROCEDURE — 36415 COLL VENOUS BLD VENIPUNCTURE: CPT

## 2022-06-18 PROCEDURE — 2709999900 HC NON-CHARGEABLE SUPPLY

## 2022-06-18 PROCEDURE — 80048 BASIC METABOLIC PNL TOTAL CA: CPT

## 2022-06-18 PROCEDURE — 74011250637 HC RX REV CODE- 250/637: Performed by: FAMILY MEDICINE

## 2022-06-18 PROCEDURE — 83735 ASSAY OF MAGNESIUM: CPT

## 2022-06-18 PROCEDURE — 97530 THERAPEUTIC ACTIVITIES: CPT

## 2022-06-18 PROCEDURE — 97166 OT EVAL MOD COMPLEX 45 MIN: CPT

## 2022-06-18 PROCEDURE — 97116 GAIT TRAINING THERAPY: CPT

## 2022-06-18 RX ORDER — ONDANSETRON 4 MG/1
4 TABLET, ORALLY DISINTEGRATING ORAL
Status: DISCONTINUED | OUTPATIENT
Start: 2022-06-18 | End: 2022-07-02 | Stop reason: HOSPADM

## 2022-06-18 RX ORDER — POTASSIUM CHLORIDE 20 MEQ/1
40 TABLET, EXTENDED RELEASE ORAL
Status: DISCONTINUED | OUTPATIENT
Start: 2022-06-18 | End: 2022-06-18 | Stop reason: CLARIF

## 2022-06-18 RX ADMIN — Medication 1 TABLET: at 17:24

## 2022-06-18 RX ADMIN — HYDRALAZINE HYDROCHLORIDE 25 MG: 25 TABLET, FILM COATED ORAL at 08:53

## 2022-06-18 RX ADMIN — GABAPENTIN 300 MG: 300 CAPSULE ORAL at 08:53

## 2022-06-18 RX ADMIN — AMLODIPINE BESYLATE 10 MG: 10 TABLET ORAL at 08:53

## 2022-06-18 RX ADMIN — PANTOPRAZOLE SODIUM 40 MG: 40 TABLET, DELAYED RELEASE ORAL at 06:38

## 2022-06-18 RX ADMIN — OXYCODONE HYDROCHLORIDE 10 MG: 5 TABLET ORAL at 08:57

## 2022-06-18 RX ADMIN — GABAPENTIN 300 MG: 300 CAPSULE ORAL at 17:23

## 2022-06-18 RX ADMIN — GABAPENTIN 300 MG: 300 CAPSULE ORAL at 21:19

## 2022-06-18 RX ADMIN — ACETAMINOPHEN 650 MG: 325 TABLET ORAL at 05:41

## 2022-06-18 RX ADMIN — ATORVASTATIN CALCIUM 10 MG: 10 TABLET, FILM COATED ORAL at 08:53

## 2022-06-18 RX ADMIN — OXYCODONE HYDROCHLORIDE 10 MG: 5 TABLET ORAL at 21:19

## 2022-06-18 RX ADMIN — POTASSIUM BICARBONATE 40 MEQ: 782 TABLET, EFFERVESCENT ORAL at 17:23

## 2022-06-18 RX ADMIN — LOSARTAN POTASSIUM 50 MG: 50 TABLET, FILM COATED ORAL at 08:53

## 2022-06-18 RX ADMIN — ACETAMINOPHEN 650 MG: 325 TABLET ORAL at 17:24

## 2022-06-18 RX ADMIN — ACETAMINOPHEN 650 MG: 325 TABLET ORAL at 11:58

## 2022-06-18 RX ADMIN — ONDANSETRON 4 MG: 4 TABLET, ORALLY DISINTEGRATING ORAL at 12:09

## 2022-06-18 RX ADMIN — HYDRALAZINE HYDROCHLORIDE 25 MG: 25 TABLET, FILM COATED ORAL at 17:23

## 2022-06-18 RX ADMIN — DOCUSATE SODIUM 100 MG: 100 CAPSULE, LIQUID FILLED ORAL at 08:52

## 2022-06-18 RX ADMIN — DOCUSATE SODIUM 100 MG: 100 CAPSULE, LIQUID FILLED ORAL at 17:24

## 2022-06-18 RX ADMIN — Medication 1 TABLET: at 09:00

## 2022-06-18 RX ADMIN — HYDRALAZINE HYDROCHLORIDE 25 MG: 25 TABLET, FILM COATED ORAL at 21:19

## 2022-06-18 RX ADMIN — NORTRIPTYLINE HYDROCHLORIDE 40 MG: 10 CAPSULE ORAL at 21:18

## 2022-06-18 NOTE — PROGRESS NOTES
Problem: Mobility Impaired (Adult and Pediatric)  Goal: *Acute Goals and Plan of Care (Insert Text)  Description: Physical Therapy Short Term Goals  Initiated 6/18/2022 and to be accomplished within 7 day(s)- 6/25/2022  1. Patient will move from supine to sit and sit to supine  in bed with contact guard assist.    2.  Patient will transfer from bed to chair and chair to bed with contact guard assist using the least restrictive device. 3.  Patient will perform sit to stand with supervision/SBA. 4.  Patient will ambulate with contact guard assist for 100 feet with the least restrictive device. 5.  Patient will ascend/descend 4 stairs with 2 handrail(s) with minimal assistance/contact guard assist.    Physical Therapy Long Term Goals  Initiated 6/18/2022 and to be accomplished within 21 day(s)  1. Patient will move from supine to sit and sit to supine  in bed with modified independence. 2.  Patient will transfer from bed to chair and chair to bed with modified independence using the least restrictive device. 3.  Patient will perform sit to stand with modified independence. 4.  Patient will ambulate with modified independence for 150 feet with the least restrictive device. 5.  Patient will ascend/descend 12 stairs with 1 handrail(s) with minimal assistance/contact guard assist.     Outcome: Progressing Towards Goal   PHYSICAL THERAPY EVALUATION    Patient: Joseph Reeves (1 y.o. female)  Date: 6/18/2022  Diagnosis: Compression fracture of body of thoracic vertebra [facility] [S22.000A] <principal problem not specified>  Precautions:    Chart, physical therapy assessment, plan of care and goals were reviewed. Time in:1100  Time out:1245    Patient seen for: Balance activities;Gait training;Patient education;Transfer training; Wheelchair mobility    Pain:  Pt pain was reported as 5 pre-treatment. Pt pain was reported as 5 post-treatment.   Intervention: nursing gave medication, rest as needed     Patient identified with name and :yes    SUBJECTIVE:     Patient stated I can't do any of this.     Patient's Goal for Physical Therapy:to be able to move again    OBJECTIVE DATA SUMMARY:     Past Medical History:   Diagnosis Date    Acid reflux     Hypertension     Spinal stenosis      Past Surgical History:   Procedure Laterality Date    HX BACK SURGERY         Problem List:    Decreased strength B LE  [x]     Decreased strength trunk/core  [x]     Decreased AROM   []     Decreased PROM  []    Decreased endurance  [x]     Decreased balance sitting  [x]     Decreased balance standing  [x]     Pain   [x]     Slow ambulation velocity  [x]    Decreased coordination  [x]    Decreased safety awareness  []      Functional Limitations:   Decreased independence with bed mobility  [x]     Decreased independence with functional transfers  [x]     Decreased independence with ambulation  [x]     Decreased independence with stair negotiation  [x]       Previous Functional Level: independent with mobility without and assistive device, 2 story home with bedroom on second floor    Home Environment: Home Environment: Private residence  # Steps to Enter: 3  One/Two Story Residence: Two story  Interior Rails: None  Lift Chair Available: No  Living Alone: No  Support Systems: Spouse/Significant Other  Patient Expects to be Discharged to[de-identified] Home  Current DME Used/Available at Home: Cane, straight,Commode, bedside,Walker, rolling  Tub or Shower Type: Tub/Shower combination    Barriers to Learning/Limitations: yes;  other anxiety  Compensate with: visual, verbal, tactile, kinesthetic cues/model         Outcome Measures:      MMT Initial Assessment   Right Lower Extremity Left Lower Extremity   Hip Flexion       Knee Extension       Knee Flexion       Ankle Dorsiflexion       0/5 No palpable muscle contraction  1/5 Palpable muscle contraction, no joint movement  2-/5 Less than full range of motion in gravity eliminated position  2/5 Able to complete full range of motion in gravity eliminated position  2+/5 Able to initiate movement against gravity  3-/5 More than half but not full range of motion against gravity  3/5 Able to complete full range of motion against gravity  3+/5 Completes full range of motion against gravity with minimal resistance  4-/5 Completes full range of motion against gravity with minimal-moderate resistance  4/5 Completes full range of motion against gravity with moderate resistance  4+/5 Completes full range of motion against gravity with moderate-maximum resistance  5/5 Completes full range of motion against gravity with maximum resistance        GROSS ASSESSMENT Initial Assessment 6/18/2022   AROM Within functional limits   Strength Generally decreased, functional   Coordination Within functional limits   Tone Normal   Sensation Intact   PROM WNL        POSTURE Initial Assessment 6/18/2022   Posture (WDL)     Posture Assessment    kyphosis       BALANCE Initial Assessment 6/18/2022    Sitting - Static: Good (unsupported)  Sitting - Dynamic: Fair (occasional)  Standing - Static: Fair  Standing - Dynamic : Impaired       BED/CHAIR/WHEELCHAIR TRANSFERS Initial Assessment 6/18/2022   Rolling Right 5 (Stand-by assistance)   Rolling Left 5 (Stand-by assistance)   Supine to Sit 4 (Minimal assistance)   Sit to Stand Minimal assistance   Sit to Supine 3 (Moderate assistance)   Transfer Assist Score 4 (Minimal assistance)   Transfer Type Stand step with RW    Comments    Pt required increased time and frequent cues for sequencing and reassurance that she was safe   Car Transfer Not tested   Car Type         WHEELCHAIR MOBILITY/MANAGEMENT Initial Assessment 6/18/2022   Able to Propel 60 feet   Assist Level 4   Curbs/ramps assistance required 0 (Not tested)   Wheelchair set up assistance required 2 (Maximal assistance)   Wheelchair management     Comments Assistance with maneuvering around obstacles       GAIT Initial Assessment 6/18/2022 Gait Description (WDL)     Gait Abnormalities Decreased step clearance, short shuffling steps       WALKING INDEPENDENCE Initial Assessment 6/18/2022   Assistive device Walker, rolling   Ambulation assistance - level surface 4 (Minimal assistance)   Distance 25 Feet (ft)   Comments  Pt with short shuffling steps   Ambulation assistance - unlevel surface NT        STEPS/STAIRS Initial Assessment 6/18/2022   Steps/Stairs ambulated 0   Rail Use     Assistance Level 0 (Not tested)   Comments     Curbs/Ramps         Therapeutic Exercises: Ankle [email or web address]          ASSESSMENT :  Based on the objective data described above, the patient presents with decreased strength, balance and activity tolerance and increased pain and anxiety resulting in decreased independence with functional mobility. Pt required constant cues for relaxation and reassurance that she could move safely with assistance. Pt required increased time with all transfers and verbal cues for sequencing. Patient will benefit from skilled intervention to address the above impairments. Patient's rehabilitation potential is considered to be Good  Factors which may influence rehabilitation potential include:   []         None noted  []         Mental ability/status  [x]         Medical condition  []         Home/family situation and support systems  []         Safety awareness  [x]         Pain tolerance/management  []         Other:      PLAN :      Order received from MD for physical therapy services and chart reviewed. Pt to be seen 5 times per week for 3 hours of total therapy per day for 3 weeks. Thank you for the referral.    Pt would benefit from skilled physical therapy in order to improve independent functional mobility within the home.  Interventions may include range of motion (AROM, PROM B LE/trunk), motor function (B LE/trunk strengthening/coordination), activity tolerance (vitals, oxygen saturation levels), bed mobility training, balance activities, gait training (progressive ambulation program), wheelchair management and functional transfer training. Patient would also benefit from concurrent and group therapy sessions to promote increased participation in skilled therapy interventions and to provide opportunities for increased social interaction. Discharge Recommendations: Home Physical Therapy  Further Equipment Recommendations for Discharge: rolling walker        Activity Tolerance:   Poor+ due to pain  Please refer to the flowsheet for vital signs taken during this treatment. After treatment:   [] Patient left in no apparent distress in bed  [x] Patient left in no apparent distress sitting up in chair  [] Patient left in no apparent distress in w/c mobilizing under own power  [] Patient left in no apparent distress dining area  [] Patient left in no apparent distress mobilizing under own power  [x] Call bell left within reach  [x] Nursing notified  [x] Caregiver present  [] Bed alarm activated   [x] Chair alarm activated. COMMUNICATION/EDUCATION:   [x]         Fall prevention education was provided and the patient/caregiver indicated understanding. [x]         Patient/family have participated as able in goal setting and plan of care. [x]         Patient/family agree to work toward stated goals and plan of care. []         Patient understands intent and goals of therapy, but is neutral about his/her participation. []         Patient is unable to participate in goal setting and plan of care.     Thank you for this referral.  Patria Tee, PT  6/18/2022

## 2022-06-18 NOTE — PROGRESS NOTES
RN called at 9.24 pm to my cellphone stating that patient had arrived from inpatient [facility]. I have placed orders for inpatient rehab including pain medications for 4 days( as recommended by hospitalist discharge summary ).

## 2022-06-18 NOTE — PROGRESS NOTES
Problem: Patient Education: Go to Patient Education Activity  Goal: Patient/Family Education  6/18/2022 1120 by Sydnee Cline RN  Outcome: Progressing Towards Goal  6/18/2022 1119 by Sydnee Cline RN  Outcome: Progressing Towards Goal     Problem: Pain  Goal: *Control of Pain  6/18/2022 1120 by Sydnee Cline RN  Outcome: Progressing Towards Goal  6/18/2022 1119 by Sydnee Cline RN  Outcome: Progressing Towards Goal  Goal: *PALLIATIVE CARE:  Alleviation of Pain  6/18/2022 1120 by Sydnee Cline RN  Outcome: Progressing Towards Goal  6/18/2022 1119 by Sydnee Cline RN  Outcome: Progressing Towards Goal     Problem: Patient Education: Go to Patient Education Activity  Goal: Patient/Family Education  6/18/2022 1120 by Sydnee Cline RN  Outcome: Progressing Towards Goal  6/18/2022 1119 by Sydnee Cline RN  Outcome: Progressing Towards Goal     Problem: Falls - Risk of  Goal: *Absence of Falls  Description: Document Bard  Fall Risk and appropriate interventions in the flowsheet.   6/18/2022 1120 by Sydnee Cline RN  Outcome: Progressing Towards Goal  Note: Fall Risk Interventions:  Mobility Interventions: Bed/chair exit alarm,Patient to call before getting OOB    Mentation Interventions: Bed/chair exit alarm,Evaluate medications/consider consulting pharmacy    Medication Interventions: Bed/chair exit alarm,Evaluate medications/consider consulting pharmacy    Elimination Interventions: Bed/chair exit alarm,Call light in reach,Patient to call for help with toileting needs    History of Falls Interventions: Bed/chair exit alarm,Evaluate medications/consider consulting pharmacy      6/18/2022 1119 by Sydnee Cline RN  Outcome: Progressing Towards Goal  Note: Fall Risk Interventions:  Mobility Interventions: Bed/chair exit alarm,Patient to call before getting OOB    Mentation Interventions: Bed/chair exit alarm,Evaluate medications/consider consulting pharmacy    Medication Interventions: Bed/chair exit alarm,Evaluate medications/consider consulting pharmacy    Elimination Interventions: Bed/chair exit alarm,Call light in reach,Patient to call for help with toileting needs    History of Falls Interventions: Bed/chair exit alarm,Evaluate medications/consider consulting pharmacy         Problem: Patient Education: Go to Patient Education Activity  Goal: Patient/Family Education  6/18/2022 1120 by Miguelito Whelan RN  Outcome: Progressing Towards Goal  6/18/2022 1119 by Miguelito Whelan RN  Outcome: Progressing Towards Goal     Problem: Impaired Skin Integrity/Pressure Injury Treatment  Goal: *Improvement of Existing Pressure Injury  6/18/2022 1120 by Miguelito Whelan RN  Outcome: Progressing Towards Goal  6/18/2022 1119 by Miguelito Whelan RN  Outcome: Progressing Towards Goal  Goal: *Prevention of pressure injury  Description: Document Valeriy Scale and appropriate interventions in the flowsheet.   6/18/2022 1120 by Miguelito Whelan RN  Outcome: Progressing Towards Goal  6/18/2022 1119 by Miguelito Whelan RN  Outcome: Progressing Towards Goal  Note: Pressure Injury Interventions:  Sensory Interventions: Assess changes in LOC    Moisture Interventions: Absorbent underpads    Activity Interventions: Chair cushion,Increase time out of bed,Pressure redistribution bed/mattress(bed type)    Mobility Interventions: Chair cushion,Pressure redistribution bed/mattress (bed type)    Nutrition Interventions: Document food/fluid/supplement intake    Friction and Shear Interventions: HOB 30 degrees or less                Problem: Patient Education: Go to Patient Education Activity  Goal: Patient/Family Education  6/18/2022 1120 by Miguelito Whelan RN  Outcome: Progressing Towards Goal  6/18/2022 1119 by Miguelito Whelan RN  Outcome: Progressing Towards Goal     Problem: Anxiety  Goal: *Alleviation of anxiety  6/18/2022 1120 by Miguelito Whelan RN  Outcome: Progressing Towards Goal  6/18/2022 1119 by Giovanna Grigsby RN  Outcome: Progressing Towards Goal  Goal: *Alleviation of anxiety (Palliative Care)  6/18/2022 1120 by Giovanna Grigsby RN  Outcome: Progressing Towards Goal  6/18/2022 1119 by Giovanna Grigsby RN  Outcome: Progressing Towards Goal     Problem: Patient Education: Go to Patient Education Activity  Goal: Patient/Family Education  6/18/2022 1120 by Giovanna Grigsby, RN  Outcome: Progressing Towards Goal  6/18/2022 1119 by Giovanna Grigsby RN  Outcome: Progressing Towards Goal     Problem: Patient Education: Go to Patient Education Activity  Goal: Patient/Family Education  6/18/2022 1120 by Giovanna Grigsby RN  Outcome: Progressing Towards Goal  6/18/2022 1119 by Giovanna Grigsby RN  Outcome: Progressing Towards Goal     Problem: Hypertension  Goal: *Blood pressure within specified parameters  6/18/2022 1120 by Giovanna Grigsby RN  Outcome: Progressing Towards Goal  6/18/2022 1119 by Giovanna Grigsby RN  Outcome: Progressing Towards Goal  Goal: *Fluid volume balance  6/18/2022 1120 by Giovanna Grigsby RN  Outcome: Progressing Towards Goal  6/18/2022 1119 by Giovanna Grigsby RN  Outcome: Progressing Towards Goal  Goal: *Labs within defined limits  6/18/2022 1120 by Giovanna Grigsby RN  Outcome: Progressing Towards Goal  6/18/2022 1119 by Giovanna Grigsby RN  Outcome: Progressing Towards Goal     Problem: Patient Education: Go to Patient Education Activity  Goal: Patient/Family Education  6/18/2022 1120 by Giovanna Grigsby RN  Outcome: Progressing Towards Goal  6/18/2022 1119 by Giovanna Grigsby RN  Outcome: Progressing Towards Goal

## 2022-06-18 NOTE — PROGRESS NOTES
Fish oil was ordered but there is no interchange/substitution while in-house; it is non-formulary. Patient may supply their own.     Sadie Huertas, BettyD, BCPS
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
## 2022-06-18 NOTE — REHAB NOTE
Nadya Rios is a 68 y.o.  female admitted on 6/17/2022 from [facility] [place]. Report received from [facility], [address] [place] [address] to [facility]. Transportation was provided by bed, and the patient was transferred to her room via bed. Patient was assisted to bed with assist of 2. The patient was oriented to her room. Fall risk precautions were discussed with the patient; she was instructed to call for help prior to getting up. The following fall risk precautions were initiated: bed/ chair alarms, door signage, yellow bracelet and socks as well as completion of the [place] Flow tool in the patient's room. Four eyes nurse skin assessment was performed by Me  And TD GATES  .  Skin problems noted: YES    Holden Underwood RN

## 2022-06-18 NOTE — REHAB NOTE
[facility]    Bedside and Verbal shift change report given to Faith Gardner (oncoming nurse) by Stevenson Cespedes (offgoing nurse). Report included the following information SBAR, Kardex, MAR and Recent Results.     Situation:   Code Status: Full Code   Reason for Admission: Spinal Stenosis  Hospital Day: 1   Problem List:   Hospital Problems  Date Reviewed: 5/24/2022          Codes Class Noted POA    Compression fracture of body of thoracic vertebra [facility]) ICD-10-CM: S22.000A  ICD-9-CM: 805.2  6/17/2022 Unknown              Background:   Past Medical History:   Past Medical History:   Diagnosis Date    Acid reflux     Hypertension     Spinal stenosis       Patient taking anticoagulants no  Patient has a defibrillator: no     Assessment:   Changes in Assessment throughout shift: none     Patient has central line: no Reasons if yes: no  Insertion date:na Last dressing date:na   Patient has Nance Cath: no Reasons if yes: na   Insertion date:na     Last Vitals:     Vitals:    06/17/22 2000 06/18/22 0749   BP: 134/82 (!) 145/76   Pulse: 62 (!) 110   Resp: 18 18   Temp: 98.1 °F (36.7 °C) 98.2 °F (36.8 °C)   SpO2: 94% 96%   Weight: 71 kg (156 lb 9.6 oz)    Height: 5' 5\" (1.651 m)         PAIN    Pain Assessment    Pain Intensity 1: 0 (06/18/22 0411) Pain Intensity 1: 2 (12/29/14 1105)    Pain Location 1: Back Pain Location 1: Abdomen    Pain Intervention(s) 1: Emotional support,Medication (see MAR) Pain Intervention(s) 1: Medication (see MAR)  Patient Stated Pain Goal: 0 Patient Stated Pain Goal: 0  o Intervention effective: yes   o Other actions taken for pain: yes     Skin Assessment  Skin color    Condition/Temperature    Integrity    Turgor    Weekly Pressure Ulcer Documentation  Pressure  Injury Documentation: No Pressure Injury Noted-Pressure Ulcer Prevention Initiated  Wound Prevention & Protection Methods  Orientation of wound Orientation of Wound Prevention: Posterior  Location of Prevention Location of Wound Prevention: Heel,Buttocks,Sacrum/Coccyx  Dressing Present Dressing Present : No  Dressing Status    Wound Offloading Wound Offloading (Prevention Methods): Bed, pressure reduction mattress,Elevate heels,Foam silicone,Pillows,Repositioning,Turning     INTAKE/OUPUT  Date 06/17/22 0700 - 06/18/22 0659 06/18/22 0700 - 06/19/22 0659   Shift 0700-1859 1900-0659 24 Hour Total 0700-1859 1900-0659 24 Hour Total   INTAKE   Shift Total(mL/kg)         OUTPUT   Urine           Urine Occurrence(s)  1 x 1 x 0 x  0 x   Emesis/NG output           Emesis Occurrence(s)  0 x 0 x      Stool           Stool Occurrence(s)  0 x 0 x 0 x  0 x   Shift Total(mL/kg)         NET         Weight (kg)  71 71 71 71 71       Recommendations:  1. Patient needs and requests: none @ present    2. Diet: REGULAR    3. Pending tests/procedures: labs     4. Functional Level/Equipment: Bedrest @ present  5. Estimated Discharge Date: none yet Posted on Whiteboard in Patients Room: no none yet    HEALS Safety Check    A safety check occurred in the patient's room between off going nurse and oncoming nurse listed above. The safety check included the below items  Area Items   H  High Alert Medications - Verify all high alert medication drips (heparin, PCA, etc.)   E  Equipment - Suction is set up for ALL patients (with yanker)  - Red plugs utilized for all equipment (IV pumps, etc.)  - WOWs wiped down at end of shift.  - Room stocked with oxygen, suction, and other unit-specific supplies   A  Alarms - Bed alarm is set for fall risk patients  - Ensure chair alarm is in place and activated if patient is up in a chair   L  Lines - Check IV for any infiltration  - Nance bag is empty if patient has a Nance   - Tubing and IV bags are labeled   S  Safety   - Room is clean, patient is clean, and equipment is clean. - Hallways are clear from equipment besides carts.    - Fall bracelet on for fall risk patients  - Ensure room is clear and free of clutter  - Suction is set up for ALL patients (with catalina)  - Hallways are clear from equipment besides carts.    - Isolation precautions followed, supplies available outside room, sign posted   changeshift

## 2022-06-18 NOTE — ACP (ADVANCE CARE PLANNING)
Advance Care Planning   Advance Care Planning Inpatient Note  [address] [facility] Department    Today's Date: 6/18/2022  Unit: [facility]  RE[facility]AB [facility]    Received request from rounding. Upon review of chart and communication with care team, patient's decision making abilities are not in question. Patient was/were present in the room during visit. Goals of ACP Conversation:  Discuss Advance Care planning documents    Health Care Decision Makers:    No healthcare decision makers have been documented. Click here to complete [employer] including selection of the Healthcare Decision Maker Relationship (ie \"Primary\")    Summary:  No Decision Maker named by patient at this time    Advance Care Planning Documents (Patient Wishes) on file:  None     Assessment:    The  provided the following Interventions:  Initiated a relationship of care and support with Patient and any Family present.  and patient discussed and explored issues of nika, belief, spirituality, and Jehovah's witness/ritual needs while hospitalized.  listened empathically while providing chaplaincy education and information about [id number] and Advance Medical Directives. Offered prayer and assurance of continued prayers on patient's behalf. Patients Chart reviewed. The following outcomes where achieved:  Patient processed feeling about current hospitalization. Gained understanding of Advance Care Planning. Patient and/or Family expressed gratitude for 's visit. Assessment/Plan:  Patient does not have any Jehovah's witness/cultural needs that will affect patients preferences in health care. There are no spiritual or Jehovah's witness issues which require intervention currently. Chaplains will continue to follow and will provide pastoral care on an as needed/requested basis.    recommends bedside caregivers page  on duty if patient shows signs of acute spiritual or emotional distress.       Interventions:  Provided education on documents for clarity and greater understanding  Deferred conversation as patient not interested in completing an advance directive at this time    Care Preferences Communicated:  No    Outcomes/Plan:  ACP Discussion Postponed    Chaplain Reagan on 6/18/2022 at 1:40 PM

## 2022-06-18 NOTE — REHAB NOTE
[facility]    Bedside and Verbal shift change report given to Tristin Stafford LPN (oncoming nurse) by Narcisa Ponce RN   (offgoing nurse). Report included the following information SBAR, Kardex, MAR and Recent Results.     Situation:   Code Status: Full Code   Reason for Admission: Spinal Stenosis  Hospital Day: 1   Problem List:   Hospital Problems  Date Reviewed: 5/24/2022          Codes Class Noted POA    Compression fracture of body of thoracic vertebra [facility]) ICD-10-CM: S22.000A  ICD-9-CM: 805.2  6/17/2022 Unknown              Background:   Past Medical History:   Past Medical History:   Diagnosis Date    Acid reflux     Hypertension     Spinal stenosis       Patient taking anticoagulants no  Patient has a defibrillator: no     Assessment:   Changes in Assessment throughout shift: none     Patient has central line: no Reasons if yes: no  Insertion date:na Last dressing date:na   Patient has Nance Cath: no Reasons if yes: na   Insertion date:na     Last Vitals:     Vitals:    06/17/22 2000 06/18/22 0749 06/18/22 1600   BP: 134/82 (!) 145/76 (!) 142/77   Pulse: 62 (!) 110 (!) 105   Resp: 18 18 18   Temp: 98.1 °F (36.7 °C) 98.2 °F (36.8 °C) 97.1 °F (36.2 °C)   SpO2: 94% 96% 97%   Weight: 71 kg (156 lb 9.6 oz)     Height: 5' 5\" (1.651 m)          PAIN    Pain Assessment    Pain Intensity 1: 0 (06/18/22 1630) Pain Intensity 1: 2 (12/29/14 1105)    Pain Location 1: Back Pain Location 1: Abdomen    Pain Intervention(s) 1: Medication (see MAR) Pain Intervention(s) 1: Medication (see MAR)  Patient Stated Pain Goal: 0 Patient Stated Pain Goal: 0  o Intervention effective: yes   o Other actions taken for pain: yes     Skin Assessment  Skin color    Condition/Temperature    Integrity    Turgor    Weekly Pressure Ulcer Documentation  Pressure  Injury Documentation: No Pressure Injury Noted-Pressure Ulcer Prevention Initiated  Wound Prevention & Protection Methods  Orientation of wound Orientation of Wound Prevention: Posterior  Location of Prevention Location of Wound Prevention: Sacrum/Coccyx  Dressing Present Dressing Present : No  Dressing Status Dressing Status: Intact  Wound Offloading Wound Offloading (Prevention Methods): Bed, pressure reduction mattress     INTAKE/OUPUT  Date 06/17/22 0700 - 06/18/22 0659 06/18/22 0700 - 06/19/22 0659   Shift 7362-0810 5381-1531 24 Hour Total 0700-1859 1900-0659 24 Hour Total   INTAKE   Shift Total(mL/kg)         OUTPUT   Urine           Urine Occurrence(s)  1 x 1 x 4 x  4 x   Emesis/NG output           Emesis Occurrence(s)  0 x 0 x      Stool           Stool Occurrence(s)  0 x 0 x 0 x  0 x   Shift Total(mL/kg)         NET         Weight (kg)  71 71 71 71 71       Recommendations:  1. Patient needs and requests: none @ present    2. Diet: REGULAR    3. Pending tests/procedures: labs     4. Functional Level/Equipment: Bedrest @ present  5. Estimated Discharge Date: none yet Posted on Whiteboard in Patients Room: no none yet    HEALS Safety Check    A safety check occurred in the patient's room between off going nurse and oncoming nurse listed above. The safety check included the below items  Area Items   H  High Alert Medications - Verify all high alert medication drips (heparin, PCA, etc.)   E  Equipment - Suction is set up for ALL patients (with yanker)  - Red plugs utilized for all equipment (IV pumps, etc.)  - WOWs wiped down at end of shift.  - Room stocked with oxygen, suction, and other unit-specific supplies   A  Alarms - Bed alarm is set for fall risk patients  - Ensure chair alarm is in place and activated if patient is up in a chair   L  Lines - Check IV for any infiltration  - Nance bag is empty if patient has a Nance   - Tubing and IV bags are labeled   S  Safety   - Room is clean, patient is clean, and equipment is clean. - Hallways are clear from equipment besides carts.    - Fall bracelet on for fall risk patients  - Ensure room is clear and free of clutter  - Suction is set up for ALL patients (with catalina)  - Hallways are clear from equipment besides carts.    - Isolation precautions followed, supplies available outside room, sign posted   changeshift

## 2022-06-19 LAB
ALBUMIN SERPL-MCNC: 3.1 G/DL (ref 3.4–5)
ALBUMIN/GLOB SERPL: 0.9 {RATIO} (ref 0.8–1.7)
ALP SERPL-CCNC: 378 U/L (ref 45–117)
ALT SERPL-CCNC: 20 U/L (ref 13–56)
ANION GAP SERPL CALC-SCNC: 11 MMOL/L (ref 3–18)
AST SERPL-CCNC: 23 U/L (ref 10–38)
BASOPHILS # BLD: 0.1 K/UL (ref 0–0.1)
BASOPHILS NFR BLD: 1 % (ref 0–2)
BILIRUB SERPL-MCNC: 0.6 MG/DL (ref 0.2–1)
BUN SERPL-MCNC: 15 MG/DL (ref 7–18)
BUN/CREAT SERPL: 39 (ref 12–20)
CALCIUM SERPL-MCNC: 9.4 MG/DL (ref 8.5–10.1)
CHLORIDE SERPL-SCNC: 103 MMOL/L (ref 100–111)
CO2 SERPL-SCNC: 24 MMOL/L (ref 21–32)
CREAT SERPL-MCNC: 0.38 MG/DL (ref 0.6–1.3)
DIFFERENTIAL METHOD BLD: ABNORMAL
EOSINOPHIL # BLD: 0.2 K/UL (ref 0–0.4)
EOSINOPHIL NFR BLD: 2 % (ref 0–5)
ERYTHROCYTE [DISTWIDTH] IN BLOOD BY AUTOMATED COUNT: 19.3 % (ref 11.6–14.5)
FOLATE SERPL-MCNC: 16.7 NG/ML (ref 3.1–17.5)
GLOBULIN SER CALC-MCNC: 3.4 G/DL (ref 2–4)
GLUCOSE SERPL-MCNC: 94 MG/DL (ref 74–99)
HCT VFR BLD AUTO: 31.8 % (ref 35–45)
HGB BLD-MCNC: 9.6 G/DL (ref 12–16)
IMM GRANULOCYTES # BLD AUTO: 0.1 K/UL (ref 0–0.04)
IMM GRANULOCYTES NFR BLD AUTO: 1 % (ref 0–0.5)
IRON SATN MFR SERPL: 6 % (ref 20–50)
IRON SERPL-MCNC: 16 UG/DL (ref 50–175)
LYMPHOCYTES # BLD: 1.7 K/UL (ref 0.9–3.6)
LYMPHOCYTES NFR BLD: 25 % (ref 21–52)
MAGNESIUM SERPL-MCNC: 1.6 MG/DL (ref 1.6–2.6)
MCH RBC QN AUTO: 21.7 PG (ref 24–34)
MCHC RBC AUTO-ENTMCNC: 30.2 G/DL (ref 31–37)
MCV RBC AUTO: 71.8 FL (ref 78–100)
MONOCYTES # BLD: 1 K/UL (ref 0.05–1.2)
MONOCYTES NFR BLD: 14 % (ref 3–10)
NEUTS SEG # BLD: 3.9 K/UL (ref 1.8–8)
NEUTS SEG NFR BLD: 57 % (ref 40–73)
NRBC # BLD: 0 K/UL (ref 0–0.01)
NRBC BLD-RTO: 0 PER 100 WBC
PLATELET # BLD AUTO: 349 K/UL (ref 135–420)
PMV BLD AUTO: 8.3 FL (ref 9.2–11.8)
POTASSIUM SERPL-SCNC: 3.5 MMOL/L (ref 3.5–5.5)
PROT SERPL-MCNC: 6.5 G/DL (ref 6.4–8.2)
RBC # BLD AUTO: 4.43 M/UL (ref 4.2–5.3)
SODIUM SERPL-SCNC: 138 MMOL/L (ref 136–145)
TIBC SERPL-MCNC: 285 UG/DL (ref 250–450)
VIT B12 SERPL-MCNC: >2000 PG/ML (ref 211–911)
WBC # BLD AUTO: 6.9 K/UL (ref 4.6–13.2)

## 2022-06-19 PROCEDURE — 83735 ASSAY OF MAGNESIUM: CPT

## 2022-06-19 PROCEDURE — 82607 VITAMIN B-12: CPT

## 2022-06-19 PROCEDURE — 36415 COLL VENOUS BLD VENIPUNCTURE: CPT

## 2022-06-19 PROCEDURE — 80053 COMPREHEN METABOLIC PANEL: CPT

## 2022-06-19 PROCEDURE — 74011250637 HC RX REV CODE- 250/637: Performed by: FAMILY MEDICINE

## 2022-06-19 PROCEDURE — 74011250637 HC RX REV CODE- 250/637: Performed by: EMERGENCY MEDICINE

## 2022-06-19 PROCEDURE — 97535 SELF CARE MNGMENT TRAINING: CPT

## 2022-06-19 PROCEDURE — 97530 THERAPEUTIC ACTIVITIES: CPT

## 2022-06-19 PROCEDURE — 65310000000 HC RM PRIVATE REHAB

## 2022-06-19 PROCEDURE — 83550 IRON BINDING TEST: CPT

## 2022-06-19 PROCEDURE — 85025 COMPLETE CBC W/AUTO DIFF WBC: CPT

## 2022-06-19 RX ORDER — LORAZEPAM 0.5 MG/1
0.5 TABLET ORAL
Status: DISCONTINUED | OUTPATIENT
Start: 2022-06-19 | End: 2022-07-02 | Stop reason: HOSPADM

## 2022-06-19 RX ADMIN — DOCUSATE SODIUM 100 MG: 100 CAPSULE, LIQUID FILLED ORAL at 17:02

## 2022-06-19 RX ADMIN — HYDRALAZINE HYDROCHLORIDE 25 MG: 25 TABLET, FILM COATED ORAL at 16:26

## 2022-06-19 RX ADMIN — ACETAMINOPHEN 650 MG: 325 TABLET ORAL at 01:02

## 2022-06-19 RX ADMIN — ATORVASTATIN CALCIUM 10 MG: 10 TABLET, FILM COATED ORAL at 08:01

## 2022-06-19 RX ADMIN — HYDRALAZINE HYDROCHLORIDE 25 MG: 25 TABLET, FILM COATED ORAL at 21:06

## 2022-06-19 RX ADMIN — ACETAMINOPHEN 650 MG: 325 TABLET ORAL at 11:40

## 2022-06-19 RX ADMIN — DOCUSATE SODIUM 100 MG: 100 CAPSULE, LIQUID FILLED ORAL at 08:01

## 2022-06-19 RX ADMIN — LOSARTAN POTASSIUM 50 MG: 50 TABLET, FILM COATED ORAL at 08:01

## 2022-06-19 RX ADMIN — GABAPENTIN 300 MG: 300 CAPSULE ORAL at 16:32

## 2022-06-19 RX ADMIN — Medication 1 TABLET: at 08:01

## 2022-06-19 RX ADMIN — Medication 1 TABLET: at 16:26

## 2022-06-19 RX ADMIN — LORAZEPAM 0.5 MG: 0.5 TABLET ORAL at 21:06

## 2022-06-19 RX ADMIN — ACETAMINOPHEN 650 MG: 325 TABLET ORAL at 17:02

## 2022-06-19 RX ADMIN — ONDANSETRON 4 MG: 4 TABLET, ORALLY DISINTEGRATING ORAL at 09:42

## 2022-06-19 RX ADMIN — PANTOPRAZOLE SODIUM 40 MG: 40 TABLET, DELAYED RELEASE ORAL at 06:31

## 2022-06-19 RX ADMIN — NORTRIPTYLINE HYDROCHLORIDE 40 MG: 10 CAPSULE ORAL at 21:06

## 2022-06-19 RX ADMIN — AMLODIPINE BESYLATE 10 MG: 10 TABLET ORAL at 08:01

## 2022-06-19 RX ADMIN — HYDRALAZINE HYDROCHLORIDE 25 MG: 25 TABLET, FILM COATED ORAL at 08:01

## 2022-06-19 RX ADMIN — ACETAMINOPHEN 650 MG: 325 TABLET ORAL at 05:29

## 2022-06-19 RX ADMIN — GABAPENTIN 300 MG: 300 CAPSULE ORAL at 21:06

## 2022-06-19 RX ADMIN — GABAPENTIN 300 MG: 300 CAPSULE ORAL at 08:01

## 2022-06-19 NOTE — REHAB NOTE
SHIFT CHANGE NOTE FOR [facility]    Bedside and Verbal shift change report given to Singh Alas LPN (oncoming nurse) by Arianna Rose RN   (offgoing nurse). Report included the following information SBAR, Kardex, MAR and Recent Results.     Situation:   Code Status: Full Code   Reason for Admission: Spinal Stenosis  Hospital Day: 2   Problem List:   Hospital Problems  Date Reviewed: 5/24/2022          Codes Class Noted POA    Compression fracture of body of thoracic vertebra [facility]) ICD-10-CM: S22.000A  ICD-9-CM: 805.2  6/17/2022 Unknown              Background:   Past Medical History:   Past Medical History:   Diagnosis Date    Acid reflux     Hypertension     Spinal stenosis       Patient taking anticoagulants no  Patient has a defibrillator: no     Assessment:   Changes in Assessment throughout shift: none     Patient has central line: no Reasons if yes: no  Insertion date:na Last dressing date:na   Patient has Nance Cath: no Reasons if yes: na   Insertion date:na     Last Vitals:     Vitals:    06/18/22 1600 06/18/22 2035 06/19/22 0723 06/19/22 1523   BP: (!) 142/77 138/74 137/71 135/81   Pulse: (!) 105 (!) 114 (!) 102 (!) 103   Resp: 18 18 17 19   Temp: 97.1 °F (36.2 °C) 98.3 °F (36.8 °C) 97.1 °F (36.2 °C) 97.8 °F (36.6 °C)   SpO2: 97% 95% 97% 96%   Weight:       Height:            PAIN    Pain Assessment    Pain Intensity 1: 0 (06/19/22 1600) Pain Intensity 1: 2 (12/29/14 1105)    Pain Location 1: Back Pain Location 1: Abdomen    Pain Intervention(s) 1: Medication (see MAR) Pain Intervention(s) 1: Medication (see MAR)  Patient Stated Pain Goal: 0 Patient Stated Pain Goal: 0  o Intervention effective: yes   o Other actions taken for pain: yes     Skin Assessment  Skin color    Condition/Temperature    Integrity    Turgor    Weekly Pressure Ulcer Documentation  Pressure  Injury Documentation: No Pressure Injury Noted-Pressure Ulcer Prevention Initiated  Wound Prevention & Protection Methods  Orientation of wound Orientation of Wound Prevention: Posterior  Location of Prevention Location of Wound Prevention: Sacrum/Coccyx  Dressing Present Dressing Present : No  Dressing Status Dressing Status: Intact  Wound Offloading Wound Offloading (Prevention Methods): Bed, pressure reduction mattress     INTAKE/OUPUT  Date 06/18/22 1900 - 06/19/22 0659 06/19/22 0700 - 06/20/22 0659   Shift 4186-0589 24 Hour Total 2275-2918 8225-6091 24 Hour Total   INTAKE   P.O.   480  480     P. O.   480  480   Shift Total(mL/kg)   480(6.8)  480(6.8)   OUTPUT   Urine(mL/kg/hr)          Urine Occurrence(s) 3 x 7 x 4 x  4 x   Stool          Stool Occurrence(s) 0 x 0 x 0 x  0 x   Shift Total(mL/kg)        NET   480  480   Weight (kg) 71 71 71 71 71       Recommendations:  1. Patient needs and requests: none @ present    2. Diet: REGULAR    3. Pending tests/procedures: labs     4. Functional Level/Equipment: Bedrest @ present  5. Estimated Discharge Date: none yet Posted on Whiteboard in Patients Room: no none yet    HEALS Safety Check    A safety check occurred in the patient's room between off going nurse and oncoming nurse listed above. The safety check included the below items  Area Items   H  High Alert Medications - Verify all high alert medication drips (heparin, PCA, etc.)   E  Equipment - Suction is set up for ALL patients (with yanker)  - Red plugs utilized for all equipment (IV pumps, etc.)  - WOWs wiped down at end of shift.  - Room stocked with oxygen, suction, and other unit-specific supplies   A  Alarms - Bed alarm is set for fall risk patients  - Ensure chair alarm is in place and activated if patient is up in a chair   L  Lines - Check IV for any infiltration  - Nance bag is empty if patient has a Nance   - Tubing and IV bags are labeled   S  Safety   - Room is clean, patient is clean, and equipment is clean. - Hallways are clear from equipment besides carts.    - Fall bracelet on for fall risk patients  - Ensure room is clear and free of clutter  - Suction is set up for ALL patients (with catalina)  - Hallways are clear from equipment besides carts.    - Isolation precautions followed, supplies available outside room, sign posted   changeshift

## 2022-06-19 NOTE — REHAB NOTE
Ce Lyons SHIFT CHANGE NOTE FOR [facility]    Bedside and Verbal shift change report given to [address] (oncoming nurse) by Jeanei Holland LPN (offgoing nurse). Report included the following information SBAR, Kardex, MAR and Recent Results.     Situation:   Code Status: Full Code   Reason for Admission: Spinal Stenosis  Hospital Day: 1   Problem List:   Hospital Problems  Date Reviewed: 5/24/2022          Codes Class Noted POA    Compression fracture of body of thoracic vertebra [facility]) ICD-10-CM: S22.000A  ICD-9-CM: 805.2  6/17/2022 Unknown              Background:   Past Medical History:   Past Medical History:   Diagnosis Date    Acid reflux     Hypertension     Spinal stenosis       Patient taking anticoagulants no  Patient has a defibrillator: no     Assessment:   Changes in Assessment throughout shift: none     Patient has central line: no Reasons if yes: no  Insertion date:na Last dressing date:na   Patient has Nance Cath: no Reasons if yes: na   Insertion date:na     Last Vitals:     Vitals:    06/17/22 2000 06/18/22 0749 06/18/22 1600 06/18/22 2035   BP: 134/82 (!) 145/76 (!) 142/77 138/74   Pulse: 62 (!) 110 (!) 105 (!) 114   Resp: 18 18 18 18   Temp: 98.1 °F (36.7 °C) 98.2 °F (36.8 °C) 97.1 °F (36.2 °C) 98.3 °F (36.8 °C)   SpO2: 94% 96% 97% 95%   Weight: 71 kg (156 lb 9.6 oz)      Height: 5' 5\" (1.651 m)           PAIN    Pain Assessment    Pain Intensity 1: 0 (06/18/22 2204) Pain Intensity 1: 2 (12/29/14 1105)    Pain Location 1: Back Pain Location 1: Abdomen    Pain Intervention(s) 1: Medication (see MAR) Pain Intervention(s) 1: Medication (see MAR)  Patient Stated Pain Goal: 0 Patient Stated Pain Goal: 0  o Intervention effective: yes   o Other actions taken for pain: yes     Skin Assessment  Skin color    Condition/Temperature    Integrity    Turgor    Weekly Pressure Ulcer Documentation  Pressure  Injury Documentation: No Pressure Injury Noted-Pressure Ulcer Prevention Initiated  Wound Prevention & Protection Methods  Orientation of wound Orientation of Wound Prevention: Posterior  Location of Prevention Location of Wound Prevention: Sacrum/Coccyx  Dressing Present Dressing Present : No  Dressing Status Dressing Status: Intact  Wound Offloading Wound Offloading (Prevention Methods): Bed, pressure reduction mattress     INTAKE/OUPUT  Date 06/17/22 1900 - 06/18/22 0659 06/18/22 0700 - 06/19/22 0659   Shift 4736-9852 24 Hour Total 6617-0362 1113-4199 24 Hour Total   INTAKE   Shift Total(mL/kg)        OUTPUT   Urine(mL/kg/hr)          Urine Occurrence(s) 1 x 1 x 4 x  4 x   Emesis/NG output          Emesis Occurrence(s) 0 x 0 x      Stool          Stool Occurrence(s) 0 x 0 x 0 x  0 x   Shift Total(mL/kg)        NET        Weight (kg) 71 71 71 71 71       Recommendations:  1. Patient needs and requests: none @ present    2. Diet: REGULAR    3. Pending tests/procedures: labs     4. Functional Level/Equipment: Bedrest @ present  5. Estimated Discharge Date: none yet Posted on Whiteboard in Patients Room: no none yet    HEALS Safety Check    A safety check occurred in the patient's room between off going nurse and oncoming nurse listed above. The safety check included the below items  Area Items   H  High Alert Medications - Verify all high alert medication drips (heparin, PCA, etc.)   E  Equipment - Suction is set up for ALL patients (with yanker)  - Red plugs utilized for all equipment (IV pumps, etc.)  - WOWs wiped down at end of shift.  - Room stocked with oxygen, suction, and other unit-specific supplies   A  Alarms - Bed alarm is set for fall risk patients  - Ensure chair alarm is in place and activated if patient is up in a chair   L  Lines - Check IV for any infiltration  - Nance bag is empty if patient has a Nance   - Tubing and IV bags are labeled   S  Safety   - Room is clean, patient is clean, and equipment is clean. - Hallways are clear from equipment besides carts.    - Fall bracelet on for fall risk patients  - Ensure room is clear and free of clutter  - Suction is set up for ALL patients (with hemalker)  - Hallways are clear from equipment besides carts.    - Isolation precautions followed, supplies available outside room, sign posted   changeshift

## 2022-06-19 NOTE — PROGRESS NOTES
Progress Note    Patient: Emily Shah MRN: [id number]  CSN: [id number]    YOB: 1945  Age: 68 y.o. Sex: female    DOA: 6/17/2022 LOS:  LOS: 2 days                    Subjective:     impairment group:paraplegia unspecified  MAXIM: nontraumatic spinal cord injury  Diagnosis: spinal stenosis , lumber region with neurogenic claudication    Review of systems  General: No fevers or chills. Cardiovascular: No chest pain or pressure. No palpitations. Pulmonary: No shortness of breath, cough or wheeze. Gastrointestinal: No abdominal pain, nausea, vomiting or diarrhea. Genitourinary: No urinary frequency, urgency, hesitancy or dysuria. Musculoskeletal: Back pain improving   Neurologic: No headache, generalized weakness    Objective:     Physical Exam:  Visit Vitals  /71 (BP 1 Location: Left upper arm, BP Patient Position: Supine)   Pulse (!) 102   Temp 97.1 °F (36.2 °C)   Resp 17   Ht 5' 5\" (1.651 m)   Wt 71 kg (156 lb 9.6 oz)   SpO2 97%   Breastfeeding No   BMI 26.06 kg/m²        General:         Alert,  no acute distress  pt sitting in her wheelchair with family visiting in the court yard   HEENT: NC, Atraumatic. PERRLA, anicteric sclerae. Lungs: CTA Bilaterally. No Wheezing/Rhonchi/Rales. Heart:  Regular  rhythm,  No murmur, No Rubs, No Gallops  Abdomen: Soft, Non distended, Non tender.  +Bowel sounds, no HSM  Extremities:  trace lower limb edema   Psych:     very  anxious not  agitated. Neurologic:  CN 2-12 grossly intact, Alert and oriented X 3. No acute neurological                                 Deficits,     Intake and Output:  Current Shift:  06/19 0701 - 06/19 1900  In: 240 [P.O.:240]  Out: -   Last three shifts:  No intake/output data recorded.     Labs: Results:       Chemistry Recent Labs     06/19/22  0530 06/18/22  0509 06/17/22  0232   GLU 94 88 107*    137 136   K 3.5 3.2* 3.1*    105 106   CO2 24 22 21   BUN 15 19* 9   CREA 0.38* 0.44* 0.31*   CA 9.4 9.2 8.7 AGAP 11 10 9   BUCR 39* 43* 29*   *  --   --    TP 6.5  --   --    ALB 3.1*  --   --    GLOB 3.4  --   --    AGRAT 0.9  --   --       CBC w/Diff Recent Labs     06/19/22  0530 06/18/22  0509 06/17/22  0232   WBC 6.9 6.4 7.5   RBC 4.43 3.97* 3.89*   HGB 9.6* 8.7* 8.6*   HCT 31.8* 28.5* 27.8*    310 310   GRANS 57 56  --    LYMPH 25 25  --    EOS 2 4  --       Cardiac Enzymes No results for input(s): CPK, CKND1, LES in the last 72 hours. No lab exists for component: CKRMB, TROIP   Coagulation No results for input(s): PTP, INR, APTT, INREXT in the last 72 hours. Lipid Panel No results found for: CHOL, CHOLPOCT, CHOLX, CHLST, CHOLV, 885494, HDL, HDLP, LDL, LDLC, DLDLP, 247258, VLDLC, VLDL, TGLX, TRIGL, TRIGP, TGLPOCT, CHHD, CHHDX   BNP No results for input(s): BNPP in the last 72 hours.    Liver Enzymes Recent Labs     06/19/22  0530   TP 6.5   ALB 3.1*   *      Thyroid Studies Lab Results   Component Value Date/Time    TSH 0.63 06/10/2022 11:40 AM          Procedures/imaging: see electronic medical records for all procedures/Xrays and details which were not copied into this note but were reviewed prior to creation of Plan    Medications:   Current Facility-Administered Medications   Medication Dose Route Frequency    LORazepam (ATIVAN) tablet 0.5 mg  0.5 mg Oral BID PRN    ondansetron (ZOFRAN ODT) tablet 4 mg  4 mg Oral Q8H PRN    amLODIPine (NORVASC) tablet 10 mg  10 mg Oral DAILY    atorvastatin (LIPITOR) tablet 10 mg  10 mg Oral DAILY    ferrous gluconate 324 mg (37.5 mg iron) tablet 1 Tablet  324 mg Oral BID WITH MEALS    gabapentin (NEURONTIN) capsule 300 mg  300 mg Oral TID    hydrALAZINE (APRESOLINE) tablet 25 mg  25 mg Oral TID    losartan (COZAAR) tablet 50 mg  50 mg Oral DAILY    pantoprazole (PROTONIX) tablet 40 mg  40 mg Oral ACB    nortriptyline (PAMELOR) capsule 40 mg  40 mg Oral QHS    docusate sodium (COLACE) capsule 100 mg  100 mg Oral BID    bisacodyL (DULCOLAX) tablet 10 mg  10 mg Oral Q48H PRN    melatonin tablet 3 mg  3 mg Oral QHS PRN    acetaminophen (TYLENOL) tablet 650 mg  650 mg Oral Q6H    oxyCODONE IR (ROXICODONE) tablet 10 mg  10 mg Oral Q4H PRN    naloxone (NARCAN) injection 0.2 mg  0.2 mg IntraVENous PRN       Assessment/Plan     Active Problems:    Compression fracture of body of thoracic vertebra (HCC) (6/17/2022)    plan  S/p kyphoplasty due to compression fracture in T11 and bilateral sacrum fracture/moderate to severe L2-L3 stenosis/degenerative disc disease/epidural lipomatosis/bilateral leg weakness  Neurontin 300 mg 3 times a day  Pamelor 40 mg nightly per discharge summary  Oxycodone 10 mg every 4 hours  Phenergan 12.5 every 6 hours as needed  Tylenol 650 every 6 hours     Hypertension/paroxysmal A.  Fib  Patient converted to sinus stress during her hospital stay  Continue on the medication per discharge summary  Norvasc 10 nmg daily  Hydralazine 25 mg  Losartan 25 mg daily  Not clearPatient is not on aspirin  Patient will need cardiology work-up  Echocardiogram reviewed in the hospital within normal  Monitor heart rate blood pressure might benefit from low-dose beta-blocker     Anxiety  Pt used to take ativan 1 mg q8h  Nisreen dd ativan 0.5 mg BID prn  Discussed with nursing staff      Hyperlipidemia  Lipitor 10 mg nightly     Gastroesophageal flux disease/hiatal hernia  Protonix 40 mg daily     Normocytic anemia  Ferrous sulfate 324 mg daily  Check iron staruration 6  B12  More  4525 and folic acid normal     Hypokalemia  Back to grecia;  Continue to monitor    DVT/GI Prophylaxis: SCD's and H2B/PPI    Gisele Figueredo MD  6/19/2022 2:25 PM

## 2022-06-19 NOTE — PROGRESS NOTES
Problem: Patient Education: Go to Patient Education Activity  Goal: Patient/Family Education  Outcome: Progressing Towards Goal     Problem: Pain  Goal: *Control of Pain  Outcome: Progressing Towards Goal  Goal: *PALLIATIVE CARE:  Alleviation of Pain  Outcome: Progressing Towards Goal     Problem: Patient Education: Go to Patient Education Activity  Goal: Patient/Family Education  Outcome: Progressing Towards Goal     Problem: Falls - Risk of  Goal: *Absence of Falls  Description: Document Yinka Fall Risk and appropriate interventions in the flowsheet. Outcome: Progressing Towards Goal  Note: Fall Risk Interventions:  Mobility Interventions: Bed/chair exit alarm,Patient to call before getting OOB    Mentation Interventions: Bed/chair exit alarm,Evaluate medications/consider consulting pharmacy    Medication Interventions: Bed/chair exit alarm,Evaluate medications/consider consulting pharmacy,Patient to call before getting OOB    Elimination Interventions: Call light in reach,Bed/chair exit alarm,Patient to call for help with toileting needs    History of Falls Interventions: Bed/chair exit alarm,Evaluate medications/consider consulting pharmacy         Problem: Patient Education: Go to Patient Education Activity  Goal: Patient/Family Education  Outcome: Progressing Towards Goal     Problem: Impaired Skin Integrity/Pressure Injury Treatment  Goal: *Improvement of Existing Pressure Injury  Outcome: Progressing Towards Goal  Goal: *Prevention of pressure injury  Description: Document Valeriy Scale and appropriate interventions in the flowsheet.   Outcome: Progressing Towards Goal  Note: Pressure Injury Interventions:  Sensory Interventions: Assess changes in LOC    Moisture Interventions: Absorbent underpads    Activity Interventions: Chair cushion,Increase time out of bed,Pressure redistribution bed/mattress(bed type)    Mobility Interventions: Chair cushion,Pressure redistribution bed/mattress (bed type)    Nutrition Interventions: Document food/fluid/supplement intake    Friction and Shear Interventions: HOB 30 degrees or less                Problem: Patient Education: Go to Patient Education Activity  Goal: Patient/Family Education  Outcome: Progressing Towards Goal     Problem: Anxiety  Goal: *Alleviation of anxiety  Outcome: Progressing Towards Goal  Goal: *Alleviation of anxiety (Palliative Care)  Outcome: Progressing Towards Goal     Problem: Patient Education: Go to Patient Education Activity  Goal: Patient/Family Education  Outcome: Progressing Towards Goal     Problem: Patient Education: Go to Patient Education Activity  Goal: Patient/Family Education  Outcome: Progressing Towards Goal     Problem: Hypertension  Goal: *Blood pressure within specified parameters  Outcome: Progressing Towards Goal  Goal: *Fluid volume balance  Outcome: Progressing Towards Goal  Goal: *Labs within defined limits  Outcome: Progressing Towards Goal     Problem: Patient Education: Go to Patient Education Activity  Goal: Patient/Family Education  Outcome: Progressing Towards Goal     Problem: Patient Education: Go to Patient Education Activity  Goal: Patient/Family Education  Outcome: Progressing Towards Goal     Problem: Pressure Injury - Risk of  Goal: *Prevention of pressure injury  Description: Document Valeriy Scale and appropriate interventions in the flowsheet.   Outcome: Progressing Towards Goal  Note: Pressure Injury Interventions:  Sensory Interventions: Assess changes in LOC    Moisture Interventions: Absorbent underpads    Activity Interventions: Chair cushion,Increase time out of bed,Pressure redistribution bed/mattress(bed type)    Mobility Interventions: Chair cushion,Pressure redistribution bed/mattress (bed type)    Nutrition Interventions: Document food/fluid/supplement intake    Friction and Shear Interventions: HOB 30 degrees or less                Problem: Patient Education: Go to Patient Education Activity  Goal: Patient/Family Education  Outcome: Progressing Towards Goal

## 2022-06-19 NOTE — PROGRESS NOTES
Problem: Self Care Deficits Care Plan (Adult)  Goal: *Therapy Goal (Edit Goal, Insert Text)  Description: Occupational Therapy Goals   Long Term Goals  Initiated  and to be accomplished within 2 week(s)  1. Pt will perform self-feeding with independence. 2. Pt will perform grooming with MOD I.  3. Pt will perform UB bathing with MOD I.  4. Pt will perform LB bathing with MOD I.  5. Pt will perform tub/shower transfer with MOD I.   6. Pt will perform UB dressing with MOD I.  7. Pt will perform LB dressing with no more than MIN A for shoe management. 8. Pt will perform toileting task with MOD I  9. Pt will perform toilet transfer with MOD I. Short Term Goals   Initiated  and to be accomplished within 7 day(s)  1. Pt will perform grooming with no more than MIN A from w/c at sink. 2. Pt will perform UB bathing with no more than MIN A from w/c with basin or TTB. 3. Pt will perform LB bathing with MOD A.  4. Pt will perform tub/shower transfer with MOD A  5. Pt will perform UB dressing with setup A. 6. Pt will perform LB dressing with MOD A  7. Pt will perform toileting task with MIN A.  8. Pt will perform toilet transfer with MOD A.   2022 1545 by Jean-Claude Gonzalez OT  Outcome: Progressing Towards Goal  2022 1544 by Jean-Claude Gonzalez OT  Outcome: Progressing Towards Goal   Occupational Therapy TREATMENT    Patient: Tee Corley   68 y.o. Patient identified with name and : yes    Date: 2022    First Tx Session  Time In: 1143  Time Out[de-identified] 1300        Diagnosis: Compression fracture of body of thoracic vertebra ([address] [S22.000A]   Precautions:  spinal precautions, falls  Chart, occupational therapy assessment, plan of care, and goals were reviewed. Pain:  Pt reports 10/10 pain or discomfort prior to treatment. (buttocks)  Pt reports -/10 pain or discomfort post treatment.    Intervention Provided: Pt reported 10/10 pain in buttocks however agreeable to participate in UB/LB showering for sake of OT      SUBJECTIVE:   Patient stated i'm trying to stay away from that damn stuff.  (pain medication)    OBJECTIVE DATA SUMMARY:     FEEDING/EATING Daily Assessment    Functional Level: 6  Comments: Self fed burger and fries from  [employer] grasping finger foods and bring to mouth with Neptali     GROOMING Daily Assessment    Functional Level: 5 (setup)  Tasks Completed by Patient: Brushed hair;Brushed teeth; Washed face; Washed hands  Comments: Pt performed grroming tasks brushing teeth and hair at sink side and washed face and hands from tub transfer bench with supervision. Oral hygiene: 5 (setup)     BATHING Daily Assessment    Functional Level: 2  Body Parts Patient Bathed: Abdomen;Arm, left;Arm, right;Chest;Thigh, left; Thigh, right  Comments: Pt performed UB showering from tub transfer bench with Larissa to wash back adn LB showering in seated position to wash lower BLE legs and feet in seated position with TA. Pt washed upper BLE thighs in seated position and maxA in standing using grab bar to wash buttocks and perie area. Pt required max VC's for safety with showering secondary to no BLT and observing anxious behaviors requiring VC's for pacing and safety. TOILETING Daily Assessment     Pt declined. UPPER BODY DRESSING Daily Assessment    Functional Level: 4  Items Applied/Steps Completed: Pullover (4 steps)  Comments: Larissa from w/c to pull down over back     LOWER BODY DRESSING Daily Assessment    Functional Level: 2  Items Applied/Steps Completed: Elastic waist pants (3 steps); Sock, left (1 step); Sock, right (1 step); Underpants (3 steps)  Comments: Pt performed LB dressing with maxA and required maxA to thread BLE feet through brief and pants ad well as fastening brief  and pulling up over buttocks in standing. Pt repoerted being dizzy in standing following shower and demosntrated 1 LOB while pulling up pants at [address].   Pt reported feeling dizzy however subsided once seated. Sock and/or Shoe management: 2       MOBILITY/TRANSFERS Daily Assessment          Tub/Shower Transfer Score: 4  Comments: tub transfer bench and FWW, simulated tub transfer bench transfer for home environment       ASSESSMENT:  Pt requires frequent VC's for spinal precautions and overall safety, (pacing and sequencing for safe technique). Pt tends to plan things quickly and requires VC's to slow and focus attention. Pt would benefit from long handled sponge and sock aid to facilitate increased independence with self cares. Progression toward goals:  [x]          Improving appropriately and progressing toward goals  []          Improving slowly and progressing toward goals  []          Not making progress toward goals and plan of care will be adjusted     PLAN:  Patient continues to benefit from skilled intervention to address the above impairments. Continue treatment per established plan of care. Discharge Recommendations:  Home Health  Further Equipment Recommendations for Discharge:  transfer bench, TBD dependent on progress     Activity Tolerance:  Fair-good      Estimated LOS:~ DC 6/17/22    Please refer to the flowsheet for vital signs taken during this treatment. After treatment:   [x]  Patient left in no apparent distress sitting up in chair   []  Patient left in no apparent distress in bed  [x]  Call bell left within reach  []  Nursing notified  []  Caregiver present  []  Bed alarm activated    COMMUNICATION/EDUCATION:   [x] Home safety education was provided and the patient/caregiver indicated understanding. [] Patient/family have participated as able in goal setting and plan of care. [x] Patient/family agree to work toward stated goals and plan of care. [] Patient understands intent and goals of therapy, but is neutral about his/her participation. [] Patient is unable to participate in goal setting and plan of care.       Nira Denver, OT

## 2022-06-20 PROBLEM — M84.48XD BILATERAL SACRAL INSUFFICIENCY FRACTURE WITH ROUTINE HEALING: Status: ACTIVE | Noted: 2022-06-09

## 2022-06-20 PROBLEM — Z78.9 IMPAIRED MOBILITY AND ADLS: Status: ACTIVE | Noted: 2022-06-09

## 2022-06-20 PROBLEM — R53.1 GENERALIZED WEAKNESS: Status: ACTIVE | Noted: 2022-06-09

## 2022-06-20 PROBLEM — S22.080D COMPRESSION FRACTURE OF T11 VERTEBRA WITH ROUTINE HEALING: Status: ACTIVE | Noted: 2022-06-09

## 2022-06-20 PROBLEM — Z98.890 STATUS POST KYPHOPLASTY: Status: ACTIVE | Noted: 2022-06-13

## 2022-06-20 PROBLEM — Z74.09 IMPAIRED MOBILITY AND ADLS: Status: ACTIVE | Noted: 2022-06-09

## 2022-06-20 PROCEDURE — 97116 GAIT TRAINING THERAPY: CPT

## 2022-06-20 PROCEDURE — 97150 GROUP THERAPEUTIC PROCEDURES: CPT

## 2022-06-20 PROCEDURE — 97535 SELF CARE MNGMENT TRAINING: CPT

## 2022-06-20 PROCEDURE — 97110 THERAPEUTIC EXERCISES: CPT

## 2022-06-20 PROCEDURE — 65310000000 HC RM PRIVATE REHAB

## 2022-06-20 PROCEDURE — 74011250637 HC RX REV CODE- 250/637: Performed by: FAMILY MEDICINE

## 2022-06-20 PROCEDURE — 74011250637 HC RX REV CODE- 250/637: Performed by: INTERNAL MEDICINE

## 2022-06-20 PROCEDURE — 99232 SBSQ HOSP IP/OBS MODERATE 35: CPT | Performed by: INTERNAL MEDICINE

## 2022-06-20 PROCEDURE — 97530 THERAPEUTIC ACTIVITIES: CPT

## 2022-06-20 PROCEDURE — 74011250637 HC RX REV CODE- 250/637: Performed by: EMERGENCY MEDICINE

## 2022-06-20 RX ORDER — GABAPENTIN 400 MG/1
400 CAPSULE ORAL 3 TIMES DAILY
Status: DISCONTINUED | OUTPATIENT
Start: 2022-06-20 | End: 2022-06-23

## 2022-06-20 RX ORDER — OXYCODONE HYDROCHLORIDE 5 MG/1
10 TABLET ORAL
Status: DISCONTINUED | OUTPATIENT
Start: 2022-06-20 | End: 2022-07-02 | Stop reason: HOSPADM

## 2022-06-20 RX ORDER — NORTRIPTYLINE HYDROCHLORIDE 10 MG/1
40 CAPSULE ORAL
Status: DISCONTINUED | OUTPATIENT
Start: 2022-06-20 | End: 2022-07-02 | Stop reason: HOSPADM

## 2022-06-20 RX ORDER — ACETAMINOPHEN 325 MG/1
650 TABLET ORAL
Status: DISCONTINUED | OUTPATIENT
Start: 2022-06-20 | End: 2022-06-23

## 2022-06-20 RX ADMIN — PANTOPRAZOLE SODIUM 40 MG: 40 TABLET, DELAYED RELEASE ORAL at 06:58

## 2022-06-20 RX ADMIN — ATORVASTATIN CALCIUM 10 MG: 10 TABLET, FILM COATED ORAL at 09:01

## 2022-06-20 RX ADMIN — GABAPENTIN 300 MG: 300 CAPSULE ORAL at 09:01

## 2022-06-20 RX ADMIN — GABAPENTIN 300 MG: 300 CAPSULE ORAL at 17:05

## 2022-06-20 RX ADMIN — DOCUSATE SODIUM 100 MG: 100 CAPSULE, LIQUID FILLED ORAL at 17:05

## 2022-06-20 RX ADMIN — LORAZEPAM 0.5 MG: 0.5 TABLET ORAL at 09:05

## 2022-06-20 RX ADMIN — AMLODIPINE BESYLATE 10 MG: 10 TABLET ORAL at 09:02

## 2022-06-20 RX ADMIN — DOCUSATE SODIUM 100 MG: 100 CAPSULE, LIQUID FILLED ORAL at 09:02

## 2022-06-20 RX ADMIN — ACETAMINOPHEN 650 MG: 325 TABLET ORAL at 06:58

## 2022-06-20 RX ADMIN — NORTRIPTYLINE HYDROCHLORIDE 40 MG: 10 CAPSULE ORAL at 20:44

## 2022-06-20 RX ADMIN — ACETAMINOPHEN 650 MG: 325 TABLET ORAL at 17:05

## 2022-06-20 RX ADMIN — Medication 1 TABLET: at 09:01

## 2022-06-20 RX ADMIN — LOSARTAN POTASSIUM 50 MG: 50 TABLET, FILM COATED ORAL at 09:01

## 2022-06-20 RX ADMIN — HYDRALAZINE HYDROCHLORIDE 25 MG: 25 TABLET, FILM COATED ORAL at 09:03

## 2022-06-20 RX ADMIN — Medication 1 TABLET: at 17:05

## 2022-06-20 RX ADMIN — ACETAMINOPHEN 650 MG: 325 TABLET ORAL at 11:40

## 2022-06-20 RX ADMIN — GABAPENTIN 400 MG: 400 CAPSULE ORAL at 20:44

## 2022-06-20 RX ADMIN — ACETAMINOPHEN 650 MG: 325 TABLET ORAL at 01:22

## 2022-06-20 NOTE — REHAB NOTE
[facility] PHYSICAL REHABILITATION  [address] [place] [address]  OVERALL PLAN OF CARE    Name: Lauryn Peck CSN: [id number]   Age: 68 y.o. MRN: [id number]   Sex: female Admit Date: 6/17/2022     Primary Rehabilitation Diagnosis  1. Generalized weakness with Impaired mobility and ADLs  2. Compression fracture of T11 vertebra with routine healing  3. Bilateral sacral insufficiency fracture with routine healing  4.  S/P Image guided T11 kyphoplasty; Image guided bilateral sacroplasty (6/13/2022 - Dr. Nara Rene)    Comorbidities  Patient Active Problem List   Diagnosis Code    Spinal stenosis of lumbar region with neurogenic claudication M48.062    Lumbar facet arthropathy M47.816    Disorder of bone and cartilage M89.9, M94.9    Diverticulosis of colon K57.30    Dyslipidemia E78.5    Essential hypertension, benign I10    Gastroesophageal reflux disease without esophagitis K21.9    Generalized anxiety disorder F41.1    Impaired fasting glucose R73.01    Irritable bowel syndrome with diarrhea K58.0    Melanoma in situ of right upper arm (HCC) D03.61    Primary insomnia F51.01    Sciatica of right side M54.31    Squamous cell carcinoma [id number]    Lumbar stenosis with neurogenic claudication M48.062    Depression, recurrent (HCC) F33.9    Spinal stenosis M48.00    Leg weakness, bilateral R29.898    Atrial fibrillation with rapid ventricular response (HCC) I48.91    Compression fracture of T11 vertebra with routine healing S22.080D    Bilateral sacral insufficiency fracture with routine healing M84.48XD    Status post kyphoplasty Z98.890    Generalized weakness R53.1    Impaired mobility and ADLs Z74.09, Z78.9       ANTICIPATED INTERVENTIONS THAT SUPPORT THE MEDICAL NECESSITY OF THIS ADMISSION:    I. Physical Therapy              A. Intensity: 1.5 hour per day              B. Frequency: 5 times per week              C. Duration: 3 weeks D. Short Term Goals:    1. Patient will move from supine to sit and sit to supine  in bed with contact guard assist.      2.  Patient will transfer from bed to chair and chair to bed with contact guard assist using the least restrictive device. 3.  Patient will perform sit to stand with supervision/SBA. 4.  Patient will ambulate with contact guard assist for 100 feet with the least restrictive device. 5.  Patient will ascend/descend 4 stairs with 2 handrail(s) with minimal assistance/contact guard assist.              E. Long Term Goals:    1. Patient will move from supine to sit and sit to supine  in bed with modified independence. 2.  Patient will transfer from bed to chair and chair to bed with modified independence using the least restrictive device. 3.  Patient will perform sit to stand with modified independence. 4.  Patient will ambulate with modified independence for 150 feet with the least restrictive device. 5.  Patient will ascend/descend 12 stairs with 1 handrail(s) with minimal assistance/contact guard assist.   F. Interventions: Interventions may include range of motion (AROM, PROM B LE/trunk), motor function (B LE/trunk strengthening/coordination), activity tolerance (vitals, oxygen saturation levels), bed mobility training, balance activities, gait training (progressive ambulation program), wheelchair management and functional transfer training. Patient would also benefit from concurrent and group therapy sessions to promote increased participation in skilled therapy interventions and to provide opportunities for increased social interaction. II. Occupational Therapy              A. Intensity: 1.5 hour per day              B. Frequency: 5 times per week              C. Duration: 2 weeks              D. Short Term Goals:    1. Pt will perform grooming with no more than MIN A from w/c at sink.     2. Pt will perform UB bathing with no more than MIN A from w/c with basin or TTB. 3. Pt will perform LB bathing with MOD A.    4. Pt will perform tub/shower transfer with MOD A    5. Pt will perform UB dressing with setup A. 6. Pt will perform LB dressing with MOD A    7. Pt will perform toileting task with MIN A.    8. Pt will perform toilet transfer with MOD A.               E. Long Term Goals:    1. Pt will perform self-feeding with independence. 2. Pt will perform grooming with MOD I.    3. Pt will perform UB bathing with MOD I.    4. Pt will perform LB bathing with MOD I.    5. Pt will perform tub/shower transfer with MOD I.     6. Pt will perform UB dressing with MOD I.    7. Pt will perform LB dressing with no more than MIN A for shoe management. 8. Pt will perform toileting task with MOD I    9. Pt will perform toilet transfer with MOD I.   F. Interventions: Interventions may include range of motion (AROM, PROM B UE), motor function (B UE/ strengthening/coordination), activity tolerance (vitals, oxygen saturation levels), balance training, ADL/IADL training and functional transfer training. PHYSICIAN'S ASSESSMENT OF FINDINGS:    Are the established goals sufficient for achieving the optimal level of function? [x]  Yes      []  No    What changes would you recommend to the goals as written? None      Are the interventions noted sufficient for achieving the optimal level of function? [x]  Yes      []  No    What changes would you recommend to the interventions noted? If therapy staff is unable to provide 3 hr of total therapy per day in 5 days due to medical issues or decreased patient tolerance, may modify treatment schedule to 15 hr/week.       Estimated length of stay: 2 weeks      Medical rehabilitation prognosis:    []  Excellent     [x]  Good     []  Fair     []  Guarded       Discharge Destination:     [x]  Home     []  [address]     []  [facility]     []  [address]     []  [facility] []  Other:       Signed:    Renetta Juarez MD    June 19, 2022

## 2022-06-20 NOTE — PROGRESS NOTES
Problem: Patient Education: Go to Patient Education Activity  Goal: Patient/Family Education  Outcome: Progressing Towards Goal     Problem: Pain  Goal: *Control of Pain  Outcome: Progressing Towards Goal  Goal: *PALLIATIVE CARE:  Alleviation of Pain  Outcome: Progressing Towards Goal     Problem: Patient Education: Go to Patient Education Activity  Goal: Patient/Family Education  Outcome: Progressing Towards Goal     Problem: Falls - Risk of  Goal: *Absence of Falls  Description: Document Yinka Fall Risk and appropriate interventions in the flowsheet. Outcome: Progressing Towards Goal  Note: Fall Risk Interventions:  Mobility Interventions: Bed/chair exit alarm    Mentation Interventions: Bed/chair exit alarm    Medication Interventions: Bed/chair exit alarm    Elimination Interventions: Call light in reach,Patient to call for help with toileting needs    History of Falls Interventions: Bed/chair exit alarm,Door open when patient unattended         Problem: Patient Education: Go to Patient Education Activity  Goal: Patient/Family Education  Outcome: Progressing Towards Goal     Problem: Impaired Skin Integrity/Pressure Injury Treatment  Goal: *Improvement of Existing Pressure Injury  Outcome: Progressing Towards Goal  Goal: *Prevention of pressure injury  Description: Document Valeriy Scale and appropriate interventions in the flowsheet.   Outcome: Progressing Towards Goal  Note: Pressure Injury Interventions:  Sensory Interventions: Assess changes in LOC    Moisture Interventions: Absorbent underpads    Activity Interventions: Increase time out of bed    Mobility Interventions: Pressure redistribution bed/mattress (bed type)    Nutrition Interventions: Document food/fluid/supplement intake    Friction and Shear Interventions: HOB 30 degrees or less                Problem: Patient Education: Go to Patient Education Activity  Goal: Patient/Family Education  Outcome: Progressing Towards Goal     Problem: Anxiety  Goal: *Alleviation of anxiety  Outcome: Progressing Towards Goal  Goal: *Alleviation of anxiety (Palliative Care)  Outcome: Progressing Towards Goal     Problem: Patient Education: Go to Patient Education Activity  Goal: Patient/Family Education  Outcome: Progressing Towards Goal     Problem: Patient Education: Go to Patient Education Activity  Goal: Patient/Family Education  Outcome: Progressing Towards Goal     Problem: Hypertension  Goal: *Blood pressure within specified parameters  Outcome: Progressing Towards Goal  Goal: *Fluid volume balance  Outcome: Progressing Towards Goal  Goal: *Labs within defined limits  Outcome: Progressing Towards Goal     Problem: Patient Education: Go to Patient Education Activity  Goal: Patient/Family Education  Outcome: Progressing Towards Goal     Problem: Patient Education: Go to Patient Education Activity  Goal: Patient/Family Education  Outcome: Progressing Towards Goal     Problem: Pressure Injury - Risk of  Goal: *Prevention of pressure injury  Description: Document Valeriy Scale and appropriate interventions in the flowsheet.   Outcome: Progressing Towards Goal  Note: Pressure Injury Interventions:  Sensory Interventions: Assess changes in LOC    Moisture Interventions: Absorbent underpads    Activity Interventions: Increase time out of bed    Mobility Interventions: Pressure redistribution bed/mattress (bed type)    Nutrition Interventions: Document food/fluid/supplement intake    Friction and Shear Interventions: HOB 30 degrees or less                Problem: Patient Education: Go to Patient Education Activity  Goal: Patient/Family Education  Outcome: Progressing Towards Goal     Problem: Patient Education: Go to Patient Education Activity  Goal: Patient/Family Education  Outcome: Progressing Towards Goal     Problem: Patient Education: Go to Patient Education Activity  Goal: Patient/Family Education  Outcome: Progressing Towards Goal

## 2022-06-20 NOTE — REHAB NOTE
SHIFT CHANGE NOTE FOR [facility]    Bedside and Verbal shift change report given to Sofia Sawyer [address] (oncoming nurse) by Blanca Grimm LPN   (offgoing nurse). Report included the following information SBAR, Kardex, MAR and Recent Results.     Situation:   Code Status: Full Code   Reason for Admission: Spinal Stenosis  Hospital Day: 2   Problem List:   Hospital Problems  Date Reviewed: 5/24/2022          Codes Class Noted POA    Compression fracture of body of thoracic vertebra [facility]) ICD-10-CM: S22.000A  ICD-9-CM: 805.2  6/17/2022 Unknown              Background:   Past Medical History:   Past Medical History:   Diagnosis Date    Acid reflux     Hypertension     Spinal stenosis       Patient taking anticoagulants no  Patient has a defibrillator: no     Assessment:   Changes in Assessment throughout shift: none     Patient has central line: no Reasons if yes: no  Insertion date:na Last dressing date:na   Patient has Nance Cath: no Reasons if yes: na   Insertion date:na     Last Vitals:     Vitals:    06/18/22 1600 06/18/22 2035 06/19/22 0723 06/19/22 1523   BP: (!) 142/77 138/74 137/71 135/81   Pulse: (!) 105 (!) 114 (!) 102 (!) 103   Resp: 18 18 17 19   Temp: 97.1 °F (36.2 °C) 98.3 °F (36.8 °C) 97.1 °F (36.2 °C) 97.8 °F (36.6 °C)   SpO2: 97% 95% 97% 96%   Weight:       Height:            PAIN    Pain Assessment    Pain Intensity 1: 3 (06/19/22 2014) Pain Intensity 1: 2 (12/29/14 1105)    Pain Location 1: Back Pain Location 1: Abdomen    Pain Intervention(s) 1: Medication (see MAR) Pain Intervention(s) 1: Medication (see MAR)  Patient Stated Pain Goal: 0 Patient Stated Pain Goal: 0  o Intervention effective: yes   o Other actions taken for pain: yes     Skin Assessment  Skin color    Condition/Temperature    Integrity    Turgor    Weekly Pressure Ulcer Documentation  Pressure  Injury Documentation: No Pressure Injury Noted-Pressure Ulcer Prevention Initiated  Wound Prevention & Protection Methods  Orientation of wound Orientation of Wound Prevention: Posterior  Location of Prevention Location of Wound Prevention: Sacrum/Coccyx  Dressing Present Dressing Present : No  Dressing Status Dressing Status: Intact  Wound Offloading Wound Offloading (Prevention Methods): Bed, pressure reduction mattress     INTAKE/OUPUT  Date 06/18/22 1900 - 06/19/22 0659 06/19/22 0700 - 06/20/22 0659   Shift 7915-9123 24 Hour Total 6675-6683 4020-4429 24 Hour Total   INTAKE   P.O.   480  480     P. O.   480  480   Shift Total(mL/kg)   480(6.8)  480(6.8)   OUTPUT   Urine(mL/kg/hr)          Urine Occurrence(s) 3 x 7 x 4 x  4 x   Stool          Stool Occurrence(s) 0 x 0 x 0 x  0 x   Shift Total(mL/kg)        NET   480  480   Weight (kg) 71 71 71 71 71       Recommendations:  1. Patient needs and requests: none @ present    2. Diet: REGULAR    3. Pending tests/procedures: labs     4. Functional Level/Equipment: Bedrest @ present  5. Estimated Discharge Date: none yet Posted on Whiteboard in Patients Room: no none yet    HEALS Safety Check    A safety check occurred in the patient's room between off going nurse and oncoming nurse listed above. The safety check included the below items  Area Items   H  High Alert Medications - Verify all high alert medication drips (heparin, PCA, etc.)   E  Equipment - Suction is set up for ALL patients (with yanker)  - Red plugs utilized for all equipment (IV pumps, etc.)  - WOWs wiped down at end of shift.  - Room stocked with oxygen, suction, and other unit-specific supplies   A  Alarms - Bed alarm is set for fall risk patients  - Ensure chair alarm is in place and activated if patient is up in a chair   L  Lines - Check IV for any infiltration  - Nance bag is empty if patient has a Nance   - Tubing and IV bags are labeled   S  Safety   - Room is clean, patient is clean, and equipment is clean. - Hallways are clear from equipment besides carts.    - Fall bracelet on for fall risk patients  - Ensure room is clear and free of clutter  - Suction is set up for ALL patients (with catalina)  - Hallways are clear from equipment besides carts.    - Isolation precautions followed, supplies available outside room, sign posted   changeshift

## 2022-06-20 NOTE — PROGRESS NOTES
Problem: Patient Education: Go to Patient Education Activity  Goal: Patient/Family Education  Outcome: Progressing Towards Goal     Problem: Pain  Goal: *Control of Pain  Outcome: Progressing Towards Goal  Goal: *PALLIATIVE CARE:  Alleviation of Pain  Outcome: Progressing Towards Goal     Problem: Patient Education: Go to Patient Education Activity  Goal: Patient/Family Education  Outcome: Progressing Towards Goal     Problem: Falls - Risk of  Goal: *Absence of Falls  Description: Document Yinka Fall Risk and appropriate interventions in the flowsheet. Outcome: Progressing Towards Goal  Note: Fall Risk Interventions:  Mobility Interventions: Bed/chair exit alarm    Mentation Interventions: Bed/chair exit alarm    Medication Interventions: Bed/chair exit alarm    Elimination Interventions: Call light in reach,Patient to call for help with toileting needs    History of Falls Interventions: Bed/chair exit alarm,Door open when patient unattended         Problem: Patient Education: Go to Patient Education Activity  Goal: Patient/Family Education  Outcome: Progressing Towards Goal     Problem: Impaired Skin Integrity/Pressure Injury Treatment  Goal: *Improvement of Existing Pressure Injury  Outcome: Progressing Towards Goal  Goal: *Prevention of pressure injury  Description: Document Valeriy Scale and appropriate interventions in the flowsheet.   Outcome: Progressing Towards Goal  Note: Pressure Injury Interventions:  Sensory Interventions: Assess changes in LOC    Moisture Interventions: Absorbent underpads    Activity Interventions: Increase time out of bed    Mobility Interventions: Pressure redistribution bed/mattress (bed type)    Nutrition Interventions: Document food/fluid/supplement intake    Friction and Shear Interventions: HOB 30 degrees or less                Problem: Patient Education: Go to Patient Education Activity  Goal: Patient/Family Education  Outcome: Progressing Towards Goal     Problem: Anxiety  Goal: *Alleviation of anxiety  Outcome: Progressing Towards Goal  Goal: *Alleviation of anxiety (Palliative Care)  Outcome: Progressing Towards Goal     Problem: Patient Education: Go to Patient Education Activity  Goal: Patient/Family Education  Outcome: Progressing Towards Goal     Problem: Patient Education: Go to Patient Education Activity  Goal: Patient/Family Education  Outcome: Progressing Towards Goal     Problem: Hypertension  Goal: *Blood pressure within specified parameters  Outcome: Progressing Towards Goal  Goal: *Fluid volume balance  Outcome: Progressing Towards Goal  Goal: *Labs within defined limits  Outcome: Progressing Towards Goal     Problem: Patient Education: Go to Patient Education Activity  Goal: Patient/Family Education  Outcome: Progressing Towards Goal     Problem: Patient Education: Go to Patient Education Activity  Goal: Patient/Family Education  Outcome: Progressing Towards Goal     Problem: Pressure Injury - Risk of  Goal: *Prevention of pressure injury  Description: Document Valeriy Scale and appropriate interventions in the flowsheet.   Outcome: Progressing Towards Goal     Problem: Patient Education: Go to Patient Education Activity  Goal: Patient/Family Education  Outcome: Progressing Towards Goal

## 2022-06-20 NOTE — PROGRESS NOTES
[facility] PHYSICAL REHABILITATION  [address] [place] [address], [address]     INPATIENT REHABILITATION  DAILY PROGRESS NOTE     Date: 6/20/2022    Name: Esther Santillan Age / Sex: 68 y.o. / female   CSN: [id number] MRN: [id number]   Admit Date: 6/17/2022 Length of Stay: 3 days     Primary Rehabilitation Diagnosis: Generalized weakness with Impaired mobility and ADLs secondary to:  1. Compression fracture of T11 vertebra with routine healing  2. Bilateral sacral insufficiency fracture with routine healing  3. S/P Image guided T11 kyphoplasty; Image guided bilateral sacroplasty (6/13/2022 - Dr. Miguel Tan)      Subjective:     Patient seen and examined. Blood pressure fairly controlled. Patient's Complaint:   No significant medical complaints    Pain Control: ongoing significant pain in which is stable and controlled by current meds      Objective:     Vital Signs:  Patient Vitals for the past 24 hrs:   BP Temp Pulse Resp SpO2   06/20/22 1516 115/75 97 °F (36.1 °C) (!) 111 14 97 %   06/20/22 0729 (!) 141/73 96.8 °F (36 °C) 98 16 97 %   06/19/22 2104 136/82 97.1 °F (36.2 °C) (!) 115 18 97 %        Physical Examination:  GENERAL SURVEY: Patient is awake, alert, oriented x 3, sitting comfortably on the wheelchair, not in acute respiratory distress. HEENT: pale palpebral conjunctivae, anicteric sclerae, no nasoaural discharge, moist oral mucosa  NECK: supple, no jugular venous distention, no palpable lymph nodes  CHEST/LUNGS: symmetrical chest expansion, good air entry, clear breath sounds  HEART: adynamic precordium, good S1 S2, no S3, regular rhythm, no murmurs  ABDOMEN: flat, bowel sounds appreciated, soft, non-tender  EXTREMITIES: pale nailbeds, no edema, full and equal pulses, no calf tenderness   NEUROLOGICAL EXAM: The patient is awake, alert and oriented x3, able to answer questions fairly appropriately, able to follow 1 and 2 step commands. Able to tell time from the wall clock.   Cranial nerves II-XII are grossly intact. No gross sensory deficit. Motor strength is 4+/5 on BUE, 4-/5 on BLE. Current Medications:  Current Facility-Administered Medications   Medication Dose Route Frequency    LORazepam (ATIVAN) tablet 0.5 mg  0.5 mg Oral BID PRN    ondansetron (ZOFRAN ODT) tablet 4 mg  4 mg Oral Q8H PRN    amLODIPine (NORVASC) tablet 10 mg  10 mg Oral DAILY    atorvastatin (LIPITOR) tablet 10 mg  10 mg Oral DAILY    ferrous gluconate 324 mg (37.5 mg iron) tablet 1 Tablet  324 mg Oral BID WITH MEALS    gabapentin (NEURONTIN) capsule 300 mg  300 mg Oral TID    hydrALAZINE (APRESOLINE) tablet 25 mg  25 mg Oral TID    losartan (COZAAR) tablet 50 mg  50 mg Oral DAILY    pantoprazole (PROTONIX) tablet 40 mg  40 mg Oral ACB    nortriptyline (PAMELOR) capsule 40 mg  40 mg Oral QHS    docusate sodium (COLACE) capsule 100 mg  100 mg Oral BID    bisacodyL (DULCOLAX) tablet 10 mg  10 mg Oral Q48H PRN    melatonin tablet 3 mg  3 mg Oral QHS PRN    acetaminophen (TYLENOL) tablet 650 mg  650 mg Oral Q6H    oxyCODONE IR (ROXICODONE) tablet 10 mg  10 mg Oral Q4H PRN    naloxone (NARCAN) injection 0.2 mg  0.2 mg IntraVENous PRN       Allergies:  No Known Allergies      Lab/Data Review:  No results found for this or any previous visit (from the past 24 hour(s)). Assessment:     Primary Rehabilitation Diagnosis  1. Generalized weakness with Impaired mobility and ADLs  2. Compression fracture of T11 vertebra with routine healing  3. Bilateral sacral insufficiency fracture with routine healing  4.  S/P Image guided T11 kyphoplasty; Image guided bilateral sacroplasty (6/13/2022 - Dr. Penny Chin)    Comorbidities  Patient Active Problem List   Diagnosis Code    Spinal stenosis of lumbar region with neurogenic claudication M48.062    Lumbar facet arthropathy M47.816    Disorder of bone and cartilage M89.9, M94.9    Diverticulosis of colon K57.30    Dyslipidemia E78.5    Essential hypertension, benign I10    Gastroesophageal reflux disease without esophagitis K21.9    Generalized anxiety disorder F41.1    Impaired fasting glucose R73.01    Irritable bowel syndrome with diarrhea K58.0    Melanoma in situ of right upper arm ([facility]) D03.61    Primary insomnia F51.01    Sciatica of right side M54.31    Squamous cell carcinoma [id number]    Lumbar stenosis with neurogenic claudication M48.062    Depression, recurrent ([facility]) F33.9    Spinal stenosis M48.00    Leg weakness, bilateral R29.898    Atrial fibrillation with rapid ventricular response ([facility]) I48.91    Compression fracture of T11 vertebra with routine healing S22.080D    Bilateral sacral insufficiency fracture with routine healing M84.48XD    Status post kyphoplasty Z98.890    Generalized weakness R53.1    Impaired mobility and ADLs Z74.09, Z78.9    Chronic anemia D64.9       Plan:     1. Justification for continued stay: Good progression towards established rehabilitation goals. 2. Medical Issues being followed closely:    [x]  Fall and safety precautions     []  Wound Care     [x]  Bowel and Bladder Function     [x]  Fluid Electrolyte and Nutrition Balance     [x]  Pain Control      3. Issues that 24 hour rehabilitation nursing is following:    [x]  Fall and safety precautions     []  Wound Care     [x]  Bowel and Bladder Function     [x]  Fluid Electrolyte and Nutrition Balance     [x]  Pain Control      [x]  Assistance with and education on in-room safety with transfers to and from the bed, wheelchair, toilet and shower. 4. Acute rehabilitation plan of care:    [x]  Continue current care and rehab. [x]  Physical Therapy           [x]  Occupational Therapy           []  Speech Therapy     []  Hold Rehab until further notice     5. Medications:    [x]  MAR Reviewed     [x]  Continue Present Medications     6.  Chemical DVT Prophylaxis:      []  Enoxaparin     []  Unfractionated Heparin     []  Warfarin     [] NOAC     []  Aspirin     [x]  None     7. Mechanical DVT Prophylaxis:      []  YOLY Stockings     [x]  Sequential Compression Device     []  None     8. GI Prophylaxis:      []  PPI     []  H2 Blocker     [x]  None / Not indicated     9. Code status:    [x]  Full code     []  Partial code     []  Do not intubate     []  Do not resuscitate     10. Diet:  Specifications  Low fat/Low cholesterol/High fiber/2 gm Na   Solids (consistency)  Regular   Liquids (consistency)  Thin   Fluid Restriction  None     11. Orders:   > Compression fracture of T11 vertebra with routine healing; Bilateral sacral insufficiency fracture with routine healing; S/P Image guided T11 kyphoplasty; Image guided bilateral sacroplasty (6/13/2022 - Dr. Emily Robles)   > Thoracic spinal precautions    > Chronic anemia   > Hgb/Hct (6/18/2022, on admission to the ARU) = 8.7/28.5    > Hgb/Hct (6/19/2022) = 9.6/31.8    > Continue Ferrous gluconate 324 mg PO BID with meals    > Dyslipidemia   > Continue Atorvastatin 10 mg PO once daily    > Essential hypertension   > Continue:    > Amlodipine 10 mg PO once daily (9AM)    > Hydralazine 25 mg PO TID (8AM, 2PM, 8PM)    > Losartan 50 mg PO once daily (9AM)    > Gastroesophageal reflux disease   > Continue Pantoprazole 40 mg PO daily before breakfast    > Generalized anxiety disorder   > Continue:    > Nortriptyline 40 mg PO q HS    > Lorazepam 0.5 mg PO BID PRN for anxiety    > Sciatica of right side   > Continue:    > Increase Gabapentin from 300 mg to 400 mg PO TID (8AM, 2PM, 8PM)    > Nortriptyline 40 mg PO q HS    > Analgesia   > Continue:    > Acetaminophen 650 mg PO q 4 hr PRN for pain level 4/10 or lesser    > Oxycodone IR 10 mg PO q 4 hr PRN for pain level 5/10 or greater       12. Personal Protective Equipment (N95 face mask with eye goggles) was used while interacting with the patient. Patient was using a surgical mask.     13. Patient's progress in rehabilitation and medical issues discussed with the patient. All questions answered to the best of my ability. Care plan discussed with patient and nurse. 14. Total clinical care time is 30 minutes, including review of chart including all labs, radiology, past medical history, and discussion with patient. Greater than 50% of my time was spent in coordination of care and counseling.       Signed:    Karina Weems MD    June 20, 2022

## 2022-06-20 NOTE — PROGRESS NOTES
Problem: Mobility Impaired (Adult and Pediatric)  Goal: *Acute Goals and Plan of Care (Insert Text)  Description: Physical Therapy Short Term Goals  Initiated 6/18/2022 and to be accomplished within 7 day(s)- 6/25/2022  1. Patient will move from supine to sit and sit to supine  in bed with contact guard assist.    2.  Patient will transfer from bed to chair and chair to bed with contact guard assist using the least restrictive device. 3.  Patient will perform sit to stand with supervision/SBA. 4.  Patient will ambulate with contact guard assist for 100 feet with the least restrictive device. 5.  Patient will ascend/descend 4 stairs with 2 handrail(s) with minimal assistance/contact guard assist.    Physical Therapy Long Term Goals  Initiated 6/18/2022 and to be accomplished within 21 day(s)  1. Patient will move from supine to sit and sit to supine  in bed with modified independence. 2.  Patient will transfer from bed to chair and chair to bed with modified independence using the least restrictive device. 3.  Patient will perform sit to stand with modified independence. 4.  Patient will ambulate with modified independence for 150 feet with the least restrictive device. 5.  Patient will ascend/descend 12 stairs with 1 handrail(s) with minimal assistance/contact guard assist.     Outcome: Progressing Towards Goal   PHYSICAL THERAPY TREATMENT    Patient: Kevin Becerra (17 y.o. female)  Date: 6/20/2022  Diagnosis: Compression fracture of body of thoracic vertebra (HCC) [S22.000A] Compression fracture of T11 vertebra with routine healing  Precautions:    Chart, physical therapy assessment, plan of care and goals were reviewed. Time In:1330  Time Out:1500    Patient seen for: Balance activities; Family training;Gait training;Patient education; Therapeutic exercise;Transfer training; Wheelchair mobility    Pain:  Pt pain was reported as 3 pre-treatment.   Pt pain was reported as 3 post-treatment. Intervention: rest as needed    Patient identified with name and :yes    SUBJECTIVE:      \"I'm ready to try hard. \"    OBJECTIVE DATA SUMMARY:    Objective:     GROSS ASSESSMENT Daily Assessment      Pt required increased repetition of cues for sequencing of sit to stand transfers despite performing multiple repetitions in a row. Pt demonstrates mild off and on confusion reporting details of previous conversations incorrectly. BED/MAT MOBILITY Daily Assessment     Rolling Right : 5 (Supervision)  Rolling Left : 5 (Supervision)  Supine to Sit : 4 (Contact guard assistance)      TRANSFERS Daily Assessment     Other: stand step with RW  Transfer Assistance : 4 (Contact guard assistance)  Sit to Stand Assistance: Contact guard assistance  Car Transfers: Not tested  Frequent cues for proper hand placement during transfers. GAIT Daily Assessment    Gait Description (WDL)      Gait Abnormalities Decreased step clearance, decreased step length, slow samantha    Assistive device Walker, rolling    Ambulation assistance - level surface 4 (Contact guard assistance)    Distance 68 Feet (ft) (40, 15)    Ambulation assistance- uneven surface      Comments         STEPS/STAIRS Daily Assessment     Steps/Stairs ambulated      Assistance Required 0 (Not tested)    Rail Use      Comments      Curbs/Ramps          BALANCE Daily Assessment     Sitting - Static: Good (unsupported)  Sitting - Dynamic: Fair (occasional)  Standing - Static: Fair (with UE support)        WHEELCHAIR MOBILITY Daily Assessment     Able to Propel (ft): 120 feet  Functional Level: 5  Curbs/Ramps Assist Required (FIM Score): 0 (Not tested)  Wheelchair Management: Manages right brake;Manages left brake        THERAPEUTIC EXERCISES Daily Assessment       Seated LE exercises x15        ASSESSMENT:  Pt demonstrated increased independence with functional mobility and decreased reports of pain compared to initial evaluation.   Continues to require encouragement to push herself but encouragement was met with less resistance today. Plan to initiate stair training next session. Progression toward goals:  [x]      Improving appropriately and progressing toward goals  []      Improving slowly and progressing toward goals  []      Not making progress toward goals and plan of care will be adjusted      PLAN:  Patient continues to benefit from skilled intervention to address the above impairments. Continue treatment per established plan of care. Discharge Recommendations:  Home Physical Therapy  Further Equipment Recommendations for Discharge:  rolling walker      Estimated Discharge Date:7/6/2022    Activity Tolerance:   Fair-  Please refer to the flowsheet for vital signs taken during this treatment.     After treatment:   [] Patient left in no apparent distress in bed  [x] Patient left in no apparent distress sitting up in chair  [] Patient left in no apparent distress in w/c mobilizing under own power  [] Patient left in no apparent distress dining area  [] Patient left in no apparent distress mobilizing under own power  [x] Call bell left within reach  [x] Nursing notified  [] Caregiver present  [] Bed alarm activated   [x] Chair alarm activated      Mo Norris, EMMA  6/20/2022

## 2022-06-20 NOTE — PROGRESS NOTES
Problem: Self Care Deficits Care Plan (Adult)  Goal: *Acute Goals and Plan of Care (Insert Text)  Description: Occupational Therapy Goals   Long Term Goals  Initiated  and to be accomplished within 2 week(s), by (2022)  1. Pt will perform self-feeding with independence. 2. Pt will perform grooming with MOD I.  3. Pt will perform UB bathing with MOD I.  4. Pt will perform LB bathing with MOD I.  5. Pt will perform tub/shower transfer with MOD I.   6. Pt will perform UB dressing with MOD I.  7. Pt will perform LB dressing with no more than MIN A for shoe management. 8. Pt will perform toileting task with MOD I  9. Pt will perform toilet transfer with MOD I. Short Term Goals   Initiated 2022 and to be accomplished within 7 day(s), by (2022)  1. Pt will perform grooming with no more than MIN A from w/c at sink. 2. Pt will perform UB bathing with no more than MIN A from w/c with basin or TTB. 3. Pt will perform LB bathing with MOD A.  4. Pt will perform tub/shower transfer with MOD A  5. Pt will perform UB dressing with setup A. 6. Pt will perform LB dressing with MOD A  7. Pt will perform toileting task with MIN A.  8. Pt will perform toilet transfer with MOD A. Outcome: Progressing Towards Goal  Goal: Interventions  Outcome: Progressing Towards Goal   Occupational Therapy TREATMENT    Patient: Tacos Owens   68 y.o. Patient identified with name and : yes    Date: 2022    First Tx Session  Time In: 1 (OT group there ex session)  Time Out: 56    Second Tx Session  Time In: 1030  Time Out: 8650    Diagnosis: Compression fracture of body of thoracic vertebra ([facility] [address] [S22.000A]   Precautions:  Safety precautions; Fall precautions; Spinal precautions  Chart, occupational therapy assessment, plan of care, and goals were reviewed.      Pain:  Pt reports 5/10 pain or discomfort prior to treatment; lower back/ sacrum (aching)   Pt reports 5/10 pain or discomfort post treatment; lower back/ sacrum (aching)  Intervention Provided: repositioning; intermittent rest breaks. Pt denies need for pain medication at this time. Tasks modified based on pt's tolerance. SUBJECTIVE:   Patient stated I need to use the bathroom.     OBJECTIVE DATA SUMMARY:     THERAPEUTIC ACTIVITY Daily Assessment    RUE/ LUE there act performed for reaching out-front and overhead to secure graded clothespins onto vertical/ horizontal tabletop stand. Therapist providing verbal instruction with demonstration for task performance. Performed for range of motion and pinch strength for increased functional independence with ADL's and mobility using LRAD (FWW). RUE/ LUE there act performed for reaching out-front to insert (followed by removal) of large colored pegs onto vertical foam pegboard; increased task challenge with use of 1.5 lb wrist weights. Therapist providing verbal cues for color selection and for peg placement location. Performed for [facility] (tip pinch; in-hand manipulation), ROM, and strengthening for increased independence with self-cares. Increased time for task performance. Verbal cues and redirection to facilitate focused attention on task. Therapist introduced using of AE during today's treatment session. Edu/ instruction with demonstration and opportunity for practice in use of AE during LB dressing task (doffing/ donning slipper socks) using reacher, dressing stick, and sock aid. GROOMING Daily Assessment    Functional Level: 5 (set-up)  Tasks Completed by Patient: Washed hands  Comments: Pt performed hand hygiene (set-up) w/c at sink following toileting self-care. TOILETING Daily Assessment    Functional Level: 3 (CM performed (Mod A); hygiene SBA)  Comments: Pt voided using elevated seat over toilet; pt requiring Mod A for CM (pants up/ down) and SBA for hygiene. Verbal cues for maintaining spinal precautions.       LOWER BODY DRESSING Daily Assessment    Functional Level: 3 (Mod A (doff/ donning socks using AE))  Items Applied/Steps Completed: Sock, left (1 step); Sock, right (1 step)  Comments: Patient doffed/ donned slipper socks (Mod A) w/c level; edu/ instruction with opportunity for practice using AE for LB dressing task e.g. reacher, sock aid, and dressing stick. Verbal cues for task sequencing and for focused attention on task. Sock and/or Shoe management: 3      MOBILITY/TRANSFERS Daily Assessment    Toilet Transfer Score: 4 (Min A)  Comments: Stand step xfer w/c <-> elevated seat over toilet; grab bar. Gait belt for safety. Verbal cues for hand placement and body positioning for adhering to spinal precautions. ASSESSMENT:  Today's skilled occupational therapy session focused on RUE/ LUE there act (FM skills; ROM), edu/ instruction in use of AE for LB ADL's, functional transfer training, and participation during occupational therapy group (there ex) session for increased functional independence and safety with ADL's and mobility. Patient requiring frequent verbal cues and redirection for improved attention to task. Verbal cues and reminders for maintaining spinal precautions. Progression toward goals:  []          Improving appropriately and progressing toward goals  [x]          Improving slowly and progressing toward goals  []          Not making progress toward goals and plan of care will be adjusted     PLAN:  Patient continues to benefit from skilled intervention to address the above impairments. Continue treatment per established plan of care. Discharge Recommendations:  Home Health occupational therapy with assist/ supv at home  Further Equipment Recommendations for Discharge:  bedside commode, gait belt, and transfer bench     Activity Tolerance:  Fair; intermittent rest breaks due to fatigue and decreased activity tolerance  Estimated LOS: 7/6/2022    Please refer to the flowsheet for vital signs taken during this treatment.   After treatment:   []  Patient left in no apparent distress sitting up in chair   [x]  Patient left in no apparent distress in bed with needs met  [x]  Call bell left within reach  [x]  Nursing notified  []  Caregiver present  [x]  Bed alarm activated    COMMUNICATION/EDUCATION:   [x] Home safety education was provided and the patient/caregiver indicated understanding. [x] Patient/family have participated as able in goal setting and plan of care. [x] Patient/family agree to work toward stated goals and plan of care. [] Patient understands intent and goals of therapy, but is neutral about his/her participation. [] Patient is unable to participate in goal setting and plan of care. [address] [place] [address],     OCCUPATIONAL THERAPY GROUP THERAPY TREATMENT     Patient: Patricio Womack (57 y.o. female)  Date: 2022  Diagnosis: Compression fracture of body of thoracic vertebra ([address] [S22.000A] Compression fracture of T11 vertebra with routine healing  Precautions:  Fall precautions; Safety precautions; Spinal precautions      Time In: 1003  Time Out: 1030     Patient seen for: Occupational Therapy Group there ex session  Pain:  Pt reports 5/10 pain or discomfort prior to treatment; lower back/ sacrum (aching)   Pt reports 5/10 pain or discomfort post treatment; lower back/ sacrum (aching)  Intervention Provided: repositioning; intermittent rest breaks. Pt denies need for pain medication at this time. Tasks modified based on pt's tolerance. Patient identified with name and : yes     SUBJECTIVE:        Patient agreeable to participate in OT group therapy session. OBJECTIVE DATA SUMMARY:     Objective:       Therex   Sets Reps Comments   Shoulder flexion/extension to ~85 degrees (arm raises) 2 10 2 lb hand weights; alternating RUE/ LUE   Forward rolls        2       10      2 lb hand weights   Bicep curls        2       15      2 lb hand weights   Chest Press        2       10       2 lb hand weights   Wrist flexion/extension        2       10 Yellow flexbar   ER/IR        2       10      Yellow flexbar   Forearm pronation/ supination        2       10      Yellow flexbar       Therac   Sets   Reps   Comments   Beach ball toss/catch Multiple Multiple Patient participated in beach ball toss/ catching activity with therapist and fellow group member to facilitate active engagement/ social interaction (during questions/ answers e.g. favorite hobby, recipe, vacation etc.). Performed for range of motion and activity tolerance for increased (I) with ADL's. Verbal cues for maintaining spinal precautions. ASSESSMENT:  Progression toward goals:  []          Patient participated fully in group therapy session and able to tolerate all activities  [x]          Patient able to participate in group therapy session with only minor modifications and/or extended rest breaks needed. []          Patient not able to tolerate group therapy session. PLAN:  Patient continues to benefit from skilled intervention to address functional impairments. Patient is appropriate to continue group therapy sessions to promote increased participation in skilled therapy interventions and to provide opportunities for increased social interaction.            After treatment:   [x]  Patient in no apparent distress sitting up in wheelchair with needs met for continuation of skilled OT treatment session  []  Patient left in no apparent distress in bed  []  Call bell left within reach  []  Nursing notified  []  Caregiver present  []  Bed alarm activated      Alonzo Arellano OT  6/20/2022

## 2022-06-20 NOTE — REHAB NOTE
SHIFT CHANGE NOTE FOR [facility]    Bedside and Verbal shift change report given to Avelino Escobedo RN (oncoming nurse) by Penny Mccann RN   (offgoing nurse). Report included the following information SBAR, Kardex, MAR and Recent Results.     Situation:   Code Status: Full Code   Reason for Admission: Spinal Stenosis  Hospital Day: 3   Problem List:   Hospital Problems  Date Reviewed: 6/20/2022          Codes Class Noted POA    Status post kyphoplasty ICD-10-CM: Z98.890  ICD-9-CM: V45.89  6/13/2022 Yes    Overview Signed 6/20/2022  4:37 PM by Luh Pavon MD     S/P Image guided T11 kyphoplasty; Image guided bilateral sacroplasty (6/13/2022 - Dr. Jorge A Grewal)             * (Principal) Compression fracture of T11 vertebra with routine healing ICD-10-CM: S22.080D  ICD-9-CM: V54.17  6/9/2022 Yes        Bilateral sacral insufficiency fracture with routine healing ICD-10-CM: M84.48XD  ICD-9-CM: V54.27  6/9/2022 Yes        Generalized weakness ICD-10-CM: R53.1  ICD-9-CM: 780.79  6/9/2022 Yes        Impaired mobility and ADLs ICD-10-CM: Z74.09, Z78.9  ICD-9-CM: V49.89  6/9/2022 Yes              Background:   Past Medical History:   Past Medical History:   Diagnosis Date    Acid reflux     Bilateral sacral insufficiency fracture with routine healing 6/9/2022    Compression fracture of T11 vertebra with routine healing 6/9/2022    Hypertension     Spinal stenosis       Patient taking anticoagulants no  Patient has a defibrillator: no     Assessment:   Changes in Assessment throughout shift: none     Patient has central line: no Reasons if yes: no  Insertion date:na Last dressing date:na   Patient has Nance Cath: no Reasons if yes: na   Insertion date:na     Last Vitals:     Vitals:    06/19/22 1523 06/19/22 2104 06/20/22 0729 06/20/22 1516   BP: 135/81 136/82 (!) 141/73 115/75   Pulse: (!) 103 (!) 115 98 (!) 111   Resp: 19 18 16 14   Temp: 97.8 °F (36.6 °C) 97.1 °F (36.2 °C) 96.8 °F (36 °C) 97 °F (36.1 °C)   SpO2: 96% 97% 97% 97%   Weight:       Height:            PAIN    Pain Assessment    Pain Intensity 1: 6 (06/20/22 1837) Pain Intensity 1: 2 (12/29/14 1105)    Pain Location 1: Back Pain Location 1: Abdomen    Pain Intervention(s) 1: Distraction,Family Support,Position,Repositioned Pain Intervention(s) 1: Medication (see MAR)  Patient Stated Pain Goal: 0 Patient Stated Pain Goal: 0  o Intervention effective: yes   o Other actions taken for pain: yes     Skin Assessment  Skin color    Condition/Temperature    Integrity    Turgor    Weekly Pressure Ulcer Documentation  Pressure  Injury Documentation: No Pressure Injury Noted-Pressure Ulcer Prevention Initiated  Wound Prevention & Protection Methods  Orientation of wound Orientation of Wound Prevention: Posterior  Location of Prevention Location of Wound Prevention: Sacrum/Coccyx  Dressing Present Dressing Present : No  Dressing Status Dressing Status: Intact  Wound Offloading Wound Offloading (Prevention Methods): Bed, pressure redistribution/air     INTAKE/OUPUT  Date 06/19/22 1900 - 06/20/22 0659 06/20/22 0700 - 06/21/22 0659   Shift 4180-8184 24 Hour Total 1495-4733 1008-9606 24 Hour Total   INTAKE   P.O.  480 480  480     P. O.  480 480  480   Shift Total(mL/kg)  480(6.8) 480(6.8)  480(6.8)   OUTPUT   Urine(mL/kg/hr)          Urine Occurrence(s) 2 x 6 x 2 x  2 x   Emesis/NG output          Emesis Occurrence(s)   0 x  0 x   Stool          Stool Occurrence(s) 0 x 0 x 0 x  0 x   Shift Total(mL/kg)        NET  480 480  480   Weight (kg) 71 71 71 71 71       Recommendations:  1. Patient needs and requests: none @ present    2. Diet: REGULAR    3. Pending tests/procedures: labs     4. Functional Level/Equipment: Bedrest @ present  5. Estimated Discharge Date: none yet Posted on Whiteboard in Patients Room: no none yet    HEALS Safety Check    A safety check occurred in the patient's room between off going nurse and oncoming nurse listed above.     The safety check included the below items  Area Items   H  High Alert Medications - Verify all high alert medication drips (heparin, PCA, etc.)   E  Equipment - Suction is set up for ALL patients (with catalina)  - Red plugs utilized for all equipment (IV pumps, etc.)  - WOWs wiped down at end of shift.  - Room stocked with oxygen, suction, and other unit-specific supplies   A  Alarms - Bed alarm is set for fall risk patients  - Ensure chair alarm is in place and activated if patient is up in a chair   L  Lines - Check IV for any infiltration  - Nance bag is empty if patient has a Nance   - Tubing and IV bags are labeled   S  Safety   - Room is clean, patient is clean, and equipment is clean. - Hallways are clear from equipment besides carts. - Fall bracelet on for fall risk patients  - Ensure room is clear and free of clutter  - Suction is set up for ALL patients (with catalina)  - Hallways are clear from equipment besides carts.    - Isolation precautions followed, supplies available outside room, sign posted   changeshift

## 2022-06-21 PROCEDURE — 74011250637 HC RX REV CODE- 250/637: Performed by: INTERNAL MEDICINE

## 2022-06-21 PROCEDURE — 97530 THERAPEUTIC ACTIVITIES: CPT

## 2022-06-21 PROCEDURE — 97116 GAIT TRAINING THERAPY: CPT

## 2022-06-21 PROCEDURE — 97112 NEUROMUSCULAR REEDUCATION: CPT

## 2022-06-21 PROCEDURE — 97535 SELF CARE MNGMENT TRAINING: CPT

## 2022-06-21 PROCEDURE — 65310000000 HC RM PRIVATE REHAB

## 2022-06-21 PROCEDURE — 74011250637 HC RX REV CODE- 250/637: Performed by: EMERGENCY MEDICINE

## 2022-06-21 PROCEDURE — 99232 SBSQ HOSP IP/OBS MODERATE 35: CPT | Performed by: INTERNAL MEDICINE

## 2022-06-21 PROCEDURE — 2709999900 HC NON-CHARGEABLE SUPPLY

## 2022-06-21 RX ORDER — LOSARTAN POTASSIUM 50 MG/1
50 TABLET ORAL
Status: DISCONTINUED | OUTPATIENT
Start: 2022-06-22 | End: 2022-06-23

## 2022-06-21 RX ORDER — LOSARTAN POTASSIUM 50 MG/1
100 TABLET ORAL DAILY
Status: DISCONTINUED | OUTPATIENT
Start: 2022-06-22 | End: 2022-06-21

## 2022-06-21 RX ORDER — HYDRALAZINE HYDROCHLORIDE 25 MG/1
25 TABLET, FILM COATED ORAL
Status: DISCONTINUED | OUTPATIENT
Start: 2022-06-21 | End: 2022-06-22

## 2022-06-21 RX ORDER — METOPROLOL TARTRATE 50 MG/1
50 TABLET ORAL EVERY 12 HOURS
Status: DISCONTINUED | OUTPATIENT
Start: 2022-06-21 | End: 2022-06-24

## 2022-06-21 RX ADMIN — NORTRIPTYLINE HYDROCHLORIDE 40 MG: 10 CAPSULE ORAL at 20:30

## 2022-06-21 RX ADMIN — METOPROLOL TARTRATE 50 MG: 50 TABLET, FILM COATED ORAL at 20:30

## 2022-06-21 RX ADMIN — Medication 1 TABLET: at 17:28

## 2022-06-21 RX ADMIN — ATORVASTATIN CALCIUM 10 MG: 10 TABLET, FILM COATED ORAL at 08:54

## 2022-06-21 RX ADMIN — OXYCODONE HYDROCHLORIDE 10 MG: 5 TABLET ORAL at 04:07

## 2022-06-21 RX ADMIN — Medication 1 TABLET: at 08:55

## 2022-06-21 RX ADMIN — GABAPENTIN 400 MG: 400 CAPSULE ORAL at 20:30

## 2022-06-21 RX ADMIN — DOCUSATE SODIUM 100 MG: 100 CAPSULE, LIQUID FILLED ORAL at 08:50

## 2022-06-21 RX ADMIN — GABAPENTIN 400 MG: 400 CAPSULE ORAL at 13:46

## 2022-06-21 RX ADMIN — OXYCODONE HYDROCHLORIDE 10 MG: 5 TABLET ORAL at 19:33

## 2022-06-21 RX ADMIN — PANTOPRAZOLE SODIUM 40 MG: 40 TABLET, DELAYED RELEASE ORAL at 06:51

## 2022-06-21 RX ADMIN — GABAPENTIN 400 MG: 400 CAPSULE ORAL at 08:54

## 2022-06-21 RX ADMIN — DOCUSATE SODIUM 100 MG: 100 CAPSULE, LIQUID FILLED ORAL at 17:28

## 2022-06-21 NOTE — ROUTINE PROCESS
Wilda Jordan called and requested for her to speak to patient concerning meals.  Patient has only eaten 15-50% of meals stating they dont have any flavor/taste yesterday and 15% this am breakfast. Patient given magic cup as suppliment and states she tasted it and she enjoyed it

## 2022-06-21 NOTE — ROUTINE PROCESS
SHIFT CHANGE NOTE FOR [facility]    Bedside and Verbal shift change report given to David Ghotra RN (oncoming nurse) by Sandra Perez RN (offgoing nurse). Report included the following information SBAR, Kardex, MAR and Recent Results. Situation:   Code Status: Full Code   Hospital Day: 4   Problem List:   Hospital Problems  Date Reviewed: 6/20/2022          Codes Class Noted POA    Status post kyphoplasty ICD-10-CM: Z98.890  ICD-9-CM: V45.89  6/13/2022 Yes    Overview Signed 6/20/2022  4:37 PM by Jose Maria Judd MD     S/P Image guided T11 kyphoplasty; Image guided bilateral sacroplasty (6/13/2022 - Dr. Jomar Chakraborty)             * (Principal) Compression fracture of T11 vertebra with routine healing ICD-10-CM: S22.080D  ICD-9-CM: V54.17  6/9/2022 Yes        Bilateral sacral insufficiency fracture with routine healing ICD-10-CM: M84.48XD  ICD-9-CM: V54.27  6/9/2022 Yes        Generalized weakness ICD-10-CM: R53.1  ICD-9-CM: 780.79  6/9/2022 Yes        Impaired mobility and ADLs ICD-10-CM: Z74.09, Z78.9  ICD-9-CM: V49.89  6/9/2022 Yes        Sciatica of right side ICD-10-CM: M54.31  ICD-9-CM: 724.3  2/7/2018 Yes        Essential hypertension, benign ICD-10-CM: I10  ICD-9-CM: 401.1  1/29/2009 Yes              Background:   Past Medical History:   Past Medical History:   Diagnosis Date    Acid reflux     Bilateral sacral insufficiency fracture with routine healing 6/9/2022    Chronic anemia     Compression fracture of T11 vertebra with routine healing 6/9/2022    Hypertension     Spinal stenosis         Assessment:   Changes in Assessment throughout shift:       Patient has a central line: no Reasons if yes: Insertion date: Last dressing date:   Patient has Ellis Cath: no Reasons if yes:     Insertion date:  Shift ellis care completed:      Last Vitals:     Vitals:    06/21/22 0826 06/21/22 0910 06/21/22 1603 06/21/22 1816   BP: 119/70  116/70    Pulse: (!) 110  (!) 107    Resp: 18  16    Temp: 97.1 °F (36.2 °C)  97 °F (36.1 °C)    SpO2: 96%  97%    Weight:  68.8 kg (151 lb 9.6 oz)     Height:  5' 5\" (1.651 m)  5' 5\" (1.651 m)        PAIN    Pain Assessment    Pain Intensity 1: 0 (06/21/22 1603) Pain Intensity 1: 2 (12/29/14 1105)    Pain Location 1: Back,Hip Pain Location 1: Abdomen    Pain Intervention(s) 1: Medication (see MAR) Pain Intervention(s) 1: Medication (see MAR)  Patient Stated Pain Goal: 0 Patient Stated Pain Goal: 0  o Intervention effective: yes  o Other actions taken for pain:       Skin Assessment  Skin color    Condition/Temperature    Integrity    Turgor    Weekly Pressure Ulcer Documentation  Pressure  Injury Documentation: No Pressure Injury Noted-Pressure Ulcer Prevention Initiated  Wound Prevention & Protection Methods  Orientation of wound Orientation of Wound Prevention: Posterior  Location of Prevention Location of Wound Prevention: Buttocks,Sacrum/Coccyx  Dressing Present Dressing Present : No  Dressing Status Dressing Status: Intact  Wound Offloading Wound Offloading (Prevention Methods): Bed, pressure redistribution/air    Wound Prevention Checklist  Precautions:      []  Heel prevention boots placed on patient    []  Patient turned q2h during shift    []  Valeriy Order Set ordered    []  Patient on [facility] bed/Specialty bed    []  Each Wound is documented during shift (Stage, Color, drainage, odor, measurements, and dressings)    [x]  Dual skin checks done at bedside during shift report with Timmy Arce RN     INTAKE/OUPUT  Date 06/20/22 0700 - 06/21/22 0659 06/21/22 0700 - 06/22/22 0659   Shift 8226-0516 5410-2252 24 Hour Total 8344-0612 9013-3189 24 Hour Total   INTAKE   P.O. 480  480 480  480     P. O. 480  480 480  480   Shift Total(mL/kg) 480(6.8)  480(6.8) 480(7)  480(7)   OUTPUT   Urine(mL/kg/hr)           Urine Occurrence(s) 4 x 2 x 6 x 2 x  2 x   Emesis/NG output           Emesis Occurrence(s) 0 x  0 x      Stool           Stool Occurrence(s) 0 x 0 x 0 x 1 x  1 x   Shift Total(mL/kg)           480 480  480   Weight (kg) 71 71 71 68.8 68.8 68.8       Recommendations:  1. Patient needs and requests: Ensure and magic cup with meal    2. Pending tests/procedures:      3. Functional Level/Equipment: Partial (one person) / Wheelchair    Fall Precautions:   Fall risk precautions were reinforced with the patient; she was instructed to call for help prior to getting up. The following fall risk precautions were continued: bed/ chair alarms, door signage, yellow bracelet and socks as well as update of the Soren Guzman tool in the patient's room. Yinka Score: 4    HEALS Safety Check    A safety check occurred in the patient's room between off going nurse and oncoming nurse listed above. The safety check included the below items  Area Items   H  High Alert Medications - Verify all high alert medication drips (heparin, PCA, etc.)   E  Equipment - Suction is set up for ALL patients (with catalina)  - Red plugs utilized for all equipment (IV pumps, etc.)  - WOWs wiped down at end of shift.  - Room stocked with oxygen, suction, and other unit-specific supplies   A  Alarms - Bed alarm is set for fall risk patients  - Ensure chair alarm is in place and activated if patient is up in a chair   L  Lines - Check IV for any infiltration  - Nance bag is empty if patient has a Nance   - Tubing and IV bags are labeled   S  Safety   - Room is clean, patient is clean, and equipment is clean. - Hallways are clear from equipment besides carts. - Fall bracelet on for fall risk patients  - Ensure room is clear and free of clutter  - Suction is set up for ALL patients (with catalina)  - Hallways are clear from equipment besides carts.    - Isolation precautions followed, supplies available outside room, sign posted     Jimi Do RN

## 2022-06-21 NOTE — PROGRESS NOTES
Problem: Patient Education: Go to Patient Education Activity  Goal: Patient/Family Education  Outcome: Progressing Towards Goal     Problem: Pain  Goal: *Control of Pain  Outcome: Progressing Towards Goal  Goal: *PALLIATIVE CARE:  Alleviation of Pain  Outcome: Progressing Towards Goal     Problem: Patient Education: Go to Patient Education Activity  Goal: Patient/Family Education  Outcome: Progressing Towards Goal     Problem: Falls - Risk of  Goal: *Absence of Falls  Description: Document Yinka Fall Risk and appropriate interventions in the flowsheet. Outcome: Progressing Towards Goal  Note: Fall Risk Interventions:  Mobility Interventions: Bed/chair exit alarm,Patient to call before getting OOB    Mentation Interventions: Adequate sleep, hydration, pain control    Medication Interventions: Bed/chair exit alarm,Patient to call before getting OOB    Elimination Interventions: Bed/chair exit alarm,Call light in reach,Elevated toilet seat,Patient to call for help with toileting needs    History of Falls Interventions: Bed/chair exit alarm,Room close to nurse's station         Problem: Patient Education: Go to Patient Education Activity  Goal: Patient/Family Education  Outcome: Progressing Towards Goal     Problem: Impaired Skin Integrity/Pressure Injury Treatment  Goal: *Improvement of Existing Pressure Injury  Outcome: Progressing Towards Goal  Goal: *Prevention of pressure injury  Description: Document Valeriy Scale and appropriate interventions in the flowsheet.   Outcome: Progressing Towards Goal  Note: Pressure Injury Interventions:  Sensory Interventions: Assess changes in LOC    Moisture Interventions: Maintain skin hydration (lotion/cream)    Activity Interventions: Increase time out of bed,Pressure redistribution bed/mattress(bed type)    Mobility Interventions: Pressure redistribution bed/mattress (bed type)    Nutrition Interventions: Document food/fluid/supplement intake    Friction and Shear Interventions: HOB 30 degrees or less                Problem: Patient Education: Go to Patient Education Activity  Goal: Patient/Family Education  Outcome: Progressing Towards Goal     Problem: Anxiety  Goal: *Alleviation of anxiety  Outcome: Progressing Towards Goal  Goal: *Alleviation of anxiety (Palliative Care)  Outcome: Progressing Towards Goal     Problem: Patient Education: Go to Patient Education Activity  Goal: Patient/Family Education  Outcome: Progressing Towards Goal     Problem: Hypertension  Goal: *Blood pressure within specified parameters  Outcome: Progressing Towards Goal  Goal: *Fluid volume balance  Outcome: Progressing Towards Goal  Goal: *Labs within defined limits  Outcome: Progressing Towards Goal     Problem: Patient Education: Go to Patient Education Activity  Goal: Patient/Family Education  Outcome: Progressing Towards Goal     Problem: Patient Education: Go to Patient Education Activity  Goal: Patient/Family Education  Outcome: Progressing Towards Goal     Problem: Pressure Injury - Risk of  Goal: *Prevention of pressure injury  Description: Document Valeriy Scale and appropriate interventions in the flowsheet.   Outcome: Progressing Towards Goal     Problem: Patient Education: Go to Patient Education Activity  Goal: Patient/Family Education  Outcome: Progressing Towards Goal     Problem: Patient Education: Go to Patient Education Activity  Goal: Patient/Family Education  Outcome: Progressing Towards Goal     Problem: Patient Education: Go to Patient Education Activity  Goal: Patient/Family Education  Outcome: Progressing Towards Goal

## 2022-06-21 NOTE — PROGRESS NOTES
[facility] PHYSICAL REHABILITATION  [address] [place] [address], [address]     INPATIENT REHABILITATION  DAILY PROGRESS NOTE     Date: 6/21/2022    Name: Kavita Varghese Age / Sex: 68 y.o. / female   CSN: [id number] MRN: [id number]   Admit Date: 6/17/2022 Length of Stay: 4 days     Primary Rehabilitation Diagnosis: Generalized weakness with Impaired mobility and ADLs secondary to:  1. Compression fracture of T11 vertebra with routine healing  2. Bilateral sacral insufficiency fracture with routine healing  3. S/P Image guided T11 kyphoplasty; Image guided bilateral sacroplasty (6/13/2022 - Dr. Felisa Garcia)      Subjective:     Patient seen and examined. Blood pressure fairly controlled. Heart rate in the 100's. Patient sitting in recliner with  at bedside. Patient's Complaint:   No significant medical complaints    Pain Control: ongoing significant pain in which is stable and controlled by current meds      Objective:     Vital Signs:  Patient Vitals for the past 24 hrs:   BP Temp Pulse Resp SpO2 Height Weight   06/21/22 0910 -- -- -- -- -- 5' 5\" (1.651 m) 68.8 kg (151 lb 9.6 oz)   06/21/22 0826 119/70 97.1 °F (36.2 °C) (!) 110 18 96 % -- --   06/20/22 2100 123/68 97 °F (36.1 °C) (!) 101 18 95 % -- --   06/20/22 1516 115/75 97 °F (36.1 °C) (!) 111 14 97 % -- --        Physical Examination:  GENERAL SURVEY: Patient is awake, alert, oriented x 3, sitting comfortably on the wheelchair, not in acute respiratory distress.   HEENT: pale palpebral conjunctivae, anicteric sclerae, no nasoaural discharge, moist oral mucosa  NECK: supple, no jugular venous distention, no palpable lymph nodes  CHEST/LUNGS: symmetrical chest expansion, good air entry, clear breath sounds  HEART: adynamic precordium, good S1 S2, no S3, regular rhythm, no murmurs  ABDOMEN: flat, bowel sounds appreciated, soft, non-tender  EXTREMITIES: pale nailbeds, no edema, full and equal pulses, no calf tenderness   NEUROLOGICAL EXAM: The patient is awake, alert and oriented x3, able to answer questions fairly appropriately, able to follow 1 and 2 step commands. Able to tell time from the wall clock. Cranial nerves II-XII are grossly intact. No gross sensory deficit. Motor strength is 4+/5 on BUE, 4-/5 on BLE.        Current Medications:  Current Facility-Administered Medications   Medication Dose Route Frequency    gabapentin (NEURONTIN) capsule 400 mg  400 mg Oral TID    nortriptyline (PAMELOR) capsule 40 mg  40 mg Oral QHS    acetaminophen (TYLENOL) tablet 650 mg  650 mg Oral Q4H PRN    oxyCODONE IR (ROXICODONE) tablet 10 mg  10 mg Oral Q4H PRN    LORazepam (ATIVAN) tablet 0.5 mg  0.5 mg Oral BID PRN    ondansetron (ZOFRAN ODT) tablet 4 mg  4 mg Oral Q8H PRN    amLODIPine (NORVASC) tablet 10 mg  10 mg Oral DAILY    atorvastatin (LIPITOR) tablet 10 mg  10 mg Oral DAILY    ferrous gluconate 324 mg (37.5 mg iron) tablet 1 Tablet  324 mg Oral BID WITH MEALS    hydrALAZINE (APRESOLINE) tablet 25 mg  25 mg Oral TID    losartan (COZAAR) tablet 50 mg  50 mg Oral DAILY    pantoprazole (PROTONIX) tablet 40 mg  40 mg Oral ACB    docusate sodium (COLACE) capsule 100 mg  100 mg Oral BID    bisacodyL (DULCOLAX) tablet 10 mg  10 mg Oral Q48H PRN    melatonin tablet 3 mg  3 mg Oral QHS PRN    naloxone (NARCAN) injection 0.2 mg  0.2 mg IntraVENous PRN       Allergies:  No Known Allergies      Functional Progress:    PHYSICAL THERAPY    ON ADMISSION MOST RECENT   Wheelchair Mobility/Management  Able to Propel (ft): 60 feet  Functional Level: 4  Curbs/Ramps Assist Required (FIM Score): 0 (Not tested)  Wheelchair Setup Assist Required : 2 (Maximal assistance)  Wheelchair Management: Manages right brake,Manages left brake Wheelchair Mobility/Management  Able to Propel (ft): 120 feet  Functional Level: 5  Curbs/Ramps Assist Required (FIM Score): 0 (Not tested)  Wheelchair Setup Assist Required : 2 (Maximal assistance)  Wheelchair Management: Manages right brake,Manages left brake     Gait  Amount of Assistance: 4 (Minimal assistance)  Distance (ft): 25 Feet (ft)  Assistive Device: Walker, rolling Gait  Amount of Assistance: 4 (Contact guard assistance)  Distance (ft):  (100)  Assistive Device: Walker, rolling     Balance-Sitting/Standing  Sitting - Static: Good (unsupported)  Sitting - Dynamic: Fair (occasional)  Standing - Static: Fair  Standing - Dynamic : Impaired Balance-Sitting/Standing  Sitting - Static: Fair (occasional)  Sitting - Dynamic: Fair (occasional)  Standing - Static: Fair  Standing - Dynamic : Impaired     Bed/Mat Mobility  Rolling Right : 5 (Stand-by assistance)  Rolling Left : 5 (Stand-by assistance)  Supine to Sit : 4 (Minimal assistance)  Sit to Supine : 3 (Moderate assistance) Bed/Mat Mobility  Rolling Right : 5 (Supervision)  Rolling Left : 5 (Supervision)  Supine to Sit : 5 (Stand-by assistance)  Sit to Supine :  (CGA)     Transfers  Other: stand step with RW  Transfer Assistance : 4 (Minimal assistance)  Sit to Stand Assistance: Minimal assistance  Car Transfers: Not tested  Car Type: car simulator Transfers  Other: stand step with RW  Transfer Assistance : 4 (Contact guard assistance)  Sit to Stand Assistance: Contact guard assistance  Car Transfers: Minimum assistance  Car Type: car simulator     Steps or Stairs  Steps/Stairs Ambulated (#): 0  Level of Assist : 0 (Not tested)  Rail Use: Both Steps or Stairs  Steps/Stairs Ambulated (#):  (3-4 inch)  Level of Assist : 4 (Contact guard assistance)  Rail Use: Both         Lab/Data Review:  No results found for this or any previous visit (from the past 24 hour(s)). Assessment:     Primary Rehabilitation Diagnosis  1. Generalized weakness with Impaired mobility and ADLs  2. Compression fracture of T11 vertebra with routine healing  3. Bilateral sacral insufficiency fracture with routine healing  4.  S/P Image guided T11 kyphoplasty; Image guided bilateral sacroplasty (6/13/2022 - Dr. Sandee Salazar)    Comorbidities  Patient Active Problem List   Diagnosis Code    Spinal stenosis of lumbar region with neurogenic claudication M48.062    Lumbar facet arthropathy M47.816    Disorder of bone and cartilage M89.9, M94.9    Diverticulosis of colon K57.30    Dyslipidemia E78.5    Essential hypertension, benign I10    Gastroesophageal reflux disease without esophagitis K21.9    Generalized anxiety disorder F41.1    Impaired fasting glucose R73.01    Irritable bowel syndrome with diarrhea K58.0    Melanoma in situ of right upper arm (HCC) D03.61    Primary insomnia F51.01    Sciatica of right side M54.31    Squamous cell carcinoma [id number]    Lumbar stenosis with neurogenic claudication M48.062    Depression, recurrent ([facility]) F33.9    Spinal stenosis M48.00    Leg weakness, bilateral R29.898    Atrial fibrillation with rapid ventricular response ([facility]) I48.91    Compression fracture of T11 vertebra with routine healing S22.080D    Bilateral sacral insufficiency fracture with routine healing M84.48XD    Status post kyphoplasty Z98.890    Generalized weakness R53.1    Impaired mobility and ADLs Z74.09, Z78.9    Chronic anemia D64.9       Plan:     1. Justification for continued stay: Good progression towards established rehabilitation goals. 2. Medical Issues being followed closely:    [x]  Fall and safety precautions     []  Wound Care     [x]  Bowel and Bladder Function     [x]  Fluid Electrolyte and Nutrition Balance     [x]  Pain Control      3. Issues that 24 hour rehabilitation nursing is following:    [x]  Fall and safety precautions     []  Wound Care     [x]  Bowel and Bladder Function     [x]  Fluid Electrolyte and Nutrition Balance     [x]  Pain Control      [x]  Assistance with and education on in-room safety with transfers to and from the bed, wheelchair, toilet and shower.       4. Acute rehabilitation plan of care:    [x]  Continue current care and rehab. [x]  Physical Therapy           [x]  Occupational Therapy           []  Speech Therapy     []  Hold Rehab until further notice     5. Medications:    [x]  MAR Reviewed     [x]  Continue Present Medications     6. Chemical DVT Prophylaxis:      []  Enoxaparin     []  Unfractionated Heparin     []  Warfarin     []  NOAC     []  Aspirin     [x]  None     7. Mechanical DVT Prophylaxis:      []  YOLY Stockings     [x]  Sequential Compression Device     []  None     8. GI Prophylaxis:      []  PPI     []  H2 Blocker     [x]  None / Not indicated     9. Code status:    [x]  Full code     []  Partial code     []  Do not intubate     []  Do not resuscitate     10. Diet:  Specifications  Low fat/Low cholesterol/High fiber/2 gm Na   Solids (consistency)  Regular   Liquids (consistency)  Thin   Fluid Restriction  None     11.  Orders:   > Compression fracture of T11 vertebra with routine healing; Bilateral sacral insufficiency fracture with routine healing; S/P Image guided T11 kyphoplasty; Image guided bilateral sacroplasty (6/13/2022 - Dr. Nara Rene)   > Thoracic spinal precautions    > Chronic anemia   > Hgb/Hct (6/18/2022, on admission to the ARU) = 8.7/28.5    > Hgb/Hct (6/19/2022) = 9.6/31.8    > Continue Ferrous gluconate 324 mg PO BID with meals    > Dyslipidemia   > Continue Atorvastatin 10 mg PO once daily    > Essential hypertension   > Start Metoprolol tartrate 50 mg PO q 12 hr (9AM, 9PM)   > Continue:    > Amlodipine 10 mg PO once daily (9AM)    > Change Hydralazine from 25 mg PO TID (8AM, 2PM, 8PM) to 25 mg PO TID PRN for SBP greater than 170 mmHg    > In AM, change Losartan from 50 mg PO once daily (9AM) to 50 mg PO q HS    > Gastroesophageal reflux disease   > Continue Pantoprazole 40 mg PO daily before breakfast    > Generalized anxiety disorder   > Continue:    > Nortriptyline 40 mg PO q HS    > Lorazepam 0.5 mg PO BID PRN for anxiety    > Sciatica of right side   > On 6/20/2022, increased Gabapentin from 300 mg to 400 mg PO TID (8AM, 2PM, 8PM)    > Continue:    > Gabapentin 400 mg PO TID (8AM, 2PM, 8PM)    > Nortriptyline 40 mg PO q HS    > Analgesia   > Continue:    > Acetaminophen 650 mg PO q 4 hr PRN for pain level 4/10 or lesser    > Oxycodone IR 10 mg PO q 4 hr PRN for pain level 5/10 or greater       12. Personal Protective Equipment (N95 face mask with eye goggles) was used while interacting with the patient. Patient was using a surgical mask. 15. Patient's progress in rehabilitation and medical issues discussed with the patient. All questions answered to the best of my ability. Care plan discussed with patient and nurse. 14. Total clinical care time is 30 minutes, including review of chart including all labs, radiology, past medical history, and discussion with patient. Greater than 50% of my time was spent in coordination of care and counseling. Signed:    Maria E Stone NP    June 21, 2022       Patient was seen and examined by Maria E Stone NP earlier this PM. The patient was seen and examined by me independently later this PM. I agree with the APC note by Maria E Stone NP. Note was reviewed and appropriate changes were made. Discussed my assessment and plan with Maria E Stone NP as outlined in the above note.        Signed:  Lee Moore MD    June 21, 2022

## 2022-06-21 NOTE — PROGRESS NOTES
conducted a Follow up consultation and Spiritual Assessment for Yaritza Martinez, who is a 68 y.o.,female. The  provided the following Interventions:  Continued the relationship of care and support. Listened empathically. Patient admittedly said, \"I'm a fallen away [address]. So I haven't been in [place] for a while. \"  Offered prayer and assurance of continued prayer on patient's behalf. Chart reviewed. The following outcomes were achieved:  Patient engaged in the gospel discussion  about the dean rule: \"Do unto others what you want others do unto you\" which she recounted as a hard thing to do despite her 47 years marriage to her . She feels her personality is the opposite of her husbands in all things. Offered Act of Spiritual Communion Prayer. Patient expressed gratitude for 's visit. Assessment:  There are no further spiritual or Congregation issues which require Spiritual Care Services interventions at this time. Plan:  Chaplains will continue to follow and will provide pastoral care on an as needed/requested basis.  recommends bedside caregivers page  on duty if patient shows signs of acute spiritual or emotional distress.      Bandar Fitzpatrick 5 [phone]

## 2022-06-21 NOTE — CONSULTS
ARU PSYCHOLOGICAL SCREENING    Assessment Initiated:  June 21, 2022    Rehab Diagnosis:  Sacral Fx    Pertinent Physical/Psychiatric History:     Patient Active Problem List   Diagnosis Code    Spinal stenosis of lumbar region with neurogenic claudication M48.062    Lumbar facet arthropathy M47.816    Disorder of bone and cartilage M89.9, M94.9    Diverticulosis of colon K57.30    Dyslipidemia E78.5    Essential hypertension, benign I10    Gastroesophageal reflux disease without esophagitis K21.9    Generalized anxiety disorder F41.1    Impaired fasting glucose R73.01    Irritable bowel syndrome with diarrhea K58.0    Melanoma in situ of right upper arm (HCC) D03.61    Primary insomnia F51.01    Sciatica of right side M54.31    Squamous cell carcinoma [id number]    Lumbar stenosis with neurogenic claudication M48.062    Depression, recurrent (HCC) F33.9    Spinal stenosis M48.00    Leg weakness, bilateral R29.898    Atrial fibrillation with rapid ventricular response (HCC) I48.91    Compression fracture of T11 vertebra with routine healing S22.080D    Bilateral sacral insufficiency fracture with routine healing M84.48XD    Status post kyphoplasty Z98.890    Generalized weakness R53.1    Impaired mobility and ADLs Z74.09, Z78.9    Chronic anemia D64.9       Patient denies current mental health services but is admitted to ARU with diagnoses for Anxiety and Depression. She reports that PCP has Rx Ativan to her \"for years,\" receiving Pamelor since hospital admit for depression, and Melatonin for sleep management. However, she reports the latter is not necessarily helpful and she often wakes during the night with back pain and discomfort. When wakening, she also reports sometimes feeling stressed with worry. Patient might consume a glass of wine a week, but denies other substance use nor dependence in history.       OBJECTIVE  GENERAL OBSERVATIONS  Willingness to participate in program: [x] good [] fair [] indifferent [] poor    General Appearance:  Patient appears casually and appropriately dressed and groomed and lying upright in bed, immediately responsive and highly engaged on contact    Sensory Impairments:  Patient has satisfactory auditory reception and comprehension and responds to inquiry with intelligibility.   She displays some mild pain behavior and complaint of discomfort while in bed    S[employer] Affiliation:  [employer] [employer]    Admission Assessment  Discharge Status   [x] alert  [] lethargic  [] difficult to arouse  [] fluctuating  [] other: Level of Consciousness [x] alert  [] lethargic  [] difficult to arouse  [] fluctuating  [] other:   [x] person  [x] place  [x] time  [x] situation Oriented [x] person  [x] place  [x] time  [x] situation   [x] within normal limits  [x] impaired       [x] mild        [] moderate        [] severe Attention [x] within normal limits  [x] impaired       [x] mild        [] moderate        [] severe   [x] within normal limits  [x] impaired       [x] mild        [] moderate        [] severe Memory [x] within normal limits  [x] impaired       [x] mild        [] moderate        [] severe   [] appropriate to situation  [x] depressed  [x] anxious  [] angry   [x] fearful  [] emotionally labile  [] other:  Mood [x] appropriate to situation  [] depressed  [x] anxious  [] angry   [] fearful  [] emotionally labile  [] other:   [x] appropriate  [] flat  [] inappropriate to content of speech Affect [x] appropriate  [] flat  [] inappropriate to content of speech   [x] appropriate  [] aggressive/agitated  [] withdrawn  [] inappropriate  [] other: Behavior [x] appropriate  [] aggressive/agitated  [] withdrawn  [] inappropriate  [] other:   [] good  [x] limited  [] denial  [] none Insight Into Illness [x] good  [] limited  [] denial  [] none   [x] intact  [x] impaired       [x] mild        [] moderate        [] severe       Describe: Patient will benefit from cues and prompts for maximum problem solving and recall of new information Cognition [x] intact  [x] impaired       [x] mild        [] moderate        [] severe       Describe:    [x] coping  [] demonstrates poor adjustment  [] undetermined       As evidenced by: Patient acknowledges feeling stressed and worried about long term planning issues for self and spouse Patient Adjustment to Disability [x] coping  [] demonstrates poor adjustment  [] undetermined       As evidenced by:    [] coping  [] demonstrates poor adjustment  [x] undetermined      As evidenced by: Not available on evaluation but she feels well supported by spouse (though he also with orthopedic surgery recovery \"issues\" Family Adjustment to Disability [x] coping  [] demonstrates poor adjustment  [] undetermined      As evidenced by:      ASSESSMENT  Clinical Impression:  Patient is a 68year old, entirely pleasant and cooperative, , retired ([employer]),  female. She and spouse reside in two level residence with three steps to enter but full flight to sleep on second floor in [place]. She reports that spouse is also still in recovery from orthopedic surgery intervention, earlier this year; and, at this time, she is preoccupied and worried about longer term living arrangements for them. She discussed that their daughter resides in [place] and she now feels that they must consider relocating to be closer to her, whereas their son resides in [place] and is also in close contact. While she has some insight about aspects of her surgical recovery, she will benefit from further medical education to best understand all parameters of her expected recovery. As well, she seems to be [employer]" instruction given to her in therapy, such as recommendation to attempt to climb steps in spite of the intervention she has just had with Kyphoplasty.   She certainly seems amenable to any and all explanation, though anxiety could be interfering variable. Emotionally, she does not appear to be in acute distress but is obviously anxious and worried. She presents with history of anxiety and recurrent depression. She reports Rx for Ativan \"for years\" as well as currently Pamelor (presumably for depression) and Melatonin for sleep. Yet, she describes that she wakes up and tends to dwell on worried thoughts. Patient was encouraged to try and focus on her immediate recovery and day to day goals for herself. In turn, she was reminded that other problems to resolve, such as relocation, can only occur satisfactorily if she has good recovery results; hence, her need to maximize her efforts in rehabilitation. Cognitively, patient is entirely alert and oriented. She can understand inquiry and respond appropriately, but sometimes is distracted and, perhaps, preoccupied with worried thoughts. She is observe with at least mild recall difficulty and this may well be exacerbated by distraction, again with worried thinking. Reviewing task assignments/directions may prove helpful in assisting with her focus of attention and recall. Patient Strengths:  Alert, oriented, cooperative and desirous of improvement    Patient Preferences:  Patient expects to return to home with spouse who, himself, is recovery from orthopedic surgery earlier this year    Rehab Potential:  Good though anxiety and worry could be barriers for her    Educational Needs: Under each heading list the specific items in which the patient or family will need education/training.  Example: hip precautions, use of walker, ADL equipment, neglect, judgment, adjustment, etc.     Special considerations or accommodations for teaching:  [x] Yes     [] No     [] NA  If Yes, explain: Variable attention, problem solving and recall with worry, preoccupation and distracting thoughts Discharge Status    Completed Demonstrated/ Verbalized Understanding    Yes No Yes No   Info regarding disability:  [] [] [] [] Adjustment:  [] [] [] []   Cognition:  [] [] [] []   Other: [] [] [] []   Other: [] [] [] []   If education not completed, explain: [] [] [] []     PLAN  Problem: Limited insight about all recovery  Long Term Goal: Increase insight  Intervention: Patient education  At Discharge - LTG Achieved: [x] Yes [] No If not achieved, explain:    Problem: Mood disturbance with anxiety history   Long Term Goal: Maintain mood stability  Intervention: Rx and support , as needed  At Discharge - LTG Achieved: [x] Yes [] No If not achieved, explain: I was not informed of patient experiencing acute distress while in treatment on ARU    Problem: Distraction from task with preoccupied thinking  Long Term Goal: Maintain attention to all task assignments  Intervention: Cues, prompts, repetition, redirection and reinforcement  At Discharge - LTG Achieved: [x] Yes [] No If not achieved, explain: Therapy staff did not notify of problems for her    Problem: Safety and pace in recovery  Long Term Goal: Maximize safety awareness and pace  Intervention: Patient education, behavioral redirection  At Discharge - LTG Achieved: [x] Yes [] No If not achieved, explain:    Problem:   Long Term Goal:   Intervention:   At Discharge - LTG Achieved: [] Yes [] No If not achieved, explain:    Kathie Go, THE [facility]  6/21/2022 10:39 AM    DISCHARGE STATUS    Clinical Impressions: Patient seen for initial evaluation but not follow up and I was not informed by any staff that she was experiencing acute difficulties that required further intervention. Patient received mood stabilizing medication while on ARU. She was able to discharge to home and continue with further recovery as outpatient.     Follow-up Services Recommended Purpose                 Kathie Go, PHD  Discharge Date/Time:

## 2022-06-21 NOTE — PROGRESS NOTES
[x] Psychology  [] Social Work [] Recreational Therapy    INTERVENTION  UNITS/TIME OF SERVICE   Assessment  June 21, 2022   Supportive Counseling    Orientation    Discharge Planning    Resource Linkage              Progress/Current Status    Patient seen for Psychological Evaluation as requested on admission to ARU by MD.  Patient found lying upright in bed and immediately responsive to me. In fact, patient presented as quite animated and engaged. She immediately described her underlying concern about both she and spouse having had orthopedic problems this year (and interventions), and the fact that they must consider alternatives to their current living arrangement, for the longer term. She is admitted to ARU with diagnoses for Anxiety and Depression and Rx Ativan (supposedly for many years), Pamelor (possibly since hospital admit) and Melatonin. She is not followed in outpatient mental health treatment. She admits to underlying worry and distraction due to various issues made complicated by her perceived limitations. Patient encouraged to focus on day to day goals for herself in rehabilitation and thereby try to avoid problems that can be addressed after she recovers. Patient will be monitored for any emotional and/or behavioral difficulties that might arise and be encouraged to persevere in her therapy effort.       Fabrizio Graham, THE [facility] 6/21/2022 10:34 AM

## 2022-06-21 NOTE — PROGRESS NOTES
Problem: Mobility Impaired (Adult and Pediatric)  Goal: *Acute Goals and Plan of Care (Insert Text)  Description: Physical Therapy Short Term Goals  Initiated 2022 and to be accomplished within 7 day(s)- 2022  1. Patient will move from supine to sit and sit to supine  in bed with contact guard assist.    2.  Patient will transfer from bed to chair and chair to bed with contact guard assist using the least restrictive device. 3.  Patient will perform sit to stand with supervision/SBA. 4.  Patient will ambulate with contact guard assist for 100 feet with the least restrictive device. 5.  Patient will ascend/descend 4 stairs with 2 handrail(s) with minimal assistance/contact guard assist.    Physical Therapy Long Term Goals  Initiated 2022 and to be accomplished within 21 day(s)  1. Patient will move from supine to sit and sit to supine  in bed with modified independence. 2.  Patient will transfer from bed to chair and chair to bed with modified independence using the least restrictive device. 3.  Patient will perform sit to stand with modified independence. 4.  Patient will ambulate with modified independence for 150 feet with the least restrictive device. 5.  Patient will ascend/descend 12 stairs with 1 handrail(s) with minimal assistance/contact guard assist.     Outcome: Progressing Towards Goal   PHYSICAL THERAPY TREATMENT    Patient: Edwin Kapadia (41 y.o. female)  Date: 2022  Diagnosis: Compression fracture of body of thoracic vertebra (HCC) [S22.000A] Compression fracture of T11 vertebra with routine healing  Precautions:   Falls  Chart, physical therapy assessment, plan of care and goals were reviewed. Time In:1330  Time Out:1505    Patient seen for: PM;Balance activities;Gait training;Patient education; Therapeutic exercise;Transfer training    Pain:  Pt with pain c/o 5/10 in spine.     Patient identified with name and :  Yes    SUBJECTIVE:      Well, I really don't think I am supposed to be doing the stairs or getting in and out of the car. (Therapist educated patient on the goals that have been established and that she will be challenged with stair and car transfer goals as these are functional activities that she will be doing on a regular basis. Patient received the information positively and she was willing to complete full program.)    OBJECTIVE DATA SUMMARY:    Objective:     GROSS ASSESSMENT Daily Assessment     AROM: Generally decreased, functional  Strength: Generally decreased, functional  Coordination: Within functional limits  Tone: Normal  Sensation: Impaired        TRANSFERS Daily Assessment     Other: stand step with RW  Transfer Assistance : 4 (Contact guard assistance)  Sit to Stand Assistance: Contact guard assistance  Car Transfers: Minimum assistance  Car Type: car simulator Patient was anxious when performing activity, however she was able to gather herself and complete the task with minimal assistance and verbal cues for set up, lifting legs in and with positioning for getting out of the simulator. GAIT Daily Assessment    Gait Description (WDL)      Gait Abnormalities Decreased step clearance    Assistive device Walker, rolling    Ambulation assistance - level surface 4 (Contact guard assistance)    Distance  (100 ft, 150 ft)    Ambulation assistance- uneven surface  NT    Comments Patient required verbal cues for decreasing force on UE while pushing the RW, hand placement on  of RW. STEPS/STAIRS Daily Assessment     Steps/Stairs ambulated  (3-4 inch)    Assistance Required 4 (Contact guard assistance)    Rail Use Both    Comments Patient is fearful, however demonstrates fair attempt of stair challenge requiring CGA and increased verbal cues for hand and foot placement on steps and with sequencing.     Curbs/Ramps  NT        BALANCE Daily Assessment     Sitting - Static: Fair (occasional)  Sitting - Dynamic: Fair (occasional)  Standing - Static: Fair  Standing - Dynamic : Impaired      Patient participated in group session and tolerated dynamic standing activities with RW to improve overall balance and stability x 21 minutes and with SBA. Neuro Re-Education:  Static balance at the RW-  Patient is asked to stand for 3 minutes without the RW and required CGA and needed at least 50% verbal cues to balance and self check    ASSESSMENT:  Patient is seen for deficits in strength, mobility, transfers, ambulation, safety and balance. Patient is agreeable to participation after an extensive conversation regarding goals and established plan of care. Patient would benefit from skilled sessions to address weakness and safety deficits. Patient required cues with most activities at least 90% of the time with sit <>stand transfers. Progression toward goals:  []      Improving appropriately and progressing toward goals  [x]      Improving slowly and progressing toward goals  []      Not making progress toward goals and plan of care will be adjusted      PLAN:  Patient continues to benefit from skilled intervention to address the above impairments. Continue treatment per established plan of care. Discharge Recommendations:  HHPT   Further Equipment Recommendations for Discharge:  RW      Estimated Discharge Date: 7/6/2022    Activity Tolerance: Tolerates session fair  Please refer to the flowsheet for vital signs taken during this treatment.     After treatment:   [] Patient left in no apparent distress in bed  [x] Patient left in no apparent distress sitting up in chair  [] Patient left in no apparent distress in w/c mobilizing under own power  [] Patient left in no apparent distress dining area  [] Patient left in no apparent distress mobilizing under own power  [x] Call bell left within reach  [] Nursing notified  [] Caregiver present  [] Bed alarm activated   [] Chair alarm activated      Jona Linares  6/21/2022

## 2022-06-21 NOTE — PROGRESS NOTES
Pt is a 68year old female admitted to ARU for a nontraumatic spinal cord injury. Pt is alert and oriented, alone in the room. Pt states that she lives with a spouse in a 2 level home with 3 steps to enter and a walk-in and tub shower. Pt states that her bedroom and full bath are on the second floor and a half bath on the first floor. Pt states that prior to admission she was able to self care and denies history with DME, home health, outpatient therapy or SNF. Pt states that she does not have a POA and notes that her spouse, Ambreen Forde ([phone]), is her NOK contact. Pt consents to calling her spouse to set caregiver education. Pt states that her PCP is Dr Maria Isabel Rich and fills her medications at [facility]. Pt states that the pharmacy called her spouse to discuss meds sent from the hospital. Pt states understanding to not fill the scripts until dc from ARU in case there are additional changes. Sw reviewed the Med[facility] to [facility] program and pt declines wanting to further coordinate with [facility]. Pt states that she she has received 2 doses of the [address] vaccine. Pt states that her insurance is Medicare and [employer]. Sw reviewed dc planning, dc date, team conference and caregiver education. Pt states understanding and denies questions. Sw called pt's spouse to schedule caregiver education with no answer. Sw left a general message and will await return call. Chris will follow.

## 2022-06-21 NOTE — PROGRESS NOTES
Problem: Self Care Deficits Care Plan (Adult)  Goal: *Acute Goals and Plan of Care (Insert Text)  Description: Occupational Therapy Goals   Long Term Goals  Initiated  and to be accomplished within 2 week(s), by (2022)  1. Pt will perform self-feeding with independence. 2. Pt will perform grooming with MOD I.  3. Pt will perform UB bathing with MOD I.  4. Pt will perform LB bathing with MOD I.  5. Pt will perform tub/shower transfer with MOD I.   6. Pt will perform UB dressing with MOD I.  7. Pt will perform LB dressing with no more than MIN A for shoe management. 8. Pt will perform toileting task with MOD I  9. Pt will perform toilet transfer with MOD I. Short Term Goals   Initiated 2022 and to be accomplished within 7 day(s), by (2022)  1. Pt will perform grooming with no more than MIN A from w/c at sink. 2. Pt will perform UB bathing with no more than MIN A from w/c with basin or TTB. 3. Pt will perform LB bathing with MOD A.  4. Pt will perform tub/shower transfer with MOD A  5. Pt will perform UB dressing with setup A. 6. Pt will perform LB dressing with MOD A  7. Pt will perform toileting task with MIN A.  8. Pt will perform toilet transfer with MOD A. Outcome: Progressing Towards Goal  Goal: Interventions  Outcome: Progressing Towards Goal   Occupational Therapy TREATMENT    Patient: Sondra Bronson   68 y.o. Patient identified with name and : yes    Date: 2022    First Tx Session  Time In: 0700  Time Out: 830    Diagnosis: Compression fracture of body of thoracic vertebra ([facility] [address] [S22.000A]   Precautions:  Safety; Spinal precautions; Fall precautions  Chart, occupational therapy assessment, plan of care, and goals were reviewed. Pain:  Pt reports 0/10 pain or discomfort prior to treatment. Pt reports 0/10 pain or discomfort post treatment.    Intervention Provided: No complaints of pain at onset, during, or at conclusion of skilled occupational therapy session. SUBJECTIVE:   Patient stated i'm not really having any pain this morning.     OBJECTIVE DATA SUMMARY:     THERAPEUTIC ACTIVITY Daily Assessment    Pt presented resting quietly with eyes closed bed level. Pt awakened at this time and agreeable to participate in ADL training session (ADL shower using tub bench). Supine to Sit transitioning performed (supv) to seated position on edge of bed. Supv for scooting forward. Sit to stand transitioning performed (CGA) at edge of bed; verbal cues for pushing up from seated surface. Gait belt in use. Stand step xfer performed (CGA) from EOB > w/c; use of bed rail. Functional transfer training performed within context of am self-care performance. Stand step transfer performed (CGA) using FWW to address toileting goal; gait belt in use. Edu/ instruction in safe handling of AD (FWW) for hand placement, sequencing, and body positioning. Edu/ instruction and practice in use of AE for improved functional ability with LB ADL's (reacher, LH sponge, sock aid, and dressing stick). FEEDING/EATING Daily Assessment    Functional Level: 5 (set-up/ Mod I)  Comments: Set-up/ Mod I for meal tray. GROOMING Daily Assessment    Functional Level: 5 (set-up)  Tasks Completed by Patient: Brushed hair;Brushed teeth; Washed face  Comments: Pt performed oral hygiene/ brushed teeth (set-up) w/c at sink. Pt brushed her hair Mod I level. Patient washed her face (set-up) level using washcloth seated on tub bench (ADL shower). Oral hygiene: 5 set-up     BATHING Daily Assessment    Functional Level: 4 (UB bathing: set-up/ supv; LB bathing: CGA/ Min A (tub bench))  Body Parts Patient Bathed: Abdomen;Arm, left;Arm, right;Buttocks; Chest;Lower leg and foot, left; Lower leg and foot, right;Kandis area; Thigh, left; Thigh, right  Comments: UB bathing: set-up/ supv; LB bathing: CGA/ Min A (tub bench). Verbal cues for use of LH sponge to wash distal BLE's for adherence to spinal precautions. Slight weight shift seated on tub bench to wash buttocks. TOILETING Daily Assessment    Functional Level: 4 (CGA/ Min A)  Comments: Toileting CGA/ Min A for aspects in stance (pants up/ down); hygiene supv/ set-up     UPPER BODY DRESSING Daily Assessment    Functional Level: 5 (set-up)  Items Applied/Steps Completed: Pullover (4 steps)  Comments: Pt donned pullover tank top and pullover long sleeve shirt (set-up) level seated on tub bench. Verbal cues for adjusting shirt down in front and over sides. LOWER BODY DRESSING Daily Assessment    Functional Level: 3 (Mod/ Min A)  Items Applied/Steps Completed: Elastic waist pants (3 steps); Sock, left (1 step); Sock, right (1 step) (Depend (3 steps))  Comments: Pt performed LB dressing (Mod/ Min A); verbal cues for use of crossed leg technique to thread pant legs over both feet. Edu/ instruction with demonstration for use of sock aid to don slipper socks seated w/c level. Increased time. Sock and/or Shoe management: Min A      MOBILITY/TRANSFERS Daily Assessment    Toilet Transfer Score: 4 (CGA)  Comments: Stand step xfer w/c <-> elevated seat over toilet; grab bar. Gait belt for safety. Verbal cues for hand placement and body positioning. Tub/Shower Transfer Score: 4 (CGA)  Comments:  (Stand step xfer (CGA) w/c <-> tub bench; grab bar/ gait belt)       ASSESSMENT:  Today's skilled occupational therapy session focused on ADL training (ADL shower using tub bench), edu/ instruction in use of AE for LB ADL's, and functional transfer training for increased (I) with self-cares and functional mobility. Patient requiring frequent verbal cues and redirection for improved attention/ focus during self-cares. Verbal cues and reminders for maintaining back precautions. Care coordinated with Peter Shaikh RN regarding concentrated urine; staff nurse to follow-up.      Progression toward goals:  []          Improving appropriately and progressing toward goals  [x] Improving slowly and progressing toward goals  []          Not making progress toward goals and plan of care will be adjusted     PLAN:  Patient continues to benefit from skilled intervention to address the above impairments. Continue treatment per established plan of care. Discharge Recommendations:  Home Health occupational therapy with assist   Further Equipment Recommendations for Discharge:  bedside commode, gait belt, and transfer bench     Activity Tolerance:  Fair; intermittent rest breaks due to fatigue  Estimated LOS: 7/6/2022    Please refer to the flowsheet for vital signs taken during this treatment. After treatment:   [x]  Patient left in no apparent distress sitting up in wheelchair with needs met  []  Patient left in no apparent distress in bed  [x]  Call bell left within reach  [x]  Nursing notified  []  Caregiver present  [x] Wheelchair alarm activated    COMMUNICATION/EDUCATION:   [x] Home safety education was provided and the patient/caregiver indicated understanding. [x] Patient/family have participated as able in goal setting and plan of care. [x] Patient/family agree to work toward stated goals and plan of care. [] Patient understands intent and goals of therapy, but is neutral about his/her participation. [] Patient is unable to participate in goal setting and plan of care.     [id number] [facility], OT

## 2022-06-21 NOTE — PROGRESS NOTES
Comprehensive Nutrition Assessment    Type and Reason for Visit: Initial    Nutrition Recommendations/Plan:   1. Modify supplements: decrease ensure enlive to once daily; add magic cup BID  2. Continue all other nutrition interventions. Encourage/ monitor po intake of meals and supplements. Malnutrition Assessment:  Malnutrition Status: At risk for malnutrition (specify) (decreased/ inadequate meal intake) (06/21/22 1819)      Nutrition History and Allergies:   Past medical hx:  acid reflux, HTN, paraplegia. Wt trends PTA per chart hx:  148 lb on 7/23/2018,   151 lb on 10/20/2021,   151 lb on 6/21/2022. No known food allergies     Nutrition Assessment:    Received call from RN reporting that pt has poor appetite/ meal intake. Noted pt has ensure enlive supplement; unable to assess if pt is consuming supplement. RN reported providing pt with a magic cup supplement at lunch today; pt liked it. Discussed adding magic cup supplement    Nutrition Related Findings:    BM 6/21, formed. No edema. Pertinent meds: Bowel regimen, ferrous gluconate, protonix, pain medication prn. Wound Type: Surgical incision    Current Nutrition Intake & Therapies:  Average Meal Intake: 1-25%  Average Supplement Intake: Unable to assess  ADULT DIET Regular; Low Fat/Low Chol/High Fiber/2 gm Na  ADULT ORAL NUTRITION SUPPLEMENT AM Snack, PM Snack; Standard High Calorie/High Protein    Anthropometric Measures:  Height: 5' 5\" (165.1 cm)  Ideal Body Weight (IBW): 125 lbs (57 kg)  Admission Body Weight: 151 lb 10.8 oz  Current Body Wt:  68.8 kg (151 lb 10.8 oz), 121.3 % IBW. Bed scale  Current BMI (kg/m2): 25.2  Usual Body Weight: 72.1 kg (159 lb) (3/9/2022 per chart hx)  % Weight Change (Calculated): -4.6  Weight Adjustment: No adjustment  BMI Category: Overweight (BMI 25.0-29. 9)    Estimated Daily Nutrient Needs:  Energy Requirements Based On: Formula  Weight Used for Energy Requirements: Admission  Energy (kcal/day): 2327-7009  Weight Used for Protein Requirements: Admission  Protein (g/day): 55-69  Method Used for Fluid Requirements: 1 ml/kcal  Fluid (ml/day): 7393-0188    Nutrition Diagnosis:   · Inadequate oral intake related to early satiety (poor appetite) as evidenced by intake 0-25%,intake 26-50%      Nutrition Interventions:   Food and/or Nutrient Delivery: Continue current diet,Mineral supplement,Modify oral nutrition supplement  Nutrition Education/Counseling: Education not indicated  Coordination of Nutrition Care: Continue to monitor while inpatient  Plan of Care discussed with: RN    Goals:     Goals: Meet at least 75% of estimated needs,PO intake 75% or greater,by next RD assessment       Nutrition Monitoring and Evaluation:   Behavioral-Environmental Outcomes: None identified  Food/Nutrient Intake Outcomes: Food and nutrient intake,Supplement intake,Vitamin/mineral intake  Physical Signs/Symptoms Outcomes: Biochemical data,Meal time behavior    Discharge Planning:     Too soon to determine    Alvis Duane, [address]  Contact: [phone]

## 2022-06-21 NOTE — REHAB NOTE
[facility]    Bedside and Verbal shift change report given to [facility] (oncoming nurse) by Prudencio Govea (offgoing nurse). Report included the following information SBAR, Kardex, MAR and Recent Results.     Situation:   Code Status: Full Code   Reason for Admission: Compression fracture of T11 vertebra with routine healing  Hospital Day: 4   Problem List:   Hospital Problems  Date Reviewed: 6/20/2022          Codes Class Noted POA    Status post kyphoplasty ICD-10-CM: Z98.890  ICD-9-CM: V45.89  6/13/2022 Yes    Overview Signed 6/20/2022  4:37 PM by Christiane Bullock MD     S/P Image guided T11 kyphoplasty; Image guided bilateral sacroplasty (6/13/2022 - Dr. Hernandez Titus)             * (Principal) Compression fracture of T11 vertebra with routine healing ICD-10-CM: S22.080D  ICD-9-CM: V54.17  6/9/2022 Yes        Bilateral sacral insufficiency fracture with routine healing ICD-10-CM: M84.48XD  ICD-9-CM: V54.27  6/9/2022 Yes        Generalized weakness ICD-10-CM: R53.1  ICD-9-CM: 780.79  6/9/2022 Yes        Impaired mobility and ADLs ICD-10-CM: Z74.09, Z78.9  ICD-9-CM: V49.89  6/9/2022 Yes        Sciatica of right side ICD-10-CM: M54.31  ICD-9-CM: 724.3  2/7/2018 Yes        Essential hypertension, benign ICD-10-CM: I10  ICD-9-CM: 401.1  1/29/2009 Yes              Background:   Past Medical History:   Past Medical History:   Diagnosis Date    Acid reflux     Bilateral sacral insufficiency fracture with routine healing 6/9/2022    Chronic anemia     Compression fracture of T11 vertebra with routine healing 6/9/2022    Hypertension     Spinal stenosis       Patient taking anticoagulants Lovenox   Patient has a defibrillator: no    Assessment:   Changes in Assessment throughout shift: toileting,pain management     Patient has central line:no Reasons if yes: na  Insertion date:na Last dressing date:na   Patient has Nance Cath: no Reasons if yes: na  Insertion date:na     Last Vitals:     Vitals:    06/19/22 2104 06/20/22 0729 06/20/22 1516 06/20/22 2100   BP: 136/82 (!) 141/73 115/75 123/68   Pulse: (!) 115 98 (!) 111 (!) 101   Resp: 18 16 14 18   Temp: 97.1 °F (36.2 °C) 96.8 °F (36 °C) 97 °F (36.1 °C) 97 °F (36.1 °C)   SpO2: 97% 97% 97% 95%   Weight:       Height:            PAIN    Pain Assessment    Pain Intensity 1: 10 (06/21/22 0407) Pain Intensity 1: 2 (12/29/14 1105)    Pain Location 1: Back,Hip Pain Location 1: Abdomen    Pain Intervention(s) 1: Medication (see MAR) Pain Intervention(s) 1: Medication (see MAR)  Patient Stated Pain Goal: 0 Patient Stated Pain Goal: 0  o Intervention effective: yes  o Other actions taken for pain: none     Skin Assessment  Skin color    Condition/Temperature    Integrity    Turgor    Weekly Pressure Ulcer Documentation  Pressure  Injury Documentation: No Pressure Injury Noted-Pressure Ulcer Prevention Initiated  Wound Prevention & Protection Methods  Orientation of wound Orientation of Wound Prevention: Posterior  Location of Prevention Location of Wound Prevention: Buttocks,Sacrum/Coccyx  Dressing Present Dressing Present : No  Dressing Status Dressing Status: Intact  Wound Offloading Wound Offloading (Prevention Methods): Bed, pressure redistribution/air     INTAKE/OUPUT  Date 06/20/22 0700 - 06/21/22 0659 06/21/22 0700 - 06/22/22 0659   Shift 3968-9142 0617-8295 24 Hour Total 9581-6835 4878-1973 24 Hour Total   INTAKE   P.O. 480  480        P. O. 480  480      Shift Total(mL/kg) 480(6.8)  480(6.8)      OUTPUT   Urine(mL/kg/hr)           Urine Occurrence(s) 4 x 2 x 6 x      Emesis/NG output           Emesis Occurrence(s) 0 x  0 x      Stool           Stool Occurrence(s) 0 x 0 x 0 x      Shift Total(mL/kg)           480      Weight (kg) 71 71 71 71 71 71       Recommendations:  1. Patient needs and requests: toileting,pain management    2. Diet: Regular cardiac restricted    3. Pending tests/procedures: none    4.  Functional Level/Equipment: assist x 1/wheelchair    5. Estimated Discharge Date: 7/06/22 Posted on Whiteboard in Patients Room: yes    HEALS Safety Check    A safety check occurred in the patient's room between off going nurse and oncoming nurse listed above. The safety check included the below items  Area Items   H  High Alert Medications - Verify all high alert medication drips (heparin, PCA, etc.)   E  Equipment - Suction is set up for ALL patients (with catalina)  - Red plugs utilized for all equipment (IV pumps, etc.)  - WOWs wiped down at end of shift.  - Room stocked with oxygen, suction, and other unit-specific supplies   A  Alarms - Bed alarm is set for fall risk patients  - Ensure chair alarm is in place and activated if patient is up in a chair   L  Lines - Check IV for any infiltration  - Nance bag is empty if patient has a Nance   - Tubing and IV bags are labeled   S  Safety   - Room is clean, patient is clean, and equipment is clean. - Hallways are clear from equipment besides carts. - Fall bracelet on for fall risk patients  - Ensure room is clear and free of clutter  - Suction is set up for ALL patients (with catalina)  - Hallways are clear from equipment besides carts.    - Isolation precautions followed, supplies available outside room, sign posted

## 2022-06-22 PROBLEM — E87.6 HYPOKALEMIA: Status: ACTIVE | Noted: 2022-06-22

## 2022-06-22 LAB
ANION GAP SERPL CALC-SCNC: 9 MMOL/L (ref 3–18)
ATRIAL RATE: 81 BPM
BASOPHILS # BLD: 0 K/UL (ref 0–0.1)
BASOPHILS NFR BLD: 1 % (ref 0–2)
BUN SERPL-MCNC: 13 MG/DL (ref 7–18)
BUN/CREAT SERPL: 24 (ref 12–20)
CALCIUM SERPL-MCNC: 8.9 MG/DL (ref 8.5–10.1)
CALCULATED P AXIS, ECG09: 30 DEGREES
CALCULATED R AXIS, ECG10: 17 DEGREES
CALCULATED T AXIS, ECG11: 18 DEGREES
CHLORIDE SERPL-SCNC: 102 MMOL/L (ref 100–111)
CO2 SERPL-SCNC: 25 MMOL/L (ref 21–32)
CREAT SERPL-MCNC: 0.54 MG/DL (ref 0.6–1.3)
DIAGNOSIS, 93000: NORMAL
DIFFERENTIAL METHOD BLD: ABNORMAL
EOSINOPHIL # BLD: 0.2 K/UL (ref 0–0.4)
EOSINOPHIL NFR BLD: 2 % (ref 0–5)
ERYTHROCYTE [DISTWIDTH] IN BLOOD BY AUTOMATED COUNT: 19.6 % (ref 11.6–14.5)
GLUCOSE SERPL-MCNC: 109 MG/DL (ref 74–99)
HCT VFR BLD AUTO: 30.7 % (ref 35–45)
HGB BLD-MCNC: 9.5 G/DL (ref 12–16)
IMM GRANULOCYTES # BLD AUTO: 0 K/UL (ref 0–0.04)
IMM GRANULOCYTES NFR BLD AUTO: 1 % (ref 0–0.5)
LYMPHOCYTES # BLD: 1.9 K/UL (ref 0.9–3.6)
LYMPHOCYTES NFR BLD: 29 % (ref 21–52)
MAGNESIUM SERPL-MCNC: 1.8 MG/DL (ref 1.6–2.6)
MCH RBC QN AUTO: 22.1 PG (ref 24–34)
MCHC RBC AUTO-ENTMCNC: 30.9 G/DL (ref 31–37)
MCV RBC AUTO: 71.6 FL (ref 78–100)
MONOCYTES # BLD: 0.8 K/UL (ref 0.05–1.2)
MONOCYTES NFR BLD: 12 % (ref 3–10)
NEUTS SEG # BLD: 3.7 K/UL (ref 1.8–8)
NEUTS SEG NFR BLD: 56 % (ref 40–73)
NRBC # BLD: 0 K/UL (ref 0–0.01)
NRBC BLD-RTO: 0 PER 100 WBC
P-R INTERVAL, ECG05: 142 MS
PLATELET # BLD AUTO: 354 K/UL (ref 135–420)
PMV BLD AUTO: 8.3 FL (ref 9.2–11.8)
POTASSIUM SERPL-SCNC: 2.9 MMOL/L (ref 3.5–5.5)
POTASSIUM SERPL-SCNC: 4.5 MMOL/L (ref 3.5–5.5)
Q-T INTERVAL, ECG07: 426 MS
QRS DURATION, ECG06: 76 MS
QTC CALCULATION (BEZET), ECG08: 494 MS
RBC # BLD AUTO: 4.29 M/UL (ref 4.2–5.3)
SODIUM SERPL-SCNC: 136 MMOL/L (ref 136–145)
VENTRICULAR RATE, ECG03: 81 BPM
WBC # BLD AUTO: 6.6 K/UL (ref 4.6–13.2)

## 2022-06-22 PROCEDURE — 97116 GAIT TRAINING THERAPY: CPT

## 2022-06-22 PROCEDURE — 83735 ASSAY OF MAGNESIUM: CPT

## 2022-06-22 PROCEDURE — 74011250637 HC RX REV CODE- 250/637: Performed by: EMERGENCY MEDICINE

## 2022-06-22 PROCEDURE — 93005 ELECTROCARDIOGRAM TRACING: CPT

## 2022-06-22 PROCEDURE — 99232 SBSQ HOSP IP/OBS MODERATE 35: CPT | Performed by: INTERNAL MEDICINE

## 2022-06-22 PROCEDURE — 74011250637 HC RX REV CODE- 250/637: Performed by: INTERNAL MEDICINE

## 2022-06-22 PROCEDURE — 36415 COLL VENOUS BLD VENIPUNCTURE: CPT

## 2022-06-22 PROCEDURE — 65310000000 HC RM PRIVATE REHAB

## 2022-06-22 PROCEDURE — 97530 THERAPEUTIC ACTIVITIES: CPT

## 2022-06-22 PROCEDURE — 84132 ASSAY OF SERUM POTASSIUM: CPT

## 2022-06-22 PROCEDURE — 85025 COMPLETE CBC W/AUTO DIFF WBC: CPT

## 2022-06-22 PROCEDURE — 97110 THERAPEUTIC EXERCISES: CPT

## 2022-06-22 PROCEDURE — 97535 SELF CARE MNGMENT TRAINING: CPT

## 2022-06-22 RX ORDER — LANOLIN ALCOHOL/MO/W.PET/CERES
400 CREAM (GRAM) TOPICAL DAILY
Status: DISCONTINUED | OUTPATIENT
Start: 2022-06-22 | End: 2022-07-02 | Stop reason: HOSPADM

## 2022-06-22 RX ORDER — HYDRALAZINE HYDROCHLORIDE 25 MG/1
25 TABLET, FILM COATED ORAL
Status: DISCONTINUED | OUTPATIENT
Start: 2022-06-22 | End: 2022-07-02 | Stop reason: HOSPADM

## 2022-06-22 RX ADMIN — POTASSIUM BICARBONATE 40 MEQ: 782 TABLET, EFFERVESCENT ORAL at 08:56

## 2022-06-22 RX ADMIN — DOCUSATE SODIUM 100 MG: 100 CAPSULE, LIQUID FILLED ORAL at 17:36

## 2022-06-22 RX ADMIN — LOSARTAN POTASSIUM 50 MG: 50 TABLET, FILM COATED ORAL at 20:37

## 2022-06-22 RX ADMIN — Medication 400 MG: at 14:19

## 2022-06-22 RX ADMIN — Medication 1 TABLET: at 08:55

## 2022-06-22 RX ADMIN — PANTOPRAZOLE SODIUM 40 MG: 40 TABLET, DELAYED RELEASE ORAL at 06:35

## 2022-06-22 RX ADMIN — POTASSIUM BICARBONATE 40 MEQ: 782 TABLET, EFFERVESCENT ORAL at 12:21

## 2022-06-22 RX ADMIN — GABAPENTIN 400 MG: 400 CAPSULE ORAL at 20:37

## 2022-06-22 RX ADMIN — OXYCODONE HYDROCHLORIDE 10 MG: 5 TABLET ORAL at 17:36

## 2022-06-22 RX ADMIN — POTASSIUM BICARBONATE 40 MEQ: 782 TABLET, EFFERVESCENT ORAL at 16:20

## 2022-06-22 RX ADMIN — METOPROLOL TARTRATE 50 MG: 50 TABLET, FILM COATED ORAL at 08:56

## 2022-06-22 RX ADMIN — DOCUSATE SODIUM 100 MG: 100 CAPSULE, LIQUID FILLED ORAL at 08:56

## 2022-06-22 RX ADMIN — OXYCODONE HYDROCHLORIDE 10 MG: 5 TABLET ORAL at 02:45

## 2022-06-22 RX ADMIN — METOPROLOL TARTRATE 50 MG: 50 TABLET, FILM COATED ORAL at 20:37

## 2022-06-22 RX ADMIN — GABAPENTIN 400 MG: 400 CAPSULE ORAL at 14:18

## 2022-06-22 RX ADMIN — GABAPENTIN 400 MG: 400 CAPSULE ORAL at 08:55

## 2022-06-22 RX ADMIN — AMLODIPINE BESYLATE 10 MG: 10 TABLET ORAL at 08:55

## 2022-06-22 RX ADMIN — ATORVASTATIN CALCIUM 10 MG: 10 TABLET, FILM COATED ORAL at 08:56

## 2022-06-22 RX ADMIN — NORTRIPTYLINE HYDROCHLORIDE 40 MG: 10 CAPSULE ORAL at 20:37

## 2022-06-22 RX ADMIN — Medication 1 TABLET: at 17:36

## 2022-06-22 NOTE — PROGRESS NOTES
[facility] PHYSICAL REHABILITATION  [address] [place] [address], [address]     INPATIENT REHABILITATION  DAILY PROGRESS NOTE     Date: 6/22/2022    Name: Al Maya Age / Sex: 68 y.o. / female   CSN: [id number] MRN: [id number]   Admit Date: 6/17/2022 Length of Stay: 5 days     Primary Rehabilitation Diagnosis: Generalized weakness with Impaired mobility and ADLs secondary to:  1. Compression fracture of T11 vertebra with routine healing  2. Bilateral sacral insufficiency fracture with routine healing  3. S/P Image guided T11 kyphoplasty; Image guided bilateral sacroplasty (6/13/2022 - Dr. Sandee Salazar)      Subjective:     Patient seen and examined. Blood pressure controlled. Heart rate better this AM.     Patient's Complaint:   Felt weak and tired since this AM; reportedly slept well last night    Pain Control: ongoing significant pain in which is stable and controlled by current meds      Objective:     Vital Signs:  Patient Vitals for the past 24 hrs:   BP Temp Pulse Resp SpO2 Height   06/22/22 0830 127/73 97.2 °F (36.2 °C) 84 19 97 % --   06/21/22 2030 135/78 97 °F (36.1 °C) (!) 104 18 95 % --   06/21/22 1816 -- -- -- -- -- 5' 5\" (1.651 m)   06/21/22 1603 116/70 97 °F (36.1 °C) (!) 107 16 97 % --        Physical Examination:  GENERAL SURVEY: Patient is awake, alert, oriented x 3, sitting comfortably on the wheelchair, not in acute respiratory distress.   HEENT: pale palpebral conjunctivae, anicteric sclerae, no nasoaural discharge, moist oral mucosa  NECK: supple, no jugular venous distention, no palpable lymph nodes  CHEST/LUNGS: symmetrical chest expansion, good air entry, clear breath sounds  HEART: adynamic precordium, good S1 S2, no S3, regular rhythm, no murmurs  ABDOMEN: flat, bowel sounds appreciated, soft, non-tender  EXTREMITIES: pale nailbeds, no edema, full and equal pulses, no calf tenderness   NEUROLOGICAL EXAM: The patient is awake, alert and oriented x3, able to answer questions fairly appropriately, able to follow 1 and 2 step commands. Able to tell time from the wall clock. Cranial nerves II-XII are grossly intact. No gross sensory deficit. Motor strength is 4+/5 on BUE, 4-/5 on BLE.        Current Medications:  Current Facility-Administered Medications   Medication Dose Route Frequency    potassium bicarb-citric acid (EFFER-K) tablet 40 mEq  40 mEq Oral Q4H    metoprolol tartrate (LOPRESSOR) tablet 50 mg  50 mg Oral Q12H    hydrALAZINE (APRESOLINE) tablet 25 mg  25 mg Oral TID PRN    losartan (COZAAR) tablet 50 mg  50 mg Oral QHS    gabapentin (NEURONTIN) capsule 400 mg  400 mg Oral TID    nortriptyline (PAMELOR) capsule 40 mg  40 mg Oral QHS    acetaminophen (TYLENOL) tablet 650 mg  650 mg Oral Q4H PRN    oxyCODONE IR (ROXICODONE) tablet 10 mg  10 mg Oral Q4H PRN    LORazepam (ATIVAN) tablet 0.5 mg  0.5 mg Oral BID PRN    ondansetron (ZOFRAN ODT) tablet 4 mg  4 mg Oral Q8H PRN    amLODIPine (NORVASC) tablet 10 mg  10 mg Oral DAILY    atorvastatin (LIPITOR) tablet 10 mg  10 mg Oral DAILY    ferrous gluconate 324 mg (37.5 mg iron) tablet 1 Tablet  324 mg Oral BID WITH MEALS    pantoprazole (PROTONIX) tablet 40 mg  40 mg Oral ACB    docusate sodium (COLACE) capsule 100 mg  100 mg Oral BID    bisacodyL (DULCOLAX) tablet 10 mg  10 mg Oral Q48H PRN    melatonin tablet 3 mg  3 mg Oral QHS PRN    naloxone (NARCAN) injection 0.2 mg  0.2 mg IntraVENous PRN       Allergies:  No Known Allergies      Functional Progress:    OCCUPATIONAL THERAPY    ON ADMISSION MOST RECENT   Eating  Functional Level: 5   Eating  Functional Level: 5 (set-up/ Mod I)     Grooming  Functional Level: 5   Grooming  Functional Level: 5 (set-up)     Bathing  Functional Level: 3   Bathing  Functional Level: 4 (UB bathing: set-up/ supv; LB bathing: CGA/ Min A (tub bench))     Upper Body Dressing  Functional Level: 3   Upper Body Dressing  Functional Level: 5 (set-up)     Lower Body Dressing  Functional Level: 2   Lower Body Dressing  Functional Level: 3 (Mod/ Min A)     Toileting  Functional Level: 3   Toileting  Functional Level: 4     Toilet Transfers  Toilet Transfer Score: 4   Toilet Transfers  Toilet Transfer Score: 4 (CGA)     Tub /Shower Transfers  Tub/Shower Transfer Score: 4   Tub/Shower Transfers  Tub/Shower Transfer Score: 4 (CGA)       Legend:   7 - Independent   6 - Modified Independent   5 - Standby Assistance / Supervision / Set-up   4 - Minimum Assistance / Contact Guard Assistance   3 - Moderate Assistance   2 - Maximum Assistance   1 - Total Assistance / Dependent       Lab/Data Review:  Recent Results (from the past 24 hour(s))   CBC WITH AUTOMATED DIFF    Collection Time: 06/22/22  6:28 AM   Result Value Ref Range    WBC 6.6 4.6 - 13.2 K/uL    RBC 4.29 4. 20 - 5.30 M/uL    HGB 9.5 (L) 12.0 - 16.0 g/dL    HCT 30.7 (L) 35.0 - 45.0 %    MCV 71.6 (L) 78.0 - 100.0 FL    MCH 22.1 (L) 24.0 - 34.0 PG    MCHC 30.9 (L) 31.0 - 37.0 g/dL    RDW 19.6 (H) 11.6 - 14.5 %    PLATELET 181 807 - 482 K/uL    MPV 8.3 (L) 9.2 - 11.8 FL    NRBC 0.0 0  WBC    ABSOLUTE NRBC 0.00 0.00 - 0.01 K/uL    NEUTROPHILS 56 40 - 73 %    LYMPHOCYTES 29 21 - 52 %    MONOCYTES 12 (H) 3 - 10 %    EOSINOPHILS 2 0 - 5 %    BASOPHILS 1 0 - 2 %    IMMATURE GRANULOCYTES 1 (H) 0.0 - 0.5 %    ABS. NEUTROPHILS 3.7 1.8 - 8.0 K/UL    ABS. LYMPHOCYTES 1.9 0.9 - 3.6 K/UL    ABS. MONOCYTES 0.8 0.05 - 1.2 K/UL    ABS. EOSINOPHILS 0.2 0.0 - 0.4 K/UL    ABS. BASOPHILS 0.0 0.0 - 0.1 K/UL    ABS. IMM.  GRANS. 0.0 0.00 - 0.04 K/UL    DF AUTOMATED     METABOLIC PANEL, BASIC    Collection Time: 06/22/22  6:28 AM   Result Value Ref Range    Sodium 136 136 - 145 mmol/L    Potassium 2.9 (LL) 3.5 - 5.5 mmol/L    Chloride 102 100 - 111 mmol/L    CO2 25 21 - 32 mmol/L    Anion gap 9 3.0 - 18 mmol/L    Glucose 109 (H) 74 - 99 mg/dL    BUN 13 7.0 - 18 MG/DL    Creatinine 0.54 (L) 0.6 - 1.3 MG/DL    BUN/Creatinine ratio 24 (H) 12 - 20 GFR est AA >60 >60 ml/min/1.73m2    GFR est non-AA >60 >60 ml/min/1.73m2    Calcium 8.9 8.5 - 10.1 MG/DL   MAGNESIUM    Collection Time: 06/22/22  6:28 AM   Result Value Ref Range    Magnesium 1.8 1.6 - 2.6 mg/dL   EKG, 12 LEAD, INITIAL    Collection Time: 06/22/22  8:21 AM   Result Value Ref Range    Ventricular Rate 81 BPM    Atrial Rate 81 BPM    P-R Interval 142 ms    QRS Duration 76 ms    Q-T Interval 426 ms    QTC Calculation (Bezet) 494 ms    Calculated P Axis 30 degrees    Calculated R Axis 17 degrees    Calculated T Axis 18 degrees    Diagnosis       Normal sinus rhythm  Prolonged QT  Abnormal ECG  When compared with ECG of 15-MATTHEW-2022 07:43,  No significant change was found         Assessment:     Primary Rehabilitation Diagnosis  1. Generalized weakness with Impaired mobility and ADLs  2. Compression fracture of T11 vertebra with routine healing  3. Bilateral sacral insufficiency fracture with routine healing  4.  S/P Image guided T11 kyphoplasty; Image guided bilateral sacroplasty (6/13/2022 - Dr. Roger Robledo)    Comorbidities  Patient Active Problem List   Diagnosis Code    Spinal stenosis of lumbar region with neurogenic claudication M48.062    Lumbar facet arthropathy M47.816    Disorder of bone and cartilage M89.9, M94.9    Diverticulosis of colon K57.30    Dyslipidemia E78.5    Essential hypertension, benign I10    Gastroesophageal reflux disease without esophagitis K21.9    Generalized anxiety disorder F41.1    Impaired fasting glucose R73.01    Irritable bowel syndrome with diarrhea K58.0    Melanoma in situ of right upper arm (HCC) D03.61    Primary insomnia F51.01    Sciatica of right side M54.31    Squamous cell carcinoma [id number]    Lumbar stenosis with neurogenic claudication M48.062    Depression, recurrent ([facility]) F33.9    Spinal stenosis M48.00    Leg weakness, bilateral R29.898    Atrial fibrillation with rapid ventricular response (HCC) I48.91    Compression fracture of T11 vertebra with routine healing S22.080D    Bilateral sacral insufficiency fracture with routine healing M84.48XD    Status post kyphoplasty Z98.890    Generalized weakness R53.1    Impaired mobility and ADLs Z74.09, Z78.9    Chronic anemia D64.9    Hypokalemia E87.6       Plan:     1. Justification for continued stay: Good progression towards established rehabilitation goals. 2. Medical Issues being followed closely:    [x]  Fall and safety precautions     []  Wound Care     [x]  Bowel and Bladder Function     [x]  Fluid Electrolyte and Nutrition Balance     [x]  Pain Control      3. Issues that 24 hour rehabilitation nursing is following:    [x]  Fall and safety precautions     []  Wound Care     [x]  Bowel and Bladder Function     [x]  Fluid Electrolyte and Nutrition Balance     [x]  Pain Control      [x]  Assistance with and education on in-room safety with transfers to and from the bed, wheelchair, toilet and shower. 4. Acute rehabilitation plan of care:    [x]  Continue current care and rehab. [x]  Physical Therapy           [x]  Occupational Therapy           []  Speech Therapy     []  Hold Rehab until further notice     5. Medications:    [x]  MAR Reviewed     [x]  Continue Present Medications     6. Chemical DVT Prophylaxis:      []  Enoxaparin     []  Unfractionated Heparin     []  Warfarin     []  NOAC     []  Aspirin     [x]  None     7. Mechanical DVT Prophylaxis:      []  YOLY Stockings     [x]  Sequential Compression Device     []  None     8. GI Prophylaxis:      []  PPI     []  H2 Blocker     [x]  None / Not indicated     9. Code status:    [x]  Full code     []  Partial code     []  Do not intubate     []  Do not resuscitate     10. Diet:  Specifications  Low fat/Low cholesterol/High fiber/2 gm Na   Solids (consistency)  Regular   Liquids (consistency)  Thin   Fluid Restriction  None     11.  Orders:   > Compression fracture of T11 vertebra with routine healing; Bilateral sacral insufficiency fracture with routine healing; S/P Image guided T11 kyphoplasty; Image guided bilateral sacroplasty (6/13/2022 - Dr. Nara Rene)   > Thoracic spinal precautions    > Chronic anemia   > Hgb/Hct (6/18/2022, on admission to the ARU) = 8.7/28.5    > Hgb/Hct (6/19/2022) = 9.6/31.8    > Hgb/Hct (6/22/2022) = 9.5/30.7    > Continue Ferrous gluconate 324 mg PO BID with meals    > Dyslipidemia   > Continue Atorvastatin 10 mg PO once daily    > Essential hypertension   > On 6/21/2022:    > Change Hydralazine from 25 mg PO TID (8AM, 2PM, 8PM) to 25 mg PO TID PRN for SBP greater than 170 mmHg    > Started Metoprolol tartrate 50 mg PO q 12 hr (9AM, 9PM)   > Continue:    > Amlodipine 10 mg PO once daily (9AM)    > Change Losartan from 50 mg PO once daily (9AM) to 50 mg PO q HS    > Metoprolol tartrate 50 mg PO q 12 hr (9AM, 9PM)    > Hydralazine 25 mg PO TID PRN for SBP greater than 170 mmHg    > Gastroesophageal reflux disease   > Continue Pantoprazole 40 mg PO daily before breakfast    > Generalized anxiety disorder   > Continue:    > Nortriptyline 40 mg PO q HS    > Lorazepam 0.5 mg PO BID PRN for anxiety    > Sciatica of right side   > On 6/20/2022, increased Gabapentin from 300 mg to 400 mg PO TID (8AM, 2PM, 8PM)    > Continue:    > Gabapentin 400 mg PO TID (8AM, 2PM, 8PM)    > Nortriptyline 40 mg PO q HS    > Hypokalemia      06/17/22  0232 06/16/22  0725 06/15/22  1145 06/15/22  0714 06/15/22  0210 06/14/22  0317 06/13/22  0513 06/13/22  0209 06/12/22  0250 06/11/22  0359 06/09/22  1330   K 3.1* 3.8 4.1 3.7 2.8* 3.4* 2.9* 3.1* 3.0* 3.2* 2.8*   MG  --   --   --  1.6 1.7  --   --  2.0 2.1 2.1 2.1      > K (6/17/2022, on admission to the ARU) = 3.2      06/22/22  0628 06/19/22  0530 06/18/22  0509   K 2.9* 3.5 3.2*   MG 1.8 1.6 1.8      > 12-lead EKG (6/22/2022) showed normal sinus rhythm at 81 bpm, prolonged QTc at 494 ms   > Potassium bicarbonate 40 meq PO q 4 hr x 3 doses (8AM, 12PM, 4PM)   > Start Magnesium oxide 400 mg PO once daily   > Repeat serum K at 6PM     > Analgesia   > Continue:    > Acetaminophen 650 mg PO q 4 hr PRN for pain level 4/10 or lesser    > Oxycodone IR 10 mg PO q 4 hr PRN for pain level 5/10 or greater       12. Personal Protective Equipment (N95 face mask with eye goggles) was used while interacting with the patient. Patient was using a surgical mask. 15. Patient's progress in rehabilitation and medical issues discussed with the patient and . All questions answered to the best of my ability. Care plan discussed with patient and nurse. 14. Total clinical care time is 30 minutes, including review of chart including all labs, radiology, past medical history, and discussion with patient. Greater than 50% of my time was spent in coordination of care and counseling.       Signed:    Lee Moore MD    June 22, 2022

## 2022-06-22 NOTE — INTERDISCIPLINARY ROUNDS
[address]  [address] [place] [address], [address]     INPATIENT REHABILITATION  DISCHARGE RECOMMENDATION SHEET    Date: 6/22/2022     Name: Ritu Barton Age / Sex: 68 y.o. / female   CSN: [id number] MRN: [id number]   [facility] Date: 6/17/2022 Discharge Date: 7/2/2022        [facility] WALKING AIDS FOLLOW-UP SERVICES      Height  []  Straight cane  [] DMV Handicap Placard    []  #14 ½ parmjit height  []  Forearm crutches OUTPATIENT    []  Parmjit height  []  Axillary crutches  []  PT    []  Standard height  []  LBQC  []  OT    []  Reclining high back  []  SBQC  []  ST       Weight  HOME HEALTH    []  Standard weight WALKERS  [x]  PT    []  Lightweight  [x]  Standard height  [x]  OT    []  High-strength lightweight  []  Small adult  []  ST    []  Heavy Duty   []  Tall walker  [x]  Nursing    []  Patient is unable to self-propel a standard weight wheelchair  [x]  Rolling walker with 5 single fixed wheels  []  Wound care  Location of wound:  Specify needs:      []  Anticoagulation management       Width  []  Bariatric walker  []  Diabetes management    []  Narrow (16)  []  Small Adult Rolling walker with 5 single fixed wheels  []  Insulin management    []  Standard (18) ACCESSORIES  []  No insulin management    []  Wide (20)  []  Rear sure glide brakes  []  BP management    []  Other  []  10 rear wheel brake  []  CHF Telehealth       Arms  []  Tall leg extensions  []  Post-CABG care/monitoring    []  Standard  []  Other:  []  Colostomy care    []  Desk/Tray   []  Tube feeding    []  Removable BATHROOM EQUIPMENT  []  PICC line care      Foot/Leg Rests  [x]  Bedside commode  []  Nance catheter care    []  Removable  []  Extra wide bedside commode  [x]  Other: Med. teaching    []  Elevating  []  3:1 commode WITH drop arms  []  Medical Social Worker      Other  []  Tub transfer bench  []  Aide    []  Brake extensions  [x]  Shower chair     []  Padded gloves       []  Antitippers       [] Right Arm Tray      []  Left Arm Tray          CUSHIONS OTHER     []  Foam cushion  []  Seat belt     []  Gel cushion  [x]  Gait belt     []  Fonparke Herter II  []  Transfer board - Size:     []  Roho  []  Oxygen     []  Other  []  CPM      []  [address]  []  Ramp     []  Hospital bed  []  Hip kit     []  Special mattress  []  Reacher     []  Trapeze bar       Preferred Local Pharmacy (not mail-order): [facility]    Physical Therapy JOSÉ MIGUEL Montelongo   Occupational Therapy Maximino Rdz, OTR/L   Speech-Language Therapy    Social Work Shelle Kussmaul, MSW   Nursing Ryan Merino RN

## 2022-06-22 NOTE — PROGRESS NOTES
Problem: Mobility Impaired (Adult and Pediatric)  Goal: *Acute Goals and Plan of Care (Insert Text)  Description: Physical Therapy Short Term Goals  Initiated 6/18/2022 and to be accomplished within 7 day(s)- 6/25/2022  1. Patient will move from supine to sit and sit to supine  in bed with contact guard assist.    2.  Patient will transfer from bed to chair and chair to bed with contact guard assist using the least restrictive device. 3.  Patient will perform sit to stand with supervision/SBA. 4.  Patient will ambulate with contact guard assist for 100 feet with the least restrictive device. 5.  Patient will ascend/descend 4 stairs with 2 handrail(s) with minimal assistance/contact guard assist.    Physical Therapy Long Term Goals  Initiated 6/18/2022 and to be accomplished within 21 day(s)  1. Patient will move from supine to sit and sit to supine  in bed with modified independence. 2.  Patient will transfer from bed to chair and chair to bed with modified independence using the least restrictive device. 3.  Patient will perform sit to stand with modified independence. 4.  Patient will ambulate with modified independence for 150 feet with the least restrictive device. 5.  Patient will ascend/descend 12 stairs with 1 handrail(s) with minimal assistance/contact guard assist.     Outcome: Progressing Towards Goal   PHYSICAL THERAPY TREATMENT    Patient: Reina Flores (22 y.o. female)  Date: 6/22/2022  Diagnosis: Compression fracture of body of thoracic vertebra (HCC) [S22.000A] Compression fracture of T11 vertebra with routine healing  Precautions:  lumbar precautions, falls  Chart, physical therapy assessment, plan of care and goals were reviewed. Time SC:7978  Time Out:1030    Patient seen for: AM;Balance activities;Gait training;Patient education; Therapeutic exercise;Transfer training; Wheelchair mobility  Time In: 1500  Time Out: 3093  Patient seen for family education training  Pain:  Pt with increased discomfort sacral region, especially with bed mobility. Patient identified with name and :   Yes    SUBJECTIVE:    Dr. Bhupinder Argueta told me a few things yesterday that sort of made me worry a little. First, he said that my HR was increased and he graciously explained that as we age, our hearts are not as tolerant to increased activity as when we were younger. I appreciated the explanation, but it's another thing that makes me think my life is never going to be the same today. He also told me that I may be getting a UTI. More great news. (Therapist assured the patient that she was in the appropriate location and that she will continuously be monitored to make sure that all of her medical and rehab needs are met)  I am really sore today. I know you told me that that would be normal, but I'm really sore. OBJECTIVE DATA SUMMARY:    Objective:  Patient has been having increased HR and monitoring this session recorded the following results-  Prior to session-HR 99 bpm  During session--108 bpm  After rest- bpm  After session-HR 98 bpm  Sp02 remained between 96-98% throughout session. Assigned nurse, Nisreen Hernandez also shared that the patient's potassium is low. GROSS ASSESSMENT Daily Assessment     AROM: Generally decreased, functional  Strength: Generally decreased, functional  Coordination: Within functional limits  Tone: Normal  Sensation: Impaired      BED/MAT MOBILITY Daily Assessment     Rolling Left : 5 (Supervision)  Supine to Sit : 5 (Stand-by assistance)  Sit to Supine :  (CGA)      TRANSFERS Daily Assessment     Other: stand step with RW  Transfer Assistance : 4 (Contact guard assistance)  Sit to Stand Assistance: Contact guard assistance Caregiver was present and was able to witness and stand by with patient as she performed transfers from [facility] to chair without AD. Patient required verbal cues and increased time to complete transfer as well as CGA.  Patient's caregiver was given feedback regarding his approach, as he tends to change tone when he is attempting to assist patient and she becomes anxious, nervous and 2nd guesses her method of transfers. Spouse appeared receptive to education and he was not willing to attempt another trial as the patient reported being too fatigued to continue with training. GAIT Daily Assessment    Gait Description (WDL)      Gait Abnormalities Decreased step clearance    Assistive device Walker, rolling    Ambulation assistance - level surface 4 (Contact guard assistance)    Distance  (100)    Ambulation assistance- uneven surface  Not tested    Comments Improved samantha, RW management, step length and width. Slowly improving on releasing UE support on the RW when pushing it forward. Continues to make gains with ambulation. BALANCE Daily Assessment     Sitting - Static: Fair (occasional)  Sitting - Dynamic: Fair (occasional)  Standing - Static: Fair  Standing - Dynamic : Impaired               ASSESSMENT:  Patient is seen for deficits in strength, mobility, transfers, ambulation, safety and balance. Patient is agreeable to skilled session participation even though she c/o increased muscle soreness, increased fatigue and muscle aches. Patient is challenged this session with functional activities to improve her strength, mobility, safety and balance. Continue to address deficits for improved functional independence. PM session-patient's spouse was present during family caregiver education, he participated in session and he was educated on the need to consider his approach when assisting the patient as the therapist witnessed the patient becoming anxious and nervous about continuing. Patient's spouse was receptive to education and he reported that he would continue to provide support. He also reported that he wants to do what is best for the patient when she gets home as he has goals and he wants his wife to be included on the goals.  Continue to address current weakness for improved functional independence. Progression toward goals:  []      Improving appropriately and progressing toward goals  [x]      Improving slowly and progressing toward goals  []      Not making progress toward goals and plan of care will be adjusted      PLAN:  Patient continues to benefit from skilled intervention to address the above impairments. Continue treatment per established plan of care. Discharge Recommendations:  HHPT   Further Equipment Recommendations for Discharge:  RW      Estimated Discharge Date: 7/8/2022    Activity Tolerance: Tolerates session fair with improved attitude noted. Please refer to the flowsheet for vital signs taken during this treatment.     After treatment:   [] Patient left in no apparent distress in bed  [x] Patient left in no apparent distress sitting up in chair  [] Patient left in no apparent distress in w/c mobilizing under own power  [] Patient left in no apparent distress dining area  [] Patient left in no apparent distress mobilizing under own power  [x] Call bell left within reach  [x] Nursing notified  [] Caregiver present  [] Bed alarm activated   [] Chair alarm activated      Rodríguez Buitrago  6/22/2022

## 2022-06-22 NOTE — INTERDISCIPLINARY ROUNDS
[facility] PHYSICAL REHABILITATION  [address] [place] [address], [address]    INPATIENT REHABILITATION  PRE-TEAM CONFERENCE SUMMARY     Date of Conference: 6/23/2022    Patient Information:        Name: Franklyn Hilliard Age / Sex: 68 y.o. / female   CSN: [id number] MRN: [id number]   [facility] Date: 6/17/2022 Length of Stay: 5 days     Primary Rehabilitation Diagnosis  1. Generalized weakness with Impaired mobility and ADLs  2. Compression fracture of T11 vertebra with routine healing  3. Bilateral sacral insufficiency fracture with routine healing  4.  S/P Image guided T11 kyphoplasty; Image guided bilateral sacroplasty (6/13/2022 - Dr. Yulissa Dial)    Comorbidities  Patient Active Problem List   Diagnosis Code    Spinal stenosis of lumbar region with neurogenic claudication M48.062    Lumbar facet arthropathy M47.816    Disorder of bone and cartilage M89.9, M94.9    Diverticulosis of colon K57.30    Dyslipidemia E78.5    Essential hypertension, benign I10    Gastroesophageal reflux disease without esophagitis K21.9    Generalized anxiety disorder F41.1    Impaired fasting glucose R73.01    Irritable bowel syndrome with diarrhea K58.0    Melanoma in situ of right upper arm (HCC) D03.61    Primary insomnia F51.01    Sciatica of right side M54.31    Squamous cell carcinoma [id number]    Lumbar stenosis with neurogenic claudication M48.062    Depression, recurrent (HCC) F33.9    Spinal stenosis M48.00    Leg weakness, bilateral R29.898    Atrial fibrillation with rapid ventricular response (HCC) I48.91    Compression fracture of T11 vertebra with routine healing S22.080D    Bilateral sacral insufficiency fracture with routine healing M84.48XD    Status post kyphoplasty Z98.890    Generalized weakness R53.1    Impaired mobility and ADLs Z74.09, Z78.9    Chronic anemia D64.9    Hypokalemia E87.6         Therapy:     FIM SCORES Initial Assessment Weekly Progress Assessment 6/22/2022 Eating Functional Level: 5  Comments: Self feeding with fork  Functional Level: 6   Swallowing     Grooming 5 5 (set-up)   Bathing 3  5 (SBA)   Upper Body Dressing Functional Level: 3  Items Applied/Steps Completed: Bra (3 steps)  Comments: At EOB, A to pull over trunk. Donning l/s shirt reclined in bed with assist to locate R sleeve and pull over trunk via log rolling  Functional Level: 5 (SBA)   Lower Body Dressing Functional Level: 2  Items Applied/Steps Completed: Elastic waist pants (3 steps)  Comments: Donning from reclined in bed d/t pain and dizziness Functional Level: 4 (Min A)  Items Applied/Steps Completed: Shoe, left (1 step); Shoe, right (1 step); Sock, left (1 step); Sock, right (1 step)  Comments: Patient doffed socks using crossed leg technique seated w/c level. Pt donned slip on sneakers seated EOB (SBA/ Min A) level. Verbal cues for use of crossed leg technique for maintaining back precautions. Toileting Functional Level: 3 (6/20/22)   Functional Level: 4 (CGA for CM; hygiene set-up assist)  Comments: CGA for CM; hygiene set-up assist   Bladder 0 1   Bowel  0 0   Toilet Transfer [place] Toilet Transfer Score: 4   Toilet Transfer Score: 4 (Stand step xfer (CGA) using FWW; gait belt. Seat over toilet)  Comments: Stand step xfer (CGA) using FWW; gait belt. Elevated seat over toilet. Verbal cues for safe handling of AD (FWW) e.g. standing within frame of FWW and for body positioning.     Tub/Shower Transfer [place] Tub or Shower Type: Tub/Shower combination  Tub/Shower Transfer score: not tested this date  Tub/ shower transfer score: 4 (CGA) stand step xfer using FWW; gait belt      Comprehension Primary Mode of Comprehension: Auditory  Score: not assessed on initial eval due to anxiety and pain limiting pt's participation Primary Mode of Comprehension: Auditory  Score: 5     Expression Primary Mode of Expression: Verbal  Score: see above Primary Mode of Expression: Verbal  Score: 5   Social Interaction Score: see above Score: 5   Problem Solving Score: see above Score: 4   Memory Score: see above Score: 4     FIM SCORES Initial Assessment Weekly Progress Assessment 6/22/2022   Bed/Chair/Wheelchair Transfers Other: stand step with RW  Transfer Assistance : 4 (Minimal assistance)  Sit to Stand Assistance: Minimal assistance  Car Transfers: Not tested  Car Type: car simulator Other: stand step with RW  Transfer Assistance : 4 (Contact guard assistance)  Sit to Stand Assistance: Contact guard assistance  Car Transfers:  (SBA and verbal cues.)  Car Type: car simulator   Bed Mobility Rolling Right 5 (Stand-by assistance)   Rolling Left 5 (Stand-by assistance)   Supine to Sit 4 (Minimal assistance)   Sit to Stand Minimal assistance   Sit to Supine 3 (Moderate assistance)    Rolling Right   5 (Supervision)   Rolling Left   5 (Supervision)   Supine to Sit   5 (Stand-by assistance)   Sit to Stand   Contact guard assistance   Sit to Supine    (CGA)      Locomotion (W/C) Able to Propel (ft): 60 feet  Functional Level: 4  Curbs/Ramps Assist Required (FIM Score): 0 (Not tested)  Wheelchair Setup Assist Required : 2 (Maximal assistance)  Wheelchair Management: Manages right brake,Manages left brake Patient has been ambulating to the gym from her room so WC mobs have not been challenged         Locomotion (W/C distance)       Locomotion (Walk) 4 (Minimal assistance) 4 (Contact guard assistance)  Walker, rolling   Locomotion (Walk dist.) 25 Feet (ft)  (100)   Steps/Stairs Steps/Stairs Ambulated (#): 0  Level of Assist : 0 (Not tested)  Rail Use: Both  Challenged 6/21/2022-3, 4 inch steps with BHRs and CGA. Patient required cues for foot and hand placement and with sequencing.          Nursing:     Neuro:   AAA&O x  3         Respiratory:   [x] WNL   [] O2 LPM:   Other:  Peripheral Vascular:   [] TEDS present   [] Edema present ____ Grade   Cardiac:   [x] WNL   [] Other  Genitourinary:   [x] continent   [] incontinent [] ellis  Abdominal ___6/22____ LBM  GI: _cardiac______ Diet _thin____ Liquids _____ tube feeds  Musculoskeletal: ____ ROM Transfers _____ Assistive Device Used  __mod__ Level of Assistance  Skin Integumentary:   [x] Intact   [] Not Intact   __________Preventative Measures  Details______________________________________________________________  Pain: [x] Controlled   [] Not Controlled   Pain Meds:   [] Scheduled   [] PRN        Interdisciplinary Team Goals:     1. Discipline  Physical Therapy    Goal  Patient will perform sit to stand transfers from different height surfaces with supervision, using 100% accuracy for safety and recall for transfer set up, hand placement with standing and sitting. Barrier  Cognition, decreased strength, mobility, transfers, ambulation, safety and balance    Intervention  Patient will participate in skilled sessions to address weakness, decreased transfers, ambulation, safety and balance    Goal written by:   JOSÉ MIGUEL Montelongo     2. Discipline  Occupational Therapy    Goal  Patient will perform toileting (SBA/ supv) for CM and hygiene    Barrier  Decreased (I) with ADL's, impaired cognition/ memory, impaired functional mobility/ transfers, decreased strength/ endurance, decreased activity tolerance, safety and balance    Intervention  ADL training, there ex, there act, patient education    Goal written by:  Maximino Rdz, OTR/L     3. Discipline  Speech Therapy    Goal      Barrier      Intervention      Goal written by:       4. Discipline  Nursing    Goal  Pt. Electrolytes will be within normal limits by discharge from rehab. Barrier  Hypokalemia, Knowledge deficit on medications. Intervention  Medication teaching, dietary guidelines, and encourage proper fluid intake. Goal written by:  Ryan Merino RN     5.   Discipline  Clinical Psychology    Goal  Maintain mood stability during rehabilitation effort    Barrier  History of anxiety and current feelings of stress and worry Intervention  Rx, support  as needed and redirection    Goal written by:  Nely Ramos, PhD         Disposition / Discharge Planning: Follow-up services:  [x] Physical Therapy             [x] Occupational Therapy       [] Speech Therapy           [] Skilled Nursing      [] Medical Social Worker   [] Aide        [] Outpatient      [] vs   [x] Home Health  [] vs       [x] to progress to outpatient       [] with 24-hour supervision       [] with 24-hour assistance   [] [facility] recommendations:  RW   Estimated discharge date:  7/6/2022   Discharge Location:  [x] Home  [] versus    [] [facility]    [] [address]   [] Other:           Electronic Signatures:      Signature Date Signed   Physical Therapist    JOSÉ MIGUEL Yeboah PT, DPT  6/22/2022 6/23/2022   Occupational Therapist    Brenda Munoz, OTR/L  6/23/2022   Speech Therapist         Nursing    Radha Fernandes 6/22/2022   Clinical Psychologist    Nely Ramos, PhD  6/22/2022    Physician    Shorty Martinez MD   6/22/2022        Vi Mancia, MARKELL  6/22/2022     Opportunity to share with family/caregiver[] YES [] NO    Relationship to patient____________________________________________________      The above information has been reviewed with the patient in a language that they can understand. Opportunity for comments and questions has been provided and a signed attestation has been scanned into the \"media tab\" of the EMR.       Patient Signature: ______________________________________________________    Date Signed: __________________________________________________________

## 2022-06-22 NOTE — REHAB NOTE
[facility]    Bedside and Verbal shift change report given to Yaritza Cee (oncoming nurse) by Anna Linares (offgoing nurse). Report included the following information SBAR, Kardex, MAR and Recent Results.     Situation:   Code Status: Full Code   Reason for Admission: Compression fracture of T11 vertebra with routine healing  Hospital Day: 5   Problem List:   Hospital Problems  Date Reviewed: 6/20/2022          Codes Class Noted POA    Status post kyphoplasty ICD-10-CM: Z98.890  ICD-9-CM: V45.89  6/13/2022 Yes    Overview Signed 6/20/2022  4:37 PM by Sandy Anna MD     S/P Image guided T11 kyphoplasty; Image guided bilateral sacroplasty (6/13/2022 - Dr. Vic Brannon)             * (Principal) Compression fracture of T11 vertebra with routine healing ICD-10-CM: S22.080D  ICD-9-CM: V54.17  6/9/2022 Yes        Bilateral sacral insufficiency fracture with routine healing ICD-10-CM: M84.48XD  ICD-9-CM: V54.27  6/9/2022 Yes        Generalized weakness ICD-10-CM: R53.1  ICD-9-CM: 780.79  6/9/2022 Yes        Impaired mobility and ADLs ICD-10-CM: Z74.09, Z78.9  ICD-9-CM: V49.89  6/9/2022 Yes        Sciatica of right side ICD-10-CM: M54.31  ICD-9-CM: 724.3  2/7/2018 Yes        Essential hypertension, benign ICD-10-CM: I10  ICD-9-CM: 401.1  1/29/2009 Yes              Background:   Past Medical History:   Past Medical History:   Diagnosis Date    Acid reflux     Bilateral sacral insufficiency fracture with routine healing 6/9/2022    Chronic anemia     Compression fracture of T11 vertebra with routine healing 6/9/2022    Hypertension     Spinal stenosis       Patient taking anticoagulants No   Patient has a defibrillator: no    Assessment:   Changes in Assessment throughout shift: none     Patient has central line:no Reasons if yes: na  Insertion date:na Last dressing date:na   Patient has Nance Cath: no Reasons if yes: na  Insertion date:na     Last Vitals:     Vitals:    06/21/22 0910 06/21/22 1603 06/21/22 1816 06/21/22 2030   BP:  116/70  135/78   Pulse:  (!) 107  (!) 104   Resp:  16  18   Temp:  97 °F (36.1 °C)  97 °F (36.1 °C)   SpO2:  97%  95%   Weight: 68.8 kg (151 lb 9.6 oz)      Height: 5' 5\" (1.651 m)  5' 5\" (1.651 m)         PAIN    Pain Assessment    Pain Intensity 1: 6 (06/22/22 0245) Pain Intensity 1: 2 (12/29/14 1105)    Pain Location 1: Leg Pain Location 1: Abdomen    Pain Intervention(s) 1: Medication (see MAR) Pain Intervention(s) 1: Medication (see MAR)  Patient Stated Pain Goal: 0 Patient Stated Pain Goal: 0  o Intervention effective: yes  o Other actions taken for pain: repositioned     Skin Assessment  Skin color    Condition/Temperature    Integrity    Turgor    Weekly Pressure Ulcer Documentation  Pressure  Injury Documentation: No Pressure Injury Noted-Pressure Ulcer Prevention Initiated  Wound Prevention & Protection Methods  Orientation of wound Orientation of Wound Prevention: Posterior  Location of Prevention Location of Wound Prevention: Buttocks,Sacrum/Coccyx  Dressing Present Dressing Present : No  Dressing Status Dressing Status: Intact  Wound Offloading Wound Offloading (Prevention Methods): Bed, pressure redistribution/air     INTAKE/OUPUT  Date 06/21/22 0700 - 06/22/22 0659 06/22/22 0700 - 06/23/22 0659   Shift [id number] 4360-4281 24 Hour Total 4056-3298 2582-9185 24 Hour Total   INTAKE   P.O. 480  480        P. O. 480  480      Shift Total(mL/kg) 480(7)  480(7)      OUTPUT   Urine(mL/kg/hr)           Urine Occurrence(s) 2 x 2 x 4 x      Stool           Stool Occurrence(s) 1 x 2 x 3 x      Shift Total(mL/kg)           480      Weight (kg) 68.8 68.8 68.8 68.8 68.8 68.8       Recommendations:  1. Patient needs and requests: toileting,pain management,assist with ADL's    2. Diet: Regular Cardiac Restricted    3. Pending tests/procedures: labs    4. Functional Level/Equipment: assist x 1/walker/wheelchair    5.  Estimated Discharge Date: 07/6/22 Posted on [facility] in [facility]: yes    HEALS Safety Check    A safety check occurred in the patient's room between off going nurse and oncoming nurse listed above. The safety check included the below items  Area Items   H  High Alert Medications - Verify all high alert medication drips (heparin, PCA, etc.)   E  Equipment - Suction is set up for ALL patients (with catalina)  - Red plugs utilized for all equipment (IV pumps, etc.)  - WOWs wiped down at end of shift.  - Room stocked with oxygen, suction, and other unit-specific supplies   A  Alarms - Bed alarm is set for fall risk patients  - Ensure chair alarm is in place and activated if patient is up in a chair   L  Lines - Check IV for any infiltration  - Nance bag is empty if patient has a Nance   - Tubing and IV bags are labeled   S  Safety   - Room is clean, patient is clean, and equipment is clean. - Hallways are clear from equipment besides carts. - Fall bracelet on for fall risk patients  - Ensure room is clear and free of clutter  - Suction is set up for ALL patients (with catalina)  - Hallways are clear from equipment besides carts.    - Isolation precautions followed, supplies available outside room, sign posted

## 2022-06-22 NOTE — PROGRESS NOTES
Problem: Self Care Deficits Care Plan (Adult)  Goal: *Acute Goals and Plan of Care (Insert Text)  Description: Occupational Therapy Goals   Long Term Goals  Initiated  and to be accomplished within 2 week(s), by (2022)  1. Pt will perform self-feeding with independence. 2. Pt will perform grooming with MOD I.  3. Pt will perform UB bathing with MOD I.  4. Pt will perform LB bathing with MOD I.  5. Pt will perform tub/shower transfer with MOD I.   6. Pt will perform UB dressing with MOD I.  7. Pt will perform LB dressing with no more than MIN A for shoe management. 8. Pt will perform toileting task with MOD I  9. Pt will perform toilet transfer with MOD I. Short Term Goals   Initiated 2022 and to be accomplished within 7 day(s), by (2022)  1. Pt will perform grooming with no more than MIN A from w/c at sink. 2. Pt will perform UB bathing with no more than MIN A from w/c with basin or TTB. 3. Pt will perform LB bathing with MOD A.  4. Pt will perform tub/shower transfer with MOD A  5. Pt will perform UB dressing with setup A. 6. Pt will perform LB dressing with MOD A  7. Pt will perform toileting task with MIN A.  8. Pt will perform toilet transfer with MOD A. Outcome: Progressing Towards Goal  Goal: Interventions  Outcome: Progressing Towards Goal   Occupational Therapy TREATMENT    Patient: Shanta Busby   68 y.o. Patient identified with name and : yes     Date: 2022    First Tx Session  Time In: 1330  Time Out: 1500    Diagnosis: Compression fracture of body of thoracic vertebra ([facility] [address] [S22.000A]   Precautions:  Safety precautions; Fall precautions; Spinal precautions  Chart, occupational therapy assessment, plan of care, and goals were reviewed.      Pain:  Pt reports 6/10 pain or discomfort prior to treatment; sacrum and lower back discomfort described as aching   Pt reports 6/10 pain or discomfort post treatment; sacrum and lower back discomfort described as aching  Intervention Provided: repositioning; intermittent rest breaks. Pt denies need for pain medication at this time. SUBJECTIVE:   Patient stated I'm really tired today\" and \"I like to read at night if I can't sleep. \"    OBJECTIVE DATA SUMMARY:     THERAPEUTIC ACTIVITY Daily Assessment    Patient presented awake, semi-reclined bed level. Pt agreeable to participate in pt/ caregiver edu and treatment session at this time. Pt's  present for family training. Bed mobility performed (supv) level; log rolling towards pt's right side. Supine to sit (SBA) to seated position EOB. Sit to stand transitioning (CGA); verbal cues for pushing up from seated surface. Functional transfer training performed (CGA) using FWW; gait belt for safety. Verbal cues for safe handling of AD (FWW) e.g. standing within frame of walker, body positioning, and safety awareness. Wheelchair mobility performed (SBA) with patient propelling w/c forward using BLE's for accessing and navigating within facility environment. RUE/ LUE [address] [facility] tabletop activity performed for inserting small geometric shapes (25 pieces) into correct position onto tabletop stand; increased task challenge with use of 1.5 lb wrist weights. Performed for FM skills (tip pinch; manipulation of small plastic shapes) and for light cognitive task (problem solving task) for carryover to self-cares. Pt participated in [address] [place] [address] there act using eight inch therapy ball during ball toss/ catch activity with therapist. Performed for functional reach out-front, range of motion, and improved activity tolerance for increased independence with ADL's. THERAPEUTIC EXERCISE Daily Assessment    RUE/ LUE there ex performed using 2 lb hand weights 10 reps x2 for shoulder flexion to ~90 degrees, shoulder abduction/ adduction to ~90 degrees, chest press, forearm pronation/ supination, wrist flexion/ extension, shoulder internal/ external rotation, and elbow flexion/ extension. Verbal cues for alternating upper extremities. Therapist providing verbal cues with demonstration for technique and for slowing pace. Performed for range of motion, strengthening, and endurance for increased (I) with ADL's and mobility. Intermittent rest breaks due to fatigue. RUE/ LUE tabletop there act performed for reaching out-front to insert large pegs onto vertical foam pegboard attached to stand; increased task challenge with use of 1.5 lb wrist weights. Task performed for range of motion, [address] [place] (in-hand manipulation; tip pinch), and upper extremity strengthening for increased functional ability with ADL's. RUE/ LUE there ex performed using yellow flexbar 10 reps x2 for forearm pronation/ supination, shoulder internal/ external rotation, and wrist flexion/ extension. Verbal cues with demonstration for hand placement and form. GROOMING Daily Assessment    Functional Level: 5 (set-up)  Tasks Completed by Patient: Brushed hair; Washed hands  Comments: Pt performed hand hygiene w/c at sink following toileting. Pt brushed her hair (set-up) using hair brush seated in w/c.      TOILETING Daily Assessment    Functional Level: 4 (CGA for CM; hygiene set-up assist)  Comments: CGA for CM; hygiene set-up assist     LOWER BODY DRESSING Daily Assessment    Functional Level: 4 (Min A)  Items Applied/Steps Completed: Shoe, left (1 step); Shoe, right (1 step); Sock, left (1 step); Sock, right (1 step)  Comments: Patient doffed socks using crossed leg technique seated w/c level. Pt donned slip on sneakers seated EOB (SBA/ Min A) level. Verbal cues for use of crossed leg technique for maintaining back precautions. Sock and/or Shoe management: 4      MOBILITY/TRANSFERS Daily Assessment    Toilet Transfer Score: 4 (Stand step xfer (CGA) using FWW; gait belt. Seat over toilet)  Comments: Stand step xfer (CGA) using FWW; gait belt. Elevated seat over toilet.  Verbal cues for safe handling of AD (FWW) e.g. standing within frame of FWW and for body positioning. ASSESSMENT:  Patient presented awake, semi-reclined bed level. Pt agreeable to participate in pt/ caregiver education and treatment session at this time. Pt's  present for caregiver edu session. Therapist reviewed patient's current level of assistance with ADL's. Pt currently requiring set-up/ supv with UB ADL's and Min/ Mod A for LB dressing tasks with use of AE. Therapist discussed equipment recommendations for upcoming discharge e.g. shower chair and bedside commode. Therapist reviewed use of AE for improved functional ability with LB ADL's. Patient will continue to benefit from skilled occupational therapy interventions to improve patient's functional independence with ADL's, increased safety and (I) with functional transfers/ mobility using LRAD (FWW), improved activity tolerance, and strengthening/ endurance. Progression toward goals:  []          Improving appropriately and progressing toward goals  [x]          Improving slowly and progressing toward goals  []          Not making progress toward goals and plan of care will be adjusted     PLAN:  Patient continues to benefit from skilled intervention to address the above impairments. Continue treatment per established plan of care. Discharge Recommendations:  Home Health occupational therapy with family assist  Further Equipment Recommendations for Discharge:  bedside commode, gait belt and shower chair     Activity Tolerance:  Fair; intermittent rest breaks due to fatigue and endurance  Estimated LOS: 7/2/2022    Please refer to the flowsheet for vital signs taken during this treatment.   After treatment:   [x]  Patient left in no apparent distress sitting up in wheelchair with needs met  []  Patient left in no apparent distress in bed  [x]  Call bell left within reach  [x]  Nursing notified  [x]  Caregiver present; patient's  present during portion of treatment session for pt/ caregiver edu session  [x]  Wheelchair alarm activated    COMMUNICATION/EDUCATION:   [x] Home safety education was provided and the patient/caregiver indicated understanding. [x] Patient/family have participated as able in goal setting and plan of care. [x] Patient/family agree to work toward stated goals and plan of care. [] Patient understands intent and goals of therapy, but is neutral about his/her participation. [] Patient is unable to participate in goal setting and plan of care.     [address] [facility], OT

## 2022-06-22 NOTE — ROUTINE PROCESS
SHIFT CHANGE NOTE FOR [facility]    Bedside and Verbal shift change report given to Jessica Woo RN (oncoming nurse) by Cherry Pelletier RN (offgoing nurse). Report included the following information SBAR, Kardex, MAR and Recent Results.     Situation:   Code Status: Full Code   Hospital Day: 5   Problem List:   Hospital Problems  Date Reviewed: 6/22/2022          Codes Class Noted POA    Hypokalemia ICD-10-CM: E87.6  ICD-9-CM: 276.8  6/22/2022 Yes        Status post kyphoplasty ICD-10-CM: Z98.890  ICD-9-CM: V45.89  6/13/2022 Yes    Overview Signed 6/20/2022  4:37 PM by Inna Esteves MD     S/P Image guided T11 kyphoplasty; Image guided bilateral sacroplasty (6/13/2022 - Dr. Janis Solorio)             * (Principal) Compression fracture of T11 vertebra with routine healing ICD-10-CM: S22.080D  ICD-9-CM: V54.17  6/9/2022 Yes        Bilateral sacral insufficiency fracture with routine healing ICD-10-CM: M84.48XD  ICD-9-CM: V54.27  6/9/2022 Yes        Generalized weakness ICD-10-CM: R53.1  ICD-9-CM: 780.79  6/9/2022 Yes        Impaired mobility and ADLs ICD-10-CM: Z74.09, Z78.9  ICD-9-CM: V49.89  6/9/2022 Yes        Sciatica of right side ICD-10-CM: M54.31  ICD-9-CM: 724.3  2/7/2018 Yes        Essential hypertension, benign ICD-10-CM: I10  ICD-9-CM: 401.1  1/29/2009 Yes              Background:   Past Medical History:   Past Medical History:   Diagnosis Date    Acid reflux     Bilateral sacral insufficiency fracture with routine healing 6/9/2022    Chronic anemia     Compression fracture of T11 vertebra with routine healing 6/9/2022    Hypertension     Spinal stenosis         Assessment:   Changes in Assessment throughout shift: No change to previous assessment     Patient has a central line: no Reasons if yes: na  Insertion date:na Last dressing date:na   Patient has Ellis Cath: no Reasons if yes: na   Insertion date:na  Shift ellis care completed: NO     Last Vitals:     Vitals:    06/21/22 1816 06/21/22 2030 06/22/22 0830 06/22/22 1641   BP:  135/78 127/73 109/65   Pulse:  (!) 104 84 79   Resp:  18 19 18   Temp:  97 °F (36.1 °C) 97.2 °F (36.2 °C) 97.2 °F (36.2 °C)   SpO2:  95% 97% 97%   Weight:       Height: 5' 5\" (1.651 m)           PAIN    Pain Assessment    Pain Intensity 1: 0 (06/22/22 1836) Pain Intensity 1: 2 (12/29/14 1105)    Pain Location 1: Leg Pain Location 1: Abdomen    Pain Intervention(s) 1: Medication (see MAR) Pain Intervention(s) 1: Medication (see MAR)  Patient Stated Pain Goal: 0 Patient Stated Pain Goal: 0  o Intervention effective: yes  o Other actions taken for pain: Medication (see MAR)     Skin Assessment  Skin color    Condition/Temperature    Integrity    Turgor    Weekly Pressure Ulcer Documentation  Pressure  Injury Documentation: No Pressure Injury Noted-Pressure Ulcer Prevention Initiated  Wound Prevention & Protection Methods  Orientation of wound Orientation of Wound Prevention: Posterior  Location of Prevention Location of Wound Prevention: Buttocks,Sacrum/Coccyx  Dressing Present Dressing Present : No  Dressing Status Dressing Status: Intact  Wound Offloading Wound Offloading (Prevention Methods): Bed, pressure redistribution/air    Wound Prevention Checklist  Precautions:      []  Heel prevention boots placed on patient    []  Patient turned q2h during shift    []  Valeriy Order Set ordered    []  Patient on [facility] bed/Specialty bed    []  Each Wound is documented during shift (Stage, Color, drainage, odor, measurements, and dressings)    []  Dual skin checks done at bedside during shift report with Yoly Rhodes RN     INTAKE/OUPUT  Date 06/21/22 0700 - 06/22/22 0659 06/22/22 0700 - 06/23/22 0659   Shift 0053-5728 1203-5704 24 Hour Total 7223-2196 8940-2613 24 Hour Total   INTAKE   P.O. 480  480 1080  1080     P. O. 480  480 1080  1080   Shift Total(mL/kg) 480(7)  480(7) 1080(15.7)  0721(95.7)   OUTPUT   Urine(mL/kg/hr)           Urine Occurrence(s) 2 x 3 x 5 x 4 x  4 x   Stool Stool Occurrence(s) 1 x 2 x 3 x 1 x  1 x   Shift Total(mL/kg)           480 1080  1080   Weight (kg) 68.8 68.8 68.8 68.8 68.8 68.8       Recommendations:  1. Patient needs and requests: na    Pending tests/procedures: na  2. Functional Level/Equipment: Partial (one person) / Bed (comment)    Fall Precautions:   Fall risk precautions were reinforced with the patient; she was instructed to call for help prior to getting up. The following fall risk precautions were continued: bed/ chair alarms, door signage, yellow bracelet and socks as well as update of the [place] tool in the patient's room. Yinka Score: 4    HEALS Safety Check    A safety check occurred in the patient's room between off going nurse and oncoming nurse listed above. The safety check included the below items  Area Items   H  High Alert Medications - Verify all high alert medication drips (heparin, PCA, etc.)   E  Equipment - Suction is set up for ALL patients (with catalina)  - Red plugs utilized for all equipment (IV pumps, etc.)  - WOWs wiped down at end of shift.  - Room stocked with oxygen, suction, and other unit-specific supplies   A  Alarms - Bed alarm is set for fall risk patients  - Ensure chair alarm is in place and activated if patient is up in a chair   L  Lines - Check IV for any infiltration  - Nance bag is empty if patient has a Nance   - Tubing and IV bags are labeled   S  Safety   - Room is clean, patient is clean, and equipment is clean. - Hallways are clear from equipment besides carts. - Fall bracelet on for fall risk patients  - Ensure room is clear and free of clutter  - Suction is set up for ALL patients (with catalina)  - Hallways are clear from equipment besides carts.    - Isolation precautions followed, supplies available outside room, sign posted     Demond Rodney RN

## 2022-06-22 NOTE — ROUTINE PROCESS
0730  Critical Lab K 2.9 Dr. Glory Suh was notified and ordered Potassium bicarb 40 meq every 4 hrs. X 3 doses. And  12 ekg.   0800 pt. Sitting up in bed no change in  Assessment pt. Reported to be feeling fine. 0930 PT. Sitting up in chair eating breakfast.   1200 with therapy. 1330 able to transfer from bed to chair with assist.  1500 no change in assessment. 1800 Pt. Sitting up in chair eating dinner.

## 2022-06-23 PROCEDURE — 2709999900 HC NON-CHARGEABLE SUPPLY

## 2022-06-23 PROCEDURE — 97530 THERAPEUTIC ACTIVITIES: CPT

## 2022-06-23 PROCEDURE — 97110 THERAPEUTIC EXERCISES: CPT

## 2022-06-23 PROCEDURE — 97150 GROUP THERAPEUTIC PROCEDURES: CPT

## 2022-06-23 PROCEDURE — 65310000000 HC RM PRIVATE REHAB

## 2022-06-23 PROCEDURE — 99232 SBSQ HOSP IP/OBS MODERATE 35: CPT | Performed by: INTERNAL MEDICINE

## 2022-06-23 PROCEDURE — 97535 SELF CARE MNGMENT TRAINING: CPT

## 2022-06-23 PROCEDURE — 74011250637 HC RX REV CODE- 250/637: Performed by: EMERGENCY MEDICINE

## 2022-06-23 PROCEDURE — 74011250637 HC RX REV CODE- 250/637: Performed by: INTERNAL MEDICINE

## 2022-06-23 PROCEDURE — 97116 GAIT TRAINING THERAPY: CPT

## 2022-06-23 RX ORDER — ACETAMINOPHEN 325 MG/1
650 TABLET ORAL
Status: DISCONTINUED | OUTPATIENT
Start: 2022-06-23 | End: 2022-07-02 | Stop reason: HOSPADM

## 2022-06-23 RX ORDER — LOSARTAN POTASSIUM 25 MG/1
25 TABLET ORAL DAILY
Status: DISCONTINUED | OUTPATIENT
Start: 2022-06-24 | End: 2022-06-24

## 2022-06-23 RX ORDER — ACETAMINOPHEN 325 MG/1
650 TABLET ORAL 3 TIMES DAILY
Status: DISCONTINUED | OUTPATIENT
Start: 2022-06-24 | End: 2022-07-02 | Stop reason: HOSPADM

## 2022-06-23 RX ORDER — GABAPENTIN 400 MG/1
400 CAPSULE ORAL
Status: DISCONTINUED | OUTPATIENT
Start: 2022-06-23 | End: 2022-06-29

## 2022-06-23 RX ORDER — GABAPENTIN 100 MG/1
200 CAPSULE ORAL 2 TIMES DAILY
Status: DISCONTINUED | OUTPATIENT
Start: 2022-06-24 | End: 2022-07-02 | Stop reason: HOSPADM

## 2022-06-23 RX ADMIN — Medication 1 TABLET: at 08:09

## 2022-06-23 RX ADMIN — PANTOPRAZOLE SODIUM 40 MG: 40 TABLET, DELAYED RELEASE ORAL at 06:35

## 2022-06-23 RX ADMIN — DOCUSATE SODIUM 100 MG: 100 CAPSULE, LIQUID FILLED ORAL at 08:10

## 2022-06-23 RX ADMIN — Medication 400 MG: at 08:10

## 2022-06-23 RX ADMIN — GABAPENTIN 400 MG: 400 CAPSULE ORAL at 08:09

## 2022-06-23 RX ADMIN — ATORVASTATIN CALCIUM 10 MG: 10 TABLET, FILM COATED ORAL at 08:10

## 2022-06-23 RX ADMIN — OXYCODONE HYDROCHLORIDE 10 MG: 5 TABLET ORAL at 01:14

## 2022-06-23 RX ADMIN — Medication 1 TABLET: at 16:18

## 2022-06-23 RX ADMIN — NORTRIPTYLINE HYDROCHLORIDE 40 MG: 10 CAPSULE ORAL at 20:05

## 2022-06-23 RX ADMIN — GABAPENTIN 400 MG: 400 CAPSULE ORAL at 13:40

## 2022-06-23 RX ADMIN — GABAPENTIN 400 MG: 400 CAPSULE ORAL at 20:05

## 2022-06-23 RX ADMIN — OXYCODONE HYDROCHLORIDE 10 MG: 5 TABLET ORAL at 20:05

## 2022-06-23 NOTE — PROGRESS NOTES
[facility] PHYSICAL REHABILITATION  [address] [place] [address], [address]     INPATIENT REHABILITATION  DAILY PROGRESS NOTE     Date: 6/23/2022    Name: Cahro Masterson Age / Sex: 68 y.o. / female   CSN: [id number] MRN: [id number]   Admit Date: 6/17/2022 Length of Stay: 6 days     Primary Rehabilitation Diagnosis: Generalized weakness with Impaired mobility and ADLs secondary to:  1. Compression fracture of T11 vertebra with routine healing  2. Bilateral sacral insufficiency fracture with routine healing  3. S/P Image guided T11 kyphoplasty; Image guided bilateral sacroplasty (6/13/2022 - Dr. Adore Hutchison)      Subjective:     Patient seen and examined. Blood pressure controlled. Amlodipine and Metoprolol tartrate were both not given this AM due to BP precautions. Heart rate better controlled. Patient's Complaint:   No significant medical complaints    Pain Control: ongoing significant pain in which is stable and controlled by current meds      Objective:     Vital Signs:  Patient Vitals for the past 24 hrs:   BP Temp Pulse Resp SpO2   06/23/22 0816 -- 96.9 °F (36.1 °C) -- -- --   06/23/22 0809 106/68 96.9 °F (36.1 °C) 79 18 98 %   06/22/22 2035 121/72 97 °F (36.1 °C) 82 18 96 %   06/22/22 1641 109/65 97.2 °F (36.2 °C) 79 18 97 %        Physical Examination:  GENERAL SURVEY: Patient is awake, alert, oriented x 3, sitting comfortably on the wheelchair, not in acute respiratory distress.   HEENT: pale palpebral conjunctivae, anicteric sclerae, no nasoaural discharge, moist oral mucosa  NECK: supple, no jugular venous distention, no palpable lymph nodes  CHEST/LUNGS: symmetrical chest expansion, good air entry, clear breath sounds  HEART: adynamic precordium, good S1 S2, no S3, regular rhythm, no murmurs  ABDOMEN: flat, bowel sounds appreciated, soft, non-tender  EXTREMITIES: pale nailbeds, no edema, full and equal pulses, no calf tenderness   NEUROLOGICAL EXAM: The patient is awake, alert and oriented x3, able to answer questions fairly appropriately, able to follow 1 and 2 step commands. Able to tell time from the wall clock. Cranial nerves II-XII are grossly intact. No gross sensory deficit. Motor strength is 4+/5 on BUE, 4-/5 on BLE.        Current Medications:  Current Facility-Administered Medications   Medication Dose Route Frequency    hydrALAZINE (APRESOLINE) tablet 25 mg  25 mg Oral TID PRN    magnesium oxide (MAG-OX) tablet 400 mg  400 mg Oral DAILY    metoprolol tartrate (LOPRESSOR) tablet 50 mg  50 mg Oral Q12H    losartan (COZAAR) tablet 50 mg  50 mg Oral QHS    gabapentin (NEURONTIN) capsule 400 mg  400 mg Oral TID    nortriptyline (PAMELOR) capsule 40 mg  40 mg Oral QHS    acetaminophen (TYLENOL) tablet 650 mg  650 mg Oral Q4H PRN    oxyCODONE IR (ROXICODONE) tablet 10 mg  10 mg Oral Q4H PRN    LORazepam (ATIVAN) tablet 0.5 mg  0.5 mg Oral BID PRN    ondansetron (ZOFRAN ODT) tablet 4 mg  4 mg Oral Q8H PRN    amLODIPine (NORVASC) tablet 10 mg  10 mg Oral DAILY    atorvastatin (LIPITOR) tablet 10 mg  10 mg Oral DAILY    ferrous gluconate 324 mg (37.5 mg iron) tablet 1 Tablet  324 mg Oral BID WITH MEALS    pantoprazole (PROTONIX) tablet 40 mg  40 mg Oral ACB    docusate sodium (COLACE) capsule 100 mg  100 mg Oral BID    bisacodyL (DULCOLAX) tablet 10 mg  10 mg Oral Q48H PRN    melatonin tablet 3 mg  3 mg Oral QHS PRN    naloxone (NARCAN) injection 0.2 mg  0.2 mg IntraVENous PRN       Allergies:  No Known Allergies    Functional Progress:    PHYSICAL THERAPY    ON ADMISSION MOST RECENT   Wheelchair Mobility/Management  Able to Propel (ft): 60 feet  Functional Level: 4  Curbs/Ramps Assist Required (FIM Score): 0 (Not tested)  Wheelchair Setup Assist Required : 2 (Maximal assistance)  Wheelchair Management: Manages right brake,Manages left brake Wheelchair Mobility/Management  Able to Propel (ft): 120 feet  Functional Level: 5  Curbs/Ramps Assist Required (FIM Score): 0 (Not tested)  Wheelchair Setup Assist Required : 2 (Maximal assistance)  Wheelchair Management: Manages right brake,Manages left brake     Gait  Amount of Assistance: 4 (Minimal assistance)  Distance (ft): 25 Feet (ft)  Assistive Device: Walker, rolling Gait  Amount of Assistance: 4 (Contact guard assistance)  Distance (ft):  (123 ft, 150 ft)  Assistive Device: Walker, rolling     Balance-Sitting/Standing  Sitting - Static: Good (unsupported)  Sitting - Dynamic: Fair (occasional)  Standing - Static: Fair  Standing - Dynamic : Impaired Balance-Sitting/Standing  Sitting - Static: Fair (occasional)  Sitting - Dynamic: Fair (occasional)  Standing - Static: Fair  Standing - Dynamic : Impaired     Bed/Mat Mobility  Rolling Right : 5 (Stand-by assistance)  Rolling Left : 5 (Stand-by assistance)  Supine to Sit : 4 (Minimal assistance)  Sit to Supine : 3 (Moderate assistance) Bed/Mat Mobility  Rolling Right : 5 (Supervision)  Rolling Left : 5 (Supervision)  Supine to Sit : 5 (Supervision)  Sit to Supine : 5 (Supervision)     Transfers  Other: stand step with RW  Transfer Assistance : 4 (Minimal assistance)  Sit to Stand Assistance: Minimal assistance  Car Transfers: Not tested  Car Type: car simulator Transfers  Other: stand step with RW  Transfer Assistance : 5 (Stand-by assistance)  Sit to Stand Assistance: Stand-by assistance  Car Transfers:  (SBA and verbal cues.)  Car Type: car simulator     Steps or Stairs  Steps/Stairs Ambulated (#): 0  Level of Assist : 0 (Not tested)  Rail Use: Both Steps or Stairs  Steps/Stairs Ambulated (#):  (2-6 inch steps)  Level of Assist : 4 (Contact guard assistance)  Rail Use: Both         Lab/Data Review:  Recent Results (from the past 24 hour(s))   POTASSIUM    Collection Time: 06/22/22  6:42 PM   Result Value Ref Range    Potassium 4.5 3.5 - 5.5 mmol/L       Assessment:     Primary Rehabilitation Diagnosis  1. Generalized weakness with Impaired mobility and ADLs  2.  Compression fracture of T11 vertebra with routine healing  3. Bilateral sacral insufficiency fracture with routine healing  4. S/P Image guided T11 kyphoplasty; Image guided bilateral sacroplasty (6/13/2022 - Dr. Roger Robledo)    Comorbidities  Patient Active Problem List   Diagnosis Code    Spinal stenosis of lumbar region with neurogenic claudication M48.062    Lumbar facet arthropathy M47.816    Disorder of bone and cartilage M89.9, M94.9    Diverticulosis of colon K57.30    Dyslipidemia E78.5    Essential hypertension, benign I10    Gastroesophageal reflux disease without esophagitis K21.9    Generalized anxiety disorder F41.1    Impaired fasting glucose R73.01    Irritable bowel syndrome with diarrhea K58.0    Melanoma in situ of right upper arm (HCC) D03.61    Primary insomnia F51.01    Sciatica of right side M54.31    Squamous cell carcinoma [id number]    Lumbar stenosis with neurogenic claudication M48.062    Depression, recurrent (HCC) F33.9    Spinal stenosis M48.00    Leg weakness, bilateral R29.898    Atrial fibrillation with rapid ventricular response ([facility]) I48.91    Compression fracture of T11 vertebra with routine healing S22.080D    Bilateral sacral insufficiency fracture with routine healing M84.48XD    Status post kyphoplasty Z98.890    Generalized weakness R53.1    Impaired mobility and ADLs Z74.09, Z78.9    Chronic anemia D64.9    Hypokalemia E87.6       Plan:     1. Justification for continued stay: Good progression towards established rehabilitation goals. 2. Medical Issues being followed closely:    [x]  Fall and safety precautions     []  Wound Care     [x]  Bowel and Bladder Function     [x]  Fluid Electrolyte and Nutrition Balance     [x]  Pain Control      3.  Issues that 24 hour rehabilitation nursing is following:    [x]  Fall and safety precautions     []  Wound Care     [x]  Bowel and Bladder Function     [x]  Fluid Electrolyte and Nutrition Balance     [x]  Pain Control      [x] Assistance with and education on in-room safety with transfers to and from the bed, wheelchair, toilet and shower. 4. Acute rehabilitation plan of care:    [x]  Continue current care and rehab. [x]  Physical Therapy           [x]  Occupational Therapy           []  Speech Therapy     []  Hold Rehab until further notice     5. Medications:    [x]  MAR Reviewed     [x]  Continue Present Medications     6. Chemical DVT Prophylaxis:      []  Enoxaparin     []  Unfractionated Heparin     []  Warfarin     []  NOAC     []  Aspirin     [x]  None     7. Mechanical DVT Prophylaxis:      []  YOLY Stockings     [x]  Sequential Compression Device     []  None     8. GI Prophylaxis:      []  PPI     []  H2 Blocker     [x]  None / Not indicated     9. Code status:    [x]  Full code     []  Partial code     []  Do not intubate     []  Do not resuscitate     10. Diet:  Specifications  Low fat/Low cholesterol/High fiber/2 gm Na   Solids (consistency)  Regular   Liquids (consistency)  Thin   Fluid Restriction  None     11.  Orders:   > Compression fracture of T11 vertebra with routine healing; Bilateral sacral insufficiency fracture with routine healing; S/P Image guided T11 kyphoplasty; Image guided bilateral sacroplasty (6/13/2022 - Dr. Sylvain Sorto)   > Thoracic spinal precautions    > Chronic anemia   > Hgb/Hct (6/18/2022, on admission to the ARU) = 8.7/28.5    > Hgb/Hct (6/19/2022) = 9.6/31.8    > Hgb/Hct (6/22/2022) = 9.5/30.7    > Continue Ferrous gluconate 324 mg PO BID with meals    > Dyslipidemia   > Continue Atorvastatin 10 mg PO once daily    > Essential hypertension   > On 6/21/2022:    > Change Hydralazine from 25 mg PO TID (8AM, 2PM, 8PM) to 25 mg PO TID PRN for SBP greater than 170 mmHg    > Started Metoprolol tartrate 50 mg PO q 12 hr (9AM, 9PM)   > On 6/22/2022, changed Losartan from 50 mg PO once daily (9AM) to 50 mg PO q HS   > Discontinue Amlodipine 10 mg PO once daily (9AM)   > Continue:    > Change Losartan from 50 mg PO q HS to 25 mg PO once daily (9AM)    > Metoprolol tartrate 50 mg PO q 12 hr (9AM, 9PM)    > Hydralazine 25 mg PO TID PRN for SBP greater than 170 mmHg    > Gastroesophageal reflux disease   > Continue Pantoprazole 40 mg PO daily before breakfast    > Generalized anxiety disorder   > Continue:    > Nortriptyline 40 mg PO q HS    > Lorazepam 0.5 mg PO BID PRN for anxiety    > Sciatica of right side   > On 6/20/2022, increased Gabapentin from 300 mg to 400 mg PO TID (8AM, 2PM, 8PM)    > Continue:    > Decrease Gabapentin from 400 mg PO TID (8AM, 2PM, 8PM) to:     > Gabapentin 200 mg PO BID (8AM, 2PM)     > Gabapentin 400 mg PO q 8PM    > Nortriptyline 40 mg PO q HS    > Hypokalemia      06/17/22  0232 06/16/22  0725 06/15/22  1145 06/15/22  0714 06/15/22  0210 06/14/22  0317 06/13/22  0513 06/13/22  0209 06/12/22  0250 06/11/22  0359 06/09/22  1330   K 3.1* 3.8 4.1 3.7 2.8* 3.4* 2.9* 3.1* 3.0* 3.2* 2.8*   MG  --   --   --  1.6 1.7  --   --  2.0 2.1 2.1 2.1      > K (6/17/2022, on admission to the ARU) = 3.2      06/22/22  0628 06/19/22  0530 06/18/22  0509   K 2.9* 3.5 3.2*   MG 1.8 1.6 1.8      > 12-lead EKG (6/22/2022) showed normal sinus rhythm at 81 bpm, prolonged QTc at 494 ms   > On 6/22/2022:    > Patient was given Potassium bicarbonate 40 meq PO q 4 hr x 3 doses (8AM, 12PM, 4PM)    > Started Magnesium oxide 400 mg PO once daily   > K (6/22/2022, 6:42 PM) = 4.5   > Continue Magnesium oxide 400 mg PO once daily     > Analgesia   > Start Acetaminophen 650 mg PO TID (8AM, 2PM, 8PM)   > Continue:    > Acetaminophen 650 mg PO q 4 hr PRN for pain level 4/10 or lesser (to be given from 8PM to 4AM only)    > Oxycodone IR 10 mg PO q 4 hr PRN for pain level 5/10 or greater       12. Personal Protective Equipment (N95 face mask with eye goggles) was used while interacting with the patient. Patient was using a surgical mask.     13. Patient's progress in rehabilitation and medical issues discussed with the patient and . All questions answered to the best of my ability. Care plan discussed with patient and nurse. 14. Total clinical care time is 30 minutes, including review of chart including all labs, radiology, past medical history, and discussion with patient. Greater than 50% of my time was spent in coordination of care and counseling.       Signed:    Skylar Ang MD    June 23, 2022

## 2022-06-23 NOTE — PROGRESS NOTES
Problem: Self Care Deficits Care Plan (Adult)  Goal: *Acute Goals and Plan of Care (Insert Text)  Description: Occupational Therapy Goals   Long Term Goals  Initiated 6/18/2022 (goals revised on 6/23/2022) and to be accomplished within 2 week(s), by (7/2/2022)  1. Pt will perform self-feeding with independence. 2. Pt will perform grooming with MOD I.  3. Pt will perform UB bathing with MOD I.  4. Pt will perform LB bathing with MOD I using AE and/ or compensatory strategies as needed. 5. Pt will perform tub/shower transfer with supervision using least restrictive assistive device. 6. Pt will perform UB dressing with MOD I.  7. Pt will perform LB dressing with set-up assistance using AE and/ or compensatory strategies as needed. 8. Pt will perform toileting task with MOD I.  9. Pt will perform toilet transfer with supervision using least restrictive assistive device. Short Term Goals   Initiated 6/18/2022 (reassessed on 6/23/2022) and to be accomplished within 7 day(s), by (6/30/2022)  1. Pt will perform grooming with no more than MIN A from w/c at sink. (Goal met 6/23/2022; currently set-up)  2. Pt will perform UB bathing with no more than MIN A from w/c with basin or TTB. (Goal met 6/23/2022)      Goal upgraded: Pt will perform UB bathing with set-up. 3. Pt will perform LB bathing with MOD A using AE and/ or compensatory strategies as needed. (Goal met 6/23/2022; currently SBA)      Goal upgraded: Pt will perform LB bathing with supv/ set-up using AE and/ or compensatory strategies as needed. 4. Pt will perform tub/shower transfer with MOD A using LRAD. (Goal met 6/23/2022; currently CGA)      Goal upgraded: Pt will perform tub/shower transfer with SBA using least restrictive assistive device. 5. Pt will perform UB dressing with setup A. (Goal met 6/23/2022)  6. Pt will perform LB dressing with MOD A using AE and/ or compensatory strategies.  (Goal met 6/23/2022; currently CGA)      Goal upgraded: Pt will perform LB dressing with SBA using AE and/ or compensatory strategies. 7. Pt will perform toileting task with MIN A. (Goal met 2022; currently CGA)      Goal upgraded: Pt will perform toileting task with supv/ SBA. 8. Pt will perform toilet transfer with MOD A using LRAD. (Goal met 2022; currently SBA)      Goal upgraded: Pt will perform toilet transfer with supervision using least restrictive assistive device. Outcome: Progressing Towards Goal  Goal: Interventions  Outcome: Progressing Towards Goal   OT WEEKLY PROGRESS NOTE  Patient Name:Leila Tse      Time In: 0900  Time Out: 1032    Medical Diagnosis:  Compression fracture of body of thoracic vertebra (HCC) [S22.000A] Compression fracture of T11 vertebra with routine healing     Pain at start of tx: 4/10 pain or discomfort; lower back/ sacrum (aching)  Pain at stop of tx: 4/10 pain or discomfort; lower back/ sacrum (aching)  Interventions: repositioning; intermittent rest breaks. Pt denies need for pain medication at this time.      Patient identified with name and : yes  Subjective: \"I would like to wash my hair\"         Objective: /68, HR 79 bpm, RR 18 bpm, SpO2 98% on room air  Outcome Measures: AROM: RUE/ LUE AROM WFL    COGNITION/PERCEPTION Initial Assessment Weekly Progress Assessment 2022   Premorbid Reading Status       Premorbid Writing Status       Arousal/Alertness       Orientation Level Oriented to person,Oriented to place,Oriented to situation Oriented X4   Visual Fields       Praxis       Body Scheme       COMPREHENSION MODE Initial Assessment Weekly Progress Assessment 2022   Primary Mode of Comprehension  Auditory   Hearing Aide  None   Corrective Lenses       Score Not assessed on initial eval due to anxiety and pain limiting patient participation 5     EXPRESSION Initial Assessment Weekly Progress Assessment 2022   Primary Mode of Expression  Verbal   Score Not assessed on initial eval due to anxiety and pain limiting patient participation 5   Comments         SOCIAL INTERACTION/ PRAGMATICS Initial Assessment Weekly Progress Assessment 6/23/2022   Score Not assessed on initial eval due to anxiety and pain limiting patient participation 5   Comments         PROBLEM SOLVING Initial Assessment Weekly Progress Assessment 6/23/2022   Score Not assessed on initial eval due to anxiety and pain limiting patient participation 4   Comments         MEMORY Initial Assessment Weekly Progress Assessment 6/23/2022   Score Not assessed on initial eval due to anxiety and pain limiting patient participation 4   Comments         EATING Initial Assessment Weekly Progress Assessment 6/23/2022   Functional Level 5 Functional Level: 6 (Mod I for self-feeding following set-up of tray)   Comments Self feeding with fork Comments: Mod I for self-feeding following set-up of tray     GROOMING Initial Assessment Weekly Progress Assessment 6/23/2022   Functional Level 5 Functional Level: 5 (set-up/ Mod I (w/c at sink))   Tasks completed by patient Brushed teeth Tasks Completed by Patient: Brushed hair;Brushed teeth; Washed face   Comments Seated EOB Comments: Patient washed her face (set-up) seated on tub bench using washcloth seated on tub bench. Pt performed oral hygiene (brushed teeth) set-up/ Mod I level w/c at sink. BATHING Initial Assessment Weekly Progress Assessment 6/23/2022   Functional Level 3 Functional Level: 5 (SBA)   Body parts patient bathed Abdomen,Arm, left,Arm, right,Chest Body Parts Patient Bathed: Abdomen;Arm, left;Arm, right;Buttocks; Chest;Lower leg and foot, left; Lower leg and foot, right;Kandis area; Thigh, left; Thigh, right   Comments  Comments: UB bathing performed supv/ set-up seated on tub bench. LB bathing performed (SBA) seated on tub bench (ADL shower). Verbal cues for use of LH sponge to wash distal BLE's. Edu/ instruction in use of weight shift (side to side) to wash sacrum/ buttocks. TUB/SHOWER TRANSFER INDEPENDENCE Initial Assessment Weekly Progress Assessment 6/23/2022   Score 4 Tub/Shower Transfer Score: 4 (Stand step xfer (CGA) FWW; gait belt)   Comments tub transfer bench and FWW, simulated tub transfer bench transfer for home environment Comments:  (Stand step xfer (CGA) FWW to seated position on tub bench. )     UPPER BODY DRESSING/UNDRESSING Initial Assessment Weekly Progress Assessment 6/23/2022   Functional Level 3 Functional Level: 5 (set-up)   Items applied/Steps completed Bra (3 steps) Items Applied/Steps Completed: Pullover (4 steps)   Comments At EOB, A to pull over trunk. Donning l/s shirt reclined in bed with assist to locate R sleeve and pull over trunk via log rolling Comments: UB dressing performed (set-up/ Mod I) for doffing/ donning pullover shirt seated on tub bench. LOWER BODY DRESSING/UNDRESSING Initial Assessment Weekly Progress Assessment 6/23/2022   Functional Level 2 Functional Level: 4 (CGA/ SBA)   Items applied/Steps completed Elastic waist pants (3 steps) Items Applied/Steps Completed: Elastic waist pants (3 steps); Sock, left (1 step); Sock, right (1 step) (Depends (3 steps))   Comments Donning from reclined in bed d/t pain and dizziness Comments: LB dressing performed CGA/ SBA overall. Pt donned elastic waist pants (CGA/ SBA) w/c level using crossed leg technique to thread pant legs and depend over distal BLE's. Pt donned slipper socks (set-up) using crossed leg technique w/c level. TOILETING Initial Assessment Weekly Progress Assessment 6/23/2022   Functional Level 3 (6/20/2022) Functional Level: 4 (SBA/ CGA)   Comments Pt voided using elevated seat over toilet; pt requiring Mod A for CM (pants up/ down) and SBA for hygiene. Verbal cues for maintaining spinal precautions.   Comments: SBA/ CGA for CM; set-up for hygiene     TOILET TRANSFER INDEPENDENCE Initial Assessment Weekly Progress Assessment 6/23/2022   Transfer score 4 Toilet Transfer Score: 5 (Stand step xfer (SBA) using FWW; gait belt)   Comments Use of FWW  Comments: Stand step xfer (SBA) using FWW; gait belt            ASSESSMENT:  Patient improving slowly and progressing toward goals having achieved 8/8 STG's this reporting period. Therapist reassessed and updated goals based on patient's current functional ability and demonstrated performance. Today's skilled occupational therapy session focused on ADL training (ADL shower using tub bench), edu/ instruction in use of AE and/ or compensatory strategies for LB ADL's, functional transfer training using LRAD (FWW), and RUE/ LUE there ex (strengthening; endurance). Patient advanced to performing LB dressing (SBA/ CGA) level with use of AE as needed. Pt requiring (SBA/ CGA) for toileting self-care 2/2 balance and coordination. Patient will continue to benefit from skilled occupational therapy interventions to improve patient's functional independence with ADL's, increase safety and independence with functional transfers/ mobility, increase activity tolerance, and for strengthening/ endurance. There ex: RUE/ LUE there ex performed using 2 lb weighted bar (15 reps x2) for chest press, elbow flexion/ extension, and forward roll. Verbal cues for hand placement and technique. Intermittent rest breaks between sets due to fatigue. Performed for improved strength and endurance necessary for ADL's and mobility. Progression toward goals:  []          Improving appropriately and progressing toward goals  [x]          Improving slowly and progressing toward goals  []          Not making progress toward goals and plan of care will be adjusted     PLAN:  Patient continues to benefit from skilled intervention to address the above impairments. Continue treatment per established plan of care.   Discharge Recommendations:  Home Health occupational therapy with family assist/ support  Further Equipment Recommendations for Discharge:  bedside commode, gait belt, and shower chair      Please refer to the flow sheet for vital signs taken during this treatment. After treatment:   [x]  Patient left in no apparent distress sitting up in wheelchair with needs met  []  Patient left in no apparent distress in bed  [x]  Call bell left within reach  [x]  Nursing notified  []  Caregiver present  [x]  Wheelchair alarm activated    COMMUNICATION/EDUCATION:   [x] Home safety education was provided and the patient/caregiver indicated understanding. [x] Patient/family have participated as able in goal setting and plan of care. [x] Patient/family agree to work toward stated goals and plan of care. [] Patient understands intent and goals of therapy, but is neutral about his/her participation. [] Patient is unable to participate in goal setting and plan of care. Plan of Care: Please see Care Plan for updated STG/LTGs.    Family Training: pt/ caregiver edu performed on 6/22/2022; pt's  in attendance  Estimated LOS: 7/2/2022    Sharon Saldana OT  6/23/2022

## 2022-06-23 NOTE — ROUTINE PROCESS
SHIFT CHANGE NOTE FOR [facility]    Bedside and Verbal shift change report given to Sandy Whitehead RN (oncoming nurse) by Abdullahi Siegel RN (offgoing nurse). Report included the following information SBAR, Kardex, MAR and Recent Results.     Situation:   Code Status: Full Code   Hospital Day: 6   Problem List:   Hospital Problems  Date Reviewed: 6/23/2022          Codes Class Noted POA    Hypokalemia ICD-10-CM: E87.6  ICD-9-CM: 276.8  6/22/2022 Yes        Status post kyphoplasty ICD-10-CM: Z98.890  ICD-9-CM: V45.89  6/13/2022 Yes    Overview Signed 6/20/2022  4:37 PM by Christiane Bullock MD     S/P Image guided T11 kyphoplasty; Image guided bilateral sacroplasty (6/13/2022 - Dr. Hernandez Titus)             * (Principal) Compression fracture of T11 vertebra with routine healing ICD-10-CM: S22.080D  ICD-9-CM: V54.17  6/9/2022 Yes        Bilateral sacral insufficiency fracture with routine healing ICD-10-CM: M84.48XD  ICD-9-CM: V54.27  6/9/2022 Yes        Generalized weakness ICD-10-CM: R53.1  ICD-9-CM: 780.79  6/9/2022 Yes        Impaired mobility and ADLs ICD-10-CM: Z74.09, Z78.9  ICD-9-CM: V49.89  6/9/2022 Yes        Sciatica of right side ICD-10-CM: M54.31  ICD-9-CM: 724.3  2/7/2018 Yes        Essential hypertension, benign ICD-10-CM: I10  ICD-9-CM: 401.1  1/29/2009 Yes              Background:   Past Medical History:   Past Medical History:   Diagnosis Date    Acid reflux     Bilateral sacral insufficiency fracture with routine healing 6/9/2022    Chronic anemia     Compression fracture of T11 vertebra with routine healing 6/9/2022    Hypertension     Spinal stenosis         Assessment:   Changes in Assessment throughout shift: No change to previous assessment     Patient has a central line: no Reasons if yes: n/a  Insertion date: n/a Last dressing date: n/a   Patient has Nance Cath: no Reasons if yes:  n/a   Insertion date: n/a     Last Vitals:     Vitals:    06/22/22 2035 06/23/22 0809 06/23/22 7481 06/23/22 1552   BP: 121/72 106/68  103/69   Pulse: 82 79  95   Resp: 18 18  19   Temp: 97 °F (36.1 °C) 96.9 °F (36.1 °C) 96.9 °F (36.1 °C) 97.7 °F (36.5 °C)   SpO2: 96% 98%  98%   Weight:       Height:            PAIN    Pain Assessment    Pain Intensity 1: 0 (06/23/22 1552) Pain Intensity 1: 2 (12/29/14 1105)    Pain Location 1: Back Pain Location 1: Abdomen    Pain Intervention(s) 1: Medication (see MAR) Pain Intervention(s) 1: Medication (see MAR)  Patient Stated Pain Goal: 0 Patient Stated Pain Goal: 0  o Intervention effective: yes  o Other actions taken for pain:       Skin Assessment  Skin color    Condition/Temperature    Integrity    Turgor    Weekly Pressure Ulcer Documentation  Pressure  Injury Documentation: No Pressure Injury Noted-Pressure Ulcer Prevention Initiated  Wound Prevention & Protection Methods  Orientation of wound Orientation of Wound Prevention: Posterior  Location of Prevention Location of Wound Prevention: Sacrum/Coccyx  Dressing Present Dressing Present : No  Dressing Status Dressing Status: Intact  Wound Offloading Wound Offloading (Prevention Methods): Bed, pressure redistribution/air    Wound Prevention Checklist  Precautions:      []  Heel prevention boots placed on patient    []  Patient turned q2h during shift    []  Valeriy Order Set ordered    [x]  Patient on Weiser bed/Specialty bed    []  Each Wound is documented during shift (Stage, Color, drainage, odor, measurements, and dressings)    [x]  Dual skin checks done at bedside during shift report with Sindy APPIAH     INTAKE/OUPUT  Date 06/22/22 1900 - 06/23/22 0659 06/23/22 0700 - 06/24/22 0659   Shift 9799-1481 24 Hour Total 5864-9772 5646-8356 24 Hour Total   INTAKE   P.O. 200 1280  240 240     P. O. 200 1280  240 240   Shift Total(mL/kg) 200(2.9) 1280(18.6)  240(3.5) 240(3.5)   OUTPUT   Urine(mL/kg/hr)          Urine Occurrence(s) 2 x 6 x 2 x 1 x 3 x   Emesis/NG output          Emesis Occurrence(s) 0 x 0 x      Stool   1  1 Stool Occurrence(s) 0 x 1 x 2 x 0 x 2 x     Stool   1  1   Shift Total(mL/kg)   1(0)  1(0)    1280 -1 240 239   Weight (kg) 68.8 68.8 68.8 68.8 68.8       Recommendations:  1. Patient needs and requests: pain management; toileting    2. Pending tests/procedures: routine labs     3. Functional Level/Equipment: Partial (one person) / Bed (comment)    Fall Precautions:   Fall risk precautions were reinforced with the patient; she was instructed to call for help prior to getting up. The following fall risk precautions were continued: bed/ chair alarms, door signage, yellow bracelet and socks as well as update of the [facility]  tool in the patient's room. Yinka Score: 4    HEALS Safety Check    A safety check occurred in the patient's room between off going nurse and oncoming nurse listed above. The safety check included the below items  Area Items   H  High Alert Medications - Verify all high alert medication drips (heparin, PCA, etc.)   E  Equipment - Suction is set up for ALL patients (with catalina)  - Red plugs utilized for all equipment (IV pumps, etc.)  - WOWs wiped down at end of shift.  - Room stocked with oxygen, suction, and other unit-specific supplies   A  Alarms - Bed alarm is set for fall risk patients  - Ensure chair alarm is in place and activated if patient is up in a chair   L  Lines - Check IV for any infiltration  - Nance bag is empty if patient has a Nance   - Tubing and IV bags are labeled   S  Safety   - Room is clean, patient is clean, and equipment is clean. - Hallways are clear from equipment besides carts. - Fall bracelet on for fall risk patients  - Ensure room is clear and free of clutter  - Suction is set up for ALL patients (with catalina)  - Hallways are clear from equipment besides carts.    - Isolation precautions followed, supplies available outside room, sign posted     Abdullahi Siegel RN

## 2022-06-23 NOTE — ROUTINE PROCESS
[facility]    Bedside and Verbal shift change report given to Oscar Win / Walter Davis RN (oncoming nurse) by Juan Carlos Jc RN (offgoing nurse). Report included the following information SBAR, Kardex, MAR and Recent Results.     Situation:   Code Status: Full Code   Hospital Day: 6   Problem List:   Hospital Problems  Date Reviewed: 6/22/2022          Codes Class Noted POA    Hypokalemia ICD-10-CM: E87.6  ICD-9-CM: 276.8  6/22/2022 Yes        Status post kyphoplasty ICD-10-CM: Z98.890  ICD-9-CM: V45.89  6/13/2022 Yes    Overview Signed 6/20/2022  4:37 PM by Sandy Anna MD     S/P Image guided T11 kyphoplasty; Image guided bilateral sacroplasty (6/13/2022 - Dr. Vic Brannon)             * (Principal) Compression fracture of T11 vertebra with routine healing ICD-10-CM: S22.080D  ICD-9-CM: V54.17  6/9/2022 Yes        Bilateral sacral insufficiency fracture with routine healing ICD-10-CM: M84.48XD  ICD-9-CM: V54.27  6/9/2022 Yes        Generalized weakness ICD-10-CM: R53.1  ICD-9-CM: 780.79  6/9/2022 Yes        Impaired mobility and ADLs ICD-10-CM: Z74.09, Z78.9  ICD-9-CM: V49.89  6/9/2022 Yes        Sciatica of right side ICD-10-CM: M54.31  ICD-9-CM: 724.3  2/7/2018 Yes        Essential hypertension, benign ICD-10-CM: I10  ICD-9-CM: 401.1  1/29/2009 Yes              Background:   Past Medical History:   Past Medical History:   Diagnosis Date    Acid reflux     Bilateral sacral insufficiency fracture with routine healing 6/9/2022    Chronic anemia     Compression fracture of T11 vertebra with routine healing 6/9/2022    Hypertension     Spinal stenosis         Assessment:   Changes in Assessment throughout shift: No change to previous assessment     Patient has a central line: no Reasons if yes: n/a  Insertion date: n/a Last dressing date: n/a   Patient has Nance Cath: no Reasons if yes:  n/a   Insertion date: n/a     Last Vitals:     Vitals:    06/21/22 2030 06/22/22 0830 06/22/22 1645 06/22/22 2035   BP: 135/78 127/73 109/65 121/72   Pulse: (!) 104 84 79 82   Resp: 18 19 18 18   Temp: 97 °F (36.1 °C) 97.2 °F (36.2 °C) 97.2 °F (36.2 °C) 97 °F (36.1 °C)   SpO2: 95% 97% 97% 96%   Weight:       Height:            PAIN    Pain Assessment    Pain Intensity 1: 3 (06/23/22 0200) Pain Intensity 1: 2 (12/29/14 1105)    Pain Location 1: Back Pain Location 1: Abdomen    Pain Intervention(s) 1: Medication (see MAR) Pain Intervention(s) 1: Medication (see MAR)  Patient Stated Pain Goal: 0 Patient Stated Pain Goal: 0  o Intervention effective: yes  o Other actions taken for pain: Medication (see MAR)     Skin Assessment  Skin color    Condition/Temperature    Integrity    Turgor    Weekly Pressure Ulcer Documentation  Pressure  Injury Documentation: No Pressure Injury Noted-Pressure Ulcer Prevention Initiated  Wound Prevention & Protection Methods  Orientation of wound Orientation of Wound Prevention: Posterior  Location of Prevention Location of Wound Prevention: Sacrum/Coccyx  Dressing Present Dressing Present : No  Dressing Status Dressing Status: Intact  Wound Offloading Wound Offloading (Prevention Methods): Bed, pressure redistribution/air    Wound Prevention Checklist  Precautions:      []  Heel prevention boots placed on patient    []  Patient turned q2h during shift    []  Valeriy Order Set ordered    [x]  Patient on Madeline bed/Specialty bed    []  Each Wound is documented during shift (Stage, Color, drainage, odor, measurements, and dressings)    [x]  Dual skin checks done at bedside during shift report with Sindy APPIAH     INTAKE/OUPUT  Date 06/22/22 0700 - 06/23/22 0659 06/23/22 0700 - 06/24/22 0659   Shift 6220-9222 2722-2973 24 Hour Total 4130-1712 3865-4558 24 Hour Total   INTAKE   P.O. 7497 132 8120        P. O. 9986 958 4569      Shift Total(mL/kg) 1080(15.7) 150(2.2) 1230(17.9)      OUTPUT   Urine(mL/kg/hr)           Urine Occurrence(s) 4 x 2 x 6 x      Emesis/NG output           Emesis Occurrence(s)  0 x 0 x      Stool           Stool Occurrence(s) 1 x 0 x 1 x      Shift Total(mL/kg)         NET [phone]      Weight (kg) 68.8 68.8 68.8 68.8 68.8 68.8       Recommendations:  1. Patient needs and requests: pain management; toileting    2. Pending tests/procedures: routine labs     3. Functional Level/Equipment: Partial (one person) / Bed (comment)    Fall Precautions:   Fall risk precautions were reinforced with the patient; she was instructed to call for help prior to getting up. The following fall risk precautions were continued: bed/ chair alarms, door signage, yellow bracelet and socks as well as update of the [employer] tool in the patient's room. Yinka Score: 4    HEALS Safety Check    A safety check occurred in the patient's room between off going nurse and oncoming nurse listed above. The safety check included the below items  Area Items   H  High Alert Medications - Verify all high alert medication drips (heparin, PCA, etc.)   E  Equipment - Suction is set up for ALL patients (with catalina)  - Red plugs utilized for all equipment (IV pumps, etc.)  - WOWs wiped down at end of shift.  - Room stocked with oxygen, suction, and other unit-specific supplies   A  Alarms - Bed alarm is set for fall risk patients  - Ensure chair alarm is in place and activated if patient is up in a chair   L  Lines - Check IV for any infiltration  - Nance bag is empty if patient has a Nance   - Tubing and IV bags are labeled   S  Safety   - Room is clean, patient is clean, and equipment is clean. - Hallways are clear from equipment besides carts. - Fall bracelet on for fall risk patients  - Ensure room is clear and free of clutter  - Suction is set up for ALL patients (with catalina)  - Hallways are clear from equipment besides carts.    - Isolation precautions followed, supplies available outside room, sign posted     Tam Mondragon RN

## 2022-06-23 NOTE — PROGRESS NOTES
Problem: Mobility Impaired (Adult and Pediatric)  Goal: *Acute Goals and Plan of Care (Insert Text)  Description: Physical Therapy Short Term Goals  Initiated 6/18/2022 and to be accomplished within 7 day(s)- 6/25/2022  1. Patient will move from supine to sit and sit to supine  in bed with contact guard assist.    2.  Patient will transfer from bed to chair and chair to bed with contact guard assist using the least restrictive device. 3.  Patient will perform sit to stand with supervision/SBA. 4.  Patient will ambulate with contact guard assist for 100 feet with the least restrictive device. 5.  Patient will ascend/descend 4 stairs with 2 handrail(s) with minimal assistance/contact guard assist.    Physical Therapy Long Term Goals  Initiated 6/18/2022 and to be accomplished within 21 day(s)  1. Patient will move from supine to sit and sit to supine  in bed with modified independence. 2.  Patient will transfer from bed to chair and chair to bed with modified independence using the least restrictive device. 3.  Patient will perform sit to stand with modified independence. 4.  Patient will ambulate with modified independence for 150 feet with the least restrictive device. 5.  Patient will ascend/descend 12 stairs with 1 handrail(s) with minimal assistance/contact guard assist.     Outcome: Progressing Towards Goal   PHYSICAL THERAPY TREATMENT    Patient: Sondra Bronson (89 y.o. female)  Date: 6/23/2022  Diagnosis: Compression fracture of body of thoracic vertebra (HCC) [S22.000A] Compression fracture of T11 vertebra with routine healing  Precautions:    Chart, physical therapy assessment, plan of care and goals were reviewed. Time In:1330  Time Out:1500    Patient seen for: PM;Balance activities; Patient education;Gait training; Therapeutic exercise;Transfer training  Time In; 14:30  Time Out: 15:00  Patient participated In group session. Pain:  Pt with increased c/o pain, sacral region of 6/10. PM-same complaint and pain scale of 6/10 in sacral region    Patient identified with name and : Yes    SUBJECTIVE:      Davin Vincent, I was just napping. What are we going to do today? OBJECTIVE DATA SUMMARY:    Objective:     GROSS ASSESSMENT Daily Assessment     AROM: Generally decreased, functional  Strength: Generally decreased, functional  Coordination: Within functional limits  Tone: Normal  Sensation: Impaired      BED/MAT MOBILITY Daily Assessment     Rolling Right : 5 (Supervision)  Rolling Left : 5 (Supervision)  Supine to Sit : 5 (Supervision)  Sit to Supine : 5 (Supervision)      TRANSFERS Daily Assessment     Other: stand step with RW  Transfer Assistance : 5 (Stand-by assistance)  Sit to Stand Assistance: Stand-by assistance Patient requires additional time to complete 2/2 anticipating pain stimulus as she tenses up when trying to stand up without straining her LB. GAIT Daily Assessment    Gait Description (WDL)      Gait Abnormalities Decreased step clearance    Assistive device Walker, rolling    Ambulation assistance - level surface 4 (Contact guard assistance)    Distance  (123 ft, 150 ft)    Ambulation assistance- uneven surface  NT    Comments Patient requires increased verbal cues for staying within the walker this session. Slow samantha and short choppy steps, able to correct with verbal cues and demonstration of heel toe gait. STEPS/STAIRS Daily Assessment     Steps/Stairs ambulated  (2-6 inch steps)    Assistance Required 4 (Contact guard assistance)    Rail Use Both    Comments  Patient with increased assistance on 1st step 2/2 fear, however the rest of the trial was with CGA and verbal cues for hand placement.     Curbs/Ramps  NT        BALANCE Daily Assessment     Sitting - Static: Fair (occasional)  Sitting - Dynamic: Fair (occasional)  Standing - Static: Fair  Standing - Dynamic : Impaired            Group therapy treatment:    Seated  EXERCISE   Sets   Reps   Active Active Assist   Comments   Ankle Pumps 2 15 x [] supervision   Long Arc Quads 2 15  x  required minimal assistance   Seated Marching 2 15 [] x Required minimal assistance   Heel slides   [] []    Hip Abd/ER clamshells w/ T-band   [] []    Hip Abd SLRs   [] []    Hip Add Isometrics   [] []    Heel taps on floor   [] []    Wheelchair pushups   [] []        [x]   Patient appropriate to continue group therapy sessions to promote increased participation in skilled therapy interventions and to provide opportunities for increased social interaction. ASSESSMENT:  Patient is seen for deficits in strength, mobility, transfers, ambulation, safety and balance. Patient continues to c/o significant sacral pain, it is evident with her squirming when she sits supported for more than 20 minutes. Patient is agreeable to participation and she has improved with tolerance in the gym and around other individuals. Decreasing anxiety as noted by not anticipating what is coming next. She reported being nervous about going home with only support of spouse, however she did agree that her daily progress when she is focused has improved in a short time. Continue to address current weakness for improved functional independence. Progression toward goals:  []      Improving appropriately and progressing toward goals  [x]      Improving slowly and progressing toward goals  []      Not making progress toward goals and plan of care will be adjusted      PLAN:  Patient continues to benefit from skilled intervention to address the above impairments. Continue treatment per established plan of care. Discharge Recommendations:  HHPT with caregiver support  Further Equipment Recommendations for Discharge:  RW, gait belt      Estimated Discharge Date: 7/6/2022    Activity Tolerance: Tolerates session fair  Please refer to the flowsheet for vital signs taken during this treatment.     After treatment:   [] Patient left in no apparent distress in bed  [x] Patient left in no apparent distress sitting up in chair  [] Patient left in no apparent distress in w/c mobilizing under own power  [] Patient left in no apparent distress dining area  [] Patient left in no apparent distress mobilizing under own power  [x] Call bell left within reach  [x] Nursing notified  [x] Caregiver present  [] Bed alarm activated   [x] Chair alarm activated      Chiquis Vaughn  6/23/2022

## 2022-06-23 NOTE — INTERDISCIPLINARY ROUNDS
[facility] PHYSICAL REHABILITATION  [address] [place] [address], [address]    INPATIENT REHABILITATION  TEAM CONFERENCE SUMMARY     Date of Conference: 6/23/2022    Patient Information:        Name: Kaylene Mejía Age / Sex: 68 y.o. / female   CSN: [id number] MRN: [id number]   [facility] Date: 6/17/2022 Length of Stay: 6 days     Primary Rehabilitation Diagnosis  1. Generalized weakness with Impaired mobility and ADLs  2. Compression fracture of T11 vertebra with routine healing  3. Bilateral sacral insufficiency fracture with routine healing  4.  S/P Image guided T11 kyphoplasty; Image guided bilateral sacroplasty (6/13/2022 - Dr. Penny Chin)    Comorbidities  Patient Active Problem List   Diagnosis Code    Spinal stenosis of lumbar region with neurogenic claudication M48.062    Lumbar facet arthropathy M47.816    Disorder of bone and cartilage M89.9, M94.9    Diverticulosis of colon K57.30    Dyslipidemia E78.5    Essential hypertension, benign I10    Gastroesophageal reflux disease without esophagitis K21.9    Generalized anxiety disorder F41.1    Impaired fasting glucose R73.01    Irritable bowel syndrome with diarrhea K58.0    Melanoma in situ of right upper arm (HCC) D03.61    Primary insomnia F51.01    Sciatica of right side M54.31    Squamous cell carcinoma [id number]    Lumbar stenosis with neurogenic claudication M48.062    Depression, recurrent (HCC) F33.9    Spinal stenosis M48.00    Leg weakness, bilateral R29.898    Atrial fibrillation with rapid ventricular response (HCC) I48.91    Compression fracture of T11 vertebra with routine healing S22.080D    Bilateral sacral insufficiency fracture with routine healing M84.48XD    Status post kyphoplasty Z98.890    Generalized weakness R53.1    Impaired mobility and ADLs Z74.09, Z78.9    Chronic anemia D64.9    Hypokalemia E87.6         Therapy:     FIM SCORES Initial Assessment Weekly Progress Assessment 6/23/2022   Eating Functional Level: 5  Comments: Self feeding with fork Functional Level: 6 (Mod I for self-feeding following set-up of tray)  Comments: Mod I for self-feeding following set-up of trayFunctional Level: 6   Swallowing     Grooming 5 5 (set-up/ Mod I (w/c at sink))   Bathing 3 5 (SBA)5 (SBA)   Upper Body Dressing Functional Level: 3  Items Applied/Steps Completed: Bra (3 steps)  Comments: At EOB, A to pull over trunk. Donning l/s shirt reclined in bed with assist to locate R sleeve and pull over trunk via log rolling Functional Level: 5 (set-up)  Items Applied/Steps Completed: Pullover (4 steps)  Comments: UB dressing performed (set-up/ Mod I) for doffing/ donning pullover shirt seated on tub bench. Functional Level: 5 (SBA)   Lower Body Dressing Functional Level: 2  Items Applied/Steps Completed: Elastic waist pants (3 steps)  Comments: Donning from reclined in bed d/t pain and dizziness Functional Level: 4 (CGA/ SBA)  Items Applied/Steps Completed: Elastic waist pants (3 steps); Sock, left (1 step); Sock, right (1 step) (Depends (3 steps))  Comments: LB dressing performed CGA/ SBA overall. Pt donned elastic waist pants (CGA/ SBA) w/c level using crossed leg technique to thread pant legs and depend over distal BLE's. Pt donned slipper socks (set-up) using crossed leg technique w/c level.     Toileting Functional Level: 3 (6/20/22)   Functional Level: 4 (SBA/ CGA)  Comments: SBA/ CGA for CM; set-up for hygiene   Bladder 0 1   Bowel  0 0   Toilet Transfer [place] Toilet Transfer Score: 4   Toilet Transfer Score: 4 (Stand step xfer (CGA) using FWW; gait belt)  Comments: Stand step xfer (CGA) using FWW; gait belt   Tub/Shower Transfer [place] Tub or Shower Type: Tub/Shower combination  Tub/Shower Transfer score: not tested this date Tub/Shower Transfer Score: 4 (Stand step xfer (CGA) FWW; gait belt)  Comments:  (Stand step xfer (CGA) FWW to seated position on tub bench. )Tub/ shower transfer score: 4 (CGA) stand step xfer using FWW; gait belt      Comprehension Primary Mode of Comprehension: Auditory  Score: not assessed on initial eval due to anxiety and pain limiting pt's participation Primary Mode of Comprehension: Auditory  Score: 5     Expression Primary Mode of Expression: Verbal  Score: see above Primary Mode of Expression: Verbal  Score: 5   Social Interaction Score: see above Score: 5   Problem Solving Score: see above Score: 4   Memory Score: see above Score: 4     FIM SCORES Initial Assessment Weekly Progress Assessment 6/23/2022   Bed/Chair/Wheelchair Transfers Other: stand step with RW  Transfer Assistance : 4 (Minimal assistance)  Sit to Stand Assistance: Minimal assistance  Car Transfers: Not tested  Car Type: car simulator Other: stand step with RW  Transfer Assistance : 5 (Stand-by assistance)  Sit to Stand Assistance: Stand-by assistance   Bed Mobility Rolling Right 5 (Stand-by assistance)   Rolling Left 5 (Stand-by assistance)   Supine to Sit 4 (Minimal assistance)   Sit to Stand Minimal assistance   Sit to Supine 3 (Moderate assistance)    Rolling Right   5 (Supervision)   Rolling Left   5 (Supervision)   Supine to Sit   5 (Supervision)   Sit to Stand   Stand-by assistance   Sit to Supine   5 (Supervision)      Locomotion (W/C) Able to Propel (ft): 60 feet  Functional Level: 4  Curbs/Ramps Assist Required (FIM Score): 0 (Not tested)  Wheelchair Setup Assist Required : 2 (Maximal assistance)  Wheelchair Management: Manages right brake,Manages left brake Patient has been ambulating to the gym from her room so WC mobs have not been challenged         Locomotion (W/C distance)       Locomotion (Walk) 4 (Minimal assistance) 4 (Contact guard assistance)  Walker, rolling   Locomotion (Walk dist.) 25 Feet (ft)  (123 ft, 150 ft)   Steps/Stairs Steps/Stairs Ambulated (#): 0  Level of Assist : 0 (Not tested)  Rail Use: Both  Challenged 6/21/2022-3, 4 inch steps with BHRs and CGA.  Patient required cues for foot and hand placement and with sequencing. Nursing:     Neuro:   AAA&O x  3         Respiratory:   [x] WNL   [] O2 LPM:   Other:  Peripheral Vascular:   [] TEDS present   [] Edema present ____ Grade   Cardiac:   [x] WNL   [] Other  Genitourinary:   [x] continent   [] incontinent   [] ellis  Abdominal ___6/22____ LBM  GI: _cardiac______ Diet _thin____ Liquids _____ tube feeds  Musculoskeletal: ____ ROM Transfers _____ Assistive Device Used  __mod__ Level of Assistance  Skin Integumentary:   [x] Intact   [] Not Intact   __________Preventative Measures  Details______________________________________________________________  Pain: [x] Controlled   [] Not Controlled   Pain Meds:   [] Scheduled   [] PRN        Interdisciplinary Team Goals:     1. Discipline  Physical Therapy    Goal  Patient will perform sit to stand transfers from different height surfaces with supervision, using 100% accuracy for safety and recall for transfer set up, hand placement with standing and sitting. Barrier  Cognition, decreased strength, mobility, transfers, ambulation, safety and balance    Intervention  Patient will participate in skilled sessions to address weakness, decreased transfers, ambulation, safety and balance    Goal written by:   JOSÉ MIGUEL Bacon     2. Discipline  Occupational Therapy    Goal  Patient will perform toileting (SBA/ supv) for CM and hygiene    Barrier  Decreased (I) with ADL's, impaired cognition/ memory, impaired functional mobility/ transfers, decreased strength/ endurance, decreased activity tolerance, safety and balance    Intervention  ADL training, there ex, there act, patient education    Goal written by:  Khadijah Painter OTR/L     3. Discipline  Speech Therapy    Goal      Barrier      Intervention      Goal written by:       4. Discipline  Nursing    Goal  Pt. Electrolytes will be within normal limits by discharge from rehab. Barrier  Hypokalemia, Knowledge deficit on medications.     Intervention Medication teaching, dietary guidelines, and encourage proper fluid intake. Goal written by:  Ryan Merino RN     5. Discipline  Clinical Psychology    Goal  Maintain mood stability during rehabilitation effort    Barrier  History of anxiety and current feelings of stress and worry    Intervention  Rx, support  as needed and redirection    Goal written by:  Cesilia Judd, PhD         Disposition / Discharge Planning:      Follow-up services:  [x] Physical Therapy             [x] Occupational Therapy       [] Speech Therapy           [] Skilled Nursing      [] Medical Social Worker   [] Aide        [] Outpatient      [] vs   [x] Home Health  [] vs       [x] to progress to outpatient       [] with 24-hour supervision       [] with 24-hour assistance   [] Ravinder DARBY recommendations:  RW   Estimated discharge date:  7/6/2022   Discharge Location:  [x] Home  [] versus    [] [place]    [] [address]   [] Other:           Interdisciplinary team rounds were held this PM with the following team members:       Name   Physical Therapist    Andry Canela PT, DPT     Occupational Therapist    Maximino Rdz OTR/L   Nursing    Pilar Wilson, TD   Physician    Manasa Bishop MD        Shelle Kussmaul, MSW          Signed:  Manasa Bishop MD    June 23, 2022

## 2022-06-24 PROCEDURE — 97530 THERAPEUTIC ACTIVITIES: CPT

## 2022-06-24 PROCEDURE — 65310000000 HC RM PRIVATE REHAB

## 2022-06-24 PROCEDURE — 97110 THERAPEUTIC EXERCISES: CPT

## 2022-06-24 PROCEDURE — 74011250637 HC RX REV CODE- 250/637: Performed by: INTERNAL MEDICINE

## 2022-06-24 PROCEDURE — 99232 SBSQ HOSP IP/OBS MODERATE 35: CPT | Performed by: INTERNAL MEDICINE

## 2022-06-24 PROCEDURE — 97116 GAIT TRAINING THERAPY: CPT

## 2022-06-24 PROCEDURE — 74011250637 HC RX REV CODE- 250/637: Performed by: EMERGENCY MEDICINE

## 2022-06-24 RX ORDER — METOPROLOL TARTRATE 25 MG/1
25 TABLET, FILM COATED ORAL EVERY 12 HOURS
Status: DISCONTINUED | OUTPATIENT
Start: 2022-06-24 | End: 2022-07-02 | Stop reason: HOSPADM

## 2022-06-24 RX ADMIN — NORTRIPTYLINE HYDROCHLORIDE 40 MG: 10 CAPSULE ORAL at 20:45

## 2022-06-24 RX ADMIN — GABAPENTIN 200 MG: 100 CAPSULE ORAL at 08:51

## 2022-06-24 RX ADMIN — Medication 1 TABLET: at 08:51

## 2022-06-24 RX ADMIN — GABAPENTIN 400 MG: 400 CAPSULE ORAL at 20:02

## 2022-06-24 RX ADMIN — METOPROLOL TARTRATE 25 MG: 25 TABLET, FILM COATED ORAL at 20:44

## 2022-06-24 RX ADMIN — Medication 400 MG: at 08:51

## 2022-06-24 RX ADMIN — OXYCODONE HYDROCHLORIDE 10 MG: 5 TABLET ORAL at 12:09

## 2022-06-24 RX ADMIN — ACETAMINOPHEN 650 MG: 325 TABLET ORAL at 08:51

## 2022-06-24 RX ADMIN — OXYCODONE HYDROCHLORIDE 10 MG: 5 TABLET ORAL at 00:41

## 2022-06-24 RX ADMIN — DOCUSATE SODIUM 100 MG: 100 CAPSULE, LIQUID FILLED ORAL at 18:33

## 2022-06-24 RX ADMIN — GABAPENTIN 200 MG: 100 CAPSULE ORAL at 13:34

## 2022-06-24 RX ADMIN — ACETAMINOPHEN 650 MG: 325 TABLET ORAL at 12:09

## 2022-06-24 RX ADMIN — ATORVASTATIN CALCIUM 10 MG: 10 TABLET, FILM COATED ORAL at 08:51

## 2022-06-24 RX ADMIN — ACETAMINOPHEN 650 MG: 325 TABLET ORAL at 16:11

## 2022-06-24 RX ADMIN — PANTOPRAZOLE SODIUM 40 MG: 40 TABLET, DELAYED RELEASE ORAL at 06:40

## 2022-06-24 RX ADMIN — DOCUSATE SODIUM 100 MG: 100 CAPSULE, LIQUID FILLED ORAL at 08:51

## 2022-06-24 RX ADMIN — OXYCODONE HYDROCHLORIDE 10 MG: 5 TABLET ORAL at 20:06

## 2022-06-24 RX ADMIN — Medication 1 TABLET: at 18:33

## 2022-06-24 NOTE — ROUTINE PROCESS
SHIFT CHANGE NOTE FOR [facility]    Bedside and Verbal shift change report given to 405Candice Brown Gaye (oncoming nurse) by Ashley Bourgeois RN (offgoing nurse). Report included the following information SBAR, Kardex, MAR and Recent Results.     Situation:   Code Status: Full Code   Hospital Day: 7   Problem List:   Hospital Problems  Date Reviewed: 6/23/2022          Codes Class Noted POA    Hypokalemia ICD-10-CM: E87.6  ICD-9-CM: 276.8  6/22/2022 Yes        Status post kyphoplasty ICD-10-CM: Z98.890  ICD-9-CM: V45.89  6/13/2022 Yes    Overview Signed 6/20/2022  4:37 PM by Christiane Bullock MD     S/P Image guided T11 kyphoplasty; Image guided bilateral sacroplasty (6/13/2022 - Dr. Hernandez Titus)             * (Principal) Compression fracture of T11 vertebra with routine healing ICD-10-CM: S22.080D  ICD-9-CM: V54.17  6/9/2022 Yes        Bilateral sacral insufficiency fracture with routine healing ICD-10-CM: M84.48XD  ICD-9-CM: V54.27  6/9/2022 Yes        Generalized weakness ICD-10-CM: R53.1  ICD-9-CM: 780.79  6/9/2022 Yes        Impaired mobility and ADLs ICD-10-CM: Z74.09, Z78.9  ICD-9-CM: V49.89  6/9/2022 Yes        Sciatica of right side ICD-10-CM: M54.31  ICD-9-CM: 724.3  2/7/2018 Yes        Essential hypertension, benign ICD-10-CM: I10  ICD-9-CM: 401.1  1/29/2009 Yes              Background:   Past Medical History:   Past Medical History:   Diagnosis Date    Acid reflux     Bilateral sacral insufficiency fracture with routine healing 6/9/2022    Chronic anemia     Compression fracture of T11 vertebra with routine healing 6/9/2022    Hypertension     Spinal stenosis         Assessment:   Changes in Assessment throughout shift: No change to previous assessment     Patient has a central line: no Reasons if yes: n/a  Insertion date: n/a Last dressing date: n/a   Patient has Nance Cath: no Reasons if yes:  n/a   Insertion date: n/a     Last Vitals:     Vitals:    06/23/22 0809 06/23/22 0816 06/23/22 1552 06/23/22 2004   BP: 106/68  103/69 108/63   Pulse: 79  95 96   Resp: 18  19 19   Temp: 96.9 °F (36.1 °C) 96.9 °F (36.1 °C) 97.7 °F (36.5 °C) 97.1 °F (36.2 °C)   SpO2: 98%  98% 99%   Weight:       Height:            PAIN    Pain Assessment    Pain Intensity 1: 0 (06/24/22 0411) Pain Intensity 1: 2 (12/29/14 1105)    Pain Location 1: Back Pain Location 1: Abdomen    Pain Intervention(s) 1: Medication (see MAR) Pain Intervention(s) 1: Medication (see MAR)  Patient Stated Pain Goal: 0 Patient Stated Pain Goal: 0  o Intervention effective: yes  o Other actions taken for pain:       Skin Assessment  Skin color    Condition/Temperature    Integrity    Turgor    Weekly Pressure Ulcer Documentation  Pressure  Injury Documentation: No Pressure Injury Noted-Pressure Ulcer Prevention Initiated  Wound Prevention & Protection Methods  Orientation of wound Orientation of Wound Prevention: Posterior  Location of Prevention Location of Wound Prevention: Sacrum/Coccyx  Dressing Present Dressing Present : No  Dressing Status Dressing Status: Intact  Wound Offloading Wound Offloading (Prevention Methods): Bed, pressure redistribution/air    Wound Prevention Checklist  Precautions:      []  Heel prevention boots placed on patient    []  Patient turned q2h during shift    []  Valeriy Order Set ordered    [x]  Patient on Madeline bed/Specialty bed    []  Each Wound is documented during shift (Stage, Color, drainage, odor, measurements, and dressings)    [x]  Dual skin checks done at bedside during shift report with Sindy APPIAH     INTAKE/OUPUT  Date 06/23/22 0700 - 06/24/22 0659 06/24/22 0700 - 06/25/22 0659   Shift [id number] 1900-0659 24 Hour Total 3613-2759 7631-5215 24 Hour Total   INTAKE   P.O.  390 390        P.O.  390 390      Shift Total(mL/kg)  390(5.7) 390(5.7)      OUTPUT   Urine(mL/kg/hr)           Urine Occurrence(s) 2 x 4 x 6 x      Emesis/NG output           Emesis Occurrence(s)  0 x 0 x      Stool 1  1        Stool Occurrence(s) 2 x 2 x 4 x        Stool 1  1      Shift Total(mL/kg) 1(0)  1(0)      NET -1 390 389      Weight (kg) 68.8 68.8 68.8 68.8 68.8 68.8       Recommendations:  1. Patient needs and requests: pain management; toileting    2. Pending tests/procedures: routine labs     3. Functional Level/Equipment: Partial (one person) / Bed (comment)    Fall Precautions:   Fall risk precautions were reinforced with the patient; she was instructed to call for help prior to getting up. The following fall risk precautions were continued: bed/ chair alarms, door signage, yellow bracelet and socks as well as update of the [employer] tool in the patient's room. Yinka Score: 4    HEALS Safety Check    A safety check occurred in the patient's room between off going nurse and oncoming nurse listed above. The safety check included the below items  Area Items   H  High Alert Medications - Verify all high alert medication drips (heparin, PCA, etc.)   E  Equipment - Suction is set up for ALL patients (with catalina)  - Red plugs utilized for all equipment (IV pumps, etc.)  - WOWs wiped down at end of shift.  - Room stocked with oxygen, suction, and other unit-specific supplies   A  Alarms - Bed alarm is set for fall risk patients  - Ensure chair alarm is in place and activated if patient is up in a chair   L  Lines - Check IV for any infiltration  - Nance bag is empty if patient has a Nance   - Tubing and IV bags are labeled   S  Safety   - Room is clean, patient is clean, and equipment is clean. - Hallways are clear from equipment besides carts. - Fall bracelet on for fall risk patients  - Ensure room is clear and free of clutter  - Suction is set up for ALL patients (with catalina)  - Hallways are clear from equipment besides carts.    - Isolation precautions followed, supplies available outside room, sign posted     Kate Steele RN

## 2022-06-24 NOTE — ROUTINE PROCESS
0800 Pt. Awake sitting up in bed no change in assessment pt. Reported to be feeling fine. 0930 PT. Sitting up in chair eating breakfast.  1200 with therapy. 1330 able to transfer from bed to chair with assist.  1500 no change in assessment. 1800 Pt. Sitting up in bed eating dinner.

## 2022-06-24 NOTE — PROGRESS NOTES
Problem: Mobility Impaired (Adult and Pediatric)  Goal: *Acute Goals and Plan of Care (Insert Text)  Description: Physical Therapy Short Term Goals  Initiated 6/18/2022 and to be accomplished within 7 day(s)- 6/25/2022  1. Patient will move from supine to sit and sit to supine  in bed with contact guard assist.  Goal met 6/25/2022  2. Patient will transfer from bed to chair and chair to bed with contact guard assist using the least restrictive device. Goal met 6/25/2022  3. Patient will perform sit to stand with supervision/SBA. GOAL MET 6/25/2022  4. Patient will ambulate with contact guard assist for 100 feet with the least restrictive device. GOAL MET 6/25/2022  5. Patient will ascend/descend 4 stairs with 2 handrail(s) with minimal assistance/contact guard assist. ONGOING 6/25/2022    Physical Therapy Long Term Goals  Initiated 6/18/2022 and to be accomplished within 21 day(s)  1. Patient will move from supine to sit and sit to supine  in bed with modified independence. 2.  Patient will transfer from bed to chair and chair to bed with modified independence using the least restrictive device. 3.  Patient will perform sit to stand with modified independence. 4.  Patient will ambulate with modified independence for 150 feet with the least restrictive device. 5.  Patient will ascend/descend 12 stairs with 1 handrail(s) with minimal assistance/contact guard assist.     Outcome: Progressing Towards Goal   PHYSICAL THERAPY WEEKLY PROGRESS NOTE    Patient: Sondra Bronson (17 y.o. female)  Date: 6/24/2022  Diagnosis: Compression fracture of body of thoracic vertebra (HCC) [S22.000A] Compression fracture of T11 vertebra with routine healing  Precautions:    Chart, physical therapy assessment, plan of care and goals were reviewed. Time in:1030  Time out:1130    Patient seen for: AM;Balance activities; Therapeutic exercise;Patient education;Gait training;Transfer training      Pain:  Pt c/o LB and sacral pain of 8/10. Patient identified with name and :   Yes    SUBJECTIVE:     I am having so much groin pain and my legs are so weak. OBJECTIVE DATA SUMMARY:       GROSS ASSESSMENT Weekly Progress Assessment 2022   AROM Generally decreased, functional   Strength Generally decreased, functional   Coordination Within functional limits   Tone Normal   Sensation Impaired   PROM         POSTURE Weekly Progress Assessment 2022   Posture (WDL) Exceptions to [facility]   Posture Assessment Forward head;Rounded shoulders       BALANCE Weekly Progress Assessment 2022    Sitting - Static: Good (unsupported)  Sitting - Dynamic: Fair (occasional)  Standing - Static: Fair  Standing - Dynamic : Impaired     BED/CHAIR/WHEELCHAIR TRANSFERS Initial Assessment Weekly Progress Assessment 2022   Rolling Right 5 (Stand-by assistance) 5 (Supervision)   Rolling Left 5 (Stand-by assistance) 5 (Supervision)   Supine to Sit 4 (Minimal assistance) 5 (Supervision)   Sit to Stand Minimal assistance Supervision   Sit to Supine 3 (Moderate assistance) 5 (Supervision)   Transfer Type Other Other   Transfer Assistance Needed 4 (Minimal assistance) 5 (Supervision/setup)   Comments       Car Transfer Not tested  Not tested    Car Type car simulator         WHEELCHAIR MOBILITY/MANAGEMENT Initial Assessment Weekly Progress Assessment 2022   Able to Propel (dist) 60 feet     Assistance Required 4    Curbs/ramps assistance required 0 (Not tested)     Wheelchair set up assistance required 2 (Maximal assistance)     Wheelchair management Manages right brake,Manages left brake     Comments  Not  Challenged, patient is ambulating to the gym area from her room.        GAIT Weekly Progress Assessment 2022   Gait Description (WDL)     Gait Abnormalities Decreased step clearance       WALKING INDEPENDENCE Initial Assessment Weekly Progress Assessment 2022   Assistive device Walker, rolling Walker, rolling   Ambulation assistance - level surface 4 (Minimal assistance) 4 (Contact guard assistance)   Distance 25 Feet (ft)  100 ft x 2   Comments   Patient requires verbal cues to stay within the walker, use upright posture and to keep the walker close to her. Ambulation assistance - unlevel surfaces    CGA with uneven and rough terrain with use of RW. STEPS/STAIRS Initial Assessment Weekly Progress Assessment 6/24/2022   Steps/Stairs ambulated 0  (4-6 inch steps)   Assistance Required   3 (Moderate assistance)   Rail Use Both Both   Comments    Patient with increased anxiety during stair negotiation. Patient attempted lunging forward on step and she required moderate assistance to maintain her spinal precautions. Patient was educated on safe negotiation during stair training. Curbs/Ramps    NT     Assessment:  Patient is seen for deficits in strength, mobility, transfers, ambulation safety and balance. Patient is seen for weekly progress and she has achieved 4/5 goals. Patient did not achieve her stair negotiation goal and this has caused increased anxiety for patient. Therapist had a conversation with patient regarding her self limiting behaviors, including negative talk about her performance and not knowing what is wrong with her and especially her anxiety during sessions. Patient has been observed during this session jittery and uncomfortable . Her tolerance during session was fair, however she was supposed to engage in a group session at 12:00 and she did not attend 2/2 reports of pain. PLAN:  Patient continues to benefit from skilled intervention to address the above impairments. Continue treatment per established plan of care. Discharge Recommendations:  HHPT  Further Equipment Recommendations for Discharge:  RW, gait belt     Estimated Discharge Date: 7/2/2022    Activity Tolerance: Tolerates session fair. Please refer to the flowsheet for vital signs taken during this treatment.   After treatment:   [x] Patient left in no apparent distress in bed  [] Patient left in no apparent distress sitting up in chair  [] Patient left in no apparent distress in w/c mobilizing under own power  [] Patient left in no apparent distress dining area  [] Patient left in no apparent distress mobilizing under own power  [x] Call bell left within reach  [x] Nursing notified  [] Caregiver present  [x] Bed alarm activated   [] Chair alarm activated      Juventino Lind  6/24/2022

## 2022-06-24 NOTE — ROUTINE PROCESS
SHIFT CHANGE NOTE FOR [facility]    Bedside and Verbal shift change report given to Bruno Anna RN (oncoming nurse) by Cherry Pelletier RN (offgoing nurse). Report included the following information SBAR, Kardex, MAR and Recent Results.     Situation:   Code Status: Full Code   Hospital Day: 7   Problem List:   Hospital Problems  Date Reviewed: 6/24/2022          Codes Class Noted POA    Hypokalemia ICD-10-CM: E87.6  ICD-9-CM: 276.8  6/22/2022 Yes        Status post kyphoplasty ICD-10-CM: Z98.890  ICD-9-CM: V45.89  6/13/2022 Yes    Overview Signed 6/20/2022  4:37 PM by Inna Esteves MD     S/P Image guided T11 kyphoplasty; Image guided bilateral sacroplasty (6/13/2022 - Dr. Janis Solorio)             * (Principal) Compression fracture of T11 vertebra with routine healing ICD-10-CM: S22.080D  ICD-9-CM: V54.17  6/9/2022 Yes        Bilateral sacral insufficiency fracture with routine healing ICD-10-CM: M84.48XD  ICD-9-CM: V54.27  6/9/2022 Yes        Generalized weakness ICD-10-CM: R53.1  ICD-9-CM: 780.79  6/9/2022 Yes        Impaired mobility and ADLs ICD-10-CM: Z74.09, Z78.9  ICD-9-CM: V49.89  6/9/2022 Yes        Sciatica of right side ICD-10-CM: M54.31  ICD-9-CM: 724.3  2/7/2018 Yes        Essential hypertension, benign ICD-10-CM: I10  ICD-9-CM: 401.1  1/29/2009 Yes              Background:   Past Medical History:   Past Medical History:   Diagnosis Date    Acid reflux     Bilateral sacral insufficiency fracture with routine healing 6/9/2022    Chronic anemia     Compression fracture of T11 vertebra with routine healing 6/9/2022    Hypertension     Spinal stenosis         Assessment:   Changes in Assessment throughout shift: No change to previous assessment     Patient has a central line: no Reasons if yes: na  Insertion date:na Last dressing date:na   Patient has Ellis Cath: no Reasons if yes: na   Insertion date:na  Shift ellis care completed: NO     Last Vitals:     Vitals:    06/23/22 1552 06/23/22 2004 06/24/22 0800 06/24/22 1600   BP: 103/69 108/63 109/66 132/79   Pulse: 95 96 92 90   Resp: 19 19 19 18   Temp: 97.7 °F (36.5 °C) 97.1 °F (36.2 °C) 97.2 °F (36.2 °C) 97 °F (36.1 °C)   SpO2: 98% 99% 97% 97%   Weight:       Height:            PAIN    Pain Assessment    Pain Intensity 1: 0 (06/24/22 1600) Pain Intensity 1: 2 (12/29/14 1105)    Pain Location 1: Leg Pain Location 1: Abdomen    Pain Intervention(s) 1: Medication (see MAR) Pain Intervention(s) 1: Medication (see MAR)  Patient Stated Pain Goal: 0 Patient Stated Pain Goal: 0  o Intervention effective: yes  o Other actions taken for pain: Medication (see MAR)     Skin Assessment  Skin color    Condition/Temperature    Integrity    Turgor    Weekly Pressure Ulcer Documentation  Pressure  Injury Documentation: No Pressure Injury Noted-Pressure Ulcer Prevention Initiated  Wound Prevention & Protection Methods  Orientation of wound Orientation of Wound Prevention: Posterior  Location of Prevention Location of Wound Prevention: Sacrum/Coccyx  Dressing Present Dressing Present : No  Dressing Status Dressing Status: Intact  Wound Offloading Wound Offloading (Prevention Methods): Bed, pressure redistribution/air    Wound Prevention Checklist  Precautions:      []  Heel prevention boots placed on patient    []  Patient turned q2h during shift    []  Valeriy Order Set ordered    []  Patient on Madeline bed/Specialty bed    []  Each Wound is documented during shift (Stage, Color, drainage, odor, measurements, and dressings)    []  Dual skin checks done at bedside during shift report with Soni Landa RN     INTAKE/OUPUT  Date 06/23/22 0700 - 06/24/22 0659 06/24/22 0700 - 06/25/22 0659   Shift 2642-5778 9220-9060 24 Hour Total 8190-1598 6543-3914 24 Hour Total   INTAKE   P.O.    1320     P. O.    1320   Shift Total(mL/kg)  390(5.7) 390(5.7) 1320(19.2)  1320(19.2)   OUTPUT   Urine(mL/kg/hr)           Urine Occurrence(s) 2 x 4 x 6 x 4 x  4 x Emesis/NG output           Emesis Occurrence(s)  0 x 0 x      Stool 1  1        Stool Occurrence(s) 2 x 2 x 4 x        Stool 1  1      Shift Total(mL/kg) 1(0)  1(0)      NET -7   1320   Weight (kg) 68.8 68.8 68.8 68.8 68.8 68.8       Recommendations:  1. Patient needs and requests: na    2. Pending tests/procedures: na     3. Functional Level/Equipment: Partial (one person) /      Fall Precautions:   Fall risk precautions were reinforced with the patient; she was instructed to call for help prior to getting up. The following fall risk precautions were continued: bed/ chair alarms, door signage, yellow bracelet and socks as well as update of the Bard  tool in the patient's room. Yinka Score: 4    HEALS Safety Check    A safety check occurred in the patient's room between off going nurse and oncoming nurse listed above. The safety check included the below items  Area Items   H  High Alert Medications - Verify all high alert medication drips (heparin, PCA, etc.)   E  Equipment - Suction is set up for ALL patients (with catalina)  - Red plugs utilized for all equipment (IV pumps, etc.)  - WOWs wiped down at end of shift.  - Room stocked with oxygen, suction, and other unit-specific supplies   A  Alarms - Bed alarm is set for fall risk patients  - Ensure chair alarm is in place and activated if patient is up in a chair   L  Lines - Check IV for any infiltration  - Nance bag is empty if patient has a Nance   - Tubing and IV bags are labeled   S  Safety   - Room is clean, patient is clean, and equipment is clean. - Hallways are clear from equipment besides carts. - Fall bracelet on for fall risk patients  - Ensure room is clear and free of clutter  - Suction is set up for ALL patients (with catalina)  - Hallways are clear from equipment besides carts.    - Isolation precautions followed, supplies available outside room, sign posted     Chantale Olson RN

## 2022-06-24 NOTE — PROGRESS NOTES
[facility] PHYSICAL REHABILITATION  [address] [place] [address], [address]     INPATIENT REHABILITATION  DAILY PROGRESS NOTE     Date: 6/24/2022    Name: Patricio Womack Age / Sex: 68 y.o. / female   CSN: [id number] MRN: [id number]   Admit Date: 6/17/2022 Length of Stay: 7 days     Primary Rehabilitation Diagnosis: Generalized weakness with Impaired mobility and ADLs secondary to:  1. Compression fracture of T11 vertebra with routine healing  2. Bilateral sacral insufficiency fracture with routine healing  3. S/P Image guided T11 kyphoplasty; Image guided bilateral sacroplasty (6/13/2022 - Dr. Vadim Ricardo)      Subjective:     Patient seen and examined. Blood pressure controlled. Losartan and Metoprolol tartrate were both not given this AM due to BP precautions. Heart rate better controlled. Patient's Complaint:   No significant medical complaints    Pain Control: ongoing significant pain in which is stable and controlled by current meds      Objective:     Vital Signs:  Patient Vitals for the past 24 hrs:   BP Temp Pulse Resp SpO2   06/24/22 0800 109/66 97.2 °F (36.2 °C) 92 19 97 %   06/23/22 2004 108/63 97.1 °F (36.2 °C) 96 19 99 %   06/23/22 1552 103/69 97.7 °F (36.5 °C) 95 19 98 %        Physical Examination:  GENERAL SURVEY: Patient is awake, alert, oriented x 3, sitting comfortably on the wheelchair, not in acute respiratory distress.   HEENT: pale palpebral conjunctivae, anicteric sclerae, no nasoaural discharge, moist oral mucosa  NECK: supple, no jugular venous distention, no palpable lymph nodes  CHEST/LUNGS: symmetrical chest expansion, good air entry, clear breath sounds  HEART: adynamic precordium, good S1 S2, no S3, regular rhythm, no murmurs  ABDOMEN: flat, bowel sounds appreciated, soft, non-tender  EXTREMITIES: pale nailbeds, no edema, full and equal pulses, no calf tenderness   NEUROLOGICAL EXAM: The patient is awake, alert and oriented x3, able to answer questions fairly appropriately, able to follow 1 and 2 step commands. Able to tell time from the wall clock. Cranial nerves II-XII are grossly intact. No gross sensory deficit. Motor strength is 4+/5 on BUE, 4-/5 on BLE. Current Medications:  Current Facility-Administered Medications   Medication Dose Route Frequency    acetaminophen (TYLENOL) tablet 650 mg  650 mg Oral TID    acetaminophen (TYLENOL) tablet 650 mg  650 mg Oral Q4H PRN    losartan (COZAAR) tablet 25 mg  25 mg Oral DAILY    gabapentin (NEURONTIN) capsule 400 mg  400 mg Oral QHS    gabapentin (NEURONTIN) capsule 200 mg  200 mg Oral BID    hydrALAZINE (APRESOLINE) tablet 25 mg  25 mg Oral TID PRN    magnesium oxide (MAG-OX) tablet 400 mg  400 mg Oral DAILY    metoprolol tartrate (LOPRESSOR) tablet 50 mg  50 mg Oral Q12H    nortriptyline (PAMELOR) capsule 40 mg  40 mg Oral QHS    oxyCODONE IR (ROXICODONE) tablet 10 mg  10 mg Oral Q4H PRN    LORazepam (ATIVAN) tablet 0.5 mg  0.5 mg Oral BID PRN    ondansetron (ZOFRAN ODT) tablet 4 mg  4 mg Oral Q8H PRN    atorvastatin (LIPITOR) tablet 10 mg  10 mg Oral DAILY    ferrous gluconate 324 mg (37.5 mg iron) tablet 1 Tablet  324 mg Oral BID WITH MEALS    pantoprazole (PROTONIX) tablet 40 mg  40 mg Oral ACB    docusate sodium (COLACE) capsule 100 mg  100 mg Oral BID    bisacodyL (DULCOLAX) tablet 10 mg  10 mg Oral Q48H PRN    melatonin tablet 3 mg  3 mg Oral QHS PRN    naloxone (NARCAN) injection 0.2 mg  0.2 mg IntraVENous PRN       Allergies:  No Known Allergies      Lab/Data Review:  No results found for this or any previous visit (from the past 24 hour(s)). Assessment:     Primary Rehabilitation Diagnosis  1. Generalized weakness with Impaired mobility and ADLs  2. Compression fracture of T11 vertebra with routine healing  3. Bilateral sacral insufficiency fracture with routine healing  4.  S/P Image guided T11 kyphoplasty; Image guided bilateral sacroplasty (6/13/2022 - Dr. Culver Olya Ariana)    Comorbidities  Patient Active Problem List   Diagnosis Code    Spinal stenosis of lumbar region with neurogenic claudication M48.062    Lumbar facet arthropathy M47.816    Disorder of bone and cartilage M89.9, M94.9    Diverticulosis of colon K57.30    Dyslipidemia E78.5    Essential hypertension, benign I10    Gastroesophageal reflux disease without esophagitis K21.9    Generalized anxiety disorder F41.1    Impaired fasting glucose R73.01    Irritable bowel syndrome with diarrhea K58.0    Melanoma in situ of right upper arm ([facility]) D03.61    Primary insomnia F51.01    Sciatica of right side M54.31    Squamous cell carcinoma [id number]    Lumbar stenosis with neurogenic claudication M48.062    Depression, recurrent ([facility]) F33.9    Spinal stenosis M48.00    Leg weakness, bilateral R29.898    Atrial fibrillation with rapid ventricular response ([facility]) I48.91    Compression fracture of T11 vertebra with routine healing S22.080D    Bilateral sacral insufficiency fracture with routine healing M84.48XD    Status post kyphoplasty Z98.890    Generalized weakness R53.1    Impaired mobility and ADLs Z74.09, Z78.9    Chronic anemia D64.9    Hypokalemia E87.6       Plan:     1. Justification for continued stay: Good progression towards established rehabilitation goals. 2. Medical Issues being followed closely:    [x]  Fall and safety precautions     []  Wound Care     [x]  Bowel and Bladder Function     [x]  Fluid Electrolyte and Nutrition Balance     [x]  Pain Control      3. Issues that 24 hour rehabilitation nursing is following:    [x]  Fall and safety precautions     []  Wound Care     [x]  Bowel and Bladder Function     [x]  Fluid Electrolyte and Nutrition Balance     [x]  Pain Control      [x]  Assistance with and education on in-room safety with transfers to and from the bed, wheelchair, toilet and shower.       4. Acute rehabilitation plan of care:    [x]  Continue current care and rehab.           [x]  Physical Therapy           [x]  Occupational Therapy           []  Speech Therapy     []  Hold Rehab until further notice     5. Medications:    [x]  MAR Reviewed     [x]  Continue Present Medications     6. Chemical DVT Prophylaxis:      []  Enoxaparin     []  Unfractionated Heparin     []  Warfarin     []  NOAC     []  Aspirin     [x]  None     7. Mechanical DVT Prophylaxis:      []  YOLY Stockings     [x]  Sequential Compression Device     []  None     8. GI Prophylaxis:      []  PPI     []  H2 Blocker     [x]  None / Not indicated     9. Code status:    [x]  Full code     []  Partial code     []  Do not intubate     []  Do not resuscitate     10. Diet:  Specifications  Low fat/Low cholesterol/High fiber/2 gm Na   Solids (consistency)  Regular   Liquids (consistency)  Thin   Fluid Restriction  None     11.  Orders:   > Compression fracture of T11 vertebra with routine healing; Bilateral sacral insufficiency fracture with routine healing; S/P Image guided T11 kyphoplasty; Image guided bilateral sacroplasty (6/13/2022 - Dr. Caryle Hikes)   > Thoracic spinal precautions    > Chronic anemia   > Hgb/Hct (6/18/2022, on admission to the ARU) = 8.7/28.5    > Hgb/Hct (6/19/2022) = 9.6/31.8    > Hgb/Hct (6/22/2022) = 9.5/30.7    > Continue Ferrous gluconate 324 mg PO BID with meals    > Dyslipidemia   > Continue Atorvastatin 10 mg PO once daily    > Essential hypertension   > On 6/21/2022:    > Change Hydralazine from 25 mg PO TID (8AM, 2PM, 8PM) to 25 mg PO TID PRN for SBP greater than 170 mmHg    > Started Metoprolol tartrate 50 mg PO q 12 hr (9AM, 9PM)   > On 6/22/2022, changed Losartan from 50 mg PO once daily (9AM) to 50 mg PO q HS   > On 6/23/2022:    > Discontinued Amlodipine 10 mg PO once daily (9AM)    > Change Losartan from 50 mg PO q HS to 25 mg PO once daily (9AM)   > Discontinue Losartan 25 mg PO once daily (9AM)   > Continue:    > Decrease Metoprolol tartrate from 50 mg to 25 mg PO q 12 hr (9AM, 9PM)    > Hydralazine 25 mg PO TID PRN for SBP greater than 170 mmHg    > Gastroesophageal reflux disease   > Continue Pantoprazole 40 mg PO daily before breakfast    > Generalized anxiety disorder   > Continue:    > Nortriptyline 40 mg PO q HS    > Lorazepam 0.5 mg PO BID PRN for anxiety    > Sciatica of right side   > On 6/20/2022, increased Gabapentin from 300 mg to 400 mg PO TID (8AM, 2PM, 8PM)   > On 6/23/2022, decreased Gabapentin from 400 mg PO TID (8AM, 2PM, 8PM) to:    > Gabapentin 200 mg PO BID (8AM, 2PM)    > Gabapentin 400 mg PO q 8PM   > Continue:    > Gabapentin 200 mg PO BID (8AM, 2PM)    > Gabapentin 400 mg PO q 8PM    > Nortriptyline 40 mg PO q HS    > Hypokalemia      06/17/22  0232 06/16/22  0725 06/15/22  1145 06/15/22  0714 06/15/22  0210 06/14/22  0317 06/13/22  0513 06/13/22  0209 06/12/22  0250 06/11/22  0359 06/09/22  1330   K 3.1* 3.8 4.1 3.7 2.8* 3.4* 2.9* 3.1* 3.0* 3.2* 2.8*   MG  --   --   --  1.6 1.7  --   --  2.0 2.1 2.1 2.1      > K (6/17/2022, on admission to the ARU) = 3.2      06/22/22  0628 06/19/22  0530 06/18/22  0509   K 2.9* 3.5 3.2*   MG 1.8 1.6 1.8      > 12-lead EKG (6/22/2022) showed normal sinus rhythm at 81 bpm, prolonged QTc at 494 ms   > On 6/22/2022:    > Patient was given Potassium bicarbonate 40 meq PO q 4 hr x 3 doses (8AM, 12PM, 4PM)    > Started Magnesium oxide 400 mg PO once daily   > K (6/22/2022, 6:42 PM) = 4.5   > Continue Magnesium oxide 400 mg PO once daily     > Analgesia   > Start Acetaminophen 650 mg PO TID (8AM, 2PM, 8PM)   > Continue:    > Acetaminophen 650 mg PO q 4 hr PRN for pain level 4/10 or lesser (to be given from 8PM to 4AM only)    > Oxycodone IR 10 mg PO q 4 hr PRN for pain level 5/10 or greater       12. Personal Protective Equipment (N95 face mask with eye goggles) was used while interacting with the patient. Patient was using a surgical mask.     13. Patient's progress in rehabilitation and medical issues discussed with the patient. All questions answered to the best of my ability. Care plan discussed with patient and nurse. 14. Total clinical care time is 30 minutes, including review of chart including all labs, radiology, past medical history, and discussion with patient. Greater than 50% of my time was spent in coordination of care and counseling.       Signed:    Elliott Rojas MD    June 24, 2022

## 2022-06-24 NOTE — PROGRESS NOTES
Problem: Self Care Deficits Care Plan (Adult)  Goal: *Acute Goals and Plan of Care (Insert Text)  Description: Occupational Therapy Goals   Long Term Goals  Initiated 6/18/2022 (goals revised on 6/23/2022) and to be accomplished within 2 week(s), by (7/2/2022)  1. Pt will perform self-feeding with independence. 2. Pt will perform grooming with MOD I.  3. Pt will perform UB bathing with MOD I.  4. Pt will perform LB bathing with MOD I using AE and/ or compensatory strategies as needed. 5. Pt will perform tub/shower transfer with supervision using least restrictive assistive device. 6. Pt will perform UB dressing with MOD I.  7. Pt will perform LB dressing with set-up assistance using AE and/ or compensatory strategies as needed. 8. Pt will perform toileting task with MOD I.  9. Pt will perform toilet transfer with supervision using least restrictive assistive device. Short Term Goals   Initiated 6/18/2022 (reassessed on 6/23/2022) and to be accomplished within 7 day(s), by (6/30/2022)  1. Pt will perform grooming with no more than MIN A from w/c at sink. (Goal met 6/23/2022; currently set-up)  2. Pt will perform UB bathing with no more than MIN A from w/c with basin or TTB. (Goal met 6/23/2022)      Goal upgraded: Pt will perform UB bathing with set-up. 3. Pt will perform LB bathing with MOD A using AE and/ or compensatory strategies as needed. (Goal met 6/23/2022; currently SBA)      Goal upgraded: Pt will perform LB bathing with supv/ set-up using AE and/ or compensatory strategies as needed. 4. Pt will perform tub/shower transfer with MOD A using LRAD. (Goal met 6/23/2022; currently CGA)      Goal upgraded: Pt will perform tub/shower transfer with SBA using least restrictive assistive device. 5. Pt will perform UB dressing with setup A. (Goal met 6/23/2022)  6. Pt will perform LB dressing with MOD A using AE and/ or compensatory strategies.  (Goal met 6/23/2022; currently CGA)      Goal upgraded: Pt will perform LB dressing with SBA using AE and/ or compensatory strategies. 7. Pt will perform toileting task with MIN A. (Goal met 2022; currently CGA)      Goal upgraded: Pt will perform toileting task with supv/ SBA. 8. Pt will perform toilet transfer with MOD A using LRAD. (Goal met 2022; currently SBA)      Goal upgraded: Pt will perform toilet transfer with supervision using least restrictive assistive device. Outcome: Progressing Towards Goal  Goal: Interventions  Outcome: Progressing Towards Goal   Occupational Therapy TREATMENT    Patient: Debbie Rivas   68 y.o. Patient identified with name and : yes    Date: 2022    First Tx Session  Time In: 931  Time Out[de-identified] [id number]        Diagnosis: Compression fracture of body of thoracic vertebra ([address] [S22.000A]   Precautions:    Chart, occupational therapy assessment, plan of care, and goals were reviewed. Pain:  Pt reports 0/10 pain or discomfort prior to treatment. Pt reports -/10 pain or discomfort post treatment. Intervention Provided:       SUBJECTIVE:   Patient stated oh, you're back, hello.     OBJECTIVE DATA SUMMARY:     THERAPEUTIC ACTIVITY Daily Assessment    Pt engaged in functional reach activity which involved reaching across midline and overhead to grasp and secure small tokens in vertical slot to play game of Connect Four in dynamic standing. Pt demonstrated standing tolerance of 4 minutes 29 sec., 3 minutes and 2 minutes 30 sec. With RB's in between sets. Pt engaged in activity with bilateral 1 1/2 # wrist weights. THERAPEUTIC EXERCISE Daily Assessment    Pt performed BUE strengthening with 3# dowel in 3 directions (bicep curls, chest press, rowing) with RB's in between sets and VC's for technique and sequencing.      MOBILITY/TRANSFERS Daily Assessment        Pt performed functional mobility with w/c and self-propelled w/c with BUE and BLE over uneven terrain ascending and descending with intermittent Larissa.              ASSESSMENT:  Pt is increasing standing balance and standing tolerance with therac to promote carryover for self cares. Pt is increasing BUE strength and activity tolerance for functional mobility and carryover for self cares. Pt demonstrated some inattention to tasks and required VC's for redirection. Progression toward goals:  [x]          Improving appropriately and progressing toward goals  []          Improving slowly and progressing toward goals  []          Not making progress toward goals and plan of care will be adjusted     PLAN:  Patient continues to benefit from skilled intervention to address the above impairments. Continue treatment per established plan of care. Discharge Recommendations:  Home Health occupational therapy with family assist/ support  Further Equipment Recommendations for Discharge:  bedside commode, gait belt, and shower chair      Activity Tolerance:  Fair-good      Estimated LOS:~ DC 7/2/22    Please refer to the flowsheet for vital signs taken during this treatment. After treatment:   [x]  Patient left in no apparent distress sitting up in chair   []  Patient left in no apparent distress in bed  [x]  Call bell left within reach  []  Nursing notified  []  Caregiver present  []  Bed alarm activated    COMMUNICATION/EDUCATION:   [] Home safety education was provided and the patient/caregiver indicated understanding. [] Patient/family have participated as able in goal setting and plan of care. [x] Patient/family agree to work toward stated goals and plan of care. [] Patient understands intent and goals of therapy, but is neutral about his/her participation. [] Patient is unable to participate in goal setting and plan of care.       Sola Lubin, OT

## 2022-06-25 PROCEDURE — 74011250637 HC RX REV CODE- 250/637: Performed by: FAMILY MEDICINE

## 2022-06-25 PROCEDURE — 2709999900 HC NON-CHARGEABLE SUPPLY

## 2022-06-25 PROCEDURE — 74011250637 HC RX REV CODE- 250/637: Performed by: EMERGENCY MEDICINE

## 2022-06-25 PROCEDURE — 74011250637 HC RX REV CODE- 250/637: Performed by: INTERNAL MEDICINE

## 2022-06-25 PROCEDURE — 97535 SELF CARE MNGMENT TRAINING: CPT

## 2022-06-25 PROCEDURE — 65310000000 HC RM PRIVATE REHAB

## 2022-06-25 PROCEDURE — 97530 THERAPEUTIC ACTIVITIES: CPT

## 2022-06-25 RX ADMIN — Medication 400 MG: at 08:33

## 2022-06-25 RX ADMIN — Medication 1 TABLET: at 08:33

## 2022-06-25 RX ADMIN — LORAZEPAM 0.5 MG: 0.5 TABLET ORAL at 08:39

## 2022-06-25 RX ADMIN — GABAPENTIN 200 MG: 100 CAPSULE ORAL at 08:32

## 2022-06-25 RX ADMIN — OXYCODONE HYDROCHLORIDE 10 MG: 5 TABLET ORAL at 02:35

## 2022-06-25 RX ADMIN — ACETAMINOPHEN 650 MG: 325 TABLET ORAL at 17:25

## 2022-06-25 RX ADMIN — METOPROLOL TARTRATE 25 MG: 25 TABLET, FILM COATED ORAL at 08:32

## 2022-06-25 RX ADMIN — NORTRIPTYLINE HYDROCHLORIDE 40 MG: 10 CAPSULE ORAL at 21:19

## 2022-06-25 RX ADMIN — ATORVASTATIN CALCIUM 10 MG: 10 TABLET, FILM COATED ORAL at 08:32

## 2022-06-25 RX ADMIN — METOPROLOL TARTRATE 25 MG: 25 TABLET, FILM COATED ORAL at 21:19

## 2022-06-25 RX ADMIN — DOCUSATE SODIUM 100 MG: 100 CAPSULE, LIQUID FILLED ORAL at 17:25

## 2022-06-25 RX ADMIN — OXYCODONE HYDROCHLORIDE 10 MG: 5 TABLET ORAL at 09:37

## 2022-06-25 RX ADMIN — GABAPENTIN 400 MG: 400 CAPSULE ORAL at 21:19

## 2022-06-25 RX ADMIN — Medication 1 TABLET: at 17:25

## 2022-06-25 RX ADMIN — PANTOPRAZOLE SODIUM 40 MG: 40 TABLET, DELAYED RELEASE ORAL at 06:32

## 2022-06-25 RX ADMIN — GABAPENTIN 200 MG: 100 CAPSULE ORAL at 14:18

## 2022-06-25 RX ADMIN — DOCUSATE SODIUM 100 MG: 100 CAPSULE, LIQUID FILLED ORAL at 08:33

## 2022-06-25 RX ADMIN — ACETAMINOPHEN 650 MG: 325 TABLET ORAL at 08:33

## 2022-06-25 RX ADMIN — OXYCODONE HYDROCHLORIDE 10 MG: 5 TABLET ORAL at 19:28

## 2022-06-25 RX ADMIN — ACETAMINOPHEN 650 MG: 325 TABLET ORAL at 12:37

## 2022-06-25 NOTE — REHAB NOTE
SHIFT CHANGE NOTE FOR [facility]    Bedside and Verbal shift change report given to Lindsay Laguerre (oncoming nurse) by Charissa Aguilar (offgoing nurse). Report included the following information SBAR, Kardex, MAR and Recent Results.     Situation:   Code Status: Full Code   Reason for Admission: Compression fx of T11 vertebra with routine healing  Hospital Day: 8   Problem List:   Hospital Problems  Date Reviewed: 6/24/2022          Codes Class Noted POA    Hypokalemia ICD-10-CM: E87.6  ICD-9-CM: 276.8  6/22/2022 Yes        Status post kyphoplasty ICD-10-CM: Z98.890  ICD-9-CM: V45.89  6/13/2022 Yes    Overview Signed 6/20/2022  4:37 PM by Elaina Gimenez MD     S/P Image guided T11 kyphoplasty; Image guided bilateral sacroplasty (6/13/2022 - Dr. Sylvain Sorto)             * (Principal) Compression fracture of T11 vertebra with routine healing ICD-10-CM: S22.080D  ICD-9-CM: V54.17  6/9/2022 Yes        Bilateral sacral insufficiency fracture with routine healing ICD-10-CM: M84.48XD  ICD-9-CM: V54.27  6/9/2022 Yes        Generalized weakness ICD-10-CM: R53.1  ICD-9-CM: 780.79  6/9/2022 Yes        Impaired mobility and ADLs ICD-10-CM: Z74.09, Z78.9  ICD-9-CM: V49.89  6/9/2022 Yes        Sciatica of right side ICD-10-CM: M54.31  ICD-9-CM: 724.3  2/7/2018 Yes        Essential hypertension, benign ICD-10-CM: I10  ICD-9-CM: 401.1  1/29/2009 Yes              Background:   Past Medical History:   Past Medical History:   Diagnosis Date    Acid reflux     Bilateral sacral insufficiency fracture with routine healing 6/9/2022    Chronic anemia     Compression fracture of T11 vertebra with routine healing 6/9/2022    Hypertension     Spinal stenosis       Patient taking anticoagulants -none   Patient has a defibrillator: no    Assessment:   Changes in Assessment throughout shift: none     Patient has central line:no Reasons if yes: na  Insertion date:naLast dressing date:na   Patient has Nance Cath na Reasons if yes: na  Insertion date:na     Last Vitals:     Vitals:    06/23/22 2004 06/24/22 0800 06/24/22 1600 06/24/22 2030   BP: 108/63 109/66 132/79 129/74   Pulse: 96 92 90 87   Resp: 19 19 18 18   Temp: 97.1 °F (36.2 °C) 97.2 °F (36.2 °C) 97 °F (36.1 °C) 96.8 °F (36 °C)   SpO2: 99% 97% 97% 98%   Weight:       Height:            PAIN    Pain Assessment    Pain Intensity 1: 8 (06/25/22 0400) Pain Intensity 1: 2 (12/29/14 1105)    Pain Location 1: Hip Pain Location 1: Abdomen    Pain Intervention(s) 1: Medication (see MAR) Pain Intervention(s) 1: Medication (see MAR)  Patient Stated Pain Goal: 0 Patient Stated Pain Goal: 0  o Intervention effective: yes  o Other actions taken for pain: na     Skin Assessment  Skin color    Condition/Temperature    Integrity    Turgor    Weekly Pressure Ulcer Documentation  Pressure  Injury Documentation: No Pressure Injury Noted-Pressure Ulcer Prevention Initiated  Wound Prevention & Protection Methods  Orientation of wound Orientation of Wound Prevention: Posterior  Location of Prevention Location of Wound Prevention: Buttocks,Sacrum/Coccyx  Dressing Present Dressing Present : No  Dressing Status Dressing Status: Intact  Wound Offloading Wound Offloading (Prevention Methods): Bed, pressure redistribution/air     INTAKE/OUPUT  Date 06/24/22 0700 - 06/25/22 0659 06/25/22 0700 - 06/26/22 0659   Shift 3655-0996 6160-8274 24 Hour Total 4154-9978 1507-2976 24 Hour Total   INTAKE   P.O. 1320  1320        P. O. 1320  1320      Shift Total(mL/kg) 1320(19.2)  7310(38.3)      OUTPUT   Urine(mL/kg/hr)           Urine Occurrence(s) 4 x 3 x 7 x      Stool           Stool Occurrence(s)  0 x 0 x      Shift Total(mL/kg)         NET 1320  1320      Weight (kg) 68.8 68.8 68.8 68.8 68.8 68.8       Recommendations:  1. Patient needs and requests: toileting,pain management    2. Diet: cardiac    3. Pending tests/procedures:none    4. Functional Level/Equipment: assist x1/wheelchair/walker    5.  Estimated Discharge Date:7/6/22 Posted on Whiteboard in Patients Room: yes    HEALS Safety Check    A safety check occurred in the patient's room between off going nurse and oncoming nurse listed above. The safety check included the below items  Area Items   H  High Alert Medications - Verify all high alert medication drips (heparin, PCA, etc.)   E  Equipment - Suction is set up for ALL patients (with catalina)  - Red plugs utilized for all equipment (IV pumps, etc.)  - WOWs wiped down at end of shift.  - Room stocked with oxygen, suction, and other unit-specific supplies   A  Alarms - Bed alarm is set for fall risk patients  - Ensure chair alarm is in place and activated if patient is up in a chair   L  Lines - Check IV for any infiltration  - Nance bag is empty if patient has a Nance   - Tubing and IV bags are labeled   S  Safety   - Room is clean, patient is clean, and equipment is clean. - Hallways are clear from equipment besides carts. - Fall bracelet on for fall risk patients  - Ensure room is clear and free of clutter  - Suction is set up for ALL patients (with catalina)  - Hallways are clear from equipment besides carts.    - Isolation precautions followed, supplies available outside room, sign posted

## 2022-06-25 NOTE — PROGRESS NOTES
Progress Note    Patient: Al Maya MRN: [id number]  CSN: [id number]    YOB: 1945  Age: 68 y.o. Sex: female    DOA: 6/17/2022 LOS:  LOS: 8 days                    Subjective:     Primary Rehabilitation Diagnosis: Generalized weakness with Impaired mobility and ADLs secondary to:  1. Compression fracture of T11 vertebra with routine healing  2. Bilateral sacral insufficiency fracture with routine healing  3. S/P Image guided T11 kyphoplasty; Image guided bilateral sacroplasty (6/13/2022 - Dr. Sandee Salazar)         No acute patient or nursing concerns. Review of systems  General: No fevers or chills. Cardiovascular: No chest pain or pressure. No palpitations. Pulmonary: No shortness of breath, cough or wheeze. Gastrointestinal: No abdominal pain, nausea, vomiting or diarrhea. Genitourinary: No urinary frequency, urgency, hesitancy or dysuria. Musculoskeletal: pain fairly controlled on current regimen  Neurologic: generalized weakness    Objective:     Physical Exam:  Visit Vitals  BP (!) 153/80 (BP 1 Location: Left upper arm, BP Patient Position: At rest;Lying)   Pulse 85   Temp 97.8 °F (36.6 °C)   Resp 15   Ht 5' 5\" (1.651 m)   Wt 68.8 kg (151 lb 9.6 oz)   SpO2 98%   Breastfeeding No   BMI 25.23 kg/m²        General:         Alert, cooperative, no acute distress    HEENT: NC, Atraumatic. Lungs: CTA Bilaterally. No Wheezing/Rhonchi/Rales. Heart:  Regular  rhythm,  No murmur, No Rubs, No Gallops  Abdomen: Soft, Non distended, Non tender.  +Bowel sounds, no HSM  Extremities: No edema  Psych:   Not anxious or agitated. Expresses frustrations with ongoing limitations and pain. Neurologic:  CN 2-12 grossly intact, Alert and oriented X 3. No acute neurological deficits    Intake and Output:  Current Shift:  No intake/output data recorded.   Last three shifts:  06/23 1901 - 06/25 0700  In: 1710 [P.O.:1710]  Out: -     Labs: Results:       Chemistry Recent Labs     06/22/22  4292 K 4.5      CBC w/Diff No results for input(s): WBC, RBC, HGB, HCT, PLT, GRANS, LYMPH, EOS, HGBEXT, HCTEXT, PLTEXT in the last 72 hours. Cardiac Enzymes No results for input(s): CPK, CKND1, LES in the last 72 hours. No lab exists for component: CKRMB, TROIP   Coagulation No results for input(s): PTP, INR, APTT, INREXT in the last 72 hours. Lipid Panel No results found for: CHOL, CHOLPOCT, CHOLX, CHLST, CHOLV, 465327, HDL, HDLP, LDL, LDLC, DLDLP, 917291, VLDLC, VLDL, TGLX, TRIGL, TRIGP, TGLPOCT, CHHD, CHHDX   BNP No results for input(s): BNPP in the last 72 hours. Liver Enzymes No results for input(s): TP, ALB, TBIL, AP in the last 72 hours.     No lab exists for component: SGOT, GPT, DBIL   Thyroid Studies Lab Results   Component Value Date/Time    TSH 0.63 06/10/2022 11:40 AM          Procedures/imaging: see electronic medical records for all procedures/Xrays and details which were not copied into this note but were reviewed prior to creation of Plan    Medications:   Current Facility-Administered Medications   Medication Dose Route Frequency    metoprolol tartrate (LOPRESSOR) tablet 25 mg  25 mg Oral Q12H    acetaminophen (TYLENOL) tablet 650 mg  650 mg Oral TID    acetaminophen (TYLENOL) tablet 650 mg  650 mg Oral Q4H PRN    gabapentin (NEURONTIN) capsule 400 mg  400 mg Oral QHS    gabapentin (NEURONTIN) capsule 200 mg  200 mg Oral BID    hydrALAZINE (APRESOLINE) tablet 25 mg  25 mg Oral TID PRN    magnesium oxide (MAG-OX) tablet 400 mg  400 mg Oral DAILY    nortriptyline (PAMELOR) capsule 40 mg  40 mg Oral QHS    oxyCODONE IR (ROXICODONE) tablet 10 mg  10 mg Oral Q4H PRN    LORazepam (ATIVAN) tablet 0.5 mg  0.5 mg Oral BID PRN    ondansetron (ZOFRAN ODT) tablet 4 mg  4 mg Oral Q8H PRN    atorvastatin (LIPITOR) tablet 10 mg  10 mg Oral DAILY    ferrous gluconate 324 mg (37.5 mg iron) tablet 1 Tablet  324 mg Oral BID WITH MEALS    pantoprazole (PROTONIX) tablet 40 mg  40 mg Oral ACB    docusate sodium (COLACE) capsule 100 mg  100 mg Oral BID    bisacodyL (DULCOLAX) tablet 10 mg  10 mg Oral Q48H PRN    melatonin tablet 3 mg  3 mg Oral QHS PRN    naloxone (NARCAN) injection 0.2 mg  0.2 mg IntraVENous PRN       Assessment/Plan     Principal Problem:    Compression fracture of T11 vertebra with routine healing (6/9/2022)    Active Problems:    Essential hypertension, benign (1/29/2009)      Sciatica of right side (2/7/2018)      Bilateral sacral insufficiency fracture with routine healing (6/9/2022)      Status post kyphoplasty (6/13/2022)      Overview: S/P Image guided T11 kyphoplasty; Image guided bilateral sacroplasty       (6/13/2022 - Dr. Harish Reagan)      Generalized weakness (6/9/2022)      Impaired mobility and ADLs (6/9/2022)      Hypokalemia (6/22/2022)      Compression fracture of T11 vertebra with routine healing; Bilateral sacral insufficiency fracture with routine healing; S/P Image guided T11 kyphoplasty; Image guided bilateral sacroplasty (6/13/2022 - Dr. Harish Reagan)              > Thoracic spinal precautions  Continue pain control     Chronic anemia   Continue Ferrous gluconate 324 mg PO BID with meals      Dyslipidemia  Continue Atorvastatin 10 mg PO once daily     Essential hypertension   Normotensive throughout day however current /80              > Continue:                          >  Metoprolol tartrate from 25 mg PO q 12 hr (9AM, 9PM)                          > Hydralazine 25 mg PO TID PRN for SBP greater than 170 mmHg  Monitor and adjust BP medication as needed     Gastroesophageal reflux disease  Continue Pantoprazole 40 mg PO daily before breakfast     Generalized anxiety disorder  Continue:                          > Nortriptyline 40 mg PO q HS                          > Lorazepam 0.5 mg PO BID PRN for anxiety     Sciatica of right side  Continue:                          > Gabapentin 200 mg PO BID (8AM, 2PM)                          > Gabapentin 400 mg PO q 8PM                          > Nortriptyline 40 mg PO q HS     Hypokalemia  Continue Magnesium oxide 400 mg PO once daily                    Hannah Price MD  6/25/2022

## 2022-06-25 NOTE — PROGRESS NOTES
Problem: Self Care Deficits Care Plan (Adult)  Goal: *Acute Goals and Plan of Care (Insert Text)  Description: Occupational Therapy Goals   Long Term Goals  Initiated 6/18/2022 (goals revised on 6/23/2022) and to be accomplished within 2 week(s), by (7/2/2022)  1. Pt will perform self-feeding with independence. 2. Pt will perform grooming with MOD I.  3. Pt will perform UB bathing with MOD I.  4. Pt will perform LB bathing with MOD I using AE and/ or compensatory strategies as needed. 5. Pt will perform tub/shower transfer with supervision using least restrictive assistive device. 6. Pt will perform UB dressing with MOD I.  7. Pt will perform LB dressing with set-up assistance using AE and/ or compensatory strategies as needed. 8. Pt will perform toileting task with MOD I.  9. Pt will perform toilet transfer with supervision using least restrictive assistive device. Short Term Goals   Initiated 6/18/2022 (reassessed on 6/23/2022) and to be accomplished within 7 day(s), by (6/30/2022)  1. Pt will perform grooming with no more than MIN A from w/c at sink. (Goal met 6/23/2022; currently set-up)  2. Pt will perform UB bathing with no more than MIN A from w/c with basin or TTB. (Goal met 6/23/2022)      Goal upgraded: Pt will perform UB bathing with set-up. 3. Pt will perform LB bathing with MOD A using AE and/ or compensatory strategies as needed. (Goal met 6/23/2022; currently SBA)      Goal upgraded: Pt will perform LB bathing with supv/ set-up using AE and/ or compensatory strategies as needed. 4. Pt will perform tub/shower transfer with MOD A using LRAD. (Goal met 6/23/2022; currently CGA)      Goal upgraded: Pt will perform tub/shower transfer with SBA using least restrictive assistive device. 5. Pt will perform UB dressing with setup A. (Goal met 6/23/2022)  6. Pt will perform LB dressing with MOD A using AE and/ or compensatory strategies.  (Goal met 6/23/2022; currently CGA)      Goal upgraded: Pt will perform LB dressing with SBA using AE and/ or compensatory strategies. 7. Pt will perform toileting task with MIN A. (Goal met 2022; currently CGA)      Goal upgraded: Pt will perform toileting task with supv/ SBA. 8. Pt will perform toilet transfer with MOD A using LRAD. (Goal met 2022; currently SBA)      Goal upgraded: Pt will perform toilet transfer with supervision using least restrictive assistive device. Outcome: Progressing Towards Goal  Goal: Interventions  Outcome: Progressing Towards Goal   Occupational Therapy TREATMENT    Patient: Juan Ty   68 y.o. Patient identified with name and : yes    Date: 2022    First Tx Session  Time In: 1042  Time Out: 2680  Diagnosis: Compression fracture of body of thoracic vertebra ([address] [S22.000A]     Precautions:  Fall precautions; Safety precautions; Spinal precautions  Chart, occupational therapy assessment, plan of care, and goals were reviewed. Pain:  Pt reports 3/10 pain or discomfort prior to treatment; lower back/ sacrum (aching)  Pt reports 3/10 pain or discomfort post treatment; lower back/ sacrum (aching)   Intervention Provided: repositioning; intermittent rest breaks as needed. SUBJECTIVE:   Patient stated I have visitors coming today\" and \"would like to wash my hair.     OBJECTIVE DATA SUMMARY:     THERAPEUTIC ACTIVITY Daily Assessment    Patient presented resting quietly bed level. Pt awakened at this time and was agreeable to participate in ADL shower (per patient she requested shower and to wash her hair). Supine to sit (supv) to edge of bed. Sit to Stand (SBA) at edge of bed; verbal cues for pushing up from seated surface and for hand placement onto FWW. Functional mobility performed (CGA) using FWW and gait belt; verbal cues for safe handling of AD.  Functional stand step xfer (SBA/ CGA) using FWW to seated position TTB; functional stand step xfer (SBA) from TTB > w/c at conclusion of shower; using grab bar. Gait belt in use. GROOMING Daily Assessment    Functional Level: 6 (Mod I w/c at sink)  Tasks Completed by Patient: Brushed teeth; Washed face  Comments: Pt washed her face using washcloth (Mod I) seated on tub bench (ADL shower); pt performed oral hygiene/ brushed her teeth and brushed her hair (Mod I) w/c at sink. Oral hygiene: 6 Mod I     BATHING Daily Assessment    Functional Level: 5 (Supv overall )  Body Parts Patient Bathed: Abdomen;Arm, left;Arm, right;Buttocks; Chest;Lower leg and foot, left; Lower leg and foot, right;Kandis area; Thigh, left; Thigh, right  Comments: UB bathing performed set-up seated on tub bench; LB bathing performed (supv) seated on tub bench. Verbal cues for use of weight shift to wash sacrum. Vc's for use of LH sponge to wash distal BLE's. UPPER BODY DRESSING Daily Assessment    Functional Level: 5 (set-up/ Mod I)  Items Applied/Steps Completed: Pullover (4 steps)  Comments: UB dressing performed set-up/ Mod I seated on tub bench. LOWER BODY DRESSING Daily Assessment    Functional Level: 5 (SBA)  Items Applied/Steps Completed: Elastic waist pants (3 steps); Sock, left (1 step); Sock, right (1 step) (Depends (3 steps))  Comments: LB dressing performed (SBA) seated in w/c using crossed leg technique to thread pant legs and depend over distal BLE's. Pt stood using grab bar for steadying when pulling up depend/ pants over hips to waist.   Sock and/or Shoe management: 5 set-up/ Mod I (socks)      MOBILITY/TRANSFERS Daily Assessment    Tub/Shower Transfer Score: 4 (SBA/ CGA)  Comments: Stand step xfer (SBA/ CGA) using FWW to seated position TTB; functional stand step xfer (SBA) from TTB > w/c at conclusion of shower; using grab bar. Gait belt in use. ASSESSMENT:  Today's skilled occupational therapy session focused on ADL training (ADL shower using TTB), am self-cares, and functional transfer training using AD (FWW) within context of ADL performance.  Patient will continue to benefit from skilled occupational therapy interventions to improve independence with ADL's, increase safety and independence with functional transfers/ mobility using LRAD (FWW), increase activity tolerance, and for strengthening/ endurance. Patient progressed to performing LB dressing tasks at SBA level with occassional use of grab bar in bathroom for steadying for aspects performed in stance. Progression toward goals:  []          Improving appropriately and progressing toward goals  [x]          Improving slowly and progressing toward goals  []          Not making progress toward goals and plan of care will be adjusted     PLAN:  Patient continues to benefit from skilled intervention to address the above impairments. Continue treatment per established plan of care. Discharge Recommendations:  Home Health occupational therapy with family assist/ support  Further Equipment Recommendations for Discharge:  bedside commode, gait belt, and shower chair      Activity Tolerance:  Fair; intermittent rest breaks due to fatigue  Estimated LOS: 7/2/2022    Please refer to the flowsheet for vital signs taken during this treatment. After treatment:   [x]  Patient left in no apparent distress sitting up in wheelchair with needs met  []  Patient left in no apparent distress in bed  [x]  Call bell left within reach  [x]  Nursing notified  []  Caregiver present  [x]  Wheelchair alarm activated    COMMUNICATION/EDUCATION:   [x] Home safety education was provided and the patient/caregiver indicated understanding. [x] Patient/family have participated as able in goal setting and plan of care. [x] Patient/family agree to work toward stated goals and plan of care. [] Patient understands intent and goals of therapy, but is neutral about his/her participation. [] Patient is unable to participate in goal setting and plan of care.     [address] [facility], OT

## 2022-06-25 NOTE — ROUTINE PROCESS
SHIFT CHANGE NOTE FOR [facility]    Bedside and Verbal shift change report given to Hermes Martin RN (oncoming nurse) by Arianna Rose RN (offgoing nurse). Report included the following information SBAR, Kardex, MAR and Recent Results.     Situation:   Code Status: Full Code   Hospital Day: 8   Problem List:   Hospital Problems  Date Reviewed: 6/24/2022          Codes Class Noted POA    Hypokalemia ICD-10-CM: E87.6  ICD-9-CM: 276.8  6/22/2022 Yes        Status post kyphoplasty ICD-10-CM: Z98.890  ICD-9-CM: V45.89  6/13/2022 Yes    Overview Signed 6/20/2022  4:37 PM by Jaron De Dios MD     S/P Image guided T11 kyphoplasty; Image guided bilateral sacroplasty (6/13/2022 - Dr. Nancy Danielle)             * (Principal) Compression fracture of T11 vertebra with routine healing ICD-10-CM: S22.080D  ICD-9-CM: V54.17  6/9/2022 Yes        Bilateral sacral insufficiency fracture with routine healing ICD-10-CM: M84.48XD  ICD-9-CM: V54.27  6/9/2022 Yes        Generalized weakness ICD-10-CM: R53.1  ICD-9-CM: 780.79  6/9/2022 Yes        Impaired mobility and ADLs ICD-10-CM: Z74.09, Z78.9  ICD-9-CM: V49.89  6/9/2022 Yes        Sciatica of right side ICD-10-CM: M54.31  ICD-9-CM: 724.3  2/7/2018 Yes        Essential hypertension, benign ICD-10-CM: I10  ICD-9-CM: 401.1  1/29/2009 Yes              Background:   Past Medical History:   Past Medical History:   Diagnosis Date    Acid reflux     Bilateral sacral insufficiency fracture with routine healing 6/9/2022    Chronic anemia     Compression fracture of T11 vertebra with routine healing 6/9/2022    Hypertension     Spinal stenosis         Assessment:   Changes in Assessment throughout shift: No change to previous assessment     Patient has a central line: no Reasons if yes: na  Insertion date:na Last dressing date:na   Patient has Ellis Cath: no Reasons if yes: na   Insertion date:na  Shift ellis care completed: NO     Last Vitals:     Vitals:    06/24/22 1600 06/24/22 2030 06/25/22 0836 06/25/22 1659   BP: 132/79 129/74 (!) 153/80 107/66   Pulse: 90 87 85 75   Resp: 18 18 15 15   Temp: 97 °F (36.1 °C) 96.8 °F (36 °C) 97.8 °F (36.6 °C) 97.9 °F (36.6 °C)   SpO2: 97% 98% 98% 96%   Weight:       Height:            PAIN    Pain Assessment    Pain Intensity 1: 0 (06/25/22 1758) Pain Intensity 1: 2 (12/29/14 1105)    Pain Location 1: Hip Pain Location 1: Abdomen    Pain Intervention(s) 1: Medication (see MAR) Pain Intervention(s) 1: Medication (see MAR)  Patient Stated Pain Goal: 0 Patient Stated Pain Goal: 0  o Intervention effective: yes  o Other actions taken for pain: Medication (see MAR)     Skin Assessment  Skin color    Condition/Temperature    Integrity    Turgor    Weekly Pressure Ulcer Documentation  Pressure  Injury Documentation: No Pressure Injury Noted-Pressure Ulcer Prevention Initiated  Wound Prevention & Protection Methods  Orientation of wound Orientation of Wound Prevention: Posterior  Location of Prevention Location of Wound Prevention: Sacrum/Coccyx  Dressing Present Dressing Present : No  Dressing Status Dressing Status: Intact  Wound Offloading Wound Offloading (Prevention Methods): Bed, pressure reduction mattress    Wound Prevention Checklist  Precautions:      []  Heel prevention boots placed on patient    []  Patient turned q2h during shift    []  Valeriy Order Set ordered    []  Patient on Madeline bed/Specialty bed    []  Each Wound is documented during shift (Stage, Color, drainage, odor, measurements, and dressings)    []  Dual skin checks done at bedside during shift report with Giulia Vasquez RN     INTAKE/OUPUT  Date 06/24/22 1900 - 06/25/22 0659 06/25/22 0700 - 06/26/22 0659   Shift 1699-8493 24 Hour Total 5935-9016 5484-3753 24 Hour Total   INTAKE   P.O.  1320 480  480     P.O.  1320 480  480   Shift Total(mL/kg)  1320(19.2) 480(7)  480(7)   OUTPUT   Urine(mL/kg/hr)          Urine Occurrence(s) 4 x 8 x 4 x  4 x   Stool          Stool Occurrence(s) 0 x 0 x 2 x  2 x   Shift Total(mL/kg)        NET  1320 480  480   Weight (kg) 68.8 68.8 68.8 68.8 68.8       Recommendations:  1. Patient needs and requests: na    2. Pending tests/procedures: na     3. Functional Level/Equipment: Partial (one person) /      Fall Precautions:   Fall risk precautions were reinforced with the patient; she was instructed to call for help prior to getting up. The following fall risk precautions were continued: bed/ chair alarms, door signage, yellow bracelet and socks as well as update of the Sukumar Cease tool in the patient's room. Yinka Score: 4    HEALS Safety Check    A safety check occurred in the patient's room between off going nurse and oncoming nurse listed above. The safety check included the below items  Area Items   H  High Alert Medications - Verify all high alert medication drips (heparin, PCA, etc.)   E  Equipment - Suction is set up for ALL patients (with hemalker)  - Red plugs utilized for all equipment (IV pumps, etc.)  - WOWs wiped down at end of shift.  - Room stocked with oxygen, suction, and other unit-specific supplies   A  Alarms - Bed alarm is set for fall risk patients  - Ensure chair alarm is in place and activated if patient is up in a chair   L  Lines - Check IV for any infiltration  - Nance bag is empty if patient has a Nance   - Tubing and IV bags are labeled   S  Safety   - Room is clean, patient is clean, and equipment is clean. - Hallways are clear from equipment besides carts. - Fall bracelet on for fall risk patients  - Ensure room is clear and free of clutter  - Suction is set up for ALL patients (with catalina)  - Hallways are clear from equipment besides carts.    - Isolation precautions followed, supplies available outside room, sign posted     Penny Mccann RN

## 2022-06-25 NOTE — PROGRESS NOTES
Problem: Patient Education: Go to Patient Education Activity  Goal: Patient/Family Education  Outcome: Progressing Towards Goal     Problem: Pain  Goal: *Control of Pain  Outcome: Progressing Towards Goal  Goal: *PALLIATIVE CARE:  Alleviation of Pain  Outcome: Progressing Towards Goal     Problem: Patient Education: Go to Patient Education Activity  Goal: Patient/Family Education  Outcome: Progressing Towards Goal     Problem: Falls - Risk of  Goal: *Absence of Falls  Description: Document Yinka Fall Risk and appropriate interventions in the flowsheet. Outcome: Progressing Towards Goal  Note: Fall Risk Interventions:  Mobility Interventions: Bed/chair exit alarm    Mentation Interventions: Bed/chair exit alarm,Evaluate medications/consider consulting pharmacy    Medication Interventions: Bed/chair exit alarm,Evaluate medications/consider consulting pharmacy    Elimination Interventions: Call light in reach,Bed/chair exit alarm    History of Falls Interventions: Bed/chair exit alarm,Evaluate medications/consider consulting pharmacy         Problem: Patient Education: Go to Patient Education Activity  Goal: Patient/Family Education  Outcome: Progressing Towards Goal     Problem: Impaired Skin Integrity/Pressure Injury Treatment  Goal: *Improvement of Existing Pressure Injury  Outcome: Progressing Towards Goal  Goal: *Prevention of pressure injury  Description: Document Valeriy Scale and appropriate interventions in the flowsheet.   Outcome: Progressing Towards Goal  Note: Pressure Injury Interventions:  Sensory Interventions: Assess changes in LOC    Moisture Interventions: Absorbent underpads    Activity Interventions: Chair cushion,Increase time out of bed,Pressure redistribution bed/mattress(bed type)    Mobility Interventions: Chair cushion,Pressure redistribution bed/mattress (bed type)    Nutrition Interventions: Document food/fluid/supplement intake    Friction and Shear Interventions: HOB 30 degrees or less                Problem: Patient Education: Go to Patient Education Activity  Goal: Patient/Family Education  Outcome: Progressing Towards Goal     Problem: Anxiety  Goal: *Alleviation of anxiety  Outcome: Progressing Towards Goal  Goal: *Alleviation of anxiety (Palliative Care)  Outcome: Progressing Towards Goal     Problem: Patient Education: Go to Patient Education Activity  Goal: Patient/Family Education  Outcome: Progressing Towards Goal     Problem: Patient Education: Go to Patient Education Activity  Goal: Patient/Family Education  Outcome: Progressing Towards Goal     Problem: Hypertension  Goal: *Blood pressure within specified parameters  Outcome: Progressing Towards Goal  Goal: *Fluid volume balance  Outcome: Progressing Towards Goal  Goal: *Labs within defined limits  Outcome: Progressing Towards Goal     Problem: Patient Education: Go to Patient Education Activity  Goal: Patient/Family Education  Outcome: Progressing Towards Goal     Problem: Patient Education: Go to Patient Education Activity  Goal: Patient/Family Education  Outcome: Progressing Towards Goal     Problem: Pressure Injury - Risk of  Goal: *Prevention of pressure injury  Description: Document Valeriy Scale and appropriate interventions in the flowsheet.   Outcome: Progressing Towards Goal     Problem: Patient Education: Go to Patient Education Activity  Goal: Patient/Family Education  Outcome: Progressing Towards Goal     Problem: Patient Education: Go to Patient Education Activity  Goal: Patient/Family Education  Outcome: Progressing Towards Goal     Problem: Patient Education: Go to Patient Education Activity  Goal: Patient/Family Education  Outcome: Progressing Towards Goal     Problem: Nutrition Deficit  Goal: *Optimize nutritional status  Outcome: Progressing Towards Goal

## 2022-06-26 PROCEDURE — 74011250637 HC RX REV CODE- 250/637: Performed by: INTERNAL MEDICINE

## 2022-06-26 PROCEDURE — 65310000000 HC RM PRIVATE REHAB

## 2022-06-26 PROCEDURE — 74011250637 HC RX REV CODE- 250/637: Performed by: EMERGENCY MEDICINE

## 2022-06-26 RX ADMIN — METOPROLOL TARTRATE 25 MG: 25 TABLET, FILM COATED ORAL at 20:57

## 2022-06-26 RX ADMIN — ATORVASTATIN CALCIUM 10 MG: 10 TABLET, FILM COATED ORAL at 09:04

## 2022-06-26 RX ADMIN — OXYCODONE HYDROCHLORIDE 10 MG: 5 TABLET ORAL at 03:52

## 2022-06-26 RX ADMIN — GABAPENTIN 400 MG: 400 CAPSULE ORAL at 20:57

## 2022-06-26 RX ADMIN — GABAPENTIN 200 MG: 100 CAPSULE ORAL at 13:50

## 2022-06-26 RX ADMIN — ACETAMINOPHEN 650 MG: 325 TABLET ORAL at 15:48

## 2022-06-26 RX ADMIN — PANTOPRAZOLE SODIUM 40 MG: 40 TABLET, DELAYED RELEASE ORAL at 06:32

## 2022-06-26 RX ADMIN — DOCUSATE SODIUM 100 MG: 100 CAPSULE, LIQUID FILLED ORAL at 09:05

## 2022-06-26 RX ADMIN — Medication 1 TABLET: at 09:04

## 2022-06-26 RX ADMIN — GABAPENTIN 200 MG: 100 CAPSULE ORAL at 09:04

## 2022-06-26 RX ADMIN — Medication 400 MG: at 09:05

## 2022-06-26 RX ADMIN — DOCUSATE SODIUM 100 MG: 100 CAPSULE, LIQUID FILLED ORAL at 18:00

## 2022-06-26 RX ADMIN — METOPROLOL TARTRATE 25 MG: 25 TABLET, FILM COATED ORAL at 09:05

## 2022-06-26 RX ADMIN — ACETAMINOPHEN 650 MG: 325 TABLET ORAL at 09:05

## 2022-06-26 RX ADMIN — NORTRIPTYLINE HYDROCHLORIDE 40 MG: 10 CAPSULE ORAL at 20:57

## 2022-06-26 RX ADMIN — Medication 1 TABLET: at 17:35

## 2022-06-26 RX ADMIN — ACETAMINOPHEN 650 MG: 325 TABLET ORAL at 11:58

## 2022-06-26 RX ADMIN — OXYCODONE HYDROCHLORIDE 10 MG: 5 TABLET ORAL at 18:58

## 2022-06-26 NOTE — PROGRESS NOTES
Problem: Pain  Goal: *Control of Pain  Outcome: Progressing Towards Goal     Problem: Patient Education: Go to Patient Education Activity  Goal: Patient/Family Education  Outcome: Progressing Towards Goal     Problem: Falls - Risk of  Goal: *Absence of Falls  Description: Document Clydene Bone Fall Risk and appropriate interventions in the flowsheet. Outcome: Progressing Towards Goal  Note: Fall Risk Interventions:  Mobility Interventions: Bed/chair exit alarm,Patient to call before getting OOB,Utilize walker, cane, or other assistive device    Mentation Interventions: Door open when patient unattended,Bed/chair exit alarm,Room close to nurse's station,Toileting rounds    Medication Interventions: Bed/chair exit alarm,Patient to call before getting OOB,Teach patient to arise slowly    Elimination Interventions: Bed/chair exit alarm,Call light in reach,Patient to call for help with toileting needs,Stay With Me (per policy),Toilet paper/wipes in reach,Toileting schedule/hourly rounds    History of Falls Interventions: Bed/chair exit alarm,Door open when patient unattended,Room close to nurse's station         Problem: Patient Education: Go to Patient Education Activity  Goal: Patient/Family Education  Outcome: Progressing Towards Goal     Problem: Impaired Skin Integrity/Pressure Injury Treatment  Goal: *Improvement of Existing Pressure Injury  Outcome: Progressing Towards Goal  Goal: *Prevention of pressure injury  Description: Document Valeriy Scale and appropriate interventions in the flowsheet.   Outcome: Progressing Towards Goal  Note: Pressure Injury Interventions:  Sensory Interventions: Assess changes in LOC    Moisture Interventions: Absorbent underpads,Limit adult briefs,Moisture barrier,Minimize layers    Activity Interventions: Increase time out of bed,Pressure redistribution bed/mattress(bed type)    Mobility Interventions: HOB 30 degrees or less,Pressure redistribution bed/mattress (bed type),Chair cushion    Nutrition Interventions: Document food/fluid/supplement intake    Friction and Shear Interventions: Lift sheet,HOB 30 degrees or less,Apply protective barrier, creams and emollients                Problem: Patient Education: Go to Patient Education Activity  Goal: Patient/Family Education  Outcome: Progressing Towards Goal     Problem: Pressure Injury - Risk of  Goal: *Prevention of pressure injury  Description: Document Valeriy Scale and appropriate interventions in the flowsheet.   Outcome: Progressing Towards Goal  Note: Pressure Injury Interventions:  Sensory Interventions: Assess changes in LOC    Moisture Interventions: Absorbent underpads,Limit adult briefs,Moisture barrier,Minimize layers    Activity Interventions: Increase time out of bed,Pressure redistribution bed/mattress(bed type)    Mobility Interventions: HOB 30 degrees or less,Pressure redistribution bed/mattress (bed type),Chair cushion    Nutrition Interventions: Document food/fluid/supplement intake    Friction and Shear Interventions: Lift sheet,HOB 30 degrees or less,Apply protective barrier, creams and emollients                Problem: Patient Education: Go to Patient Education Activity  Goal: Patient/Family Education  Outcome: Progressing Towards Goal

## 2022-06-26 NOTE — PROGRESS NOTES
Progress Note    Patient: Judith Kaur MRN: [id number]  CSN: [id number]    YOB: 1945  Age: 68 y.o. Sex: female    DOA: 6/17/2022 LOS:  LOS: 9 days                    Subjective:     Primary Rehabilitation Diagnosis: Generalized weakness with Impaired mobility and ADLs secondary to:  1. Compression fracture of T11 vertebra with routine healing  2. Bilateral sacral insufficiency fracture with routine healing  3. S/P Image guided T11 kyphoplasty; Image guided bilateral sacroplasty (6/13/2022 - Dr. Jorge A Grewal)         No acute patient or nursing concerns. Review of systems  General: No fevers or chills. Cardiovascular: No chest pain or pressure. No palpitations. Pulmonary: No shortness of breath, cough or wheeze. Gastrointestinal: No abdominal pain, nausea, vomiting or diarrhea. Genitourinary: No urinary frequency, urgency, hesitancy or dysuria. Musculoskeletal: pain fairly controlled on current regimen  Neurologic: generalized weakness    Objective:     Physical Exam:  Visit Vitals  /73 (BP 1 Location: Left upper arm, BP Patient Position: At rest;Lying)   Pulse 88   Temp 98.5 °F (36.9 °C)   Resp 17   Ht 5' 5\" (1.651 m)   Wt 68.8 kg (151 lb 9.6 oz)   SpO2 94%   Breastfeeding No   BMI 25.23 kg/m²        General:         Alert, cooperative, no acute distress    HEENT: NC, Atraumatic. Lungs: CTA Bilaterally. No Wheezing/Rhonchi/Rales. Heart:  Regular  rhythm,  No murmur, No Rubs, No Gallops  Abdomen: Soft, Non distended, Non tender.  +Bowel sounds, no HSM  Extremities: No edema  Psych:   Not anxious or agitated. Expresses frustrations with ongoing limitations and pain however today notes plans to get as much as she can out of therapy to improve independence. Neurologic:  CN 2-12 grossly intact, Alert and oriented X 3.   No acute neurological deficits    Intake and Output:  Current Shift:  06/26 0701 - 06/26 1900  In: 360 [P.O.:360]  Out: -   Last three shifts:  06/24 1901 - 06/26 0700  In: 12 [P.O.:960]  Out: 0     Labs: Results:       Chemistry No results for input(s): GLU, NA, K, CL, CO2, BUN, CREA, CA, AGAP, BUCR, TBIL, AP, TP, ALB, GLOB, AGRAT in the last 72 hours. No lab exists for component: GPT   CBC w/Diff No results for input(s): WBC, RBC, HGB, HCT, PLT, GRANS, LYMPH, EOS, HGBEXT, HCTEXT, PLTEXT, HGBEXT, HCTEXT, PLTEXT in the last 72 hours. Cardiac Enzymes No results for input(s): CPK, CKND1, LES in the last 72 hours. No lab exists for component: CKRMB, TROIP   Coagulation No results for input(s): PTP, INR, APTT, INREXT, INREXT in the last 72 hours. Lipid Panel No results found for: CHOL, CHOLPOCT, CHOLX, CHLST, CHOLV, 163939, HDL, HDLP, LDL, LDLC, DLDLP, 586411, VLDLC, VLDL, TGLX, TRIGL, TRIGP, TGLPOCT, CHHD, CHHDX   BNP No results for input(s): BNPP in the last 72 hours. Liver Enzymes No results for input(s): TP, ALB, TBIL, AP in the last 72 hours.     No lab exists for component: SGOT, GPT, DBIL   Thyroid Studies Lab Results   Component Value Date/Time    TSH 0.63 06/10/2022 11:40 AM          Procedures/imaging: see electronic medical records for all procedures/Xrays and details which were not copied into this note but were reviewed prior to creation of Plan    Medications:   Current Facility-Administered Medications   Medication Dose Route Frequency    metoprolol tartrate (LOPRESSOR) tablet 25 mg  25 mg Oral Q12H    acetaminophen (TYLENOL) tablet 650 mg  650 mg Oral TID    acetaminophen (TYLENOL) tablet 650 mg  650 mg Oral Q4H PRN    gabapentin (NEURONTIN) capsule 400 mg  400 mg Oral QHS    gabapentin (NEURONTIN) capsule 200 mg  200 mg Oral BID    hydrALAZINE (APRESOLINE) tablet 25 mg  25 mg Oral TID PRN    magnesium oxide (MAG-OX) tablet 400 mg  400 mg Oral DAILY    nortriptyline (PAMELOR) capsule 40 mg  40 mg Oral QHS    oxyCODONE IR (ROXICODONE) tablet 10 mg  10 mg Oral Q4H PRN    LORazepam (ATIVAN) tablet 0.5 mg  0.5 mg Oral BID PRN    ondansetron (ZOFRAN ODT) tablet 4 mg  4 mg Oral Q8H PRN    atorvastatin (LIPITOR) tablet 10 mg  10 mg Oral DAILY    ferrous gluconate 324 mg (37.5 mg iron) tablet 1 Tablet  324 mg Oral BID WITH MEALS    pantoprazole (PROTONIX) tablet 40 mg  40 mg Oral ACB    docusate sodium (COLACE) capsule 100 mg  100 mg Oral BID    bisacodyL (DULCOLAX) tablet 10 mg  10 mg Oral Q48H PRN    melatonin tablet 3 mg  3 mg Oral QHS PRN    naloxone (NARCAN) injection 0.2 mg  0.2 mg IntraVENous PRN       Assessment/Plan     Principal Problem:    Compression fracture of T11 vertebra with routine healing (6/9/2022)    Active Problems:    Essential hypertension, benign (1/29/2009)      Sciatica of right side (2/7/2018)      Bilateral sacral insufficiency fracture with routine healing (6/9/2022)      Status post kyphoplasty (6/13/2022)      Overview: S/P Image guided T11 kyphoplasty; Image guided bilateral sacroplasty       (6/13/2022 - Dr. Emily Robles)      Generalized weakness (6/9/2022)      Impaired mobility and ADLs (6/9/2022)      Hypokalemia (6/22/2022)      Compression fracture of T11 vertebra with routine healing; Bilateral sacral insufficiency fracture with routine healing; S/P Image guided T11 kyphoplasty; Image guided bilateral sacroplasty (6/13/2022 - Dr. Emily Robles)              > Thoracic spinal precautions  Continue pain control     Chronic anemia   Continue Ferrous gluconate 324 mg PO BID with meals      Dyslipidemia  Continue Atorvastatin 10 mg PO once daily     Essential hypertension  Currently controlled              > Continue:                          >  Metoprolol tartrate from 25 mg PO q 12 hr (9AM, 9PM)                          > Hydralazine 25 mg PO TID PRN for SBP greater than 170 mmHg  Monitor and adjust BP medication as needed     Gastroesophageal reflux disease  Continue Pantoprazole 40 mg PO daily before breakfast     Generalized anxiety disorder  Continue:                          > Nortriptyline 40 mg PO q HS                          > Lorazepam 0.5 mg PO BID PRN for anxiety     Sciatica of right side  Continue:                          > Gabapentin 200 mg PO BID (8AM, 2PM)                          > Gabapentin 400 mg PO q 8PM                          > Nortriptyline 40 mg PO q HS     Hypokalemia  Continue Magnesium oxide 400 mg PO once daily                    Analia Watson MD  6/26/2022

## 2022-06-26 NOTE — PROGRESS NOTES
Problem: Patient Education: Go to Patient Education Activity  Goal: Patient/Family Education  Outcome: Progressing Towards Goal     Problem: Pain  Goal: *Control of Pain  Outcome: Progressing Towards Goal  Goal: *PALLIATIVE CARE:  Alleviation of Pain  Outcome: Progressing Towards Goal     Problem: Patient Education: Go to Patient Education Activity  Goal: Patient/Family Education  Outcome: Progressing Towards Goal     Problem: Falls - Risk of  Goal: *Absence of Falls  Description: Document Yinka Fall Risk and appropriate interventions in the flowsheet. Outcome: Progressing Towards Goal  Note: Fall Risk Interventions:  Mobility Interventions: Bed/chair exit alarm    Mentation Interventions: Door open when patient unattended    Medication Interventions: Bed/chair exit alarm    Elimination Interventions: Bed/chair exit alarm    History of Falls Interventions: Bed/chair exit alarm,Room close to nurse's station         Problem: Patient Education: Go to Patient Education Activity  Goal: Patient/Family Education  Outcome: Progressing Towards Goal     Problem: Impaired Skin Integrity/Pressure Injury Treatment  Goal: *Improvement of Existing Pressure Injury  Outcome: Progressing Towards Goal  Goal: *Prevention of pressure injury  Description: Document Valeriy Scale and appropriate interventions in the flowsheet.   Outcome: Progressing Towards Goal  Note: Pressure Injury Interventions:  Sensory Interventions: Assess changes in LOC,Check visual cues for pain,Keep linens dry and wrinkle-free    Moisture Interventions: Absorbent underpads    Activity Interventions: Increase time out of bed    Mobility Interventions: HOB 30 degrees or less    Nutrition Interventions: Document food/fluid/supplement intake    Friction and Shear Interventions: Lift sheet                Problem: Patient Education: Go to Patient Education Activity  Goal: Patient/Family Education  Outcome: Progressing Towards Goal     Problem: Anxiety  Goal: *Alleviation of anxiety  Outcome: Progressing Towards Goal  Goal: *Alleviation of anxiety (Palliative Care)  Outcome: Progressing Towards Goal     Problem: Patient Education: Go to Patient Education Activity  Goal: Patient/Family Education  Outcome: Progressing Towards Goal     Problem: Patient Education: Go to Patient Education Activity  Goal: Patient/Family Education  Outcome: Progressing Towards Goal     Problem: Hypertension  Goal: *Blood pressure within specified parameters  Outcome: Progressing Towards Goal  Goal: *Fluid volume balance  Outcome: Progressing Towards Goal  Goal: *Labs within defined limits  Outcome: Progressing Towards Goal     Problem: Patient Education: Go to Patient Education Activity  Goal: Patient/Family Education  Outcome: Progressing Towards Goal     Problem: Patient Education: Go to Patient Education Activity  Goal: Patient/Family Education  Outcome: Progressing Towards Goal     Problem: Pressure Injury - Risk of  Goal: *Prevention of pressure injury  Description: Document Valeriy Scale and appropriate interventions in the flowsheet.   Outcome: Progressing Towards Goal  Note: Pressure Injury Interventions:  Sensory Interventions: Assess changes in LOC,Check visual cues for pain,Keep linens dry and wrinkle-free    Moisture Interventions: Absorbent underpads    Activity Interventions: Increase time out of bed    Mobility Interventions: HOB 30 degrees or less    Nutrition Interventions: Document food/fluid/supplement intake    Friction and Shear Interventions: Lift sheet                Problem: Patient Education: Go to Patient Education Activity  Goal: Patient/Family Education  Outcome: Progressing Towards Goal     Problem: Patient Education: Go to Patient Education Activity  Goal: Patient/Family Education  Outcome: Progressing Towards Goal     Problem: Patient Education: Go to Patient Education Activity  Goal: Patient/Family Education  Outcome: Progressing Towards Goal     Problem: Nutrition Deficit  Goal: *Optimize nutritional status  Outcome: Progressing Towards Goal

## 2022-06-26 NOTE — PROGRESS NOTES
SHIFT CHANGE NOTE FOR [facility]    Bedside and Verbal shift change report given to TD Torres (oncoming nurse) by Alfonso Paz RN (offgoing nurse). Report included the following information SBAR, Kardex, MAR and Recent Results. Situation:   Code Status: Full Code   Hospital Day: 9   Problem List:   Hospital Problems  Date Reviewed: 6/24/2022          Codes Class Noted POA    Hypokalemia ICD-10-CM: E87.6  ICD-9-CM: 276.8  6/22/2022 Yes        Status post kyphoplasty ICD-10-CM: Z98.890  ICD-9-CM: V45.89  6/13/2022 Yes    Overview Signed 6/20/2022  4:37 PM by Cooper Metcalf MD     S/P Image guided T11 kyphoplasty; Image guided bilateral sacroplasty (6/13/2022 - Dr. Suman Ruiz)             * (Principal) Compression fracture of T11 vertebra with routine healing ICD-10-CM: S22.080D  ICD-9-CM: V54.17  6/9/2022 Yes        Bilateral sacral insufficiency fracture with routine healing ICD-10-CM: M84.48XD  ICD-9-CM: V54.27  6/9/2022 Yes        Generalized weakness ICD-10-CM: R53.1  ICD-9-CM: 780.79  6/9/2022 Yes        Impaired mobility and ADLs ICD-10-CM: Z74.09, Z78.9  ICD-9-CM: V49.89  6/9/2022 Yes        Sciatica of right side ICD-10-CM: M54.31  ICD-9-CM: 724.3  2/7/2018 Yes        Essential hypertension, benign ICD-10-CM: I10  ICD-9-CM: 401.1  1/29/2009 Yes              Background:   Past Medical History:   Past Medical History:   Diagnosis Date    Acid reflux     Bilateral sacral insufficiency fracture with routine healing 6/9/2022    Chronic anemia     Compression fracture of T11 vertebra with routine healing 6/9/2022    Hypertension     Spinal stenosis         Assessment:   Changes in Assessment throughout shift: No change to previous assessment     Patient has a central line: no Reasons if yes: Insertion date: Last dressing date:   Patient has Ellis Cath: no Reasons if yes:     Insertion date:  Shift ellis care completed:      Last Vitals:     Vitals:    06/24/22 2030 06/25/22 6832 06/25/22 1657 06/25/22 1942   BP: 129/74 (!) 153/80 107/66 113/65   Pulse: 87 85 75 78   Resp: 18 15 15 16   Temp: 96.8 °F (36 °C) 97.8 °F (36.6 °C) 97.9 °F (36.6 °C) 97.9 °F (36.6 °C)   SpO2: 98% 98% 96% 97%   Weight:       Height:            PAIN    Pain Assessment    Pain Intensity 1: 0 (06/26/22 0507) Pain Intensity 1: 2 (12/29/14 1105)    Pain Location 1: Leg,Hip Pain Location 1: Abdomen    Pain Intervention(s) 1: Repositioned,Rest Pain Intervention(s) 1: Medication (see MAR)  Patient Stated Pain Goal: 0 Patient Stated Pain Goal: 0  o Intervention effective: yes  o Other actions taken for pain: Repositioned; Rest     Skin Assessment  Skin color    Condition/Temperature    Integrity    Turgor    Weekly Pressure Ulcer Documentation  Pressure  Injury Documentation: No Pressure Injury Noted-Pressure Ulcer Prevention Initiated  Wound Prevention & Protection Methods  Orientation of wound Orientation of Wound Prevention: Posterior  Location of Prevention Location of Wound Prevention: Sacrum/Coccyx  Dressing Present Dressing Present : No  Dressing Status Dressing Status: Intact  Wound Offloading Wound Offloading (Prevention Methods): Bed, pressure reduction mattress    Wound Prevention Checklist  Precautions:      []  Heel prevention boots placed on patient    []  Patient turned q2h during shift    []  Valeriy Order Set ordered    []  Patient on [facility] bed/Specialty bed    []  Each Wound is documented during shift (Stage, Color, drainage, odor, measurements, and dressings)    [x]  Dual skin checks done at bedside during shift report with TD Torres     INTAKE/OUPUT  Date 06/25/22 0700 - 06/26/22 0659 06/26/22 0700 - 06/27/22 0659   Shift 1130-0928 9699-9756 24 Hour Total 6638-5755 1152-6348 24 Hour Total   INTAKE   P.O. 480 480 960        P. O. 480 480 960      Shift Total(mL/kg) 480(7) 480(7) 960(14)      OUTPUT   Urine(mL/kg/hr)  0 0        Urine Voided  0 0        Urine Occurrence(s) 4 x 4 x 8 x      Stool           Stool Occurrence(s) 2 x 0 x 2 x      Shift Total(mL/kg)  0(0) 0(0)       480 960      Weight (kg) 68.8 68.8 68.8 68.8 68.8 68.8       Recommendations:  1. Patient needs and requests: pain management    2. Pending tests/procedures: none at this time     3. Functional Level/Equipment: Partial (one person) / [place]    Fall Precautions:   Fall risk precautions were reinforced with the patient; she was instructed to call for help prior to getting up. The following fall risk precautions were continued: bed/ chair alarms, door signage, yellow bracelet and socks as well as update of the [employer] tool in the patient's room. Yinka Score: 4    HEALS Safety Check    A safety check occurred in the patient's room between off going nurse and oncoming nurse listed above. The safety check included the below items  Area Items   H  High Alert Medications - Verify all high alert medication drips (heparin, PCA, etc.)   E  Equipment - Suction is set up for ALL patients (with catalina)  - Red plugs utilized for all equipment (IV pumps, etc.)  - WOWs wiped down at end of shift.  - Room stocked with oxygen, suction, and other unit-specific supplies   A  Alarms - Bed alarm is set for fall risk patients  - Ensure chair alarm is in place and activated if patient is up in a chair   L  Lines - Check IV for any infiltration  - Nance bag is empty if patient has a Nance   - Tubing and IV bags are labeled   S  Safety   - Room is clean, patient is clean, and equipment is clean. - Hallways are clear from equipment besides carts. - Fall bracelet on for fall risk patients  - Ensure room is clear and free of clutter  - Suction is set up for ALL patients (with catalina)  - Hallways are clear from equipment besides carts.    - Isolation precautions followed, supplies available outside room, sign posted     Joy Sinha RN

## 2022-06-26 NOTE — REHAB NOTE
SHIFT CHANGE NOTE FOR [facility]    Bedside and Verbal shift change report given to Chelsea Kowalski RN (oncoming nurse) by Danay Whitaker RN (offgoing nurse). Report included the following information SBAR, Kardex, MAR and Recent Results. Situation:   Code Status: Full Code   Hospital Day: 9   Problem List:   Hospital Problems  Date Reviewed: 6/24/2022          Codes Class Noted POA    Hypokalemia ICD-10-CM: E87.6  ICD-9-CM: 276.8  6/22/2022 Yes        Status post kyphoplasty ICD-10-CM: Z98.890  ICD-9-CM: V45.89  6/13/2022 Yes    Overview Signed 6/20/2022  4:37 PM by Suzette Moran MD     S/P Image guided T11 kyphoplasty; Image guided bilateral sacroplasty (6/13/2022 - Dr. Ewing Closs)             * (Principal) Compression fracture of T11 vertebra with routine healing ICD-10-CM: S22.080D  ICD-9-CM: V54.17  6/9/2022 Yes        Bilateral sacral insufficiency fracture with routine healing ICD-10-CM: M84.48XD  ICD-9-CM: V54.27  6/9/2022 Yes        Generalized weakness ICD-10-CM: R53.1  ICD-9-CM: 780.79  6/9/2022 Yes        Impaired mobility and ADLs ICD-10-CM: Z74.09, Z78.9  ICD-9-CM: V49.89  6/9/2022 Yes        Sciatica of right side ICD-10-CM: M54.31  ICD-9-CM: 724.3  2/7/2018 Yes        Essential hypertension, benign ICD-10-CM: I10  ICD-9-CM: 401.1  1/29/2009 Yes              Background:   Past Medical History:   Past Medical History:   Diagnosis Date    Acid reflux     Bilateral sacral insufficiency fracture with routine healing 6/9/2022    Chronic anemia     Compression fracture of T11 vertebra with routine healing 6/9/2022    Hypertension     Spinal stenosis         Assessment:   Changes in Assessment throughout shift: No change to previous assessment     Patient has a central line: no Reasons if yes: Insertion date: Last dressing date:   Patient has Ellis Cath:  Reasons if yes:     Insertion date:  Shift ellis care completed:      Last Vitals:     Vitals:    06/25/22 1659 06/25/22 1942 06/26/22 0797 06/26/22 1602   BP: 107/66 113/65 132/73 115/78   Pulse: 75 78 88 78   Resp: 15 16 17 15   Temp: 97.9 °F (36.6 °C) 97.9 °F (36.6 °C) 98.5 °F (36.9 °C) 97.4 °F (36.3 °C)   SpO2: 96% 97% 94% 96%   Weight:       Height:            PAIN    Pain Assessment    Pain Intensity 1: 7 (06/26/22 1900) Pain Intensity 1: 2 (12/29/14 1105)    Pain Location 1: Leg,Hip Pain Location 1: Abdomen    Pain Intervention(s) 1: Medication (see MAR) Pain Intervention(s) 1: Medication (see MAR)  Patient Stated Pain Goal: 0 Patient Stated Pain Goal: 0  o Intervention effective: yes  o Other actions taken for pain: Medication (see MAR)     Skin Assessment  Skin color    Condition/Temperature    Integrity    Turgor    Weekly Pressure Ulcer Documentation  Pressure  Injury Documentation: No Pressure Injury Noted-Pressure Ulcer Prevention Initiated  Wound Prevention & Protection Methods  Orientation of wound Orientation of Wound Prevention: Posterior  Location of Prevention Location of Wound Prevention: Sacrum/Coccyx  Dressing Present Dressing Present : No  Dressing Status Dressing Status: Intact  Wound Offloading Wound Offloading (Prevention Methods): Bed, pressure redistribution/air    Wound Prevention Checklist  Precautions:      []  Heel prevention boots placed on patient    []  Patient turned q2h during shift    []  Valeriy Order Set ordered    []  Patient on [facility] bed/Specialty bed    []  Each Wound is documented during shift (Stage, Color, drainage, odor, measurements, and dressings)    []  Dual skin checks done at bedside during shift report with Louise Troncoso     INTAKE/OUPUT  Date 06/25/22 1900 - 06/26/22 0659 06/26/22 0700 - 06/27/22 0659   Shift 4286-8351 24 Hour Total 7040-4508 9320-3422 24 Hour Total   INTAKE   P.O. 480 960 720  720     P. O. 480 960 720  720   Shift Total(mL/kg) 480(7) 960(14) 720(10.5)  720(10.5)   OUTPUT   Urine(mL/kg/hr) 0 0        Urine Voided 0 0        Urine Occurrence(s) 4 x 8 x 2 x  2 x   Emesis/NG output Emesis Occurrence(s)   0 x  0 x   Stool          Stool Occurrence(s) 0 x 2 x 0 x  0 x   Shift Total(mL/kg) 0(0) 0(0)       114 725  720   Weight (kg) 68.8 68.8 68.8 68.8 68.8       Recommendations:  1. Patient needs and requests: none    2. Pending tests/procedures: no     3. Functional Level/Equipment: Partial (one person) / Rubie Mcardle    Fall Precautions:   Fall risk precautions were reinforced with the patient; she was instructed to call for help prior to getting up. The following fall risk precautions were continued: bed/ chair alarms, door signage, yellow bracelet and socks as well as update of the [place] Flow tool in the patient's room. Yinka Score: 4    HEALS Safety Check    A safety check occurred in the patient's room between off going nurse and oncoming nurse listed above. The safety check included the below items  Area Items   H  High Alert Medications - Verify all high alert medication drips (heparin, PCA, etc.)   E  Equipment - Suction is set up for ALL patients (with catalina)  - Red plugs utilized for all equipment (IV pumps, etc.)  - WOWs wiped down at end of shift.  - Room stocked with oxygen, suction, and other unit-specific supplies   A  Alarms - Bed alarm is set for fall risk patients  - Ensure chair alarm is in place and activated if patient is up in a chair   L  Lines - Check IV for any infiltration  - Nance bag is empty if patient has a Nance   - Tubing and IV bags are labeled   S  Safety   - Room is clean, patient is clean, and equipment is clean. - Hallways are clear from equipment besides carts. - Fall bracelet on for fall risk patients  - Ensure room is clear and free of clutter  - Suction is set up for ALL patients (with catalina)  - Hallways are clear from equipment besides carts.    - Isolation precautions followed, supplies available outside room, sign posted     Bob Tomlin RN

## 2022-06-27 LAB
ANION GAP SERPL CALC-SCNC: 6 MMOL/L (ref 3–18)
BUN SERPL-MCNC: 16 MG/DL (ref 7–18)
BUN/CREAT SERPL: 28 (ref 12–20)
CALCIUM SERPL-MCNC: 8.6 MG/DL (ref 8.5–10.1)
CHLORIDE SERPL-SCNC: 103 MMOL/L (ref 100–111)
CO2 SERPL-SCNC: 25 MMOL/L (ref 21–32)
CREAT SERPL-MCNC: 0.58 MG/DL (ref 0.6–1.3)
GLUCOSE SERPL-MCNC: 119 MG/DL (ref 74–99)
HCT VFR BLD AUTO: 31.6 % (ref 35–45)
HGB BLD-MCNC: 9.3 G/DL (ref 12–16)
POTASSIUM SERPL-SCNC: 4.3 MMOL/L (ref 3.5–5.5)
SODIUM SERPL-SCNC: 134 MMOL/L (ref 136–145)

## 2022-06-27 PROCEDURE — 74011250637 HC RX REV CODE- 250/637: Performed by: EMERGENCY MEDICINE

## 2022-06-27 PROCEDURE — 97530 THERAPEUTIC ACTIVITIES: CPT

## 2022-06-27 PROCEDURE — 97535 SELF CARE MNGMENT TRAINING: CPT

## 2022-06-27 PROCEDURE — 74011000250 HC RX REV CODE- 250: Performed by: INTERNAL MEDICINE

## 2022-06-27 PROCEDURE — 2709999900 HC NON-CHARGEABLE SUPPLY

## 2022-06-27 PROCEDURE — 74011250637 HC RX REV CODE- 250/637: Performed by: FAMILY MEDICINE

## 2022-06-27 PROCEDURE — 65310000000 HC RM PRIVATE REHAB

## 2022-06-27 PROCEDURE — 80048 BASIC METABOLIC PNL TOTAL CA: CPT

## 2022-06-27 PROCEDURE — 97116 GAIT TRAINING THERAPY: CPT

## 2022-06-27 PROCEDURE — 74011250637 HC RX REV CODE- 250/637: Performed by: INTERNAL MEDICINE

## 2022-06-27 PROCEDURE — 97110 THERAPEUTIC EXERCISES: CPT

## 2022-06-27 PROCEDURE — 36415 COLL VENOUS BLD VENIPUNCTURE: CPT

## 2022-06-27 PROCEDURE — 99232 SBSQ HOSP IP/OBS MODERATE 35: CPT | Performed by: INTERNAL MEDICINE

## 2022-06-27 PROCEDURE — 85018 HEMOGLOBIN: CPT

## 2022-06-27 RX ORDER — LIDOCAINE 4 G/100G
1 PATCH TOPICAL EVERY 24 HOURS
Status: DISCONTINUED | OUTPATIENT
Start: 2022-06-27 | End: 2022-07-02 | Stop reason: HOSPADM

## 2022-06-27 RX ADMIN — OXYCODONE HYDROCHLORIDE 10 MG: 5 TABLET ORAL at 09:58

## 2022-06-27 RX ADMIN — LORAZEPAM 0.5 MG: 0.5 TABLET ORAL at 02:52

## 2022-06-27 RX ADMIN — OXYCODONE HYDROCHLORIDE 10 MG: 5 TABLET ORAL at 01:34

## 2022-06-27 RX ADMIN — METOPROLOL TARTRATE 25 MG: 25 TABLET, FILM COATED ORAL at 21:05

## 2022-06-27 RX ADMIN — NORTRIPTYLINE HYDROCHLORIDE 40 MG: 10 CAPSULE ORAL at 21:05

## 2022-06-27 RX ADMIN — GABAPENTIN 200 MG: 100 CAPSULE ORAL at 09:27

## 2022-06-27 RX ADMIN — DOCUSATE SODIUM 100 MG: 100 CAPSULE, LIQUID FILLED ORAL at 17:23

## 2022-06-27 RX ADMIN — METOPROLOL TARTRATE 25 MG: 25 TABLET, FILM COATED ORAL at 09:27

## 2022-06-27 RX ADMIN — ACETAMINOPHEN 650 MG: 325 TABLET ORAL at 09:27

## 2022-06-27 RX ADMIN — GABAPENTIN 400 MG: 400 CAPSULE ORAL at 21:04

## 2022-06-27 RX ADMIN — ONDANSETRON 4 MG: 4 TABLET, ORALLY DISINTEGRATING ORAL at 03:39

## 2022-06-27 RX ADMIN — ATORVASTATIN CALCIUM 10 MG: 10 TABLET, FILM COATED ORAL at 09:26

## 2022-06-27 RX ADMIN — Medication 1 TABLET: at 17:23

## 2022-06-27 RX ADMIN — OXYCODONE HYDROCHLORIDE 10 MG: 5 TABLET ORAL at 05:40

## 2022-06-27 RX ADMIN — OXYCODONE HYDROCHLORIDE 10 MG: 5 TABLET ORAL at 19:26

## 2022-06-27 RX ADMIN — ACETAMINOPHEN 650 MG: 325 TABLET ORAL at 17:24

## 2022-06-27 RX ADMIN — ACETAMINOPHEN 650 MG: 325 TABLET ORAL at 11:43

## 2022-06-27 RX ADMIN — Medication 1 TABLET: at 09:26

## 2022-06-27 RX ADMIN — Medication 400 MG: at 09:27

## 2022-06-27 RX ADMIN — GABAPENTIN 200 MG: 100 CAPSULE ORAL at 17:23

## 2022-06-27 RX ADMIN — PANTOPRAZOLE SODIUM 40 MG: 40 TABLET, DELAYED RELEASE ORAL at 06:58

## 2022-06-27 RX ADMIN — DOCUSATE SODIUM 100 MG: 100 CAPSULE, LIQUID FILLED ORAL at 09:27

## 2022-06-27 NOTE — PROGRESS NOTES
SHIFT CHANGE NOTE FOR [facility]    Bedside and Verbal shift change report given to Yazmin Rogers (oncoming nurse) by Pam Kaplan RN (offgoing nurse). Report included the following information SBAR, Kardex, MAR and Recent Results. Situation:   Code Status: Full Code   Hospital Day: 10   Problem List:   Hospital Problems  Date Reviewed: 6/24/2022          Codes Class Noted POA    Hypokalemia ICD-10-CM: E87.6  ICD-9-CM: 276.8  6/22/2022 Yes        Status post kyphoplasty ICD-10-CM: Z98.890  ICD-9-CM: V45.89  6/13/2022 Yes    Overview Signed 6/20/2022  4:37 PM by Suzette Moran MD     S/P Image guided T11 kyphoplasty; Image guided bilateral sacroplasty (6/13/2022 - Dr. Ewing Closs)             * (Principal) Compression fracture of T11 vertebra with routine healing ICD-10-CM: S22.080D  ICD-9-CM: V54.17  6/9/2022 Yes        Bilateral sacral insufficiency fracture with routine healing ICD-10-CM: M84.48XD  ICD-9-CM: V54.27  6/9/2022 Yes        Generalized weakness ICD-10-CM: R53.1  ICD-9-CM: 780.79  6/9/2022 Yes        Impaired mobility and ADLs ICD-10-CM: Z74.09, Z78.9  ICD-9-CM: V49.89  6/9/2022 Yes        Sciatica of right side ICD-10-CM: M54.31  ICD-9-CM: 724.3  2/7/2018 Yes        Essential hypertension, benign ICD-10-CM: I10  ICD-9-CM: 401.1  1/29/2009 Yes              Background:   Past Medical History:   Past Medical History:   Diagnosis Date    Acid reflux     Bilateral sacral insufficiency fracture with routine healing 6/9/2022    Chronic anemia     Compression fracture of T11 vertebra with routine healing 6/9/2022    Hypertension     Spinal stenosis         Assessment:   Changes in Assessment throughout shift: No change to previous assessment     Patient has a central line: no Reasons if yes: Insertion date: Last dressing date:   Patient has Ellis Cath: no Reasons if yes:     Insertion date:  Shift ellis care completed:      Last Vitals:     Vitals:    06/26/22 0730 06/26/22 1602 06/26/22 2027 06/27/22 0707   BP: 132/73 115/78 139/86 112/72   Pulse: 88 78 85 81   Resp: 17 15 16 18   Temp: 98.5 °F (36.9 °C) 97.4 °F (36.3 °C) 97.9 °F (36.6 °C) 98 °F (36.7 °C)   SpO2: 94% 96% 96% 92%   Weight:       Height:            PAIN    Pain Assessment    Pain Intensity 1: 5 (06/27/22 0656) Pain Intensity 1: 2 (12/29/14 1105)    Pain Location 1: Leg Pain Location 1: Abdomen    Pain Intervention(s) 1: Repositioned,Rest,Ice Pain Intervention(s) 1: Medication (see MAR)  Patient Stated Pain Goal: 0 Patient Stated Pain Goal: 0  o Intervention effective: yes  o Other actions taken for pain: Repositioned;Rest;Ice     Skin Assessment  Skin color    Condition/Temperature    Integrity Skin Integrity: Incision (comment),Wound (add Wound LDA) (back)  Turgor    Weekly Pressure Ulcer Documentation  Pressure  Injury Documentation: No Pressure Injury Noted-Pressure Ulcer Prevention Initiated  Wound Prevention & Protection Methods  Orientation of wound Orientation of Wound Prevention: Posterior  Location of Prevention Location of Wound Prevention: Sacrum/Coccyx  Dressing Present Dressing Present : No  Dressing Status Dressing Status: Intact  Wound Offloading Wound Offloading (Prevention Methods): Bed, pressure reduction mattress    Wound Prevention Checklist  Precautions:      []  Heel prevention boots placed on patient    []  Patient turned q2h during shift    []  Valeriy Order Set ordered    []  Patient on [place] bed/Specialty bed    []  Each Wound is documented during shift (Stage, Color, drainage, odor, measurements, and dressings)    [x]  Dual skin checks done at bedside during shift report with Sharifa Taylor RN     INTAKE/OUPUT  Date 06/26/22 0700 - 06/27/22 0659 06/27/22 0700 - 06/28/22 0659   Shift 3212-7779 0367-9236 24 Hour Total 2969-6921 4978-8508 24 Hour Total   INTAKE   P.O.         P. O.       Shift Total(mL/kg) 720(10.5) 600(8.7) 1320(19.2)      OUTPUT   Urine(mL/kg/hr)  0(0) 0(0)        Urine Voided 0 0        Urine Occurrence(s) 2 x 3 x 5 x      Emesis/NG output           Emesis Occurrence(s) 0 x  0 x      Stool           Stool Occurrence(s) 0 x 0 x 0 x      Shift Total(mL/kg)  0(0) 0(0)       350 0546      Weight (kg) 68.8 68.8 68.8 68.8 68.8 68.8       Recommendations:  1. Patient needs and requests: pain management. AM nurse made aware. 2. Pending tests/procedures: none at this time     3. Functional Level/Equipment: Partial (one person) / Charlotte Sharma    Fall Precautions:   Fall risk precautions were reinforced with the patient; she was instructed to call for help prior to getting up. The following fall risk precautions were continued: bed/ chair alarms, door signage, yellow bracelet and socks as well as update of the Danne Lobe tool in the patient's room. Yinka Score: 4    HEALS Safety Check    A safety check occurred in the patient's room between off going nurse and oncoming nurse listed above. The safety check included the below items  Area Items   H  High Alert Medications - Verify all high alert medication drips (heparin, PCA, etc.)   E  Equipment - Suction is set up for ALL patients (with catalina)  - Red plugs utilized for all equipment (IV pumps, etc.)  - WOWs wiped down at end of shift.  - Room stocked with oxygen, suction, and other unit-specific supplies   A  Alarms - Bed alarm is set for fall risk patients  - Ensure chair alarm is in place and activated if patient is up in a chair   L  Lines - Check IV for any infiltration  - Nance bag is empty if patient has a Nance   - Tubing and IV bags are labeled   S  Safety   - Room is clean, patient is clean, and equipment is clean. - Hallways are clear from equipment besides carts. - Fall bracelet on for fall risk patients  - Ensure room is clear and free of clutter  - Suction is set up for ALL patients (with catalina)  - Hallways are clear from equipment besides carts.    - Isolation precautions followed, supplies available outside room, sign posted     Isaak Sutton RN

## 2022-06-27 NOTE — PROGRESS NOTES
Problem: Mobility Impaired (Adult and Pediatric)  Goal: *Acute Goals and Plan of Care (Insert Text)  Description: Physical Therapy Short Term Goals  Initiated 6/18/2022, reassessed 6/25/2022, and to be progressed to long term goals. 1.  Patient will move from supine to sit and sit to supine  in bed with contact guard assist.  Goal met 6/25/2022  2. Patient will transfer from bed to chair and chair to bed with contact guard assist using the least restrictive device. Goal met 6/25/2022  3. Patient will perform sit to stand with supervision/SBA. GOAL MET 6/25/2022  4. Patient will ambulate with contact guard assist for 100 feet with the least restrictive device. GOAL MET 6/25/2022  5. Patient will ascend/descend 4 stairs with 2 handrail(s) with minimal assistance/contact guard assist. ONGOING 6/25/2022    Physical Therapy Long Term Goals  Initiated 6/18/2022 and to be accomplished within 21 day(s)  1. Patient will move from supine to sit and sit to supine  in bed with modified independence. 2.  Patient will transfer from bed to chair and chair to bed with modified independence using the least restrictive device. 3.  Patient will perform sit to stand with modified independence. 4.  Patient will ambulate with modified independence for 150 feet with the least restrictive device. 5.  Patient will ascend/descend 12 stairs with 1 handrail(s) with minimal assistance/contact guard assist.     Outcome: Progressing Towards Goal   PHYSICAL THERAPY TREATMENT    Patient: Joseph Reeves (12 y.o. female)  Date: 6/27/2022  Diagnosis: Compression fracture of body of thoracic vertebra (HCC) [S22.000A] Compression fracture of T11 vertebra with routine healing  Precautions:  fall risk, spinal precautions  Chart, physical therapy assessment, plan of care and goals were reviewed. Time In:1133  Time GEORGE:1969    Patient seen for: Gait training;Transfer training; Therapeutic exercise;Balance activities (stair training)  *Pt -2 units due to refusing to participate in PM group. Pain:  Pt pain was reported as no c/o pre-treatment. Pt pain was reported as no c/o post-treatment. Intervention: nurse admin scheduled tylenol during session    Patient identified with name and : yes     SUBJECTIVE:      Pt reports not sleeping well last night due to pain down back of leg but feeling much better today, having no pain. OBJECTIVE DATA SUMMARY:    Objective:     TRANSFERS Daily Assessment     Transfer Type: Other  Other: stand step txfr with RW  Transfer Assistance : 5 (Stand-by assistance)  Sit to Stand Assistance: Supervision  Pt performed sit to stand from chair 10 reps with proper hand placement 75% of the time. Pt performed stand step txfr to recliner with RW and SBA with v/c for proper hand placement on arm rests. GAIT Daily Assessment    Gait Description (WDL)      Gait Abnormalities Decreased step clearance    Assistive device Walker, rolling    Ambulation assistance - level surface 5 (Stand-by assistance)    Distance 200 Feet (ft) ([id number] trials)    Ambulation assistance- uneven surface      Comments Pt ambulated to and from gym with RW and SBA with v/c for erect posture and remaining within base of RW. Pt ambulated 200ft, with ~100ft outside on various uneven pavement, with RW and SBA. Pt required mod v/c to remain within base of RW, especially as pt began to fatigue. STEPS/STAIRS Daily Assessment     Steps/Stairs ambulated 4 (6\" steps)    Assistance Required 4 (Contact guard assistance)    Rail Use Both    Comments  Pt negotiated up and down 4 6\" steps with BHR and CGA with v/c for proper LE sequencing for step to pattern, leading with left LE to ascend and right LE to descend.      Curbs/Ramps  NT        BALANCE Daily Assessment     Sitting - Static: Good (unsupported)  Sitting - Dynamic: Fair (occasional)  Standing - Static: Fair  Standing - Dynamic : Impaired        THERAPEUTIC EXERCISES Daily Assessment Standing marching x20B        ASSESSMENT:  Pt's daughter present during session. Pt did not demo any signs of anxiety and was able to perform all tasks with v/c for safety and posture. Progression toward goals:  []      Improving appropriately and progressing toward goals  [x]      Improving slowly and progressing toward goals  []      Not making progress toward goals and plan of care will be adjusted      PLAN:  Patient continues to benefit from skilled intervention to address the above impairments. Continue treatment per established plan of care. Discharge Recommendations:  Home Health  Further Equipment Recommendations for Discharge:  rolling walker      Estimated Discharge Date: 7/2/2022    Activity Tolerance:   fair  Please refer to the flowsheet for vital signs taken during this treatment.     After treatment:   [] Patient left in no apparent distress in bed  [x] Patient left in no apparent distress sitting up in chair  [] Patient left in no apparent distress in w/c mobilizing under own power  [] Patient left in no apparent distress dining area  [] Patient left in no apparent distress mobilizing under own power  [x] Call bell left within reach  [] Nursing notified  [x] Daughter present  [] Bed alarm activated   [] Chair alarm activated      Brii Blackman, PTA  6/27/2022

## 2022-06-27 NOTE — PROGRESS NOTES
Problem: Pain  Goal: *Control of Pain  Outcome: Progressing Towards Goal     Problem: Patient Education: Go to Patient Education Activity  Goal: Patient/Family Education  Outcome: Progressing Towards Goal     Problem: Falls - Risk of  Goal: *Absence of Falls  Description: Document Miguelito Jordan Fall Risk and appropriate interventions in the flowsheet. Outcome: Progressing Towards Goal  Note: Fall Risk Interventions:  Mobility Interventions: Bed/chair exit alarm,Patient to call before getting OOB,Utilize walker, cane, or other assistive device    Mentation Interventions: Door open when patient unattended,Bed/chair exit alarm,Room close to nurse's station,Toileting rounds    Medication Interventions: Bed/chair exit alarm,Patient to call before getting OOB,Teach patient to arise slowly    Elimination Interventions: Bed/chair exit alarm,Call light in reach,Patient to call for help with toileting needs,Stay With Me (per policy),Toilet paper/wipes in reach,Toileting schedule/hourly rounds    History of Falls Interventions: Bed/chair exit alarm,Door open when patient unattended,Room close to nurse's station         Problem: Patient Education: Go to Patient Education Activity  Goal: Patient/Family Education  Outcome: Progressing Towards Goal     Problem: Impaired Skin Integrity/Pressure Injury Treatment  Goal: *Improvement of Existing Pressure Injury  Outcome: Progressing Towards Goal  Goal: *Prevention of pressure injury  Description: Document Valeriy Scale and appropriate interventions in the flowsheet.   Outcome: Progressing Towards Goal  Note: Pressure Injury Interventions:  Sensory Interventions: Assess changes in LOC    Moisture Interventions: Absorbent underpads,Limit adult briefs,Moisture barrier,Minimize layers    Activity Interventions: Increase time out of bed,Pressure redistribution bed/mattress(bed type)    Mobility Interventions: HOB 30 degrees or less,Pressure redistribution bed/mattress (bed type),Chair cushion    Nutrition Interventions: Document food/fluid/supplement intake    Friction and Shear Interventions: Lift sheet,HOB 30 degrees or less,Apply protective barrier, creams and emollients                Problem: Patient Education: Go to Patient Education Activity  Goal: Patient/Family Education  Outcome: Progressing Towards Goal     Problem: Pressure Injury - Risk of  Goal: *Prevention of pressure injury  Description: Document Valeriy Scale and appropriate interventions in the flowsheet.   Outcome: Progressing Towards Goal  Note: Pressure Injury Interventions:  Sensory Interventions: Assess changes in LOC    Moisture Interventions: Absorbent underpads,Limit adult briefs,Moisture barrier,Minimize layers    Activity Interventions: Increase time out of bed,Pressure redistribution bed/mattress(bed type)    Mobility Interventions: HOB 30 degrees or less,Pressure redistribution bed/mattress (bed type),Chair cushion    Nutrition Interventions: Document food/fluid/supplement intake    Friction and Shear Interventions: Lift sheet,HOB 30 degrees or less,Apply protective barrier, creams and emollients                Problem: Patient Education: Go to Patient Education Activity  Goal: Patient/Family Education  Outcome: Progressing Towards Goal

## 2022-06-27 NOTE — PROGRESS NOTES
SHIFT CHANGE NOTE FOR [facility]    Bedside and Verbal shift change report given to Hermes Martin RN (oncoming nurse) by Scarlet Au RN (offgoing nurse). Report included the following information SBAR, Kardex, MAR and Recent Results. Situation:   Code Status: Full Code   Hospital Day: 10   Problem List:   Hospital Problems  Date Reviewed: 6/24/2022          Codes Class Noted POA    Hypokalemia ICD-10-CM: E87.6  ICD-9-CM: 276.8  6/22/2022 Yes        Status post kyphoplasty ICD-10-CM: Z98.890  ICD-9-CM: V45.89  6/13/2022 Yes    Overview Signed 6/20/2022  4:37 PM by Jaron De Dios MD     S/P Image guided T11 kyphoplasty; Image guided bilateral sacroplasty (6/13/2022 - Dr. Nancy Danielle)             * (Principal) Compression fracture of T11 vertebra with routine healing ICD-10-CM: S22.080D  ICD-9-CM: V54.17  6/9/2022 Yes        Bilateral sacral insufficiency fracture with routine healing ICD-10-CM: M84.48XD  ICD-9-CM: V54.27  6/9/2022 Yes        Generalized weakness ICD-10-CM: R53.1  ICD-9-CM: 780.79  6/9/2022 Yes        Impaired mobility and ADLs ICD-10-CM: Z74.09, Z78.9  ICD-9-CM: V49.89  6/9/2022 Yes        Sciatica of right side ICD-10-CM: M54.31  ICD-9-CM: 724.3  2/7/2018 Yes        Essential hypertension, benign ICD-10-CM: I10  ICD-9-CM: 401.1  1/29/2009 Yes              Background:   Past Medical History:   Past Medical History:   Diagnosis Date    Acid reflux     Bilateral sacral insufficiency fracture with routine healing 6/9/2022    Chronic anemia     Compression fracture of T11 vertebra with routine healing 6/9/2022    Hypertension     Spinal stenosis         Assessment:   Changes in Assessment throughout shift:       Patient has a central line: no Reasons if yes: Insertion date: Last dressing date:   Patient has Ellis Cath: no Reasons if yes:     Insertion date:  Shift ellis care completed:      Last Vitals:     Vitals:    06/26/22 1602 06/26/22 2027 06/27/22 0707 06/27/22 1521   BP: 115/78 139/86 112/72 121/78   Pulse: 78 85 81 73   Resp: 15 16 18 20   Temp: 97.4 °F (36.3 °C) 97.9 °F (36.6 °C) 98 °F (36.7 °C) 97.4 °F (36.3 °C)   SpO2: 96% 96% 92% 98%   Weight:       Height:            PAIN    Pain Assessment    Pain Intensity 1: 2 (06/27/22 1144) Pain Intensity 1: 2 (12/29/14 1105)    Pain Location 1: Hip Pain Location 1: Abdomen    Pain Intervention(s) 1: Medication (see MAR) Pain Intervention(s) 1: Medication (see MAR)  Patient Stated Pain Goal: 0 Patient Stated Pain Goal: 0  o Intervention effective: yes  o Other actions taken for pain: Medication (see MAR)     Skin Assessment  Skin color    Condition/Temperature    Integrity Skin Integrity: Incision (comment),Wound (add Wound LDA) (back)  Turgor    Weekly Pressure Ulcer Documentation  Pressure  Injury Documentation: No Pressure Injury Noted-Pressure Ulcer Prevention Initiated  Wound Prevention & Protection Methods  Orientation of wound Orientation of Wound Prevention: Posterior  Location of Prevention Location of Wound Prevention: Sacrum/Coccyx  Dressing Present Dressing Present : No  Dressing Status Dressing Status: Intact  Wound Offloading Wound Offloading (Prevention Methods): Bed, pressure reduction mattress    Wound Prevention Checklist  Precautions:      []  Heel prevention boots placed on patient    []  Patient turned q2h during shift    []  Valeriy Order Set ordered    []  Patient on [place] bed/Specialty bed    []  Each Wound is documented during shift (Stage, Color, drainage, odor, measurements, and dressings)    [x]  Dual skin checks done at bedside during shift report with Maame Franks RN     INTAKE/OUPUT  Date 06/26/22 1900 - 06/27/22 0659 06/27/22 0700 - 06/28/22 0659   Shift 0746-6927 24 Hour Total 6229-6547 2612-9980 24 Hour Total   INTAKE   P.O. 600 1320 750  750     P. O. 600 1320 750  750   Shift Total(mL/kg) 600(8.7) 1320(19.2) 750(10.9)  750(10.9)   OUTPUT   Urine(mL/kg/hr) 0 0        Urine Voided 0 0        Urine Occurrence(s) 3 x 5 x 3 x  3 x   Emesis/NG output          Emesis Occurrence(s)  0 x      Stool          Stool Occurrence(s) 0 x 0 x 0 x  0 x   Shift Total(mL/kg) 0(0) 0(0)       1320 750  750   Weight (kg) 68.8 68.8 68.8 68.8 68.8       Recommendations:  1. Patient needs and requests: pain management. AM nurse made aware. 2. Pending tests/procedures: none at this time     3. Functional Level/Equipment:   /      Fall Precautions:   Fall risk precautions were reinforced with the patient; she was instructed to call for help prior to getting up. The following fall risk precautions were continued: bed/ chair alarms, door signage, yellow bracelet and socks as well as update of the Green Salvia tool in the patient's room. Yinka Score: 4    HEALS Safety Check    A safety check occurred in the patient's room between off going nurse and oncoming nurse listed above. The safety check included the below items  Area Items   H  High Alert Medications - Verify all high alert medication drips (heparin, PCA, etc.)   E  Equipment - Suction is set up for ALL patients (with catalina)  - Red plugs utilized for all equipment (IV pumps, etc.)  - WOWs wiped down at end of shift.  - Room stocked with oxygen, suction, and other unit-specific supplies   A  Alarms - Bed alarm is set for fall risk patients  - Ensure chair alarm is in place and activated if patient is up in a chair   L  Lines - Check IV for any infiltration  - Nance bag is empty if patient has a Nance   - Tubing and IV bags are labeled   S  Safety   - Room is clean, patient is clean, and equipment is clean. - Hallways are clear from equipment besides carts. - Fall bracelet on for fall risk patients  - Ensure room is clear and free of clutter  - Suction is set up for ALL patients (with catalina)  - Hallways are clear from equipment besides carts.    - Isolation precautions followed, supplies available outside room, sign posted     Shirley Andersen RN

## 2022-06-27 NOTE — PROGRESS NOTES
Problem: Self Care Deficits Care Plan (Adult)  Goal: *Acute Goals and Plan of Care (Insert Text)  Description: Occupational Therapy Goals   Long Term Goals  Initiated 6/18/2022 (goals revised on 6/23/2022) and to be accomplished within 2 week(s), by (7/2/2022)  1. Pt will perform self-feeding with independence. 2. Pt will perform grooming with MOD I.  3. Pt will perform UB bathing with MOD I.  4. Pt will perform LB bathing with MOD I using AE and/ or compensatory strategies as needed. 5. Pt will perform tub/shower transfer with supervision using least restrictive assistive device. 6. Pt will perform UB dressing with MOD I.  7. Pt will perform LB dressing with set-up assistance using AE and/ or compensatory strategies as needed. 8. Pt will perform toileting task with MOD I.  9. Pt will perform toilet transfer with supervision using least restrictive assistive device. Short Term Goals   Initiated 6/18/2022 (reassessed on 6/23/2022) and to be accomplished within 7 day(s), by (6/30/2022)  1. Pt will perform grooming with no more than MIN A from w/c at sink. (Goal met 6/23/2022; currently set-up)  2. Pt will perform UB bathing with no more than MIN A from w/c with basin or TTB. (Goal met 6/23/2022)      Goal upgraded: Pt will perform UB bathing with set-up. 3. Pt will perform LB bathing with MOD A using AE and/ or compensatory strategies as needed. (Goal met 6/23/2022; currently SBA)      Goal upgraded: Pt will perform LB bathing with supv/ set-up using AE and/ or compensatory strategies as needed. 4. Pt will perform tub/shower transfer with MOD A using LRAD. (Goal met 6/23/2022; currently CGA)      Goal upgraded: Pt will perform tub/shower transfer with SBA using least restrictive assistive device. 5. Pt will perform UB dressing with setup A. (Goal met 6/23/2022)  6. Pt will perform LB dressing with MOD A using AE and/ or compensatory strategies.  (Goal met 6/23/2022; currently CGA)      Goal upgraded: Pt will perform LB dressing with SBA using AE and/ or compensatory strategies. 7. Pt will perform toileting task with MIN A. (Goal met 2022; currently CGA)      Goal upgraded: Pt will perform toileting task with supv/ SBA. 8. Pt will perform toilet transfer with MOD A using LRAD. (Goal met 2022; currently SBA)      Goal upgraded: Pt will perform toilet transfer with supervision using least restrictive assistive device. Outcome: Progressing Towards Goal  Goal: Interventions  Outcome: Progressing Towards Goal   Occupational Therapy TREATMENT    Patient: Tacos Owens   68 y.o. Patient identified with name and : yes    Date: 2022    First Tx Session  Time In: 07  Time Out: 830    Diagnosis: Compression fracture of body of thoracic vertebra ([address] [S22.000A]   Precautions:  Fall precautions; Safety precautions; spinal precautions for comfort  Chart, occupational therapy assessment, plan of care, and goals were reviewed. Pain:  Pt reports 5/10 pain or discomfort prior to treatment; pt reporting discomfort right hip radiating down her right leg (aching)  Pt reports 1/10 pain or discomfort post treatment; discomfort right hip radiating down right leg (aching)  Intervention Provided: repositioning, intermittent rest breaks as needed. SUBJECTIVE:   Patient stated I didn't sleep well last night because of my hip and leg pain, I was up most of the night.     OBJECTIVE DATA SUMMARY:     THERAPEUTIC ACTIVITY Daily Assessment    Patient presented resting quietly semi-reclined bed level. Pt awakened on rounds and agreeable to participate in ADL shower and for am self-care performance. Supine to sit transitioning (supv) to seated position EOB. Sit to stand (CGA) using gait belt; verbal cues and reminders for pushing up from seated surface (EOB > FWW). Functional mobility performed (SBA) using FWW from EOB > bathroom to address toileting self-care and for ADL shower using TTB.  Verbal cues for safe handling of AD (FWW) e.g. standing within frame of walker. Pt appears distracted at times requiring verbal cues for focused attention and redirection to task. Sit to stand transitions performed (supv) level (w/c <-> grab bar) within context of LB dressing tasks e.g. pulling underwear and pants over hips to waist level. FEEDING/EATING Daily Assessment    Functional Level: 6  Comments: Pt performed self-feeding (Mod I) following set-up of breakfast tray. Pt demonstrated ability to open small containers and packets on tray. GROOMING Daily Assessment    Functional Level: 6 (Mod I w/c at sink)  Tasks Completed by Patient: Brushed teeth;Brushed hair; Washed face  Comments: Patient washed her face (Mod I) using washcloth seated on TTB (ADL shower). Pt performed oral hygiene/ brushed teeth and brushed her hair (w/c level) Mod I at sink. Supplies and grooming materials at sink. Oral hygiene: 6     BATHING Daily Assessment    Functional Level: 5 (supv (ADL shower using tub bench))  Body Parts Patient Bathed: Abdomen;Arm, left;Arm, right;Buttocks; Chest;Lower leg and foot, left; Lower leg and foot, right;Kandis area; Thigh, left; Thigh, right  Comments: UB bathing (set-up/ Mod I) assist. Pt performed LB bathing (supv) seated on tub bench. Verbal cues for use of LH sponge to wash distal BLE's; use of weight shift to wash buttocks/ sacrum from seated position on TTB. TOILETING Daily Assessment    Functional Level: 5 (supv)  Comments:  (Pt performed hygiene set-up; CM performed supv)     UPPER BODY DRESSING Daily Assessment    Functional Level: 5 (set-up/ Mod I)  Items Applied/Steps Completed: Pullover (4 steps)  Comments: Pt doffed pajama shirt (Mod I) seated on tub bench. Pt donned pullover tank top and shirt (set-up) seated on TTB. LOWER BODY DRESSING Daily Assessment    Functional Level: 5 (supv)  Items Applied/Steps Completed: Elastic waist pants (3 steps); Underpants (3 steps); Sock, left (1 step); Sock, right (1 step)  Comments: LB dressing performed (supv) w/c level; use of grab bar for steadying during aspects performed in stance. Pt uses crossed leg technique to thread underwear and elastic waist pants over distal BLE's. Pt donned slipper socks seated w/c level using crossed leg technique. Sock and/or Shoe management: 5 set-up (use of crossed leg technique)     MOBILITY/TRANSFERS Daily Assessment    Toilet Transfer Score: 5 (Stand step xfer (SBA) using FWW; gait belt)  Comments: Stand step xfer (SBA) using FWW; gait belt  Tub/Shower Transfer Score: 5 (Stand step xfer (SBA) using FWW; gait belt)  Comments: Stand step xfer (SBA) using FWW; gait belt. Use of grab bar when transitioning from stand to seated position on TTB. ASSESSMENT:  Today's skilled occupational therapy session focused on ADL training (ADL shower using TTB), am self-cares, and functional transfer training using AD (FWW) within context of ADL performance. Patient will continue to benefit from skilled occupational therapy interventions to improve independence with ADL's, increase safety and independence with functional transfers/ mobility using LRAD (FWW), increase activity tolerance, and for strengthening/ endurance. Patient progressed to performing LB dressing tasks at supervision level with intermittent use of grab bar in bathroom for steadying for aspects in stance. Patient appearing anxious at times with some confusion during today's treatment session; staff nurse made aware. Care coordinated with Loree Muñiz RN regarding patient's urine appearing concentrated and malodorous; staff nurse to follow-up. Patient advancing to performing functional transfers (SBA) level using RW; however, pt continues to require verbal cues/ reminders for pushing up from seated surface (sit to stand transitioning) and verbal cues for safe handling of AD (FWW) for safety.      Therapist introduced self to pt's daughter and role of occupational therapy on inpatient rehab unit (ARU). Updated family member regarding pt's current level of assistance from this mornings ADL's and functional mobility/ transfers using AD ([address]).    Progression toward goals:  []          Improving appropriately and progressing toward goals  [x]          Improving slowly and progressing toward goals  []          Not making progress toward goals and plan of care will be adjusted     PLAN:  Patient continues to benefit from skilled intervention to address the above impairments. Continue treatment per established plan of care. Discharge Recommendations:  Home Health occupational therapy with family assist/ supv at home  Further Equipment Recommendations for Discharge:  bedside commode, gait belt, and shower chair     Activity Tolerance:  Fair; intermittent rest breaks due to fatigue  Estimated LOS: 7/2/2022    Please refer to the flowsheet for vital signs taken during this treatment. After treatment:   [x]  Patient left in no apparent distress sitting up in recliner chair with needs met  []  Patient left in no apparent distress in bed  [x]  Call bell left within reach  [x]  Nursing notified  []  Caregiver present  [x]  Recliner chair alarm activated    COMMUNICATION/EDUCATION:   [x] Home safety education was provided and the patient/caregiver indicated understanding. [x] Patient/family have participated as able in goal setting and plan of care. [x] Patient/family agree to work toward stated goals and plan of care. [] Patient understands intent and goals of therapy, but is neutral about his/her participation. [] Patient is unable to participate in goal setting and plan of care.     [address] [facility], OT

## 2022-06-27 NOTE — PROGRESS NOTES
Patients daughter is concerned about the pain of mom's leg,despite of the explanation that patient had been receiving every 4 hrs PRN Roxicodone & 200 mg BID Gabapentin + 400 mg @ HS & scheduled Tylenol TID,she feels like the pain is still very concerning to herand would like to speak w/ MD.Will inform MD or NP.

## 2022-06-27 NOTE — PROGRESS NOTES
Progress Note    Patient: Елена Baldwin MRN: [id number]  CSN: [id number]    YOB: 1945  Age: 68 y.o. Sex: female    DOA: 6/17/2022 LOS:  LOS: 10 days                    Subjective:     Primary Rehabilitation Diagnosis: Generalized weakness with Impaired mobility and ADLs secondary to:  1. Compression fracture of T11 vertebra with routine healing  2. Bilateral sacral insufficiency fracture with routine healing  3. S/P Image guided T11 kyphoplasty; Image guided bilateral sacroplasty (6/13/2022 - Dr. Du Gonsalez)     6/27/22: Patient stated she had a rough last night. She had back pain as well as right hip pain. She was nauseous but got better with medication. She ate breakfast and lunch without any issues. No abdominal pain. No chest pain or shortness of breath. No cough. No headaches or dizziness. Right hip pain is much better currently. Review of systems  General: No fevers or chills. Cardiovascular: No chest pain or pressure. No palpitations. Pulmonary: No shortness of breath, cough or wheeze. Gastrointestinal: No abdominal pain, nausea, vomiting or diarrhea. Genitourinary: No urinary frequency, urgency, hesitancy or dysuria. Musculoskeletal: Right hip pain off and on. Neurologic: generalized weakness    Objective:     Physical Exam:  Visit Vitals  /72 (BP 1 Location: Left upper arm, BP Patient Position: Supine)   Pulse 81   Temp 98 °F (36.7 °C)   Resp 18   Ht 5' 5\" (1.651 m)   Wt 68.8 kg (151 lb 9.6 oz)   SpO2 92%   Breastfeeding No   BMI 25.23 kg/m²        General appearance - alert, well appearing, and in no distress  Eyes - sclera anicteric, no pallor  Nose - no obvious nasal discharge.    Neck - supple, no JVD, trachea is midline  Chest -clear air entry noted in bases, no wheezes  Heart - S1 and S2 normal  Abdomen - soft, nontender, nondistended, Bowel sounds present  Neurological - alert, oriented, normal speech, no focal findings noted except motor strength 4-5 out of 5 in right leg. Musculoskeletal -limited range of motion of right hip. Extremities - no pedal edema noted  Psych -no hallucination or agitation. Intake and Output:  Current Shift:  06/27 0701 - 06/27 1900  In: 390 [P.O.:390]  Out: -   Last three shifts:  06/25 1901 - 06/27 0700  In: 1800 [P.O.:1800]  Out: 0     Labs: Results:       Chemistry Recent Labs     06/27/22  0648   *   *   K 4.3      CO2 25   BUN 16   CREA 0.58*   CA 8.6   AGAP 6   BUCR 28*      CBC w/Diff Recent Labs     06/27/22  0648   HGB 9.3*   HCT 31.6*      Cardiac Enzymes No results for input(s): CPK, CKND1, LES in the last 72 hours. No lab exists for component: CKRMB, TROIP   Coagulation No results for input(s): PTP, INR, APTT, INREXT, INREXT in the last 72 hours. Lipid Panel No results found for: CHOL, CHOLPOCT, CHOLX, CHLST, CHOLV, 088779, HDL, HDLP, LDL, LDLC, DLDLP, [id number], VLDLC, VLDL, TGLX, TRIGL, TRIGP, TGLPOCT, CHHD, CHHDX   BNP No results for input(s): BNPP in the last 72 hours. Liver Enzymes No results for input(s): TP, ALB, TBIL, AP in the last 72 hours.     No lab exists for component: SGOT, GPT, DBIL   Thyroid Studies Lab Results   Component Value Date/Time    TSH 0.63 06/10/2022 11:40 AM          Procedures/imaging: see electronic medical records for all procedures/Xrays and details which were not copied into this note but were reviewed prior to creation of Plan    Medications:   Current Facility-Administered Medications   Medication Dose Route Frequency    metoprolol tartrate (LOPRESSOR) tablet 25 mg  25 mg Oral Q12H    acetaminophen (TYLENOL) tablet 650 mg  650 mg Oral TID    acetaminophen (TYLENOL) tablet 650 mg  650 mg Oral Q4H PRN    gabapentin (NEURONTIN) capsule 400 mg  400 mg Oral QHS    gabapentin (NEURONTIN) capsule 200 mg  200 mg Oral BID    hydrALAZINE (APRESOLINE) tablet 25 mg  25 mg Oral TID PRN    magnesium oxide (MAG-OX) tablet 400 mg  400 mg Oral DAILY    nortriptyline (PAMELOR) capsule 40 mg  40 mg Oral QHS    oxyCODONE IR (ROXICODONE) tablet 10 mg  10 mg Oral Q4H PRN    LORazepam (ATIVAN) tablet 0.5 mg  0.5 mg Oral BID PRN    ondansetron (ZOFRAN ODT) tablet 4 mg  4 mg Oral Q8H PRN    atorvastatin (LIPITOR) tablet 10 mg  10 mg Oral DAILY    ferrous gluconate 324 mg (37.5 mg iron) tablet 1 Tablet  324 mg Oral BID WITH MEALS    pantoprazole (PROTONIX) tablet 40 mg  40 mg Oral ACB    docusate sodium (COLACE) capsule 100 mg  100 mg Oral BID    bisacodyL (DULCOLAX) tablet 10 mg  10 mg Oral Q48H PRN    melatonin tablet 3 mg  3 mg Oral QHS PRN    naloxone (NARCAN) injection 0.2 mg  0.2 mg IntraVENous PRN       Assessment/Plan     Principal Problem:    Compression fracture of T11 vertebra with routine healing (6/9/2022)    Active Problems:    Essential hypertension, benign (1/29/2009)      Sciatica of right side (2/7/2018)      Bilateral sacral insufficiency fracture with routine healing (6/9/2022)      Status post kyphoplasty (6/13/2022)      Overview: S/P Image guided T11 kyphoplasty; Image guided bilateral sacroplasty       (6/13/2022 - Dr. Tye Tamez)      Generalized weakness (6/9/2022)      Impaired mobility and ADLs (6/9/2022)      Hypokalemia (6/22/2022)      Compression fracture of T11 vertebra with routine healing; Bilateral sacral insufficiency fracture with routine healing; S/P Image guided T11 kyphoplasty; Image guided bilateral sacroplasty (6/13/2022 - Dr. Tye Tamez)              > Thoracic spinal precautions  Continue pain control  6/27/22:  We will add lidocaine patch and monitor patient's symptoms.     Chronic anemia   Continue Ferrous gluconate 324 mg PO BID with meals      Dyslipidemia  Continue Atorvastatin 10 mg PO once daily     Essential hypertension  Currently controlled              > Continue:                          >  Metoprolol tartrate from 25 mg PO q 12 hr (9AM, 9PM)                          > Hydralazine 25 mg PO TID PRN for SBP greater than 170 mmHg  Monitor and adjust BP medication as needed     Gastroesophageal reflux disease  Continue Pantoprazole 40 mg PO daily before breakfast     Generalized anxiety disorder  Continue:                          > Nortriptyline 40 mg PO q HS                          > Lorazepam 0.5 mg PO BID PRN for anxiety     Sciatica of right side  Continue:                          > Gabapentin 200 mg PO BID (8AM, 2PM)                          > Gabapentin 400 mg PO q 8PM                          > Nortriptyline 40 mg PO q HS     Hypokalemia  Continue Magnesium oxide 400 mg PO once daily                Disclaimer: Sections of this note are dictated using utilizing voice recognition software, which may have resulted in some phonetic based errors in grammar and contents. Even though attempts were made to correct all the mistakes, some may have been missed, and remained in the body of the document. If questions arise, please contact our department.     Jami Teague MD  6/27/2022

## 2022-06-28 LAB
APPEARANCE UR: ABNORMAL
BACTERIA URNS QL MICRO: ABNORMAL /HPF
BILIRUB UR QL: NEGATIVE
COLOR UR: YELLOW
EPITH CASTS URNS QL MICRO: ABNORMAL /LPF (ref 0–5)
GLUCOSE UR STRIP.AUTO-MCNC: NEGATIVE MG/DL
HGB UR QL STRIP: ABNORMAL
KETONES UR QL STRIP.AUTO: NEGATIVE MG/DL
LEUKOCYTE ESTERASE UR QL STRIP.AUTO: ABNORMAL
NITRITE UR QL STRIP.AUTO: NEGATIVE
PH UR STRIP: 6 [PH] (ref 5–8)
PROT UR STRIP-MCNC: 100 MG/DL
RBC #/AREA URNS HPF: ABNORMAL /HPF (ref 0–5)
SP GR UR REFRACTOMETRY: 1.01 (ref 1–1.03)
UROBILINOGEN UR QL STRIP.AUTO: 1 EU/DL (ref 0.2–1)
WBC URNS QL MICRO: ABNORMAL /HPF (ref 0–4)

## 2022-06-28 PROCEDURE — 74011250637 HC RX REV CODE- 250/637: Performed by: FAMILY MEDICINE

## 2022-06-28 PROCEDURE — 87077 CULTURE AEROBIC IDENTIFY: CPT

## 2022-06-28 PROCEDURE — 97530 THERAPEUTIC ACTIVITIES: CPT

## 2022-06-28 PROCEDURE — 99232 SBSQ HOSP IP/OBS MODERATE 35: CPT | Performed by: INTERNAL MEDICINE

## 2022-06-28 PROCEDURE — 87086 URINE CULTURE/COLONY COUNT: CPT

## 2022-06-28 PROCEDURE — 81001 URINALYSIS AUTO W/SCOPE: CPT

## 2022-06-28 PROCEDURE — 97150 GROUP THERAPEUTIC PROCEDURES: CPT

## 2022-06-28 PROCEDURE — 97116 GAIT TRAINING THERAPY: CPT

## 2022-06-28 PROCEDURE — 74011250637 HC RX REV CODE- 250/637: Performed by: EMERGENCY MEDICINE

## 2022-06-28 PROCEDURE — 97535 SELF CARE MNGMENT TRAINING: CPT

## 2022-06-28 PROCEDURE — 74011250637 HC RX REV CODE- 250/637: Performed by: INTERNAL MEDICINE

## 2022-06-28 PROCEDURE — 74011000250 HC RX REV CODE- 250: Performed by: INTERNAL MEDICINE

## 2022-06-28 PROCEDURE — 97110 THERAPEUTIC EXERCISES: CPT

## 2022-06-28 PROCEDURE — 65310000000 HC RM PRIVATE REHAB

## 2022-06-28 PROCEDURE — 51798 US URINE CAPACITY MEASURE: CPT

## 2022-06-28 PROCEDURE — 87186 SC STD MICRODIL/AGAR DIL: CPT

## 2022-06-28 RX ORDER — DICLOFENAC SODIUM 10 MG/G
4 GEL TOPICAL
Status: DISCONTINUED | OUTPATIENT
Start: 2022-06-28 | End: 2022-07-02 | Stop reason: HOSPADM

## 2022-06-28 RX ADMIN — Medication 1 TABLET: at 18:26

## 2022-06-28 RX ADMIN — ACETAMINOPHEN 650 MG: 325 TABLET ORAL at 09:01

## 2022-06-28 RX ADMIN — PANTOPRAZOLE SODIUM 40 MG: 40 TABLET, DELAYED RELEASE ORAL at 06:58

## 2022-06-28 RX ADMIN — METOPROLOL TARTRATE 25 MG: 25 TABLET, FILM COATED ORAL at 21:57

## 2022-06-28 RX ADMIN — ACETAMINOPHEN 650 MG: 325 TABLET ORAL at 16:06

## 2022-06-28 RX ADMIN — GABAPENTIN 200 MG: 100 CAPSULE ORAL at 14:06

## 2022-06-28 RX ADMIN — ATORVASTATIN CALCIUM 10 MG: 10 TABLET, FILM COATED ORAL at 09:01

## 2022-06-28 RX ADMIN — OXYCODONE HYDROCHLORIDE 10 MG: 5 TABLET ORAL at 00:16

## 2022-06-28 RX ADMIN — LORAZEPAM 0.5 MG: 0.5 TABLET ORAL at 21:57

## 2022-06-28 RX ADMIN — OXYCODONE HYDROCHLORIDE 10 MG: 5 TABLET ORAL at 04:30

## 2022-06-28 RX ADMIN — GABAPENTIN 200 MG: 100 CAPSULE ORAL at 09:01

## 2022-06-28 RX ADMIN — NORTRIPTYLINE HYDROCHLORIDE 40 MG: 10 CAPSULE ORAL at 21:57

## 2022-06-28 RX ADMIN — DOCUSATE SODIUM 100 MG: 100 CAPSULE, LIQUID FILLED ORAL at 09:02

## 2022-06-28 RX ADMIN — DICLOFENAC SODIUM 4 G: 10 GEL TOPICAL at 23:35

## 2022-06-28 RX ADMIN — GABAPENTIN 400 MG: 400 CAPSULE ORAL at 21:57

## 2022-06-28 RX ADMIN — Medication 400 MG: at 09:02

## 2022-06-28 RX ADMIN — Medication 1 TABLET: at 09:02

## 2022-06-28 RX ADMIN — DOCUSATE SODIUM 100 MG: 100 CAPSULE, LIQUID FILLED ORAL at 18:26

## 2022-06-28 RX ADMIN — OXYCODONE HYDROCHLORIDE 10 MG: 5 TABLET ORAL at 20:22

## 2022-06-28 RX ADMIN — METOPROLOL TARTRATE 25 MG: 25 TABLET, FILM COATED ORAL at 09:02

## 2022-06-28 RX ADMIN — ACETAMINOPHEN 650 MG: 325 TABLET ORAL at 11:39

## 2022-06-28 NOTE — PROGRESS NOTES
SHIFT CHANGE NOTE FOR [facility]    Bedside and Verbal shift change report given to Rudy Griffith (oncoming nurse) by Simin Penn RN (offgoing nurse). Report included the following information SBAR, Kardex, MAR and Recent Results. Situation:   Code Status: Full Code   Hospital Day: 11   Problem List:   Hospital Problems  Date Reviewed: 6/24/2022          Codes Class Noted POA    Hypokalemia ICD-10-CM: E87.6  ICD-9-CM: 276.8  6/22/2022 Yes        Status post kyphoplasty ICD-10-CM: Z98.890  ICD-9-CM: V45.89  6/13/2022 Yes    Overview Signed 6/20/2022  4:37 PM by Jose Maria Judd MD     S/P Image guided T11 kyphoplasty; Image guided bilateral sacroplasty (6/13/2022 - Dr. Jomar Chakraborty)             * (Principal) Compression fracture of T11 vertebra with routine healing ICD-10-CM: S22.080D  ICD-9-CM: V54.17  6/9/2022 Yes        Bilateral sacral insufficiency fracture with routine healing ICD-10-CM: M84.48XD  ICD-9-CM: V54.27  6/9/2022 Yes        Generalized weakness ICD-10-CM: R53.1  ICD-9-CM: 780.79  6/9/2022 Yes        Impaired mobility and ADLs ICD-10-CM: Z74.09, Z78.9  ICD-9-CM: V49.89  6/9/2022 Yes        Sciatica of right side ICD-10-CM: M54.31  ICD-9-CM: 724.3  2/7/2018 Yes        Essential hypertension, benign ICD-10-CM: I10  ICD-9-CM: 401.1  1/29/2009 Yes              Background:   Past Medical History:   Past Medical History:   Diagnosis Date    Acid reflux     Bilateral sacral insufficiency fracture with routine healing 6/9/2022    Chronic anemia     Compression fracture of T11 vertebra with routine healing 6/9/2022    Hypertension     Spinal stenosis         Assessment:   Changes in Assessment throughout shift: No change to previous assessment     Patient has a central line: no Reasons if yes: Insertion date: Last dressing date:   Patient has Ellis Cath: no Reasons if yes:     Insertion date:  Shift ellis care completed:      Last Vitals:     Vitals:    06/26/22 2027 06/27/22 0707 06/27/22 1521 06/27/22 1924   BP: 139/86 112/72 121/78 135/75   Pulse: 85 81 73 76   Resp: 16 18 20 18   Temp: 97.9 °F (36.6 °C) 98 °F (36.7 °C) 97.4 °F (36.3 °C) 97.7 °F (36.5 °C)   SpO2: 96% 92% 98% 100%   Weight:       Height:            PAIN    Pain Assessment    Pain Intensity 1: 0 (06/28/22 0525) Pain Intensity 1: 2 (12/29/14 1105)    Pain Location 1: Leg Pain Location 1: Abdomen    Pain Intervention(s) 1: Medication (see MAR) Pain Intervention(s) 1: Medication (see MAR)  Patient Stated Pain Goal: 0 Patient Stated Pain Goal: 0  o Intervention effective: yes  o Other actions taken for pain: Medication (see MAR)     Skin Assessment  Skin color    Condition/Temperature    Integrity Skin Integrity: Wound (add Wound LDA)  Turgor    Weekly Pressure Ulcer Documentation  Pressure  Injury Documentation: No Pressure Injury Noted-Pressure Ulcer Prevention Initiated  Wound Prevention & Protection Methods  Orientation of wound Orientation of Wound Prevention: Posterior  Location of Prevention Location of Wound Prevention: Sacrum/Coccyx  Dressing Present Dressing Present : No  Dressing Status Dressing Status: Intact  Wound Offloading Wound Offloading (Prevention Methods): Bed, pressure reduction mattress    Wound Prevention Checklist  Precautions:      []  Heel prevention boots placed on patient    []  Patient turned q2h during shift    []  Valeriy Order Set ordered    []  Patient on Madeline bed/Specialty bed    []  Each Wound is documented during shift (Stage, Color, drainage, odor, measurements, and dressings)    [x]  Dual skin checks done at bedside during shift report with Becka Mckeon LPN     INTAKE/OUPUT  Date 06/27/22 0700 - 06/28/22 0659 06/28/22 0700 - 06/29/22 0659   Shift 0220-2362 5073-5236 24 Hour Total 1274-6339 4388-1435 24 Hour Total   INTAKE   P.O.         P. O.       Shift Total(mL/kg) 750(10.9) 480(7) 1230(17.9)      OUTPUT   Urine(mL/kg/hr)  0 0        Urine Voided  0 0        Urine Occurrence(s) 3 x 3 x 6 x      Stool           Stool Occurrence(s) 0 x 1 x 1 x      Shift Total(mL/kg)  0(0) 0(0)       737 7961      Weight (kg) 68.8 68.8 68.8 68.8 68.8 68.8       Recommendations:  1. Patient needs and requests: pain management. 2. Pending tests/procedures: none at this time     3. Functional Level/Equipment: Partial (one person) / Lamberto Flores    Fall Precautions:   Fall risk precautions were reinforced with the patient; she was instructed to call for help prior to getting up. The following fall risk precautions were continued: bed/ chair alarms, door signage, yellow bracelet and socks as well as update of the Orly Franklin tool in the patient's room. Yinka Score: 4    HEALS Safety Check    A safety check occurred in the patient's room between off going nurse and oncoming nurse listed above. The safety check included the below items  Area Items   H  High Alert Medications - Verify all high alert medication drips (heparin, PCA, etc.)   E  Equipment - Suction is set up for ALL patients (with catalina)  - Red plugs utilized for all equipment (IV pumps, etc.)  - WOWs wiped down at end of shift.  - Room stocked with oxygen, suction, and other unit-specific supplies   A  Alarms - Bed alarm is set for fall risk patients  - Ensure chair alarm is in place and activated if patient is up in a chair   L  Lines - Check IV for any infiltration  - Nance bag is empty if patient has a Nance   - Tubing and IV bags are labeled   S  Safety   - Room is clean, patient is clean, and equipment is clean. - Hallways are clear from equipment besides carts. - Fall bracelet on for fall risk patients  - Ensure room is clear and free of clutter  - Suction is set up for ALL patients (with catalina)  - Hallways are clear from equipment besides carts.    - Isolation precautions followed, supplies available outside room, sign posted     Raeann Castro RN

## 2022-06-28 NOTE — DISCHARGE INSTR - DIET
Nutrition Recommendations    Good nutrition is important when healing from an illness, injury, or surgery. Follow any nutrition recommendations given to you during your hospital stay. If you were given an oral nutrition supplement while in the hospital, continue to take this supplement at home. You can take it with meals, in-between meals, and/or before bedtime. These supplements can be purchased at most local grocery stores, pharmacies, and chain [employer]-stores. If you have any questions about your diet or nutrition, call the hospital and ask for the dietitian.    - Recommend consuming 3-4 oral nutrition supplements, like Ensure Enlive (or similar product), daily if meal intake remains poor. Once meal intake starts improving, can decrease frequency of oral nutrition supplement as needed. Once meal intake is adequate and most daily nutrition is coming from meal intake, okay to discontinue consumption of oral nutrition supplements.        ~~~~~~~~~~~~~~~ End Nutrition Discharge Instructions/ Recommendations ~~~~~~~~~~~~~~~~~

## 2022-06-28 NOTE — PROGRESS NOTES
Problem: Pain  Goal: *Control of Pain  Outcome: Progressing Towards Goal     Problem: Patient Education: Go to Patient Education Activity  Goal: Patient/Family Education  Outcome: Progressing Towards Goal     Problem: Falls - Risk of  Goal: *Absence of Falls  Description: Document Ashok Harvey Fall Risk and appropriate interventions in the flowsheet. Outcome: Progressing Towards Goal  Note: Fall Risk Interventions:  Mobility Interventions: Bed/chair exit alarm,Patient to call before getting OOB,Utilize walker, cane, or other assistive device    Mentation Interventions: Door open when patient unattended,Bed/chair exit alarm,Room close to nurse's station,Toileting rounds    Medication Interventions: Bed/chair exit alarm,Patient to call before getting OOB,Teach patient to arise slowly    Elimination Interventions: Bed/chair exit alarm,Call light in reach,Patient to call for help with toileting needs,Stay With Me (per policy),Toilet paper/wipes in reach,Toileting schedule/hourly rounds    History of Falls Interventions: Bed/chair exit alarm,Door open when patient unattended,Room close to nurse's station         Problem: Impaired Skin Integrity/Pressure Injury Treatment  Goal: *Improvement of Existing Pressure Injury  Outcome: Progressing Towards Goal  Goal: *Prevention of pressure injury  Description: Document Valeriy Scale and appropriate interventions in the flowsheet.   Outcome: Progressing Towards Goal  Note: Pressure Injury Interventions:  Sensory Interventions: Assess changes in LOC    Moisture Interventions: Absorbent underpads,Minimize layers,Moisture barrier    Activity Interventions: Increase time out of bed,Pressure redistribution bed/mattress(bed type)    Mobility Interventions: HOB 30 degrees or less,Pressure redistribution bed/mattress (bed type)    Nutrition Interventions: Document food/fluid/supplement intake    Friction and Shear Interventions: Lift sheet,Minimize layers,HOB 30 degrees or less,Apply protective barrier, creams and emollients                Problem: Patient Education: Go to Patient Education Activity  Goal: Patient/Family Education  Outcome: Progressing Towards Goal     Problem: Hypertension  Goal: *Blood pressure within specified parameters  Outcome: Progressing Towards Goal  Goal: *Fluid volume balance  Outcome: Progressing Towards Goal  Goal: *Labs within defined limits  Outcome: Progressing Towards Goal     Problem: Patient Education: Go to Patient Education Activity  Goal: Patient/Family Education  Outcome: Progressing Towards Goal     Problem: Pressure Injury - Risk of  Goal: *Prevention of pressure injury  Description: Document Valeriy Scale and appropriate interventions in the flowsheet.   Outcome: Progressing Towards Goal  Note: Pressure Injury Interventions:  Sensory Interventions: Assess changes in LOC    Moisture Interventions: Absorbent underpads,Minimize layers,Moisture barrier    Activity Interventions: Increase time out of bed,Pressure redistribution bed/mattress(bed type)    Mobility Interventions: HOB 30 degrees or less,Pressure redistribution bed/mattress (bed type)    Nutrition Interventions: Document food/fluid/supplement intake    Friction and Shear Interventions: Lift sheet,Minimize layers,HOB 30 degrees or less,Apply protective barrier, creams and emollients                Problem: Patient Education: Go to Patient Education Activity  Goal: Patient/Family Education  Outcome: Progressing Towards Goal

## 2022-06-28 NOTE — PROGRESS NOTES
Progress Note    Patient: Sondra Bronson MRN: [id number]  CSN: [id number]    YOB: 1945  Age: 68 y.o. Sex: female    DOA: 6/17/2022 LOS:  LOS: 11 days                    Subjective:     Primary Rehabilitation Diagnosis: Generalized weakness with Impaired mobility and ADLs secondary to:  1. Compression fracture of T11 vertebra with routine healing  2. Bilateral sacral insufficiency fracture with routine healing  3. S/P Image guided T11 kyphoplasty; Image guided bilateral sacroplasty (6/13/2022 - Dr. Ewing Closs)     6/28/22: Patient continues to have right thigh pain and back pain. Patient was seen in presence of daughter. Appetite remains poor. No abdominal pain. No chest pain or shortness of breath. No cough. No headaches or dizziness. Right hip pain is much better currently. Review of systems  General: No fevers or chills. Cardiovascular: No chest pain or pressure. No palpitations. Pulmonary: No shortness of breath, cough or wheeze. Gastrointestinal: No abdominal pain, nausea, vomiting or diarrhea. Genitourinary: No urinary frequency, urgency, hesitancy or dysuria. Musculoskeletal: Right hip pain off and on. Neurologic: generalized weakness    Objective:     Physical Exam:  Visit Vitals  BP (!) 159/79 (BP 1 Location: Left upper arm, BP Patient Position: Supine)   Pulse 83   Temp 98.4 °F (36.9 °C)   Resp 18   Ht 5' 5\" (1.651 m)   Wt 68.8 kg (151 lb 9.6 oz)   SpO2 95%   Breastfeeding No   BMI 25.23 kg/m²        General appearance - alert, well appearing, and in no distress  Chest -clear air entry noted in bases, no wheezes  Heart - S1 and S2 normal  Abdomen - soft, nontender, nondistended, Bowel sounds present  Neurological - alert, oriented, normal speech, no focal findings noted except motor strength 4-5 out of 5 in right leg. Musculoskeletal -limited range of motion of right hip. Extremities - no pedal edema noted  Psych -no hallucination or agitation.     Functional Progress:    PHYSICAL THERAPY    ON ADMISSION MOST RECENT   Wheelchair Mobility/Management  Able to Propel (ft): 60 feet  Functional Level: 4  Curbs/Ramps Assist Required (FIM Score): 0 (Not tested)  Wheelchair Setup Assist Required : 2 (Maximal assistance)  Wheelchair Management: Manages right brake,Manages left brake Wheelchair Mobility/Management  Able to Propel (ft): 120 feet  Functional Level: 5  Curbs/Ramps Assist Required (FIM Score): 0 (Not tested)  Wheelchair Setup Assist Required : 2 (Maximal assistance)  Wheelchair Management: Manages right brake,Manages left brake     Gait  Amount of Assistance: 4 (Minimal assistance)  Distance (ft): 25 Feet (ft)  Assistive Device: Walker, rolling Gait  Amount of Assistance: 5 (Stand-by assistance)  Distance (ft):  (250 ft)  Assistive Device: Walker, rolling     Balance-Sitting/Standing  Sitting - Static: Good (unsupported)  Sitting - Dynamic: Fair (occasional)  Standing - Static: Fair  Standing - Dynamic : Impaired Balance-Sitting/Standing  Sitting - Static: Good (unsupported)  Sitting - Dynamic: Fair (occasional)  Standing - Static: Fair  Standing - Dynamic : Impaired     Bed/Mat Mobility  Rolling Right : 5 (Stand-by assistance)  Rolling Left : 5 (Stand-by assistance)  Supine to Sit : 4 (Minimal assistance)  Sit to Supine : 3 (Moderate assistance) Bed/Mat Mobility  Rolling Right : 5 (Supervision)  Rolling Left : 5 (Supervision)  Supine to Sit : 5 (Supervision)  Sit to Supine : 5 (Supervision)     Transfers  Transfer Type: Other  Other: stand step with RW  Transfer Assistance : 4 (Minimal assistance)  Sit to Stand Assistance: Minimal assistance  Car Transfers: Not tested  Car Type: car simulator Transfers  Transfer Type:  Other  Other:  (stand step with RW)  Transfer Assistance : 5 (Stand-by assistance)  Sit to Stand Assistance: Supervision  Car Transfers: Supervision  Car Type: car simulator     Steps or Stairs  Steps/Stairs Ambulated (#): 0  Level of Assist : 0 (Not tested)  Rail Use: Both Steps or Stairs  Steps/Stairs Ambulated (#): 4 (6\" steps)  Level of Assist : 4 (Contact guard assistance)  Rail Use: Both           Intake and Output:  Current Shift:  06/28 0701 - 06/28 1900  In: 240 [P.O.:240]  Out: -   Last three shifts:  06/26 1901 - 06/28 0700  In: 1950 [P.O.:1950]  Out: 0     Labs: Results:       Chemistry Recent Labs     06/27/22  0648   *   *   K 4.3      CO2 25   BUN 16   CREA 0.58*   CA 8.6   AGAP 6   BUCR 28*      CBC w/Diff Recent Labs     06/27/22  0648   HGB 9.3*   HCT 31.6*      Cardiac Enzymes No results for input(s): CPK, CKND1, LES in the last 72 hours. No lab exists for component: CKRMB, TROIP   Coagulation No results for input(s): PTP, INR, APTT, INREXT, INREXT in the last 72 hours. Lipid Panel No results found for: CHOL, CHOLPOCT, CHOLX, CHLST, CHOLV, 233311, HDL, HDLP, LDL, LDLC, DLDLP, 707605, VLDLC, VLDL, TGLX, TRIGL, TRIGP, TGLPOCT, CHHD, CHHDX   BNP No results for input(s): BNPP in the last 72 hours. Liver Enzymes No results for input(s): TP, ALB, TBIL, AP in the last 72 hours.     No lab exists for component: SGOT, GPT, DBIL   Thyroid Studies Lab Results   Component Value Date/Time    TSH 0.63 06/10/2022 11:40 AM          Procedures/imaging: see electronic medical records for all procedures/Xrays and details which were not copied into this note but were reviewed prior to creation of Plan    Medications:   Current Facility-Administered Medications   Medication Dose Route Frequency    lidocaine 4 % patch 1 Patch  1 Patch TransDERmal Q24H    metoprolol tartrate (LOPRESSOR) tablet 25 mg  25 mg Oral Q12H    acetaminophen (TYLENOL) tablet 650 mg  650 mg Oral TID    acetaminophen (TYLENOL) tablet 650 mg  650 mg Oral Q4H PRN    gabapentin (NEURONTIN) capsule 400 mg  400 mg Oral QHS    gabapentin (NEURONTIN) capsule 200 mg  200 mg Oral BID    hydrALAZINE (APRESOLINE) tablet 25 mg  25 mg Oral TID PRN    magnesium oxide (MAG-OX) tablet 400 mg  400 mg Oral DAILY    nortriptyline (PAMELOR) capsule 40 mg  40 mg Oral QHS    oxyCODONE IR (ROXICODONE) tablet 10 mg  10 mg Oral Q4H PRN    LORazepam (ATIVAN) tablet 0.5 mg  0.5 mg Oral BID PRN    ondansetron (ZOFRAN ODT) tablet 4 mg  4 mg Oral Q8H PRN    atorvastatin (LIPITOR) tablet 10 mg  10 mg Oral DAILY    ferrous gluconate 324 mg (37.5 mg iron) tablet 1 Tablet  324 mg Oral BID WITH MEALS    pantoprazole (PROTONIX) tablet 40 mg  40 mg Oral ACB    docusate sodium (COLACE) capsule 100 mg  100 mg Oral BID    bisacodyL (DULCOLAX) tablet 10 mg  10 mg Oral Q48H PRN    melatonin tablet 3 mg  3 mg Oral QHS PRN    naloxone (NARCAN) injection 0.2 mg  0.2 mg IntraVENous PRN       Assessment/Plan     Principal Problem:    Compression fracture of T11 vertebra with routine healing (6/9/2022)    Active Problems:    Essential hypertension, benign (1/29/2009)      Sciatica of right side (2/7/2018)      Bilateral sacral insufficiency fracture with routine healing (6/9/2022)      Status post kyphoplasty (6/13/2022)      Overview: S/P Image guided T11 kyphoplasty; Image guided bilateral sacroplasty       (6/13/2022 - Dr. Sandee Salazar)      Generalized weakness (6/9/2022)      Impaired mobility and ADLs (6/9/2022)      Hypokalemia (6/22/2022)      Compression fracture of T11 vertebra with routine healing; Bilateral sacral insufficiency fracture with routine healing; S/P Image guided T11 kyphoplasty; Image guided bilateral sacroplasty (6/13/2022 - Dr. Sandee Salazar)              > Thoracic spinal precautions  Continue pain control  6/27/22: We will add lidocaine patch and monitor patient's symptoms.   6/28: will add Voltaren topically to be used night time for muscle spasm treatment      Chronic anemia   Continue Ferrous gluconate 324 mg PO BID with meals      Dyslipidemia  Continue Atorvastatin 10 mg PO once daily     Essential hypertension  Currently controlled              > Continue: >  Metoprolol tartrate from 25 mg PO q 12 hr (9AM, 9PM)                          > Hydralazine 25 mg PO TID PRN for SBP greater than 170 mmHg  Monitor and adjust BP medication as needed     Gastroesophageal reflux disease  Continue Pantoprazole 40 mg PO daily before breakfast     Generalized anxiety disorder  Continue:                          > Nortriptyline 40 mg PO q HS                          > Lorazepam 0.5 mg PO BID PRN for anxiety     Sciatica of right side  Continue:                          > Gabapentin 200 mg PO BID (8AM, 2PM)                          > Gabapentin 400 mg PO q 8PM                          > Nortriptyline 40 mg PO q HS     Hypokalemia  Continue Magnesium oxide 400 mg PO once daily    Poor Appetite  6/28: discussed with patient and daughter about possibly using Remeron if needed. Disclaimer: Sections of this note are dictated using utilizing voice recognition software, which may have resulted in some phonetic based errors in grammar and contents. Even though attempts were made to correct all the mistakes, some may have been missed, and remained in the body of the document. If questions arise, please contact our department.     Franklyn Woo MD  6/28/2022

## 2022-06-28 NOTE — PROGRESS NOTES
Problem: Self Care Deficits Care Plan (Adult)  Goal: *Acute Goals and Plan of Care (Insert Text)  Description: Occupational Therapy Goals   Long Term Goals  Initiated 6/18/2022 (goals revised on 6/23/2022) and to be accomplished within 2 week(s), by (7/2/2022)  1. Pt will perform self-feeding with independence. 2. Pt will perform grooming with MOD I.  3. Pt will perform UB bathing with MOD I.  4. Pt will perform LB bathing with MOD I using AE and/ or compensatory strategies as needed. 5. Pt will perform tub/shower transfer with supervision using least restrictive assistive device. 6. Pt will perform UB dressing with MOD I.  7. Pt will perform LB dressing with set-up assistance using AE and/ or compensatory strategies as needed. 8. Pt will perform toileting task with MOD I.  9. Pt will perform toilet transfer with supervision using least restrictive assistive device. Short Term Goals   Initiated 6/18/2022 (reassessed on 6/23/2022) and to be accomplished within 7 day(s), by (6/30/2022)  1. Pt will perform grooming with no more than MIN A from w/c at sink. (Goal met 6/23/2022; currently set-up)  2. Pt will perform UB bathing with no more than MIN A from w/c with basin or TTB. (Goal met 6/23/2022)      Goal upgraded: Pt will perform UB bathing with set-up. 3. Pt will perform LB bathing with MOD A using AE and/ or compensatory strategies as needed. (Goal met 6/23/2022; currently SBA)      Goal upgraded: Pt will perform LB bathing with supv/ set-up using AE and/ or compensatory strategies as needed. 4. Pt will perform tub/shower transfer with MOD A using LRAD. (Goal met 6/23/2022; currently CGA)      Goal upgraded: Pt will perform tub/shower transfer with SBA using least restrictive assistive device. 5. Pt will perform UB dressing with setup A. (Goal met 6/23/2022)  6. Pt will perform LB dressing with MOD A using AE and/ or compensatory strategies.  (Goal met 6/23/2022; currently CGA)      Goal upgraded: Pt will perform LB dressing with SBA using AE and/ or compensatory strategies. 7. Pt will perform toileting task with MIN A. (Goal met 2022; currently CGA)      Goal upgraded: Pt will perform toileting task with supv/ SBA. 8. Pt will perform toilet transfer with MOD A using LRAD. (Goal met 2022; currently SBA)  Occupational Therapy TREATMENT    Patient: Juan Ty   68 y.o. Patient identified with name and : yes    Date: 2022    First Tx Session  Time In: 1000  Time Out[de-identified] 56    Second Tx Session  Time In: 1030  Time Out[de-identified] 1130    Diagnosis: Compression fracture of body of thoracic vertebra ([address] [S22.000A]   Precautions:  Fall  Chart, occupational therapy assessment, plan of care, and goals were reviewed. Pain:  Pt reports 0/10 pain or discomfort prior to treatment. Pt reports 0/10 pain or discomfort post treatment. Intervention Provided: N/A      SUBJECTIVE:   Patient stated My  is going to help me.     OBJECTIVE DATA SUMMARY:     THERAPEUTIC ACTIVITY Daily Assessment    24 piece puzzle with moderate asst to increase problem solving skills. THERAPEUTIC EXERCISE Daily Assessment    Group:instruct pt. in home exercise program: UB HEP (bicep curls, chess press, chest pulls, butterfly wings, front raise 3x10 with 2lb weights. UB Bike up 10 minutes with minimal resistance. IADL Daily Assessment    Medication Management-Pt completed with fair accuracy. MOBILITY/TRANSFERS Daily Assessment     Recliner to w/c with S.                ASSESSMENT:  Pt increasing strength, balance, and activity tolerance for ADLs. Progression toward goals:  [x]          Improving appropriately and progressing toward goals  []          Improving slowly and progressing toward goals  []          Not making progress toward goals and plan of care will be adjusted     PLAN:  Patient continues to benefit from skilled intervention to address the above impairments.   Continue treatment per established plan of care. Discharge Recommendations:  Home Health with asst   Further Equipment Recommendations for Discharge:  bedside commode, shower chair (per previous therapy  note)     Activity Tolerance:  Fair       Estimated LOS: 7/1/22  Please refer to the flowsheet for vital signs taken during this treatment. After treatment:   []  Patient left in no apparent distress sitting up in chair   [x]  Patient left in no apparent distress in bed  []  Call bell left within reach  []  Nursing notified  []  Caregiver present  []  Bed alarm activated    COMMUNICATION/EDUCATION:   [] Home safety education was provided and the patient/caregiver indicated understanding. [x] Patient/family have participated as able in goal setting and plan of care. [] Patient/family agree to work toward stated goals and plan of care. [] Patient understands intent and goals of therapy, but is neutral about his/her participation. [] Patient is unable to participate in goal setting and plan of care. Svetlana Rocha OT       Goal upgraded: Pt will perform toilet transfer with supervision using least restrictive assistive device.        Outcome: Progressing Towards Goal

## 2022-06-28 NOTE — PROGRESS NOTES
[facility]    Bedside and Verbal shift change report given to [address] [place] [address] 71 (oncoming nurse) by Sarthak Montano LPN (offgoing nurse). Report included the following information SBAR, Kardex, MAR and Recent Results. Situation:   Code Status: Full Code   Hospital Day: 11   Problem List:   Hospital Problems  Date Reviewed: 6/24/2022          Codes Class Noted POA    Hypokalemia ICD-10-CM: E87.6  ICD-9-CM: 276.8  6/22/2022 Yes        Status post kyphoplasty ICD-10-CM: Z98.890  ICD-9-CM: V45.89  6/13/2022 Yes    Overview Signed 6/20/2022  4:37 PM by Perfecto Officer, MD     S/P Image guided T11 kyphoplasty; Image guided bilateral sacroplasty (6/13/2022 - Dr. Sandee Salazar)             * (Principal) Compression fracture of T11 vertebra with routine healing ICD-10-CM: S22.080D  ICD-9-CM: V54.17  6/9/2022 Yes        Bilateral sacral insufficiency fracture with routine healing ICD-10-CM: M84.48XD  ICD-9-CM: V54.27  6/9/2022 Yes        Generalized weakness ICD-10-CM: R53.1  ICD-9-CM: 780.79  6/9/2022 Yes        Impaired mobility and ADLs ICD-10-CM: Z74.09, Z78.9  ICD-9-CM: V49.89  6/9/2022 Yes        Sciatica of right side ICD-10-CM: M54.31  ICD-9-CM: 724.3  2/7/2018 Yes        Essential hypertension, benign ICD-10-CM: I10  ICD-9-CM: 401.1  1/29/2009 Yes              Background:   Past Medical History:   Past Medical History:   Diagnosis Date    Acid reflux     Bilateral sacral insufficiency fracture with routine healing 6/9/2022    Chronic anemia     Compression fracture of T11 vertebra with routine healing 6/9/2022    Hypertension     Spinal stenosis         Assessment:   Changes in Assessment throughout shift: No change to previous assessment     Patient has a central line: no Reasons if yes: Insertion date: Last dressing date:   Patient has Ellis Cath: no Reasons if yes:     Insertion date:  Shift ellis care completed:      Last Vitals:     Vitals:    06/27/22 0707 06/27/22 1521 06/27/22 1924 06/28/22 0758   BP: 112/72 121/78 135/75 (!) 159/79   Pulse: 81 73 76 83   Resp: 18 20 18 18   Temp: 98 °F (36.7 °C) 97.4 °F (36.3 °C) 97.7 °F (36.5 °C) 98.4 °F (36.9 °C)   SpO2: 92% 98% 100% 95%   Weight:       Height:            PAIN    Pain Assessment    Pain Intensity 1: 2 (06/28/22 1206) Pain Intensity 1: 2 (12/29/14 1105)    Pain Location 1: Leg Pain Location 1: Abdomen    Pain Intervention(s) 1: Medication (see MAR) Pain Intervention(s) 1: Medication (see MAR)  Patient Stated Pain Goal: 0 Patient Stated Pain Goal: 0  o Intervention effective: yes  o Other actions taken for pain: Medication (see MAR)     Skin Assessment  Skin color    Condition/Temperature    Integrity Skin Integrity: Wound (add Wound LDA)  Turgor    Weekly Pressure Ulcer Documentation  Pressure  Injury Documentation: No Pressure Injury Noted-Pressure Ulcer Prevention Initiated  Wound Prevention & Protection Methods  Orientation of wound Orientation of Wound Prevention: Posterior  Location of Prevention Location of Wound Prevention: Sacrum/Coccyx  Dressing Present Dressing Present : No  Dressing Status Dressing Status: Intact  Wound Offloading Wound Offloading (Prevention Methods): Bed, pressure reduction mattress    Wound Prevention Checklist  Precautions:      []  Heel prevention boots placed on patient    []  Patient turned q2h during shift    []  Valeriy Order Set ordered    []  Patient on Laporte bed/Specialty bed    []  Each Wound is documented during shift (Stage, Color, drainage, odor, measurements, and dressings)    [x]  Dual skin checks done at bedside during shift report with Maame Franks RN     INTAKE/OUPUT  Date 06/27/22 0700 - 06/28/22 0659 06/28/22 0700 - 06/29/22 0659   Shift [id number] 1900-0659 24 Hour Total 5722-6051 1531-5144 24 Hour Total   INTAKE   P.O.  480  480     P. O.  480  480   Shift Total(mL/kg) 750(10.9) 600(8.7) 1350(19.6) 480(7)  480(7)   OUTPUT   Urine(mL/kg/hr)  0(0) 0(0)        Urine Voided  0 0        Urine Occurrence(s) 3 x 3 x 6 x 1 x  1 x   Stool           Stool Occurrence(s) 0 x 1 x 1 x 0 x  0 x   Shift Total(mL/kg)  0(0) 0(0)       525 0651 480  480   Weight (kg) 68.8 68.8 68.8 68.8 68.8 68.8       Recommendations:  1. Patient needs and requests: pain management. AM nurse made aware. 2. Pending tests/procedures: none at this time     3. Functional Level/Equipment: Partial (one person) /      Fall Precautions:   Fall risk precautions were reinforced with the patient; she was instructed to call for help prior to getting up. The following fall risk precautions were continued: bed/ chair alarms, door signage, yellow bracelet and socks as well as update of the Soren Guzman tool in the patient's room. Yinka Score: 4    HEALS Safety Check    A safety check occurred in the patient's room between off going nurse and oncoming nurse listed above. The safety check included the below items  Area Items   H  High Alert Medications - Verify all high alert medication drips (heparin, PCA, etc.)   E  Equipment - Suction is set up for ALL patients (with catalina)  - Red plugs utilized for all equipment (IV pumps, etc.)  - WOWs wiped down at end of shift.  - Room stocked with oxygen, suction, and other unit-specific supplies   A  Alarms - Bed alarm is set for fall risk patients  - Ensure chair alarm is in place and activated if patient is up in a chair   L  Lines - Check IV for any infiltration  - Nance bag is empty if patient has a Nance   - Tubing and IV bags are labeled   S  Safety   - Room is clean, patient is clean, and equipment is clean. - Hallways are clear from equipment besides carts. - Fall bracelet on for fall risk patients  - Ensure room is clear and free of clutter  - Suction is set up for ALL patients (with catalina)  - Hallways are clear from equipment besides carts.    - Isolation precautions followed, supplies available outside room, sign posted     Heidi Acevedo LPN

## 2022-06-28 NOTE — PROGRESS NOTES
Problem: Mobility Impaired (Adult and Pediatric)  Goal: *Acute Goals and Plan of Care (Insert Text)  Description: Physical Therapy Short Term Goals  Initiated 6/18/2022, reassessed 6/25/2022, and to be progressed to long term goals. 1.  Patient will move from supine to sit and sit to supine  in bed with contact guard assist.  Goal met 6/25/2022  2. Patient will transfer from bed to chair and chair to bed with contact guard assist using the least restrictive device. Goal met 6/25/2022  3. Patient will perform sit to stand with supervision/SBA. GOAL MET 6/25/2022  4. Patient will ambulate with contact guard assist for 100 feet with the least restrictive device. GOAL MET 6/25/2022  5. Patient will ascend/descend 4 stairs with 2 handrail(s) with minimal assistance/contact guard assist. ONGOING 6/25/2022    Physical Therapy Long Term Goals  Initiated 6/18/2022 and to be accomplished within 21 day(s)  1. Patient will move from supine to sit and sit to supine  in bed with modified independence. 2.  Patient will transfer from bed to chair and chair to bed with modified independence using the least restrictive device. 3.  Patient will perform sit to stand with modified independence. 4.  Patient will ambulate with modified independence for 150 feet with the least restrictive device. 5.  Patient will ascend/descend 12 stairs with 1 handrail(s) with minimal assistance/contact guard assist.     Outcome: Progressing Towards Goal   PHYSICAL THERAPY TREATMENT    Patient: Edwin Kapadia (52 y.o. female)  Date: 6/28/2022  Diagnosis: Compression fracture of body of thoracic vertebra (HCC) [S22.000A] Compression fracture of T11 vertebra with routine healing  Precautions:    Chart, physical therapy assessment, plan of care and goals were reviewed.     Time In:1136  Time Out:1206    Patient seen for: AM;Gait training;Patient education;Transfer training   Time In: 1330  Time Out: 1400  Patient seen for : PM; gait training, transfer training therapeutic activity    Pain:  No c/o pain this session during ambulation and also with sitting upright in WC. Pain this pm session in the buttock area especially on the left side. Pain management regiment was being addressed with patient after session with her assigned nurse, Lino Gil. Patient identified with name and : Yes    SUBJECTIVE:    I feel more confident and I think I can feel my legs getting stronger. PM session-Let's do the car again. OBJECTIVE DATA SUMMARY:    Objective:       TRANSFERS Daily Assessment     Transfer Type: Other  Other:  (stand step with RW)  Transfer Assistance : 5 (Stand-by assistance)  Sit to Stand Assistance: Supervision  Car Transfers: Supervision  Car Type: car simulator Patient with improved car transfers, she requires less cues for hand placement and set up as she reports having the written instructions that this therapist gave her last week has been very helpful. GAIT Daily Assessment    Gait Description (WDL)      Gait Abnormalities Decreased step clearance    Assistive device Walker, rolling    Ambulation assistance - level surface 5 (Stand-by assistance)    Distance  (250 ft)    Ambulation assistance- uneven surface      Comments Cues to stay within the walker, upright posture and increased step length, especially LLE. STEPS/STAIRS Daily Assessment     Steps/Stairs ambulated  (4-6 inch)    Assistance Required 5 (Stand-by assistance)    Rail Use Both    Comments  Patient with decreasing assistance with stair negotiation. Patient with decreasing lunging from step to step and she has improved with loading of legs prior too advancing weaker RLE. Sequencing cues especially with descending. Curbs/Ramps  4 inch curb challenged x 2 trials with SBA/CGA and verbal cues for sequencing.         BALANCE Daily Assessment     Sitting - Static: Good (unsupported)  Sitting - Dynamic: Fair (occasional)  Standing - Static: Fair  Standing - Dynamic : Impaired          THERAPEUTIC EXERCISES Daily Assessment       Standing at the stair rail: BLE heel raises, toe raises x 15 reps each; 2 sets. ASSESSMENT:  Patient is seen for deficits in strength, mobility, transfers, ambulation, safety and balance. Patient is agreeable to skilled session and she is accompanied by her daughter this session. Patient is making good improvement with transfers and with increased ambulation distance. She still requires verbal cues for safety, 25% of the time and she has difficulty maintaining upright posture for more than a few minutes before she begins extending her elbows and allowing the walker to get too far in front of her. Patient will benefit from continued training to improve functional independence and safety upon return home. PM session-patient daughter present and she was educated on the therapist's observance of patient's attention deficits. Patient performs all activities with improved focus, safety and accuracy if she is concentrating on the task at hand and not worried about what she will be doing next. Patient and patient's daughter were receptive to education. Continue to address current deficits for improved independence. Progression toward goals:  [x]      Improving appropriately and progressing toward goals  []      Improving slowly and progressing toward goals  []      Not making progress toward goals and plan of care will be adjusted      PLAN:  Patient continues to benefit from skilled intervention to address the above impairments. Continue treatment per established plan of care. Discharge Recommendations:  HHPT  Further Equipment Recommendations for Discharge:  LAW, gait belt      Estimated Discharge Date: 7/2/2022    Activity Tolerance:   Patient with improved tolerance noted since this therapist had the opportunity to treat the patient. Please refer to the flowsheet for vital signs taken during this treatment.     After treatment:   [] Patient left in no apparent distress in bed  [] Patient left in no apparent distress sitting up in chair  [x] Patient left in no apparent distress in w/c mobilizing under own power  [] Patient left in no apparent distress dining area  [] Patient left in no apparent distress mobilizing under own power  [] Call bell left within reach  [] Nursing notified  [x] Caregiver present  [] Bed alarm activated   [x] Chair alarm activated      Ligia Barboza  6/28/2022

## 2022-06-28 NOTE — PROGRESS NOTES
Nutrition Assessment     Type and Reason for Visit: Reassess    Nutrition Recommendations/Plan:   1. Modify supplements: increase Ensure Enlive (350 kcal, 20 gm protein each) to TID. Continue magic cup (290 kcal, 9 gm protein each) BID  2. Continue all other nutrition interventions. Encourage/ monitor po intake of meals and supplements  3. Nutrition recommendations for post discharge discussed with pt. Nutrition Assessment:  Pt continues to have poor/no appetite and meal intake. Even, poor po intake with meals/food her  brings from home. Pt likes/ consuming ensure enlive and magic cup supplements; 100% intake. agreeable to increase ensure enlive to TID. Nutrition recommendations for post discharge discussed with pt (supplement with nutrition supplement, ensure enlive or similar product, until meal intake improves and is adequate); pt verbalized understanding. Pt denied having any questions at this time. Noted MD had discussed with pt and her daughter about consideration for starting remeron for appetite stimulant as needed. Noted no plan to start at this time. Pt reported to this writer that she feels her meal intake will improve when she goes home; noted pt to discharge later this week. Malnutrition Assessment:  Malnutrition Status:  At risk for malnutrition (specify) (decreased/ inadequate meal intake)     Estimated Daily Nutrient Needs:  Energy (kcal):  8776-3201  Protein (g):  55-69       Fluid (ml/day):  5401-5767    Nutrition Related Findings:  BM 6/27    Current Nutrition Therapies:  ADULT DIET Regular; Low Fat/Low Chol/High Fiber/2 gm Na  ADULT ORAL NUTRITION SUPPLEMENT Breakfast; Standard High Calorie/High Protein  ADULT ORAL NUTRITION SUPPLEMENT Lunch, Dinner; Frozen Supplement    Anthropometric Measures:  Height:  5' 5\" (165.1 cm)  Current Body Wt:  68.8 kg (151 lb 10.8 oz)  BMI: 25.2    Nutrition Diagnosis:   · Inadequate oral intake related to early satiety (poor appetite) as evidenced by intake 0-25%,intake 26-50%      Nutrition Interventions:   Food and/or Nutrient Delivery: Continue current diet,Modify oral nutrition supplement  Nutrition Education/Counseling: Education not indicated  Coordination of Nutrition Care: Continue to monitor while inpatient  Plan of Care discussed with: pt    Goals:  Previous Goal Met: Progressing toward goal(s)  Goals: Meet at least 75% of estimated needs,PO intake 75% or greater,by next RD assessment       Nutrition Monitoring and Evaluation:   Behavioral-Environmental Outcomes: None identified  Food/Nutrient Intake Outcomes: Food and nutrient intake,Supplement intake  Physical Signs/Symptoms Outcomes: Biochemical data,Meal time behavior    Discharge Planning:    Continue current diet,Continue oral nutrition supplement    Chelsy Carey, [address]  Contact: [phone]

## 2022-06-29 PROCEDURE — 74011250637 HC RX REV CODE- 250/637: Performed by: INTERNAL MEDICINE

## 2022-06-29 PROCEDURE — 97110 THERAPEUTIC EXERCISES: CPT

## 2022-06-29 PROCEDURE — 65310000000 HC RM PRIVATE REHAB

## 2022-06-29 PROCEDURE — 74011250637 HC RX REV CODE- 250/637: Performed by: EMERGENCY MEDICINE

## 2022-06-29 PROCEDURE — 97535 SELF CARE MNGMENT TRAINING: CPT

## 2022-06-29 PROCEDURE — 2709999900 HC NON-CHARGEABLE SUPPLY

## 2022-06-29 PROCEDURE — 97530 THERAPEUTIC ACTIVITIES: CPT

## 2022-06-29 PROCEDURE — 99232 SBSQ HOSP IP/OBS MODERATE 35: CPT | Performed by: INTERNAL MEDICINE

## 2022-06-29 PROCEDURE — 74011000250 HC RX REV CODE- 250: Performed by: INTERNAL MEDICINE

## 2022-06-29 PROCEDURE — 97150 GROUP THERAPEUTIC PROCEDURES: CPT

## 2022-06-29 PROCEDURE — 97116 GAIT TRAINING THERAPY: CPT

## 2022-06-29 RX ORDER — GABAPENTIN 300 MG/1
600 CAPSULE ORAL
Status: DISCONTINUED | OUTPATIENT
Start: 2022-06-29 | End: 2022-07-02 | Stop reason: HOSPADM

## 2022-06-29 RX ADMIN — Medication 400 MG: at 08:47

## 2022-06-29 RX ADMIN — GABAPENTIN 200 MG: 100 CAPSULE ORAL at 08:46

## 2022-06-29 RX ADMIN — NORTRIPTYLINE HYDROCHLORIDE 40 MG: 10 CAPSULE ORAL at 20:54

## 2022-06-29 RX ADMIN — DICLOFENAC SODIUM 4 G: 10 GEL TOPICAL at 22:37

## 2022-06-29 RX ADMIN — OXYCODONE HYDROCHLORIDE 10 MG: 5 TABLET ORAL at 00:29

## 2022-06-29 RX ADMIN — Medication 1 TABLET: at 17:59

## 2022-06-29 RX ADMIN — GABAPENTIN 600 MG: 300 CAPSULE ORAL at 20:54

## 2022-06-29 RX ADMIN — PANTOPRAZOLE SODIUM 40 MG: 40 TABLET, DELAYED RELEASE ORAL at 06:35

## 2022-06-29 RX ADMIN — ACETAMINOPHEN 650 MG: 325 TABLET ORAL at 13:01

## 2022-06-29 RX ADMIN — Medication 1 TABLET: at 08:47

## 2022-06-29 RX ADMIN — DOCUSATE SODIUM 100 MG: 100 CAPSULE, LIQUID FILLED ORAL at 08:47

## 2022-06-29 RX ADMIN — OXYCODONE HYDROCHLORIDE 10 MG: 5 TABLET ORAL at 04:32

## 2022-06-29 RX ADMIN — METOPROLOL TARTRATE 25 MG: 25 TABLET, FILM COATED ORAL at 20:54

## 2022-06-29 RX ADMIN — ACETAMINOPHEN 650 MG: 325 TABLET ORAL at 16:59

## 2022-06-29 RX ADMIN — ACETAMINOPHEN 650 MG: 325 TABLET ORAL at 08:47

## 2022-06-29 RX ADMIN — GABAPENTIN 200 MG: 100 CAPSULE ORAL at 13:50

## 2022-06-29 RX ADMIN — ATORVASTATIN CALCIUM 10 MG: 10 TABLET, FILM COATED ORAL at 08:47

## 2022-06-29 RX ADMIN — METOPROLOL TARTRATE 25 MG: 25 TABLET, FILM COATED ORAL at 08:47

## 2022-06-29 NOTE — PROGRESS NOTES
Problem: Patient Education: Go to Patient Education Activity  Goal: Patient/Family Education  Outcome: Progressing Towards Goal     Problem: Self Care Deficits Care Plan (Adult)  Goal: *Acute Goals and Plan of Care (Insert Text)  Description: Occupational Therapy Goals   Long Term Goals  Initiated 6/18/2022 (goals revised on 6/23/2022) and to be accomplished within 2 week(s), by (7/2/2022)  1. Pt will perform self-feeding with independence. 2. Pt will perform grooming with MOD I.  3. Pt will perform UB bathing with MOD I.  4. Pt will perform LB bathing with MOD I using AE and/ or compensatory strategies as needed. 5. Pt will perform tub/shower transfer with supervision using least restrictive assistive device. 6. Pt will perform UB dressing with MOD I.  7. Pt will perform LB dressing with set-up assistance using AE and/ or compensatory strategies as needed. 8. Pt will perform toileting task with MOD I.  9. Pt will perform toilet transfer with supervision using least restrictive assistive device. Short Term Goals   Initiated 6/18/2022 (reassessed on 6/23/2022) and to be accomplished within 7 day(s), by (6/30/2022)  1. Pt will perform grooming with no more than MIN A from w/c at sink. (Goal met 6/23/2022; currently set-up)  2. Pt will perform UB bathing with no more than MIN A from w/c with basin or TTB. (Goal met 6/23/2022)      Goal upgraded: Pt will perform UB bathing with set-up. 3. Pt will perform LB bathing with MOD A using AE and/ or compensatory strategies as needed. (Goal met 6/23/2022; currently SBA)      Goal upgraded: Pt will perform LB bathing with supv/ set-up using AE and/ or compensatory strategies as needed. 4. Pt will perform tub/shower transfer with MOD A using LRAD. (Goal met 6/23/2022; currently CGA)      Goal upgraded: Pt will perform tub/shower transfer with SBA using least restrictive assistive device. 5. Pt will perform UB dressing with setup A. (Goal met 6/23/2022)  6.  Pt will perform LB dressing with MOD A using AE and/ or compensatory strategies. (Goal met 2022; currently CGA)      Goal upgraded: Pt will perform LB dressing with SBA using AE and/ or compensatory strategies. 7. Pt will perform toileting task with MIN A. (Goal met 2022; currently CGA)      Goal upgraded: Pt will perform toileting task with supv/ SBA. 8. Pt will perform toilet transfer with MOD A using LRAD. (Goal met 2022; currently SBA)      Goal upgraded: Pt will perform toilet transfer with supervision using least restrictive assistive device. OT WEEKLY PROGRESS NOTE  Patient Name:Leila Tse      Time In: 0900  Time Out: 1030    Medical Diagnosis:  Compression fracture of body of thoracic vertebra (HCC) [S22.000A] Compression fracture of T11 vertebra with routine healing     Pain at start of tx:0/10 pain or discomfort. Pain at stop of tx:0/10 pain or discomfort.     Patient identified with name and :Yes  Subjective: \" I have not been able to sleep in 3 day nights\"         Objective: Self Care and Functional Transfers      Outcome Measures:      [facility]: [facility]      COGNITION/PERCEPTION Initial Assessment Weekly Progress Assessment 2022   Premorbid Reading Status       Premorbid Writing Status       Arousal/Alertness       Orientation Level Oriented to person,Oriented to place,Oriented to situation Oriented X4   Visual Fields       Praxis       Body Scheme       COMPREHENSION MODE Initial Assessment Weekly Progress Assessment 2022   Primary Mode of Comprehension Auditory     Hearing Aide None     Corrective Lenses       Score 5  5     EXPRESSION Initial Assessment Weekly Progress Assessment 2022   Primary Mode of Expression Verbal Verbal   Score 5 5   Comments         SOCIAL INTERACTION/ PRAGMATICS Initial Assessment Weekly Progress Assessment 2022   Score 5 5   Comments         PROBLEM SOLVING Initial Assessment Weekly Progress Assessment 2022   Score 5 5   Comments MEMORY Initial Assessment Weekly Progress Assessment 6/29/2022   Score 5 5   Comments         EATING Initial Assessment Weekly Progress Assessment 6/29/2022   Functional Level 5  5   Comments Self feeding with fork       GROOMING Initial Assessment Weekly Progress Assessment 6/29/2022   Functional Level 5 Functional Level: 5   Tasks completed by patient Brushed teeth Tasks Completed by Patient: Brushed hair; Washed face   Comments Seated EOB       BATHING Initial Assessment Weekly Progress Assessment 6/29/2022   Functional Level 3 Functional Level: 5   Body parts patient bathed Abdomen,Arm, left,Arm, right,Chest Body Parts Patient Bathed: Abdomen;Arm, left;Arm, right;Buttocks; Chest;Lower leg and foot, left; Lower leg and foot, right;Kandis area; Thigh, left; Thigh, right   Comments Pt performed UB showering from tub transfer bench with Larissa to wash back adn LB showering in seated position to wash lower BLE legs and feet in seated position with TA. Pt washed upper BLE thighs in seated position and maxA in standing using grab bar to wash buttocks and perie area. Pt required max VC's for safety with showering secondary to no BLT and observing anxious behaviors requiring VC's for pacing and safety. TUB/SHOWER TRANSFER INDEPENDENCE Initial Assessment Weekly Progress Assessment 6/29/2022   Score 4 Tub/Shower Transfer Score: 5   Comments tub transfer bench and FWW, simulated tub transfer bench transfer for home environment       UPPER BODY DRESSING/UNDRESSING Initial Assessment Weekly Progress Assessment 6/29/2022   Functional Level 3 Functional Level: 5   Items applied/Steps completed Bra (3 steps)     Comments At EOB, A to pull over trunk.  Donning l/s shirt reclined in bed with assist to locate R sleeve and pull over trunk via log rolling       LOWER BODY DRESSING/UNDRESSING Initial Assessment Weekly Progress Assessment 6/29/2022   Functional Level 2 Functional Level: 5   Items applied/Steps completed Elastic waist pants (3 steps)     Comments Donning from reclined in bed d/t pain and dizziness       TOILETING Initial Assessment Weekly Progress Assessment 6/29/2022   Functional Level 3 Functional Level: 5   Comments Pt voided using elevated seat over toilet; pt requiring Mod A for CM (pants up/ down) and SBA for hygiene. Verbal cues for maintaining spinal precautions. TOILET TRANSFER INDEPENDENCE Initial Assessment Weekly Progress Assessment 6/29/2022   Transfer score 4 Toilet Transfer Score: 5   Comments Stand step xfer w/c <-> elevated seat over toilet; grab bar. Gait belt for safety. Verbal cues for hand placement and body positioning for adhering to spinal precautions. THERACT:5 inch Arc to increase strength and FM dexterity and strength. Pt used thumb and index finger to grasp rings x25 and bring from right to left  across a 25 inch arc. Pt appeared distracted and required increased time to complete 1/2 of task. UB Bike up to 10 minutes to promote increased strength for ADLs. ASSESSMENT:  Pt showing S/SBA with bathing/dressing and functional transfers. Pt noted to show cognitive deficits and reporting lack of sleep at night. Progression toward goals:  [x]          Improving appropriately and progressing toward goals  []          Improving slowly and progressing toward goals  []          Not making progress toward goals and plan of care will be adjusted     PLAN:  Patient continues to benefit from skilled intervention to address the above impairments. Continue treatment per established plan of care. Discharge Recommendations:  Home Health with asst   Further Equipment Recommendations for Discharge:  bedside commode, shower chair      Please refer to the flow sheet for vital signs taken during this treatment.   After treatment:   [x]  Patient left in no apparent distress sitting up in chair  []  Patient left in no apparent distress in bed  []  Call bell left within reach  []  Nursing notified  []  Caregiver present  []  Bed alarm activated    COMMUNICATION/EDUCATION:   [] Home safety education was provided and the patient/caregiver indicated understanding. [x] Patient/family have participated as able in goal setting and plan of care. [] Patient/family agree to work toward stated goals and plan of care. [] Patient understands intent and goals of therapy, but is neutral about his/her participation. [] Patient is unable to participate in goal setting and plan of care. Plan of Care: Please see Care Plan for updated STG/LTGs.    Family Training:    Estimated LOS:     Valeriy Patriica OT  6/29/2022     Outcome: Progressing Towards Goal

## 2022-06-29 NOTE — PROGRESS NOTES
Problem: Pain  Goal: *Control of Pain  Outcome: Progressing Towards Goal     Problem: Patient Education: Go to Patient Education Activity  Goal: Patient/Family Education  Outcome: Progressing Towards Goal     Problem: Falls - Risk of  Goal: *Absence of Falls  Description: Document Benji Simmons Fall Risk and appropriate interventions in the flowsheet. Outcome: Progressing Towards Goal  Note: Fall Risk Interventions:  Mobility Interventions: Bed/chair exit alarm,Patient to call before getting OOB,Utilize walker, cane, or other assistive device    Mentation Interventions: Door open when patient unattended,Bed/chair exit alarm,Room close to nurse's station,Toileting rounds    Medication Interventions: Bed/chair exit alarm,Patient to call before getting OOB,Teach patient to arise slowly    Elimination Interventions: Bed/chair exit alarm,Call light in reach,Patient to call for help with toileting needs,Stay With Me (per policy),Toilet paper/wipes in reach,Toileting schedule/hourly rounds    History of Falls Interventions: Bed/chair exit alarm,Door open when patient unattended,Room close to nurse's station         Problem: Patient Education: Go to Patient Education Activity  Goal: Patient/Family Education  Outcome: Progressing Towards Goal     Problem: Impaired Skin Integrity/Pressure Injury Treatment  Goal: *Improvement of Existing Pressure Injury  Outcome: Progressing Towards Goal  Goal: *Prevention of pressure injury  Description: Document Valeriy Scale and appropriate interventions in the flowsheet.   Outcome: Progressing Towards Goal  Note: Pressure Injury Interventions:  Sensory Interventions: Assess changes in LOC    Moisture Interventions: Absorbent underpads,Minimize layers,Moisture barrier    Activity Interventions: Increase time out of bed,Pressure redistribution bed/mattress(bed type)    Mobility Interventions: HOB 30 degrees or less,Pressure redistribution bed/mattress (bed type)    Nutrition Interventions: Document food/fluid/supplement intake    Friction and Shear Interventions: Lift sheet,Minimize layers,HOB 30 degrees or less,Apply protective barrier, creams and emollients                Problem: Patient Education: Go to Patient Education Activity  Goal: Patient/Family Education  Outcome: Progressing Towards Goal     Problem: Hypertension  Goal: *Blood pressure within specified parameters  Outcome: Progressing Towards Goal  Goal: *Fluid volume balance  Outcome: Progressing Towards Goal  Goal: *Labs within defined limits  Outcome: Progressing Towards Goal     Problem: Patient Education: Go to Patient Education Activity  Goal: Patient/Family Education  Outcome: Progressing Towards Goal

## 2022-06-29 NOTE — PROGRESS NOTES
[facility]    Bedside and Verbal shift change report given to [address] [place] [address] (oncoming nurse) by Peter Ayers LPN (offgoing nurse). Report included the following information SBAR, Kardex, MAR and Recent Results. Situation:   Code Status: Full Code   Hospital Day: 12   Problem List:   Hospital Problems  Date Reviewed: 6/24/2022          Codes Class Noted POA    Hypokalemia ICD-10-CM: E87.6  ICD-9-CM: 276.8  6/22/2022 Yes        Status post kyphoplasty ICD-10-CM: Z98.890  ICD-9-CM: V45.89  6/13/2022 Yes    Overview Signed 6/20/2022  4:37 PM by Ashley Lr MD     S/P Image guided T11 kyphoplasty; Image guided bilateral sacroplasty (6/13/2022 - Dr. Vadim Ricardo)             * (Principal) Compression fracture of T11 vertebra with routine healing ICD-10-CM: S22.080D  ICD-9-CM: V54.17  6/9/2022 Yes        Bilateral sacral insufficiency fracture with routine healing ICD-10-CM: M84.48XD  ICD-9-CM: V54.27  6/9/2022 Yes        Generalized weakness ICD-10-CM: R53.1  ICD-9-CM: 780.79  6/9/2022 Yes        Impaired mobility and ADLs ICD-10-CM: Z74.09, Z78.9  ICD-9-CM: V49.89  6/9/2022 Yes        Sciatica of right side ICD-10-CM: M54.31  ICD-9-CM: 724.3  2/7/2018 Yes        Essential hypertension, benign ICD-10-CM: I10  ICD-9-CM: 401.1  1/29/2009 Yes              Background:   Past Medical History:   Past Medical History:   Diagnosis Date    Acid reflux     Bilateral sacral insufficiency fracture with routine healing 6/9/2022    Chronic anemia     Compression fracture of T11 vertebra with routine healing 6/9/2022    Hypertension     Spinal stenosis         Assessment:   Changes in Assessment throughout shift: No change to previous assessment     Patient has a central line: no Reasons if yes: Insertion date: Last dressing date:   Patient has Ellis Cath: no Reasons if yes:     Insertion date:  Shift ellis care completed:      Last Vitals:     Vitals:    06/28/22 0758 06/28/22 1540 06/28/22 2023 06/29/22 0725   BP: (!) 159/79 117/69 (!) 159/85 (!) 148/86   Pulse: 83 74 80 80   Resp: 18 18 18 18   Temp: 98.4 °F (36.9 °C) 98.2 °F (36.8 °C) 97.3 °F (36.3 °C) 97.3 °F (36.3 °C)   SpO2: 95% 94% 98% 96%   Weight:       Height:            PAIN    Pain Assessment    Pain Intensity 1: 0 (06/29/22 0758) Pain Intensity 1: 2 (12/29/14 1105)    Pain Location 1: Leg Pain Location 1: Abdomen    Pain Intervention(s) 1: Medication (see MAR) Pain Intervention(s) 1: Medication (see MAR)  Patient Stated Pain Goal: 0 Patient Stated Pain Goal: 0  o Intervention effective: yes  o Other actions taken for pain: Medication (see MAR)     Skin Assessment  Skin color    Condition/Temperature    Integrity Skin Integrity: Wound (add Wound LDA)  Turgor    Weekly Pressure Ulcer Documentation  Pressure  Injury Documentation: No Pressure Injury Noted-Pressure Ulcer Prevention Initiated  Wound Prevention & Protection Methods  Orientation of wound Orientation of Wound Prevention: Posterior  Location of Prevention Location of Wound Prevention: Sacrum/Coccyx  Dressing Present Dressing Present : No  Dressing Status Dressing Status: Intact  Wound Offloading Wound Offloading (Prevention Methods): Bed, pressure reduction mattress    Wound Prevention Checklist  Precautions:      []  Heel prevention boots placed on patient    []  Patient turned q2h during shift    []  Valeriy Order Set ordered    []  Patient on [place] bed/Specialty bed    []  Each Wound is documented during shift (Stage, Color, drainage, odor, measurements, and dressings)    [x]  Dual skin checks done at bedside during shift report with Allyson James LPN     INTAKE/OUPUT  Date 06/28/22 0700 - 06/29/22 0659 06/29/22 0700 - 06/30/22 0659   Shift 8215-0913 9045-2071 24 Hour Total 7923-6328 9285-1479 24 Hour Total   INTAKE   P.O.         P. O.       Shift Total(mL/kg) 720(10.5) 480(7) 1200(17.5)      OUTPUT   Urine(mL/kg/hr)  0(0) 0(0)        Urine Voided  0 0 Urine Occurrence(s) 1 x 4 x 5 x      Stool           Stool Occurrence(s) 0 x 0 x 0 x      Shift Total(mL/kg)  0(0) 0(0)       405 8990      Weight (kg) 68.8 68.8 68.8 68.8 68.8 68.8       Recommendations:  1. Patient needs and requests: pain management. 2. Pending tests/procedures: none at this time     3. Functional Level/Equipment: Partial (one person) / Tremaine Sample    Fall Precautions:   Fall risk precautions were reinforced with the patient; she was instructed to call for help prior to getting up. The following fall risk precautions were continued: bed/ chair alarms, door signage, yellow bracelet and socks as well as update of the Chef Dovunque tool in the patient's room. Yinka Score: 4    HEALS Safety Check    A safety check occurred in the patient's room between off going nurse and oncoming nurse listed above. The safety check included the below items  Area Items   H  High Alert Medications - Verify all high alert medication drips (heparin, PCA, etc.)   E  Equipment - Suction is set up for ALL patients (with catalina)  - Red plugs utilized for all equipment (IV pumps, etc.)  - WOWs wiped down at end of shift.  - Room stocked with oxygen, suction, and other unit-specific supplies   A  Alarms - Bed alarm is set for fall risk patients  - Ensure chair alarm is in place and activated if patient is up in a chair   L  Lines - Check IV for any infiltration  - Nance bag is empty if patient has a Nance   - Tubing and IV bags are labeled   S  Safety   - Room is clean, patient is clean, and equipment is clean. - Hallways are clear from equipment besides carts. - Fall bracelet on for fall risk patients  - Ensure room is clear and free of clutter  - Suction is set up for ALL patients (with catalina)  - Hallways are clear from equipment besides carts.    - Isolation precautions followed, supplies available outside room, sign posted     Antony Romo LPN

## 2022-06-29 NOTE — INTERDISCIPLINARY ROUNDS
[facility] PHYSICAL REHABILITATION  [address] [place] [address], [address]    INPATIENT REHABILITATION  PRE-TEAM CONFERENCE SUMMARY     Date of Conference: 6/30/2022    Patient Information:        Name: Cooper Ledesma Age / Sex: 68 y.o. / female   CSN: [id number] MRN: [id number]   [facility] Date: 6/17/2022 Length of Stay: 12 days     Primary Rehabilitation Diagnosis  1. Impaired Mobility and ADLs  2. Compression fracture of T11 vertebra with routine healing  3. Bilateral sacral insufficiency fracture with routine healing  4.  S/P Image guided T11 kyphoplasty; Image guided bilateral sacroplasty (6/13/2022 - Dr. Nancy Danielle    Comorbidities  Patient Active Problem List   Diagnosis Code    Spinal stenosis of lumbar region with neurogenic claudication M48.062    Lumbar facet arthropathy M47.816    Disorder of bone and cartilage M89.9, M94.9    Diverticulosis of colon K57.30    Dyslipidemia E78.5    Essential hypertension, benign I10    Gastroesophageal reflux disease without esophagitis K21.9    Generalized anxiety disorder F41.1    Impaired fasting glucose R73.01    Irritable bowel syndrome with diarrhea K58.0    Melanoma in situ of right upper arm (HCC) D03.61    Primary insomnia F51.01    Sciatica of right side M54.31    Squamous cell carcinoma [id number]    Lumbar stenosis with neurogenic claudication M48.062    Depression, recurrent (HCC) F33.9    Spinal stenosis M48.00    Leg weakness, bilateral R29.898    Atrial fibrillation with rapid ventricular response (HCC) I48.91    Compression fracture of T11 vertebra with routine healing S22.080D    Bilateral sacral insufficiency fracture with routine healing M84.48XD    Status post kyphoplasty Z98.890    Generalized weakness R53.1    Impaired mobility and ADLs Z74.09, Z78.9    Chronic anemia D64.9    Hypokalemia E87.6         Therapy:     FIM SCORES Initial Assessment Weekly Progress Assessment 6/29/2022   Eating Functional Level: 5  Comments: Self feeding with fork  6   Swallowing     Grooming 5 5   Bathing 3 5   Upper Body Dressing Functional Level: 3  Items Applied/Steps Completed: Bra (3 steps)  Comments: At EOB, A to pull over trunk. Donning l/s shirt reclined in bed with assist to locate R sleeve and pull over trunk via log rolling Functional Level: 5   Lower Body Dressing Functional Level: 2  Items Applied/Steps Completed: Elastic waist pants (3 steps)  Comments: Donning from reclined in bed d/t pain and dizziness Functional Level: 5   Toileting Functional Level: 3  Comments: Pt voided using elevated seat over toilet; pt requiring Mod A for CM (pants up/ down) and SBA for hygiene. Verbal cues for maintaining spinal precautions. Functional Level: 5   Bladder 0 0   Bowel  0 0   Toilet Transfer [place] Toilet Transfer Score: 4  Comments: Stand step xfer w/c <-> elevated seat over toilet; grab bar. Gait belt for safety. Verbal cues for hand placement and body positioning for adhering to spinal precautions. Toilet Transfer Score: 5   Tub/Shower Transfer [place] Tub or Shower Type: Tub/Shower combination  Tub/Shower Transfer Score: 4  Comments: tub transfer bench and FWW, simulated tub transfer bench transfer for home environment Tub/Shower Transfer Score: 5     Comprehension Primary Mode of Comprehension: Auditory  Score: 5       Expression Primary Mode of Expression: Verbal  Score: 5     Social Interaction Score: 5     Problem Solving Score: 5     Memory Score: 5       FIM SCORES Initial Assessment Weekly Progress Assessment 6/29/2022   Bed/Chair/Wheelchair Transfers Transfer Type: Other  Other: stand step with RW  Transfer Assistance : 4 (Minimal assistance)  Sit to Stand Assistance: Minimal assistance  Car Transfers: Not tested  Car Type: car simulator Transfer Type:  Other  Other: stand step txfr with RW  Transfer Assistance : 5 (Stand-by assistance)  Sit to Stand Assistance: Supervision  Car Transfers: Supervision  Car Type: car txfr simulator   Bed Mobility Rolling Right 5 (Stand-by assistance)   Rolling Left 5 (Stand-by assistance)   Supine to Sit 4 (Minimal assistance)   Sit to Stand Minimal assistance   Sit to Supine 3 (Moderate assistance)    Rolling Right    NT   Rolling Left    NT   Supine to Sit    NT   Sit to Stand   Supervision   Sit to Supine    NT      Locomotion (W/C) Able to Propel (ft): 60 feet  Functional Level: 4  Curbs/Ramps Assist Required (FIM Score): 0 (Not tested)  Wheelchair Setup Assist Required : 2 (Maximal assistance)  Wheelchair Management: Manages right brake,Manages left brake Function  NT  Setup AssistanceNT         Locomotion (W/C distance)    NT   Locomotion (Walk) 4 (Minimal assistance) 5 (Stand-by assistance)  Walker, rolling   Locomotion (Walk dist.) 25 Feet (ft) 150 Feet (ft) (x2 trials)   Steps/Stairs Steps/Stairs Ambulated (#): 0  Level of Assist : 0 (Not tested)  Rail Use: Both  8 6\" steps with BHR and SBA, step to pattern         Nursing:     Neuro:   AAA&O x 4           Respiratory:   [x] WNL   [] O2 LPM:   Other:  Peripheral Vascular:   [] TEDS present   [] Edema present ____ Grade   Cardiac:   [x] WNL   [] Other  Genitourinary:   [x] continent   [] incontinent   [] ellis  Abdominal ___6/29___ LBM  GI: __reg_____ Diet Thin______ Liquids _____ tube feeds  Musculoskeletal: ____ ROM Transfers __wheelchair / walker___ Assistive Device Used  _Min___ Level of Assistance  Skin Integumentary:   [x] Intact   [] Not Intact   __________Preventative Measures  Details__incision healing____________________________________________________________  Pain: [x] Controlled   [] Not Controlled   Pain Meds:   [x] Scheduled   [] PRN        Interdisciplinary Team Goals:     1. Discipline  Physical Therapy    Goal  Pt will negotiate 12 stairs with BHR and SBA to access 2nd floor of home. Barrier  fear, anxiety, pain, poor carryover, decreased short term memory.      Intervention  txfr training, gait training, stair training    Goal written by:   JOSÉ MIGUEL Arroyo     2. Discipline  Occupational Therapy    Goal  Pt will perform bathing/dressing and functional transfers with Armaan    Barrier  Decreased cognition,sleep, balance, and strength    Intervention  Therex, Theract, Self Care Retraining, Education    Goal written by:  Breanna Bates MSOTR/L      3. Discipline  Speech Therapy    Goal      Barrier      Intervention      Goal written by:       4. Discipline  Nursing    Goal  No falls prior to discharge    Barrier  spinal surgery    Intervention  Non skid socks, call light and personal items in reach, toilet patient as needed     Goal written by:  Fazal Tubbs LPN     5. Discipline  Clinical Psychology    Goal  Understand all safety and pace issues for return to home    Barrier  Stress with transition from ARU to home environment    Intervention  Patient education and reinforcement    Goal written by:  Nely Ramos, PhD         Disposition / Discharge Planning:      Follow-up services:  [x] Physical Therapy             [x] Occupational Therapy       [] Speech Therapy           [] Skilled Nursing      [] Medical Social Worker   [] Aide        [] Outpatient      [] vs   [x] Home Health  [] vs       [x] to progress to outpatient       [] with 24-hour supervision       [] with 24-hour assistance   [] [place]   [facility] recommendations:  RW, shower chair   Estimated discharge date:  7/2/2022   Discharge Location:  [] Home  [] versus    [] [place]    [] [address]   [] Other:           Electronic Signatures:      Signature Date Signed   Physical Therapist    JOSÉ MIGUEL Arroyo  Rhea Found PT, DPT  6/29/2022 6/30/2022   Occupational Therapist    CYNTHIA Josue/L   6/29/22   Speech Therapist         Nursing    Fazal Tubbs LPN/ Ismael Wells MSN RN AGCNS-BC  6/29/2022   Clinical Psychologist    Nely Ramos, PhD  6/29/2022    Physician      Rich Aase, MD 6/29/2022       MARKELL Bennett  6/29/2022     Opportunity to share with family/caregiver[] YES [] NO    Relationship to patient____________________________________________________      The above information has been reviewed with the patient in a language that they can understand. Opportunity for comments and questions has been provided and a signed attestation has been scanned into the \"media tab\" of the EMR.       Patient Signature: ______________________________________________________    Date Signed: __________________________________________________________

## 2022-06-29 NOTE — PROGRESS NOTES
Progress Note    Patient: Charo Masterson MRN: [id number]  CSN: [id number]    YOB: 1945  Age: 68 y.o. Sex: female    DOA: 6/17/2022 LOS:  LOS: 12 days                    Subjective:     Primary Rehabilitation Diagnosis: Generalized weakness with Impaired mobility and ADLs secondary to:  1. Compression fracture of T11 vertebra with routine healing  2. Bilateral sacral insufficiency fracture with routine healing  3. S/P Image guided T11 kyphoplasty; Image guided bilateral sacroplasty (6/13/2022 - Dr. Adore Hutchison)     Patient continues to have right thigh pain and back pain. Appetite remains poor. No abdominal pain. No chest pain or shortness of breath. No cough. No headaches or dizziness. Right hip pain is much better currently. Review of systems  General: No fevers or chills. Cardiovascular: No chest pain or pressure. No palpitations. Pulmonary: No shortness of breath, cough or wheeze. Gastrointestinal: No abdominal pain, nausea, vomiting or diarrhea. Genitourinary: No urinary frequency, urgency, hesitancy or dysuria. Musculoskeletal: Right hip pain off and on. Neurologic: generalized weakness    Objective:     Physical Exam:  Visit Vitals  BP (!) 148/86 (BP 1 Location: Right upper arm, BP Patient Position: Supine)   Pulse 80   Temp 97.3 °F (36.3 °C)   Resp 18   Ht 5' 5\" (1.651 m)   Wt 68.8 kg (151 lb 9.6 oz)   SpO2 96%   Breastfeeding No   BMI 25.23 kg/m²        General appearance - alert, well appearing, and in no distress  Chest -clear air entry noted in bases, no wheezes  Heart - S1 and S2 normal  Abdomen - soft, nontender, nondistended, Bowel sounds present  Neurological - alert, oriented, normal speech, no focal findings noted except motor strength 4-5 out of 5 in right leg. Extremities - no pedal edema noted   Psych -no hallucination or agitation.     Functional Progress:    OCCUPATIONAL THERAPY    ON ADMISSION MOST RECENT   Eating  Functional Level: 5 Eating  Functional Level: 6     Grooming  Functional Level: 5   Grooming  Functional Level: 6 (Mod I w/c at sink)     Bathing  Functional Level: 3   Bathing  Functional Level: 5 (supv (ADL shower using tub bench))     Upper Body Dressing  Functional Level: 3   Upper Body Dressing  Functional Level: 5 (set-up/ Mod I)     Lower Body Dressing  Functional Level: 2   Lower Body Dressing  Functional Level: 5 (supv)     Toileting  Functional Level: 3   Toileting  Functional Level: 5     Toilet Transfers  Toilet Transfer Score: 4   Toilet Transfers  Toilet Transfer Score: 5     Tub /Shower Transfers  Tub/Shower Transfer Score: 4   Tub/Shower Transfers  Tub/Shower Transfer Score: 5 (Stand step xfer (SBA) using FWW; gait belt)       Legend:   7 - Independent   6 - Modified Independent   5 - Standby Assistance / Supervision / Set-up   4 - Minimum Assistance / Contact Guard Assistance   3 - Moderate Assistance   2 - Maximum Assistance   1 - Total Assistance / Dependent       Intake and Output:  Current Shift:  No intake/output data recorded. Last three shifts:  06/27 1901 - 06/29 0700  In: 1800 [P.O.:1800]  Out: 0     Labs: Results:       Chemistry Recent Labs     06/27/22  0648   *   *   K 4.3      CO2 25   BUN 16   CREA 0.58*   CA 8.6   AGAP 6   BUCR 28*      CBC w/Diff Recent Labs     06/27/22  0648   HGB 9.3*   HCT 31.6*      Cardiac Enzymes No results for input(s): CPK, CKND1, LES in the last 72 hours. No lab exists for component: CKRMB, TROIP   Coagulation No results for input(s): PTP, INR, APTT, INREXT, INREXT in the last 72 hours. Lipid Panel No results found for: CHOL, CHOLPOCT, CHOLX, CHLST, CHOLV, 339956, HDL, HDLP, LDL, LDLC, DLDLP, 390692, VLDLC, VLDL, TGLX, TRIGL, TRIGP, TGLPOCT, CHHD, CHHDX   BNP No results for input(s): BNPP in the last 72 hours. Liver Enzymes No results for input(s): TP, ALB, TBIL, AP in the last 72 hours.     No lab exists for component: SGOT, GPT, DBIL   Thyroid Studies Lab Results   Component Value Date/Time    TSH 0.63 06/10/2022 11:40 AM          Procedures/imaging: see electronic medical records for all procedures/Xrays and details which were not copied into this note but were reviewed prior to creation of Plan    Medications:   Current Facility-Administered Medications   Medication Dose Route Frequency    diclofenac (VOLTAREN) 1 % topical gel 4 g  4 g Topical QHS    lidocaine 4 % patch 1 Patch  1 Patch TransDERmal Q24H    metoprolol tartrate (LOPRESSOR) tablet 25 mg  25 mg Oral Q12H    acetaminophen (TYLENOL) tablet 650 mg  650 mg Oral TID    acetaminophen (TYLENOL) tablet 650 mg  650 mg Oral Q4H PRN    gabapentin (NEURONTIN) capsule 400 mg  400 mg Oral QHS    gabapentin (NEURONTIN) capsule 200 mg  200 mg Oral BID    hydrALAZINE (APRESOLINE) tablet 25 mg  25 mg Oral TID PRN    magnesium oxide (MAG-OX) tablet 400 mg  400 mg Oral DAILY    nortriptyline (PAMELOR) capsule 40 mg  40 mg Oral QHS    oxyCODONE IR (ROXICODONE) tablet 10 mg  10 mg Oral Q4H PRN    LORazepam (ATIVAN) tablet 0.5 mg  0.5 mg Oral BID PRN    ondansetron (ZOFRAN ODT) tablet 4 mg  4 mg Oral Q8H PRN    atorvastatin (LIPITOR) tablet 10 mg  10 mg Oral DAILY    ferrous gluconate 324 mg (37.5 mg iron) tablet 1 Tablet  324 mg Oral BID WITH MEALS    pantoprazole (PROTONIX) tablet 40 mg  40 mg Oral ACB    docusate sodium (COLACE) capsule 100 mg  100 mg Oral BID    bisacodyL (DULCOLAX) tablet 10 mg  10 mg Oral Q48H PRN    melatonin tablet 3 mg  3 mg Oral QHS PRN    naloxone (NARCAN) injection 0.2 mg  0.2 mg IntraVENous PRN       Assessment/Plan     Principal Problem:    Compression fracture of T11 vertebra with routine healing (6/9/2022)    Active Problems:    Essential hypertension, benign (1/29/2009)      Sciatica of right side (2/7/2018)      Bilateral sacral insufficiency fracture with routine healing (6/9/2022)      Status post kyphoplasty (6/13/2022)      Overview: S/P Image guided T11 kyphoplasty; Image guided bilateral sacroplasty       (6/13/2022 - Dr. Yulissa Dial)      Generalized weakness (6/9/2022)      Impaired mobility and ADLs (6/9/2022)      Hypokalemia (6/22/2022)      Compression fracture of T11 vertebra with routine healing; Bilateral sacral insufficiency fracture with routine healing; S/P Image guided T11 kyphoplasty; Image guided bilateral sacroplasty (6/13/2022 - Dr. Yulissa Dial)              > Thoracic spinal precautions  Continue pain control  6/27/22: We will add lidocaine patch and monitor patient's symptoms. 6/28: will add Voltaren topically to be used night time for muscle spasm treatment   6/29: will increase the dose of nighttime Gabapentin     Chronic anemia   Continue Ferrous gluconate 324 mg PO BID with meals      Dyslipidemia  Continue Atorvastatin 10 mg PO once daily     Essential hypertension  Currently controlled              > Continue:                          >  Metoprolol tartrate from 25 mg PO q 12 hr (9AM, 9PM)                          > Hydralazine 25 mg PO TID PRN for SBP greater than 170 mmHg  Monitor and adjust BP medication as needed     Gastroesophageal reflux disease  Continue Pantoprazole 40 mg PO daily before breakfast     Generalized anxiety disorder  Continue:                          > Nortriptyline 40 mg PO q HS                          > Lorazepam 0.5 mg PO BID PRN for anxiety     Sciatica of right side  Continue:                          > Gabapentin 200 mg PO BID (8AM, 2PM)                          > Gabapentin 400 mg PO q 8PM                          > Nortriptyline 40 mg PO q HS     Hypokalemia  Continue Magnesium oxide 400 mg PO once daily    Poor Appetite  6/28: discussed with patient and daughter about possibly using Remeron if needed. Total time to take care of this patient was  25 minutes and more than 50% of time was spent counseling and coordinating care.                    Disclaimer: Sections of this note are dictated using utilizing voice recognition software, which may have resulted in some phonetic based errors in grammar and contents. Even though attempts were made to correct all the mistakes, some may have been missed, and remained in the body of the document. If questions arise, please contact our department.     Daniel Alonso MD  6/29/2022

## 2022-06-29 NOTE — PROGRESS NOTES
SHIFT CHANGE NOTE FOR [facility]    Bedside and Verbal shift change report given to Rudy Bishop (oncoming nurse) by Abhay Gallegos RN (offgoing nurse). Report included the following information SBAR, Kardex, MAR and Recent Results. Situation:   Code Status: Full Code   Hospital Day: 12   Problem List:   Hospital Problems  Date Reviewed: 6/24/2022          Codes Class Noted POA    Hypokalemia ICD-10-CM: E87.6  ICD-9-CM: 276.8  6/22/2022 Yes        Status post kyphoplasty ICD-10-CM: Z98.890  ICD-9-CM: V45.89  6/13/2022 Yes    Overview Signed 6/20/2022  4:37 PM by Jethro Erazo MD     S/P Image guided T11 kyphoplasty; Image guided bilateral sacroplasty (6/13/2022 - Dr. Avinash Tinoco)             * (Principal) Compression fracture of T11 vertebra with routine healing ICD-10-CM: S22.080D  ICD-9-CM: V54.17  6/9/2022 Yes        Bilateral sacral insufficiency fracture with routine healing ICD-10-CM: M84.48XD  ICD-9-CM: V54.27  6/9/2022 Yes        Generalized weakness ICD-10-CM: R53.1  ICD-9-CM: 780.79  6/9/2022 Yes        Impaired mobility and ADLs ICD-10-CM: Z74.09, Z78.9  ICD-9-CM: V49.89  6/9/2022 Yes        Sciatica of right side ICD-10-CM: M54.31  ICD-9-CM: 724.3  2/7/2018 Yes        Essential hypertension, benign ICD-10-CM: I10  ICD-9-CM: 401.1  1/29/2009 Yes              Background:   Past Medical History:   Past Medical History:   Diagnosis Date    Acid reflux     Bilateral sacral insufficiency fracture with routine healing 6/9/2022    Chronic anemia     Compression fracture of T11 vertebra with routine healing 6/9/2022    Hypertension     Spinal stenosis         Assessment:   Changes in Assessment throughout shift: No change to previous assessment     Patient has a central line: no Reasons if yes: Insertion date: Last dressing date:   Patient has Ellis Cath: no Reasons if yes:     Insertion date:  Shift ellis care completed:      Last Vitals:     Vitals:    06/27/22 1924 06/28/22 0758 06/28/22 1548 06/28/22 2023   BP: 135/75 (!) 159/79 117/69 (!) 159/85   Pulse: 76 83 74 80   Resp: 18 18 18 18   Temp: 97.7 °F (36.5 °C) 98.4 °F (36.9 °C) 98.2 °F (36.8 °C) 97.3 °F (36.3 °C)   SpO2: 100% 95% 94% 98%   Weight:       Height:            PAIN    Pain Assessment    Pain Intensity 1: 0 (06/29/22 0530) Pain Intensity 1: 2 (12/29/14 1105)    Pain Location 1: Leg Pain Location 1: Abdomen    Pain Intervention(s) 1: Medication (see MAR) Pain Intervention(s) 1: Medication (see MAR)  Patient Stated Pain Goal: 0 Patient Stated Pain Goal: 0  o Intervention effective: yes  o Other actions taken for pain: Medication (see MAR)     Skin Assessment  Skin color    Condition/Temperature    Integrity Skin Integrity: Wound (add Wound LDA)  Turgor    Weekly Pressure Ulcer Documentation  Pressure  Injury Documentation: No Pressure Injury Noted-Pressure Ulcer Prevention Initiated  Wound Prevention & Protection Methods  Orientation of wound Orientation of Wound Prevention: Posterior  Location of Prevention Location of Wound Prevention: Sacrum/Coccyx  Dressing Present Dressing Present : No  Dressing Status Dressing Status: Intact  Wound Offloading Wound Offloading (Prevention Methods): Bed, pressure reduction mattress    Wound Prevention Checklist  Precautions:      []  Heel prevention boots placed on patient    []  Patient turned q2h during shift    []  Valeriy Order Set ordered    []  Patient on [place] bed/Specialty bed    []  Each Wound is documented during shift (Stage, Color, drainage, odor, measurements, and dressings)    [x]  Dual skin checks done at bedside during shift report with Alex Coon LPN     INTAKE/OUPUT  Date 06/28/22 0700 - 06/29/22 0659 06/29/22 0700 - 06/30/22 0659   Shift [id number] 1900-0659 24 Hour Total 9579-4921 5186-1300 24 Hour Total   INTAKE   P.O.         P. O.       Shift Total(mL/kg) 720(10.5) 360(5.2) 1080(15.7)      OUTPUT   Urine(mL/kg/hr)  0 0        Urine Voided  0 0        Urine Occurrence(s) 1 x 4 x 5 x      Stool           Stool Occurrence(s) 0 x 0 x 0 x      Shift Total(mL/kg)  0(0) 0(0)       317 0238      Weight (kg) 68.8 68.8 68.8 68.8 68.8 68.8       Recommendations:  1. Patient needs and requests: pain management. 2. Pending tests/procedures: none at this time     3. Functional Level/Equipment: Partial (one person) / Lamberto Man    Fall Precautions:   Fall risk precautions were reinforced with the patient; she was instructed to call for help prior to getting up. The following fall risk precautions were continued: bed/ chair alarms, door signage, yellow bracelet and socks as well as update of the Yuridia Starch tool in the patient's room. Yinka Score: 4    HEALS Safety Check    A safety check occurred in the patient's room between off going nurse and oncoming nurse listed above. The safety check included the below items  Area Items   H  High Alert Medications - Verify all high alert medication drips (heparin, PCA, etc.)   E  Equipment - Suction is set up for ALL patients (with catalina)  - Red plugs utilized for all equipment (IV pumps, etc.)  - WOWs wiped down at end of shift.  - Room stocked with oxygen, suction, and other unit-specific supplies   A  Alarms - Bed alarm is set for fall risk patients  - Ensure chair alarm is in place and activated if patient is up in a chair   L  Lines - Check IV for any infiltration  - Nance bag is empty if patient has a Nance   - Tubing and IV bags are labeled   S  Safety   - Room is clean, patient is clean, and equipment is clean. - Hallways are clear from equipment besides carts. - Fall bracelet on for fall risk patients  - Ensure room is clear and free of clutter  - Suction is set up for ALL patients (with catalina)  - Hallways are clear from equipment besides carts.    - Isolation precautions followed, supplies available outside room, sign posted     Kira Troy RN

## 2022-06-29 NOTE — PROGRESS NOTES
[facility]    Bedside and Verbal shift change report given to [address] [place] [address] (oncoming nurse) by Ara Horowitz LPN (offgoing nurse). Report included the following information SBAR, Kardex, MAR and Recent Results. Situation:   Code Status: Full Code   Hospital Day: 12   Problem List:   Hospital Problems  Date Reviewed: 6/24/2022          Codes Class Noted POA    Hypokalemia ICD-10-CM: E87.6  ICD-9-CM: 276.8  6/22/2022 Yes        Status post kyphoplasty ICD-10-CM: Z98.890  ICD-9-CM: V45.89  6/13/2022 Yes    Overview Signed 6/20/2022  4:37 PM by Viki Marte MD     S/P Image guided T11 kyphoplasty; Image guided bilateral sacroplasty (6/13/2022 - Dr. Waleska Givens)             * (Principal) Compression fracture of T11 vertebra with routine healing ICD-10-CM: S22.080D  ICD-9-CM: V54.17  6/9/2022 Yes        Bilateral sacral insufficiency fracture with routine healing ICD-10-CM: M84.48XD  ICD-9-CM: V54.27  6/9/2022 Yes        Generalized weakness ICD-10-CM: R53.1  ICD-9-CM: 780.79  6/9/2022 Yes        Impaired mobility and ADLs ICD-10-CM: Z74.09, Z78.9  ICD-9-CM: V49.89  6/9/2022 Yes        Sciatica of right side ICD-10-CM: M54.31  ICD-9-CM: 724.3  2/7/2018 Yes        Essential hypertension, benign ICD-10-CM: I10  ICD-9-CM: 401.1  1/29/2009 Yes              Background:   Past Medical History:   Past Medical History:   Diagnosis Date    Acid reflux     Bilateral sacral insufficiency fracture with routine healing 6/9/2022    Chronic anemia     Compression fracture of T11 vertebra with routine healing 6/9/2022    Hypertension     Spinal stenosis         Assessment:   Changes in Assessment throughout shift: No change to previous assessment     Patient has a central line: no Reasons if yes: Insertion date: Last dressing date:   Patient has Ellis Cath: no Reasons if yes:     Insertion date:  Shift ellis care completed:      Last Vitals:     Vitals:    06/28/22 0758 06/28/22 1540 06/28/22 2023 06/29/22 0725   BP: (!) 159/79 117/69 (!) 159/85 (!) 148/86   Pulse: 83 74 80 80   Resp: 18 18 18 18   Temp: 98.4 °F (36.9 °C) 98.2 °F (36.8 °C) 97.3 °F (36.3 °C) 97.3 °F (36.3 °C)   SpO2: 95% 94% 98% 96%   Weight:       Height:            PAIN    Pain Assessment    Pain Intensity 1: 0 (06/29/22 0758) Pain Intensity 1: 2 (12/29/14 1105)    Pain Location 1: Leg Pain Location 1: Abdomen    Pain Intervention(s) 1: Medication (see MAR) Pain Intervention(s) 1: Medication (see MAR)  Patient Stated Pain Goal: 0 Patient Stated Pain Goal: 0  o Intervention effective: yes  o Other actions taken for pain: Medication (see MAR)     Skin Assessment  Skin color    Condition/Temperature    Integrity Skin Integrity: Wound (add Wound LDA)  Turgor    Weekly Pressure Ulcer Documentation  Pressure  Injury Documentation: No Pressure Injury Noted-Pressure Ulcer Prevention Initiated  Wound Prevention & Protection Methods  Orientation of wound Orientation of Wound Prevention: Posterior  Location of Prevention Location of Wound Prevention: Sacrum/Coccyx  Dressing Present Dressing Present : No  Dressing Status Dressing Status: Intact  Wound Offloading Wound Offloading (Prevention Methods): Bed, pressure reduction mattress    Wound Prevention Checklist  Precautions:      []  Heel prevention boots placed on patient    []  Patient turned q2h during shift    []  Valeriy Order Set ordered    []  Patient on [place] bed/Specialty bed    []  Each Wound is documented during shift (Stage, Color, drainage, odor, measurements, and dressings)    [x]  Dual skin checks done at bedside during shift report with Shaina Bernard LPN     INTAKE/OUPUT  Date 06/28/22 0700 - 06/29/22 0659 06/29/22 0700 - 06/30/22 0659   Shift 8864-3864 8273-7067 24 Hour Total 8189-1354 3990-7344 24 Hour Total   INTAKE   P.O.         P. O.       Shift Total(mL/kg) 720(10.5) 480(7) 1200(17.5)      OUTPUT   Urine(mL/kg/hr)  0(0) 0(0)        Urine Voided  0 0 Urine Occurrence(s) 1 x 4 x 5 x      Stool           Stool Occurrence(s) 0 x 0 x 0 x      Shift Total(mL/kg)  0(0) 0(0)       233 4499      Weight (kg) 68.8 68.8 68.8 68.8 68.8 68.8       Recommendations:  1. Patient needs and requests: pain management. 2. Pending tests/procedures: none at this time     3. Functional Level/Equipment: Partial (one person) / Rubie Mcardle    Fall Precautions:   Fall risk precautions were reinforced with the patient; she was instructed to call for help prior to getting up. The following fall risk precautions were continued: bed/ chair alarms, door signage, yellow bracelet and socks as well as update of the Orlanod Flow tool in the patient's room. Yinka Score: 4    HEALS Safety Check    A safety check occurred in the patient's room between off going nurse and oncoming nurse listed above. The safety check included the below items  Area Items   H  High Alert Medications - Verify all high alert medication drips (heparin, PCA, etc.)   E  Equipment - Suction is set up for ALL patients (with catalina)  - Red plugs utilized for all equipment (IV pumps, etc.)  - WOWs wiped down at end of shift.  - Room stocked with oxygen, suction, and other unit-specific supplies   A  Alarms - Bed alarm is set for fall risk patients  - Ensure chair alarm is in place and activated if patient is up in a chair   L  Lines - Check IV for any infiltration  - Nance bag is empty if patient has a Nance   - Tubing and IV bags are labeled   S  Safety   - Room is clean, patient is clean, and equipment is clean. - Hallways are clear from equipment besides carts. - Fall bracelet on for fall risk patients  - Ensure room is clear and free of clutter  - Suction is set up for ALL patients (with catalina)  - Hallways are clear from equipment besides carts.    - Isolation precautions followed, supplies available outside room, sign posted     Ebony Gonzales LPN

## 2022-06-29 NOTE — PROGRESS NOTES
Problem: Mobility Impaired (Adult and Pediatric)  Goal: *Acute Goals and Plan of Care (Insert Text)  Description: Physical Therapy Short Term Goals  Initiated 6/18/2022, reassessed 6/25/2022, and to be progressed to long term goals. 1.  Patient will move from supine to sit and sit to supine  in bed with contact guard assist.  Goal met 6/25/2022  2. Patient will transfer from bed to chair and chair to bed with contact guard assist using the least restrictive device. Goal met 6/25/2022  3. Patient will perform sit to stand with supervision/SBA. GOAL MET 6/25/2022  4. Patient will ambulate with contact guard assist for 100 feet with the least restrictive device. GOAL MET 6/25/2022  5. Patient will ascend/descend 4 stairs with 2 handrail(s) with minimal assistance/contact guard assist. ONGOING 6/25/2022    Physical Therapy Long Term Goals  Initiated 6/18/2022 and to be accomplished within 21 day(s)  1. Patient will move from supine to sit and sit to supine  in bed with modified independence. 2.  Patient will transfer from bed to chair and chair to bed with modified independence using the least restrictive device. 3.  Patient will perform sit to stand with modified independence. 4.  Patient will ambulate with modified independence for 150 feet with the least restrictive device. 5.  Patient will ascend/descend 12 stairs with 1 handrail(s) with minimal assistance/contact guard assist.     Outcome: Progressing Towards Goal     Problem: Mobility Impaired (Adult and Pediatric)  Goal: *Acute Goals and Plan of Care (Insert Text)  Description: Physical Therapy Short Term Goals  Initiated 6/18/2022, reassessed 6/25/2022, and to be progressed to long term goals. 1.  Patient will move from supine to sit and sit to supine  in bed with contact guard assist.  Goal met 6/25/2022  2. Patient will transfer from bed to chair and chair to bed with contact guard assist using the least restrictive device.  Goal met 2022  3. Patient will perform sit to stand with supervision/SBA. GOAL MET 2022  4. Patient will ambulate with contact guard assist for 100 feet with the least restrictive device. GOAL MET 2022  5. Patient will ascend/descend 4 stairs with 2 handrail(s) with minimal assistance/contact guard assist. ONGOING 2022    Physical Therapy Long Term Goals  Initiated 2022 and to be accomplished within 21 day(s)  1. Patient will move from supine to sit and sit to supine  in bed with modified independence. 2.  Patient will transfer from bed to chair and chair to bed with modified independence using the least restrictive device. 3.  Patient will perform sit to stand with modified independence. 4.  Patient will ambulate with modified independence for 150 feet with the least restrictive device. 5.  Patient will ascend/descend 12 stairs with 1 handrail(s) with minimal assistance/contact guard assist.     Outcome: Progressing Towards Goal   PHYSICAL THERAPY TREATMENT    Patient: Evelyn Curry (27 y.o. female)  Date: 2022  Diagnosis: Compression fracture of body of thoracic vertebra (HCC) [S22.000A] Compression fracture of T11 vertebra with routine healing  Precautions:  fall risk  Chart, physical therapy assessment, plan of care and goals were reviewed. Time In:1030  Time [id number]    Patient seen for: Gait training;Transfer training; Therapeutic exercise (stair training)     Time in: 1530  Time out: 1610  Pt seen for: group therapy session    Pain:  Pt pain was reported as not rated, but reports sore in groin pre-treatment. Pt pain was reported as not rated post-treatment. Intervention: NA    Patient identified with name and : yes     SUBJECTIVE:      Pt reports soreness in groin and states \"I think it was from the toe lifts yesterday. \"     OBJECTIVE DATA SUMMARY:    Objective:     TRANSFERS Daily Assessment     Transfer Type:  Other  Other: stand step txfr with RW  Transfer Assistance : 5 (Stand-by assistance)  Sit to Stand Assistance: Supervision  Car Transfers: Supervision  Car Type: car txfr simulator  Pt performed sit to stand from w/c with Supervision and v/c for proper hand placement ~25% of the time. Pt performed car txfr in simulator with supervision using RW. Pt ingressed and egressed simulator with proper technique without v/c. Pt performed stand step txfr w/c to recliner with RW and SBA and v/c for proper hand placement with stand to sit. GAIT Daily Assessment    Gait Description (WDL) Exceptions to WDL    Gait Abnormalities Decreased step clearance    Assistive device Walker, rolling    Ambulation assistance - level surface 5 (Stand-by assistance)    Distance 150 Feet (ft) (x2 trials)    Ambulation assistance- uneven surface      Comments Pt ambulated [id number] with RW and SBA. Pt required mod v/c for erect posture and to remain within base of RW. STEPS/STAIRS Daily Assessment     Steps/Stairs ambulated 8 (6\" steps)    Assistance Required 5 (Stand-by assistance)    Rail Use Both    Comments  Pt negotiated up and down 8 6\" steps with BHR and SBA. Pt required mod/max v/c for proper LE sequencing for step to pattern, leading with left LE to ascend and right LE to descend. Curbs/Ramps  NT        BALANCE Daily Assessment     Sitting - Static: Good (unsupported)  Sitting - Dynamic: Fair (occasional)  Standing - Static: Fair  Standing - Dynamic : Impaired        THERAPEUTIC EXERCISES Daily Assessment       Standing marching x15 with RW  Seated BLE LAQ 2x15    Group therapy treatment:    Standing balance  Activity   Sets   Reps   Time Spent   Comments   [] Beach ball toss/catch       [] Balloon tapping       [x] Forward/multi-directional reaching with tabletop activity    9min 24sec Pt stood for first part of table top game to challenge standing balance and tolerance.  Pt performed 1UE reaching with 1UE support on RW.    [] Bean bag toss to central target       [] Blanche Li tossing into basketball hoop         [x]   Patient appropriate to continue group therapy sessions to promote increased participation in skilled therapy interventions and to provide opportunities for increased social interaction. ASSESSMENT:  Pt demo'd decreased short term memory regarding what therapy has told her about going upstairs at home. Pt also continues to require v/c for proper sequencing with stair negotiation and proper hand placement with sit to stand due to poor carryover. Progression toward goals:  []      Improving appropriately and progressing toward goals  [x]      Improving slowly and progressing toward goals  []      Not making progress toward goals and plan of care will be adjusted      PLAN:  Patient continues to benefit from skilled intervention to address the above impairments. Continue treatment per established plan of care. Discharge Recommendations:  Home Health  Further Equipment Recommendations for Discharge:  rolling walker      Estimated Discharge Date: 7/2/2022    Activity Tolerance:   Fair+  Please refer to the flowsheet for vital signs taken during this treatment.     After treatment:   [] Patient left in no apparent distress in bed  [x] Patient left in no apparent distress sitting up in chair  [] Patient left in no apparent distress in w/c mobilizing under own power  [] Patient left in no apparent distress dining area  [] Patient left in no apparent distress mobilizing under own power  [x] Call bell left within reach  [] Nursing notified  [] Caregiver present  [] Bed alarm activated   [x] Chair alarm activated      Naif Upton PTA  6/29/2022

## 2022-06-30 PROCEDURE — 97110 THERAPEUTIC EXERCISES: CPT

## 2022-06-30 PROCEDURE — 74011250636 HC RX REV CODE- 250/636: Performed by: INTERNAL MEDICINE

## 2022-06-30 PROCEDURE — 97530 THERAPEUTIC ACTIVITIES: CPT

## 2022-06-30 PROCEDURE — 74011250637 HC RX REV CODE- 250/637: Performed by: FAMILY MEDICINE

## 2022-06-30 PROCEDURE — 74011000250 HC RX REV CODE- 250: Performed by: INTERNAL MEDICINE

## 2022-06-30 PROCEDURE — 74011250637 HC RX REV CODE- 250/637: Performed by: INTERNAL MEDICINE

## 2022-06-30 PROCEDURE — 99232 SBSQ HOSP IP/OBS MODERATE 35: CPT | Performed by: INTERNAL MEDICINE

## 2022-06-30 PROCEDURE — 97116 GAIT TRAINING THERAPY: CPT

## 2022-06-30 PROCEDURE — 65310000000 HC RM PRIVATE REHAB

## 2022-06-30 PROCEDURE — 97150 GROUP THERAPEUTIC PROCEDURES: CPT

## 2022-06-30 PROCEDURE — 74011250637 HC RX REV CODE- 250/637: Performed by: EMERGENCY MEDICINE

## 2022-06-30 PROCEDURE — 51798 US URINE CAPACITY MEASURE: CPT

## 2022-06-30 RX ORDER — CEFTRIAXONE 1 G/1
1 INJECTION, POWDER, FOR SOLUTION INTRAMUSCULAR; INTRAVENOUS EVERY 24 HOURS
Status: DISCONTINUED | OUTPATIENT
Start: 2022-06-30 | End: 2022-06-30 | Stop reason: RX

## 2022-06-30 RX ADMIN — WATER 1 G: 1 INJECTION INTRAMUSCULAR; INTRAVENOUS; SUBCUTANEOUS at 11:31

## 2022-06-30 RX ADMIN — NORTRIPTYLINE HYDROCHLORIDE 40 MG: 10 CAPSULE ORAL at 20:00

## 2022-06-30 RX ADMIN — Medication 400 MG: at 08:28

## 2022-06-30 RX ADMIN — ACETAMINOPHEN 650 MG: 325 TABLET ORAL at 12:42

## 2022-06-30 RX ADMIN — GABAPENTIN 200 MG: 100 CAPSULE ORAL at 14:09

## 2022-06-30 RX ADMIN — GABAPENTIN 600 MG: 300 CAPSULE ORAL at 19:57

## 2022-06-30 RX ADMIN — Medication 1 TABLET: at 08:28

## 2022-06-30 RX ADMIN — LORAZEPAM 0.5 MG: 0.5 TABLET ORAL at 19:27

## 2022-06-30 RX ADMIN — ACETAMINOPHEN 650 MG: 325 TABLET ORAL at 17:27

## 2022-06-30 RX ADMIN — ATORVASTATIN CALCIUM 10 MG: 10 TABLET, FILM COATED ORAL at 08:28

## 2022-06-30 RX ADMIN — GABAPENTIN 200 MG: 100 CAPSULE ORAL at 08:28

## 2022-06-30 RX ADMIN — DICLOFENAC SODIUM 4 G: 10 GEL TOPICAL at 21:10

## 2022-06-30 RX ADMIN — Medication 1 TABLET: at 17:27

## 2022-06-30 RX ADMIN — ACETAMINOPHEN 650 MG: 325 TABLET ORAL at 08:28

## 2022-06-30 RX ADMIN — METOPROLOL TARTRATE 25 MG: 25 TABLET, FILM COATED ORAL at 08:28

## 2022-06-30 RX ADMIN — OXYCODONE HYDROCHLORIDE 10 MG: 5 TABLET ORAL at 04:24

## 2022-06-30 RX ADMIN — METOPROLOL TARTRATE 25 MG: 25 TABLET, FILM COATED ORAL at 20:00

## 2022-06-30 RX ADMIN — PANTOPRAZOLE SODIUM 40 MG: 40 TABLET, DELAYED RELEASE ORAL at 06:47

## 2022-06-30 NOTE — ROUTINE PROCESS
Patient bladder scanned with 261 cc noted to bladder. Patient encouraged to void and stated she could not at present.  Sitting up in recliner chair to eat lunch

## 2022-06-30 NOTE — INTERDISCIPLINARY ROUNDS
[facility] PHYSICAL REHABILITATION  [address] [place] [address], [address]    INPATIENT REHABILITATION  TEAM CONFERENCE SUMMARY     Date of Conference: 6/30/2022    Patient Information:        Name: Kevin Becerra Age / Sex: 68 y.o. / female   CSN: [id number] MRN: [id number]   [facility] Date: 6/17/2022 Length of Stay: 13 days     Primary Rehabilitation Diagnosis  1. Impaired Mobility and ADLs  2. Compression fracture of T11 vertebra with routine healing  3. Bilateral sacral insufficiency fracture with routine healing  4.  S/P Image guided T11 kyphoplasty; Image guided bilateral sacroplasty (6/13/2022 - Dr. Duran Master    Comorbidities  Patient Active Problem List   Diagnosis Code    Spinal stenosis of lumbar region with neurogenic claudication M48.062    Lumbar facet arthropathy M47.816    Disorder of bone and cartilage M89.9, M94.9    Diverticulosis of colon K57.30    Dyslipidemia E78.5    Essential hypertension, benign I10    Gastroesophageal reflux disease without esophagitis K21.9    Generalized anxiety disorder F41.1    Impaired fasting glucose R73.01    Irritable bowel syndrome with diarrhea K58.0    Melanoma in situ of right upper arm (HCC) D03.61    Primary insomnia F51.01    Sciatica of right side M54.31    Squamous cell carcinoma [id number]    Lumbar stenosis with neurogenic claudication M48.062    Depression, recurrent (HCC) F33.9    Spinal stenosis M48.00    Leg weakness, bilateral R29.898    Atrial fibrillation with rapid ventricular response (HCC) I48.91    Compression fracture of T11 vertebra with routine healing S22.080D    Bilateral sacral insufficiency fracture with routine healing M84.48XD    Status post kyphoplasty Z98.890    Generalized weakness R53.1    Impaired mobility and ADLs Z74.09, Z78.9    Chronic anemia D64.9    Hypokalemia E87.6         Therapy:     FIM SCORES Initial Assessment Weekly Progress Assessment 6/30/2022   Eating Functional Level: 5  Comments: Self feeding with fork  6   Swallowing     Grooming 5     Bathing 3     Upper Body Dressing Functional Level: 3  Items Applied/Steps Completed: Bra (3 steps)  Comments: At EOB, A to pull over trunk. Donning l/s shirt reclined in bed with assist to locate R sleeve and pull over trunk via log rolling     Lower Body Dressing Functional Level: 2  Items Applied/Steps Completed: Elastic waist pants (3 steps)  Comments: Donning from reclined in bed d/t pain and dizziness     Toileting Functional Level: 3  Comments: Pt voided using elevated seat over toilet; pt requiring Mod A for CM (pants up/ down) and SBA for hygiene. Verbal cues for maintaining spinal precautions. Functional Level: 5   Bladder 0 0   Bowel  0 0   Toilet Transfer [place] Toilet Transfer Score: 4  Comments: Stand step xfer w/c <-> elevated seat over toilet; grab bar. Gait belt for safety. Verbal cues for hand placement and body positioning for adhering to spinal precautions. Tub/Shower Transfer [place] Tub or Shower Type: Tub/Shower combination  Tub/Shower Transfer Score: 4  Comments: tub transfer bench and FWW, simulated tub transfer bench transfer for home environment       Comprehension Primary Mode of Comprehension: Auditory  Score: 5 Primary Mode of Comprehension: Auditory  Score: 5     Expression Primary Mode of Expression: Verbal  Score: 5 Primary Mode of Expression: Verbal  Score: 5   Social Interaction Score: 5 Score: 5   Problem Solving Score: 5 Score: 5   Memory Score: 5 Score: 6     FIM SCORES Initial Assessment Weekly Progress Assessment 6/30/2022   Bed/Chair/Wheelchair Transfers Transfer Type: Other  Other: stand step with RW  Transfer Assistance : 4 (Minimal assistance)  Sit to Stand Assistance: Minimal assistance  Car Transfers: Not tested  Car Type: car simulator Transfer Type:  Other  Other: stand step txfr with RW  Transfer Assistance : 5 (Stand-by assistance)  Sit to Stand Assistance: Supervision   Bed Mobility Rolling Right 5 (Stand-by assistance)   Rolling Left 5 (Stand-by assistance)   Supine to Sit 4 (Minimal assistance)   Sit to Stand Minimal assistance   Sit to Supine 3 (Moderate assistance)    Rolling Right   5 (Supervision)NT   Rolling Left   5 (Supervision)NT   Supine to Sit   5 (Stand-by assistance)NT   Sit to Stand   Supervision   Sit to Supine   4 (Minimal assistance) (with right LE)NT      Locomotion (W/C) Able to Propel (ft): 60 feet  Functional Level: 4  Curbs/Ramps Assist Required (FIM Score): 0 (Not tested)  Wheelchair Setup Assist Required : 2 (Maximal assistance)  Wheelchair Management: Manages right brake,Manages left brake Function  NT  Setup AssistanceNT         Locomotion (W/C distance)    NT   Locomotion (Walk) 4 (Minimal assistance) 5 (Stand-by assistance)  Walker, rolling   Locomotion (Walk dist.) 25 Feet (ft) 150 Feet (ft) (x2 trials)   Steps/Stairs Steps/Stairs Ambulated (#): 0  Level of Assist : 0 (Not tested)  Rail Use: Both  8 6\" steps with BHR and SBA, step to pattern         Nursing:     Neuro:   AAA&O x 4           Respiratory:   [x] WNL   [] O2 LPM:   Other:  Peripheral Vascular:   [] TEDS present   [] Edema present ____ Grade   Cardiac:   [x] WNL   [] Other  Genitourinary:   [x] continent   [] incontinent   [] ellsi  Abdominal ___6/29___ LBM  GI: __reg_____ Diet Thin______ Liquids _____ tube feeds  Musculoskeletal: ____ ROM Transfers __wheelchair / walker___ Assistive Device Used  _Min___ Level of Assistance  Skin Integumentary:   [x] Intact   [] Not Intact   __________Preventative Measures  Details__incision healing____________________________________________________________  Pain: [x] Controlled   [] Not Controlled   Pain Meds:   [x] Scheduled   [] PRN        Interdisciplinary Team Goals:     1. Discipline  Physical Therapy    Goal  Pt will negotiate 12 stairs with BHR and SBA to access 2nd floor of home.      Barrier  fear, anxiety, pain, poor carryover, decreased short term memory. Intervention  txfr training, gait training, stair training    Goal written by:   JOSÉ MIGUEL Mcclain     2. Discipline  Occupational Therapy    Goal  Pt will perform bathing/dressing and functional transfers with Armaan    Barrier  Decreased cognition,sleep, balance, and strength    Intervention  Therex, Theract, Self Care Retraining, Education    Goal written by:  CYNTHIA Estrada/MARA      3. Discipline  Speech Therapy    Goal      Barrier      Intervention      Goal written by:       4. Discipline  Nursing    Goal  No falls prior to discharge    Barrier  spinal surgery    Intervention  Non skid socks, call light and personal items in reach, toilet patient as needed     Goal written by:  Veronica December LPN     5. Discipline  Clinical Psychology    Goal  Understand all safety and pace issues for return to home    Barrier  Stress with transition from ARU to home environment    Intervention  Patient education and reinforcement    Goal written by:  Roland Rodriguez, PhD         Disposition / Discharge Planning:      Follow-up services:  [x] Physical Therapy             [x] Occupational Therapy       [] Speech Therapy           [] Skilled Nursing      [] Medical Social Worker   [] Aide        [] Outpatient      [] vs   [x] Home Health  [] vs       [x] to progress to outpatient       [] with 24-hour supervision       [] with 24-hour assistance   [] [place]   [facility] recommendations:  RW, shower chair   Estimated discharge date:  7/2/2022   Discharge Location:  [] Home  [] versus    [] Ravinder Patel    [] [facility]   [] Other:           Interdisciplinary team rounds were held this PM with the following team members      Name    Physical Therapist    Angel Michel PT, DPT     Occupational Therapist    CYNTHIA Estrada/MARA     Speech Therapist         [facility] LEMA RN [facility]     Clinical Psychologist    Steven Chang Rut Wesley, PhD      Physician      Mehul Eastman MD           MARKELL Miller       Opportunity to share with family/caregiver[] YES [] NO    Relationship to patient____________________________________________________      The above information has been reviewed with the patient in a language that they can understand. Opportunity for comments and questions has been provided and a signed attestation has been scanned into the \"media tab\" of the EMR.       Patient Signature: ______________________________________________________    Date Signed: __________________________________________________________

## 2022-06-30 NOTE — PROGRESS NOTES
Alert, no apparent distress,  ml, denies pain to bladder area. Paged MD. Latricia Christie order received: Check bladder 4 hours from now per [facility] AM nurse made aware.

## 2022-06-30 NOTE — PROGRESS NOTES
Problem: Patient Education: Go to Patient Education Activity  Goal: Patient/Family Education  Outcome: Progressing Towards Goal     Problem: Self Care Deficits Care Plan (Adult)  Goal: *Acute Goals and Plan of Care (Insert Text)  Description: Occupational Therapy Goals   Long Term Goals  Initiated 6/18/2022 (goals revised on 6/23/2022) and to be accomplished within 2 week(s), by (7/2/2022)  1. Pt will perform self-feeding with independence.  6/29/22  2. Pt will perform grooming with MOD I.  3. Pt will perform UB bathing with MOD I.  4. Pt will perform LB bathing with MOD I using AE and/ or compensatory strategies as needed. 5. Pt will perform tub/shower transfer with supervision using least restrictive assistive device. 6. Pt will perform UB dressing with MOD I.  7. Pt will perform LB dressing with set-up assistance using AE and/ or compensatory strategies as needed. 8. Pt will perform toileting task with MOD I.  9. Pt will perform toilet transfer with supervision using least restrictive assistive device. Short Term Goals   Initiated 6/18/2022 (reassessed on 6/23/2022) and to be accomplished within 7 day(s), by (6/30/2022); updated 6/29/22  1. Pt will perform grooming with no more than MIN A from w/c at sink. (Goal met 6/23/2022; currently set-up)  6/29/22 (S)  2. Pt will perform UB bathing with no more than MIN A from w/c with basin or TTB. (Goal met 6/23/2022)      Goal upgraded: Pt will perform UB bathing with set-up.  6/29/22  3. Pt will perform LB bathing with MOD A using AE and/ or compensatory strategies as needed. (Goal met 6/23/2022; currently SBA)      Goal upgraded: Pt will perform LB bathing with supv/ set-up using AE and/ or compensatory strategies as needed.  6/29/22  4. Pt will perform tub/shower transfer with MOD A using LRAD. (Goal met 6/23/2022; currently CGA)      Goal upgraded: Pt will perform tub/shower transfer with SBA using least restrictive assistive device.  6/29/22  5. Pt will perform UB dressing with setup A. (Goal met 2022)  6. Pt will perform LB dressing with MOD A using AE and/ or compensatory strategies. (Goal met 2022; currently CGA) 22        Goal upgraded: Pt will perform LB dressing with SBA using AE and/ or compensatory strategies. GM 22  7. Pt will perform toileting task with MIN A. (Goal met 2022; currently CGA)      Goal upgraded: Pt will perform toileting task with supv/ SBA. 8. Pt will perform toilet transfer with MOD A using LRAD. (Goal met 2022; currently SBA)      Goal upgraded: Pt will perform toilet transfer with supervision using least restrictive assistive device. Occupational Therapy TREATMENT    Patient: Kavita Varghese   68 y.o. Patient identified with name and : Yes     Date: 2022    First Tx Session  Time In: 1100  Time Out[de-identified] [phone]    Second Tx Session Group  Time In: 1130  Time Out[de-identified] 1200    Third Tx Session  Time In: 1430  Time Out[de-identified] 1500    Diagnosis: Compression fracture of body of thoracic vertebra ([facility] [address] [S22.000A]   Precautions:  Fall  Chart, occupational therapy assessment, plan of care, and goals were reviewed. Pain:  Pt reports 0/10 pain or discomfort prior to treatment. Pt reports 0/10 pain or discomfort post treatment. Intervention Provided: N/A      SUBJECTIVE:   Patient stated Do you tell your boyfriend to do this, to do that? .  Pt re educated on the purpose of Occupational Therapy    OBJECTIVE DATA SUMMARY:     THERAPEUTIC ACTIVITY Daily Assessment    Pt performed Card Match task to increase standing  tolerance. Pt able to stand up to 8:23. Pt showed decreased carryover of scanning strategies and required asst to locate ~15 cards. Small Mini Peg: Pt required asst to attend/copy  placement of pegs. THERAPEUTIC EXERCISE Daily Assessment    Sci Fit up to 10 minutes. Instruct pt.  in home exercise program: UB HEP (bicep curls, chess press, chest pulls, butterfly wings, front raise, over head raise)  with 2lb weight in hand. Pt declined to work with RUE 2/2 to IV in right dorsal hand. MOBILITY/TRANSFERS Daily Assessment              Sit to stand with S.  Pt requires repeated education on not getting up without staff present. ASSESSMENT:  Pt participating in therapy sessions to increase strength, FM, and balance. However, cognitive deficits impacts I with ADLs. Pt at times reluctant to perform tasks that require physical or cognitive effort. Progression toward goals:  [x]          Improving appropriately and progressing toward goals  [x]          Improving slowly and progressing toward goals  []          Not making progress toward goals and plan of care will be adjusted     PLAN:  Patient continues to benefit from skilled intervention to address the above impairments. Continue treatment per established plan of care. Discharge Recommendations:  Home Health with asst   Further Equipment Recommendations for Discharge:  bedside commode, shower chair      Activity Tolerance:  Fair       Estimated LOS: 7/1/22    Please refer to the flowsheet for vital signs taken during this treatment. After treatment:   [x]  Patient left in no apparent distress sitting up in chair   []  Patient left in no apparent distress in bed  []  Call bell left within reach  []  Nursing notified  []  Caregiver present  []  Bed alarm activated    COMMUNICATION/EDUCATION:   [] Home safety education was provided and the patient/caregiver indicated understanding. [x] Patient/family have participated as able in goal setting and plan of care. [] Patient/family agree to work toward stated goals and plan of care. [] Patient understands intent and goals of therapy, but is neutral about his/her participation. [] Patient is unable to participate in goal setting and plan of care.       Latosha Garcia, OT   Outcome: Progressing Towards Goal

## 2022-06-30 NOTE — PROGRESS NOTES
Problem: Patient Education: Go to Patient Education Activity  Goal: Patient/Family Education  Outcome: Progressing Towards Goal     Problem: Pain  Goal: *Control of Pain  Outcome: Progressing Towards Goal  Goal: *PALLIATIVE CARE:  Alleviation of Pain  Outcome: Progressing Towards Goal     Problem: Patient Education: Go to Patient Education Activity  Goal: Patient/Family Education  Outcome: Progressing Towards Goal     Problem: Falls - Risk of  Goal: *Absence of Falls  Description: Document Yinka Fall Risk and appropriate interventions in the flowsheet. Outcome: Progressing Towards Goal  Note: Fall Risk Interventions:  Mobility Interventions: Bed/chair exit alarm,Patient to call before getting OOB    Mentation Interventions: Bed/chair exit alarm,Door open when patient unattended    Medication Interventions: Bed/chair exit alarm,Patient to call before getting OOB    Elimination Interventions: Bed/chair exit alarm,Call light in reach,Patient to call for help with toileting needs    History of Falls Interventions: Bed/chair exit alarm,Door open when patient unattended,Room close to nurse's station         Problem: Patient Education: Go to Patient Education Activity  Goal: Patient/Family Education  Outcome: Progressing Towards Goal     Problem: Impaired Skin Integrity/Pressure Injury Treatment  Goal: *Improvement of Existing Pressure Injury  Outcome: Progressing Towards Goal  Goal: *Prevention of pressure injury  Description: Document Valeriy Scale and appropriate interventions in the flowsheet.   Outcome: Progressing Towards Goal  Note: Pressure Injury Interventions:  Sensory Interventions: Assess changes in LOC    Moisture Interventions: Absorbent underpads    Activity Interventions: Increase time out of bed    Mobility Interventions: HOB 30 degrees or less    Nutrition Interventions: Document food/fluid/supplement intake    Friction and [place]ar Interventions: Lift team/patient mobility team                Problem: Patient Education: Go to Patient Education Activity  Goal: Patient/Family Education  Outcome: Progressing Towards Goal     Problem: Anxiety  Goal: *Alleviation of anxiety  Outcome: Progressing Towards Goal  Goal: *Alleviation of anxiety (Palliative Care)  Outcome: Progressing Towards Goal     Problem: Patient Education: Go to Patient Education Activity  Goal: Patient/Family Education  Outcome: Progressing Towards Goal     Problem: Patient Education: Go to Patient Education Activity  Goal: Patient/Family Education  Outcome: Progressing Towards Goal     Problem: Hypertension  Goal: *Blood pressure within specified parameters  Outcome: Progressing Towards Goal  Goal: *Fluid volume balance  Outcome: Progressing Towards Goal  Goal: *Labs within defined limits  Outcome: Progressing Towards Goal     Problem: Patient Education: Go to Patient Education Activity  Goal: Patient/Family Education  Outcome: Progressing Towards Goal     Problem: Patient Education: Go to Patient Education Activity  Goal: Patient/Family Education  Outcome: Progressing Towards Goal     Problem: Pressure Injury - Risk of  Goal: *Prevention of pressure injury  Description: Document Valeriy Scale and appropriate interventions in the flowsheet.   Outcome: Progressing Towards Goal     Problem: Patient Education: Go to Patient Education Activity  Goal: Patient/Family Education  Outcome: Progressing Towards Goal     Problem: Patient Education: Go to Patient Education Activity  Goal: Patient/Family Education  Outcome: Progressing Towards Goal     Problem: Patient Education: Go to Patient Education Activity  Goal: Patient/Family Education  Outcome: Progressing Towards Goal     Problem: Nutrition Deficit  Goal: *Optimize nutritional status  Outcome: Progressing Towards Goal

## 2022-06-30 NOTE — PROGRESS NOTES
Problem: Pain  Goal: *Control of Pain  Outcome: Progressing Towards Goal     Problem: Patient Education: Go to Patient Education Activity  Goal: Patient/Family Education  Outcome: Progressing Towards Goal     Problem: Falls - Risk of  Goal: *Absence of Falls  Description: Document Hao Román Fall Risk and appropriate interventions in the flowsheet. Outcome: Progressing Towards Goal  Note: Fall Risk Interventions:  Mobility Interventions: Bed/chair exit alarm,Patient to call before getting OOB,Utilize walker, cane, or other assistive device    Mentation Interventions: Door open when patient unattended,Bed/chair exit alarm,Room close to nurse's station,Toileting rounds    Medication Interventions: Bed/chair exit alarm,Patient to call before getting OOB,Teach patient to arise slowly    Elimination Interventions: Bed/chair exit alarm,Call light in reach,Patient to call for help with toileting needs,Stay With Me (per policy),Toilet paper/wipes in reach,Toileting schedule/hourly rounds    History of Falls Interventions: Bed/chair exit alarm,Door open when patient unattended,Room close to nurse's station         Problem: Patient Education: Go to Patient Education Activity  Goal: Patient/Family Education  Outcome: Progressing Towards Goal     Problem: Impaired Skin Integrity/Pressure Injury Treatment  Goal: *Improvement of Existing Pressure Injury  Outcome: Progressing Towards Goal  Goal: *Prevention of pressure injury  Description: Document Valeriy Scale and appropriate interventions in the flowsheet.   Outcome: Progressing Towards Goal  Note: Pressure Injury Interventions:  Sensory Interventions: Assess changes in LOC    Moisture Interventions: Absorbent underpads,Minimize layers,Moisture barrier    Activity Interventions: Increase time out of bed,Pressure redistribution bed/mattress(bed type)    Mobility Interventions: HOB 30 degrees or less,Pressure redistribution bed/mattress (bed type)    Nutrition Interventions: Document food/fluid/supplement intake    Friction and Shear Interventions: Lift sheet,Minimize layers,HOB 30 degrees or less,Apply protective barrier, creams and emollients                Problem: Patient Education: Go to Patient Education Activity  Goal: Patient/Family Education  Outcome: Progressing Towards Goal     Problem: Hypertension  Goal: *Blood pressure within specified parameters  Outcome: Progressing Towards Goal  Goal: *Fluid volume balance  Outcome: Progressing Towards Goal  Goal: *Labs within defined limits  Outcome: Progressing Towards Goal     Problem: Patient Education: Go to Patient Education Activity  Goal: Patient/Family Education  Outcome: Progressing Towards Goal     Problem: Pressure Injury - Risk of  Goal: *Prevention of pressure injury  Description: Document Valeriy Scale and appropriate interventions in the flowsheet.   Outcome: Progressing Towards Goal  Note: Pressure Injury Interventions:  Sensory Interventions: Assess changes in LOC    Moisture Interventions: Absorbent underpads,Minimize layers,Moisture barrier    Activity Interventions: Increase time out of bed,Pressure redistribution bed/mattress(bed type)    Mobility Interventions: HOB 30 degrees or less,Pressure redistribution bed/mattress (bed type)    Nutrition Interventions: Document food/fluid/supplement intake    Friction and Shear Interventions: Lift sheet,Minimize layers,HOB 30 degrees or less,Apply protective barrier, creams and emollients                Problem: Patient Education: Go to Patient Education Activity  Goal: Patient/Family Education  Outcome: Progressing Towards Goal

## 2022-06-30 NOTE — PROGRESS NOTES
SHIFT CHANGE NOTE FOR [facility]    Bedside and Verbal shift change report given to Clare Navarrete RN (oncoming nurse) by Christi Joiner RN (offgoing nurse). Report included the following information SBAR, Kardex, MAR and Recent Results. Situation:   Code Status: Full Code   Hospital Day: 13   Problem List:   Hospital Problems  Date Reviewed: 6/24/2022          Codes Class Noted POA    Hypokalemia ICD-10-CM: E87.6  ICD-9-CM: 276.8  6/22/2022 Yes        Status post kyphoplasty ICD-10-CM: Z98.890  ICD-9-CM: V45.89  6/13/2022 Yes    Overview Signed 6/20/2022  4:37 PM by Anmol Reeder MD     S/P Image guided T11 kyphoplasty; Image guided bilateral sacroplasty (6/13/2022 - Dr. Felisa Garcia)             * (Principal) Compression fracture of T11 vertebra with routine healing ICD-10-CM: S22.080D  ICD-9-CM: V54.17  6/9/2022 Yes        Bilateral sacral insufficiency fracture with routine healing ICD-10-CM: M84.48XD  ICD-9-CM: V54.27  6/9/2022 Yes        Generalized weakness ICD-10-CM: R53.1  ICD-9-CM: 780.79  6/9/2022 Yes        Impaired mobility and ADLs ICD-10-CM: Z74.09, Z78.9  ICD-9-CM: V49.89  6/9/2022 Yes        Sciatica of right side ICD-10-CM: M54.31  ICD-9-CM: 724.3  2/7/2018 Yes        Essential hypertension, benign ICD-10-CM: I10  ICD-9-CM: 401.1  1/29/2009 Yes              Background:   Past Medical History:   Past Medical History:   Diagnosis Date    Acid reflux     Bilateral sacral insufficiency fracture with routine healing 6/9/2022    Chronic anemia     Compression fracture of T11 vertebra with routine healing 6/9/2022    Hypertension     Spinal stenosis         Assessment:   Changes in Assessment throughout shift: No change to previous assessment     Patient has a central line: no Reasons if yes: Insertion date: Last dressing date:   Patient has Ellis Cath: no Reasons if yes:     Insertion date:  Shift ellis care completed:      Last Vitals:     Vitals:    06/29/22 1520 06/29/22 2049 06/30/22 0749 06/30/22 1551   BP: 129/84 125/74 128/75 133/81   Pulse: 81 74 88 81   Resp: 20 18 18 16   Temp: 97.9 °F (36.6 °C) 97.7 °F (36.5 °C) 97.1 °F (36.2 °C) 97.1 °F (36.2 °C)   SpO2: 97% 97% 94% 98%   Weight:       Height:            PAIN    Pain Assessment    Pain Intensity 1: 0 (06/30/22 1551) Pain Intensity 1: 2 (12/29/14 1105)    Pain Location 1: Leg Pain Location 1: Abdomen    Pain Intervention(s) 1: Medication (see MAR) Pain Intervention(s) 1: Medication (see MAR)  Patient Stated Pain Goal: 0 Patient Stated Pain Goal: 0  o Intervention effective: yes  o Other actions taken for pain:       Skin Assessment  Skin color    Condition/Temperature    Integrity Skin Integrity: Incision (comment) (lumbar spine)  Turgor    Weekly Pressure Ulcer Documentation  Pressure  Injury Documentation: No Pressure Injury Noted-Pressure Ulcer Prevention Initiated  Wound Prevention & Protection Methods  Orientation of wound Orientation of Wound Prevention: Posterior  Location of Prevention Location of Wound Prevention: Sacrum/Coccyx  Dressing Present Dressing Present : No  Dressing Status Dressing Status: Intact  Wound Offloading Wound Offloading (Prevention Methods): Bed, pressure redistribution/air    Wound Prevention Checklist  Precautions:      []  Heel prevention boots placed on patient    []  Patient turned q2h during shift    []  Valeriy Order Set ordered    []  Patient on [place] bed/Specialty bed    []  Each Wound is documented during shift (Stage, Color, drainage, odor, measurements, and dressings)    [x]  Dual skin checks done at bedside during shift report with Peter Shaikh RN     INTAKE/OUPUT  Date 06/29/22 1900 - 06/30/22 0659 06/30/22 0700 - 07/01/22 0659   Shift 3165-6175 24 Hour Total 1309-1510 3289-3465 24 Hour Total   INTAKE   P.O. 480 720 480  480     P. O. 480 720 480  480   Shift Total(mL/kg) 480(7) 720(10.5) 480(7)  480(7)   OUTPUT   Urine(mL/kg/hr) 0 0 302(0.4)  302     Urine   302  302     Urine Voided 0 0        Urine Occurrence(s) 4 x 7 x 3 x  3 x   Stool          Stool Occurrence(s) 0 x 1 x 0 x  0 x   Shift Total(mL/kg) 0(0) 0(0) 302(4.4)  302(4.4)    596 178  178   Weight (kg) 68.8 68.8 68.8 68.8 68.8       Recommendations:  1. Patient needs and requests: pain management. 2. Pending tests/procedures: none at this time     3. Functional Level/Equipment: Partial (one person) /      Fall Precautions:   Fall risk precautions were reinforced with the patient; she was instructed to call for help prior to getting up. The following fall risk precautions were continued: bed/ chair alarms, door signage, yellow bracelet and socks as well as update of the [employer] tool in the patient's room. Yinka Score: 3    HEALS Safety Check    A safety check occurred in the patient's room between off going nurse and oncoming nurse listed above. The safety check included the below items  Area Items   H  High Alert Medications - Verify all high alert medication drips (heparin, PCA, etc.)   E  Equipment - Suction is set up for ALL patients (with catalina)  - Red plugs utilized for all equipment (IV pumps, etc.)  - WOWs wiped down at end of shift.  - Room stocked with oxygen, suction, and other unit-specific supplies   A  Alarms - Bed alarm is set for fall risk patients  - Ensure chair alarm is in place and activated if patient is up in a chair   L  Lines - Check IV for any infiltration  - Nance bag is empty if patient has a Nance   - Tubing and IV bags are labeled   S  Safety   - Room is clean, patient is clean, and equipment is clean. - Hallways are clear from equipment besides carts. - Fall bracelet on for fall risk patients  - Ensure room is clear and free of clutter  - Suction is set up for ALL patients (with catalina)  - Hallways are clear from equipment besides carts.    - Isolation precautions followed, supplies available outside room, sign posted     Fortunato Garcia RN

## 2022-06-30 NOTE — PROGRESS NOTES
SHIFT CHANGE NOTE FOR [facility]    Bedside and Verbal shift change report given to Juanito Stephenson RN (oncoming nurse) by Jermaine Peace RN (offgoing nurse). Report included the following information SBAR, Kardex, MAR and Recent Results. Situation:   Code Status: Full Code   Hospital Day: 13   Problem List:   Hospital Problems  Date Reviewed: 6/24/2022          Codes Class Noted POA    Hypokalemia ICD-10-CM: E87.6  ICD-9-CM: 276.8  6/22/2022 Yes        Status post kyphoplasty ICD-10-CM: Z98.890  ICD-9-CM: V45.89  6/13/2022 Yes    Overview Signed 6/20/2022  4:37 PM by Cherylene Falter, MD     S/P Image guided T11 kyphoplasty; Image guided bilateral sacroplasty (6/13/2022 - Dr. Cathy Rivera)             * (Principal) Compression fracture of T11 vertebra with routine healing ICD-10-CM: S22.080D  ICD-9-CM: V54.17  6/9/2022 Yes        Bilateral sacral insufficiency fracture with routine healing ICD-10-CM: M84.48XD  ICD-9-CM: V54.27  6/9/2022 Yes        Generalized weakness ICD-10-CM: R53.1  ICD-9-CM: 780.79  6/9/2022 Yes        Impaired mobility and ADLs ICD-10-CM: Z74.09, Z78.9  ICD-9-CM: V49.89  6/9/2022 Yes        Sciatica of right side ICD-10-CM: M54.31  ICD-9-CM: 724.3  2/7/2018 Yes        Essential hypertension, benign ICD-10-CM: I10  ICD-9-CM: 401.1  1/29/2009 Yes              Background:   Past Medical History:   Past Medical History:   Diagnosis Date    Acid reflux     Bilateral sacral insufficiency fracture with routine healing 6/9/2022    Chronic anemia     Compression fracture of T11 vertebra with routine healing 6/9/2022    Hypertension     Spinal stenosis         Assessment:   Changes in Assessment throughout shift: No change to previous assessment     Patient has a central line: no Reasons if yes: Insertion date: Last dressing date:   Patient has Ellis Cath: no Reasons if yes:     Insertion date:  Shift ellis care completed:      Last Vitals:     Vitals:    06/28/22 2023 06/29/22 0725 06/29/22 1520 06/29/22 2049   BP: (!) 159/85 (!) 148/86 129/84 125/74   Pulse: 80 80 81 74   Resp: 18 18 20 18   Temp: 97.3 °F (36.3 °C) 97.3 °F (36.3 °C) 97.9 °F (36.6 °C) 97.7 °F (36.5 °C)   SpO2: 98% 96% 97% 97%   Weight:       Height:            PAIN    Pain Assessment    Pain Intensity 1: 0 (06/30/22 0516) Pain Intensity 1: 2 (12/29/14 1105)    Pain Location 1: Leg Pain Location 1: Abdomen    Pain Intervention(s) 1: Medication (see MAR) Pain Intervention(s) 1: Medication (see MAR)  Patient Stated Pain Goal: 0 Patient Stated Pain Goal: 0  o Intervention effective: yes  o Other actions taken for pain: Medication (see MAR)     Skin Assessment  Skin color    Condition/Temperature    Integrity Skin Integrity: Incision (comment) (back)  Turgor    Weekly Pressure Ulcer Documentation  Pressure  Injury Documentation: No Pressure Injury Noted-Pressure Ulcer Prevention Initiated  Wound Prevention & Protection Methods  Orientation of wound Orientation of Wound Prevention: Posterior  Location of Prevention Location of Wound Prevention: Sacrum/Coccyx  Dressing Present Dressing Present : No  Dressing Status Dressing Status: Intact  Wound Offloading Wound Offloading (Prevention Methods): Bed, pressure reduction mattress    Wound Prevention Checklist  Precautions:      []  Heel prevention boots placed on patient    []  Patient turned q2h during shift    []  Valeriy Order Set ordered    []  Patient on [place] bed/Specialty bed    []  Each Wound is documented during shift (Stage, Color, drainage, odor, measurements, and dressings)    [x]  Dual skin checks done at bedside during shift report with Ruben Tovar RN     INTAKE/OUPUT  Date 06/29/22 0700 - 06/30/22 0659 06/30/22 0700 - 07/01/22 0659   Shift 2656-25711859 1900-0659 24 Hour Total 5944-0705 1908-5690 24 Hour Total   INTAKE   P.O. 240 480 720        P. O. 240 480 720      Shift Total(mL/kg) 240(3.5) 480(7) 720(10.5)      OUTPUT   Urine(mL/kg/hr)  0 0        Urine Voided  0 0        Urine Occurrence(s) 3 x 3 x 6 x      Stool           Stool Occurrence(s) 1 x 0 x 1 x      Shift Total(mL/kg)  0(0) 0(0)       480 720      Weight (kg) 68.8 68.8 68.8 68.8 68.8 68.8       Recommendations:  1. Patient needs and requests: pain management. 2. Pending tests/procedures: none at this time     3. Functional Level/Equipment: Partial (one person) / Nestora Dom    Fall Precautions:   Fall risk precautions were reinforced with the patient; she was instructed to call for help prior to getting up. The following fall risk precautions were continued: bed/ chair alarms, door signage, yellow bracelet and socks as well as update of the [employer] tool in the patient's room. Yinka Score: 4    HEALS Safety Check    A safety check occurred in the patient's room between off going nurse and oncoming nurse listed above. The safety check included the below items  Area Items   H  High Alert Medications - Verify all high alert medication drips (heparin, PCA, etc.)   E  Equipment - Suction is set up for ALL patients (with catalina)  - Red plugs utilized for all equipment (IV pumps, etc.)  - WOWs wiped down at end of shift.  - Room stocked with oxygen, suction, and other unit-specific supplies   A  Alarms - Bed alarm is set for fall risk patients  - Ensure chair alarm is in place and activated if patient is up in a chair   L  Lines - Check IV for any infiltration  - Nance bag is empty if patient has a Nance   - Tubing and IV bags are labeled   S  Safety   - Room is clean, patient is clean, and equipment is clean. - Hallways are clear from equipment besides carts. - Fall bracelet on for fall risk patients  - Ensure room is clear and free of clutter  - Suction is set up for ALL patients (with catalina)  - Hallways are clear from equipment besides carts.    - Isolation precautions followed, supplies available outside room, sign posted     Timmy Maciel RN

## 2022-06-30 NOTE — PROGRESS NOTES
Progress Note    Patient: Joseph Reeves MRN: [id number]  CSN: [id number]    YOB: 1945  Age: 68 y.o. Sex: female    DOA: 6/17/2022 LOS:  LOS: 13 days                    Subjective:     Primary Rehabilitation Diagnosis: Generalized weakness with Impaired mobility and ADLs secondary to:  1. Compression fracture of T11 vertebra with routine healing  2. Bilateral sacral insufficiency fracture with routine healing  3. S/P Image guided T11 kyphoplasty; Image guided bilateral sacroplasty (6/13/2022 - Dr. Tye Tamez)     Patient was seen in presence of her daughter. No new complaint. Her pain was little bit better last night. No dysuria currently. No shortness of breath or cough. No chest pain. No abdominal pain. No headaches or dizziness. Review of systems  General: No fevers or chills. Cardiovascular: No chest pain or pressure. No palpitations. Pulmonary: No shortness of breath, cough or wheeze. Gastrointestinal: No abdominal pain, nausea, vomiting or diarrhea. Genitourinary: No urinary frequency, urgency, hesitancy or dysuria. Musculoskeletal: Right hip pain off and on. Neurologic: generalized weakness    Objective:     Physical Exam:  Visit Vitals  /75 (BP 1 Location: Left upper arm, BP Patient Position: At rest)   Pulse 88   Temp 97.1 °F (36.2 °C)   Resp 18   Ht 5' 5\" (1.651 m)   Wt 68.8 kg (151 lb 9.6 oz)   SpO2 94%   Breastfeeding No   BMI 25.23 kg/m²        General appearance - alert, well appearing, follows verbal commands appropriately, and in no distress  Chest -clear air entry noted in bases, no wheezes  Heart - S1 and S2 normal  Abdomen - soft, nontender, nondistended, Bowel sounds present  Neurological - alert, oriented, normal speech, no focal findings noted except motor strength 4-5 out of 5 in right leg. Extremities - no pedal edema noted   Psych -no hallucination or agitation.     Functional Progress:    Functional Progress:    SPEECH AND LANGUAGE PATHOLOGY    ON ADMISSION MOST RECENT   Comprehension (Native Language)  Primary Mode of Comprehension: Auditory  Score: 5 Comprehension (Native Language)  Primary Mode of Comprehension: Auditory  Score: 5     Primary Mode of Expression: Verbal   Primary Mode of Expression: Verbal     Score: 5 Score: 5     Score: 5   Score: 5     Score: 5 Score: 6       Legend:   7 - Independent   6 - Modified Independent   5 - Standby Assistance / Supervision / Set-up   4 - Minimum Assistance / Contact Guard Assistance   3 - Moderate Assistance   2 - Maximum Assistance   1 - Total Assistance / Dependent     Functional Progress:    PHYSICAL THERAPY    ON ADMISSION MOST RECENT   Wheelchair Mobility/Management  Able to Propel (ft): 60 feet  Functional Level: 4  Curbs/Ramps Assist Required (FIM Score): 0 (Not tested)  Wheelchair Setup Assist Required : 2 (Maximal assistance)  Wheelchair Management: Manages right brake,Manages left brake Wheelchair Mobility/Management  Able to Propel (ft): 120 feet  Functional Level: 5  Curbs/Ramps Assist Required (FIM Score): 0 (Not tested)  Wheelchair Setup Assist Required : 2 (Maximal assistance)  Wheelchair Management: Manages right brake,Manages left brake     Gait  Amount of Assistance: 4 (Minimal assistance)  Distance (ft): 25 Feet (ft)  Assistive Device: Walker, rolling Gait  Amount of Assistance: 5 (Stand-by assistance)  Distance (ft): 150 Feet (ft) (x2 trials)  Assistive Device: Walker, rolling     Balance-Sitting/Standing  Sitting - Static: Good (unsupported)  Sitting - Dynamic: Fair (occasional)  Standing - Static: Fair  Standing - Dynamic : Impaired Balance-Sitting/Standing  Sitting - Static: Good (unsupported)  Sitting - Dynamic: Fair (occasional)  Standing - Static: Fair  Standing - Dynamic : Impaired     Bed/Mat Mobility  Rolling Right : 5 (Stand-by assistance)  Rolling Left : 5 (Stand-by assistance)  Supine to Sit : 4 (Minimal assistance)  Sit to Supine : 3 (Moderate assistance) Bed/Mat Mobility  Rolling Right : 5 (Supervision)  Rolling Left : 5 (Supervision)  Supine to Sit : 5 (Stand-by assistance)  Sit to Supine : 4 (Minimal assistance) (with right LE)     Transfers  Transfer Type: Other  Other: stand step with RW  Transfer Assistance : 4 (Minimal assistance)  Sit to Stand Assistance: Minimal assistance  Car Transfers: Not tested  Car Type: car simulator Transfers  Transfer Type: Other  Other: stand step txfr with RW  Transfer Assistance : 5 (Stand-by assistance)  Sit to Stand Assistance: Supervision  Car Transfers: Supervision  Car Type: car txfr simulator     Steps or Stairs  Steps/Stairs Ambulated (#): 0  Level of Assist : 0 (Not tested)  Rail Use: Both Steps or Stairs  Steps/Stairs Ambulated (#): 8 (6\" steps)  Level of Assist : 5 (Stand-by assistance)  Rail Use: Both             Intake and Output:  Current Shift:  06/30 0701 - 06/30 1900  In: 240 [P.O.:240]  Out: 302 [Urine:302]  Last three shifts:  06/28 1901 - 06/30 0700  In: 1200 [P.O.:1200]  Out: 0     Labs: Results:       Chemistry No results for input(s): GLU, NA, K, CL, CO2, BUN, CREA, CA, AGAP, BUCR, TBIL, AP, TP, ALB, GLOB, AGRAT in the last 72 hours. No lab exists for component: GPT   CBC w/Diff No results for input(s): WBC, RBC, HGB, HCT, PLT, GRANS, LYMPH, EOS, HGBEXT, HCTEXT, PLTEXT, HGBEXT, HCTEXT, PLTEXT in the last 72 hours. Cardiac Enzymes No results for input(s): CPK, CKND1, LES in the last 72 hours. No lab exists for component: CKRMB, TROIP   Coagulation No results for input(s): PTP, INR, APTT, INREXT, INREXT in the last 72 hours. Lipid Panel No results found for: CHOL, CHOLPOCT, CHOLX, CHLST, CHOLV, 077286, HDL, HDLP, LDL, LDLC, DLDLP, 144104, VLDLC, VLDL, TGLX, TRIGL, TRIGP, TGLPOCT, CHHD, CHHDX   BNP No results for input(s): BNPP in the last 72 hours. Liver Enzymes No results for input(s): TP, ALB, TBIL, AP in the last 72 hours.     No lab exists for component: SGOT, GPT, DBIL   Thyroid Studies Lab Results Component Value Date/Time    TSH 0.63 06/10/2022 11:40 AM          Procedures/imaging: see electronic medical records for all procedures/Xrays and details which were not copied into this note but were reviewed prior to creation of Plan    Medications:   Current Facility-Administered Medications   Medication Dose Route Frequency    cefTRIAXone (ROCEPHIN) 1 g in sterile water (preservative free) 10 mL IV syringe  1 g IntraVENous Q24H    gabapentin (NEURONTIN) capsule 600 mg  600 mg Oral QHS    diclofenac (VOLTAREN) 1 % topical gel 4 g  4 g Topical QHS    lidocaine 4 % patch 1 Patch  1 Patch TransDERmal Q24H    metoprolol tartrate (LOPRESSOR) tablet 25 mg  25 mg Oral Q12H    acetaminophen (TYLENOL) tablet 650 mg  650 mg Oral TID    acetaminophen (TYLENOL) tablet 650 mg  650 mg Oral Q4H PRN    gabapentin (NEURONTIN) capsule 200 mg  200 mg Oral BID    hydrALAZINE (APRESOLINE) tablet 25 mg  25 mg Oral TID PRN    magnesium oxide (MAG-OX) tablet 400 mg  400 mg Oral DAILY    nortriptyline (PAMELOR) capsule 40 mg  40 mg Oral QHS    oxyCODONE IR (ROXICODONE) tablet 10 mg  10 mg Oral Q4H PRN    LORazepam (ATIVAN) tablet 0.5 mg  0.5 mg Oral BID PRN    ondansetron (ZOFRAN ODT) tablet 4 mg  4 mg Oral Q8H PRN    atorvastatin (LIPITOR) tablet 10 mg  10 mg Oral DAILY    ferrous gluconate 324 mg (37.5 mg iron) tablet 1 Tablet  324 mg Oral BID WITH MEALS    pantoprazole (PROTONIX) tablet 40 mg  40 mg Oral ACB    docusate sodium (COLACE) capsule 100 mg  100 mg Oral BID    bisacodyL (DULCOLAX) tablet 10 mg  10 mg Oral Q48H PRN    melatonin tablet 3 mg  3 mg Oral QHS PRN    naloxone (NARCAN) injection 0.2 mg  0.2 mg IntraVENous PRN       Assessment/Plan     Principal Problem:    Compression fracture of T11 vertebra with routine healing (6/9/2022)    Active Problems:    Essential hypertension, benign (1/29/2009)      Sciatica of right side (2/7/2018)      Bilateral sacral insufficiency fracture with routine healing (6/9/2022)      Status post kyphoplasty (6/13/2022)      Overview: S/P Image guided T11 kyphoplasty; Image guided bilateral sacroplasty       (6/13/2022 - Dr. Debora Miguel)      Generalized weakness (6/9/2022)      Impaired mobility and ADLs (6/9/2022)      Hypokalemia (6/22/2022)      Compression fracture of T11 vertebra with routine healing; Bilateral sacral insufficiency fracture with routine healing; S/P Image guided T11 kyphoplasty; Image guided bilateral sacroplasty (6/13/2022 - Dr. Debora Miguel)              > Thoracic spinal precautions  Continue pain control  6/27/22: We will add lidocaine patch and monitor patient's symptoms. 6/28: will add Voltaren topically to be used night time for muscle spasm treatment   6/29: will increase the dose of nighttime Gabapentin     Chronic anemia   Continue Ferrous gluconate 324 mg PO BID with meals      Dyslipidemia  Continue Atorvastatin 10 mg PO once daily     Essential hypertension  Currently controlled              > Continue:                          >  Metoprolol tartrate from 25 mg PO q 12 hr (9AM, 9PM)                          > Hydralazine 25 mg PO TID PRN for SBP greater than 170 mmHg  Monitor and adjust BP medication as needed     Gastroesophageal reflux disease  Continue Pantoprazole 40 mg PO daily before breakfast     Generalized anxiety disorder  Continue:                          > Nortriptyline 40 mg PO q HS                          > Lorazepam 0.5 mg PO BID PRN for anxiety     Sciatica of right side  Continue:                          > Gabapentin 200 mg PO BID (8AM, 2PM)                          > Gabapentin 400 mg PO q 8PM                          > Nortriptyline 40 mg PO q HS  6/30: Follow-up with spine surgeon in a week upon discharge from here. Discussed with patient's daughter about it.     Hypokalemia  Continue Magnesium oxide 400 mg PO once daily    Gram-negative geoff UTI:  6/30: Pending urine culture.   Patient was started on Rocephin for 3 days yesterday. Poor Appetite  6/28: discussed with patient and daughter about possibly using Remeron if needed. Total time to take care of this patient was  25 minutes and more than 50% of time was spent counseling and coordinating care. Disclaimer: Sections of this note are dictated using utilizing voice recognition software, which may have resulted in some phonetic based errors in grammar and contents. Even though attempts were made to correct all the mistakes, some may have been missed, and remained in the body of the document. If questions arise, please contact our department.     Fidel Fisher MD  6/30/2022

## 2022-06-30 NOTE — PROGRESS NOTES
Problem: Mobility Impaired (Adult and Pediatric)  Goal: *Acute Goals and Plan of Care (Insert Text)  Description: Physical Therapy Short Term Goals  Initiated 6/18/2022, reassessed 6/25/2022, and to be progressed to long term goals. 1.  Patient will move from supine to sit and sit to supine  in bed with contact guard assist.  Goal met 6/25/2022  2. Patient will transfer from bed to chair and chair to bed with contact guard assist using the least restrictive device. Goal met 6/25/2022  3. Patient will perform sit to stand with supervision/SBA. GOAL MET 6/25/2022  4. Patient will ambulate with contact guard assist for 100 feet with the least restrictive device. GOAL MET 6/25/2022  5. Patient will ascend/descend 4 stairs with 2 handrail(s) with minimal assistance/contact guard assist. ONGOING 6/25/2022    Physical Therapy Long Term Goals  Initiated 6/18/2022 and to be accomplished within 21 day(s)  1. Patient will move from supine to sit and sit to supine  in bed with modified independence. 2.  Patient will transfer from bed to chair and chair to bed with modified independence using the least restrictive device. 3.  Patient will perform sit to stand with modified independence. 4.  Patient will ambulate with modified independence for 150 feet with the least restrictive device. 5.  Patient will ascend/descend 12 stairs with 1 handrail(s) with minimal assistance/contact guard assist.     Outcome: Progressing Towards Goal   PHYSICAL THERAPY TREATMENT    Patient: Stef Zaidi (39 y.o. female)  Date: 6/30/2022  Diagnosis: Compression fracture of body of thoracic vertebra (HCC) [S22.000A] Compression fracture of T11 vertebra with routine healing  Precautions:  fall risk  Chart, physical therapy assessment, plan of care and goals were reviewed. Time AD:8992  Time Out:1100    Patient seen for: Transfer training;Gait training; Therapeutic exercise (stair training, bed mobility)     Time in: 1500  Time out: [phone]  Pt seen for: group therapy session    Pain:  Pt pain was reported as not rated pre-treatment. Pt pain was reported as c/o tailbone discomfort in supine post-treatment. Intervention: pt had pain meds this morning    Patient identified with name and : yes     SUBJECTIVE:      Pt reports not sleeping well and not taking pain meds on schedule. OBJECTIVE DATA SUMMARY:    Objective:     BED/MAT MOBILITY Daily Assessment     Rolling Right : 5 (Supervision)  Rolling Left : 5 (Supervision)  Supine to Sit : 5 (Stand-by assistance)  Sit to Supine : 4 (Minimal assistance) (with right LE)   Pt performed bed mobility on right side of mat table with min assist to clear EOM with right LE for sit to supine. Pt rolled side to side with supervision and performed right sidelying to sitting with SBA and v/c for sequencing. TRANSFERS Daily Assessment     Transfer Type: Other  Other: stand step txfr with RW  Transfer Assistance : 5 (Stand-by assistance)  Sit to Stand Assistance: Supervision   Pt performed sit to stand from w/c and mat table with supervision and v/c for proper hand placement 25% of the time. Pt performed stand step txfr w/c<->mat table with RW and SBA/S for safety. GAIT Daily Assessment    Gait Description (WDL)      Gait Abnormalities Decreased step clearance    Assistive device Walker, rolling    Ambulation assistance - level surface 5 (Stand-by assistance)    Distance 150 Feet (ft) (x2 trials)    Ambulation assistance- uneven surface      Comments Pt ambulated [id number] with RW and SBA with mod v/c for erect posture and to remain within base of RW. Pt ambulated 40ft without AD with min assist/CGA using gait belt. Pt demo'd improved posture without RW but decreased stance on right LE with decreased step length BLE. Pt also demo'd poor tolerance to gait without AD.          STEPS/STAIRS Daily Assessment     Steps/Stairs ambulated 8 (6\" steps)    Assistance Required 5 (Stand-by assistance) Rail Use Both    Comments   Pt negotiated up and down 8 6\" steps with BHR and SBA, initially performing step to pattern with v/c for proper LE sequencing, leading with left LE to ascend and right LE to descend. After 4 steps pt performed step through pattern to ascend and descend. Curbs/Ramps  NT        BALANCE Daily Assessment     Sitting - Static: Good (unsupported)  Sitting - Dynamic: Fair (occasional)  Standing - Static: Fair  Standing - Dynamic : Impaired        THERAPEUTIC EXERCISES Daily Assessment       Supine bridging 2x10, BLE SAQ 2x15      Group therapy treatment:    Seated  EXERCISE   Sets   Reps   Active Active Assist   Comments   Ankle Pumps 2 15 [x] []    Long Arc Quads 2 15 [x] []    Seated Marching 2 15 [x] []    Heel slides   [] []    Hip Abd/ER clamshells w/ T-band 2 15 [x] [] With orange tband   Hip Abd SLRs   [] []    Hip Add Isometrics 2 15 [x] [] With ball   Heel taps on floor   [] []    Wheelchair pushups   [] []        [x]   Patient appropriate to continue group therapy sessions to promote increased participation in skilled therapy interventions and to provide opportunities for increased social interaction. ASSESSMENT:  Pt continues to demo decreased carryover for hand placement for sit to stand, flexed posture with gait and decreased short term memory for safety and proper sequencing with stairs. Progression toward goals:  []      Improving appropriately and progressing toward goals  [x]      Improving slowly and progressing toward goals  []      Not making progress toward goals and plan of care will be adjusted      PLAN:  Patient continues to benefit from skilled intervention to address the above impairments. Continue treatment per established plan of care.   Discharge Recommendations:  Home Health  Further Equipment Recommendations for Discharge:  rolling walker      Estimated Discharge Date: 7/2/2022    Activity Tolerance:   Fair+  Please refer to the flowsheet for vital signs taken during this treatment.     After treatment:   [] Patient left in no apparent distress in bed  [x] Patient left in no apparent distress sitting up in chair, passed off to OT  [] Patient left in no apparent distress in w/c mobilizing under own power  [] Patient left in no apparent distress dining area  [] Patient left in no apparent distress mobilizing under own power  [] Call bell left within reach  [] Nursing notified  [] Caregiver present  [] Bed alarm activated   [x] Chair alarm activated      Uri Balderrama PTA  6/30/2022

## 2022-07-01 ENCOUNTER — HOME HEALTH ADMISSION (OUTPATIENT)
Dept: HOME HEALTH SERVICES | Facility: HOME HEALTH | Age: 77
End: 2022-07-01
Payer: MEDICARE

## 2022-07-01 LAB
BACTERIA SPEC CULT: ABNORMAL
CC UR VC: ABNORMAL
SERVICE CMNT-IMP: ABNORMAL

## 2022-07-01 PROCEDURE — 74011250637 HC RX REV CODE- 250/637: Performed by: FAMILY MEDICINE

## 2022-07-01 PROCEDURE — 74011250636 HC RX REV CODE- 250/636: Performed by: INTERNAL MEDICINE

## 2022-07-01 PROCEDURE — 65310000000 HC RM PRIVATE REHAB

## 2022-07-01 PROCEDURE — 74011250637 HC RX REV CODE- 250/637: Performed by: EMERGENCY MEDICINE

## 2022-07-01 PROCEDURE — 97535 SELF CARE MNGMENT TRAINING: CPT

## 2022-07-01 PROCEDURE — 97530 THERAPEUTIC ACTIVITIES: CPT

## 2022-07-01 PROCEDURE — 97116 GAIT TRAINING THERAPY: CPT

## 2022-07-01 PROCEDURE — 74011250637 HC RX REV CODE- 250/637: Performed by: INTERNAL MEDICINE

## 2022-07-01 PROCEDURE — 99232 SBSQ HOSP IP/OBS MODERATE 35: CPT | Performed by: INTERNAL MEDICINE

## 2022-07-01 PROCEDURE — 2709999900 HC NON-CHARGEABLE SUPPLY

## 2022-07-01 PROCEDURE — 74011000250 HC RX REV CODE- 250: Performed by: INTERNAL MEDICINE

## 2022-07-01 RX ORDER — DICLOFENAC SODIUM 10 MG/G
4 GEL TOPICAL
Qty: 1 EACH | Refills: 0 | Status: SHIPPED | OUTPATIENT
Start: 2022-07-01 | End: 2022-07-26

## 2022-07-01 RX ORDER — GABAPENTIN 100 MG/1
200 CAPSULE ORAL 2 TIMES DAILY
Qty: 120 CAPSULE | Refills: 0 | Status: SHIPPED | OUTPATIENT
Start: 2022-07-02 | End: 2022-08-01

## 2022-07-01 RX ORDER — METOPROLOL TARTRATE 25 MG/1
25 TABLET, FILM COATED ORAL EVERY 12 HOURS
Qty: 60 TABLET | Refills: 0 | Status: SHIPPED | OUTPATIENT
Start: 2022-07-01 | End: 2022-07-31

## 2022-07-01 RX ORDER — OXYCODONE HYDROCHLORIDE 10 MG/1
10 TABLET ORAL
Qty: 20 TABLET | Refills: 0 | Status: SHIPPED | OUTPATIENT
Start: 2022-07-01 | End: 2022-07-06

## 2022-07-01 RX ORDER — GABAPENTIN 300 MG/1
600 CAPSULE ORAL
Qty: 60 CAPSULE | Refills: 0 | Status: SHIPPED | OUTPATIENT
Start: 2022-07-01 | End: 2022-07-31

## 2022-07-01 RX ADMIN — ACETAMINOPHEN 650 MG: 325 TABLET ORAL at 17:02

## 2022-07-01 RX ADMIN — ACETAMINOPHEN 650 MG: 325 TABLET ORAL at 09:29

## 2022-07-01 RX ADMIN — OXYCODONE HYDROCHLORIDE 10 MG: 5 TABLET ORAL at 01:14

## 2022-07-01 RX ADMIN — LORAZEPAM 0.5 MG: 0.5 TABLET ORAL at 20:01

## 2022-07-01 RX ADMIN — Medication 1 TABLET: at 09:29

## 2022-07-01 RX ADMIN — GABAPENTIN 200 MG: 100 CAPSULE ORAL at 13:14

## 2022-07-01 RX ADMIN — GABAPENTIN 200 MG: 100 CAPSULE ORAL at 09:29

## 2022-07-01 RX ADMIN — NORTRIPTYLINE HYDROCHLORIDE 40 MG: 10 CAPSULE ORAL at 20:00

## 2022-07-01 RX ADMIN — METOPROLOL TARTRATE 25 MG: 25 TABLET, FILM COATED ORAL at 20:01

## 2022-07-01 RX ADMIN — Medication 1 TABLET: at 17:02

## 2022-07-01 RX ADMIN — GABAPENTIN 600 MG: 300 CAPSULE ORAL at 20:01

## 2022-07-01 RX ADMIN — DOCUSATE SODIUM 100 MG: 100 CAPSULE, LIQUID FILLED ORAL at 09:29

## 2022-07-01 RX ADMIN — WATER 1 G: 1 INJECTION INTRAMUSCULAR; INTRAVENOUS; SUBCUTANEOUS at 09:39

## 2022-07-01 RX ADMIN — METOPROLOL TARTRATE 25 MG: 25 TABLET, FILM COATED ORAL at 09:29

## 2022-07-01 RX ADMIN — OXYCODONE HYDROCHLORIDE 10 MG: 5 TABLET ORAL at 07:10

## 2022-07-01 RX ADMIN — Medication 400 MG: at 09:29

## 2022-07-01 RX ADMIN — PANTOPRAZOLE SODIUM 40 MG: 40 TABLET, DELAYED RELEASE ORAL at 06:30

## 2022-07-01 RX ADMIN — DICLOFENAC SODIUM 4 G: 10 GEL TOPICAL at 21:45

## 2022-07-01 RX ADMIN — ATORVASTATIN CALCIUM 10 MG: 10 TABLET, FILM COATED ORAL at 09:29

## 2022-07-01 RX ADMIN — ACETAMINOPHEN 650 MG: 325 TABLET ORAL at 12:20

## 2022-07-01 NOTE — PROGRESS NOTES
Problem: Falls - Risk of  Goal: *Absence of Falls  Description: Document Danne Lobe Fall Risk and appropriate interventions in the flowsheet. Outcome: Progressing Towards Goal  Note: Fall Risk Interventions:  Mobility Interventions: Bed/chair exit alarm,Communicate number of staff needed for ambulation/transfer,Patient to call before getting OOB,Utilize walker, cane, or other assistive device,Utilize gait belt for transfers/ambulation    Mentation Interventions: Adequate sleep, hydration, pain control,Bed/chair exit alarm,Familiar objects from home,Gait belt with transfers/ambulation,Room close to nurse's station,Update white board    Medication Interventions: Bed/chair exit alarm,Patient to call before getting OOB,Teach patient to arise slowly,Utilize gait belt for transfers/ambulation    Elimination Interventions: Bed/chair exit alarm,Call light in reach,Patient to call for help with toileting needs    History of Falls Interventions: Bed/chair exit alarm,Door open when patient unattended,Room close to nurse's station,Utilize gait belt for transfer/ambulation         Problem: Pain  Goal: *Control of Pain  Outcome: Progressing Towards Goal     Problem: Impaired Skin Integrity/Pressure Injury Treatment  Goal: *Improvement of Existing Pressure Injury  Outcome: Progressing Towards Goal  Goal: *Prevention of pressure injury  Description: Document Valeriy Scale and appropriate interventions in the flowsheet. Outcome: Progressing Towards Goal  Note: Pressure Injury Interventions:  Sensory Interventions: Assess changes in LOC,Assess need for specialty bed,Chair cushion,Keep linens dry and wrinkle-free,Maintain/enhance activity level,Minimize linen layers,Pressure redistribution bed/mattress (bed type),Turn and reposition approx.  every two hours (pillows and wedges if needed)    Moisture Interventions: Absorbent underpads,Assess need for specialty bed,Maintain skin hydration (lotion/cream),Minimize layers    Activity Interventions: Assess need for specialty bed,Chair cushion,Increase time out of bed,Pressure redistribution bed/mattress(bed type)    Mobility Interventions: Assess need for specialty bed,Chair cushion,HOB 30 degrees or less,Pressure redistribution bed/mattress (bed type),Turn and reposition approx. every two hours(pillow and wedges)    Nutrition Interventions: Document food/fluid/supplement intake    Friction and Shear Interventions: HOB 30 degrees or less,Minimize layers,Transferring/repositioning devices                Problem: Anxiety  Goal: *Alleviation of anxiety  Outcome: Progressing Towards Goal     Problem: Pressure Injury - Risk of  Goal: *Prevention of pressure injury  Description: Document Valeriy Scale and appropriate interventions in the flowsheet. Outcome: Progressing Towards Goal  Note: Pressure Injury Interventions:  Sensory Interventions: Assess changes in LOC,Assess need for specialty bed,Chair cushion,Keep linens dry and wrinkle-free,Maintain/enhance activity level,Minimize linen layers,Pressure redistribution bed/mattress (bed type),Turn and reposition approx. every two hours (pillows and wedges if needed)    Moisture Interventions: Absorbent underpads,Assess need for specialty bed,Maintain skin hydration (lotion/cream),Minimize layers    Activity Interventions: Assess need for specialty bed,Chair cushion,Increase time out of bed,Pressure redistribution bed/mattress(bed type)    Mobility Interventions: Assess need for specialty bed,Chair cushion,HOB 30 degrees or less,Pressure redistribution bed/mattress (bed type),Turn and reposition approx.  every two hours(pillow and wedges)    Nutrition Interventions: Document food/fluid/supplement intake    Friction and Shear Interventions: HOB 30 degrees or less,Minimize layers,Transferring/repositioning devices

## 2022-07-01 NOTE — PROGRESS NOTES
Problem: Mobility Impaired (Adult and Pediatric)  Goal: *Acute Goals and Plan of Care (Insert Text)  Description: Physical Therapy Short Term Goals  Initiated 6/18/2022, reassessed 7/1/2022, for d/c 7/2/2022. 1.  Patient will move from supine to sit and sit to supine  in bed with contact guard assist.  Goal met 6/25/2022  2. Patient will transfer from bed to chair and chair to bed with contact guard assist using the least restrictive device. Goal met 6/25/2022  3. Patient will perform sit to stand with supervision/SBA. GOAL MET 6/25/2022  4. Patient will ambulate with contact guard assist for 100 feet with the least restrictive device. GOAL MET 6/25/2022  5. Patient will ascend/descend 4 stairs with 2 handrail(s) with minimal assistance/contact guard assist. (MET 7/1/2022)    Physical Therapy Long Term Goals  Initiated 6/18/2022 and to be accomplished within 21 day(s)  1. Patient will move from supine to sit and sit to supine  in bed with modified independence. (ongoing 7/1/2022)  2. Patient will transfer from bed to chair and chair to bed with modified independence using the least restrictive device. (Supervision with RW 7/1/2022)  3. Patient will perform sit to stand with modified independence. (S 7/1/2022)  4. Patient will ambulate with modified independence for 150 feet with the least restrictive device. (SBA with RW 7/1/2022)  5. Patient will ascend/descend 12 stairs with 1 handrail(s) with minimal assistance/contact guard assist.(MET 7/1/2022)     Outcome: Progressing Towards Goal   PHYSICAL THERAPY DISCHARGE NOTE    Patient: Jose Chang (40 y.o. female)  Date: 7/1/2022  Diagnosis: Compression fracture of body of thoracic vertebra (HCC) [S22.000A] Compression fracture of T11 vertebra with routine healing  Precautions:  spinal precautions, fall risk  Chart, physical therapy assessment, plan of care and goals were reviewed.     Time in:1015  Time out:1115    Patient seen for: Transfer training;Gait training; Wheelchair mobility (stair training)    Pain:  Pt pain was reported as  no c/o pre-treatment. Pt pain was reported as no c/o post-treatment. Intervention: NA     Patient identified with name and : yes     SUBJECTIVE:     Patient confused about who she was supposed to see and was trying to decline therapy but educated pt that OT was person she wants to see and will be seeing pt after PT.     OBJECTIVE DATA SUMMARY:     GROSS ASSESSMENT Discharge Assessment 2022   AROM Within functional limits   Strength Generally decreased, functional   Coordination Within functional limits   Tone Normal   Sensation Impaired   PROM  Within functional limits       POSTURE Discharge Assessment 2022   Posture (WDL) Exceptions to [facility]   Posture Assessment Forward head;Rounded shoulders;Trunk flexion;Kyphosis           BALANCE Discharge Assessment 2022    Sitting - Static: Good (unsupported)  Sitting - Dynamic: Fair (occasional)  Standing - Static: Fair (with RW)  Standing - Dynamic : Impaired       BED/CHAIR/WHEELCHAIR TRANSFERS Initial Assessment Discharge Assessment   Rolling Right 5 (Stand-by assistance) 5 (Supervision)   Rolling Left 5 (Stand-by assistance) 5 (Supervision)   Supine to Sit 4 (Minimal assistance) 5 (Stand-by assistance)   Sit to Stand Minimal assistance Supervision   Sit to Supine 3 (Moderate assistance) 4 (Minimal assistance) (with right LE)   Transfer Assistance Level 4 (Minimal assistance) 5 (Supervision/setup)   Transfer Type Other Other   Comments    Pt performed sit to stand from w/c and all surfaces with supervision and v/c for proper hand placement. Pt performed stand step txfr with RW and Supervision with v/c for safety. Car Transfer Not tested Supervision with RW and v/c for proper hand placement and safety.     Car Type car simulator car txfr simulator       WHEELCHAIR MOBILITY/MANAGEMENT Initial Assessment Discharge Assessment   Able to Propel 60 feet 120 feet (distance not challenged)   Assistance Level 4 Armaan with BUE and BLE   Curbs/ramps assistance required 0 (Not tested) 0 (Not tested)   Wheelchair set up assistance required 2 (Maximal assistance) 5 (Supervision/setup)   Wheelchair management Manages right brake,Manages left brake Manages left brake;Manages right brake       GAIT Discharge Assessment 7/1/2022   Gait Description (WDL) Exceptions to WDL   Gait Abnormalities Decreased step clearance       WALKING INDEPENDENCE Initial Assessment Discharge Assessment   Assistive device Walker, rolling Walker, rolling   Ambulation assistance - level surface 4 (Minimal assistance) 5 (Stand-by assistance)   Distance 25 Feet (ft) 365 Feet (ft) (and 150ft)   Comments    Pt ambulated 150ft with RW and SBA with min/mod v/c for erect posture and remaining within base of RW. Ambulation assistance - unlevel surface    Pt ambulated 365ft, to include 120ft outside on uneven pavement, with RW and SBA with mod v/c for erect posture and remaining within base of RW. Pt demo'd a few instances of decreased left foot clearance and shoe catching on floor. Pt blaming shoes but educated to improve left foot lift for clearance. STEPS/STAIRS Initial Assessment Discharge Assessment   Steps/Stairs ambulated 0 (Not tested) 16 (6\" steps)   Rail Use Both Both   Assistance Level   5 (Stand-by assistance)   Comments    Pt negotiated up and down 16 6\" steps with BHR and SBA, performing step to pattern, leading with left LE to ascend and right LE to descend. Curbs/Ramps    Pt negotiated up and down 6\" platform step with RW and SBA to ascend and CGA for balance to descend due to pt leaning back slightly when lowering RW.        ASSESSMENT:  Pt has made progress and met all STGs and 1/5 LTGs. Pt continues to require S/SBA for all txfrs and gait due to pt's decreased carryover, short term memory and cognition limiting pt's insight and safety awareness.  Pt continues to require v/c for proper hand placement for sit<->stand and for erect posture and remaining within base of RW for ambulation. Pt should not be alone when home. LTGs: met 1/5     PLAN:  Pt would benefit from continued skilled physical therapy in order to improve independent functional mobility at home health level. Interventions may include range of motion (AROM, PROM B LE/trunk), motor function (B LE/trunk strengthening/coordination), activity tolerance (vitals, oxygen saturation levels), bed mobility training, balance activities, gait training (progressive ambulation program), and functional transfer training. Discharge Recommendations:  Home Health  Further Equipment Recommendations for Discharge:  rolling walker       Activity Tolerance:   Fair+  Please refer to the flowsheet for vital signs taken during this treatment.   After treatment:   [] Patient left in no apparent distress in bed  [x] Patient left in no apparent distress sitting up in chair, passed off to OT.   [] Patient left in no apparent distress in w/c mobilizing under own power  [] Patient left in no apparent distress dining area  [] Patient left in no apparent distress mobilizing under own power  [] Call bell left within reach  [] Nursing notified  [] Caregiver present  [] Bed alarm activated   [x] Chair alarm activated      Mallory Bush, PTA  7/1/2022

## 2022-07-01 NOTE — PROGRESS NOTES
Progress Note    Patient: Franklyn Hilliard MRN: [id number]  CSN: [id number]    YOB: 1945  Age: 68 y.o. Sex: female    DOA: 6/17/2022 LOS:  LOS: 14 days                    Subjective:     Primary Rehabilitation Diagnosis: Generalized weakness with Impaired mobility and ADLs secondary to:  1. Compression fracture of T11 vertebra with routine healing  2. Bilateral sacral insufficiency fracture with routine healing  3. S/P Image guided T11 kyphoplasty; Image guided bilateral sacroplasty (6/13/2022 - Dr. Yulissa Dial)     Patient was seen in gym today during occupational therapy session. No new complaints. Patient states that she still has some pain at night in her right hip, but is manageable. Patient denies shortness of breath or cough. No chest pain. No abdominal pain. No headaches or dizziness. Pt. Is overall pleasant and very happy. Excited about discharge in the morning. Review of systems  General: No fevers or chills. Cardiovascular: No chest pain or pressure. No palpitations. Pulmonary: No shortness of breath, cough or wheeze. Gastrointestinal: No abdominal pain, nausea, vomiting or diarrhea. Genitourinary: No urinary frequency, urgency, hesitancy or dysuria. Musculoskeletal: Right hip pain off and on. Neurologic: generalized weakness    Objective:     Physical Exam:  Visit Vitals  /73 (BP 1 Location: Right upper arm, BP Patient Position: Sitting)   Pulse 99   Temp 97.9 °F (36.6 °C)   Resp 18   Ht 5' 5\" (1.651 m)   Wt 68.8 kg (151 lb 9.6 oz)   SpO2 94%   Breastfeeding No   BMI 25.23 kg/m²        General appearance - alert, well appearing, follows verbal commands appropriately, and in no distress  Chest -clear air entry noted in bases, no wheezes  Heart - S1 and S2 normal  Abdomen - soft, nontender, nondistended, Bowel sounds present  Neurological - alert, oriented, normal speech, no focal findings noted except motor strength 4-5 out of 5 in right leg.   Extremities - no pedal edema noted   Psych -no hallucination or agitation. Functional Progress:    PHYSICAL THERAPY    ON ADMISSION MOST RECENT   Wheelchair Mobility/Management  Able to Propel (ft): 60 feet  Functional Level: 4  Curbs/Ramps Assist Required (FIM Score): 0 (Not tested)  Wheelchair Setup Assist Required : 2 (Maximal assistance)  Wheelchair Management: Manages right brake,Manages left brake Wheelchair Mobility/Management  Able to Propel (ft): 120 feet (distance not challenged)  Functional Level:  (Neptali)  Curbs/Ramps Assist Required (FIM Score): 0 (Not tested)  Wheelchair Setup Assist Required : 5 (Supervision/setup)  Wheelchair Management: Manages left brake,Manages right brake     Gait  Amount of Assistance: 4 (Minimal assistance)  Distance (ft): 25 Feet (ft)  Assistive Device: Walker, rolling Gait  Amount of Assistance: 5 (Stand-by assistance)  Distance (ft): 365 Feet (ft) (and 150ft)  Assistive Device: Walker, rolling     Balance-Sitting/Standing  Sitting - Static: Good (unsupported)  Sitting - Dynamic: Fair (occasional)  Standing - Static: Fair  Standing - Dynamic : Impaired Balance-Sitting/Standing  Sitting - Static: Good (unsupported)  Sitting - Dynamic: Fair (occasional)  Standing - Static: Fair (with RW)  Standing - Dynamic : Impaired     Bed/Mat Mobility  Rolling Right : 5 (Stand-by assistance)  Rolling Left : 5 (Stand-by assistance)  Supine to Sit : 4 (Minimal assistance)  Sit to Supine : 3 (Moderate assistance) Bed/Mat Mobility  Rolling Right : 5 (Supervision)  Rolling Left : 5 (Supervision)  Supine to Sit : 5 (Stand-by assistance)  Sit to Supine : 4 (Minimal assistance) (with right LE)     Transfers  Transfer Type: Other  Other: stand step with RW  Transfer Assistance : 4 (Minimal assistance)  Sit to Stand Assistance: Minimal assistance  Car Transfers: Not tested  Car Type: car simulator Transfers  Transfer Type:  Other  Other: stand step txfr with RW  Transfer Assistance : 5 (Supervision/setup)  Sit to Stand Assistance: Supervision  Car Transfers: Supervision  Car Type: car txfr simulator     Steps or Stairs  Steps/Stairs Ambulated (#): 0  Level of Assist : 0 (Not tested)  Rail Use: Both Steps or Stairs  Steps/Stairs Ambulated (#): 16 (6\" steps)  Level of Assist : 5 (Stand-by assistance)  Rail Use: Both         Intake and Output:  Current Shift:  No intake/output data recorded. Last three shifts:  06/29 1901 - 07/01 0700  In: 960 [P.O.:960]  Out: 302 [Urine:302]    Labs: Results:       Chemistry No results for input(s): GLU, NA, K, CL, CO2, BUN, CREA, CA, AGAP, BUCR, TBIL, AP, TP, ALB, GLOB, AGRAT in the last 72 hours. No lab exists for component: GPT   CBC w/Diff No results for input(s): WBC, RBC, HGB, HCT, PLT, GRANS, LYMPH, EOS, HGBEXT, HCTEXT, PLTEXT, HGBEXT, HCTEXT, PLTEXT in the last 72 hours. Cardiac Enzymes No results for input(s): CPK, CKND1, LES in the last 72 hours. No lab exists for component: CKRMB, TROIP   Coagulation No results for input(s): PTP, INR, APTT, INREXT, INREXT in the last 72 hours. Lipid Panel No results found for: CHOL, CHOLPOCT, CHOLX, CHLST, CHOLV, 967376, HDL, HDLP, LDL, LDLC, DLDLP, 765400, VLDLC, VLDL, TGLX, TRIGL, TRIGP, TGLPOCT, CHHD, CHHDX   BNP No results for input(s): BNPP in the last 72 hours. Liver Enzymes No results for input(s): TP, ALB, TBIL, AP in the last 72 hours.     No lab exists for component: SGOT, GPT, DBIL   Thyroid Studies Lab Results   Component Value Date/Time    TSH 0.63 06/10/2022 11:40 AM          Procedures/imaging: see electronic medical records for all procedures/Xrays and details which were not copied into this note but were reviewed prior to creation of Plan    Medications:   Current Facility-Administered Medications   Medication Dose Route Frequency    cefTRIAXone (ROCEPHIN) 1 g in sterile water (preservative free) 10 mL IV syringe  1 g IntraVENous Q24H    gabapentin (NEURONTIN) capsule 600 mg  600 mg Oral QHS    diclofenac (VOLTAREN) 1 % topical gel 4 g  4 g Topical QHS    lidocaine 4 % patch 1 Patch  1 Patch TransDERmal Q24H    metoprolol tartrate (LOPRESSOR) tablet 25 mg  25 mg Oral Q12H    acetaminophen (TYLENOL) tablet 650 mg  650 mg Oral TID    acetaminophen (TYLENOL) tablet 650 mg  650 mg Oral Q4H PRN    gabapentin (NEURONTIN) capsule 200 mg  200 mg Oral BID    hydrALAZINE (APRESOLINE) tablet 25 mg  25 mg Oral TID PRN    magnesium oxide (MAG-OX) tablet 400 mg  400 mg Oral DAILY    nortriptyline (PAMELOR) capsule 40 mg  40 mg Oral QHS    oxyCODONE IR (ROXICODONE) tablet 10 mg  10 mg Oral Q4H PRN    LORazepam (ATIVAN) tablet 0.5 mg  0.5 mg Oral BID PRN    ondansetron (ZOFRAN ODT) tablet 4 mg  4 mg Oral Q8H PRN    atorvastatin (LIPITOR) tablet 10 mg  10 mg Oral DAILY    ferrous gluconate 324 mg (37.5 mg iron) tablet 1 Tablet  324 mg Oral BID WITH MEALS    pantoprazole (PROTONIX) tablet 40 mg  40 mg Oral ACB    docusate sodium (COLACE) capsule 100 mg  100 mg Oral BID    bisacodyL (DULCOLAX) tablet 10 mg  10 mg Oral Q48H PRN    melatonin tablet 3 mg  3 mg Oral QHS PRN    naloxone (NARCAN) injection 0.2 mg  0.2 mg IntraVENous PRN       Assessment/Plan     Principal Problem:    Compression fracture of T11 vertebra with routine healing (6/9/2022)    Active Problems:    Essential hypertension, benign (1/29/2009)      Sciatica of right side (2/7/2018)      Bilateral sacral insufficiency fracture with routine healing (6/9/2022)      Status post kyphoplasty (6/13/2022)      Overview: S/P Image guided T11 kyphoplasty; Image guided bilateral sacroplasty       (6/13/2022 - Dr. Caryle Hikes)      Generalized weakness (6/9/2022)      Impaired mobility and ADLs (6/9/2022)      Hypokalemia (6/22/2022)      Compression fracture of T11 vertebra with routine healing; Bilateral sacral insufficiency fracture with routine healing; S/P Image guided T11 kyphoplasty; Image guided bilateral sacroplasty (6/13/2022 - Dr. Caryle Hikes) > Thoracic spinal precautions  Continue pain control  6/27/22: We will add lidocaine patch and monitor patient's symptoms. 6/28: will add Voltaren topically to be used night time for muscle spasm treatment   6/29: will increase the dose of nighttime Gabapentin     Chronic anemia   Continue Ferrous gluconate 324 mg PO BID with meals      Dyslipidemia  Continue Atorvastatin 10 mg PO once daily     Essential hypertension  Currently controlled              > Continue:                          >  Metoprolol tartrate from 25 mg PO q 12 hr (9AM, 9PM)                          > Hydralazine 25 mg PO TID PRN for SBP greater than 170 mmHg  Monitor and adjust BP medication as needed     Gastroesophageal reflux disease  Continue Pantoprazole 40 mg PO daily before breakfast     Generalized anxiety disorder  Continue:                          > Nortriptyline 40 mg PO q HS                          > Lorazepam 0.5 mg PO BID PRN for anxiety     Sciatica of right side  Continue:                          > Gabapentin 200 mg PO BID (8AM, 2PM)                          > Gabapentin 400 mg PO q 8PM                          > Nortriptyline 40 mg PO q HS  6/30: Follow-up with spine surgeon in a week upon discharge from here. Discussed with patient's daughter about it.     Hypokalemia  Continue Magnesium oxide 400 mg PO once daily    Gram-negative geoff UTI:  6/30: Pending urine culture. Patient was started on Rocephin for 3 days yesterday. 7/1: Urine culture reveals E-Coli present. Will continue Rocephin antibiotics. Poor Appetite  6/28: discussed with patient and daughter about possibly using Remeron if needed. Total time to take care of this patient was  25 minutes and more than 50% of time was spent counseling and coordinating care.                    Sammi Irwin NP  7/1/2022

## 2022-07-01 NOTE — PROGRESS NOTES
SHIFT CHANGE NOTE FOR [facility]    Bedside and Verbal shift change report given to JUANITO Armstrong (oncoming nurse) by Roseanne Franco RN (offgoing nurse). Report included the following information SBAR, Kardex, MAR and Recent Results. Situation:   Code Status: Full Code   Hospital Day: 14   Problem List:   Hospital Problems  Date Reviewed: 6/24/2022          Codes Class Noted POA    Hypokalemia ICD-10-CM: E87.6  ICD-9-CM: 276.8  6/22/2022 Yes        Status post kyphoplasty ICD-10-CM: Z98.890  ICD-9-CM: V45.89  6/13/2022 Yes    Overview Signed 6/20/2022  4:37 PM by Suzette Moran MD     S/P Image guided T11 kyphoplasty; Image guided bilateral sacroplasty (6/13/2022 - Dr. Ewing Closs)             * (Principal) Compression fracture of T11 vertebra with routine healing ICD-10-CM: S22.080D  ICD-9-CM: V54.17  6/9/2022 Yes        Bilateral sacral insufficiency fracture with routine healing ICD-10-CM: M84.48XD  ICD-9-CM: V54.27  6/9/2022 Yes        Generalized weakness ICD-10-CM: R53.1  ICD-9-CM: 780.79  6/9/2022 Yes        Impaired mobility and ADLs ICD-10-CM: Z74.09, Z78.9  ICD-9-CM: V49.89  6/9/2022 Yes        Sciatica of right side ICD-10-CM: M54.31  ICD-9-CM: 724.3  2/7/2018 Yes        Essential hypertension, benign ICD-10-CM: I10  ICD-9-CM: 401.1  1/29/2009 Yes              Background:   Past Medical History:   Past Medical History:   Diagnosis Date    Acid reflux     Bilateral sacral insufficiency fracture with routine healing 6/9/2022    Chronic anemia     Compression fracture of T11 vertebra with routine healing 6/9/2022    Hypertension     Spinal stenosis         Assessment:   Changes in Assessment throughout shift: No change to previous assessment     Patient has a central line: no Reasons if yes: Insertion date: Last dressing date:   Patient has Ellis Cath: no Reasons if yes:     Insertion date:  Shift ellis care completed:      Last Vitals:     Vitals:    06/29/22 2049 06/30/22 0749 06/30/22 1551 06/30/22 1957   BP: 125/74 128/75 133/81 123/69   Pulse: 74 88 81 77   Resp: 18 18 16 18   Temp: 97.7 °F (36.5 °C) 97.1 °F (36.2 °C) 97.1 °F (36.2 °C) 96.9 °F (36.1 °C)   SpO2: 97% 94% 98% 96%   Weight:       Height:            PAIN    Pain Assessment    Pain Intensity 1: 0 (07/01/22 0404) Pain Intensity 1: 2 (12/29/14 1105)    Pain Location 1: Leg Pain Location 1: Abdomen    Pain Intervention(s) 1: Medication (see MAR) Pain Intervention(s) 1: Medication (see MAR)  Patient Stated Pain Goal: 0 Patient Stated Pain Goal: 0  o Intervention effective: yes  o Other actions taken for pain: Medication (see MAR)     Skin Assessment  Skin color    Condition/Temperature    Integrity Skin Integrity: Incision (comment) (spine)  Turgor    Weekly Pressure Ulcer Documentation  Pressure  Injury Documentation: No Pressure Injury Noted-Pressure Ulcer Prevention Initiated  Wound Prevention & Protection Methods  Orientation of wound Orientation of Wound Prevention: Posterior  Location of Prevention Location of Wound Prevention: Buttocks,Sacrum/Coccyx  Dressing Present Dressing Present : No  Dressing Status Dressing Status: Intact  Wound Offloading Wound Offloading (Prevention Methods): Bed, pressure redistribution/air,Bed, pressure reduction mattress,Chair cushion,Wheelchair    Wound Prevention Checklist  Precautions:      []  Heel prevention boots placed on patient    []  Patient turned q2h during shift    []  Valeriy Order Set ordered    [x]  Patient on Madeline bed/Specialty bed    [x]  Each Wound is documented during shift (Stage, Color, drainage, odor, measurements, and dressings)    [x]  Dual skin checks done at bedside during shift report with Alexandre Denny LPN    INTAKE/OUPUT  Date 06/30/22 0700 - 07/01/22 0659 07/01/22 0700 - 07/02/22 0659   Shift 1690-2675 9076-7858 24 Hour Total 2951-9479 8938-8463 24 Hour Total   INTAKE   P.O. 480  480        P. O. 480  480      Shift Total(mL/kg) 480(7)  480(7)      OUTPUT   Urine(mL/kg/hr) 302(0.4)  302        Urine 302  302        Urine Occurrence(s) 3 x 3 x 6 x      Stool           Stool Occurrence(s) 0 x 1 x 1 x      Shift Total(mL/kg) 302(4.4)  302(4.4)        178      Weight (kg) 68.8 68.8 68.8 68.8 68.8 68.8       Recommendations:  1. Patient needs and requests: pain management. 2. Pending tests/procedures: none at this time     3. Functional Level/Equipment: Partial (one person) / Bed (comment); Wheelchair;Walker    Fall Precautions:   Fall risk precautions were reinforced with the patient; she was instructed to call for help prior to getting up. The following fall risk precautions were continued: bed/ chair alarms, door signage, yellow bracelet and socks as well as update of the Ragena Glad tool in the patient's room. Yinka Score: 3    HEALS Safety Check    A safety check occurred in the patient's room between off going nurse and oncoming nurse listed above. The safety check included the below items  Area Items   H  High Alert Medications - Verify all high alert medication drips (heparin, PCA, etc.)   E  Equipment - Suction is set up for ALL patients (with catalina)  - Red plugs utilized for all equipment (IV pumps, etc.)  - WOWs wiped down at end of shift.  - Room stocked with oxygen, suction, and other unit-specific supplies   A  Alarms - Bed alarm is set for fall risk patients  - Ensure chair alarm is in place and activated if patient is up in a chair   L  Lines - Check IV for any infiltration  - Nance bag is empty if patient has a Nance   - Tubing and IV bags are labeled   S  Safety   - Room is clean, patient is clean, and equipment is clean. - Hallways are clear from equipment besides carts. - Fall bracelet on for fall risk patients  - Ensure room is clear and free of clutter  - Suction is set up for ALL patients (with catalina)  - Hallways are clear from equipment besides carts.    - Isolation precautions followed, supplies available outside room, sign posted     Reford Salvador RN

## 2022-07-01 NOTE — PROGRESS NOTES
Sw spoke with pt and her spouse after team conference yesterday regarding dc plans. Pt is to dc to home tomorrow with her spouse. Pt reports having her spouses a rolling walker and bedside commode but intends to  a another set under her insurance benefit. Pt anticipates using sets on different levels of her home. Sw offered [facility] for home health and pt selected [facility]. Sw has placed a referral and notified the liaison. Sw confirmed pt's desire to fill dc meds at [facility]. Sw answered questions. Pt and spouse noted understanding and appreciation. No further needs are noted at this time.

## 2022-07-01 NOTE — PROGRESS NOTES
[facility]    Bedside and Verbal shift change report given to [address] [place] [address] (oncoming nurse) by John Carter LPN (offgoing nurse). Report included the following information SBAR, Kardex, MAR and Recent Results. Situation:   Code Status: Full Code   Hospital Day: 14   Problem List:   Hospital Problems  Date Reviewed: 6/24/2022          Codes Class Noted POA    Hypokalemia ICD-10-CM: E87.6  ICD-9-CM: 276.8  6/22/2022 Yes        Status post kyphoplasty ICD-10-CM: Z98.890  ICD-9-CM: V45.89  6/13/2022 Yes    Overview Signed 6/20/2022  4:37 PM by Anmol Reeder MD     S/P Image guided T11 kyphoplasty; Image guided bilateral sacroplasty (6/13/2022 - Dr. Felisa Garcia)             * (Principal) Compression fracture of T11 vertebra with routine healing ICD-10-CM: S22.080D  ICD-9-CM: V54.17  6/9/2022 Yes        Bilateral sacral insufficiency fracture with routine healing ICD-10-CM: M84.48XD  ICD-9-CM: V54.27  6/9/2022 Yes        Generalized weakness ICD-10-CM: R53.1  ICD-9-CM: 780.79  6/9/2022 Yes        Impaired mobility and ADLs ICD-10-CM: Z74.09, Z78.9  ICD-9-CM: V49.89  6/9/2022 Yes        Sciatica of right side ICD-10-CM: M54.31  ICD-9-CM: 724.3  2/7/2018 Yes        Essential hypertension, benign ICD-10-CM: I10  ICD-9-CM: 401.1  1/29/2009 Yes              Background:   Past Medical History:   Past Medical History:   Diagnosis Date    Acid reflux     Bilateral sacral insufficiency fracture with routine healing 6/9/2022    Chronic anemia     Compression fracture of T11 vertebra with routine healing 6/9/2022    Hypertension     Spinal stenosis         Assessment:   Changes in Assessment throughout shift: No change to previous assessment     Patient has a central line: no Reasons if yes: Insertion date: Last dressing date:   Patient has Ellis Cath: no Reasons if yes:     Insertion date:  Shift ellis care completed:      Last Vitals:     Vitals:    06/30/22 0749 06/30/22 1551 06/30/22 1957 07/01/22 0808   BP: 128/75 133/81 123/69 117/73   Pulse: 88 81 77 99   Resp: 18 16 18 18   Temp: 97.1 °F (36.2 °C) 97.1 °F (36.2 °C) 96.9 °F (36.1 °C) 97.9 °F (36.6 °C)   SpO2: 94% 98% 96% 94%   Weight:       Height:            PAIN    Pain Assessment    Pain Intensity 1: 0 (07/01/22 1206) Pain Intensity 1: 2 (12/29/14 1105)    Pain Location 1: Leg Pain Location 1: Abdomen    Pain Intervention(s) 1: Medication (see MAR) Pain Intervention(s) 1: Medication (see MAR)  Patient Stated Pain Goal: 0 Patient Stated Pain Goal: 0  o Intervention effective: yes  o Other actions taken for pain: Medication (see MAR)     Skin Assessment  Skin color    Condition/Temperature    Integrity Skin Integrity: Incision (comment)  Turgor    Weekly Pressure Ulcer Documentation  Pressure  Injury Documentation: No Pressure Injury Noted-Pressure Ulcer Prevention Initiated  Wound Prevention & Protection Methods  Orientation of wound Orientation of Wound Prevention: Posterior  Location of Prevention Location of Wound Prevention: Buttocks,Sacrum/Coccyx  Dressing Present Dressing Present : No  Dressing Status Dressing Status: Intact  Wound Offloading Wound Offloading (Prevention Methods): Bed, pressure redistribution/air,Bed, pressure reduction mattress,Chair cushion,Wheelchair    Wound Prevention Checklist  Precautions:      []  Heel prevention boots placed on patient    []  Patient turned q2h during shift    []  Valeriy Order Set ordered    [x]  Patient on Douglas bed/Specialty bed    [x]  Each Wound is documented during shift (Stage, Color, drainage, odor, measurements, and dressings)    [x]  Dual skin checks done at bedside during shift report with JUANITO Armstrong    INTAKE/OUPUT  Date 06/30/22 0700 - 07/01/22 0659 07/01/22 0700 - 07/02/22 0659   Shift 1051-6732 5141-3670 24 Hour Total 0578-3800 1560-0253 24 Hour Total   INTAKE   P.O. 480  480        P. O. 480  480      Shift Total(mL/kg) 480(7)  480(7)      OUTPUT   Urine(mL/kg/hr) 302(0.4) 302(0.2)        Urine 302  302        Urine Occurrence(s) 3 x 4 x 7 x 1 x  1 x   Stool           Stool Occurrence(s) 0 x 1 x 1 x 0 x  0 x   Shift Total(mL/kg) 302(4.4)  302(4.4)        178      Weight (kg) 68.8 68.8 68.8 68.8 68.8 68.8       Recommendations:  1. Patient needs and requests: pain management. 2. Pending tests/procedures: none at this time     3. Functional Level/Equipment:   /      Fall Precautions:   Fall risk precautions were reinforced with the patient; she was instructed to call for help prior to getting up. The following fall risk precautions were continued: bed/ chair alarms, door signage, yellow bracelet and socks as well as update of the Earma Jaimee tool in the patient's room. Yinka Score: 3    HEALS Safety Check    A safety check occurred in the patient's room between off going nurse and oncoming nurse listed above. The safety check included the below items  Area Items   H  High Alert Medications - Verify all high alert medication drips (heparin, PCA, etc.)   E  Equipment - Suction is set up for ALL patients (with hemalker)  - Red plugs utilized for all equipment (IV pumps, etc.)  - WOWs wiped down at end of shift.  - Room stocked with oxygen, suction, and other unit-specific supplies   A  Alarms - Bed alarm is set for fall risk patients  - Ensure chair alarm is in place and activated if patient is up in a chair   L  Lines - Check IV for any infiltration  - Nance bag is empty if patient has a Nance   - Tubing and IV bags are labeled   S  Safety   - Room is clean, patient is clean, and equipment is clean. - Hallways are clear from equipment besides carts. - Fall bracelet on for fall risk patients  - Ensure room is clear and free of clutter  - Suction is set up for ALL patients (with catalina)  - Hallways are clear from equipment besides carts.    - Isolation precautions followed, supplies available outside room, sign posted     Maria E Stone LPN

## 2022-07-01 NOTE — PROGRESS NOTES
Problem: Self Care Deficits Care Plan (Adult)  Goal: *Acute Goals and Plan of Care (Insert Text)  Description: Occupational Therapy Goals   Long Term Goals  Initiated 6/18/2022 (goals revised on 6/23/2022) and to be accomplished within 2 week(s), by (7/2/2022)  1. Pt will perform self-feeding with independence.  6/29/22  2. Pt will perform grooming with MOD I.  3. Pt will perform UB bathing with MOD I.  4. Pt will perform LB bathing with MOD I using AE and/ or compensatory strategies as needed. 5. Pt will perform tub/shower transfer with supervision using least restrictive assistive device. 6. Pt will perform UB dressing with MOD I.  7. Pt will perform LB dressing with set-up assistance using AE and/ or compensatory strategies as needed. 8. Pt will perform toileting task with MOD I.  9. Pt will perform toilet transfer with supervision using least restrictive assistive device. Short Term Goals   Initiated 6/18/2022 (reassessed on 6/23/2022) and to be accomplished within 7 day(s), by (6/30/2022); updated 6/29/22  1. Pt will perform grooming with no more than MIN A from w/c at sink. (Goal met 6/23/2022; currently set-up)  6/29/22 (S)  2. Pt will perform UB bathing with no more than MIN A from w/c with basin or TTB. (Goal met 6/23/2022)      Goal upgraded: Pt will perform UB bathing with set-up.  6/29/22  3. Pt will perform LB bathing with MOD A using AE and/ or compensatory strategies as needed. (Goal met 6/23/2022; currently SBA)      Goal upgraded: Pt will perform LB bathing with supv/ set-up using AE and/ or compensatory strategies as needed.  6/29/22  4. Pt will perform tub/shower transfer with MOD A using LRAD. (Goal met 6/23/2022; currently CGA)      Goal upgraded: Pt will perform tub/shower transfer with SBA using least restrictive assistive device.  6/29/22  5. Pt will perform UB dressing with setup A. (Goal met 6/23/2022)  6.  Pt will perform LB dressing with MOD A using AE and/ or compensatory strategies. (Goal met 2022; currently CGA)GM 22        Goal upgraded: Pt will perform LB dressing with SBA using AE and/ or compensatory strategies.  22  7. Pt will perform toileting task with MIN A. (Goal met 2022; currently CGA)      Goal upgraded: Pt will perform toileting task with supv/ SBA. 8. Pt will perform toilet transfer with MOD A using LRAD. (Goal met 2022; currently SBA)      Goal upgraded: Pt will perform toilet transfer with supervision using least restrictive assistive device. Outcome: Progressing Towards Goal   OCCUPATIONAL THERAPY DISCHARGE    Patient: Kavita Varghese (89 y.o. female)  Date: 2022    First Tx Session  Time In: [phone]  Time Out[de-identified] [phone]    Second Tx Session  Pt was to participate in group activity at 1430. When therapist entered the room, pt was in bed asleep. Pt was easily aroused but stated she did not want to participate in group session. (-2 group units)    Primary Diagnosis: Compression fracture of body of thoracic vertebra (HCC) [S22.000A] Compression fracture of T11 vertebra with routine healing    Precautions: Fall       Barriers to Learning/Limitations: yes; Physical, cognitive  Compensate with: visual, verbal, tactile, kinesthetic cues/model     Patient identified with name and :yes    SUBJECTIVE:   Patient spoke throughout session about her children and grand children.     OBJECTIVE DATA SUMMARY:     Past Medical History:   Diagnosis Date    Acid reflux     Bilateral sacral insufficiency fracture with routine healing 2022    Chronic anemia     Compression fracture of T11 vertebra with routine healing 2022    Hypertension     Spinal stenosis      Past Surgical History:   Procedure Laterality Date    HX BACK SURGERY       Prior Level of Function/Home Situation: modified independent with ADLs;  just also had surgery  Home Situation  Home Environment: Private residence  # Steps to Enter: 3  One/Two Story Residence: Two story  Interior Rails: None  Lift Chair Available: No  Living Alone: No  Support Systems: Spouse/Significant Other  Patient Expects to be Discharged to[de-identified] Home  Current DME Used/Available at Home: Cane, straight,Commode, bedside,Walker, rolling  Tub or Shower Type: Tub/Shower combination  [x]     Right hand dominant   []     Left hand dominant    Therapeutic Actvities:  During first session, pt participated in kitchen mobility activity, having to retrieve items within the ADL kitchen. Pt used RW for safety during activity and was able to locate all items with supervision. Pain:  Pt reports no pain or discomfort prior to treatment. Pt reports no pain or discomfort post treatment.    Problem List:    Decreased strength B UE  []     Decreased strength trunk/core  []     Decreased AROM   []     Decreased PROM  []     Decreased balance sitting  []     Decreased balance standing  [x]     Decreased endurance  [x]     Pain  [x]       Functional Limitations:   Decreased independence with ADL  [x]     Decreased independence with functional transfers  [x]     Decreased independence with ambulation  [x]     Decreased independence with IADL  [x]       Outcome Measures:      MMT Initial Assessment   Right Upper Extremity  Left Upper Extremity    UE AROM 4+/5 4+/5   Shoulder flexion     Shoulder extension     Shoulder ABDuction     Shoulder ADDUction     Elbow Flexion     Elbow Extension     Wrist Extension/Flexion                   MMT Discharge Assessment   Right Upper Extremity  Left Upper Extremity    UE AROM 4+/5 4+/5   Shoulder flexion     Shoulder extension     Shoulder ABDuction     Shoulder ADDUction     Elbow Flexion     Elbow Extension     Wrist Extension/Flexion              0/5 No palpable muscle contraction  1/5 Palpable muscle contraction, no joint movement  2-/5 Less than full range of motion in gravity eliminated position  2/5 Able to complete full range of motion in gravity eliminated position  2+/5 Able to initiate movement against gravity  3-/5 More than half but not full range of motion against gravity  3/5 Able to complete full range of motion against gravity  3+/5 Completes full range of motion against gravity with minimal resistance  4-/5 Completes full range of motion against gravity with minimal resistance  4/5 Completes full range of motion against gravity with moderate resistance  5/5 Completes full range of motion against gravity with maximum resistance    Coordination: intact  Sensation: intact    FIM SCORES Initial Assessment Discharge Assessment   Eating 5 Functional Level: 6   Grooming 5 Functional Level: 6  Pt performed oral hygiene task in stance at sink. Pt brushed seated w/c level. Pt washed face and hair seated on TTB in shower. Oral Hygiene FIM: 6    Bathing 3 Functional Level: 5 (UB: 6; LB: 5)  Body Parts Patient Bathed: Abdomen;Arm, left;Arm, right;Buttocks; Chest;Lower leg and foot, left; Lower leg and foot, right;Kandis area; Thigh, left; Thigh, right  Comments: Pt stood in shower to wash periarea and buttocks. Pt used LH sponge to wash lower legs and feet. Upper Body Dressing 3 Functional Level: 6  Items Applied/Steps Completed: Pullover (4 steps); Tuck shirt (1 step/optional)  Comments: Pt donned undershirt that she teucked in and pullover shirt while seated in chair. Lower Body Dressing 2 Functional Level: 5     Comments: Pt donned socks and shoes by placing foot on knee. Pt threaded B feet into pants/underpants by placing foot on knee and stood with RW to pull up over hips/buttocks. Sock and/or Shoe Management FIM: 6   Toileting 3 Functional Level: 5        Tub/Shower Transfer 4 Tub/Shower Transfer Score: 5        Toilet Transfer 4 Toilet Transfer Score: 5  Comments: Pt used RW and elevated seat.        Comprehension 5  5   Expression 5  5   Social Interaction 5  5   Problem Solving 5  5   Memory 5  5   Please see [facility] Interdisciplinary Eval: Coordination/Balance Section for details regarding FIM score description. Activity Tolerance:   Fair+    ASSESSMENT:  Pt making progress with independence in self-care tasks. Pt achieved 7/9 LTGs and should continue to make progress with transition and home and home health. Pt has difficulty recalling proper hand placement during sit to stands and requires verbal cues to lock brakes before performing functional transfers. Progression toward goals:  [x]      Improving appropriately and progressing toward goals  []      Improving slowly and progressing toward goals  []      Not making progress toward goals and plan of care will be adjusted     PLAN:  Pt would benefit from continued skilled occupational therapy in order to improve ADL function and IADL with use of least restrictive device. Interventions may include range of motion (AROM, PROM B UE/trunk), motor function (B UE/trunk strengthening/coordination), activity tolerance (vitals, oxygen saturation levels), ADL, balance activities, IADL, and functional transfer training. Discharge Recommendations: Home Health with assist  Further Equipment Recommendations for Discharge: bedside commode and shower chair       Please refer to the flow sheet for vital signs taken during this treatment. After treatment:   [x]  Patient left in no apparent distress sitting up in chair  []  Patient left in no apparent distress in bed  [x]  Call bell left within reach  []  Nursing notified  [x]  Caregiver present (supportive )  []  Bed alarm activated    COMMUNICATION/EDUCATION:   Communication/Collaboration:  [x]      Home safety education was provided and the patient/caregiver indicated understanding. [x]      Patient/family have participated as able and agree with findings and recommendations. []      Patient is unable to participate in plan of care at this time.     [facility], MATIAS/L  7/1/2022

## 2022-07-02 VITALS
OXYGEN SATURATION: 95 % | WEIGHT: 151.6 LBS | DIASTOLIC BLOOD PRESSURE: 84 MMHG | HEART RATE: 87 BPM | BODY MASS INDEX: 25.26 KG/M2 | TEMPERATURE: 97.5 F | SYSTOLIC BLOOD PRESSURE: 158 MMHG | RESPIRATION RATE: 17 BRPM | HEIGHT: 65 IN

## 2022-07-02 PROCEDURE — 74011250637 HC RX REV CODE- 250/637: Performed by: INTERNAL MEDICINE

## 2022-07-02 PROCEDURE — 74011250637 HC RX REV CODE- 250/637: Performed by: EMERGENCY MEDICINE

## 2022-07-02 PROCEDURE — 74011000250 HC RX REV CODE- 250: Performed by: INTERNAL MEDICINE

## 2022-07-02 PROCEDURE — 99239 HOSP IP/OBS DSCHRG MGMT >30: CPT | Performed by: INTERNAL MEDICINE

## 2022-07-02 PROCEDURE — 74011250636 HC RX REV CODE- 250/636: Performed by: INTERNAL MEDICINE

## 2022-07-02 RX ADMIN — OXYCODONE HYDROCHLORIDE 10 MG: 5 TABLET ORAL at 09:17

## 2022-07-02 RX ADMIN — Medication 1 TABLET: at 09:13

## 2022-07-02 RX ADMIN — METOPROLOL TARTRATE 25 MG: 25 TABLET, FILM COATED ORAL at 09:13

## 2022-07-02 RX ADMIN — WATER 1 G: 1 INJECTION INTRAMUSCULAR; INTRAVENOUS; SUBCUTANEOUS at 10:40

## 2022-07-02 RX ADMIN — GABAPENTIN 200 MG: 100 CAPSULE ORAL at 09:13

## 2022-07-02 RX ADMIN — OXYCODONE HYDROCHLORIDE 10 MG: 5 TABLET ORAL at 02:40

## 2022-07-02 RX ADMIN — ACETAMINOPHEN 650 MG: 325 TABLET ORAL at 09:13

## 2022-07-02 RX ADMIN — PANTOPRAZOLE SODIUM 40 MG: 40 TABLET, DELAYED RELEASE ORAL at 06:32

## 2022-07-02 RX ADMIN — ATORVASTATIN CALCIUM 10 MG: 10 TABLET, FILM COATED ORAL at 09:13

## 2022-07-02 RX ADMIN — Medication 400 MG: at 09:13

## 2022-07-02 NOTE — PROGRESS NOTES
Problem: Pain  Goal: *Control of Pain  Outcome: Progressing Towards Goal     Problem: Patient Education: Go to Patient Education Activity  Goal: Patient/Family Education  Outcome: Progressing Towards Goal     Problem: Falls - Risk of  Goal: *Absence of Falls  Description: Document Clydene Bone Fall Risk and appropriate interventions in the flowsheet. Outcome: Progressing Towards Goal  Note: Fall Risk Interventions:  Mobility Interventions: Bed/chair exit alarm,Patient to call before getting OOB,Utilize walker, cane, or other assistive device    Mentation Interventions: Bed/chair exit alarm,Door open when patient unattended,Room close to nurse's station    Medication Interventions: Bed/chair exit alarm,Patient to call before getting OOB,Teach patient to arise slowly    Elimination Interventions: Bed/chair exit alarm,Call light in reach,Patient to call for help with toileting needs,Stay With Me (per policy)    History of Falls Interventions: Bed/chair exit alarm,Door open when patient unattended,Room close to nurse's station         Problem: Patient Education: Go to Patient Education Activity  Goal: Patient/Family Education  Outcome: Progressing Towards Goal     Problem: Impaired Skin Integrity/Pressure Injury Treatment  Goal: *Improvement of Existing Pressure Injury  Outcome: Progressing Towards Goal  Goal: *Prevention of pressure injury  Description: Document Valeriy Scale and appropriate interventions in the flowsheet.   Outcome: Progressing Towards Goal  Note: Pressure Injury Interventions:  Sensory Interventions: Assess changes in LOC    Moisture Interventions: Absorbent underpads,Minimize layers,Moisture barrier,Apply protective barrier, creams and emollients    Activity Interventions: Increase time out of bed,Pressure redistribution bed/mattress(bed type)    Mobility Interventions: HOB 30 degrees or less,Pressure redistribution bed/mattress (bed type),Float heels    Nutrition Interventions: Document food/fluid/supplement intake    Friction and Shear Interventions: HOB 30 degrees or less,Lift sheet,Minimize layers,Transferring/repositioning devices                Problem: Patient Education: Go to Patient Education Activity  Goal: Patient/Family Education  Outcome: Progressing Towards Goal     Problem: Anxiety  Goal: *Alleviation of anxiety  Outcome: Progressing Towards Goal  Goal: *Alleviation of anxiety (Palliative Care)  Outcome: Progressing Towards Goal     Problem: Patient Education: Go to Patient Education Activity  Goal: Patient/Family Education  Outcome: Progressing Towards Goal     Problem: Pressure Injury - Risk of  Goal: *Prevention of pressure injury  Description: Document Valeriy Scale and appropriate interventions in the flowsheet.   Outcome: Progressing Towards Goal  Note: Pressure Injury Interventions:  Sensory Interventions: Assess changes in LOC    Moisture Interventions: Absorbent underpads,Minimize layers,Moisture barrier,Apply protective barrier, creams and emollients    Activity Interventions: Increase time out of bed,Pressure redistribution bed/mattress(bed type)    Mobility Interventions: HOB 30 degrees or less,Pressure redistribution bed/mattress (bed type),Float heels    Nutrition Interventions: Document food/fluid/supplement intake    Friction and Shear Interventions: HOB 30 degrees or less,Lift sheet,Minimize layers,Transferring/repositioning devices                Problem: Patient Education: Go to Patient Education Activity  Goal: Patient/Family Education  Outcome: Progressing Towards Goal

## 2022-07-02 NOTE — ROUTINE PROCESS
Edwin Kapadia 68 y.o. female was discharged home on 07/02/22. Patient's armband removed and shredded. Discharge instructions were reviewed with patient, and she verbalized understanding. The patient was wheeled out to transportation vehicle by a staff member along with the patient's belongings. Transportation was provided by private vehicle.     Zeferino Jeffery LPN

## 2022-07-02 NOTE — PROGRESS NOTES
SHIFT CHANGE NOTE FOR [facility]    Bedside and Verbal shift change report given to Rudy Peter (oncoming nurse) by Kira Troy RN (offgoing nurse). Report included the following information SBAR, Kardex, MAR and Recent Results. Situation:   Code Status: Full Code   Hospital Day: 15   Problem List:   Hospital Problems  Date Reviewed: 6/24/2022          Codes Class Noted POA    Hypokalemia ICD-10-CM: E87.6  ICD-9-CM: 276.8  6/22/2022 Yes        Status post kyphoplasty ICD-10-CM: Z98.890  ICD-9-CM: V45.89  6/13/2022 Yes    Overview Signed 6/20/2022  4:37 PM by Isma Holman MD     S/P Image guided T11 kyphoplasty; Image guided bilateral sacroplasty (6/13/2022 - Dr. Ricardo Pelletier)             * (Principal) Compression fracture of T11 vertebra with routine healing ICD-10-CM: S22.080D  ICD-9-CM: V54.17  6/9/2022 Yes        Bilateral sacral insufficiency fracture with routine healing ICD-10-CM: M84.48XD  ICD-9-CM: V54.27  6/9/2022 Yes        Generalized weakness ICD-10-CM: R53.1  ICD-9-CM: 780.79  6/9/2022 Yes        Impaired mobility and ADLs ICD-10-CM: Z74.09, Z78.9  ICD-9-CM: V49.89  6/9/2022 Yes        Sciatica of right side ICD-10-CM: M54.31  ICD-9-CM: 724.3  2/7/2018 Yes        Essential hypertension, benign ICD-10-CM: I10  ICD-9-CM: 401.1  1/29/2009 Yes              Background:   Past Medical History:   Past Medical History:   Diagnosis Date    Acid reflux     Bilateral sacral insufficiency fracture with routine healing 6/9/2022    Chronic anemia     Compression fracture of T11 vertebra with routine healing 6/9/2022    Hypertension     Spinal stenosis         Assessment:   Changes in Assessment throughout shift: No change to previous assessment     Patient has a central line: no Reasons if yes: Insertion date: Last dressing date:   Patient has Ellis Cath: no Reasons if yes:     Insertion date:  Shift ellis care completed:      Last Vitals:     Vitals:    06/30/22 1957 07/01/22 4865 07/01/22 1551 07/01/22 2001   BP: 123/69 117/73 135/78 (!) 158/90   Pulse: 77 99 75 90   Resp: 18 18 18 18   Temp: 96.9 °F (36.1 °C) 97.9 °F (36.6 °C) 97.4 °F (36.3 °C) 98.8 °F (37.1 °C)   SpO2: 96% 94% 98% 96%   Weight:       Height:            PAIN    Pain Assessment    Pain Intensity 1: 0 (07/02/22 0407) Pain Intensity 1: 2 (12/29/14 1105)    Pain Location 1: Leg Pain Location 1: Abdomen    Pain Intervention(s) 1: Medication (see MAR) Pain Intervention(s) 1: Medication (see MAR)  Patient Stated Pain Goal: 0 Patient Stated Pain Goal: 0  o Intervention effective: yes  o Other actions taken for pain: Medication (see MAR)     Skin Assessment  Skin color    Condition/Temperature    Integrity Skin Integrity: Incision (comment) (healed incision)  Turgor    Weekly Pressure Ulcer Documentation  Pressure  Injury Documentation: No Pressure Injury Noted-Pressure Ulcer Prevention Initiated  Wound Prevention & Protection Methods  Orientation of wound Orientation of Wound Prevention: Posterior  Location of Prevention Location of Wound Prevention: Sacrum/Coccyx,Heel  Dressing Present Dressing Present : No  Dressing Status Dressing Status: Intact  Wound Offloading Wound Offloading (Prevention Methods): Bed, pressure reduction mattress    Wound Prevention Checklist  Precautions:      []  Heel prevention boots placed on patient    [x]  Patient turned q2h during shift    []  Valeriy Order Set ordered    []  Patient on [place] bed/Specialty bed    []  Each Wound is documented during shift (Stage, Color, drainage, odor, measurements, and dressings)    [x]  Dual skin checks done at bedside during shift report with Radha Darling LPN     INTAKE/OUPUT  Date 07/01/22 0700 - 07/02/22 0659 07/02/22 0700 - 07/03/22 0659   Shift 2563-7423 4698-5043 24 Hour Total 2871-4193 4465-3319 24 Hour Total   INTAKE   P.O. 480 360 840        P. O. 480 360 840      Shift Total(mL/kg) 480(7) 360(5.2) 840(12.2)      OUTPUT   Urine(mL/kg/hr)  0 0        Urine Voided  0 0 Urine Occurrence(s) 1 x 2 x 3 x      Stool           Stool Occurrence(s) 0 x 0 x 0 x      Shift Total(mL/kg)  0(0) 0(0)       256 840      Weight (kg) 68.8 68.8 68.8 68.8 68.8 68.8       Recommendations:  1. Patient needs and requests: Discharge instruction    2. Pending tests/procedures: none at this time     3. Functional Level/Equipment: Partial (one person) /      Fall Precautions:   Fall risk precautions were reinforced with the patient; she was instructed to call for help prior to getting up. The following fall risk precautions were continued: bed/ chair alarms, door signage, yellow bracelet and socks as well as update of the Rob Li tool in the patient's room. Yinka Score: 3    HEALS Safety Check    A safety check occurred in the patient's room between off going nurse and oncoming nurse listed above. The safety check included the below items  Area Items   H  High Alert Medications - Verify all high alert medication drips (heparin, PCA, etc.)   E  Equipment - Suction is set up for ALL patients (with catalina)  - Red plugs utilized for all equipment (IV pumps, etc.)  - WOWs wiped down at end of shift.  - Room stocked with oxygen, suction, and other unit-specific supplies   A  Alarms - Bed alarm is set for fall risk patients  - Ensure chair alarm is in place and activated if patient is up in a chair   L  Lines - Check IV for any infiltration  - Nance bag is empty if patient has a Nance   - Tubing and IV bags are labeled   S  Safety   - Room is clean, patient is clean, and equipment is clean. - Hallways are clear from equipment besides carts. - Fall bracelet on for fall risk patients  - Ensure room is clear and free of clutter  - Suction is set up for ALL patients (with catalina)  - Hallways are clear from equipment besides carts.    - Isolation precautions followed, supplies available outside room, sign posted     Rehana Han RN

## 2022-07-02 NOTE — DISCHARGE INSTRUCTIONS
DISCHARGE SUMMARY from Nurse    PATIENT INSTRUCTIONS:    After general anesthesia or intravenous sedation, for 24 hours or while taking prescription Narcotics:  · Limit your activities  · Do not drive and operate hazardous machinery  · Do not make important personal or business decisions  · Do  not drink alcoholic beverages  · If you have not urinated within 8 hours after discharge, please contact your surgeon on call. Report the following to your surgeon:  · Excessive pain, swelling, redness or odor of or around the surgical area  · Temperature over 100.5  · Nausea and vomiting lasting longer than 4 hours or if unable to take medications  · Any signs of decreased circulation or nerve impairment to extremity: change in color, persistent  numbness, tingling, coldness or increase pain  · Any questions    What to do at Home:          *  Please give a list of your current medications to your Primary Care Provider. *  Please update this list whenever your medications are discontinued, doses are      changed, or new medications (including over-the-counter products) are added. *  Please carry medication information at all times in case of emergency situations. These are general instructions for a healthy lifestyle:    No smoking/ No tobacco products/ Avoid exposure to second hand smoke  Surgeon General's Warning:  Quitting smoking now greatly reduces serious risk to your health. Obesity, smoking, and sedentary lifestyle greatly increases your risk for illness    A healthy diet, regular physical exercise & weight monitoring are important for maintaining a healthy lifestyle    You may be retaining fluid if you have a history of heart failure or if you experience any of the following symptoms:  Weight gain of 3 pounds or more overnight or 5 pounds in a week, increased swelling in our hands or feet or shortness of breath while lying flat in bed.   Please call your doctor as soon as you notice any of these symptoms; do not wait until your next office visit. The discharge information has been reviewed with the patient. The patient verbalized understanding. Discharge medications reviewed with the patient and appropriate educational materials and side effects teaching were provided. Patient {ARMBANDS:}  MyChart Activation    Thank you for requesting access to [employer]. Please follow the instructions below to securely access and download your online medical record. [employer] allows you to send messages to your doctor, view your test results, renew your prescriptions, schedule appointments, and more. How Do I Sign Up? 1. In your internet browser, go to www."[employer]  2. Click on the First Time User? Click Here link in the Sign In box. You will be redirect to the New Member Sign Up page. 3. Enter your [employer] Access Code exactly as it appears below. You will not need to use this code after youve completed the sign-up process. If you do not sign up before the expiration date, you must request a new code. [employer] Access Code: [email or web address]  Expires: 2022  1:13 PM (This is the date your [employer] access code will )    4. Enter the last four digits of your Social Security Number (xxxx) and Date of Birth (mm/dd/yyyy) as indicated and click Submit. You will be taken to the next sign-up page. 5. Create a [employer] ID. This will be your [employer] login ID and cannot be changed, so think of one that is secure and easy to remember. 6. Create a [employer] password. You can change your password at any time. 7. Enter your Password Reset Question and Answer. This can be used at a later time if you forget your password. 8. Enter your e-mail address. You will receive e-mail notification when new information is available in [address] 9. Click Sign Up. You can now view and download portions of your medical record.   10. Click the Download Summary menu link to download a portable copy of your medical information. Additional Information    If you have questions, please visit the Frequently Asked Questions section of the [employer] website at [email or web address] Remember, [employer] is NOT to be used for urgent needs.  For medical emergencies, dial 911.      ___________________________________________________________________________________________________________________________________

## 2022-07-05 ENCOUNTER — HOME CARE VISIT (OUTPATIENT)
Dept: SCHEDULING | Facility: HOME HEALTH | Age: 77
End: 2022-07-05
Payer: MEDICARE

## 2022-07-05 VITALS
HEART RATE: 78 BPM | OXYGEN SATURATION: 97 % | DIASTOLIC BLOOD PRESSURE: 72 MMHG | RESPIRATION RATE: 16 BRPM | TEMPERATURE: 97.7 F | SYSTOLIC BLOOD PRESSURE: 110 MMHG

## 2022-07-05 PROCEDURE — G0151 HHCP-SERV OF PT,EA 15 MIN: HCPCS

## 2022-07-05 PROCEDURE — 3331090001 HH PPS REVENUE CREDIT

## 2022-07-05 PROCEDURE — 400013 HH SOC

## 2022-07-05 PROCEDURE — 3331090002 HH PPS REVENUE DEBIT

## 2022-07-05 PROCEDURE — 400018 HH-NO PAY CLAIM PROCEDURE

## 2022-07-05 NOTE — Clinical Note
Dear Dr. Leela Oseguera    I wanted to thank you for your referral of your patient Sayra Manley. We have admitted this patient for Home Physical Therapy with the following frequency: 2w1, 3w2, 2w2, 1w1.       Sincerely,    GITA Melo   Physical Therapist

## 2022-07-05 NOTE — HOME CARE
7/1/22:  Received HH referral for tentative discharge of Saturday 7/2/22. Verified demographics and information and initiated referral.  Awaiting for additional orders and information for completion. 7/5/22:  Patient discharged on  (7/2/22), [facility] will follow for PT/OT/SN - wound care.  HH referral processed over the weekend to [facility] by central.        ---   Jf Martel, [address] [place] [address]

## 2022-07-05 NOTE — DISCHARGE SUMMARY
[facility] PHYSICAL REHABILITATION  [address] [place] [address], [address]     INPATIENT REHABILITATION  DISCHARGE SUMMARY    Name: Charo Masterson MRN: [id number]   Age / Sex: 68 y.o. / female CSN: [id number]   YOB: 1945 Length of Stay: 12 days   Admit Date: 6/17/2022 Discharge Date: 7/2/2022       Patient was seen on discharge. Patient was stable and resting comfortably in recliner with daughter at bedside. Patient was educated on all medication changes and updates. Patient was also given all her follow up appointment/discharge information. Patient was pleasant and grateful, happy to be going home. PRIMARY CARE PHYSICIAN: Saul Baum MD    DISCHARGE DIAGNOSES:    Primary Rehabilitation Diagnosis  1. Impaired Mobility and ADLs  2. Compression fracture of T11 vertebra with routine healing  3. Bilateral sacral insufficiency fracture with routine healing  4.  S/P Image guided T11 kyphoplasty; Image guided bilateral sacroplasty (6/13/2022 - Dr. Adore Hutchison)    Comorbidities  Patient Active Problem List   Diagnosis Code    Spinal stenosis of lumbar region with neurogenic claudication M48.062    Lumbar facet arthropathy M47.816    Disorder of bone and cartilage M89.9, M94.9    Diverticulosis of colon K57.30    Dyslipidemia E78.5    Essential hypertension, benign I10    Gastroesophageal reflux disease without esophagitis K21.9    Generalized anxiety disorder F41.1    Impaired fasting glucose R73.01    Irritable bowel syndrome with diarrhea K58.0    Melanoma in situ of right upper arm (HCC) D03.61    Primary insomnia F51.01    Sciatica of right side M54.31    Squamous cell carcinoma [id number]    Lumbar stenosis with neurogenic claudication M48.062    Depression, recurrent ([facility]) F33.9    Spinal stenosis M48.00    Leg weakness, bilateral R29.898    Atrial fibrillation with rapid ventricular response (HCC) I48.91    Compression fracture of T11 vertebra with routine healing S22.080D    Bilateral sacral insufficiency fracture with routine healing M84.48XD    Status post kyphoplasty Z98.890    Generalized weakness R53.1    Impaired mobility and ADLs Z74.09, Z78.9    Chronic anemia D64.9    Hypokalemia E87.6       CONSULTS CALLED: None      PROCEDURES DONE: None      BRIEF HISTORY: (from the history and physical performed by Dr. Latisha De Souza)  Emily Shah is a 68 y.o.  female who has history of hypertension hyperlipidemia gastroesophageal reflux disease spinal stenosis and anxiety     Patient was seen by spine doctor Dr. Argenis Jerome who referred patient for ER evaluation due to worsening back pain with movement     Patient initial work-up in [facility] CT lumbar spine showed acute subacute compression fracture. Patient had interventional radiology consult for cement augmentation to T11 and sacral fracture        CT lumber spine  1. Progression of subacute compression fracture at T11, at about 33% loss of  height.  -Resultant kyphosis  -mild cord contact     2. Acute/subacute sacral insufficiency fracture pattern predominating on the  right  -As outlined above  -Most likely source of reported right sciatica     3. Moderate severity stenosis at L2-3 from combined degenerative disc disease  and epidural lipomatosis  -Similar lesser degree of stenoses at L4-5 and L3-4 with same combination of  findings     4. Previous corrective geoff placement spanning T10-L2, hardware intact.     Echo 6/15    Left Ventricle: Normal left ventricular systolic function with a visually estimated EF of 55 - 60%. Left ventricle size is normal. Increased wall thickness. Findings consistent with mild concentric hypertrophy. Normal wall motion. Normal diastolic function.   Tricuspid Valve: The estimated RVSP is 53 mmHg.   Pulmonary Arteries: Moderate pulmonary hypertension present.   Aorta: Dilated aortic root. Ao Root diameter is 3.8 cm. Dilated ascending aorta.  Ao Ascending diameter is 3.6 cm.     Patient is status post kyphoplasty and bilateral sacroplasty on June 13, 2022 with interventional radiology patient had difficulty controlling her pain is  IV morphine and started on oxycodone 10 mg every 4 hours before discharge and gabapentin     Patient had transient atrial fibrillation during her hospital stay required metoprolol IV and Cardizem 10 mg IV and echocardiogram was ordered patient converted to sinus stress was likely treatment no record of cardiology consult for history of anticoagulation per discharge summary and the hospital course     Pt seen today has been lying in bed after pt pain under controlled had nausea this morning and resolved with zofran no chest pain no SOB. COURSE IN THE HOSPITAL: Upon admission to the [facility], the patient underwent physical therapy, occupational therapy and speech therapy. The patient was able to actively participate in the rehabilitation activities and progressed well. On discharge, the patient was able to perform the following activities:    1. Occupational Therapy    ON ADMISSION ON DISCHARGE   Eating  Functional Level: 5   Eating  Functional Level: 6     Grooming  Functional Level: 5   Grooming  Functional Level: 6     Bathing  Functional Level: 3   Bathing  Functional Level: 5 (UB: 6; LB: 5)     Upper Body Dressing  Functional Level: 3   Upper Body Dressing  Functional Level: 6     Lower Body Dressing  Functional Level: 2   Lower Body Dressing  Functional Level: 5     Toileting  Functional Level: 3   Toileting  Functional Level: 5     Toilet Transfers  Toilet Transfer Score: 4   Toilet Transfers  Toilet Transfer Score: 5     Tub /Shower Transfers  Tub/Shower Transfer Score: 4   Tub/Shower Transfers  Tub/Shower Transfer Score: 5       2.  Physical Therapy    ON ADMISSION ON DISCHARGE   Wheelchair Mobility/Management  Able to Propel (ft): 60 feet  Functional Level: 4  Curbs/Ramps Assist Required (FIM Score): 0 (Not tested)  Wheelchair Setup Assist Required : 2 (Maximal assistance)  Wheelchair Management: Manages right brake,Manages left brake Wheelchair Mobility/Management  Able to Propel (ft): 120 feet (distance not challenged)  Functional Level:  (Neptali)  Curbs/Ramps Assist Required (FIM Score): 0 (Not tested)  Wheelchair Setup Assist Required : 5 (Supervision/setup)  Wheelchair Management: Manages left brake,Manages right brake     Gait  Amount of Assistance: 4 (Minimal assistance)  Distance (ft): 25 Feet (ft)  Assistive Device: Walker, rolling Gait  Amount of Assistance: 5 (Stand-by assistance)  Distance (ft): 365 Feet (ft) (and 150ft)  Assistive Device: Walker, rolling     Balance-Sitting/Standing  Sitting - Static: Good (unsupported)  Sitting - Dynamic: Fair (occasional)  Standing - Static: Fair  Standing - Dynamic : Impaired Balance-Sitting/Standing  Sitting - Static: Good (unsupported)  Sitting - Dynamic: Fair (occasional)  Standing - Static: Fair (with RW)  Standing - Dynamic : Impaired     Bed/Mat Mobility  Rolling Right : 5 (Stand-by assistance)  Rolling Left : 5 (Stand-by assistance)  Supine to Sit : 4 (Minimal assistance)  Sit to Supine : 3 (Moderate assistance) Bed/Mat Mobility  Rolling Right : 5 (Supervision)  Rolling Left : 5 (Supervision)  Supine to Sit : 5 (Stand-by assistance)  Sit to Supine : 4 (Minimal assistance) (with right LE)     Transfers  Transfer Type: Other  Other: stand step with RW  Transfer Assistance : 4 (Minimal assistance)  Sit to Stand Assistance: Minimal assistance  Car Transfers: Not tested  Car Type: car simulator Transfers  Transfer Type:  Other  Other: stand step txfr with RW  Transfer Assistance : 5 (Supervision/setup)  Sit to Stand Assistance: Supervision  Car Transfers: Supervision  Car Type: car txfr simulator     Steps or Stairs  Steps/Stairs Ambulated (#): 0  Level of Assist : 0 (Not tested)  Rail Use: Both Steps or Stairs  Steps/Stairs Ambulated (#): 16 (6\" steps)  Level of Assist : 5 (Stand-by assistance)  Rail Use: Both       3. Speech and Language Pathology    ON ADMISSION ON DISCHARGE   Comprehension (Native Language)  Primary Mode of Comprehension: Auditory  Score: 5 Comprehension (Native Language)  Primary Mode of Comprehension: Auditory  Score: 5     Expression (Native Language)  Primary Mode of Expression: Verbal  Score: 5   Expression (Native Language)  Primary Mode of Expression: Verbal  Score: 5     Social Interaction/Pragmatics  Score: 5 Social Interaction/Pragmatics  Score: 5     Problem Solving  Score: 5   Problem Solving  Score: 5     Memory  Score: 5 Memory  Score: 6       Legend:   7 - Independent   6 - Modified Independent   5 - Standby Assistance / Supervision / Set-up   4 - Minimum Assistance / Contact Guard Assistance   3 - Moderate Assistance   2 - Maximum Assistance   1 - Total Assistance / Dependent       ACUTE MEDICAL ISSUES ADDRESSED IN INPATIENT REHABILITATION FACILITY:     Compression fracture of T11 vertebra with routine healing; Bilateral sacral insufficiency fracture with routine healing; S/P Image guided T11 kyphoplasty; Image guided bilateral sacroplasty (6/13/2022 - Dr. Vadim Ricardo)              > Thoracic spinal precautions  Continue pain control  6/27/22: We will add lidocaine patch and monitor patient's symptoms.   6/28: will add Voltaren topically to be used night time for muscle spasm treatment   6/29: will increase the dose of nighttime Gabapentin     Chronic anemia   Continue Ferrous gluconate 324 mg PO BID with meals      Dyslipidemia  Continue Atorvastatin 10 mg PO once daily     Essential hypertension  Currently controlled              > Continue:                          >  Metoprolol tartrate from 25 mg PO q 12 hr (9AM, 9PM)                          > Hydralazine 25 mg PO TID PRN for SBP greater than 170 mmHg  Monitor and adjust BP medication as needed     Gastroesophageal reflux disease  Continue Pantoprazole 40 mg PO daily before breakfast     Generalized anxiety disorder  Continue:                          > Nortriptyline 40 mg PO q HS                          > Lorazepam 0.5 mg PO BID PRN for anxiety     Sciatica of right side  Continue:                          > Gabapentin 200 mg PO BID (8AM, 2PM)                          > Gabapentin 400 mg PO q 8PM                          > Nortriptyline 40 mg PO q HS  6/30: Follow-up with spine surgeon in a week upon discharge from here. Discussed with patient's daughter about it.     Hypokalemia  Continue Magnesium oxide 400 mg PO once daily     Gram-negative geoff UTI:  6/30: Pending urine culture. Patient was started on Rocephin for 3 days yesterday. 7/1: Urine culture reveals E-Coli present. Will continue Rocephin antibiotics.      Poor Appetite  6/28: discussed with patient and daughter about possibly using Remeron if needed. MEDICATIONS ON DISCHARGE:    Discharge Medication List as of 7/2/2022 10:50 AM      START taking these medications    Details   metoprolol tartrate (LOPRESSOR) 25 mg tablet Take 1 Tablet by mouth every twelve (12) hours for 30 days. Indications: high blood pressure, Normal, Disp-60 Tablet, R-0      diclofenac (VOLTAREN) 1 % gel Apply 4 g to affected area nightly for 25 days. Apply to right hip and thigh., Normal, Disp-1 Each, R-0         CONTINUE these medications which have CHANGED    Details   !! gabapentin (Neurontin) 300 mg capsule Take 2 Capsules by mouth nightly for 30 days. Max Daily Amount: 600 mg. Indications: neuropathic pain, Normal, Disp-60 Capsule, R-0      oxyCODONE IR (ROXICODONE) 10 mg tab immediate release tablet Take 1 Tablet by mouth every six (6) hours as needed for Pain for up to 5 days. Max Daily Amount: 40 mg. Indications: pain, Normal, Disp-20 Tablet, R-0      !! gabapentin (NEURONTIN) 100 mg capsule Take 2 Capsules by mouth two (2) times a day for 30 days. Max Daily Amount: 400 mg.  Indications: neuropathic pain, Normal, Disp-120 Capsule, R-0 !! - Potential duplicate medications found. Please discuss with provider. CONTINUE these medications which have NOT CHANGED    Details   ferrous gluconate 324 mg (37.5 mg iron) tablet Take 1 Tablet by mouth two (2) times daily (with meals). , Normal, Disp-60 Tablet, R-0      hydrALAZINE (APRESOLINE) 25 mg tablet Take 1 Tablet by mouth three (3) times daily. , Normal, Disp-90 Tablet, R-0      promethazine (PHENERGAN) 12.5 mg tablet Take 1 Tablet by mouth every six (6) hours as needed for Nausea for up to 30 days. , Normal, Disp-30 Tablet, R-0      docosahexaenoic acid/epa (FISH OIL PO) Take 1,000 mg by mouth daily. , Historical Med      naloxone (NARCAN) 4 mg/actuation nasal spray Use 1 spray intranasally, then discard. Repeat with new spray every 2 min as needed for opioid overdose symptoms, alternating nostrils. , Normal, Disp-2 Each, R-0      nortriptyline (PAMELOR) 10 mg capsule Take 4 Capsules by mouth nightly., Normal, Disp-360 Capsule, R-1      MAGNESIUM OXIDE PO Take 1 Tab by mouth daily. , Historical Med      omeprazole (PRILOSEC) 40 mg capsule Take 40 mg by mouth daily. Indications: gastroesophageal reflux disease, Historical Med      LORazepam (ATIVAN) 1 mg tablet Take 1 mg by mouth every eight (8) hours as needed., Historical Med      atorvastatin (LIPITOR) 10 mg tablet Take 10 mg by mouth daily. , Historical Med         STOP taking these medications       amLODIPine (NORVASC) 10 mg tablet Comments:   Reason for Stopping:         losartan (COZAAR) 50 mg tablet Comments:   Reason for Stopping:         losartan (COZAAR) 25 mg tablet Comments:   Reason for Stopping:         hydroCHLOROthiazide (HYDRODIURIL) 25 mg tablet Comments:   Reason for Stopping:               DISCHARGE VITAL SIGNS:  Visit Vitals  BP (!) 158/84 (BP 1 Location: Right upper arm, BP Patient Position: At rest)   Pulse 87   Temp 97.5 °F (36.4 °C)   Resp 17   Ht 5' 5\" (1.651 m)   Wt 68.8 kg (151 lb 9.6 oz)   SpO2 95%   Breastfeeding No BMI 25.23 kg/m²       DISCHARGE PHYSICAL EXAMINATION:  GENERAL SURVEY: Patient is awake, alert, oriented x 3, sitting comfortably on the chair, not in acute respiratory distress. HEENT: pink palpebral conjunctivae, anicteric sclerae, no nasoaural discharge, moist oral mucosa  NECK: supple, no jugular venous distention, no palpable lymph nodes  CHEST/LUNGS: symmetrical chest expansion, good air entry, clear breath sounds  HEART: adynamic precordium, good S1 S2, no S3, regular rhythm, no murmurs  ABDOMEN: flat, bowel sounds appreciated, soft, non-tender  EXTREMITIES: pink nailbeds, no edema, full and equal pulses, no calf tenderness   NEUROLOGICAL EXAM: The patient is awake, alert and oriented x3, able to answer questions fairly appropriately, able to follow 1 and 2 step commands. Able to tell time from the wall clock. Cranial nerves II-XII are grossly intact. No gross sensory deficit. CONDITION ON DISCHARGE: Stable. DISPOSITION: Patient clinically improved and was discharged to home with home health physical therapy, occupational therapy, and skilled nursing. The patient is temporarily homebound secondary to functional deficits due to compression fracture of T11 vertebra with routine healing; bilateral sacral insufficiency fracture with routine healing; S/P Image guided T11 kyphoplasty; Image guided bilateral sacroplasty. The patient can ambulate using rolling walker. The patient would benefit from continued skilled physical therapy to improve independent functional mobility within the home with use of least restrictive device. The patient would also benefit from continued skilled occupational therapy to improve self-care and functional mobility within the home with use of least restrictive device. Short-term skilled nursing is needed for medication reconciliation and disease education. Medical social worker for needs assessment and community resources.     FOLLOW-UP RECOMMENDATIONS:   Follow-up Information     Follow up With Specialties Details Why Contact Info    Emeli Manriquez MD Family Medicine On 7/8/2022 Patient has an appointment scheduled with PCP Dr. Jet Azevedo on July 8, 2022 @ 1:15pm. [address] [facility] [address]re [address]  [phone]      Harjinder Rogers MD Orthopedic Surgery On 7/5/2022 Patient has an appointment scheduled with Spine Dr. Ruel Snow on July 5, 2022 @ 10:35am. [address] [facility] [address]  [address] [place] [address]  [phone]            OTHER INSTRUCTIONS:  1. Activity. As tolerated. 2. Safety / fall precautions. 3. Weightbearing status. TIME SPENT ON DISCHARGE ACTIVITIES: 38 minutes.       Signed:  Rommel Flores NP    7/5/2022

## 2022-07-05 NOTE — HOME HEALTH
PT INITIAL EVALUATION    Past Medical Hx:    Spinal stenosis of lumbar region with neurogenic claudication M48.062    Lumbar facet arthropathy M47.816    Disorder of bone and cartilage M89.9, M94.9    Diverticulosis of colon K57.30    Dyslipidemia E78.5    Essential hypertension, benign I10    Gastroesophageal reflux disease without esophagitis K21.9    Generalized anxiety disorder F4 1.1    Impaired fasting glucose R73.01    Irritable bowel syndrome with diarrhea K58.0    Melanoma in situ of right upper arm ([facility]) D03.61    Primary insomnia F51.01    Sciatica of right side M54.31    Squamous cell carcinoma [id number]    Lumbar stenosis with neurogenic claudication M48.062    Depression, recurrent ([facility]) F33.9    Spinal stenosis M48.00    Leg weakness, bilateral R29.898    Atrial fibrillation with rapid ventricu lar response ([facility]) I48.91    Compression fracture of T11 vertebra with routine healing S22.080D    Bilateral sacral insufficiency fracture with routine healing M84.48XD    Status post kyphoplasty Z98.890    Generalized weakness R53.1    Impaired mobility and ADLs Z74.09, Z78.9    Chronic anemia D64.9    Hypokalemia E87.6        Recent H/o current illness:  68 year old female presents with MD referral for HHPT s/p hospitalization due to complression fracture T11  Medication Management:  assists with medication management  Social hx and home eval:  Pt lives in 2 story home with .    PLOF:  Pt PLOF is ambulation w no AD, pts base level of function independent with all ADL's  BALANCE:     Seated unsupported balance is good   Standing static balance is fair  Standing dynamic balance is fair-  Tinetti 11 /28  Patient is at risk for falls due to recent hospitalization and weakness  BLE Strength:  Left Hip flexion 3-/5 , hip abduction 3-/5, hip adduction 3-/5, Knee flexion 3-/5  knee extension 3-/5, ankle dorsiflexion 3/5  Right Hip flexion 3-/5 , hip abduction 3-/5, hip adduction 3-/5, Knee flexion 3-/5  knee extension 3-/5, ankle dorsiflexion 3/5  BLE ROM:  Right hip/knee/ankle: WFL  Left hip/knee/ankle: WFL  Bed mobility:  independent on/off couch   Transfers:  mod A with sit<->stand for bed to chair, with FWW AD. mod cues and instruction needed for safety and sequencing  GAIT:  Patient ambulated  40 ft  with FWW  on level  surfaces min/mod A. Pt demonstrates with decreased hip and knee flexion on BLE in pre and mid swing phase of gait as well as decreased stride-length and samantha. Patient is unable to safely ambulate without assistance at this time. Pt required min cues for  safety and sequencing  Stairs: NT  Patient education provided this visit: Safety with functional mobility and instruction with HEP. Patient level of understanding of education provided: fair ,able to return demonstrate mobility instruction and HEP with min assistance  Patient response to treatment:  good with no c/o increased pain  Assessment: Referral for HHPT following recent hospitalization due to compression fracture. HHPT is medically necessary to address the following clinical findings: decreased BLE strength and ROM, impaired gait, decreased I and safety with transfers and gait, decreased endurance, decreased balance and decreased safety in order to improve functional mobility and decrease fall risk. Pt is a fall risk as indicated by Tinetti score of 11/28. Patient will be seen for HHPT 2w1, 3w2, 2w2, 1w1 for therapeutic exercises to increase BLE strength and ROM,  training for gait, stairs and transfers to increase I and safety with daily functional mobility and balance and endurance activities to decrease fall risk, decrease impairment and increase functional mobility and independence in the home.   Pt. requires skilled PT intervention to instruct Pt. with gait training with LRAD, ROM and strengthening, stair training, transfer training, balance training, manual therapy, neuromuscular re-education, patient care-giver education, HEP, endurance training, body mechanics. Instructed patient with HEP for BLE strengthening and left HEP handout for the patient. Patient was able to return demonstrate the exercises given. HEP includes:   Pt. received therapeutic exercises which included:  COPD/CHF exercises including: pursed lip breathing, ankle pumps, LAQ x 10 x 2,  arm circles, biceps curls, pray and open, overhead reaching, triceps extensions x 10 x 2-3 times/day                Skilled services/Home bound verification:     Skilled Reason for admission/summary of clinical condition:  complression fracture . This patient is homebound for the following reasons Requires considerable and taxing effort to leave the home , Requires the assistance of 1 or more persons to leave the home  and Only leaves the home for medical reasons or [employer] services and are infrequent and of short duration for other reasons . Caregiver: spouse. Caregiver assists with ADL's, medication management and medical appointments. Medications reconciled and all medications are available in the home this visit. The following education was provided regarding medications: NA  Medications  are not all available  at this time. High risk medication teaching regarding anticoagulants, hyperglycemic agents or opiod narcotics performed (specify) Roxicodone. Instructed only to take only amount prescribed, notify sn/pt if oversedated,  may cause constipation--notify if no BM x 3 days. Geneva Banks MD notified of any discrepancies/look a like medications/medication interactions patient does not have the Hydralazine 25 mg. Home health supplies by type and quantity ordered/delivered this visit include: NA    Patient education provided this visit to include: safety with funcitional mobility.       Patient level of understanding of education provided: fair    Sharps Education Provided: NA  Patient response to procedure performed:  NA    Pt/Caregiver instructed on plan of care and are agreeable to plan of care at this time. Physician Laura Paulino MD  notified of patient admission to home health and plan of care including anticipated frequency of 2w1, 3w2, 2w2, 1w1 and treatments/interventions/modalities of therex, functional mobility, balance and gait training, safety education. Discharge planning discussed with patient and caregiver. Discharge planning as follows: once goals are met, pt will be discharged to self under MD supervision. Pt/Caregiver did verbalize understanding of discharge planning. Next MD appointment 7/8/22 with Kaley Dexter MD.  Patient/caregiver encouraged/instructed to keep appointment as lack of follow through with physician appointment could result in discontinuation of home care services for non-compliance.

## 2022-07-06 ENCOUNTER — HOME CARE VISIT (OUTPATIENT)
Dept: HOME HEALTH SERVICES | Facility: HOME HEALTH | Age: 77
End: 2022-07-06
Payer: MEDICARE

## 2022-07-06 PROCEDURE — 3331090001 HH PPS REVENUE CREDIT

## 2022-07-06 PROCEDURE — 3331090002 HH PPS REVENUE DEBIT

## 2022-07-06 NOTE — CASE COMMUNICATION
Blanche Kiran          '35     2w1, 3w2, 2w2, 1w1  \" T11 compression fracture  \" OT/SN  \" Next MD appointment with Kaley Dexter MD on 7/8/22  \" Please focus on strengthening/stairs/balance/gait/safety education/functional mobility

## 2022-07-07 ENCOUNTER — HOME CARE VISIT (OUTPATIENT)
Dept: SCHEDULING | Facility: HOME HEALTH | Age: 77
End: 2022-07-07
Payer: MEDICARE

## 2022-07-07 VITALS
HEART RATE: 78 BPM | RESPIRATION RATE: 18 BRPM | TEMPERATURE: 98.1 F | SYSTOLIC BLOOD PRESSURE: 120 MMHG | DIASTOLIC BLOOD PRESSURE: 64 MMHG | OXYGEN SATURATION: 99 %

## 2022-07-07 PROCEDURE — G0299 HHS/HOSPICE OF RN EA 15 MIN: HCPCS

## 2022-07-07 PROCEDURE — 3331090001 HH PPS REVENUE CREDIT

## 2022-07-07 PROCEDURE — 3331090002 HH PPS REVENUE DEBIT

## 2022-07-07 NOTE — Clinical Note
Rosa Isela Moore admitted to [facility] for disease med management 1wk4 skilled nursing.      Respectfully,   Rosa Pruitt RN

## 2022-07-08 ENCOUNTER — HOME CARE VISIT (OUTPATIENT)
Dept: HOME HEALTH SERVICES | Facility: HOME HEALTH | Age: 77
End: 2022-07-08
Payer: MEDICARE

## 2022-07-08 PROCEDURE — 3331090002 HH PPS REVENUE DEBIT

## 2022-07-08 PROCEDURE — 3331090001 HH PPS REVENUE CREDIT

## 2022-07-08 NOTE — HOME HEALTH
Skilled Reason for admission/summary of clinical condition:  compression fracture . This patient is homebound for the following reasons Requires considerable and taxing effort to leave the home , Requires the assistance of 1 or more persons to leave the home  and Only leaves the home for medical reasons or [employer] services and are infrequent and of short duration for other reasons . Caregiver: spouse. Caregiver assists with ADL's, medication management and medical appointments. Medications reconciled and all medications are available in the home this visit. The following education was provided regarding medications:  patient to continue to take medications as prescribed. patient aware to monitor for effectiveness and to notify staff of any adverse reactions to medications/any changes to medication regimen. Medications  are not available  at this time. High risk medication teaching regarding anticoagulants, hyperglycemic agents or opiod narcotics performed (specify) Roxicodone. Instructed only to take only amount prescribed, notify sn/pt if oversedated,  may cause constipation--notify if no BM x 3 days. Saul Baum MD notified of any discrepancies/look a like medications/medication interactions:  patient does not have the Hydralazine 25 mg. Home health supplies by type and quantity ordered/delivered this visit include: none needed          Patient education provided this visit to include: MEDICATION ADHERENCE IS AN IMPORTANT COMPONENT OF HTN MANAGEMENT. PATIENT STATES THE IMPORTANCE TO TAKE HTN MEDS SAME TIME EACH DAY. [facility] Billing Patient level of understanding of education provided: pt verbalizes understanding of all education provided during visit             Sharps Education Provided: NA    Patient response to procedure performed:  na          Pt/Caregiver instructed on plan of care and are agreeable to plan of care at this time. Discharge planning discussed with patient and caregiver. Patient will be discharged once education has completed, patient is medically stable and pt is able to independently medication regimen and disease process and  longer requires skilled care.

## 2022-07-09 PROCEDURE — 3331090001 HH PPS REVENUE CREDIT

## 2022-07-09 PROCEDURE — 3331090002 HH PPS REVENUE DEBIT

## 2022-07-10 PROCEDURE — 3331090001 HH PPS REVENUE CREDIT

## 2022-07-10 PROCEDURE — 3331090002 HH PPS REVENUE DEBIT

## 2022-07-11 PROCEDURE — 3331090002 HH PPS REVENUE DEBIT

## 2022-07-11 PROCEDURE — 3331090001 HH PPS REVENUE CREDIT

## 2022-07-12 ENCOUNTER — HOME CARE VISIT (OUTPATIENT)
Dept: SCHEDULING | Facility: HOME HEALTH | Age: 77
End: 2022-07-12
Payer: MEDICARE

## 2022-07-12 PROCEDURE — G0157 HHC PT ASSISTANT EA 15: HCPCS

## 2022-07-12 PROCEDURE — 3331090001 HH PPS REVENUE CREDIT

## 2022-07-12 PROCEDURE — 3331090002 HH PPS REVENUE DEBIT

## 2022-07-13 PROCEDURE — 3331090002 HH PPS REVENUE DEBIT

## 2022-07-13 PROCEDURE — 3331090001 HH PPS REVENUE CREDIT

## 2022-07-14 ENCOUNTER — HOME CARE VISIT (OUTPATIENT)
Dept: SCHEDULING | Facility: HOME HEALTH | Age: 77
End: 2022-07-14
Payer: MEDICARE

## 2022-07-14 VITALS
DIASTOLIC BLOOD PRESSURE: 65 MMHG | TEMPERATURE: 97.3 F | SYSTOLIC BLOOD PRESSURE: 112 MMHG | OXYGEN SATURATION: 95 % | RESPIRATION RATE: 18 BRPM | HEART RATE: 81 BPM

## 2022-07-14 PROCEDURE — 3331090002 HH PPS REVENUE DEBIT

## 2022-07-14 PROCEDURE — G0152 HHCP-SERV OF OT,EA 15 MIN: HCPCS

## 2022-07-14 PROCEDURE — G0300 HHS/HOSPICE OF LPN EA 15 MIN: HCPCS

## 2022-07-14 PROCEDURE — 3331090001 HH PPS REVENUE CREDIT

## 2022-07-14 PROCEDURE — G0157 HHC PT ASSISTANT EA 15: HCPCS

## 2022-07-14 NOTE — HOME HEALTH
Recent H/o current illness: 68year old female presents with MD referral for [facility] s/p hospitalization due to compression fracture T11    Past Medical Hx: o Spinal stenosis of lumbar region with neurogenic claudication M48.062 o Lumbar facet arthropathy M47.816 o Disorder of bone and cartilage M89.9, M94.9   o Diverticulosis of colon K57.30 o Dyslipidemia E78.5 o Essential hypertension, benign I10 o Gastroesophageal reflux disease without esophagitis K21.9 o Generalized anxiety disorder F4 1.1 o Impaired fasting glucose R73.01 o Irritable bowel syndrome with diarrhea K58.0 o Melanoma in situ of right upper arm (HCC) D03.61 o Primary insomnia F51.01 o Sciatica of right side M54.31 o Squamous cell carcinoma [id number] o Lumbar stenosis with neurogenic claudication M48.062 o Depression, recurrent ([facility]) F33.9 o Spinal stenosis M48.00o Leg weakness, bilateral R29.898 o Atrial fibrillation with rapid ventricu lar response (HCC) I48.91 o Compression fracture of T11 vertebra with routine healing S22.080D o Bilateral sacral insufficiency fracture with routine healing M84.48XD o Status post kyphoplasty Z98.890 o Generalized weakness R53.1 o Impaired mobility and ADLs Z74.09, Z78.9 o Chronic anemia D64.9 o Hypokalemia E87.6      Medication Management: Pt  assists with medication management medications reconciled with new medications added and reviewed under medications tab. Educated to continue as directed per MD.   PLOF: Pt lives in 2 story home with . She previously was independent with ambulation with no assistive device and independent with ADLs/IADLs    SUBJECTIVE: Pt reports back pain increased and fatigue due to working with stair trianing with PT  CAREGIVER INVOLVEMENT:  Caregiver is her  who assists with ADL's, medication management and medical appointments. DME ORDERED/RECOMMENEDED: N/A    OBJECTIVE:  BATHING: Pt full bath on second floor with pt currently sponge bathing.  Pt has walk in shower with shower chair. Pt has long handled sponge. Pt completed bathing with SBA/CGA. TOILETING: Pt SBA for toileting with spinal precautions. Pt educated on optional toilet aid option. Pt has raised toilet with grab bars  UB DRESSING: Pt independent with upper body dressing to miquel/doff shirts  LB DRESSING: Pt SBA for lower body dressing to miquel/doff socks shoes and pants with cues for spinal precautions. Pt educated on attaching reacher to walker via velcro to have when needed for item retreival.  GROOMING: Pt supervision for grooming tasks sitting at sink for oral care, hair care, and washing hands/face. Pt with cues for spinal precautions with oral care. FEEDING: Pt is independent for self feeding    Modified Gerald RPE 8/10 after performing ambulation, transfers, and I/ADL assessment     OT instructed/demonstrated pt the following with good understanding:     IADL: Pt  assists with IADLs (cooking, cleaning, shopping etc.). Pt able to complete light meal prep with SBA  AMBULATION: Pt supervision for ambulating short house hold distances using rolling walker  EOB/BED TRANSFER: Pt is SBA for bed mobility with cues and visual demonstration for pt to properly use log role technique  COUCH: Pt SBA for sit to stand transfers with cues for hand placements and rolling walker use  TOILET: Pt SBA for toilet transfers using 3:1 commode  TUB SHOWER: N/A second floor with pt unable to tolerate stairs at this time. PATIENT RESPONSE TO TREATMENT:  Pt responded well to home health occupational therapy session with cues throughout for spinal precautions. Pt  present throughout. PATIENT EDUCATION PROVIDED THIS VISIT: spinal precautions, OT role, energy conservation, fall prevention/safety training ADL/IADL tasks, continue diet and medications as instructed per MD, consult MD or urgent care for medical assistance as opposed to ER unless situation emergent.     PATIENT LEVEL OF UNDERSTANDING OF EDUCATION PROVIDED: Pt able to teach back spinal precautions with good understanding. Pt able to teach back role of OT with good understanding. Pt able to teach back log roll technique for bed mobility however will need reinforcment for consistancy. Pt able to teach back sequencing for sit to stand transfers    REHAB POTENTIAL: Mrs. Michael Hickey presents with good rehab potenital to increase her balance, endurance and saftey when completing ADLs, IADLs and functional mobility. Pt currently completing ADLS like dressing, toileting and bathing with SBA for compliance with spinal precautions and saftey. Pt with SBA for functional mobiltiy with cues for spinal precautions and proper sequencing. Pt agreeable to continued home health OT for increasing her saftey and independence with ADLs, IADLs and functional mobiltiy within her home. HOME EXERCISE PROGRAM: Pt educated to take time when completing her ADLs and fucntional mobility to be more aware of spinal precautions within her daily routine. Pt to implament log roll technique for bed mobility. CONTINUED NEED FOR THE FOLLOWING SKILLS: HH OT is medically necessary to address pain, decreased functional strength, impaired bed mobility to perform ADL tasks, decreased independence and safety with functional transfers, decreased independence and safety performing ADL/IADL tasks, decreased activity and standing tolerance, decreased functional endurance, and impaired balance in order to improve functional independence, obtain set goals, reduce risk of falls, reduce pain, improve quality of life, and return to PLOF. ASSESSMENT:  Pt presents with spinal precautions s/p T11 fx and kypho and now presents with spinal precuations (no bending, lifting or twisting). Pt with cues throughout for ahderance to spinal precautions during functional mobility assessment and ADL assessment.  Pt has assistive devices as needed but requires continued cues for saftey and use of this equipment including walker and reacher. Pt with increased difficulty following log roll technique with pt sleeping on 1st floor couch due to inability to access her 2nd floor at this time. Pt would benift from continued progression of her bed mobility with log roll technique use and proper sequencing of functional transfers to increase her saftey and reduce pain. Pt eager and agreeable to continued home health OT for increasing her saftey, compliance with spinal precautions and independnce. Skilled Care Provided: Pt completed full occupational therapy assessment to include balance, coordination, strengthening, ADL education/training, functional mobility and home safety. PLAN: D/C 1w1, 2w3 with plans to discharge when goals are met or maximal potenital achieved.

## 2022-07-14 NOTE — Clinical Note
Pt presents with spinal precautions. Please review lower body dressing/bathing with spinal precautions. Review energy conservation tehcniques with Modified Gerald scale due to her increased faitgue. Pt currently staying on 1st floor of 2 story house and sleeping on couch. Please review log roll technique. Pt has walk in shower however it is upstairs please progress pt back to using her shower. Increase her saftey awarness and awarness of her spinal precautions wihtin her routine. Please address adaptive tehcniques for IADLs with spinal precautions. Please reach out with any questions or concerns.

## 2022-07-14 NOTE — Clinical Note
Therapy Functional Score Assessment  Question                                            Score   Grooming                            1       Upper Dressing   1      Lower Dressing   2     Bathing                 5      Toilet Transfer                   1    Transfer                1            Ambulation                         3   Dyspnea                     3       Pain Interfering with activity        4  Est number therapy visits      7     Mrs. Suman Mace presents with good rehab potential to increase her balance, endurance and safety when completing ADLs, IADLs and functional mobility. Pt currently completing ADLS like dressing, toileting and bathing with SBA for compliance with spinal precautions and safety. Pt with SBA for functional mobility with cues for spinal precautions and proper sequencing. Pt agreeable to continued home health OT for increasing her safety and independence with ADLs, IADLs and functional mobility within her home. PLAN: D/C 1w1, 2w3 with plans to discharge when goals are met or maximal potential achieved.

## 2022-07-15 ENCOUNTER — HOME CARE VISIT (OUTPATIENT)
Dept: SCHEDULING | Facility: HOME HEALTH | Age: 77
End: 2022-07-15
Payer: MEDICARE

## 2022-07-15 VITALS
DIASTOLIC BLOOD PRESSURE: 73 MMHG | TEMPERATURE: 97.6 F | OXYGEN SATURATION: 99 % | SYSTOLIC BLOOD PRESSURE: 135 MMHG | HEART RATE: 81 BPM | HEART RATE: 80 BPM | SYSTOLIC BLOOD PRESSURE: 111 MMHG | TEMPERATURE: 97.6 F | OXYGEN SATURATION: 97 % | DIASTOLIC BLOOD PRESSURE: 73 MMHG

## 2022-07-15 PROCEDURE — G0157 HHC PT ASSISTANT EA 15: HCPCS

## 2022-07-15 PROCEDURE — 3331090002 HH PPS REVENUE DEBIT

## 2022-07-15 PROCEDURE — 3331090001 HH PPS REVENUE CREDIT

## 2022-07-15 NOTE — HOME HEALTH
Skilled reason for visit: Compression fx T11  Caregiver involvement: Spouse in home and assit with ADLs, transportation, meals. Medications reviewed and all medications are available in the home this visit. The following education was provided regarding medications, medication interactions, and look alike medications (specify): new meds entered by therapist, and pt and spouse understands about constipation associated with oxycodone. .    Medications  are effective at this time. Home health supplies by type and quantity ordered/delivered this visit include: none  Patient education provided this visit:Patient is a fall risk. Educated pateint to sit on the side of the chair/bed, take a slow deep breaths, have feet firmly planted before standing up, use cane/walker if available, or have someone to assist.  pt aware to keep incision clean and dry as ordered, to report any new drainage to staff. patient currently taking oxydone 10/325 every 6 hours for pain management. pt reporting that pain is at tolerable level with 5. encouraged patient to continue with medication and to notify staff/md if pain becomes excrutiating/intolerable. Progress toward goals: Pt reports understanding of education and reporting any out the ordinary pain or distress noted. pt reports she can tolerated pain level 5 , but perfer not to.   Home exercise program: as tolerated  Continued need for the following skills: Nursing, Physical Therapy and Occupational Therapy  Patient and/or caregiver notified and agrees to changes in the Plan of Care N/A    Patient will be discharged once education and wounds if applicable has been completed, patient is medically stable, and all goals met

## 2022-07-16 PROCEDURE — 3331090002 HH PPS REVENUE DEBIT

## 2022-07-16 PROCEDURE — 3331090001 HH PPS REVENUE CREDIT

## 2022-07-17 VITALS
HEART RATE: 70 BPM | TEMPERATURE: 97.8 F | SYSTOLIC BLOOD PRESSURE: 107 MMHG | DIASTOLIC BLOOD PRESSURE: 61 MMHG | OXYGEN SATURATION: 99 %

## 2022-07-17 PROCEDURE — 3331090002 HH PPS REVENUE DEBIT

## 2022-07-17 PROCEDURE — 3331090001 HH PPS REVENUE CREDIT

## 2022-07-18 ENCOUNTER — HOME CARE VISIT (OUTPATIENT)
Dept: SCHEDULING | Facility: HOME HEALTH | Age: 77
End: 2022-07-18
Payer: MEDICARE

## 2022-07-18 PROCEDURE — 3331090002 HH PPS REVENUE DEBIT

## 2022-07-18 PROCEDURE — G0157 HHC PT ASSISTANT EA 15: HCPCS

## 2022-07-18 PROCEDURE — 3331090001 HH PPS REVENUE CREDIT

## 2022-07-18 NOTE — HOME HEALTH
SUBJECTIVE: Patient reported axiety over stair negotiation today.  and daughter present for training on stairs if needed. No Falls reported  No changes in Meds reported    CAREGIVER INVOLVEMENT/ASSISTANCE: Patient able to perform some ADL's in sitting, requires assist from spouse for meals, Meds, household chores, dressing, and supervision with ambulation. .  OBJECTIVE:  See interventions. [place] 15/28    PATIENT EDUCATION PROVIDED THIS VISIT: Patient/caregiver educated on fall prvention with assist during ambulation. Education provided again on removal of area rugs throughout the home, the rugs icrease Fall Risk with all gait within house. Education provided on HEP for strengthening with daughter reviewing exercises with pt. Educated pt/caregivers on opiod/narcotic medications and on pain management, including use of ice. PATIENT RESPONSE TO EDUCATION PROVIDED: Continued education required on Fall preventionto attempt to get rugs removed. Pt able to demonstrate HEP with use of handout and caregiver assist. continue education next visit to achieve Ind. Pt/caregivers with verbal understanding of opiod/narcotic protocols and pain management techniques. PATIENT RESPONSE TO TREATMENT: Patient tolerated stairs without increased c/o pain. Pt with reported fatigue following gait and stairs, but able to perform exercises. ASSESSMENT OF PROGRESS TOWARD GOALS: Pt showing progress with strength and endurance with increased gait to 150ft with SBA  Pt progressing toward stair goals with negotiation of 6 steps up/down with Mariah Heredia   PLAN FOR NEXT VISIT: Stiar negotiation, outdoor gait, TUG  THE FOLLOWING DISCHARGE PLANNING WAS DISCUSSED WITH THE PATIENT/CAREGIVER: Continue PT 3 W2, 2W2, tentative DC set for 8/9/22

## 2022-07-18 NOTE — HOME HEALTH
SUBJECTIVE: Patient stated being nervouse over stair negotiation today but wants to be able to sleep upstairs. No Falls reported  No changes in Meds reported    CAREGIVER INVOLVEMENT/ASSISTANCE: Patient able to perform some ADL's in sitting, requires assist from spouse for meals, Meds, household chores,  and dressing,    OBJECTIVE:  See interventions. TUG 23, performed as tool to teach samantha,     PATIENT EDUCATION PROVIDED THIS VISIT: Patient/caregiver educated on fall prvention strategies with removal of throw rugs in hallways, family room, and in bedroom. Education provided on HEP for strengthening and focus on engaging core. Educated pt/caregivers on opiod/narcotic medications and on pain management, including use of ice/ heat for increasedpain during night with skin barrier. PATIENT RESPONSE TO EDUCATION PROVIDED: Caregiver states understanding of fall prevention, but still hesitant on removal of throw rugs, Continued education required. Pt able to demonstrate HEP with use of handout and caregiver assist.SUP. Pt/caregivers with verbal understanding of opiod/narcotic protocols and pain management techniques. PATIENT RESPONSE TO TREATMENT: Patient tolerated stairs up/down without increased pain and RPE 5/10. Pt motivated to continue PT to start gait with cane and return to PLOF. ASSESSMENT OF PROGRESS TOWARD GOALS: Pt progressing with stairs goal today up/down 1 flight with Hai. Pt progressing with gait and endurance goals with increased gait with decreased assist noted today. Simon Sinclair   PLAN FOR NEXT VISIT: Gait outide on eneven surfaces, Stiar negotiation, Tinetti  THE FOLLOWING DISCHARGE PLANNING WAS DISCUSSED WITH THE PATIENT/CAREGIVER: Continue PT 3 W2, 2W2, tentative DC set for 8/9/22

## 2022-07-18 NOTE — HOME HEALTH
SUBJECTIVE: Patient  and daughter present for session. Pt states being fatigued but encouraged to participate from caregivers. No Falls reported  No changes in Meds reported    CAREGIVER INVOLVEMENT/ASSISTANCE: Patient able to perform some ADL's in sitting, requires assist from spouse for meals, Meds, household chores, and dressing. .  OBJECTIVE:  See interventions. PATIENT EDUCATION PROVIDED THIS VISIT: Patient/caregiver educated on fall prvention with assist during ambulation and removal of area rugs throughout the home. Education provided on HEP for strengthening. Educated pt/caregivers on opiod/narcotic medications and on pain management. PATIENT RESPONSE TO EDUCATION PROVIDED: Continued education required on Fall prevention to teach assist with Gait and get rugs removed. Pt able to demonstrate HEP with use of handout and caregiver assist with verbal direction, continue education next visit. Pt/caregivers with verbal understanding of opiod/narcotic protocols and pain management techniques. PATIENT RESPONSE TO TREATMENT: Patient tolerated treatment well without c/o increased pain. Patient motivated to increase strength and stability and improve to use Cane when safe. .  ASSESSMENT OF PROGRESS TOWARD GOALS: Pt showing progress with strength and endurance with increased gait to 100ft with CGA. Improvement with stability noted by toes taps and negotiating obstacles. [facility]   PLAN FOR NEXT VISIT: to continue strenthening, start stiar negotiation, outdoor gait, Tinetti Assessed  THE FOLLOWING DISCHARGE PLANNING WAS DISCUSSED WITH THE PATIENT/CAREGIVER: Continue PT 3 xW2, 2 xW2, tentative DC set for 8/9/22

## 2022-07-19 ENCOUNTER — HOME CARE VISIT (OUTPATIENT)
Dept: SCHEDULING | Facility: HOME HEALTH | Age: 77
End: 2022-07-19
Payer: MEDICARE

## 2022-07-19 VITALS
HEART RATE: 68 BPM | SYSTOLIC BLOOD PRESSURE: 117 MMHG | TEMPERATURE: 97.4 F | OXYGEN SATURATION: 97 % | DIASTOLIC BLOOD PRESSURE: 68 MMHG

## 2022-07-19 PROCEDURE — 3331090001 HH PPS REVENUE CREDIT

## 2022-07-19 PROCEDURE — G0157 HHC PT ASSISTANT EA 15: HCPCS

## 2022-07-19 PROCEDURE — 3331090002 HH PPS REVENUE DEBIT

## 2022-07-19 NOTE — HOME HEALTH
SUBJECTIVE: Patient reported sleeping upstairs over weekend, but still difficulty sleeping due to LE pain at night. No Falls reported  No changes in Meds reported    CAREGIVER INVOLVEMENT/ASSISTANCE: Patient able to perform some ADL's in sitting, requires assist from spouse for meals, Meds, and household chores. OBJECTIVE:  See interventions. TINETTI: 19/28    PATIENT EDUCATION PROVIDED THIS VISIT: Patient/caregiver educated on fall prvention strategies with removal of throw rugs in hallways, family room, and in bedroom. Education continued on HEP for strengthening and focus on engaging core with added exercises  Educated pt/caregivers on opiod/narcotic medications and on pain management, including use of ice/ heat to decrease pain during night with skin barrier. PATIENT RESPONSE TO EDUCATION PROVIDED: Patient/Caregiver provided understanding of fall prevention, has moved some rugs aside, continue education. Pt able to demonstrate HEP with use of handout, SUP. Pt/caregivers with verbal understanding of opiod/narcotic protocols, has written schedule and Medication organizer. Verbal understanding of pain management techniques. PATIENT RESPONSE TO TREATMENT: Patient tolerated increased activity with standing exercises, stairs, and gait without seated rest breaks today. No increased pain reported during or after session. Pt motivated to continue PT to start ambulating without assist or AD. ASSESSMENT OF PROGRESS TOWARD GOALS: Pt progressing with balance goal and decreased risk for falls as noted by increased score of 19/28 on Tinetti. Progress noted on ambulation goal with training on Cane and Armaan with FWW. Jennifer Tobias     PLAN FOR NEXT VISIT: Gait outide on eneven surfaces, Car transfers    THE FOLLOWING DISCHARGE PLANNING WAS DISCUSSED WITH THE PATIENT/CAREGIVER: Continue PT 3 W2, 2W2, tentative DC set for 8/9/22

## 2022-07-20 ENCOUNTER — HOME CARE VISIT (OUTPATIENT)
Dept: SCHEDULING | Facility: HOME HEALTH | Age: 77
End: 2022-07-20
Payer: MEDICARE

## 2022-07-20 PROCEDURE — G0158 HHC OT ASSISTANT EA 15: HCPCS

## 2022-07-20 PROCEDURE — 3331090001 HH PPS REVENUE CREDIT

## 2022-07-20 PROCEDURE — 3331090002 HH PPS REVENUE DEBIT

## 2022-07-20 NOTE — HOME HEALTH
SUBJECTIVE: MATIAS greeted by patient at door. Pt voiced no concerns  . CAREGIVER INVOLVEMENT/ASSISTANCE NEEDED FOR: Pt lives with  who provides support as needed for ADLs  . HOME HEALTH SUPPLIES BY TYPE AND QUANTITY ORDERED/DELIVERED THIS VISIT INCLUDE: NONE  . OBJECTIVE:  See interventions. .  Patient education provided this visit:  Pt educated on energy conservation techniques   . Patient level of understanding of education provided: Pt in agreeance to education provided by POLLY. [facility] Home exercise program: BUE therex for increased ROM and strength and range of motion needed for ADLs. shoulder press, shoulder flexion, shoulder abduction, shoulder extension, chest press, elbow flexion/extension x 10, x 2 per day. .  RESPONSE TO TREATMENT: Pt had a positive response to treatment with no increased complaint of pain   . ASSESSMENT OF PROGRESS TOWARD GOALS:  Pt able to demonstrate ability to retrieve items with use of grabber with sup. Pt able to complete sit to stands with SBA as needed for functional transfers   . CONTINUED NEED FOR THE FOLLOWING SKILLS: HH OT is medically necessary to address decreased functional strength,  decreased independence and safety with functional transfers, decreased independence and safety performing ADL/IADL tasks, decreased functional endurance, and impaired balance in order to improve functional independence, obtain set goals, reduce risk of falls, improve quality of life, and return to PLOF. [facility] PLAN FOR NEXT VISIT:MATIAS will address balance and ADL training   .    THE FOLLOWING DISCHARGE PLANNING WAS DISCUSSED WITH THE PATIENT/CAREGIVER: Discharge to self and family under MD supervision once all goals have been met or patient has reached maximum potential.  Frequency remaining: Luis M Her

## 2022-07-21 ENCOUNTER — HOME CARE VISIT (OUTPATIENT)
Dept: SCHEDULING | Facility: HOME HEALTH | Age: 77
End: 2022-07-21
Payer: MEDICARE

## 2022-07-21 VITALS
DIASTOLIC BLOOD PRESSURE: 82 MMHG | RESPIRATION RATE: 16 BRPM | HEART RATE: 68 BPM | OXYGEN SATURATION: 98 % | SYSTOLIC BLOOD PRESSURE: 149 MMHG | TEMPERATURE: 97.2 F

## 2022-07-21 PROCEDURE — 3331090002 HH PPS REVENUE DEBIT

## 2022-07-21 PROCEDURE — 3331090001 HH PPS REVENUE CREDIT

## 2022-07-21 PROCEDURE — G0151 HHCP-SERV OF PT,EA 15 MIN: HCPCS

## 2022-07-22 ENCOUNTER — HOME CARE VISIT (OUTPATIENT)
Dept: SCHEDULING | Facility: HOME HEALTH | Age: 77
End: 2022-07-22
Payer: MEDICARE

## 2022-07-22 VITALS
SYSTOLIC BLOOD PRESSURE: 117 MMHG | HEART RATE: 75 BPM | TEMPERATURE: 97.3 F | DIASTOLIC BLOOD PRESSURE: 82 MMHG | OXYGEN SATURATION: 94 %

## 2022-07-22 PROCEDURE — 3331090001 HH PPS REVENUE CREDIT

## 2022-07-22 PROCEDURE — G0158 HHC OT ASSISTANT EA 15: HCPCS

## 2022-07-22 PROCEDURE — 3331090002 HH PPS REVENUE DEBIT

## 2022-07-23 PROCEDURE — 3331090002 HH PPS REVENUE DEBIT

## 2022-07-23 PROCEDURE — 3331090001 HH PPS REVENUE CREDIT

## 2022-07-24 VITALS
OXYGEN SATURATION: 98 % | HEART RATE: 70 BPM | DIASTOLIC BLOOD PRESSURE: 74 MMHG | SYSTOLIC BLOOD PRESSURE: 138 MMHG | TEMPERATURE: 97.6 F

## 2022-07-24 PROCEDURE — 3331090001 HH PPS REVENUE CREDIT

## 2022-07-24 PROCEDURE — 3331090002 HH PPS REVENUE DEBIT

## 2022-07-24 NOTE — HOME HEALTH
SUBJECTIVE: Pt voiced no concerns  . CAREGIVER INVOLVEMENT/ASSISTANCE NEEDED FOR: Pt lives with  who assist as needed for community IADLs   . HOME HEALTH SUPPLIES BY TYPE AND QUANTITY ORDERED/DELIVERED THIS VISIT INCLUDE: NONE   . OBJECTIVE:  See interventions. .  Patient education provided this visit:  Pt educated on energy conservation techniques during IADLs  . Patient level of understanding of education provided: Pt in agreeance to education provided by POLLY. [facility] Home exercise program:Shoulder flexion, shoulder abduction, horizontal adduction, elbow flexion, elbow extension, Forearm pronation and supination 10 reps X 2 sets with using water bottle  . RESPONSE TO TREATMENT: Pt had a positive response to treatment with no increased pain or fatigue  . ASSESSMENT OF PROGRESS TOWARD GOALS:Pt able to follow UE HEP with Ind. Pt is able to complete ADLs with Sup. Pt demonstartes ability to descend stairs with sup with use of cane  . CONTINUED NEED FOR THE FOLLOWING SKILLS: HH OT is medically necessary to address decreased functional strength,  decreased independence and safety with functional transfers, decreased independence and safety performing ADL/IADL tasks, decreased functional endurance, and impaired balance in order to improve functional independence, obtain set goals, reduce risk of falls, improve quality of life, and return to PLOF. Reina Solo PLAN FOR NEXT VISIT:MATIAS will address IADLs, balance and transfer training.    .   THE FOLLOWING DISCHARGE PLANNING WAS DISCUSSED WITH THE PATIENT/CAREGIVER: Discharge to self and family under MD supervision: Remaining frequency 2X1

## 2022-07-24 NOTE — HOME HEALTH
SUBJECTIVE: Patient reports doing well, but has not done HEP. No Falls reported  No Changes in medications reported    CAREGIVER INVOLVEMENT/ASSISTANCE: Pt reports being Ind with dressing and seated ADL's,  for SUP with shower, meals, and household chores. OBJECTIVE:  See interventions. Car transfers CGA, with verbal cues for hand and foot placement. Gait outdoors with cane 50ft Hai, LOB x 1 , continued  150' ft with FWW  on uneven surfaces outdoors SUP    PATIENT EDUCATION PROVIDED THIS VISIT: Education provided on car transfers and gait outdoors with FWW with daughter to be able to safely assist pt. Education provided on HEP, and importance of strengthening 3 x day. Education on medications, scheduling and dosage with caregiver/pt and use of pill organizer to assist with schedule. PATIENT RESPONSE TO EDUCATION PROVIDED: Daughter and pt able to reteach and demonstrate correct technique for car transfers and gait assist outdoors. Patient with verbal undertanding  of HEP, able to demonstrate exercises with visual guide, Ind.   Patient/caegiver with verbal acknowledgment on medications and to use organizer,    PATIENT RESPONSE TO TREATMENT: Pt reported minimal increase in pain with outside activity. No SOB during exercises or gait, Patient motivated to use cane and return to PLOF. .  ASSESSMENT OF PROGRESS TOWARD GOALS: Pt progressing with gait goal as documented with increased gait outdoors with decreased assist, continue goal.  No Falls reported and improved balance show improvement working toward goal for decreased fall risk. Patient reports non-compliance with HEP, performing 0-1x day.     PLAN FOR NEXT VISIT: [place], Supervisory Visit, Continue gait training  THE FOLLOWING DISCHARGE PLANNING WAS DISCUSSED: Continue PT servises 2 x week, Tentative DC set for 8/9/22

## 2022-07-25 ENCOUNTER — HOME CARE VISIT (OUTPATIENT)
Dept: SCHEDULING | Facility: HOME HEALTH | Age: 77
End: 2022-07-25
Payer: MEDICARE

## 2022-07-25 VITALS
OXYGEN SATURATION: 98 % | SYSTOLIC BLOOD PRESSURE: 147 MMHG | DIASTOLIC BLOOD PRESSURE: 82 MMHG | HEART RATE: 70 BPM | TEMPERATURE: 98.2 F

## 2022-07-25 PROCEDURE — 3331090001 HH PPS REVENUE CREDIT

## 2022-07-25 PROCEDURE — 3331090002 HH PPS REVENUE DEBIT

## 2022-07-25 PROCEDURE — G0157 HHC PT ASSISTANT EA 15: HCPCS

## 2022-07-26 ENCOUNTER — HOME CARE VISIT (OUTPATIENT)
Dept: SCHEDULING | Facility: HOME HEALTH | Age: 77
End: 2022-07-26
Payer: MEDICARE

## 2022-07-26 PROCEDURE — 3331090001 HH PPS REVENUE CREDIT

## 2022-07-26 PROCEDURE — 3331090002 HH PPS REVENUE DEBIT

## 2022-07-26 PROCEDURE — G0152 HHCP-SERV OF OT,EA 15 MIN: HCPCS

## 2022-07-26 NOTE — Clinical Note
Occupational Therapy Discharge - Mrs. Edelmira Anguiano was seen by skilled OT for 3 visits s/p recovery from compression fracture. Pt requested DC from [facility] OT reporting she and  are able to ind complete ADLs/IADLs without complications. Pt has been educated on energy conservation strategies to reduce SOB and level of exertion with ADL tasks. Pt Mod I for upper and lower body bathing and dressing. Pt is able to ascended and descend stairs with SBA with use of cane and hand rails. Stand by assist for shower transfers. Mod I for toilet transfers. Pt demonstrates ability to complete simple IADL tasks with use of compensatory strategies Ind . Modified Gerald Scale for PRE score of 4/10. Pt able to identify home safety and spinal precautions during functional tasks  She as reached maximal potential wish skilled OT at this time. Caregiver is able to independently provide care for pt. No further skilled OT is indicated at this time.

## 2022-07-26 NOTE — HOME HEALTH
SUBJECTIVE: Patient with increased anxiety due to 's fall/injury previous day, stating \"too much going on with both of us hurt\"  No Falls Reported  No Changes in Medications    CAREGIVER INVOLVEMENT/ASSISTANCE: Patient able to perform ADL's with increased time. Spouse assist with medication management and SUP shower for safety. OBJECTIVE:  See interventions. PATIENT EDUCATION PROVIDED THIS VISIT: Education provided on importance of HEP done 3 x day toimprove strength and mobility. Education provied on fall strategies and medication management    PATIENT RESPONSE TO EDUCATION PROVIDED: Patient able to demonstrate HEP and verbal understanding of importance. Pt/caregiver with verbal understanding of fall prevention and medication management. PATIENT RESPONSE TO TREATMENT: Patient motivated to improve with balance and strength to return to PLOF. Pt with c/o increased pain following stair activity. .  ASSESSMENT OF PROGRESS TOWARD GOALS: Pt met goal for bed transfers, Ind.  Pt progressing with gait distance and endurance.     PLAN FOR NEXT VISIT: Continue Cane training, Gait outdoors  THE FOLLOWING DISCHARGE PLANNING WAS DISCUSSED WITH THE PATIENT/CAREGIVER: Continue PT 2 x week, tentative dc set for 8/9/22

## 2022-07-26 NOTE — HOME HEALTH
Patient/Caregiver Subjective:  Pt requested DC from [place] OT   . Caregiver involvement:  Pt  provides assistance with ADLs/IADLs as needed   . Medications reconciled and all medications are available in the home this visit. .  Patient education provided this visit:  Pt has been educated on energy conservation techniques  -Schedule tasks throughout the week/d.  -Complete tasks sitting down when possible. Use Adaptive equipment.  -Delegate heavy housework, shopping, laundry and  when possible. -Drag or slide objects rather than lifting. If you do need to lift an object, use your leg muscles rather than your back muscles.  -Sit when you can. -Stop working before becoming overly tired. .  Home exercise program: Pt to implement sternal precautions during functional tasks  . Progress toward goals: Mod I for toilet transfers. Pt demonstrates ability to complete simple IADL tasks with use of compensatory strategies Ind . Modified Gerald Scale for PRE score of 4/10. Pt able to identify home safety and spinal precautions during functional tasks  She as reached maximal potential wish skilled OT at this time. Caregiver is able to independently provide care for pt. No further skilled OT is indicated at this time.

## 2022-07-27 VITALS
DIASTOLIC BLOOD PRESSURE: 65 MMHG | SYSTOLIC BLOOD PRESSURE: 130 MMHG | TEMPERATURE: 98.1 F | OXYGEN SATURATION: 99 % | HEART RATE: 72 BPM

## 2022-07-27 PROCEDURE — 3331090002 HH PPS REVENUE DEBIT

## 2022-07-27 PROCEDURE — 3331090001 HH PPS REVENUE CREDIT

## 2022-07-28 ENCOUNTER — HOME CARE VISIT (OUTPATIENT)
Dept: SCHEDULING | Facility: HOME HEALTH | Age: 77
End: 2022-07-28
Payer: MEDICARE

## 2022-07-28 VITALS
SYSTOLIC BLOOD PRESSURE: 118 MMHG | DIASTOLIC BLOOD PRESSURE: 67 MMHG | HEART RATE: 70 BPM | OXYGEN SATURATION: 98 % | TEMPERATURE: 97.3 F

## 2022-07-28 VITALS
DIASTOLIC BLOOD PRESSURE: 84 MMHG | RESPIRATION RATE: 18 BRPM | SYSTOLIC BLOOD PRESSURE: 142 MMHG | HEART RATE: 78 BPM | OXYGEN SATURATION: 99 %

## 2022-07-28 PROCEDURE — 3331090002 HH PPS REVENUE DEBIT

## 2022-07-28 PROCEDURE — G0157 HHC PT ASSISTANT EA 15: HCPCS

## 2022-07-28 PROCEDURE — 3331090001 HH PPS REVENUE CREDIT

## 2022-07-28 PROCEDURE — G0299 HHS/HOSPICE OF RN EA 15 MIN: HCPCS

## 2022-07-28 NOTE — HOME HEALTH
Skilled Reason for admission/summary of clinical condition:  compression fracture . This patient is homebound for the following reasons Requires considerable and taxing effort to leave the home , Requires the assistance of 1 or more persons to leave the home  and Only leaves the home for medical reasons or [facility] services and are infrequent and of short duration for other reasons . Caregiver: spouse. Caregiver assists with ADL's, medication management and medical appointments. Medications reconciled and all medications are available in the home this visit. The following education was provided regarding medications:  patient to continue to take medications as prescribed. patient aware to monitor for effectiveness and to notify staff of any adverse reactions to medications/any changes to medication regimen. Medications  are not available  at this time. High risk medication teaching regarding anticoagulants, hyperglycemic agents or opiod narcotics performed (specify) Roxicodone. Instructed only to take only amount prescribed, notify sn/pt if oversedated,  may cause constipation--notify if no BM x 3 days. Dieudonne Barclay MD notified of any discrepancies/look a like medications/medication interactions:  patient does not have the Hydralazine 25 mg. Home health supplies by type and quantity ordered/delivered this visit include: none needed                    Patient education provided this visit to include: MEDICATION ADHERENCE IS AN IMPORTANT COMPONENT OF HTN MANAGEMENT. PATIENT STATES THE IMPORTANCE TO TAKE HTN MEDS SAME TIME EACH DAY. Deni Saucedo                      Patient level of understanding of education provided: pt verbalizes understanding of all education provided during visit                             Sharps Education Provided: NA         Patient response to procedure performed:  na Pt/Caregiver instructed on plan of care and are agreeable to plan of care at this time. Discharge planning discussed with patient and caregiver. Patient will be discharged once education has completed, patient is medically stable and pt is able to independently medication regimen and disease process and  longer requires skilled care.

## 2022-07-29 PROCEDURE — 3331090002 HH PPS REVENUE DEBIT

## 2022-07-29 PROCEDURE — 3331090001 HH PPS REVENUE CREDIT

## 2022-07-30 PROCEDURE — 3331090002 HH PPS REVENUE DEBIT

## 2022-07-30 PROCEDURE — 3331090001 HH PPS REVENUE CREDIT

## 2022-07-31 PROCEDURE — 3331090001 HH PPS REVENUE CREDIT

## 2022-07-31 PROCEDURE — 3331090002 HH PPS REVENUE DEBIT

## 2022-08-01 PROCEDURE — 3331090002 HH PPS REVENUE DEBIT

## 2022-08-01 PROCEDURE — 3331090001 HH PPS REVENUE CREDIT

## 2022-08-01 NOTE — HOME HEALTH
SUBJECTIVE: Patient repoting she has been up/down stairs twice this morning and legs hurt. No Falls Reported  No Changes in Medications    CAREGIVER PARTICIPATION: Pt able to perform ADL's with increased time, spouce assist with meal prep and medication management. OBJECTIVE:see interventions    PATIENT EDUCATION PROVIDED THIS VISIT: Education provided on importance of HEP, added standing exercises, to be done 3 x day to improve strength and mobility. Education provied on fall stategies, pain management, and medication management. Education on gait training with cane on sequencing and uneen surfaces. PATIENT RESPONSE TO EDUCATION PROVIDED: Patient able to demonstrate HEP and verbal understanding of importance and schedule. Pt/caregiver with verbal understanding of fall prevention,medication management, and pain management. PT able to demonstrate gait with correct cane sequencing, continue training next visit. PATIENT RESPONSE TO TREATMENT:Patient with c/o increased pain in LE's following outside gait training. Patient motivated to improve with balance and strength to return to PLOF. .    ASSESSMENT OF PROGRESS TOWARD GOALS: Patient progressing with gait goal using less assist with gait on unuven surfaces 2 x 100ft with cane    PLAN FOR NEXT VISIT: [place] Gait training with cane, without AD if able    THE FOLLOWING DISCHARGE PLANNING WAS DISCUSSED WITH THE PATIENT/CAREGIVER: Continue PT 2 x week, tentative dc set for 8/9/22

## 2022-08-02 ENCOUNTER — HOME CARE VISIT (OUTPATIENT)
Dept: HOME HEALTH SERVICES | Facility: HOME HEALTH | Age: 77
End: 2022-08-02
Payer: MEDICARE

## 2022-08-02 PROCEDURE — 3331090002 HH PPS REVENUE DEBIT

## 2022-08-02 PROCEDURE — 3331090001 HH PPS REVENUE CREDIT

## 2022-08-03 ENCOUNTER — HOME CARE VISIT (OUTPATIENT)
Dept: SCHEDULING | Facility: HOME HEALTH | Age: 77
End: 2022-08-03
Payer: MEDICARE

## 2022-08-03 ENCOUNTER — HOME CARE VISIT (OUTPATIENT)
Dept: HOME HEALTH SERVICES | Facility: HOME HEALTH | Age: 77
End: 2022-08-03
Payer: MEDICARE

## 2022-08-03 PROCEDURE — G0157 HHC PT ASSISTANT EA 15: HCPCS

## 2022-08-03 PROCEDURE — 3331090001 HH PPS REVENUE CREDIT

## 2022-08-03 PROCEDURE — 3331090002 HH PPS REVENUE DEBIT

## 2022-08-03 NOTE — Clinical Note
This is to inform you that your patient, Demetra Pereyra had a fall on 8/2/22 in her house. She was bending to retrieve laundry from washing machine, said her vision went blurry, then dark and she fell backwards. No injuries reported and no bruising noted when checked following visit.

## 2022-08-04 ENCOUNTER — HOME CARE VISIT (OUTPATIENT)
Dept: SCHEDULING | Facility: HOME HEALTH | Age: 77
End: 2022-08-04
Payer: MEDICARE

## 2022-08-04 VITALS
RESPIRATION RATE: 18 BRPM | HEART RATE: 78 BPM | DIASTOLIC BLOOD PRESSURE: 72 MMHG | TEMPERATURE: 97.8 F | SYSTOLIC BLOOD PRESSURE: 138 MMHG | OXYGEN SATURATION: 99 %

## 2022-08-04 PROCEDURE — G0299 HHS/HOSPICE OF RN EA 15 MIN: HCPCS

## 2022-08-04 PROCEDURE — 400013 HH SOC

## 2022-08-04 PROCEDURE — 3331090002 HH PPS REVENUE DEBIT

## 2022-08-04 PROCEDURE — 3331090001 HH PPS REVENUE CREDIT

## 2022-08-04 NOTE — HOME HEALTH
Skilled Reason for admission/summary of clinical condition:  compression fracture disease med mangement   Caregiver: spouse. Caregiver assists with ADL's, medication management and medical appointments. Medications reconciled and all medications are available in the home this visit. The following education was provided regarding medications:  patient to continue to take medications as prescribed. patient aware to monitor for effectiveness and to notify staff of any adverse reactions to medications/any changes to medication regimen. Medications  are not available  at this time. High risk medication teaching regarding anticoagulants, hyperglycemic agents or opiod narcotics performed (specify) Roxicodone. Instructed only to take only amount prescribed, notify sn/pt if oversedated,  may cause constipation--notify if no BM x 3 days. Home health supplies by type and quantity ordered/delivered this visit include: none needed   Patient education provided this visit to include: MEDICATION ADHERENCE IS AN IMPORTANT COMPONENT OF HTN MANAGEMENT. PATIENT STATES THE IMPORTANCE TO TAKE HTN MEDS SAME TIME EACH DAY. Talib Velasco Patient level of understanding of education provided: pt verbalizes understanding of all education provided during visit    Sharps Education Provided: NA    Patient response to procedure performed:  na     Pt/Caregiver instructed on plan of care and are agreeable to plan of care at this time. Discharge planning discussed with patient and caregiver.  Discipline discharge 8/4/2022

## 2022-08-04 NOTE — CASE COMMUNICATION
Patient reported having a fall on 8/2/22. She was bending forward doing laundry, reports getting tunnel vision and then it gradually became dark. She fell backwards onto her bottom. She was able to crawl to couch to get up. She reports that she did not hit her head and  has no injuries. No reports of any pain the following day.

## 2022-08-05 ENCOUNTER — HOME CARE VISIT (OUTPATIENT)
Dept: SCHEDULING | Facility: HOME HEALTH | Age: 77
End: 2022-08-05
Payer: MEDICARE

## 2022-08-05 PROCEDURE — 3331090002 HH PPS REVENUE DEBIT

## 2022-08-05 PROCEDURE — 3331090001 HH PPS REVENUE CREDIT

## 2022-08-05 PROCEDURE — G0157 HHC PT ASSISTANT EA 15: HCPCS

## 2022-08-06 PROCEDURE — 3331090001 HH PPS REVENUE CREDIT

## 2022-08-06 PROCEDURE — 3331090002 HH PPS REVENUE DEBIT

## 2022-08-07 PROCEDURE — 3331090001 HH PPS REVENUE CREDIT

## 2022-08-07 PROCEDURE — 3331090002 HH PPS REVENUE DEBIT

## 2022-08-08 VITALS
SYSTOLIC BLOOD PRESSURE: 123 MMHG | SYSTOLIC BLOOD PRESSURE: 108 MMHG | HEART RATE: 80 BPM | TEMPERATURE: 98.2 F | DIASTOLIC BLOOD PRESSURE: 80 MMHG | DIASTOLIC BLOOD PRESSURE: 66 MMHG | OXYGEN SATURATION: 97 % | TEMPERATURE: 98.2 F | OXYGEN SATURATION: 98 %

## 2022-08-08 PROCEDURE — 3331090001 HH PPS REVENUE CREDIT

## 2022-08-08 PROCEDURE — 3331090002 HH PPS REVENUE DEBIT

## 2022-08-09 ENCOUNTER — HOME CARE VISIT (OUTPATIENT)
Dept: SCHEDULING | Facility: HOME HEALTH | Age: 77
End: 2022-08-09
Payer: MEDICARE

## 2022-08-09 VITALS
DIASTOLIC BLOOD PRESSURE: 80 MMHG | TEMPERATURE: 97.3 F | OXYGEN SATURATION: 98 % | HEART RATE: 75 BPM | RESPIRATION RATE: 16 BRPM | SYSTOLIC BLOOD PRESSURE: 140 MMHG

## 2022-08-09 PROCEDURE — G0151 HHCP-SERV OF PT,EA 15 MIN: HCPCS

## 2022-08-09 PROCEDURE — 3331090001 HH PPS REVENUE CREDIT

## 2022-08-09 PROCEDURE — 3331090002 HH PPS REVENUE DEBIT

## 2022-08-09 NOTE — HOME HEALTH
DC ACTIONS NARRATIVE  Pt. clinically discharged and documentation finalized for completion of PT discharge. Caregiver involvement: Pt  is primary caregiver at this time and has been assisting with medication management and ADL support  Medications reconciled and all medications are available in the home this visit. The following education was provided regarding medications, medication interactions, and look a like medications: NA  Medications  are effective at this time. Home health supplies by type and quantity ordered/delivered this visit include: NA  Patient education provided this visit: safety with functional mobility  Current Functional Status and progress towards goals:  Strength: Pt. BLE strength is 3+/5 which is an improvement from the initial evaluation strength of 3-/5. This allows the patient increased functional independence and mobility    BED MOBILITY: Pt. is independent with all bed mobility which demonstrates an improvement from the initial evaluation of unable to get in/out of bed. This allows for the patient to be functionally more independent. TRANSFERS: Pt. is mod I with all transfers with no AD which demonstrates and improvement from the initial evaluation score of mod A with FWW. This allows the patient increased functional independence and mobility    GAIT/WC MOBILITY: Pt. is able to ambulate >150 feet outside over even and uneven surfaces with CGA with SPC AD. This represents an improvement from the initial evaluation of 40 feet with min/mod A on level surfaces with FWW AD.    STAIRS: 12 stairs with SPC with min A    BALANCE: Patient's tinetti is 20/28 which is an improvement from the initial evaluation score of 11/28. This allows the patient to be functionally more independent and have a decreased fall risk Progress toward goals: Patient has met all goals, see interventions for details. Pt. was able to return demonstrate all mobility training and HEP shown independently. Patient response to treatment and education: Patient tolerated treatment well, with no complaint of new pain noted post treatment. Home exercise program: Patient has been given a HEP and patient/caregiver understand the HEP. Patient is doing the HEP 2-3/day as able  Continued need for the following skills:  NA patient is final DC from PT today   The following discharge planning was discussed with the pt/caregiver: DC from agency    POST FALL:   Date and Time of Fall: 8/2/22  SOC/SUSANA Date: 7/5/22  Fall observed by [facility] Staff? no  Describe Event and Document any re-training or treatment plan modifications indicated:  pt was attempting to get laundry out of the dryer and lost her balance  Response to re-training or treatment plan modifications: advised patient to let her  help her with ADL's  Assisted Devices used by patient prior to fall: no AD  Was equipment in use at time of fall? (Yes / No) NO  Injury (Yes / No), If yes, describe:  no  Emergent Care Received: (Yes / No), if yes, describe: no  Was patient identified as High Risk for falls?  (Yes / No) YES  List Tests Performed, Scores of Tests, and Patient Risk Factors: FAROOQ MCCARTHY Notified (name and time): Dr. Brian Ellis MD

## 2022-08-09 NOTE — HOME HEALTH
SUBJECTIVE: Patient reported fall on previous day, no injuries reported. No Changes in medications    CAREGIVER INVOLVEMENT/ASSISTANCE NEEDED FOR: Patient Ind with ADL's, spouse/daughter assist with medication management and chores. OBJECTIVE:  See interventions. PATIENT EDUCATION PROVIDED THIS VISIT:Patient educated on fall prevention, to monitor medications that may alter mental status. Educated on using Supervision when needed for bending/retrieving objects    PATIENT RESPONSE TO EDUCATION PROVIDED: Pt/caregiver with verbal understanding of fall prevention. Daughter/spouse able to demonstrate correct assist with bending gait activities. PATIENT RESPONSE TO TREATMENT: Patient frustrated with fall previous day, no injuries reported. Pt able to demonstrate safe transfers and understanding of contied use of AD for improved stability. .  ASSESSMENT OF PROGRESS TOWARD GOALS: Patient continues with gait training and balance training to return to PLOF. Pt is Armaan with ambulation within house with FWW. Paige West   PLAN FOR NEXT VISIT: Gait outdoors, Tinetti, cane training  THE FOLLOWING DISCHARGE PLANNING WAS DISCUSSED WITH THE PATIENT/CAREGIVER: Discharge tentative on 8/9/22

## 2022-08-09 NOTE — Clinical Note
Ms. Freya Carter has been clinically discharged and documentation finalized for completion of PT discharge. Caregiver involvement: Pt  is primary caregiver at this time and has been assisting with medication management and ADL support  Medications reconciled and all medications are available in the home this visit. The following education was provided regarding medications, medication interactions, and look a like medications: NA  Medications  are effective at this time. Home health supplies by type and quantity ordered/delivered this visit include: NA  Patient education provided this visit: safety with functional mobility  Current Functional Status and progress towards goals:  Strength: Pt. BLE strength is 3+/5 which is an improvement from the initial evaluation strength of 3-/5. This allows the patient increased functional independence and mobility    BED MOBILITY: Pt. is independent with all bed mobility which demonstrates an improvement from the initial evaluation of unable to get in/out of bed. This allows for the patient to be functionally more independent. TRANSFERS: Pt. is mod I with all transfers with no AD which demonstrates and improvement from the initial evaluation score of mod A with FWW. This allows the patient increased functional independence and mobility    GAIT/WC MOBILITY: Pt. is able to ambulate >150 feet outside over even and uneven surfaces with CGA with SPC AD. This represents an improvement from the initial evaluation of 40 feet with min/mod A on level surfaces with FWW AD.    STAIRS: 12 stairs with SPC with min A    BALANCE: Patient's tinetti is 20/28 which is an improvement from the initial evaluation score of 11/28. This allows the patient to be functionally more independent and have a decreased fall risk Progress toward goals: Patient has met all goals, see interventions for details. Pt. was able to return demonstrate all mobility training and HEP shown independently. Patient response to treatment and education: Patient tolerated treatment well, with no complaint of new pain noted post treatment. Home exercise program: Patient has been given a HEP and patient/caregiver understand the HEP. Patient is doing the HEP 2-3/day as able  Continued need for the following skills:  NA patient is final DC from PT today   The following discharge planning was discussed with the pt/caregiver: DC from agency    POST FALL:   Date and Time of Fall: 8/2/22  SOC/SUSANA Date: 7/5/22  Fall observed by [facility] Staff? no  Describe Event and Document any re-training or treatment plan modifications indicated:  pt was attempting to get laundry out of the dryer and lost her balance  Response to re-training or treatment plan modifications: advised patient to let her  help her with ADL's  Assisted Devices used by patient prior to fall: no AD  Was equipment in use at time of fall? (Yes / No) NO  Injury (Yes / No), If yes, describe:  no  Emergent Care Received: (Yes / No), if yes, describe: no  Was patient identified as High Risk for falls? (Yes / No) YES  List Tests Performed, Scores of Tests, and Patient Risk Factors: FAROOQ MCCARTHY Notified (name and time): Dr. Stefan Bourgeois MD    Thank you for your referral of this patient.     Sincerely,    Letty Alves, MPT  Physical Therapist

## 2022-08-09 NOTE — HOME HEALTH
SUBJECTIVE: Patient wanting to ambulate outdoors with daughter,   No changes in medications reported  No Falls reported    CAREGIVER INVOLVEMENT/ASSISTANCE NEEDED: Pt able to perform ADL's, assistance from spouse/daughter for meals, chores, medication management, and tranportation    OBJECTIVE:  See interventions. Current Functional Status and progress towards goals:  Strength: Pt. BLE strength is 4/5 which is an improvement from the initial evaluation strength of 3-/5. This allows the patient increased functional independence and mobility  BED MOBILITY: Pt. is Ind with all bed mobility which demonstrates an improvement from the previous level of CGA, due to pain. . This allows for the patient to be functionally more independent. TRANSFERS: Pt. is Ind with all transfers with FWW or cane which demonstrates and improvement from the initial evaluation score of Mod A with FWW. This allows the patient increased functional independence and mobility  GAIT/WC MOBILITY: Pt. is able to ambulate >150 feet outside over even and uneven surfaces with HHA with cane, and Armaan with FWW AD. This represents an improvement from the initial evaluation of 40 feet with Min/ModA on level surfaces with FWW AD.  STAIRS: Patient able to negotiate up/down 14 steps with 1 rail and cane Armaan, which is improvement from eval when pt not able to attempt stairs. BALANCE: Patient's tinetti is 23/28 which is an improvement from the initial evaluation score of 1128. This allows the patient to be functionally more independent and have a decreased fall risk  Home exercise program: Patient has been given a HEP and patient/caregiver understand the HEP. Patient is doing the HEP 3/day as able  Reviewed medications and updated any new medication changes as needed. Education Provide and Response:  Patient and caregiver educated on medication management and fall prevention. Educated on importance of continued HEP for strengthening and balance.   Verbal understanding and acknoledgement on medications and fall prevention strategies. Pt able to demonstrate HEP, Ind.     PLAN:  Plan is for discharge next week on 8/9/22  [facility]

## 2022-08-17 ENCOUNTER — HOSPITAL ENCOUNTER (OUTPATIENT)
Dept: PHYSICAL THERAPY | Age: 77
Discharge: HOME | End: 2022-08-17
Payer: MEDICARE

## 2022-08-17 PROCEDURE — 97162 PT EVAL MOD COMPLEX 30 MIN: CPT

## 2022-08-17 PROCEDURE — 97535 SELF CARE MNGMENT TRAINING: CPT

## 2022-08-17 PROCEDURE — 97110 THERAPEUTIC EXERCISES: CPT

## 2022-08-17 NOTE — PROGRESS NOTES
[facility]  [address] [facility] [address]  [place], [address]  [phone] fax    Plan of Care/ Statement of Necessity for Physical Therapy Services    Patient name: Franklyn Hilliard Start of Care: 2022   Referral source: Gerrie Eisenmenger : 1945    Medical Diagnosis: Other low back pain [M54.59]  Payor: Annita Baltazar / Plan: [place] MED[place]ARE [place] A & B / Product Type: Medicare /  Onset Date:6 months    Treatment Diagnosis: LBP, gait instability   Prior Hospitalization: see medical history Provider#: [id number]   Medications: Verified on Patient summary List    Comorbidities: stenosis, osteoporosis, coccyx fx with kyphoplasty injection procedures 6/10/2022, arthritis, anxiety, depression, high blood pressure   Prior Level of Function: previously amb without AD with lessened LBP      The Plan of Care and following information is based on the information from the initial evaluation. Assessment/ key information: Pt is a 77-year-old woman presenting to the clinic with her daughter for evaluation of her LBP and balance. Pt requires SBA to CGA with amb on her cane due to losing balance when she turns her head or turning corners. She has a RW at home, but would like to be more independent without it. She had a kyphoplasty procedure done on 6/10/2022, in which it is reported by her daughter that was to provide injections for pain due to multiple coccyx fractures. She also c/o left anterior leg pain that worsens with activity. Pt demonstrates reduced L/S mobility in all planes, decreased flexibility of the anterior and posterior chain, decreased BLE strength, reduced core stability, impaired rhomberg and split stance balance with right sided leaning, and Larissa with STS from a standard 18\" chair. Signs and sx consistent with stenotic LBP, muscular deconditioning, and general unsteadiness on feet.  Pt will benefit from skilled PT services to address the aforementioned impairments and improve ADL ease. Evaluation Complexity History MEDIUM  Complexity : 1-2 comorbidities / personal factors will impact the outcome/ POC ; Examination MEDIUM Complexity : 3 Standardized tests and measures addressing body structure, function, activity limitation and / or participation in recreation  ;Presentation MEDIUM Complexity : Evolving with changing characteristics  ; Clinical Decision Making MEDIUM Complexity : FOTO score of 26-74  Overall Complexity Rating: MEDIUM  Problem List: pain affecting function, decrease ROM, decrease strength, impaired gait/ balance, decrease ADL/ functional abilitiies, decrease activity tolerance, decrease flexibility/ joint mobility, and decrease transfer abilities   Treatment Plan may include any combination of the following: Therapeutic exercise, Therapeutic activities, Neuromuscular re-education, Physical agent/modality, Gait/balance training, Manual therapy, Aquatic therapy, Patient education, Self Care training, Functional mobility training, Home safety training, and Stair training  Patient / Family readiness to learn indicated by: asking questions, trying to perform skills, and interest  Persons(s) to be included in education: patient (P)  Barriers to Learning/Limitations: None  Patient Goal (s): walk straighter, improved posture, decreased leg pain  Patient Self Reported Health Status: fair  Rehabilitation Potential: fair    Short Term Goals: To be accomplished in 2 weeks:  Pt will report compliance with HEP 1x/day to maximize therapeutic outcomes. Pt will perform a STS with BUE from an 18\" chair independently to improve ease of transfers. Long Term Goals: To be accomplished in 4 weeks:  Pt will improve her FOTO to 41, indicating improved functional ADL capacity. Pt will perform 8 STS with 1 UE assist from a low plinth without assistance to maximize transfer independence at home.   Pt will demonstrate rhomberg stance on foam for 30 seconds without LOB to reduce fall risk. Pt will demonstrate split stance rhomberg stance on firm ground B for at least 15 seconds without LOB to reduce fall risk. Pt will improve her B hip abd/ext strength to 3+/5 MMT to improve stability with gait. Frequency / Duration: Patient to be seen 2 times per week for 4 weeks. Patient/ Caregiver education and instruction: Diagnosis, prognosis, self care, activity modification, and exercises   [x]  Plan of care has been reviewed with PTA    Certification Period: 8/17/2022 -- 9/15/2022  Moraima Alegria, PT 8/17/2022 4:53 PM    ________________________________________________________________________    I certify that the above Therapy Services are being furnished while the patient is under my care. I agree with the treatment plan and certify that this therapy is necessary.     [de-identified] Signature:____________________  Date:____________Time: _________     Vitaly Anne PA-C  Please sign and return to [facility] [address] [place] [address] & [facility]  [address]  [place], [address]  [phone] fax

## 2022-08-17 NOTE — PROGRESS NOTES
PT DAILY TREATMENT NOTE     Patient Name: Cooper Ledesma  OEJT:  : 1945  [x]  Patient  Verified  Payor: Linda Hawkins / Plan: [place] MEDICARE PART A & B / Product Type: Medicare /    In time:305pm  Out time:350pm  Total Treatment Time (min): 45  Visit #: 1 of 8    Medicare/BCBS Only   Total Timed Codes (min):  23 1:1 Treatment Time:  45       Treatment Area: Other low back pain [M54.59]    SUBJECTIVE  Pain Level (0-10 scale): 5  Any medication changes, allergies to medications, adverse drug reactions, diagnosis change, or new procedure performed?: [x] No    [] Yes (see summary sheet for update)  Subjective functional status/changes:   [] No changes reported  See eval    OBJECTIVE    22 min [x]Eval                  []Re-Eval       10 min Therapeutic Exercise:  [] See flow sheet : HEP   Rationale: increase ROM and increase strength to improve the patients ability to manage ADLs with improved ease. 13 min Self care/home management:  []  See flow sheet : pt education on healing progress, strengthening timeline, use of RW versus cane   Rationale:  improve understanding   to improve the patients ability to manage self care.          With   [] TE   [] TA   [] neuro   [] other: Patient Education: [x] Review HEP    [] Progressed/Changed HEP based on:   [] positioning   [] body mechanics   [] transfers   [] heat/ice application    [] other:      Other Objective/Functional Measures: see eval     Pain Level (0-10 scale) post treatment: 5    ASSESSMENT/Changes in Function: see POC    Patient will continue to benefit from skilled PT services to modify and progress therapeutic interventions, address functional mobility deficits, address ROM deficits, address strength deficits, analyze and address soft tissue restrictions, analyze and cue movement patterns, analyze and modify body mechanics/ergonomics, assess and modify postural abnormalities, address imbalance/dizziness, and instruct in home and community integration to attain remaining goals. []  See Plan of Care  []  See progress note/recertification  []  See Discharge Summary         Progress towards goals / Updated goals:  Short Term Goals: To be accomplished in 2 weeks:  Pt will report compliance with HEP 1x/day to maximize therapeutic outcomes. Eval: HEP assigned  Pt will perform a STS with BUE from an 18\" chair independently to improve ease of transfers. Eval: needs cueing and Larissa  Long Term Goals: To be accomplished in 4 weeks:  Pt will improve her FOTO to 51, indicating improved functional ADL capacity. Eval: 35  Pt will perform 8 STS with 1 UE assist from a low plinth without assistance to maximize transfer independence at home. Eval: Larissa, BUE  Pt will demonstrate rhomberg stance on foam for 30 seconds without LOB to reduce fall risk. Eval: 13 sec with LOB to the right  Pt will demonstrate split stance rhomberg stance on firm ground B for at least 15 seconds without LOB to reduce fall risk. Eval: 1 sec left stance, 3 sec right stance  Pt will improve her B hip abd/ext strength to 3+/5 MMT to improve stability with gait.    Eval: 2/5 MMT    PLAN  []  Upgrade activities as tolerated     [x]  Continue plan of care  []  Update interventions per flow sheet       []  Discharge due to:_  []  Other:_      Tate Mays, PT 8/17/2022  7:18 PM    Future Appointments   Date Time Provider Sean Kaur   8/19/2022  3:00 PM Wygiancarlo Bream [facility]   8/24/2022 11:15 AM Rogue Gosselin, PT [facility]   8/26/2022 10:30 AM Karthik Santos [facility]   8/29/2022 11:15 AM Rogue Gosselin, PT [facility]   8/31/2022  9:15 AM Shaniqua Landin MD [facility]   9/2/2022 10:00 AM Praveen Sparks, PT [facility]   9/7/2022 10:30 AM Mike Winston, PT [facility]   9/9/2022 11:15 AM Mike Winston, PT [facility]   9/19/2022  3:00 PM PPA [facility]

## 2022-08-19 ENCOUNTER — HOSPITAL ENCOUNTER (OUTPATIENT)
Dept: PHYSICAL THERAPY | Age: 77
Discharge: HOME | End: 2022-08-19
Payer: MEDICARE

## 2022-08-19 PROCEDURE — 97112 NEUROMUSCULAR REEDUCATION: CPT

## 2022-08-19 PROCEDURE — 97110 THERAPEUTIC EXERCISES: CPT

## 2022-08-19 PROCEDURE — 97530 THERAPEUTIC ACTIVITIES: CPT

## 2022-08-19 NOTE — PROGRESS NOTES
PT DAILY TREATMENT NOTE     Patient Name: Shanta Busby  Date:2022  : 1945  [x]  Patient  Verified  Payor: [place] MEDICARE / Plan: [place] MEDICARE PART A & B / Product Type: Medicare /    In time:305  Out time:350  Total Treatment Time (min): 45  Visit #: 2 of 8    Medicare/BCBS Only   Total Timed Codes (min):  45 1:1 Treatment Time:  45       Treatment Area: Other low back pain [M54.59]    SUBJECTIVE  Pain Level (0-10 scale): 2-3  Any medication changes, allergies to medications, adverse drug reactions, diagnosis change, or new procedure performed?: [x] No    [] Yes (see summary sheet for update)  Subjective functional status/changes:   [] No changes reported  Patient reports she fell yesterday landing on her kitchen floor. She has a small bruise on her back, but no pain associated. She denies hitting her head of LOC. Patient reports her  assisted her up.      OBJECTIVE    27 min Therapeutic Exercise:  [x] See flow sheet :   Rationale: increase ROM, increase strength, and improve coordination to improve the patients ability to perform ADLs and self care tasks with greater ease     10 min Therapeutic Activity:  [x]  See flow sheet : sit<>stand, step ups    Rationale: increase strength and improve coordination  to improve the patients ability to perform functional tasks with greater ease      8 min Neuromuscular Re-education:  [x]  See flow sheet :   Rationale: increase strength, improve coordination, improve balance, and increase proprioception  to improve the patients ability to perform functional tasks with greater stabilization      With   [] TE   [] TA   [] neuro   [] other: Patient Education: [x] Review HEP    [] Progressed/Changed HEP based on:   [] positioning   [] body mechanics   [] transfers   [] heat/ice application    [] other:      Other Objective/Functional Measures: exercises initiated per flow sheet      Bruise measuring 5.5 cm width by 3.5 cm height to right of incision    Pain Level (0-10 scale) post treatment: 0    ASSESSMENT/Changes in Function: Exercises initiated per flow sheet. Patient encouraged to call referring MD office to inform them of her fall and bruise on her back which measured 5.5 cm by 3.5 cm height to the right of her incision. Patient denies pain associated with this area. Patient requiring several rest breaks throughout the session due to fatigue. Will continue progressing balance, LE strength, flexibility, and mobility as able to promote return to PLOF. Patient will continue to benefit from skilled PT services to modify and progress therapeutic interventions, address functional mobility deficits, address ROM deficits, address strength deficits, analyze and address soft tissue restrictions, analyze and cue movement patterns, analyze and modify body mechanics/ergonomics, assess and modify postural abnormalities, address imbalance/dizziness, and instruct in home and community integration to attain remaining goals. []  See Plan of Care  []  See progress note/recertification  []  See Discharge Summary         Progress towards goals / Updated goals:  Short Term Goals: To be accomplished in 2 weeks:  Pt will report compliance with HEP 1x/day to maximize therapeutic outcomes. Eval: HEP assigned  Current: reports compliance 8/19/2022   Pt will perform a STS with BUE from an 18\" chair independently to improve ease of transfers. Eval: needs cueing and Larissa  Long Term Goals: To be accomplished in 4 weeks:  Pt will improve her FOTO to 51, indicating improved functional ADL capacity. Eval: 35  Pt will perform 8 STS with 1 UE assist from a low plinth without assistance to maximize transfer independence at home. Eval: Larissa, BUE  Pt will demonstrate rhomberg stance on foam for 30 seconds without LOB to reduce fall risk.   Eval: 13 sec with LOB to the right  Pt will demonstrate split stance rhomberg stance on firm ground B for at least 15 seconds without LOB to reduce fall risk. Eval: 1 sec left stance, 3 sec right stance  Pt will improve her B hip abd/ext strength to 3+/5 MMT to improve stability with gait.            Eval: 2/5 MMT    PLAN  []  Upgrade activities as tolerated     []  Continue plan of care  []  Update interventions per flow sheet       []  Discharge due to:_  []  Other:_      Shikha Teague, PT, DPT  8/19/2022  3:07 PM    Future Appointments   Date Time Provider Sean Kaur   8/24/2022 11:15 AM Fior Dennis, PT [facility]   8/26/2022 10:30 AM Shirley Ramirez [facility]   8/29/2022 11:15 AM Fior Dennis, PT [facility]   8/31/2022  9:15 AM Vinh Saldana MD [facility]   9/2/2022 10:00 AM William Godoy, PT [facility]   9/7/2022 10:30 AM Andry Yates, PT [facility]   9/9/2022 11:15 AM Andry Yates, PT [facility]   9/19/2022  3:00 PM [facility]

## 2022-08-24 ENCOUNTER — APPOINTMENT (OUTPATIENT)
Dept: PHYSICAL THERAPY | Age: 77
End: 2022-08-24
Payer: MEDICARE

## 2022-08-26 ENCOUNTER — HOSPITAL ENCOUNTER (OUTPATIENT)
Dept: PHYSICAL THERAPY | Age: 77
Discharge: HOME | End: 2022-08-26
Payer: MEDICARE

## 2022-08-26 PROCEDURE — 97140 MANUAL THERAPY 1/> REGIONS: CPT

## 2022-08-26 PROCEDURE — 97112 NEUROMUSCULAR REEDUCATION: CPT

## 2022-08-26 PROCEDURE — 97530 THERAPEUTIC ACTIVITIES: CPT

## 2022-08-26 PROCEDURE — 97110 THERAPEUTIC EXERCISES: CPT

## 2022-08-26 NOTE — PROGRESS NOTES
PT DAILY TREATMENT NOTE     Patient Name: Kuldip Comment  Date:2022  : 1945  [x]  Patient  Verified  Payor: Iván Ervin / Plan: [place] MEDICARE PART A & B / Product Type: Medicare /    In time:10:00  Out time:10:45  Total Treatment Time (min): 45  Visit #: 3 of 8    Medicare/BCBS Only   Total Timed Codes (min):  45 1:1 Treatment Time:  45       Treatment Area: Other low back pain [M54.59]    SUBJECTIVE  Pain Level (0-10 scale): 0/10  Any medication changes, allergies to medications, adverse drug reactions, diagnosis change, or new procedure performed?: [x] No    [] Yes (see summary sheet for update)  Subjective functional status/changes:   [] No changes reported  \"Doing okay, a little sore. \"    OBJECTIVE     22 min Therapeutic Exercise:  [x] See flow sheet :   Rationale: increase ROM and increase strength to improve the patients ability to perform ADL's.    8 min Therapeutic Activity:  [x]  See flow sheet : sit<>stand, step ups    Rationale: increase ROM, increase strength, improve coordination, and increase proprioception  to improve the patients ability to perform functional activities. 15 min Neuromuscular Re-education:  [x]  See flow sheet : 6\" step taps, ambulated with SPC for 150', rhomberg and MSR balance per flow sheet. Rationale: increase strength, improve coordination, improve balance, and increase proprioception  to improve the patients ability to negotiate community distances. With   [x] TE   [] TA   [] neuro   [] other: Patient Education: [x] Review HEP    [] Progressed/Changed HEP based on:   [] positioning   [] body mechanics   [] transfers   [] heat/ice application    [] other:      Other Objective/Functional Measures: Added rhomberg head turns while standing on level surface, CG(A). Added hip 3-way 10 reps each. Pain Level (0-10 scale) post treatment: 0/10    ASSESSMENT/Changes in Function: Ambulated 150' with SPC, occasional CG(A).  Pt presents with fairly good balance with ambulation when she is paying attention, begins to stumble/lose balance when looking around the building and/or move quickly. Occasional cueing to maintain proper gait mechanics with SPC. Continue PT to increase strength/stability and balance to improve walking/standing tolerance. Patient will continue to benefit from skilled PT services to modify and progress therapeutic interventions, address functional mobility deficits, address ROM deficits, address strength deficits, analyze and address soft tissue restrictions, analyze and cue movement patterns, analyze and modify body mechanics/ergonomics, and address imbalance/dizziness to attain remaining goals. [x]  See Plan of Care  []  See progress note/recertification  []  See Discharge Summary         Progress towards goals / Updated goals:  Short Term Goals: To be accomplished in 2 weeks:  Pt will report compliance with HEP 1x/day to maximize therapeutic outcomes. Eval: HEP assigned  Current: reports compliance 8/19/2022   Pt will perform a STS with BUE from an 18\" chair independently to improve ease of transfers. Eval: needs cueing and Larissa  Long Term Goals: To be accomplished in 4 weeks:  Pt will improve her FOTO to 51, indicating improved functional ADL capacity. Eval: 35  Pt will perform 8 STS with 1 UE assist from a low plinth without assistance to maximize transfer independence at home. Eval: Larissa, BUE  Pt will demonstrate rhomberg stance on foam for 30 seconds without LOB to reduce fall risk. Eval: 13 sec with LOB to the right  Pt will demonstrate split stance rhomberg stance on firm ground B for at least 15 seconds without LOB to reduce fall risk. Eval: 1 sec left stance, 3 sec right stance  Pt will improve her B hip abd/ext strength to 3+/5 MMT to improve stability with gait.            Eval: 2/5 MMT    PLAN  []  Upgrade activities as tolerated     [x]  Continue plan of care  []  Update interventions per flow sheet       [] Discharge due to:_  []  Other:_      Mandeep Query, PTA 8/26/2022  9:58 AM    Future Appointments   Date Time Provider Sean Kaur   8/26/2022 10:00 AM Moncho Wendi, PTA [facility]   8/29/2022 11:15 AM Ricky Keith, PT [facility]   8/31/2022  9:15 AM MD YULISA Herrera [facility]   9/2/2022 10:00 AM Danii Mark, PT [facility]   9/7/2022 10:30 AM Juanda Crew, PT [facility]   9/9/2022 11:15 AM Juanda Crew, PT [facility]   9/19/2022  3:00 PM PPA DONAVON [facility]

## 2022-08-29 ENCOUNTER — HOSPITAL ENCOUNTER (OUTPATIENT)
Dept: PHYSICAL THERAPY | Age: 77
Discharge: HOME | End: 2022-08-29
Payer: MEDICARE

## 2022-08-29 PROCEDURE — 97112 NEUROMUSCULAR REEDUCATION: CPT

## 2022-08-29 PROCEDURE — 97110 THERAPEUTIC EXERCISES: CPT

## 2022-08-29 PROCEDURE — 97530 THERAPEUTIC ACTIVITIES: CPT

## 2022-08-31 ENCOUNTER — OFFICE VISIT (OUTPATIENT)
Dept: ORTHOPEDIC SURGERY | Age: 77
End: 2022-08-31
Payer: MEDICARE

## 2022-08-31 VITALS
OXYGEN SATURATION: 97 % | BODY MASS INDEX: 23.49 KG/M2 | HEART RATE: 76 BPM | HEIGHT: 65 IN | TEMPERATURE: 94.1 F | RESPIRATION RATE: 16 BRPM | WEIGHT: 141 LBS

## 2022-08-31 DIAGNOSIS — M48.061 LUMBAR FORAMINAL STENOSIS: ICD-10-CM

## 2022-08-31 DIAGNOSIS — M47.816 LUMBAR FACET ARTHROPATHY: ICD-10-CM

## 2022-08-31 DIAGNOSIS — M80.00XS OSTEOPOROSIS WITH CURRENT PATHOLOGICAL FRACTURE, UNSPECIFIED OSTEOPOROSIS TYPE, SEQUELA: ICD-10-CM

## 2022-08-31 DIAGNOSIS — S22.080D COMPRESSION FRACTURE OF T11 VERTEBRA WITH ROUTINE HEALING: Primary | ICD-10-CM

## 2022-08-31 DIAGNOSIS — M43.25 FUSION OF SPINE OF THORACOLUMBAR REGION: ICD-10-CM

## 2022-08-31 DIAGNOSIS — R26.9 GAIT ABNORMALITY: ICD-10-CM

## 2022-08-31 PROCEDURE — 1101F PT FALLS ASSESS-DOCD LE1/YR: CPT | Performed by: PHYSICAL MEDICINE & REHABILITATION

## 2022-08-31 PROCEDURE — G8756 NO BP MEASURE DOC: HCPCS | Performed by: PHYSICAL MEDICINE & REHABILITATION

## 2022-08-31 PROCEDURE — G8536 NO DOC ELDER MAL SCRN: HCPCS | Performed by: PHYSICAL MEDICINE & REHABILITATION

## 2022-08-31 PROCEDURE — G9717 DOC PT DX DEP/BP F/U NT REQ: HCPCS | Performed by: PHYSICAL MEDICINE & REHABILITATION

## 2022-08-31 PROCEDURE — 99214 OFFICE O/P EST MOD 30 MIN: CPT | Performed by: PHYSICAL MEDICINE & REHABILITATION

## 2022-08-31 PROCEDURE — 1090F PRES/ABSN URINE INCON ASSESS: CPT | Performed by: PHYSICAL MEDICINE & REHABILITATION

## 2022-08-31 PROCEDURE — 1123F ACP DISCUSS/DSCN MKR DOCD: CPT | Performed by: PHYSICAL MEDICINE & REHABILITATION

## 2022-08-31 PROCEDURE — G8420 CALC BMI NORM PARAMETERS: HCPCS | Performed by: PHYSICAL MEDICINE & REHABILITATION

## 2022-08-31 PROCEDURE — G8427 DOCREV CUR MEDS BY ELIG CLIN: HCPCS | Performed by: PHYSICAL MEDICINE & REHABILITATION

## 2022-08-31 RX ORDER — DICLOFENAC SODIUM 10 MG/G
GEL TOPICAL 4 TIMES DAILY
COMMUNITY

## 2022-08-31 NOTE — LETTER
9/4/2022    Patient: Tacos Owens   YOB: 1945   Date of Visit: 8/31/2022     Tamiko Sotomayor MD  [address]  Via Fax: [phone]    Dear Tamiko Sotomayor MD,      Thank you for referring Ms. Pauly Cassidy to [address] [facility] for evaluation. My notes for this consultation are attached. If you have questions, please do not hesitate to call me. I look forward to following your patient along with you.       Sincerely,    Sondra Michael MD

## 2022-08-31 NOTE — PATIENT INSTRUCTIONS
Low Back Arthritis: Exercises  Introduction  Here are some examples of typical rehabilitation exercises for your condition. Start each exercise slowly. Ease off the exercise if you start to have pain. Your doctor or physical therapist will tell you when you can start these exercises and which ones will work best for you. When you are not being active, find a comfortable position for rest. Some people are comfortable on the floor or a medium-firm bed with a small pillow under their head and another under their knees. Some people prefer to lie on their side with a pillow between their knees. Don't stay in one position for too long. Take short walks (10 to 20 minutes) every 2 to 3 hours. Avoid slopes, hills, and stairs until you feel better. Walk only distances you can manage without pain, especially leg pain. How to do the exercises  Pelvic tilt    Lie on your back with your knees bent. \"Brace\" your stomach--tighten your muscles by pulling in and imagining your belly button moving toward your spine. Press your lower back into the floor. You should feel your hips and pelvis rock back. Hold for 6 seconds while breathing smoothly. Relax and allow your pelvis and hips to rock forward. Repeat 8 to 12 times. Back stretches    Get down on your hands and knees on the floor. Relax your head and allow it to droop. Round your back up toward the ceiling until you feel a nice stretch in your upper, middle, and lower back. Hold this stretch for as long as it feels comfortable, or about 15 to 30 seconds. Return to the starting position with a flat back while you are on your hands and knees. Let your back sway by pressing your stomach toward the floor. Lift your buttocks toward the ceiling. Hold this position for 15 to 30 seconds. Repeat 2 to 4 times. Follow-up care is a key part of your treatment and safety. Be sure to make and go to all appointments, and call your doctor if you are having problems.  It's also a good idea to know your test results and keep a list of the medicines you take. Where can you learn more? Go to http://www.Spoonity.com/  Enter T094 in the search box to learn more about \"Low Back Arthritis: Exercises. \"  Current as of: July 1, 2021               Content Version: 13.2  © 2006-2022 [employer]. Care instructions adapted under license by [employer] (which disclaims liability or warranty for this information). If you have questions about a medical condition or this instruction, always ask your healthcare professional. [address] any warranty or liability for your use of this information. Learning About Sleeping Well  What does sleeping well mean? Sleeping well means getting enough sleep to feel good and stay healthy. How much sleep is enough varies among people. The number of hours you sleep and how you feel when you wake up are both important. If you do not feel refreshed, you probably need more sleep. Another sign of not getting enough sleep is feeling tired during the day. Experts recommend that adults get at least 7 or more hours of sleep per day. Children and older adults need more sleep. Why is getting enough sleep important? Getting enough quality sleep is a basic part of good health. When your sleep suffers, your physical health, mood, and your thoughts can suffer too. You may find yourself feeling more grumpy or stressed. Not getting enough sleep also can lead to serious problems, including injury, accidents, anxiety, and depression. What might cause poor sleeping? Many things can cause sleep problems, including:  Changes to your sleep schedule. Stress. Stress can be caused by fear about a single event, such as giving a speech. Or you may have ongoing stress, such as worry about work or school. Depression, anxiety, and other mental or emotional conditions. Changes in your sleep habits or surroundings. This includes changes that happen where you sleep, such as noise, light, or sleeping in a different bed. It also includes changes in your sleep pattern, such as having jet lag or working a late shift. Health problems, such as pain, breathing problems, and restless legs syndrome. Lack of regular exercise. Using alcohol, nicotine, or caffeine before bed. How can you help yourself? Here are some tips that may help you sleep more soundly and wake up feeling more refreshed. Your sleeping area   Use your bedroom only for sleeping and sex. A bit of light reading may help you fall asleep. But if it doesn't, do your reading elsewhere in the house. Try not to use your TV, computer, smartphone, or tablet while you are in bed. Be sure your bed is big enough to stretch out comfortably, especially if you have a sleep partner. Keep your bedroom quiet, dark, and cool. Use curtains, blinds, or a sleep mask to block out light. To block out noise, use earplugs, soothing music, or a \"white noise\" machine. Your evening and bedtime routine   Create a relaxing bedtime routine. You might want to take a warm shower or bath, or listen to soothing music. Go to bed at the same time every night. And get up at the same time every morning, even if you feel tired. What to avoid   Limit caffeine (coffee, tea, caffeinated sodas) during the day, and don't have any for at least 6 hours before bedtime. Avoid drinking alcohol before bedtime. Alcohol can cause you to wake up more often during the night. Try not to smoke or use tobacco, especially in the evening. Nicotine can keep you awake. Limit naps during the day, especially close to bedtime. Avoid lying in bed awake for too long. If you can't fall asleep or if you wake up in the middle of the night and can't get back to sleep within about 20 minutes, get out of bed and go to another room until you feel sleepy.   Avoid taking medicine right before bed that may keep you awake or make you feel hyper or energized. Your doctor can tell you if your medicine may do this and if you can take it earlier in the day. If you can't sleep   Imagine yourself in a peaceful, pleasant scene. Focus on the details and feelings of being in a place that is relaxing. Get up and do a quiet or boring activity until you feel sleepy. Avoid drinking any liquids before going to bed to help prevent waking up often to use the bathroom. Where can you learn more? Go to http://www.gray.com/  Enter Z781 in the search box to learn more about \"Learning About Sleeping Well. \"  Current as of: June 16, 2021               Content Version: 13.2  © 2006-2022 [employer]. Care instructions adapted under license by [employer] (which disclaims liability or warranty for this information). If you have questions about a medical condition or this instruction, always ask your healthcare professional. [place] [address] any warranty or liability for your use of this information.

## 2022-08-31 NOTE — PROGRESS NOTES
[facility]  [address] [place], [facility] [address]  Phone: [phone]  Fax: [phone]    Pt's YOB: 1945    ASSESSMENT   Diagnoses and all orders for this visit:    1. Compression fracture of T11 vertebra with routine healing    2. Lumbar foraminal stenosis    3. Fusion of spine of thoracolumbar region    4. Gait abnormality    5. Lumbar facet arthropathy    6. Osteoporosis with current pathological fracture, unspecified osteoporosis type, sequela       IMPRESSION AND PLAN:  Fuad Walker is a 68 y.o. female with history of lumbar pain and presents to the office today for follow up, accompanied by her . Pt continues to complain of numbness and weakness in the right lower extremity and alleviation of the pain when sitting and lying down. She is taking Neurontin 300 mg 1 cap QAM and 1 cap QHS with minimal relief, Norco 5-325 mg 1 tab QAM and 1 tab QHS as needed with temporary relief, and Pamelor 10 mg 1 cap QHS with relief. 1) Pt was given information on lumbar arthritis exercises and sleep health. 2) She will continue to take Neurontin 300 mg 1 cap QAM and 1 cap QHS with relief and Pamelor 10 mg 1 caps QHS and does not require a refill at this time. 3) Recommended pt use Dove soap and zinc oxide for sores and dry skin on her legs. 4) Ms. Erika Copeland has a reminder for a \"due or due soon\" health maintenance. I have asked that she contact her primary care provider, Jody Villarreal MD, for follow-up on this health maintenance. 5)  demonstrated consistency with prescribing. 6) Pt will continue with outpatient physical therapy. Follow-up and Dispositions    Return in about 2 months (around 10/31/2022) for Medication follow up. HISTORY OF PRESENT ILLNESS:  Fuad Walker is a 68 y.o. female with history of lumbar pain and presents to the office today for follow up, accompanied by her .  Pt continues to complain of numbness and weakness in the right lower extremity and alleviation of the pain when sitting and lying down. She reports undergoing a kyphoplasty on 06/13/2022 and has gone through inpatient physical therapy with at home therapy for six weeks after the surgery. Pt admits to having to use a walker while walking around the house due to continued pain and recovering from surgery. She is taking Neurontin 300 mg 1 cap QAM and 1 cap QHS with minimal relief, Norco 5-325 mg 1 tab QAM and 1 tab QHS as needed with temporary relief, and Pamelor 10 mg 1 cap QHS with relief. She mentions confusion for several days as a side effect of Neurontin causing her to decrease her dosage per her primary care providers instruction. Her  notes the pt had fallen days before the confusion episode but did not think it was a concussion due to her falling on carpet. He further mention the confusion episode involved the pt not being able to recall where she was or who the president was but she was able to recall that he was her . She has not had any further issues since that time. Pt at this time desires to continue with current care.     More than 30 minutes was spent with the pt and her  extensively discussing her recent fracture, hospitalization, rehab, medication adjustments and     Pain Scale: 5/10    PCP: Isai Stearns MD     Past Medical History:   Diagnosis Date    Acid reflux     Bilateral sacral insufficiency fracture with routine healing 6/9/2022    Chronic anemia     Compression fracture of T11 vertebra with routine healing 6/9/2022    Hypertension     Spinal stenosis         Social History     Socioeconomic History    Marital status:      Spouse name: Not on file    Number of children: Not on file    Years of education: Not on file    Highest education level: Not on file   Occupational History    Not on file   Tobacco Use    Smoking status: Never    Smokeless tobacco: Never   Vaping Use Vaping Use: Never used   Substance and Sexual Activity    Alcohol use: Yes     Alcohol/week: 1.0 standard drink     Types: 1 Glasses of wine per week    Drug use: No    Sexual activity: Yes     Partners: Male     Birth control/protection: None   Other Topics Concern    Not on file   Social History Narrative    Not on file     Social Determinants of Health     Financial Resource Strain: Not on file   Food Insecurity: Not on file   Transportation Needs: Not on file   Physical Activity: Not on file   Stress: Not on file   Social Connections: Not on file   Intimate Partner Violence: Not on file   Housing Stability: Not on file       Current Outpatient Medications   Medication Sig Dispense Refill    diclofenac (VOLTAREN) 1 % gel Apply  to affected area four (4) times daily. gabapentin (NEURONTIN) 300 mg capsule Take 300 mg by mouth three (3) times daily. 1 capsule in am  2 capsules in pm      metoprolol tartrate (LOPRESSOR) 25 mg tablet Take 25 mg by mouth two (2) times a day. potassium 99 mg tablet Take 99 mg by mouth in the morning. mecobalamin, vitamin B12, 5,000 mcg TbDi Take 1 Tablet by mouth daily. IRON, FERROUS SULFATE, PO Take 1 Tablet by mouth daily. dosage: 27 mg      nortriptyline (PAMELOR) 10 mg capsule Take 4 Capsules by mouth nightly. 360 Capsule 1    ibuprofen (MOTRIN) 200 mg tablet Take 200 mg by mouth daily. pt. takes 3 tablets daily      magnesium 250 mg tab Take 1 Tablet by mouth daily. ferrous gluconate 324 mg (37.5 mg iron) tablet Take 1 Tablet by mouth two (2) times daily (with meals). (Patient taking differently: Take 324 mg by mouth daily.) 60 Tablet 0    naloxone (NARCAN) 4 mg/actuation nasal spray Use 1 spray intranasally, then discard. Repeat with new spray every 2 min as needed for opioid overdose symptoms, alternating nostrils. 2 Each 0    MAGNESIUM OXIDE PO Take 1 Tab by mouth daily. omeprazole (PRILOSEC) 40 mg capsule Take 40 mg by mouth daily.  Indications: gastroesophageal reflux disease      LORazepam (ATIVAN) 1 mg tablet Take 1 mg by mouth every eight (8) hours as needed for Anxiety. atorvastatin (LIPITOR) 10 mg tablet Take 10 mg by mouth daily. oxyCODONE IR (ROXICODONE) 10 mg tab immediate release tablet Take 10 mg by mouth every six (6) hours as needed for Pain. (Patient not taking: Reported on 8/31/2022)      cholecalciferol, vitamin D3, (Vitamin D3) 50 mcg (2,000 unit) tab Take 1 Tablet by mouth daily. zinc gluconate 30 mg tab Take 1 Tablet by mouth daily. (Patient not taking: Reported on 8/31/2022)      calcium/D3/zinc/copper/suzie (CITRACAL-D3 MAXIMUM PLUS PO) Take 1 Tablet by mouth daily. (Patient not taking: Reported on 8/31/2022)      lysine (L-LYSINE) 500 mg tab tablet Take 500 mg by mouth daily. (Patient not taking: Reported on 8/31/2022)      hydrALAZINE (APRESOLINE) 25 mg tablet Take 1 Tablet by mouth three (3) times daily. (Patient not taking: Reported on 8/31/2022) 90 Tablet 0    docosahexaenoic acid/epa (FISH OIL PO) Take 1,000 mg by mouth daily. (Patient not taking: Reported on 8/31/2022)         No Known Allergies      REVIEW OF SYSTEMS    Constitutional: Negative for fever, chills, or weight change. Respiratory: Negative for cough or shortness of breath. Cardiovascular: Negative for chest pain or palpitations. Positive for bilateral leg edema. Gastrointestinal: Negative for acid reflux, change in bowel habits, or constipation. Genitourinary: Negative for dysuria and flank pain. Musculoskeletal: Positive for lumbar pain. Positive for right lower extremity weakness. Skin: Negative for rash. Neurological: Negative for headaches or dizziness. Positive for right lower extremity numbness. Endo/Heme/Allergies: Negative for increased bruising. Psychiatric/Behavioral: Negative for difficulty with sleep.     As per HPI    PHYSICAL EXAMINATION  Visit Vitals  Pulse 76   Temp (!) 94.1 °F (34.5 °C) (Temporal)   Resp 16   Ht 5' 5\" (1.651 m)   Wt 141 lb (64 kg)   SpO2 97%   BMI 23.46 kg/m²       Constitutional: Awake, alert, and in no acute distress. Neurological: Sensation to light touch is intact. Negative Mckeon's sign bilaterally. Skin: warm, dry, and intact. Positive for dry skin and sores on bilateral lower extremities. Musculoskeletal: Moderate pain with extension, axial loading, and forward flexion. No pain with internal or external rotation of her hips. Negative straight leg raise bilaterally. Biceps  Triceps Deltoids Wrist Ext Wrist Flex Hand Intrin   Right +4/5 +4/5 +4/5 +4/5 +4/5 +4/5   Left +4/5 +4/5 +4/5 +4/5 +4/5 +4/5      Hip Flex  Quads Hamstrings Ankle DF EHL Ankle PF   Right +4/5 +4/5 +4/5 +4/5 +4/5 +4/5   Left +4/5 +4/5 +4/5 +4/5 +4/5 +4/5     IMAGING:  CT Lumbar spin with Contrast 06/10/2022 was personally reviewed and demonstrated:  CT SPINE LUMB W CONT: Patient Communication     Add Comments   Not seen     Study Result    Narrative & Impression   EXAM: CT SPINE LUMB W CONT     CLINICAL INDICATION/HISTORY: worsening back pain, described as severe ;  increased difficulty walking, leg weakness; planned surgery in the near term  -Patient with right-sided sciatica symptoms     TECHNIQUE: Contiguous axial images were performed through the lumbar spine. From these, sagittal and coronal reconstructions were generated. CT scans at this facility are performed using dose optimization technique as  appropriate with performed exam, to include automated exposure control,  adjustment of mA and/or kV according to patient's size (including appropriate  matching for site-specific examinations), or use of iterative reconstruction  technique. COMPARISON: Preceding myelogram images; MRI March 2022     FINDINGS:   There is diagnostic quality opacification of the intrathecal contents. Prior corrective surgery with rods spanning T10-L2; utilization of underhooks  atT10 and overhooks at L2.  The hardware itself is intact, no dislodgment. Alignment: There is moderately pronounced kyphotic angulation centered at  T10-11. Further discussed below. There is trace L4 anterolisthesis in the  presence of degenerative facet arthropathy. Vertebral body height: At T11, progression of now approximately 33% compression  fracture deformity involving central to anterior vertebral body. Only minor  compressive change posteriorly. No retropulsion. Curvilinear sclerosis and  subtle compression fracture plane of lucency within the vertebral body. No  propagation of the posterior elements. Mild biconcave morphology of T12 is unchanged. Axial imaging correlation:     T8-9: Patent canal and foramina     T9-10: Patent canal and foramina     T10-11: Moderately severe disc space narrowing. There is mild disc osteophyte  ridge formation. There is contact and slight flattening with contour deformity  of the distal thoracic cord. CSF remains evident posterior of the cord. There is  no gross cord expansion. There is moderate foraminal stenosis. T11-12: Patent canal and foramina. T12-L1: Conus terminates at this level, has normal morphology. No clumping of the cauda equina nerve roots. L1-2: Patent canal and foramina. L2-3: Moderate disc space narrowing with vacuum phenomenon. Endplate sclerosis  and cyst formation. There is disc osteophyte ridge formation. There is bulging  epidural lipomatosis posteriorly. There is moderate severity spinal stenosis. Sagittal image 41, and around axial image 67 (streak artifact). No high-grade  foraminal stenosis. L3-4: Moderate disc space narrowing and vacuum phenomenon. Lesser degree of  endplate sclerosis and subchondral cyst formation. Disc osteophyte ridge  formation. Also with some bulging epidural lipomatosis along with facet  arthropathy. Moderate spinal stenosis. Moderate right foraminal stenosis. L4-5: Slight uncovering of the disc. Mild broad-based disc protrusion. Mild  amount of vacuum disc phenomenon. Small locule of gas behind L4 likely related  to the myelogram access. Also with bulging epidural lipomatosis. Facet  arthropathy. Deformity of the thecal sac with overall moderate stenosis. Particular narrowing of the lateral recesses. Mild to moderate foraminal  stenosis. L5-S1: Facet arthropathy. Patent canal and foramina. There are bilateral sacral wing fractures, with fracture lucencies more readily  evident on the right. Includes fracture propagation through transitional  morphology right L5. Some cortical buckling on the left. Horizontal component  within the sacrum is not readily appreciated, but would be most suspicious at  upper S2 region. Other structures: Moderate to large hiatal hernia noted. Minor reticular  scarring/atelectasis within lower lungs. Atherosclerosis of normal caliber  aorta. Large bowel diverticulosis. IMPRESSION  :     1. Progression of subacute compression fracture at T11, at about 33% loss of  height.  -Resultant kyphosis  -mild cord contact     2. Acute/subacute sacral insufficiency fracture pattern predominating on the  right  -As outlined above  -Most likely source of reported right sciatica     3. Moderate severity stenosis at L2-3 from combined degenerative disc disease  and epidural lipomatosis  -Similar lesser degree of stenoses at L4-5 and L3-4 with same combination of  findings     4. Previous corrective geoff placement spanning T10-L2, hardware intact     5. Discussed by me with Dr. Issac Reza of orthopedic surgery 6/10/2022 at 1356 hours         Lumbar spine MRI from 03/07/2022 was personally reviewed with the patient and her  and demonstrated:     Severe hardware artifact at L2-L3, and lower thoracic segment. Images through  these levels are nondiagnostic. Grade 1 anterolisthesis L4 on L5. No compression deformity.      Edema in the superior endplate of L3 and inferior endplate of L2.     C01-R7 and L1-L2: No suspected disc herniation or central stenosis. No foraminal  stenosis. L2-L3: Nondiagnostic     L3-L4: Similar posterior disc bulge with endplate spondylosis. Facet and  ligamentous hypertrophy, worse on the right. Moderately severe central stenosis. AP canal measures 6 mm Severe right foraminal stenosis with compression of right  exiting L3 nerve root. Mild left foraminal stenosis. L4-L5: Posterior disc bulge. More severe facet and ligamentous hypertrophy. Facet inflammation with left facet joint effusion. Severe central stenosis. AP  canal measures 4.3 mm. Severe bilateral foraminal stenosis with compression of  exiting L4 nerve roots bilaterally. Grossly stable     L5-S1: No disc herniation. No significant central stenosis. Mild facet  hypertrophy with no foraminal stenosis. IMPRESSION  1. Central stenosis worst at L4-L5, followed by L3-L4: Posterior disc  bulge/protrusion and facet/ligamentous hypertrophy. 2. Bilateral L4 foraminal stenosis with compression of exiting L4 nerve roots. 3. Right L3 foraminal stenosis with compression of right exiting L3 nerve root. Lumbar spine 4V x-rays from 03/03/2022 were personally reviewed with the patient and demonstrated:     Thoracolumbar fusion. Multilevel degenerative fact. Severe degenerative disc L3-4, L2-3. Atherosclerosis. Thoracic spine 2V x-rays from 11/23/2020 were personally reviewed with the patient and demonstrated:  Thoracolumbar fusion. Written by Bk Hahn, as dictated by Leyla Hercules MD.  I, Dr. Leyla Hercules confirm that all documentation is accurate.

## 2022-08-31 NOTE — PROGRESS NOTES
Chief Complaint   Patient presents with    Follow-up       Pt preferred language for health care discussion is english. Is someone accompanying this pt? no    Is the patient using any DME equipment during OV? no    Depression Screening:  3 most recent \[facility]\"" 36 Screens 5/24/2022 12/14/2020 11/23/2020 10/16/2020 10/16/2020 7/14/2020 7/23/2019   Little interest or pleasure in doing things Not at all Not at all Not at all Not at all Not at all Not at all Not at all   Feeling down, depressed, irritable, or hopeless Not at all Not at all Not at all Not at all Not at all Not at all Not at all   Total Score PHQ 2 0 0 0 0 0 0 0       Learning Assessment:  Learning Assessment 7/23/2019   PRIMARY LEARNER Patient   HIGHEST LEVEL OF EDUCATION - PRIMARY LEARNER  GRADUATED HIGH SCHOOL OR GED   BARRIERS PRIMARY LEARNER NONE   CO-LEARNER CAREGIVER No   PRIMARY LANGUAGE ENGLISH   LEARNER PREFERENCE PRIMARY DEMONSTRATION     READING     VIDEOS   ANSWERED BY patient   RELATIONSHIP SELF       Abuse Screening:  Abuse Screening Questionnaire 5/24/2022 11/23/2020 7/23/2019   Do you ever feel afraid of your partner? N N N   Are you in a relationship with someone who physically or mentally threatens you? N N N   Is it safe for you to go home? Y Y Y       Fall Risk  Fall Risk Assessment, last 12 mths 5/24/2022   Able to walk? Yes   Fall in past 12 months? 0   Do you feel unsteady? 1   Are you worried about falling 1   Is the gait abnormal? 0           Advance Directive:  1. Do you have an advance directive in place? Patient Reply:no    2. If not, would you like material regarding how to put one in place? Patient Reply: no    2. Per patient no changes to their ACP contact no. Coordination of Care:  1. Have you been to the ER, urgent care clinic since your last visit? Hospitalized since your last visit? Yes [facility]  unable to walk    2.  Have you seen or consulted any other health care providers outside of the [address] [place] [address] since your last visit? Include any pap smears or colon screening.  no

## 2022-09-02 ENCOUNTER — HOSPITAL ENCOUNTER (OUTPATIENT)
Dept: PHYSICAL THERAPY | Age: 77
Discharge: HOME | End: 2022-09-02
Payer: MEDICARE

## 2022-09-02 PROCEDURE — 97112 NEUROMUSCULAR REEDUCATION: CPT

## 2022-09-02 PROCEDURE — 97530 THERAPEUTIC ACTIVITIES: CPT

## 2022-09-02 PROCEDURE — 97110 THERAPEUTIC EXERCISES: CPT

## 2022-09-02 NOTE — PROGRESS NOTES
PT DAILY TREATMENT NOTE     Patient Name: Al Maya  Date:2022  : 1945  [x]  Patient  Verified  Payor: [place] MEDICARE / Plan: [place] MEDICARE PART A & B / Product Type: Medicare /    In time:10:01  Out time:1045  Total Treatment Time (min): 44  Visit #: 5 of 8    Medicare/BCBS Only   Total Timed Codes (min):  44 1:1 Treatment Time:  44       Treatment Area: Other low back pain [M54.59]    SUBJECTIVE  Pain Level (0-10 scale): 0  Any medication changes, allergies to medications, adverse drug reactions, diagnosis change, or new procedure performed?: [x] No    [] Yes (see summary sheet for update)  Subjective functional status/changes:   [] No changes reported  The pt reports she feels therapy is helping.      OBJECTIVE    Modality rationale:    Min Type Additional Details    [] Estim:  []Unatt       []IFC  []Premod                        []Other:  []w/ice   []w/heat  Position:  Location:    [] Estim: []Att    []TENS instruct  []NMES                    []Other:  []w/US   []w/ice   []w/heat  Position:  Location:    []  Traction: [] Cervical       []Lumbar                       [] Prone          []Supine                       []Intermittent   []Continuous Lbs:  [] before manual  [] after manual    []  Ultrasound: []Continuous   [] Pulsed                           []1MHz   []3MHz W/cm2:  Location:    []  Iontophoresis with dexamethasone         Location: [] Take home patch   [] In clinic    []  Ice     []  heat  []  Ice massage  []  Laser   []  Anodyne Position:  Location:    []  Laser with stim  []  Other:  Position:  Location:    []  Vasopneumatic Device    []  Right     []  Left  Pre-treatment girth:  Post-treatment girth:  Measured at (location):  Pressure:       [] lo [] med [] hi   Temperature: [] lo [] med [] hi   [] Skin assessment post-treatment:  []intact []redness- no adverse reaction    []redness - adverse reaction:         24 min Therapeutic Exercise:  [x] See flow sheet :   Rationale: increase ROM and increase strength to improve the patients ability to perform ADL    8 min Therapeutic Activity:  [x]  See flow sheet :   Rationale: increase strength, improve coordination, and increase proprioception  to improve the patients ability to perform ADL     12 min Neuromuscular Re-education:  [x]  See flow sheet : balance activities, gait   Rationale: improve coordination, improve balance, and increase proprioception  to improve the patients ability to perform ADL                  With   [] TE   [] TA   [] neuro   [] other: Patient Education: [x] Review HEP    [] Progressed/Changed HEP based on:   [] positioning   [] body mechanics   [] transfers   [] heat/ice application    [] other:      Other Objective/Functional Measures: progressed therex per flow sheet     Pain Level (0-10 scale) post treatment: 0    ASSESSMENT/Changes in Function: The pt is showing progress with sit to stands from table. Improvement is noted with Romberg. Good effort noted throughout treatment today. + cues needed for therex at times. Patient will continue to benefit from skilled PT services to modify and progress therapeutic interventions, address functional mobility deficits, address ROM deficits, address strength deficits, analyze and address soft tissue restrictions, and analyze and cue movement patterns to attain remaining goals. []  See Plan of Care  []  See progress note/recertification  []  See Discharge Summary         Progress towards goals / Updated goals:  Short Term Goals: To be accomplished in 2 weeks:  Pt will report compliance with HEP 1x/day to maximize therapeutic outcomes. Eval: HEP assigned  Current: reports compliance 8/19/2022   Pt will perform a STS with BUE from an 18\" chair independently to improve ease of transfers. Eval: needs cueing and Larissa  Long Term Goals: To be accomplished in 4 weeks:  Pt will improve her FOTO to 51, indicating improved functional ADL capacity.   Eval: 35  Pt will perform 8 STS with 1 UE assist from a low plinth without assistance to maximize transfer independence at home. Eval: JUDY Dias  Current: Progressing: Patient required some support from (B) UE in lap with sit to stand transfers, but was able to complete 10 reps. (8/29/22)      Pt will demonstrate rhomberg stance on foam for 30 seconds without LOB to reduce fall risk. Eval: 13 sec with LOB to the right   Current: progressing well 25 sec on 9-2-22  Pt will demonstrate split stance rhomberg stance on firm ground B for at least 15 seconds without LOB to reduce fall risk. Eval: 1 sec left stance, 3 sec right stance  Pt will improve her B hip abd/ext strength to 3+/5 MMT to improve stability with gait.            Eval: 2/5 MMT    PLAN  []  Upgrade activities as tolerated     [x]  Continue plan of care  []  Update interventions per flow sheet       []  Discharge due to:_  []  Other:_      Marci Julian, PT 9/2/2022  10:07 AM    Future Appointments   Date Time Provider Sean Kaur   9/7/2022 10:30 AM Patrice Weber, PT [facility]   9/9/2022 11:15 AM Patrice Weber PT [facility]   9/19/2022  3:00 PM [facility]   10/31/2022 10:45 AM Jose Antonio Almaraz MD [facility]

## 2022-09-06 DIAGNOSIS — M48.061 LUMBAR FORAMINAL STENOSIS: Primary | ICD-10-CM

## 2022-09-06 DIAGNOSIS — M47.816 LUMBAR FACET ARTHROPATHY: ICD-10-CM

## 2022-09-06 DIAGNOSIS — M43.25 FUSION OF SPINE OF THORACOLUMBAR REGION: ICD-10-CM

## 2022-09-06 RX ORDER — GABAPENTIN 300 MG/1
CAPSULE ORAL
Qty: 60 CAPSULE | Refills: 1 | Status: SHIPPED | OUTPATIENT
Start: 2022-09-06 | End: 2022-10-31 | Stop reason: SDUPTHER

## 2022-09-06 NOTE — TELEPHONE ENCOUNTER
Last Visit: 8/31/22 with MD Maribeth Ng  Next Appointment: 10/31/22 with MD Maribeth Ng    Requested Prescriptions     Pending Prescriptions Disp Refills    gabapentin (NEURONTIN) 300 mg capsule 60 Capsule 1     Sig: Take 1 Capsule by mouth every morning AND 1 Capsule every evening. Max Daily Amount: 600 mg. For [facility] in place:   Recommendation Provided To:    Intervention Detail: New Rx: 1, reason: Patient Preference  Gap Closed?:   Intervention Accepted By:   Time Spent (min): 5

## 2022-09-07 ENCOUNTER — APPOINTMENT (OUTPATIENT)
Dept: PHYSICAL THERAPY | Age: 77
End: 2022-09-07
Payer: MEDICARE

## 2022-09-09 ENCOUNTER — HOSPITAL ENCOUNTER (OUTPATIENT)
Dept: PHYSICAL THERAPY | Age: 77
Discharge: HOME | End: 2022-09-09
Payer: MEDICARE

## 2022-09-09 PROCEDURE — 97530 THERAPEUTIC ACTIVITIES: CPT

## 2022-09-09 PROCEDURE — 97110 THERAPEUTIC EXERCISES: CPT

## 2022-09-09 PROCEDURE — 97112 NEUROMUSCULAR REEDUCATION: CPT

## 2022-09-09 NOTE — PROGRESS NOTES
PT DAILY TREATMENT NOTE     Patient Name: Franklyn Hilliard  Date:2022  : 1945  [x]  Patient  Verified  Payor: [place] MEDICARE / Plan: [place] MEDICARE PART A & B / Product Type: Medicare /    In time:1114am  Out time:12pm  Total Treatment Time (min): 46  Visit #: 6 of 8    Medicare/BCBS Only   Total Timed Codes (min):  46 1:1 Treatment Time:  42       Treatment Area: Other low back pain [M54.59]    SUBJECTIVE  Pain Level (0-10 scale): 0  Any medication changes, allergies to medications, adverse drug reactions, diagnosis change, or new procedure performed?: [x] No    [] Yes (see summary sheet for update)  Subjective functional status/changes:   [] No changes reported  Intermittent right leg pain at night time. Has a hard time standing up straight with walking, \"I can get so far before it starts hurting. \"     OBJECTIVE  24 min Therapeutic Exercise:  [x] See flow sheet :   Rationale: increase ROM, increase strength, and improve coordination to improve the patients ability to perform ADLs and self care tasks with greater ease      10 min Therapeutic Activity:  [x]  See flow sheet : sit<>stand, step ups    Rationale: increase strength and improve coordination  to improve the patients ability to perform functional tasks with greater ease      8 min Neuromuscular Re-education:  [x]  See flow sheet :   Rationale: increase strength, improve coordination, improve balance, and increase proprioception  to improve the patients ability to perform functional tasks with greater stabilization           With   [] TE   [] TA   [] neuro   [] other: Patient Education: [x] Review HEP    [] Progressed/Changed HEP based on:   [] positioning   [] body mechanics   [] transfers   [] heat/ice application    [] other:      Other Objective/Functional Measures: added 1/2 prone gluteal exercises and seated tband rows.       Pain Level (0-10 scale) post treatment: 0    ASSESSMENT/Changes in Function: Pt requires cueing for posture for upright due to tendency to forward flex in standing and extend in sitting with therEx. Emphasized gluteal, abdominal, and scapular work today due to primary c/o posture with balance. Despite reduced confidence, pt does quite well with rhomberg. Encouraged pt that strengthening will take time and to not rush her progress. Patient will continue to benefit from skilled PT services to modify and progress therapeutic interventions, address functional mobility deficits, address ROM deficits, address strength deficits, analyze and address soft tissue restrictions, analyze and cue movement patterns, analyze and modify body mechanics/ergonomics, assess and modify postural abnormalities, address imbalance/dizziness, and instruct in home and community integration to attain remaining goals. []  See Plan of Care  []  See progress note/recertification  []  See Discharge Summary         Progress towards goals / Updated goals:  Short Term Goals: To be accomplished in 2 weeks:  Pt will report compliance with HEP 1x/day to maximize therapeutic outcomes. Eval: HEP assigned  Current: reports compliance 8/19/2022   Pt will perform a STS with BUE from an 18\" chair independently to improve ease of transfers. Eval: needs cueing and Larissa   Current: met  (9/9/2022)  Long Term Goals: To be accomplished in 4 weeks:  Pt will improve her FOTO to 51, indicating improved functional ADL capacity. Eval: 35  Pt will perform 8 STS with 1 UE assist from a low plinth without assistance to maximize transfer independence at home. Eval: JUDY Dias  Current: Progressing: Patient required some support from (B) UE in lap with sit to stand transfers, but was able to complete 10 reps. (8/29/22)    Pt will demonstrate rhomberg stance on foam for 30 seconds without LOB to reduce fall risk.   Eval: 13 sec with LOB to the right  Current: progressing well 25 sec on 9-2-22  Pt will demonstrate split stance rhomberg stance on firm ground B for at least 15 seconds without LOB to reduce fall risk. Eval: 1 sec left stance, 3 sec right stance  Current: met, 30 seconds B with reduced confidence. (9/9/2022)  Pt will improve her B hip abd/ext strength to 3+/5 MMT to improve stability with gait.            Eval: 2/5 MMT   Current: slow progress, 2+/5 B (9/9/2022)    PLAN  []  Upgrade activities as tolerated     [x]  Continue plan of care  []  Update interventions per flow sheet       []  Discharge due to:_  []  Other:_      Bryan Boone, PT 9/9/2022  11:22 AM    Future Appointments   Date Time Provider Sean Kaur   9/13/2022 12:15 PM Caro Harris PTA [facility]   9/19/2022  3:00 PM [facility]   10/31/2022 10:45 AM Jennifer Cardoza MD [facility]

## 2022-09-13 ENCOUNTER — HOSPITAL ENCOUNTER (OUTPATIENT)
Dept: PHYSICAL THERAPY | Age: 77
Discharge: HOME | End: 2022-09-13
Payer: MEDICARE

## 2022-09-13 PROCEDURE — 97530 THERAPEUTIC ACTIVITIES: CPT

## 2022-09-13 PROCEDURE — 97112 NEUROMUSCULAR REEDUCATION: CPT

## 2022-09-13 PROCEDURE — 97110 THERAPEUTIC EXERCISES: CPT

## 2022-09-13 NOTE — PROGRESS NOTES
PT DAILY TREATMENT NOTE     Patient Name: Kaylene Mejía  IQBW:[id number]  : 1945  [x]  Patient  Verified  Payor: [place] MEDICARE / Plan: [place] MEDICARE PART A & B / Product Type: Medicare /    In time:12:10  Out time:12:53  Total Treatment Time (min): 43  Visit #: 7 of 8    Medicare/BCBS Only   Total Timed Codes (min):  43 1:1 Treatment Time:  43       Treatment Area: Other low back pain [M54.59]    SUBJECTIVE  Pain Level (0-10 scale): 0  Any medication changes, allergies to medications, adverse drug reactions, diagnosis change, or new procedure performed?: [x] No    [] Yes (see summary sheet for update)  Subjective functional status/changes:   [] No changes reported  Pt reports feeling tired today    OBJECTIVE    20 min Therapeutic Exercise:  [x] See flow sheet :   Rationale: increase ROM and increase strength to improve the patients ability to perform ADLs    8 min Therapeutic Activity:  [x]  See flow sheet :   Rationale: increase strength, improve coordination, and increase proprioception  to improve the patients ability to increase ease with daily tasks     15 min Neuromuscular Re-education:  [x]  See flow sheet :   Rationale: increase strength, improve coordination, improve balance, and increase proprioception  to improve the patients ability to perform functional tasks        With   [] TE   [] TA   [] neuro   [] other: Patient Education: [x] Review HEP    [] Progressed/Changed HEP based on:   [] positioning   [] body mechanics   [] transfers   [] heat/ice application    [] other:      Other Objective/Functional Measures:    Incr'd reps per flow sheet    Pain Level (0-10 scale) post treatment: 0    ASSESSMENT/Changes in Function: Patient was challenged well with incr'd reps today. Demo's ms fatigue and reports low back soreness after performing balance activities in parallel bars. Req's vc's for sequencing with SPC.      Patient will continue to benefit from skilled PT services to modify and progress therapeutic interventions, address functional mobility deficits, address ROM deficits, address strength deficits, analyze and address soft tissue restrictions, analyze and cue movement patterns, analyze and modify body mechanics/ergonomics, assess and modify postural abnormalities, and address imbalance/dizziness to attain remaining goals. []  See Plan of Care  []  See progress note/recertification  []  See Discharge Summary         Progress towards goals / Updated goals:  Short Term Goals: To be accomplished in 2 weeks:  Pt will report compliance with HEP 1x/day to maximize therapeutic outcomes. Eval: HEP assigned  Current: reports compliance 8/19/2022   Pt will perform a STS with BUE from an 18\" chair independently to improve ease of transfers. Eval: needs cueing and Larissa   Current: met  (9/9/2022)  Long Term Goals: To be accomplished in 4 weeks:  Pt will improve her FOTO to 51, indicating improved functional ADL capacity. Eval: 35  Pt will perform 8 STS with 1 UE assist from a low plinth without assistance to maximize transfer independence at home. Eval: Larissa, BUE  Current: Progressing: Patient required some support from (B) UE in lap with sit to stand transfers, but was able to complete 10 reps. (8/29/22)    Pt will demonstrate rhomberg stance on foam for 30 seconds without LOB to reduce fall risk. Eval: 13 sec with LOB to the right  Current: progressing well 25 sec on 9-2-22  Pt will demonstrate split stance rhomberg stance on firm ground B for at least 15 seconds without LOB to reduce fall risk. Eval: 1 sec left stance, 3 sec right stance  Current: met, 30 seconds B with reduced confidence. (9/9/2022)  Pt will improve her B hip abd/ext strength to 3+/5 MMT to improve stability with gait.            Eval: 2/5 MMT   Current: slow progress, 2+/5 B (9/9/2022)    PLAN  []  Upgrade activities as tolerated     [x]  Continue plan of care  []  Update interventions per flow sheet       []  Discharge due to:_  []  Other:_      Kang Gupta, PTA 9/13/2022  12:06 PM    Future Appointments   Date Time Provider Sean Kaur   9/13/2022 12:15 PM Fabiola Sample, PTA [facility]   9/16/2022  2:30 PM Elisabeth Jacque, PTA [facility]   9/19/2022  3:00 PM [facility]   9/21/2022 12:45 PM Elisabeth Jacque, PTA [facility]   9/23/2022 11:30 AM Elisabeth Jacque, PTA [facility]   9/27/2022 12:45 PM Elisabeth Jacque, PTA [facility]   9/29/2022 10:45 AM Elisabeth Jacque, PTA [facility]   10/4/2022 11:15 AM Leanne Haff, PT [facility]   10/6/2022 11:30 AM Leanne Haff, PT [facility]   10/31/2022 10:45 AM Riaz Emmanuel MD [facility]

## 2022-09-16 ENCOUNTER — HOSPITAL ENCOUNTER (OUTPATIENT)
Dept: PHYSICAL THERAPY | Age: 77
Discharge: HOME | End: 2022-09-16
Payer: MEDICARE

## 2022-09-16 PROCEDURE — 97530 THERAPEUTIC ACTIVITIES: CPT

## 2022-09-16 PROCEDURE — 97112 NEUROMUSCULAR REEDUCATION: CPT

## 2022-09-16 PROCEDURE — 97110 THERAPEUTIC EXERCISES: CPT

## 2022-09-16 NOTE — PROGRESS NOTES
PT DAILY TREATMENT NOTE     Patient Name: Reina Flores  YICL:[id number]  : 1945  [x]  Patient  Verified  Payor: Ermias Morrison / Plan: [place] MEDICARE PART A & B / Product Type: Medicare /    In time:2:31  Out time:3:21  Total Treatment Time (min): 50  Visit #: 8 of 8    Medicare/BCBS Only   Total Timed Codes (min):  50 1:1 Treatment Time:  43       Treatment Area: Other low back pain [M54.59]    SUBJECTIVE  Pain Level (0-10 scale): 0/10  Any medication changes, allergies to medications, adverse drug reactions, diagnosis change, or new procedure performed?: [x] No    [] Yes (see summary sheet for update)  Subjective functional status/changes:   [] No changes reported  \"I've gotten better but it's slow. \"    OBJECTIVE    28 min Therapeutic Exercise:  [x] See flow sheet :   Rationale: increase ROM and increase strength to improve the patients ability to perform ADL's. 10 min Therapeutic Activity:  [x]  See flow sheet : Step ups, HR/TR's, ambulation with SPC. Rationale: increase strength, improve coordination, and increase proprioception  to improve the patients ability to negotiate community distances with LRAD. 12 min Neuromuscular Re-education:  [x]  See flow sheet : Rhomberg on airex EC/Head turns. Seated marching. Rationale: increase strength, improve coordination, improve balance, and increase proprioception  to improve the patients ability to perform functional activities. With   [x] TE   [] TA   [] neuro   [] other: Patient Education: [x] Review HEP    [] Progressed/Changed HEP based on:   [] positioning   [] body mechanics   [] transfers   [] heat/ice application    [] other:      Other Objective/Functional Measures: FOTO 49%. Pain Level (0-10 scale) post treatment: 0/10    ASSESSMENT/Changes in Function: Demonstrates improved posture awareness and static balance. Fatigues quickly which contributes to forward posture.  Pt would benefit from continuing PT for additional 2x a week for 4 weeks. Pt's goal \"to improve my walking and posture. \"    Patient will continue to benefit from skilled PT services to modify and progress therapeutic interventions, address functional mobility deficits, address ROM deficits, address strength deficits, analyze and address soft tissue restrictions, analyze and cue movement patterns, analyze and modify body mechanics/ergonomics, and address imbalance/dizziness to attain remaining goals. []  See Plan of Care  [x]  See progress note/recertification  []  See Discharge Summary         Progress towards goals / Updated goals:  Short Term Goals: To be accomplished in 2 weeks:  Pt will report compliance with HEP 1x/day to maximize therapeutic outcomes. Eval: HEP assigned  Current: reports compliance 8/19/2022   Pt will perform a STS with BUE from an 18\" chair independently to improve ease of transfers. Eval: needs cueing and Larissa   Current: met  (9/9/2022)  Long Term Goals: To be accomplished in 4 weeks:  Pt will improve her FOTO to 51, indicating improved functional ADL capacity. Eval: 35  Current: Progressing, 49%. 9/16/2022  Pt will perform 8 STS with 1 UE assist from a low plinth without assistance to maximize transfer independence at home. Eval: Larissa, BUE  Current: Progressing: Patient required some support from (B) UE in lap with sit to stand transfers, but was able to complete 10 reps. (8/29/22)    Pt will demonstrate rhomberg stance on foam for 30 seconds without LOB to reduce fall risk. Eval: 13 sec with LOB to the right  Current: progressing well 25 sec on 9-2-22  Pt will demonstrate split stance rhomberg stance on firm ground B for at least 15 seconds without LOB to reduce fall risk. Eval: 1 sec left stance, 3 sec right stance  Current: met, 30 seconds B with reduced confidence. (9/9/2022)  Pt will improve her B hip abd/ext strength to 3+/5 MMT to improve stability with gait.            Eval: 2/5 MMT   Current: slow progress, 2+/5 B (9/9/2022)    PLAN  []  Upgrade activities as tolerated     [x]  Continue plan of care  []  Update interventions per flow sheet       []  Discharge due to:_  []  Other:_      Bruce Stager, PTA 9/16/2022  2:45 PM    Future Appointments   Date Time Provider Sean Natasha   9/19/2022  3:00 PM [facility]   9/21/2022  9:15 AM Елена, Maribel A, PTA [facility]   9/23/2022 11:30 AM Suraj Poag, PTA [facility]   9/27/2022 12:45 PM [place] Poag, PTA [facility]   9/29/2022 10:45 AM [place] Poag, PTA [facility]   10/4/2022 11:15 AM Sierra Bar, PT [facility]   10/6/2022 11:30 AM Sierra Bar, PT [facility]   10/31/2022 10:45 AM Kelle Tobin MD [facility]

## 2022-09-16 NOTE — PROGRESS NOTES
[facility]  [address] [facility] [address]  [place], [address]  [phone] fax    Continued Plan of Care/ Re-certification for Physical Therapy Services    Patient name: Kuldip Narvaez Start of Care: 2022   Referral source: Hortencia Apley : 1945   Medical/Treatment Diagnosis: Other low back pain [M54.59]  Payor: Iván Vallejoiver / Plan: Keo Ball / Product Type: Medicare /  Onset Date: 6 months     Prior Hospitalization: see medical history Provider#: [id number]   Medications: Verified on Patient Summary List    Comorbidities: stenosis, osteoporosis, coccyx fx with kyphoplasty injection procedures 6/10/2022, arthritis, anxiety, depression, high blood pressure  Prior Level of Function:previously amb without AD with lessened LBP  Visits from Start of Care: 8    Missed Visits: 0    The Plan of Care and following information is based on the patient's current status:      Key functional changes:     Progress towards goals / Updated goals:  Short Term Goals: To be accomplished in 2 weeks:  Pt will report compliance with HEP 1x/day to maximize therapeutic outcomes. Eval: HEP assigned  Current: reports compliance. Pt will perform a STS with BUE from an 18\" chair independently to improve ease of transfers. Eval: needs cueing and Larissa   Current: met  Long Term Goals: To be accomplished in 4 weeks:  Pt will improve her FOTO to 51, indicating improved functional ADL capacity. Eval: 35  Current: Progressing, 49%. Pt will perform 8 STS with 1 UE assist from a low plinth without assistance to maximize transfer independence at home. Eval: Larissa, BUE  Current: Progressing: Patient required some support from (B) UE in lap with sit to stand transfers, but was able to complete 10 reps. Pt will demonstrate rhomberg stance on foam for 30 seconds without LOB to reduce fall risk. Eval: 13 sec with LOB to the right  Current: progressing well 25 sec.   Pt will demonstrate split stance rhomberg stance on firm ground B for at least 15 seconds without LOB to reduce fall risk. Eval: 1 sec left stance, 3 sec right stance  Current: met, 30 seconds B with reduced confidence. Pt will improve her B hip abd/ext strength to 3+/5 MMT to improve stability with gait. Eval: 2/5 MMT   Current: slow progress, 2+/5 B    Problems/ barriers to goal attainment: stenosis, osteoporosis, coccyx fx with kyphoplasty injection procedures 6/10/2022. Problem List: pain affecting function, decrease ROM, decrease strength, impaired gait/ balance, decrease ADL/ functional abilitiies, decrease activity tolerance, decrease flexibility/ joint mobility, and decrease transfer abilities    Treatment Plan: Therapeutic exercise, Therapeutic activities, Neuromuscular re-education, Physical agent/modality, Gait/balance training, Manual therapy, Patient education, Self Care training, Functional mobility training, Home safety training, and Stair training     Patient Goal (s) has been updated and includes: Pt's goal \"to improve my walking and posture. \"     Goals for this certification period to be accomplished in 4 weeks:  Long Term Goals: To be accomplished in 4 weeks:  Pt will improve her FOTO to 51, indicating improved functional ADL capacity. Re-Cert: Progressing, 55%. Pt will perform 8 STS with 1 UE assist from a low plinth without assistance to maximize transfer independence at home. Re-Cert: Progressing: Patient required some support from (B) UE in lap with sit to stand transfers, but was able to complete 10 reps. Pt will demonstrate rhomberg stance on foam for 30 seconds without LOB to reduce fall risk. Re-Cert: progressing well 25 sec. 4.   Pt will improve her B hip abd/ext strength to 3+/5 MMT to improve stability with gait. Re-Cert: slow progress, 2+/5 B.     Frequency / Duration: Patient to be seen 2 times per week for 4 weeks:    Assessment / Recommendations:Demonstrates improved posture awareness and static balance. Fatigues quickly which contributes to forward posture. Demonstrates capacity to continue to improve based on current progress with therapy. Pt would benefit from continuing PT for additional 2x a week for 4 weeks. Certification Period: 9/18/2022 - 10/17/2022    Floydyordan Kaushik, PTA 9/16/2022 3:24 PM  Co-sign: Eden Munoz, PT, DPT  9/16/2022   4PM    ________________________________________________________________________  I certify that the above Therapy Services are being furnished while the patient is under my care. I agree with the treatment plan and certify that this therapy is necessary. [] I have read the above and request that my patient continue as recommended.   [] I have read the above report and request that my patient continue therapy with the following changes/special instructions: _____________________________________________  [] I have read the above report and request that my patient be discharged from therapy    Physician's Signature:____________Date:_________TIME:________     Kali Morales PA-C  ** Signature, Date and Time must be completed for valid certification **    Please sign and return to [facility] [address] [place] [address] & [facility] [address]  [address]  [place], [address]  [phone] fax

## 2022-09-19 ENCOUNTER — OFFICE VISIT (OUTPATIENT)
Dept: PULMONOLOGY | Age: 77
End: 2022-09-19
Payer: MEDICARE

## 2022-09-19 VITALS — HEIGHT: 65 IN | WEIGHT: 143 LBS | BODY MASS INDEX: 23.82 KG/M2

## 2022-09-19 DIAGNOSIS — R05.3 CHRONIC COUGH: ICD-10-CM

## 2022-09-19 DIAGNOSIS — R06.02 SHORTNESS OF BREATH: ICD-10-CM

## 2022-09-19 DIAGNOSIS — R53.81 PHYSICAL DECONDITIONING: ICD-10-CM

## 2022-09-19 DIAGNOSIS — R06.09 DYSPNEA ON EXERTION: ICD-10-CM

## 2022-09-19 PROCEDURE — 94727 GAS DIL/WSHOT DETER LNG VOL: CPT | Performed by: INTERNAL MEDICINE

## 2022-09-19 PROCEDURE — 94060 EVALUATION OF WHEEZING: CPT | Performed by: INTERNAL MEDICINE

## 2022-09-19 PROCEDURE — 94729 DIFFUSING CAPACITY: CPT | Performed by: INTERNAL MEDICINE

## 2022-09-21 ENCOUNTER — HOSPITAL ENCOUNTER (OUTPATIENT)
Dept: PHYSICAL THERAPY | Age: 77
Discharge: HOME | End: 2022-09-21
Payer: MEDICARE

## 2022-09-21 PROCEDURE — 97530 THERAPEUTIC ACTIVITIES: CPT

## 2022-09-21 PROCEDURE — 97110 THERAPEUTIC EXERCISES: CPT

## 2022-09-21 PROCEDURE — 97112 NEUROMUSCULAR REEDUCATION: CPT

## 2022-09-21 NOTE — PROGRESS NOTES
PT DAILY TREATMENT NOTE     Patient Name: Anjali Toledo  Date:2022  : 1945  [x]  Patient  Verified  Payor: Jackson Jiménez / Plan: [place] MED[place]ARE PART A & B / Product Type: Medicare /    In time:12pm  Out time:1241pm  Total Treatment Time (min): 41  Visit #: 1 of 8    Medicare/BCBS Only   Total Timed Codes (min):  41 1:1 Treatment Time:  38       Treatment Area: Other low back pain [M54.59]    SUBJECTIVE  Pain Level (0-10 scale): 0  Any medication changes, allergies to medications, adverse drug reactions, diagnosis change, or new procedure performed?: [x] No    [] Yes (see summary sheet for update)  Subjective functional status/changes:   [] No changes reported  Reports she is trying to walk without an AD at home. OBJECTIVE    18 min Therapeutic Exercise:  [x] See flow sheet :   Rationale: increase ROM and increase strength to improve the patients ability to perform ADL's. 10 min Therapeutic Activity:  [x]  See flow sheet : Step ups, HR/TR's, ambulation with SPC. Rationale: increase strength, improve coordination, and increase proprioception  to improve the patients ability to negotiate community distances with LRAD. 10 min Neuromuscular Re-education:  [x]  See flow sheet : Rhomberg on airex EC/Head turns. Seated marching. Rationale: increase strength, improve coordination, improve balance, and increase proprioception  to improve the patients ability to perform functional activities. With   [] TE   [] TA   [] neuro   [] other: Patient Education: [x] Review HEP    [] Progressed/Changed HEP based on:   [] positioning   [] body mechanics   [] transfers   [] heat/ice application    [] other:      Other Objective/Functional Measures: met STG #3     Pain Level (0-10 scale) post treatment: 0    ASSESSMENT/Changes in Function: Pt amb 460ft today with SPC, needing cueing for posture to maintain upright and forward gaze, and to ensure lifting feet from the floor as she fatigues.  Due to her goal of wanting to walk without an AD, plan to gait train without AD moving forward. She tolerates addition of ankle weights to standing therEx well. Patient will continue to benefit from skilled PT services to modify and progress therapeutic interventions, address functional mobility deficits, address ROM deficits, address strength deficits, analyze and address soft tissue restrictions, analyze and cue movement patterns, analyze and modify body mechanics/ergonomics, assess and modify postural abnormalities, address imbalance/dizziness, and instruct in home and community integration to attain remaining goals. []  See Plan of Care  []  See progress note/recertification  []  See Discharge Summary         Progress towards goals / Updated goals:  Pt will improve her FOTO to 51, indicating improved functional ADL capacity. Re-Cert: Progressing, 45%. Pt will perform 8 STS with 1 UE assist from a low plinth without assistance to maximize transfer independence at home. Re-Cert: Progressing: Patient required some support from (B) UE in lap with sit to stand transfers, but was able to complete 10 reps. Pt will demonstrate rhomberg stance on foam for 30 seconds without LOB to reduce fall risk. Re-Cert: progressing well 25 sec. Current: met, 30 seconds EC airex (9/21/2022)  4. Pt will improve her B hip abd/ext strength to 3+/5 MMT to improve stability with gait. Re-Cert: slow progress, 2+/5 B.     PLAN  []  Upgrade activities as tolerated     [x]  Continue plan of care  []  Update interventions per flow sheet       []  Discharge due to:_  []  Other:_      Patricia Clark, PT 9/21/2022  11:37 AM    Future Appointments   Date Time Provider Sean Kaur   9/21/2022 12:00 PM Rell Crain, PT [facility]   9/23/2022 11:30 AM Tino Rios PTA [facility]   9/27/2022 12:45 PM Tino Rios PTA [facility]   9/29/2022 10:45 AM Tino Rios PTA [facility]   10/4/2022 11:15 AM Minoo Campbell, PT [facility]   10/6/2022 11:30 AM Minoo Campbell, PT [facility]   10/31/2022 10:45 AM Bianka Mantilla MD [facility] BS AMB   11/1/2022 12:15 PM Andrea Sheets MD [facility] BS AMB

## 2022-09-23 ENCOUNTER — HOSPITAL ENCOUNTER (OUTPATIENT)
Dept: PHYSICAL THERAPY | Age: 77
Discharge: HOME | End: 2022-09-23
Payer: MEDICARE

## 2022-09-23 PROCEDURE — 97530 THERAPEUTIC ACTIVITIES: CPT

## 2022-09-23 PROCEDURE — 97110 THERAPEUTIC EXERCISES: CPT

## 2022-09-23 PROCEDURE — 97112 NEUROMUSCULAR REEDUCATION: CPT

## 2022-09-23 NOTE — PROGRESS NOTES
PT DAILY TREATMENT NOTE     Patient Name: Patricio Womack  Date:2022  : 1945  [x]  Patient  Verified  Payor: Kelsykaleb Lewis / Plan: [place] MEDICARE PART A & B / Product Type: Medicare /    In time:11:30  Out time:12:13  Total Treatment Time (min): 43  Visit #: 2 of 8    Medicare/BCBS Only   Total Timed Codes (min):  43 1:1 Treatment Time:  43       Treatment Area: Other low back pain [M54.59]    SUBJECTIVE  Pain Level (0-10 scale): 0/10  Any medication changes, allergies to medications, adverse drug reactions, diagnosis change, or new procedure performed?: [x] No    [] Yes (see summary sheet for update)  Subjective functional status/changes:   [] No changes reported  No new complaints. OBJECTIVE    23 min Therapeutic Exercise:  [x] See flow sheet :   Rationale: increase ROM and increase strength to improve the patients ability to perform ADL's. 10 min Therapeutic Activity:  [x]  See flow sheet : Step ups, HR's, CG(A) ambulation without AD. Rationale: increase strength, improve coordination, and increase proprioception  to improve the patients ability to safely negotiate community distances. 10 min Neuromuscular Re-education:  [x]  See flow sheet : Airex balance exercises, standing t-band rows/ext for balance, 6\" step taps. Rationale: increase strength, improve coordination, improve balance, and increase proprioception  to improve the patients ability to perform functional activities. With   [x] TE   [] TA   [] neuro   [] other: Patient Education: [x] Review HEP    [] Progressed/Changed HEP based on:   [] positioning   [] body mechanics   [] transfers   [] heat/ice application    [] other:      Other Objective/Functional Measures: Changed seated T-band series to standing, able to maintain balance with mild anterior/posterior trunk sway. Pain Level (0-10 scale) post treatment: 0/10    ASSESSMENT/Changes in Function: Pt ambulated without AD, with CG(A) 120' x 2.  Requests sitting break after walking due to low back tension/pain. Pt is making gradual progress, demonstrating improved balance and ease with exercises. Fatigue/low back pain with prolonged walking without AD. Continue PT to further increase strength/stability and balance to further improve pt's ability to perform functional activities and community activities. Patient will continue to benefit from skilled PT services to modify and progress therapeutic interventions, address functional mobility deficits, address ROM deficits, address strength deficits, analyze and address soft tissue restrictions, analyze and cue movement patterns, analyze and modify body mechanics/ergonomics, and address imbalance/dizziness to attain remaining goals. [x]  See Plan of Care  []  See progress note/recertification  []  See Discharge Summary         Progress towards goals / Updated goals:  Pt will improve her FOTO to 51, indicating improved functional ADL capacity. Re-Cert: Progressing, 80%. Pt will perform 8 STS with 1 UE assist from a low plinth without assistance to maximize transfer independence at home. Re-Cert: Progressing: Patient required some support from (B) UE in lap with sit to stand transfers, but was able to complete 10 reps. Pt will demonstrate rhomberg stance on foam for 30 seconds without LOB to reduce fall risk. Re-Cert: progressing well 25 sec. Current: met, 30 seconds EC airex (9/21/2022)  4. Pt will improve her B hip abd/ext strength to 3+/5 MMT to improve stability with gait. Re-Cert: slow progress, 2+/5 B.     PLAN  []  Upgrade activities as tolerated     [x]  Continue plan of care  []  Update interventions per flow sheet       []  Discharge due to:_  []  Other:_      Stephanie Hummel PTA 9/23/2022  11:21 AM    Future Appointments   Date Time Provider Sean Kaur   9/23/2022 11:30 AM [facility]   9/29/2022 10:45 AM [facility]   10/4/2022 11:15 AM Fatmata Fresh, PT [facility]   10/6/2022 11:30 AM Marychelly Fresh, PT [facility]   10/31/2022 10:45 AM Brianne Mclain MD [facility] BS AMB   11/1/2022 12:15 PM Myke Paniagua MD [facility] BS AMB

## 2022-09-27 ENCOUNTER — APPOINTMENT (OUTPATIENT)
Dept: PHYSICAL THERAPY | Age: 77
End: 2022-09-27
Payer: MEDICARE

## 2022-09-29 ENCOUNTER — HOSPITAL ENCOUNTER (OUTPATIENT)
Dept: PHYSICAL THERAPY | Age: 77
Discharge: HOME | End: 2022-09-29
Payer: MEDICARE

## 2022-09-29 PROCEDURE — 97112 NEUROMUSCULAR REEDUCATION: CPT

## 2022-09-29 PROCEDURE — 97110 THERAPEUTIC EXERCISES: CPT

## 2022-09-29 PROCEDURE — 97530 THERAPEUTIC ACTIVITIES: CPT

## 2022-09-29 NOTE — PROGRESS NOTES
PT DAILY TREATMENT NOTE     Patient Name: Marilin Murdock  Date:2022  : 1945  [x]  Patient  Verified  Payor: Gurinder Gomez / Plan: [place] MEDICARE PART A & B / Product Type: Medicare /    In time:9:48  Out time:11:45  Total Treatment Time (min): 57  Visit #: 3 of 8    Medicare/BCBS Only   Total Timed Codes (min):  57 1:1 Treatment Time:  50       Treatment Area: Other low back pain [M54.59]    SUBJECTIVE  Pain Level (0-10 scale): 0/10  Any medication changes, allergies to medications, adverse drug reactions, diagnosis change, or new procedure performed?: [x] No    [] Yes (see summary sheet for update)  Subjective functional status/changes:   [] No changes reported  \"Doing okay. A little discouraged from the MD yesterday. Said my back probably won't get much better. \"    OBJECTIVE    37 min Therapeutic Exercise:  [x] See flow sheet :   Rationale: increase ROM and increase strength to improve the patients ability to perform ADL's. 10 min Therapeutic Activity:  [x]  See flow sheet : Step ups, HR's, CG(A) ambulation without AD. Rationale: increase strength and improve coordination  to improve the patients ability to perform functional activities. 10 min Neuromuscular Re-education:  [x]  See flow sheet : Airex balance exercises, standing t-band rows/ext for balance, 6\" step taps. Rationale: increase strength, improve coordination, improve balance, and increase proprioception  to improve the patients ability to perform functional activities. With   [x] TE   [] TA   [] neuro   [] other: Patient Education: [x] Review HEP    [] Progressed/Changed HEP based on:   [] positioning   [] body mechanics   [] transfers   [] heat/ice application    [] other:      Other Objective/Functional Measures: Added ida-disc to seated marching. Pain Level (0-10 scale) post treatment: 0/10    ASSESSMENT/Changes in Function: Pt performed sit to stand 10x from low plinth with Left UE support only.  Pt is making gradual progress and demonstrates improved ease with transfers. Challenged with airex EC rhomberg today, required min(A) intermittently. Pt puts forth good effort and is very motivated. Continue PT to further improve posture awareness and increase strength/endurance to improve walking/standing tolerance. Patient will continue to benefit from skilled PT services to modify and progress therapeutic interventions, address functional mobility deficits, address ROM deficits, address strength deficits, analyze and address soft tissue restrictions, analyze and cue movement patterns, analyze and modify body mechanics/ergonomics, and address imbalance/dizziness to attain remaining goals. [x]  See Plan of Care  []  See progress note/recertification  []  See Discharge Summary         Progress towards goals / Updated goals:  Pt will improve her FOTO to 51, indicating improved functional ADL capacity. Re-Cert: Progressing, 78%. Pt will perform 8 STS with 1 UE assist from a low plinth without assistance to maximize transfer independence at home. Re-Cert: Progressing: Patient required some support from (B) UE in lap with sit to stand transfers, but was able to complete 10 reps. Current: Met, pt performed sit to stand 10x from low plinth with Left UE support only. 9/29/2022  Pt will demonstrate rhomberg stance on foam for 30 seconds without LOB to reduce fall risk. Re-Cert: progressing well 25 sec. Current: met, 30 seconds EC airex (9/21/2022)  4. Pt will improve her B hip abd/ext strength to 3+/5 MMT to improve stability with gait. Re-Cert: slow progress, 2+/5 B.     PLAN  []  Upgrade activities as tolerated     [x]  Continue plan of care  []  Update interventions per flow sheet       []  Discharge due to:_  []  Other:_      Giuliana Avila PTA 9/29/2022  10:52 AM    Future Appointments   Date Time Provider Sean Kaur   10/4/2022 11:15 AM [facility]   10/6/2022 11:30 AM Renee Denny, PT [facility]   10/31/2022 10:45 AM Chris Ball MD [facility] BS AMB   11/1/2022 12:15 PM Iban Tinsley MD [facility] BS AMB

## 2022-10-04 ENCOUNTER — HOSPITAL ENCOUNTER (OUTPATIENT)
Dept: PHYSICAL THERAPY | Age: 77
Discharge: HOME | End: 2022-10-04
Payer: MEDICARE

## 2022-10-04 PROCEDURE — 97530 THERAPEUTIC ACTIVITIES: CPT

## 2022-10-04 PROCEDURE — 97112 NEUROMUSCULAR REEDUCATION: CPT

## 2022-10-04 PROCEDURE — 97110 THERAPEUTIC EXERCISES: CPT

## 2022-10-04 NOTE — PROGRESS NOTES
PT DAILY TREATMENT NOTE     Patient Name: Yan Hernández  PPR  : 1945  [x]  Patient  Verified  Payor: [place] MEDICARE / Plan: [place] MEDICARE PART A & B / Product Type: Medicare /    In time:1117am  Out time:12pm  Total Treatment Time (min): 43  Visit #: 4 of 8    Medicare/BCBS Only   Total Timed Codes (min):  43 1:1 Treatment Time:  38       Treatment Area: Other low back pain [M54.59]    SUBJECTIVE  Pain Level (0-10 scale): 0  Any medication changes, allergies to medications, adverse drug reactions, diagnosis change, or new procedure performed?: [x] No    [] Yes (see summary sheet for update)  Subjective functional status/changes:   [] No changes reported  Discouraged by MD that she will be weak and low energy moving forward with minimal improvement that she will be able to attain. OBJECTIVE    18 min Therapeutic Exercise:  [x] See flow sheet :   Rationale: increase ROM and increase strength to improve the patients ability to perform ADL's. 10 min Therapeutic Activity:  [x]  See flow sheet : Step ups, HR/TR's, ambulation with SPC. Rationale: increase strength, improve coordination, and increase proprioception  to improve the patients ability to negotiate community distances with LRAD. 10 min Neuromuscular Re-education:  [x]  See flow sheet : Rhomberg on airex EC/Head turns. Seated marching. Rationale: increase strength, improve coordination, improve balance, and increase proprioception  to improve the patients ability to perform functional activities.               With   [] TE   [] TA   [] neuro   [] other: Patient Education: [x] Review HEP    [] Progressed/Changed HEP based on:   [] positioning   [] body mechanics   [] transfers   [] heat/ice application    [] other:      Other Objective/Functional Measures: 2+/5 glute strength     Pain Level (0-10 scale) post treatment: 0    ASSESSMENT/Changes in Function: Pt demonstrates increased confusion with exercise performance and presence of backwards lean with standing balance, requiring CGA to ensure she does not fall out of her CANDY. Requires modA to complete tband postural exercises. Pt denies any bowel or bladder issues when asked about possibility of a UTI. She attests that her  has told her she has been slower the last few days and may have had a sore throat at one point. Due to iffy balance and focus, opted for plinth exercises today. Patient will continue to benefit from skilled PT services to modify and progress therapeutic interventions, address functional mobility deficits, address ROM deficits, address strength deficits, analyze and address soft tissue restrictions, analyze and cue movement patterns, analyze and modify body mechanics/ergonomics, assess and modify postural abnormalities, address imbalance/dizziness, and instruct in home and community integration to attain remaining goals. []  See Plan of Care  []  See progress note/recertification  []  See Discharge Summary         Progress towards goals / Updated goals:  Pt will improve her FOTO to 51, indicating improved functional ADL capacity. Re-Cert: Progressing, 63%. Pt will perform 8 STS with 1 UE assist from a low plinth without assistance to maximize transfer independence at home. Re-Cert: Progressing: Patient required some support from (B) UE in lap with sit to stand transfers, but was able to complete 10 reps. Current: Met, pt performed sit to stand 10x from low plinth with Left UE support only. 9/29/2022  Pt will demonstrate rhomberg stance on foam for 30 seconds without LOB to reduce fall risk. Re-Cert: progressing well 25 sec. Current: met, 30 seconds EC airex (9/21/2022)  4. Pt will improve her B hip abd/ext strength to 3+/5 MMT to improve stability with gait. Re-Cert: slow progress, 2+/5 B.    Current: no change, 2+/5 B (10/4/2022)    PLAN  []  Upgrade activities as tolerated     [x]  Continue plan of care  []  Update interventions per flow sheet       []  Discharge due to:_  []  Other:_      Ayden Thakur, PT 10/4/2022  11:24 AM    Future Appointments   Date Time Provider Sean Kaur   10/6/2022 11:30 AM Luis Rowan PT [facility]   10/31/2022 10:45 AM Salvatore Epps MD [facility] BS AMB   11/1/2022 12:15 PM Derick Whitt MD [facility] BS AMB

## 2022-10-06 ENCOUNTER — HOSPITAL ENCOUNTER (OUTPATIENT)
Dept: PHYSICAL THERAPY | Age: 77
Discharge: HOME | End: 2022-10-06
Payer: MEDICARE

## 2022-10-06 PROCEDURE — 97110 THERAPEUTIC EXERCISES: CPT

## 2022-10-06 PROCEDURE — 97112 NEUROMUSCULAR REEDUCATION: CPT

## 2022-10-06 PROCEDURE — 97530 THERAPEUTIC ACTIVITIES: CPT

## 2022-10-06 NOTE — PROGRESS NOTES
PT DAILY TREATMENT NOTE     Patient Name: Jayashree Oconnor  Date:10/6/2022  : 1945  [x]  Patient  Verified  Payor: [place] MED[place]ARE / Plan: [place] MEDICARE PART A & B / Product Type: Medicare /    In time:1130am  Out time:1215pm  Total Treatment Time (min): 45  Visit #: 5 of 8    Medicare/BCBS Only   Total Timed Codes (min):  45 1:1 Treatment Time:  38       Treatment Area: Other low back pain [M54.59]    SUBJECTIVE  Pain Level (0-10 scale): 0    Any medication changes, allergies to medications, adverse drug reactions, diagnosis change, or new procedure performed?: [x] No    [] Yes (see summary sheet for update)  Subjective functional status/changes:   [x] No changes reported    OBJECTIVE  18 min Therapeutic Exercise:  [x] See flow sheet :   Rationale: increase ROM and increase strength to improve the patients ability to perform ADL's. 10 min Therapeutic Activity:  [x]  See flow sheet : Step ups, HR/TR's   Rationale: increase strength, improve coordination, and increase proprioception  to improve the patients ability to negotiate community distances with LRAD. 10 min Neuromuscular Re-education:  [x]  See flow sheet : Rhomberg on airex EC/Head turns. Seated marching. Rationale: increase strength, improve coordination, improve balance, and increase proprioception  to improve the patients ability to perform functional activities. With   [] TE   [] TA   [] neuro   [] other: Patient Education: [x] Review HEP    [] Progressed/Changed HEP based on:   [] positioning   [] body mechanics   [] transfers   [] heat/ice application    [] other:      Other Objective/Functional Measures: exercises per flowsheet     Pain Level (0-10 scale) post treatment: 0    ASSESSMENT/Changes in Function: Pt demonstrates improved energy and capacity for exercise today, able to perform plinth and standing therEx. Plan to continue with hip strengthening and postural strengthening to improve stability with gait. Patient will continue to benefit from skilled PT services to modify and progress therapeutic interventions, address functional mobility deficits, address ROM deficits, address strength deficits, analyze and address soft tissue restrictions, analyze and cue movement patterns, analyze and modify body mechanics/ergonomics, assess and modify postural abnormalities, address imbalance/dizziness, and instruct in home and community integration to attain remaining goals. []  See Plan of Care  [x]  See progress note/recertification  []  See Discharge Summary         Progress towards goals / Updated goals:  Pt will improve her FOTO to 51, indicating improved functional ADL capacity. Re-Cert: Progressing, 26%. Pt will perform 8 STS with 1 UE assist from a low plinth without assistance to maximize transfer independence at home. Re-Cert: Progressing: Patient required some support from (B) UE in lap with sit to stand transfers, but was able to complete 10 reps. Current: Met, pt performed sit to stand 10x from low plinth with Left UE support only. 9/29/2022  Pt will demonstrate rhomberg stance on foam for 30 seconds without LOB to reduce fall risk. Re-Cert: progressing well 25 sec. Current: met, 30 seconds EC airex (9/21/2022)  4. Pt will improve her B hip abd/ext strength to 3+/5 MMT to improve stability with gait. Re-Cert: slow progress, 2+/5 B.    Current: no change, 2+/5 B (10/4/2022)    PLAN  []  Upgrade activities as tolerated     [x]  Continue plan of care  []  Update interventions per flow sheet       []  Discharge due to:_  []  Other:_      Nigelissa Peterson, PT 10/6/2022  11:40 AM    Future Appointments   Date Time Provider Sean Kaur   10/31/2022 10:45 AM Montse Ross MD [facility] AMB   11/1/2022 12:15 PM Alban Bruner MD [facility] AMB

## 2022-10-14 ENCOUNTER — HOSPITAL ENCOUNTER (OUTPATIENT)
Dept: PHYSICAL THERAPY | Age: 77
Discharge: HOME | End: 2022-10-14
Payer: MEDICARE

## 2022-10-14 PROCEDURE — 97110 THERAPEUTIC EXERCISES: CPT

## 2022-10-14 PROCEDURE — 97112 NEUROMUSCULAR REEDUCATION: CPT

## 2022-10-14 PROCEDURE — 97530 THERAPEUTIC ACTIVITIES: CPT

## 2022-10-14 NOTE — PROGRESS NOTES
PT DAILY TREATMENT NOTE     Patient Name: Maritza Tenorio  ZTOQ:  : 1945  [x]  Patient  Verified  Payor: [place] MEDICARE / Plan: [place] MEDICARE PART A & B / Product Type: Medicare /    In time:10:47  Out time:11:30  Total Treatment Time (min): 43  Visit #: 6 of 8    Medicare/BCBS Only   Total Timed Codes (min):  43 1:1 Treatment Time:  43       Treatment Area: Other low back pain [M54.59]    SUBJECTIVE  Pain Level (0-10 scale): 0  Any medication changes, allergies to medications, adverse drug reactions, diagnosis change, or new procedure performed?: [x] No    [] Yes (see summary sheet for update)  Subjective functional status/changes:   [] No changes reported  Pt states she still walks hunched over when she uses the cane but thinks she is getting better    OBJECTIVE    23 min Therapeutic Exercise:  [x] See flow sheet :   Rationale: increase ROM and increase strength to improve the patients ability to perform ADLs    10 min Therapeutic Activity:  [x]  See flow sheet :   Rationale: increase strength, improve coordination, and increase proprioception  to improve the patients ability to increase ease with daily tasks     10 min Neuromuscular Re-education:  [x]  See flow sheet :   Rationale: increase strength, improve coordination, improve balance, and increase proprioception  to improve the patients ability to perform functional tasks           With   [] TE   [] TA   [] neuro   [] other: Patient Education: [x] Review HEP    [] Progressed/Changed HEP based on:   [] positioning   [] body mechanics   [] transfers   [] heat/ice application    [] other:      Other Objective/Functional Measures:    Reissued HEP    Pain Level (0-10 scale) post treatment: 0    ASSESSMENT/Changes in Function: Cont'd postural cues throughout treatment. Reissued HEP as pt states she can't find it and wanted to know what exercises she could do at home.  Patient reports not being very active at home and asked about a back brace to help with posture. Pt was encouraged to correct posture without use of external brace to avoid excessive ms loss. Patient will continue to benefit from skilled PT services to modify and progress therapeutic interventions, address functional mobility deficits, address ROM deficits, address strength deficits, analyze and address soft tissue restrictions, analyze and cue movement patterns, analyze and modify body mechanics/ergonomics, assess and modify postural abnormalities, and address imbalance/dizziness to attain remaining goals. []  See Plan of Care  []  See progress note/recertification  []  See Discharge Summary         Progress towards goals / Updated goals:  Pt will improve her FOTO to 51, indicating improved functional ADL capacity. Re-Cert: Progressing, 17%. Pt will perform 8 STS with 1 UE assist from a low plinth without assistance to maximize transfer independence at home. Re-Cert: Progressing: Patient required some support from (B) UE in lap with sit to stand transfers, but was able to complete 10 reps. Current: Met, pt performed sit to stand 10x from low plinth with Left UE support only. 9/29/2022  Pt will demonstrate rhomberg stance on foam for 30 seconds without LOB to reduce fall risk. Re-Cert: progressing well 25 sec. Current: met, 30 seconds EC airex (9/21/2022)  4. Pt will improve her B hip abd/ext strength to 3+/5 MMT to improve stability with gait. Re-Cert: slow progress, 2+/5 B.    Current: no change, 2+/5 B (10/4/2022)    PLAN  []  Upgrade activities as tolerated     [x]  Continue plan of care  []  Update interventions per flow sheet       []  Discharge due to:_  []  Other:_      Babak Christiansen PTA 10/14/2022  10:49 AM    Future Appointments   Date Time Provider Sean Natasha   10/18/2022 10:30 AM Effie Hinojosa PTA [facility]   10/20/2022 10:45 AM Effie Hinojosa PTA [facility]   10/31/2022 10:45 AM Michael Encinas MD [facility] AMB 11/1/2022 12:15 PM Hilary Acosta MD [facility] AMB   11/7/2022 10:30 AM Jordin Kelley PTA [facility]

## 2022-10-18 ENCOUNTER — APPOINTMENT (OUTPATIENT)
Dept: PHYSICAL THERAPY | Age: 77
End: 2022-10-18
Payer: MEDICARE

## 2022-10-18 ENCOUNTER — TELEPHONE (OUTPATIENT)
Dept: PHYSICAL THERAPY | Age: 77
End: 2022-10-18

## 2022-10-20 ENCOUNTER — HOSPITAL ENCOUNTER (OUTPATIENT)
Dept: PHYSICAL THERAPY | Age: 77
Discharge: HOME | End: 2022-10-20
Payer: MEDICARE

## 2022-10-20 PROCEDURE — 97112 NEUROMUSCULAR REEDUCATION: CPT

## 2022-10-20 PROCEDURE — 97530 THERAPEUTIC ACTIVITIES: CPT

## 2022-10-20 PROCEDURE — 97110 THERAPEUTIC EXERCISES: CPT

## 2022-10-20 NOTE — PROGRESS NOTES
PT DAILY TREATMENT NOTE     Patient Name: Ishan Rivas  PYPO:  : 1945  [x]  Patient  Verified  Payor: [place] MEDICARE / Plan: VA MEDICARE PART A & B / Product Type: Medicare /    In time:10:45  Out time:11:30  Total Treatment Time (min): 45  Visit #: 7 of 8    Medicare/BCBS Only   Total Timed Codes (min):  45 1:1 Treatment Time:  45       Treatment Area: Other low back pain [M54.59]    SUBJECTIVE  Pain Level (0-10 scale): 0/10  Any medication changes, allergies to medications, adverse drug reactions, diagnosis change, or new procedure performed?: [x] No    [] Yes (see summary sheet for update)  Subjective functional status/changes:   [] No changes reported  \"Really haven't had any pain in a while. \"    OBJECTIVE    22 min Therapeutic Exercise:  [x] See flow sheet :   Rationale: increase ROM and increase strength to improve the patients ability to perform ADL's.    8 min Therapeutic Activity:  [x]  See flow sheet : Step ups, HR's,   Rationale: increase strength and increase proprioception  to improve the patients ability to perform ADL's. 15 min Neuromuscular Re-education:  [x]  See flow sheet : Airex balance exercises, standing t-band rows/ext for balance, 6\" step taps. Rationale: increase strength, improve coordination, improve balance, and increase proprioception  to improve the patients ability to perform functional activities. With   [x] TE   [] TA   [] neuro   [] other: Patient Education: [x] Review HEP    [] Progressed/Changed HEP based on:   [] positioning   [] body mechanics   [] transfers   [] heat/ice application    [] other:      Other Objective/Functional Measures: FOTO 52%. Pain Level (0-10 scale) post treatment: 0/10    ASSESSMENT/Changes in Function: Reviewed sitting/standing posture as pt was sitting in waiting room in a very slouched posture. Pt was receptive to information and cueing.  Challenged with maintaining erect posture while standing, expressed feeling tired and fatigued. Pt reports minimal compliance with HEP. Pt given new copies of HEP previous visit on 10/14/2022, pt has not attempted them to date. Educated the importance of performing HEP consistently to increase strength/endurance to improve posture and ease with ADL's. P't is in agreement with D/C as pain has been very manageable. Pt has no further questions or concerns and thanked staff for services provided. [x]  See Plan of Care  []  See progress note/recertification  []  See Discharge Summary         Progress towards goals / Updated goals:  Pt will improve her FOTO to 51, indicating improved functional ADL capacity. Re-Cert: Progressing, 40%  Current: Met, 52%. 10/20/2022. Pt will perform 8 STS with 1 UE assist from a low plinth without assistance to maximize transfer independence at home. Re-Cert: Progressing: Patient required some support from (B) UE in lap with sit to stand transfers, but was able to complete 10 reps. Current: Met, pt performed sit to stand 10x from low plinth with Left UE support only. 9/29/2022  Pt will demonstrate rhomberg stance on foam for 30 seconds without LOB to reduce fall risk. Re-Cert: progressing well 25 sec. Current: met, 30 seconds EC airex (9/21/2022)  4. Pt will improve her B hip abd/ext strength to 3+/5 MMT to improve stability with gait. Re-Cert: slow progress, 2+/5 B. Current: no change, 2+/5 B (10/4/2022)    PLAN  []  Upgrade activities as tolerated     []  Continue plan of care  []  Update interventions per flow sheet       [x]  Discharge due to: Good progress with pain and balance. D/C to HEP to increase strength.   []  Other:_      Clementina Henley PTA 10/20/2022  10:42 AM    Future Appointments   Date Time Provider Sean Jenkinsi   10/20/2022 10:45 AM Jamel Vale PTA [facility]   10/31/2022 10:45 AM Alyse Harry MD [facility] AMB   11/1/2022 12:15 PM Arcelia Avalos MD [facility] AMB   11/7/2022 10:30 AM Leonie Maguire Mary Higginbotham, PTA [facility]

## 2022-10-20 NOTE — PROGRESS NOTES
[facility]  [address] [facility] [address]  [place], [address]  [phone] fax    Physical Therapy Discharge Summary  Patient name: Kaitlin Harden Start of Care: 2022   Referral source: Brenda Langford : 1945   Medical/Treatment Diagnosis: Other low back pain [M54.59]  Payor: Aminata Ray / Plan: [address] [employer] / Product Type: Medicare /  Onset Date: 6 months     Prior Hospitalization: see medical history Provider#: [id number]   Medications: Verified on Patient Summary List    Comorbidities: stenosis, osteoporosis, coccyx fx with kyphoplasty injection procedures 6/10/2022, arthritis, anxiety, depression, high blood pressure  Prior Level of Function:previously amb without AD with lessened LBP  Visits from Start of Care: 15    Missed Visits: 1  Reporting Period : 2022 to 10/20/2022    Summary of Care:    Pt will improve her FOTO to 51, indicating improved functional ADL capacity. Re-Cert: Progressing, 08%  Current: Met, 52%. Pt will perform 8 STS with 1 UE assist from a low plinth without assistance to maximize transfer independence at home. Re-Cert: Progressing: Patient required some support from (B) UE in lap with sit to stand transfers, but was able to complete 10 reps. Current: Met, pt performed sit to stand 10x from low plinth with Left UE support only. Pt will demonstrate rhomberg stance on foam for 30 seconds without LOB to reduce fall risk. Re-Cert: progressing well 25 sec. Current: met, 30 seconds EC airex. 4.   Pt will improve her B hip abd/ext strength to 3+/5 MMT to improve stability with gait. Re-Cert: slow progress, 2+/5 B. Current: no change, 2+/5 B      ASSESSMENT/RECOMMENDATIONS:  Reviewed sitting/standing posture as pt was sitting in waiting room in a very slouched posture. Pt was receptive to information and cueing.  Challenged with maintaining erect posture while standing, expressed feeling tired and fatigued. Pt reports minimal compliance with HEP. Pt given new copies of HEP previous visit on 10/14/2022, pt has not attempted them to date. Educated the importance of performing HEP consistently to increase strength/endurance to improve posture and ease with ADL's. P't is in agreement with D/C as pain has been very manageable. Pt has no further questions or concerns and thanked staff for services provided.    [x]Discontinue therapy: [x]Patient has reached or is progressing toward set goals      []Patient is non-compliant or has abdicated      []Due to lack of appreciable progress towards set goals    Hodan Gutierrez, PTA 10/20/2022 11:39 AM  Co-sign: Ayden Thakur, PT, DPT  10/20/2022  2:08 PM

## 2022-10-27 NOTE — PROGRESS NOTES
[facility]  [address] [place], [facility] [place] [address]  Phone: [phone]  Fax: [phone]    Pt's YOB: 1945    ASSESSMENT   Diagnoses and all orders for this visit:    1. Lumbar foraminal stenosis  -     gabapentin (NEURONTIN) 300 mg capsule; Take 1 Capsule by mouth every morning AND 1 Capsule every evening. Max Daily Amount: 600 mg.    2. Fusion of spine of thoracolumbar region  -     gabapentin (NEURONTIN) 300 mg capsule; Take 1 Capsule by mouth every morning AND 1 Capsule every evening. Max Daily Amount: 600 mg.    3. Lumbar facet arthropathy  -     gabapentin (NEURONTIN) 300 mg capsule; Take 1 Capsule by mouth every morning AND 1 Capsule every evening. Max Daily Amount: 600 mg.    4. Osteoporosis with current pathological fracture, unspecified osteoporosis type, sequela    5. Compression fracture of T11 vertebra with routine healing    6. Gait abnormality    7. Muscle spasm    8. Sleep disorder       IMPRESSION AND PLAN:  Naima Perez is a 68 y.o. female with history of lumbar pain and presents to the office today for follow up, accompanied by her . Pt continues to complain of numbness and weakness in the right lower extremity with improvement of her lumbar pain since undergoing a kyphoplasty on 06/13/2022. She is taking Neurontin 300 mg 2 caps QHS with minimal benefit, Norco 5-325 mg 1 tab QAM and 1 tab QHS as needed with temporary relief, and Pamelor 10 mg 1 cap QHS with relief. 1) Pt was given information on lumbar arthritis exercises and sleep health. 2) She will continue to take Neurontin 300 mg 1 cap QAM and 1 cap QHS with relief and Pamelor 10 mg 1 caps QHS. She received refills of Neurontin at this time. 3) Pt will begin tapering down to 1 cap QHS of Neurontin 300 mg as directed. Adjustment of Pamelor dosage will be consider when patient follows up in 3 months.    4) Ms. Caroline Shannon has a reminder for a \"due or due soon\" health maintenance. I have asked that she contact her primary care provider, Elizabeth Meadows MD, for follow-up on this health maintenance. 5)  demonstrated consistency with prescribing. 6) Pt will make an appointment with Angela Powers NP at today's office visit for osteoporosis evaluation. Follow-up and Dispositions    Return in about 3 months (around 1/17/2023) for Medication follow up with me, 2-4 weeks with Angela Powers for osteoporosis tx. HISTORY OF PRESENT ILLNESS:  Naima Perez is a 68 y.o. female with history of lumbar pain and presents to the office today for follow up, accompanied by her . Pt continues to complain of numbness and weakness in the right lower extremity with improvement of her lumbar pain since undergoing a kyphoplasty on 06/13/2022. She reports a new onset of difficulty with sleep after switching the frequency of her Neurontin frequency to 2 caps QHS. Pt notes she does not believe there is a correlation between the two and denies issues with sleep in relation to pain. She admits to intermittently napping with napping every other day for 30 minutes x day. Pt notes she is continuing to adjust her posture actively and notes walking short distances exacerbates her pain. She is taking Neurontin 300 mg 2 caps QHS with minimal benefit, Norco 5-325 mg 1 tab QAM and 1 tab QHS as needed with temporary relief, and Pamelor 10 mg 1 cap QHS with relief. Pt at this time desires to continue with current care. Of note, pt ambulates with the assistance of a single point cane at today's office visit.      Pain Scale: 0 - No pain/10    PCP: Elizabeth Meadows MD     Past Medical History:   Diagnosis Date    Acid reflux     Bilateral sacral insufficiency fracture with routine healing 6/9/2022    Chronic anemia     Compression fracture of T11 vertebra with routine healing 6/9/2022    Hypertension     Spinal stenosis         Social History     Socioeconomic History    Marital status:      Spouse name: Not on file    Number of children: Not on file    Years of education: Not on file    Highest education level: Not on file   Occupational History    Not on file   Tobacco Use    Smoking status: Never    Smokeless tobacco: Never   Vaping Use    Vaping Use: Never used   Substance and Sexual Activity    Alcohol use: Yes     Alcohol/week: 1.0 standard drink     Types: 1 Glasses of wine per week    Drug use: No    Sexual activity: Yes     Partners: Male     Birth control/protection: None   Other Topics Concern    Not on file   Social History Narrative    Not on file     Social Determinants of Health     Financial Resource Strain: Not on file   Food Insecurity: Not on file   Transportation Needs: Not on file   Physical Activity: Not on file   Stress: Not on file   Social Connections: Not on file   Intimate Partner Violence: Not on file   Housing Stability: Not on file       Current Outpatient Medications   Medication Sig Dispense Refill    gabapentin (NEURONTIN) 300 mg capsule Take 1 Capsule by mouth every morning AND 1 Capsule every evening. Max Daily Amount: 600 mg. 180 Capsule 1    hydroCHLOROthiazide (HYDRODIURIL) 25 mg tablet       potassium chloride SR (K-TAB) 20 mEq tablet       diclofenac (VOLTAREN) 1 % gel Apply  to affected area four (4) times daily. metoprolol tartrate (LOPRESSOR) 25 mg tablet Take 25 mg by mouth two (2) times a day. potassium 99 mg tablet Take 99 mg by mouth in the morning. mecobalamin, vitamin B12, 5,000 mcg TbDi Take 1 Tablet by mouth daily. IRON, FERROUS SULFATE, PO Take 1 Tablet by mouth daily. dosage: 27 mg      nortriptyline (PAMELOR) 10 mg capsule Take 4 Capsules by mouth nightly. 360 Capsule 1    ibuprofen (MOTRIN) 200 mg tablet Take 200 mg by mouth daily. pt. takes 3 tablets daily      oxyCODONE IR (ROXICODONE) 10 mg tab immediate release tablet Take 10 mg by mouth every six (6) hours as needed for Pain.  (Patient not taking: No sig reported)      magnesium 250 mg tab Take 1 Tablet by mouth daily. cholecalciferol, vitamin D3, 50 mcg (2,000 unit) tab Take 1 Tablet by mouth daily. zinc gluconate 30 mg tab Take 1 Tablet by mouth daily. (Patient not taking: No sig reported)      calcium/D3/zinc/copper/suzie (CITRACAL-D3 MAXIMUM PLUS PO) Take 1 Tablet by mouth daily. (Patient not taking: No sig reported)      lysine (L-LYSINE) 500 mg tab tablet Take 500 mg by mouth daily. (Patient not taking: No sig reported)      ferrous gluconate 324 mg (37.5 mg iron) tablet Take 1 Tablet by mouth two (2) times daily (with meals). (Patient taking differently: Take 324 mg by mouth daily.) 60 Tablet 0    hydrALAZINE (APRESOLINE) 25 mg tablet Take 1 Tablet by mouth three (3) times daily. (Patient not taking: No sig reported) 90 Tablet 0    docosahexaenoic acid/epa (FISH OIL PO) Take 1,000 mg by mouth daily. (Patient not taking: No sig reported)      naloxone (NARCAN) 4 mg/actuation nasal spray Use 1 spray intranasally, then discard. Repeat with new spray every 2 min as needed for opioid overdose symptoms, alternating nostrils. 2 Each 0    MAGNESIUM OXIDE PO Take 1 Tab by mouth daily. omeprazole (PRILOSEC) 40 mg capsule Take 40 mg by mouth daily. Indications: gastroesophageal reflux disease      LORazepam (ATIVAN) 1 mg tablet Take 1 mg by mouth every eight (8) hours as needed for Anxiety. atorvastatin (LIPITOR) 10 mg tablet Take 10 mg by mouth daily. No Known Allergies      REVIEW OF SYSTEMS    Constitutional: Negative for fever, chills, or weight change. Respiratory: Negative for cough or shortness of breath. Cardiovascular: Negative for chest pain or palpitations. Gastrointestinal: Negative for acid reflux, change in bowel habits, or constipation. Genitourinary: Negative for dysuria and flank pain. Musculoskeletal: Positive for right lower extremity weakness. Skin: Negative for rash.    Neurological: Negative for headaches, dizziness, or numbness. Endo/Heme/Allergies: Negative for increased bruising. Psychiatric/Behavioral: Positive for difficulty with sleep. As per HPI    PHYSICAL EXAMINATION  Visit Vitals  Pulse 65   Temp 97.1 °F (36.2 °C) (Temporal)   Ht 5' 5\" (1.651 m)   Wt 144 lb (65.3 kg)   SpO2 97%   BMI 23.96 kg/m²       Constitutional: Awake, alert, and in no acute distress. Neurological: 1+ symmetrical DTRs in the upper extremities. 1+ symmetrical DTRs in the lower extremities. Sensation to light touch is intact. Negative Mckeon's sign bilaterally. Skin: warm, dry, and intact. Musculoskeletal: Mild pain with extension, axial loading, less with forward flexion. No pain with internal or external rotation of her hips. Negative straight leg raise bilaterally. Pt ambulates with assistance of a single point cane. Biceps  Triceps Deltoids Wrist Ext Wrist Flex Hand Intrin   Right +4/5 +4/5 +4/5 +4/5 +4/5 +4/5   Left +4/5 +4/5 +4/5 +4/5 +4/5 +4/5      Hip Flex  Quads Hamstrings Ankle DF EHL Ankle PF   Right +4/5 +4/5 +4/5 +4/5 +4/5 +4/5   Left +4/5 +4/5 +4/5 +4/5 +4/5 +4/5     IMAGING:    CT Lumbar spin with Contrast 06/10/2022 was personally reviewed and demonstrated:  Narrative & Impression   EXAM: CT SPINE LUMB W CONT     CLINICAL INDICATION/HISTORY: worsening back pain, described as severe ;  increased difficulty walking, leg weakness; planned surgery in the near term  -Patient with right-sided sciatica symptoms     TECHNIQUE: Contiguous axial images were performed through the lumbar spine. From these, sagittal and coronal reconstructions were generated. CT scans at this facility are performed using dose optimization technique as  appropriate with performed exam, to include automated exposure control,  adjustment of mA and/or kV according to patient's size (including appropriate  matching for site-specific examinations), or use of iterative reconstruction  technique.      COMPARISON: Preceding myelogram images; MRI March 2022     FINDINGS:   There is diagnostic quality opacification of the intrathecal contents. Prior corrective surgery with rods spanning T10-L2; utilization of underhooks  atT10 and overhooks at L2. The hardware itself is intact, no dislodgment. Alignment: There is moderately pronounced kyphotic angulation centered at  T10-11. Further discussed below. There is trace L4 anterolisthesis in the  presence of degenerative facet arthropathy. Vertebral body height: At T11, progression of now approximately 33% compression  fracture deformity involving central to anterior vertebral body. Only minor  compressive change posteriorly. No retropulsion. Curvilinear sclerosis and  subtle compression fracture plane of lucency within the vertebral body. No  propagation of the posterior elements. Mild biconcave morphology of T12 is unchanged. Axial imaging correlation:     T8-9: Patent canal and foramina     T9-10: Patent canal and foramina     T10-11: Moderately severe disc space narrowing. There is mild disc osteophyte  ridge formation. There is contact and slight flattening with contour deformity  of the distal thoracic cord. CSF remains evident posterior of the cord. There is  no gross cord expansion. There is moderate foraminal stenosis. T11-12: Patent canal and foramina. T12-L1: Conus terminates at this level, has normal morphology. No clumping of the cauda equina nerve roots. L1-2: Patent canal and foramina. L2-3: Moderate disc space narrowing with vacuum phenomenon. Endplate sclerosis  and cyst formation. There is disc osteophyte ridge formation. There is bulging  epidural lipomatosis posteriorly. There is moderate severity spinal stenosis. Sagittal image 41, and around axial image 67 (streak artifact). No high-grade  foraminal stenosis. L3-4: Moderate disc space narrowing and vacuum phenomenon. Lesser degree of  endplate sclerosis and subchondral cyst formation.  Disc osteophyte ridge  formation. Also with some bulging epidural lipomatosis along with facet  arthropathy. Moderate spinal stenosis. Moderate right foraminal stenosis. L4-5: Slight uncovering of the disc. Mild broad-based disc protrusion. Mild  amount of vacuum disc phenomenon. Small locule of gas behind L4 likely related  to the myelogram access. Also with bulging epidural lipomatosis. Facet  arthropathy. Deformity of the thecal sac with overall moderate stenosis. Particular narrowing of the lateral recesses. Mild to moderate foraminal  stenosis. L5-S1: Facet arthropathy. Patent canal and foramina. There are bilateral sacral wing fractures, with fracture lucencies more readily  evident on the right. Includes fracture propagation through transitional  morphology right L5. Some cortical buckling on the left. Horizontal component  within the sacrum is not readily appreciated, but would be most suspicious at  upper S2 region. Other structures: Moderate to large hiatal hernia noted. Minor reticular  scarring/atelectasis within lower lungs. Atherosclerosis of normal caliber  aorta. Large bowel diverticulosis. IMPRESSION  :     1. Progression of subacute compression fracture at T11, at about 33% loss of  height.  -Resultant kyphosis  -mild cord contact     2. Acute/subacute sacral insufficiency fracture pattern predominating on the  right  -As outlined above  -Most likely source of reported right sciatica     3. Moderate severity stenosis at L2-3 from combined degenerative disc disease  and epidural lipomatosis  -Similar lesser degree of stenoses at L4-5 and L3-4 with same combination of  findings     4. Previous corrective geoff placement spanning T10-L2, hardware intact     5.  Discussed by me with Dr. Aleta Blue of orthopedic surgery 6/10/2022 at 1356 hours      Lumbar spine MRI from 03/07/2022 was personally reviewed with the patient and her  and demonstrated:     Severe hardware artifact at L2-L3, and lower thoracic segment. Images through  these levels are nondiagnostic. Grade 1 anterolisthesis L4 on L5. No compression deformity. Edema in the superior endplate of L3 and inferior endplate of L2.     [id number] and L1-L2: No suspected disc herniation or central stenosis. No foraminal  stenosis. L2-L3: Nondiagnostic     L3-L4: Similar posterior disc bulge with endplate spondylosis. Facet and  ligamentous hypertrophy, worse on the right. Moderately severe central stenosis. AP canal measures 6 mm Severe right foraminal stenosis with compression of right  exiting L3 nerve root. Mild left foraminal stenosis. L4-L5: Posterior disc bulge. More severe facet and ligamentous hypertrophy. Facet inflammation with left facet joint effusion. Severe central stenosis. AP  canal measures 4.3 mm. Severe bilateral foraminal stenosis with compression of  exiting L4 nerve roots bilaterally. Grossly stable     L5-S1: No disc herniation. No significant central stenosis. Mild facet  hypertrophy with no foraminal stenosis. IMPRESSION  1. Central stenosis worst at L4-L5, followed by L3-L4: Posterior disc  bulge/protrusion and facet/ligamentous hypertrophy. 2. Bilateral L4 foraminal stenosis with compression of exiting L4 nerve roots. 3. Right L3 foraminal stenosis with compression of right exiting L3 nerve root. Lumbar spine 4V x-rays from 03/03/2022 were personally reviewed with the patient and demonstrated:     Thoracolumbar fusion. Multilevel degenerative fact. Severe degenerative disc L3-4, L2-3. Atherosclerosis. Thoracic spine 2V x-rays from 11/23/2020 were personally reviewed with the patient and demonstrated:  Thoracolumbar fusion. Written by Tamy Mcghee, as dictated by Surinder Washington MD.  I, Dr. Surinder Washington confirm that all documentation is accurate.

## 2022-10-28 PROBLEM — K21.00 GASTROESOPHAGEAL REFLUX DISEASE WITH ESOPHAGITIS: Status: ACTIVE | Noted: 2022-09-25

## 2022-10-28 PROBLEM — R41.3 MEMORY DEFICIT: Status: ACTIVE | Noted: 2022-08-23

## 2022-10-28 PROBLEM — M85.88 OSTEOPENIA OF LUMBAR SPINE: Status: ACTIVE | Noted: 2022-09-25

## 2022-10-28 PROBLEM — R35.0 URINARY FREQUENCY: Status: ACTIVE | Noted: 2022-08-23

## 2022-10-28 PROBLEM — M84.48XD SACRAL INSUFFICIENCY FRACTURE WITH ROUTINE HEALING: Status: ACTIVE | Noted: 2022-08-23

## 2022-10-31 ENCOUNTER — OFFICE VISIT (OUTPATIENT)
Dept: ORTHOPEDIC SURGERY | Age: 77
End: 2022-10-31
Payer: MEDICARE

## 2022-10-31 VITALS
WEIGHT: 144 LBS | OXYGEN SATURATION: 97 % | TEMPERATURE: 97.1 F | BODY MASS INDEX: 23.99 KG/M2 | HEIGHT: 65 IN | HEART RATE: 65 BPM

## 2022-10-31 DIAGNOSIS — M47.816 LUMBAR FACET ARTHROPATHY: ICD-10-CM

## 2022-10-31 DIAGNOSIS — M62.838 MUSCLE SPASM: ICD-10-CM

## 2022-10-31 DIAGNOSIS — R26.9 GAIT ABNORMALITY: ICD-10-CM

## 2022-10-31 DIAGNOSIS — M43.25 FUSION OF SPINE OF THORACOLUMBAR REGION: ICD-10-CM

## 2022-10-31 DIAGNOSIS — M48.061 LUMBAR FORAMINAL STENOSIS: Primary | ICD-10-CM

## 2022-10-31 DIAGNOSIS — S22.080D COMPRESSION FRACTURE OF T11 VERTEBRA WITH ROUTINE HEALING: ICD-10-CM

## 2022-10-31 DIAGNOSIS — M80.00XS OSTEOPOROSIS WITH CURRENT PATHOLOGICAL FRACTURE, UNSPECIFIED OSTEOPOROSIS TYPE, SEQUELA: ICD-10-CM

## 2022-10-31 DIAGNOSIS — G47.9 SLEEP DISORDER: ICD-10-CM

## 2022-10-31 PROCEDURE — 1090F PRES/ABSN URINE INCON ASSESS: CPT | Performed by: PHYSICAL MEDICINE & REHABILITATION

## 2022-10-31 PROCEDURE — G8756 NO BP MEASURE DOC: HCPCS | Performed by: PHYSICAL MEDICINE & REHABILITATION

## 2022-10-31 PROCEDURE — G9717 DOC PT DX DEP/BP F/U NT REQ: HCPCS | Performed by: PHYSICAL MEDICINE & REHABILITATION

## 2022-10-31 PROCEDURE — G8427 DOCREV CUR MEDS BY ELIG CLIN: HCPCS | Performed by: PHYSICAL MEDICINE & REHABILITATION

## 2022-10-31 PROCEDURE — G8420 CALC BMI NORM PARAMETERS: HCPCS | Performed by: PHYSICAL MEDICINE & REHABILITATION

## 2022-10-31 PROCEDURE — 1101F PT FALLS ASSESS-DOCD LE1/YR: CPT | Performed by: PHYSICAL MEDICINE & REHABILITATION

## 2022-10-31 PROCEDURE — G8536 NO DOC ELDER MAL SCRN: HCPCS | Performed by: PHYSICAL MEDICINE & REHABILITATION

## 2022-10-31 PROCEDURE — 99214 OFFICE O/P EST MOD 30 MIN: CPT | Performed by: PHYSICAL MEDICINE & REHABILITATION

## 2022-10-31 PROCEDURE — 1123F ACP DISCUSS/DSCN MKR DOCD: CPT | Performed by: PHYSICAL MEDICINE & REHABILITATION

## 2022-10-31 RX ORDER — GABAPENTIN 300 MG/1
CAPSULE ORAL
Qty: 180 CAPSULE | Refills: 1 | Status: SHIPPED | OUTPATIENT
Start: 2022-10-31

## 2022-10-31 NOTE — PATIENT INSTRUCTIONS
Low Back Arthritis: Exercises  Introduction  Here are some examples of typical rehabilitation exercises for your condition. Start each exercise slowly. Ease off the exercise if you start to have pain. Your doctor or physical therapist will tell you when you can start these exercises and which ones will work best for you. When you are not being active, find a comfortable position for rest. Some people are comfortable on the floor or a medium-firm bed with a small pillow under their head and another under their knees. Some people prefer to lie on their side with a pillow between their knees. Don't stay in one position for too long. Take short walks (10 to 20 minutes) every 2 to 3 hours. Avoid slopes, hills, and stairs until you feel better. Walk only distances you can manage without pain, especially leg pain. How to do the exercises  Pelvic tilt  Lie on your back with your knees bent. \"Brace\" your stomach--tighten your muscles by pulling in and imagining your belly button moving toward your spine. Press your lower back into the floor. You should feel your hips and pelvis rock back. Hold for 6 seconds while breathing smoothly. Relax and allow your pelvis and hips to rock forward. Repeat 8 to 12 times. Back stretches  Get down on your hands and knees on the floor. Relax your head and allow it to droop. Round your back up toward the ceiling until you feel a nice stretch in your upper, middle, and lower back. Hold this stretch for as long as it feels comfortable, or about 15 to 30 seconds. Return to the starting position with a flat back while you are on your hands and knees. Let your back sway by pressing your stomach toward the floor. Lift your buttocks toward the ceiling. Hold this position for 15 to 30 seconds. Repeat 2 to 4 times. Follow-up care is a key part of your treatment and safety. Be sure to make and go to all appointments, and call your doctor if you are having problems.  It's also a good idea to know your test results and keep a list of the medicines you take. Current as of: March 9, 2022               Content Version: 13.4  © 2006-2022 Healthwise, [employer]. Care instructions adapted under license by [employer] (which disclaims liability or warranty for this information). If you have questions about a medical condition or this instruction, always ask your healthcare professional. [address] any warranty or liability for your use of this information. Learning About Sleeping Well  What does sleeping well mean? Sleeping well means getting enough sleep to feel good and stay healthy. How much sleep is enough varies among people. The number of hours you sleep and how you feel when you wake up are both important. If you do not feel refreshed, you probably need more sleep. Another sign of not getting enough sleep is feeling tired during the day. Experts recommend that adults get at least 7 or more hours of sleep per day. Children and older adults need more sleep. Why is getting enough sleep important? Getting enough quality sleep is a basic part of good health. When your sleep suffers, your physical health, mood, and your thoughts can suffer too. You may find yourself feeling more grumpy or stressed. Not getting enough sleep also can lead to serious problems, including injury, accidents, anxiety, and depression. What might cause poor sleeping? Many things can cause sleep problems, including:  Changes to your sleep schedule. Stress. Stress can be caused by fear about a single event, such as giving a speech. Or you may have ongoing stress, such as worry about work or school. Depression, anxiety, and other mental or emotional conditions. Changes in your sleep habits or surroundings. This includes changes that happen where you sleep, such as noise, light, or sleeping in a different bed.  It also includes changes in your sleep pattern, such as having jet lag or working a late shift. Health problems, such as pain, breathing problems, and restless legs syndrome. Lack of regular exercise. Using alcohol, nicotine, or caffeine before bed. How can you help yourself? Here are some tips that may help you sleep more soundly and wake up feeling more refreshed. Your sleeping area   Use your bedroom only for sleeping and sex. A bit of light reading may help you fall asleep. But if it doesn't, do your reading elsewhere in the house. Try not to use your TV, computer, smartphone, or tablet while you are in bed. Be sure your bed is big enough to stretch out comfortably, especially if you have a sleep partner. Keep your bedroom quiet, dark, and cool. Use curtains, blinds, or a sleep mask to block out light. To block out noise, use earplugs, soothing music, or a \"white noise\" machine. Your evening and bedtime routine   Create a relaxing bedtime routine. You might want to take a warm shower or bath, or listen to soothing music. Go to bed at the same time every night. And get up at the same time every morning, even if you feel tired. What to avoid   Limit caffeine (coffee, tea, caffeinated sodas) during the day, and don't have any for at least 6 hours before bedtime. Avoid drinking alcohol before bedtime. Alcohol can cause you to wake up more often during the night. Try not to smoke or use tobacco, especially in the evening. Nicotine can keep you awake. Limit naps during the day, especially close to bedtime. Avoid lying in bed awake for too long. If you can't fall asleep or if you wake up in the middle of the night and can't get back to sleep within about 20 minutes, get out of bed and go to another room until you feel sleepy. Avoid taking medicine right before bed that may keep you awake or make you feel hyper or energized. Your doctor can tell you if your medicine may do this and if you can take it earlier in the day.   If you can't sleep   Imagine yourself in a peaceful, pleasant scene. Focus on the details and feelings of being in a place that is relaxing. Get up and do a quiet or boring activity until you feel sleepy. Avoid drinking any liquids before going to bed to help prevent waking up often to use the bathroom. Where can you learn more? Go to http://www.gray.com/  Enter M498 in the search box to learn more about \"Learning About Sleeping Well. \"  Current as of: March 1, 2022               Content Version: 13.4  © 2278-8504 [employer]. Care instructions adapted under license by [employer] (which disclaims liability or warranty for this information). If you have questions about a medical condition or this instruction, always ask your healthcare professional. [address] any warranty or liability for your use of this information.

## 2022-10-31 NOTE — PROGRESS NOTES
Maritza Tenorio presents today for   Chief Complaint   Patient presents with    Back Pain       Is someone accompanying this pt? no    Is the patient using any DME equipment during OV? no    Depression Screening:  3 most recent PHQ Screens 5/24/2022   Little interest or pleasure in doing things Not at all   Feeling down, depressed, irritable, or hopeless Not at all   Total Score PHQ 2 0       Learning Assessment:  Learning Assessment 7/23/2019   PRIMARY LEARNER Patient   HIGHEST LEVEL OF EDUCATION - PRIMARY LEARNER  GRADUATED HIGH SCHOOL OR GED   BARRIERS PRIMARY LEARNER NONE   CO-LEARNER CAREGIVER No   PRIMARY LANGUAGE ENGLISH   LEARNER PREFERENCE PRIMARY DEMONSTRATION     READING     VIDEOS   ANSWERED BY patient   RELATIONSHIP SELF       Abuse Screening:  Abuse Screening Questionnaire 5/24/2022   Do you ever feel afraid of your partner? N   Are you in a relationship with someone who physically or mentally threatens you? N   Is it safe for you to go home? Y       Fall Risk  Fall Risk Assessment, last 12 mths 5/24/2022   Able to walk? Yes   Fall in past 12 months? 0   Do you feel unsteady? 1   Are you worried about falling 1   Is the gait abnormal? 0       OPIOID RISK TOOL  No flowsheet data found. Coordination of Care:  1. Have you been to the ER, urgent care clinic since your last visit? no  Hospitalized since your last visit? no    2. Have you seen or consulted any other health care providers outside of the [address] [place] [address] since your last visit? no Include any pap smears or colon screening.  no

## 2022-11-07 ENCOUNTER — APPOINTMENT (OUTPATIENT)
Dept: PHYSICAL THERAPY | Age: 77
End: 2022-11-07

## 2022-11-15 ENCOUNTER — OFFICE VISIT (OUTPATIENT)
Dept: ORTHOPEDIC SURGERY | Age: 77
End: 2022-11-15
Payer: MEDICARE

## 2022-11-15 VITALS — HEART RATE: 75 BPM | OXYGEN SATURATION: 95 % | TEMPERATURE: 96 F | WEIGHT: 144.6 LBS | BODY MASS INDEX: 24.06 KG/M2

## 2022-11-15 DIAGNOSIS — M80.00XS AGE-RELATED OSTEOPOROSIS WITH CURRENT PATHOLOGICAL FRACTURE, SEQUELA: ICD-10-CM

## 2022-11-15 DIAGNOSIS — S22.080S CLOSED WEDGE COMPRESSION FRACTURE OF T11 VERTEBRA, SEQUELA: ICD-10-CM

## 2022-11-15 DIAGNOSIS — M84.48XS SACRAL INSUFFICIENCY FRACTURE, SEQUELA: ICD-10-CM

## 2022-11-15 DIAGNOSIS — M85.9 DISORDER OF BONE DENSITY AND STRUCTURE, UNSPECIFIED: ICD-10-CM

## 2022-11-15 DIAGNOSIS — S22.080S CLOSED WEDGE COMPRESSION FRACTURE OF T11 VERTEBRA, SEQUELA: Primary | ICD-10-CM

## 2022-11-15 PROCEDURE — G8427 DOCREV CUR MEDS BY ELIG CLIN: HCPCS | Performed by: NURSE PRACTITIONER

## 2022-11-15 PROCEDURE — 1090F PRES/ABSN URINE INCON ASSESS: CPT | Performed by: NURSE PRACTITIONER

## 2022-11-15 PROCEDURE — 99213 OFFICE O/P EST LOW 20 MIN: CPT | Performed by: NURSE PRACTITIONER

## 2022-11-15 PROCEDURE — G8756 NO BP MEASURE DOC: HCPCS | Performed by: NURSE PRACTITIONER

## 2022-11-15 PROCEDURE — 1101F PT FALLS ASSESS-DOCD LE1/YR: CPT | Performed by: NURSE PRACTITIONER

## 2022-11-15 PROCEDURE — 1123F ACP DISCUSS/DSCN MKR DOCD: CPT | Performed by: NURSE PRACTITIONER

## 2022-11-15 PROCEDURE — G9717 DOC PT DX DEP/BP F/U NT REQ: HCPCS | Performed by: NURSE PRACTITIONER

## 2022-11-15 PROCEDURE — G8536 NO DOC ELDER MAL SCRN: HCPCS | Performed by: NURSE PRACTITIONER

## 2022-11-15 PROCEDURE — G8420 CALC BMI NORM PARAMETERS: HCPCS | Performed by: NURSE PRACTITIONER

## 2022-11-15 RX ORDER — IBUPROFEN 200 MG
TABLET ORAL
COMMUNITY

## 2022-11-15 NOTE — PROGRESS NOTES
Chief complaint   Chief Complaint   Patient presents with    Follow-up     osteoporosis       History of Present Illness:  Kaitlin Harden is a  68 y.o.  female who comes in today with her  to discuss her osteoporosis. She had a bone density test back on April 4, 2022 that showed only osteopenia. But then she had a T11 compression fracture and a sacral insufficiency fracture. On June 13, 2022 she underwent kyphoplasty of the T11 and a sacroplasty at the same time. Her pain was relieved immediately. She is here to discuss medication options for her osteoporosis. She denies fever bowel bladder dysfunction      Physical Exam: The patient is a 79-year-old female well-developed well-nourished who is alert and oriented with a normal mood and affect. She has a full weightbearing nonantalgic gait utilizing a tripod cane. Assessment and Plan: This is a patient who has had a compression fracture and a sacral insufficiency fracture with successful kyphoplasty and sacroplasty. I will get a BMP, phosphorus, magnesium and vitamin D level. We discussed starting her on Evenity. She is agreeable to this. Once I review the labs I can put the order in we will give her a call. Medications:  Current Outpatient Medications   Medication Sig Dispense Refill    ibuprofen (AdviL) 200 mg tablet Take  by mouth.      gabapentin (NEURONTIN) 300 mg capsule Take 1 Capsule by mouth every morning AND 1 Capsule every evening. Max Daily Amount: 600 mg. 180 Capsule 1    hydroCHLOROthiazide (HYDRODIURIL) 25 mg tablet       metoprolol tartrate (LOPRESSOR) 25 mg tablet Take 25 mg by mouth two (2) times a day. potassium 99 mg tablet Take 99 mg by mouth in the morning. mecobalamin, vitamin B12, 5,000 mcg TbDi Take 1 Tablet by mouth daily. nortriptyline (PAMELOR) 10 mg capsule Take 4 Capsules by mouth nightly. 360 Capsule 1    magnesium 250 mg tab Take 1 Tablet by mouth daily.       cholecalciferol, vitamin D3, 50 mcg (2,000 unit) tab Take 1 Tablet by mouth daily. zinc gluconate 30 mg tab Take 1 Tablet by mouth daily. calcium/D3/zinc/copper/suzie (CITRACAL-D3 MAXIMUM PLUS PO) Take 1 Tablet by mouth daily. lysine (L-LYSINE) 500 mg tab tablet Take 500 mg by mouth daily. MAGNESIUM OXIDE PO Take 1 Tab by mouth daily. omeprazole (PRILOSEC) 40 mg capsule Take 40 mg by mouth daily. Indications: gastroesophageal reflux disease      LORazepam (ATIVAN) 1 mg tablet Take 1 mg by mouth every eight (8) hours as needed for Anxiety. atorvastatin (LIPITOR) 10 mg tablet Take 10 mg by mouth daily. potassium chloride SR (K-TAB) 20 mEq tablet  (Patient not taking: Reported on 11/15/2022)      diclofenac (VOLTAREN) 1 % gel Apply  to affected area four (4) times daily. (Patient not taking: Reported on 11/15/2022)      IRON, FERROUS SULFATE, PO Take 1 Tablet by mouth daily. dosage: 27 mg (Patient not taking: Reported on 11/15/2022)      ibuprofen (MOTRIN) 200 mg tablet Take 200 mg by mouth daily. pt. takes 3 tablets daily (Patient not taking: Reported on 11/15/2022)      oxyCODONE IR (ROXICODONE) 10 mg tab immediate release tablet Take 10 mg by mouth every six (6) hours as needed for Pain. (Patient not taking: No sig reported)      ferrous gluconate 324 mg (37.5 mg iron) tablet Take 1 Tablet by mouth two (2) times daily (with meals). (Patient not taking: Reported on 11/15/2022) 60 Tablet 0    hydrALAZINE (APRESOLINE) 25 mg tablet Take 1 Tablet by mouth three (3) times daily. (Patient not taking: No sig reported) 90 Tablet 0    docosahexaenoic acid/epa (FISH OIL PO) Take 1,000 mg by mouth daily. (Patient not taking: No sig reported)      naloxone (NARCAN) 4 mg/actuation nasal spray Use 1 spray intranasally, then discard. Repeat with new spray every 2 min as needed for opioid overdose symptoms, alternating nostrils.  (Patient not taking: Reported on 11/15/2022) 2 Each 0           Review of systems:    Past Medical History:   Diagnosis Date    Acid reflux     Bilateral sacral insufficiency fracture with routine healing 6/9/2022    Chronic anemia     Compression fracture of T11 vertebra with routine healing 6/9/2022    Hypertension     Spinal stenosis      Past Surgical History:   Procedure Laterality Date    HX BACK SURGERY       Social History     Socioeconomic History    Marital status:      Spouse name: Not on file    Number of children: Not on file    Years of education: Not on file    Highest education level: Not on file   Occupational History    Not on file   Tobacco Use    Smoking status: Never    Smokeless tobacco: Never   Vaping Use    Vaping Use: Never used   Substance and Sexual Activity    Alcohol use: Yes     Alcohol/week: 1.0 standard drink     Types: 1 Glasses of wine per week    Drug use: No    Sexual activity: Yes     Partners: Male     Birth control/protection: None   Other Topics Concern    Not on file   Social History Narrative    Not on file     Social Determinants of Health     Financial Resource Strain: Not on file   Food Insecurity: Not on file   Transportation Needs: Not on file   Physical Activity: Not on file   Stress: Not on file   Social Connections: Not on file   Intimate Partner Violence: Not on file   Housing Stability: Not on file     Family History   Problem Relation Age of Onset    Heart Attack Mother     Cancer Father        Physical Exam:  Visit Vitals  Pulse 75   Temp (!) 96 °F (35.6 °C) (Temporal)   Wt 144 lb 9.6 oz (65.6 kg)   SpO2 95%   BMI 24.06 kg/m²     Pain Scale: 0 - No pain/10       has been . reviewed and is appropriate          Diagnoses and all orders for this visit:    1. Closed wedge compression fracture of T11 vertebra, sequela  -     METABOLIC PANEL, BASIC; Future  -     MAGNESIUM; Future  -     PHOSPHORUS  -     VITAMIN D, 25 HYDROXY; Future    2. Sacral insufficiency fracture, sequela  -     METABOLIC PANEL, BASIC; Future  -     MAGNESIUM;  Future  - PHOSPHORUS  -     VITAMIN D, 25 HYDROXY; Future    3. Age-related osteoporosis with current pathological fracture, sequela  -     METABOLIC PANEL, BASIC; Future  -     MAGNESIUM; Future  -     PHOSPHORUS  -     VITAMIN D, 25 HYDROXY; Future    4. Disorder of bone density and structure, unspecified   -     VITAMIN D, 25 HYDROXY; Future          Follow-up and Dispositions    Return for She will keep her follow-ups with Dr. Josh Saldana the. We have informed Viviana Falcon to notify us for immediate appointment if she has any worsening neurogical symptoms or if an emergency situation presents, then call [phone]    Please note that this dictation was completed with [employer], the [employer] voice recognition software. Quite often unanticipated grammatical, syntax, homophones, and other interpretive errors are inadvertently transcribed by the computer software. Please disregard these errors. Please excuse any errors that have escaped final proofreading.

## 2022-11-16 ENCOUNTER — HOSPITAL ENCOUNTER (OUTPATIENT)
Dept: LAB | Age: 77
Discharge: HOME | End: 2022-11-16
Payer: MEDICARE

## 2022-11-16 LAB
25(OH)D3 SERPL-MCNC: 76.9 NG/ML (ref 30–100)
ANION GAP SERPL CALC-SCNC: 7 MMOL/L (ref 3–18)
BUN SERPL-MCNC: 21 MG/DL (ref 7–18)
BUN/CREAT SERPL: 28 (ref 12–20)
CALCIUM SERPL-MCNC: 9.3 MG/DL (ref 8.5–10.1)
CHLORIDE SERPL-SCNC: 105 MMOL/L (ref 100–111)
CO2 SERPL-SCNC: 27 MMOL/L (ref 21–32)
CREAT SERPL-MCNC: 0.76 MG/DL (ref 0.6–1.3)
GLUCOSE SERPL-MCNC: 99 MG/DL (ref 74–99)
MAGNESIUM SERPL-MCNC: 2.2 MG/DL (ref 1.6–2.6)
PHOSPHATE SERPL-MCNC: 4.8 MG/DL (ref 2.5–4.9)
POTASSIUM SERPL-SCNC: 4.6 MMOL/L (ref 3.5–5.5)
SODIUM SERPL-SCNC: 139 MMOL/L (ref 136–145)

## 2022-11-16 PROCEDURE — 80048 BASIC METABOLIC PNL TOTAL CA: CPT

## 2022-11-16 PROCEDURE — 36415 COLL VENOUS BLD VENIPUNCTURE: CPT

## 2022-11-16 PROCEDURE — 83735 ASSAY OF MAGNESIUM: CPT

## 2022-11-16 PROCEDURE — 82306 VITAMIN D 25 HYDROXY: CPT

## 2022-11-16 PROCEDURE — 84100 ASSAY OF PHOSPHORUS: CPT

## 2022-11-22 ENCOUNTER — TELEPHONE (OUTPATIENT)
Dept: ORTHOPEDIC SURGERY | Age: 77
End: 2022-11-22

## 2022-11-22 NOTE — TELEPHONE ENCOUNTER
The pt's order for evenity was faxed over to the [address] [place] [address]. A fax confirmation was received. They will contact the pt to schedule an appt. The order form will be sent to scanning.

## 2022-12-07 PROBLEM — M81.0 OSTEOPOROSIS: Status: ACTIVE | Noted: 2022-12-07

## 2022-12-07 RX ORDER — DIPHENHYDRAMINE HYDROCHLORIDE 50 MG/ML
25 INJECTION, SOLUTION INTRAMUSCULAR; INTRAVENOUS AS NEEDED
Status: CANCELLED
Start: 2022-12-14

## 2022-12-07 RX ORDER — DIPHENHYDRAMINE HYDROCHLORIDE 50 MG/ML
50 INJECTION, SOLUTION INTRAMUSCULAR; INTRAVENOUS AS NEEDED
Status: CANCELLED
Start: 2022-12-14

## 2022-12-07 RX ORDER — ONDANSETRON 2 MG/ML
8 INJECTION INTRAMUSCULAR; INTRAVENOUS AS NEEDED
Status: CANCELLED | OUTPATIENT
Start: 2022-12-14

## 2022-12-07 RX ORDER — ALBUTEROL SULFATE 0.83 MG/ML
2.5 SOLUTION RESPIRATORY (INHALATION) AS NEEDED
Status: CANCELLED
Start: 2022-12-14

## 2022-12-07 RX ORDER — EPINEPHRINE 1 MG/ML
0.3 INJECTION, SOLUTION, CONCENTRATE INTRAVENOUS AS NEEDED
Status: CANCELLED | OUTPATIENT
Start: 2022-12-14

## 2022-12-07 RX ORDER — ACETAMINOPHEN 325 MG/1
650 TABLET ORAL AS NEEDED
Status: CANCELLED
Start: 2022-12-14

## 2022-12-07 RX ORDER — HYDROCORTISONE SODIUM SUCCINATE 100 MG/2ML
100 INJECTION, POWDER, FOR SOLUTION INTRAMUSCULAR; INTRAVENOUS AS NEEDED
Status: CANCELLED | OUTPATIENT
Start: 2022-12-14

## 2022-12-13 ENCOUNTER — TELEPHONE (OUTPATIENT)
Dept: ORTHOPEDIC SURGERY | Age: 77
End: 2022-12-13

## 2022-12-13 NOTE — TELEPHONE ENCOUNTER
Call the infusion center and see what is going on with the Evenity.   I have not received any paperwork

## 2022-12-13 NOTE — TELEPHONE ENCOUNTER
Patient called in requesting to speak with Kenzie Johnson, or Leonela in regards an infusion that is scheduled for tomorrow. Patient stated that it was denied due to paperwork that was faxed to Vanessa Somers to complete and not receiving it.        Patient advise callback  [phone]

## 2022-12-13 NOTE — TELEPHONE ENCOUNTER
Called patient [phone] and verified her name and date of birth. I informed her that we had received the denial from [facility] for the [facility]. We wanted to let her know that we did reach out to the [facility] and was informed that she was covered for tomorrow for the Infusion. They may have tried to process this under her prescription plan but it should have been done under her medical benefits. So, she is good for tomorrow. Patient stated she was really worried about it. I informed her this is once less thing she needs to worry about. Patient verbalized understanding and no further action is needed at this time.

## 2022-12-14 ENCOUNTER — HOSPITAL ENCOUNTER (OUTPATIENT)
Dept: INFUSION THERAPY | Age: 77
Discharge: HOME | End: 2022-12-14
Payer: MEDICARE

## 2022-12-14 VITALS
SYSTOLIC BLOOD PRESSURE: 141 MMHG | RESPIRATION RATE: 16 BRPM | TEMPERATURE: 97.7 F | HEART RATE: 63 BPM | DIASTOLIC BLOOD PRESSURE: 84 MMHG | OXYGEN SATURATION: 98 %

## 2022-12-14 DIAGNOSIS — M80.00XA AGE-RELATED OSTEOPOROSIS WITH CURRENT PATHOLOGICAL FRACTURE, INITIAL ENCOUNTER: Primary | ICD-10-CM

## 2022-12-14 PROCEDURE — 74011250636 HC RX REV CODE- 250/636: Performed by: NURSE PRACTITIONER

## 2022-12-14 PROCEDURE — 96372 THER/PROPH/DIAG INJ SC/IM: CPT

## 2022-12-14 RX ORDER — HYDROCORTISONE SODIUM SUCCINATE 100 MG/2ML
100 INJECTION, POWDER, FOR SOLUTION INTRAMUSCULAR; INTRAVENOUS AS NEEDED
OUTPATIENT
Start: 2023-01-08

## 2022-12-14 RX ORDER — ONDANSETRON 2 MG/ML
8 INJECTION INTRAMUSCULAR; INTRAVENOUS AS NEEDED
OUTPATIENT
Start: 2023-01-08

## 2022-12-14 RX ORDER — ACETAMINOPHEN 325 MG/1
650 TABLET ORAL AS NEEDED
Start: 2023-01-08

## 2022-12-14 RX ORDER — DIPHENHYDRAMINE HYDROCHLORIDE 50 MG/ML
25 INJECTION, SOLUTION INTRAMUSCULAR; INTRAVENOUS AS NEEDED
Start: 2023-01-08

## 2022-12-14 RX ORDER — ALBUTEROL SULFATE 0.83 MG/ML
2.5 SOLUTION RESPIRATORY (INHALATION) AS NEEDED
Start: 2023-01-08

## 2022-12-14 RX ORDER — EPINEPHRINE 1 MG/ML
0.3 INJECTION, SOLUTION, CONCENTRATE INTRAVENOUS AS NEEDED
OUTPATIENT
Start: 2023-01-08

## 2022-12-14 RX ORDER — DIPHENHYDRAMINE HYDROCHLORIDE 50 MG/ML
50 INJECTION, SOLUTION INTRAMUSCULAR; INTRAVENOUS AS NEEDED
Start: 2023-01-08

## 2022-12-14 RX ADMIN — ROMOSOZUMAB-AQQG 210 MG: 105 INJECTION, SOLUTION SUBCUTANEOUS at 14:21

## 2022-12-14 NOTE — PROGRESS NOTES
[facility] Progress Note    Date: 2022    Name: Ce Cobos    MRN: [id number]         : 1945    Evenity injection    Ms. Tse arrived to [facility] at 1410. Ms. Fabiola Louis was assessed and education was provided. Ms. Francisco J Hastings vitals were reviewed. Visit Vitals  BP (!) 141/84 (BP 1 Location: Left upper arm)   Pulse 63   Temp 97.7 °F (36.5 °C)   Resp 16   SpO2 98%   Breastfeeding No       Evenity 210mg was administered as ordered SQ in patient's Right upper arm ( right) in two injections. Two bandaids applied. Ms. Fabiola Louis remained in clinic for 30 minutes for observation per policy. Pt tolerated well without complaints. Patient Vitals for the past 12 hrs:   Temp Pulse Resp BP SpO2   22 1454 97.7 °F (36.5 °C) 63 16 (!) 141/84 98 %   22 1412 -- -- -- (!) 177/84 --   22 1410 97.7 °F (36.5 °C) 70 16 (!) 183/84 100 %     Written education given to pt and reviewed. Discharge/ follow-up instructions discussed w/ pt. Pt verbalized understanding. Pt armband removed & shredded. Ms. Fabiola Louis was discharged from [place] [address] in stable condition at 1500. She is to return on 23 at [phone] for her [place] lab appointment.     Christopher Art RN  2022

## 2022-12-28 ENCOUNTER — OFFICE VISIT (OUTPATIENT)
Dept: PULMONOLOGY | Age: 77
End: 2022-12-28
Payer: MEDICARE

## 2022-12-28 VITALS
DIASTOLIC BLOOD PRESSURE: 67 MMHG | WEIGHT: 137.9 LBS | SYSTOLIC BLOOD PRESSURE: 135 MMHG | BODY MASS INDEX: 22.98 KG/M2 | OXYGEN SATURATION: 93 % | HEART RATE: 67 BPM | TEMPERATURE: 96.8 F | RESPIRATION RATE: 16 BRPM | HEIGHT: 65 IN

## 2022-12-28 DIAGNOSIS — R05.3 CHRONIC COUGH: ICD-10-CM

## 2022-12-28 DIAGNOSIS — I42.2 HYPERTROPHIC CARDIOMYOPATHY (HCC): ICD-10-CM

## 2022-12-28 DIAGNOSIS — R06.09 DYSPNEA ON EXERTION: Primary | ICD-10-CM

## 2022-12-28 DIAGNOSIS — I27.20 PULMONARY HYPERTENSION (HCC): ICD-10-CM

## 2022-12-28 DIAGNOSIS — R06.02 SHORTNESS OF BREATH: ICD-10-CM

## 2022-12-28 DIAGNOSIS — R53.81 PHYSICAL DECONDITIONING: ICD-10-CM

## 2022-12-28 PROCEDURE — G8420 CALC BMI NORM PARAMETERS: HCPCS | Performed by: INTERNAL MEDICINE

## 2022-12-28 PROCEDURE — 1123F ACP DISCUSS/DSCN MKR DOCD: CPT | Performed by: INTERNAL MEDICINE

## 2022-12-28 PROCEDURE — 1101F PT FALLS ASSESS-DOCD LE1/YR: CPT | Performed by: INTERNAL MEDICINE

## 2022-12-28 PROCEDURE — 99214 OFFICE O/P EST MOD 30 MIN: CPT | Performed by: INTERNAL MEDICINE

## 2022-12-28 PROCEDURE — G8536 NO DOC ELDER MAL SCRN: HCPCS | Performed by: INTERNAL MEDICINE

## 2022-12-28 PROCEDURE — 1090F PRES/ABSN URINE INCON ASSESS: CPT | Performed by: INTERNAL MEDICINE

## 2022-12-28 PROCEDURE — 3074F SYST BP LT 130 MM HG: CPT | Performed by: INTERNAL MEDICINE

## 2022-12-28 PROCEDURE — G9717 DOC PT DX DEP/BP F/U NT REQ: HCPCS | Performed by: INTERNAL MEDICINE

## 2022-12-28 PROCEDURE — 3078F DIAST BP <80 MM HG: CPT | Performed by: INTERNAL MEDICINE

## 2022-12-28 PROCEDURE — G8427 DOCREV CUR MEDS BY ELIG CLIN: HCPCS | Performed by: INTERNAL MEDICINE

## 2022-12-28 RX ORDER — ERYTHROMYCIN 5 MG/G
OINTMENT OPHTHALMIC
COMMUNITY
Start: 2022-11-18 | End: 2022-12-28

## 2022-12-28 NOTE — LETTER
12/28/2022    Patient: Lou Hernandez   YOB: 1945   Date of Visit: 12/28/2022     Vinny Somers MD  [address]  Via Fax: [phone]    Dear Vinny Somers MD,      Thank you for referring Ms. Francisco Javier Templeton to [address] [place] [address] for evaluation. My notes for this consultation are attached. If you have questions, please do not hesitate to call me. I look forward to following your patient along with you.       Sincerely,    Debbi Rincon MD

## 2022-12-28 NOTE — PROGRESS NOTES
[facility] presents today for   Chief Complaint   Patient presents with    Shortness of Breath     Follow up from 5/24/2022    Cough     chronic    Sleep Apnea     obstructive    Other     THOMSON, physical deconditioning    Results     PFT 9/19/2022, DVT (RLE) 5/30/2022, Echo 6/15/2022       Is someone accompanying this pt? No    Is the patient using any DME equipment during OV? No    -DME Company N/A    Depression Screening:  3 most recent PHQ Screens 5/24/2022   Little interest or pleasure in doing things Not at all   Feeling down, depressed, irritable, or hopeless Not at all   Total Score PHQ 2 0       Learning Assessment:  Learning Assessment 7/23/2019   PRIMARY LEARNER Patient   HIGHEST LEVEL OF EDUCATION - PRIMARY LEARNER  GRADUATED HIGH SCHOOL OR GED   BARRIERS PRIMARY LEARNER NONE   CO-LEARNER CAREGIVER No   PRIMARY LANGUAGE ENGLISH   LEARNER PREFERENCE PRIMARY DEMONSTRATION     READING     VIDEOS   ANSWERED BY patient   RELATIONSHIP SELF       Abuse Screening:  Abuse Screening Questionnaire 5/24/2022   Do you ever feel afraid of your partner? N   Are you in a relationship with someone who physically or mentally threatens you? N   Is it safe for you to go home? Y       Fall Risk  Fall Risk Assessment, last 12 mths 5/24/2022   Able to walk? Yes   Fall in past 12 months? 0   Do you feel unsteady? 1   Are you worried about falling 1   Is the gait abnormal? 0         Coordination of Care:  1. Have you been to the ER, urgent care clinic since your last visit? Hospitalized since your last visit? Yes; Where: [facility] ED, [facility] & [facility] rehab, When: 5/30/2022-RLE pain, 6/9/2022-spinal stenosis, bilateral lower back pain with sciatica and hypokalemia, 6/10-6/17/2022-right sided sciatica & 6/17-7/2/2022-compression fracture    2. Have you seen or consulted any other health care providers outside of the [address] [place] [address] since your last visit? Include any pap smears or colon screening. Yes.  Dr. Luann Cramer, PCP    Per patient, does not received influenza vaccine.

## 2022-12-28 NOTE — PROGRESS NOTES
[address] [place] [address]  [phone]    [facility]  Pulmonary, Critical Care, and Sleep Medicine    Pulmonary Office F/U  Name: Joselin Falcon 68 y.o. female  MRN: [id number]  : 1945  Service Date: 22  Chief Complaint:   Chief Complaint   Patient presents with    Shortness of Breath     Follow up from 2022    Cough     chronic    Sleep Apnea     obstructive    Other     THOMSON, physical deconditioning    Results     PFT 2022, DVT (RLE) 2022, Echo 6/15/2022       History of Present Illness:   Joselin Falcon is a 68 y.o. female, who presents to Pulmonary clinic for followup of SOB. Patient was last seen in our clinic on 2022. In the interval, patient reports she suffered from spine fx from osteoporosis. Reports that was admitted for a month including acute rehab. In the interval, cough subsided  Breathing is also stable  Still wheezes at night when laying flat --- did improve some since last visit. Since discharge, some swelling in her legs    Past Medical History:   Diagnosis Date    Acid reflux     Bilateral sacral insufficiency fracture with routine healing 2022    Chronic anemia     Compression fracture of T11 vertebra with routine healing 2022    Hypertension     Spinal stenosis      Past Surgical History:   Procedure Laterality Date    HX BACK SURGERY         Family History   Problem Relation Age of Onset    Heart Attack Mother     Cancer Father      Social History     Socioeconomic History    Marital status:      Spouse name: Not on file    Number of children: Not on file    Years of education: Not on file    Highest education level: Not on file   Occupational History    Not on file   Tobacco Use    Smoking status: Never    Smokeless tobacco: Never   Vaping Use    Vaping Use: Never used   Substance and Sexual Activity    Alcohol use:  Yes     Alcohol/week: 1.0 standard drink     Types: 1 Glasses of wine per week    Drug use: No    Sexual activity: Yes     Partners: Male     Birth control/protection: None   Other Topics Concern    Not on file   Social History Narrative    Not on file     Social Determinants of Health     Financial Resource Strain: Not on file   Food Insecurity: Not on file   Transportation Needs: Not on file   Physical Activity: Not on file   Stress: Not on file   Social Connections: Not on file   Intimate Partner Violence: Not on file   Housing Stability: Not on file     No Known Allergies    Prior to Admission medications    Medication Sig Start Date End Date Taking? Authorizing Provider   ibuprofen (MOTRIN) 200 mg tablet Take 200 mg by mouth every eight (8) hours as needed. Yes Provider, Historical   gabapentin (NEURONTIN) 300 mg capsule Take 1 Capsule by mouth every morning AND 1 Capsule every evening. Max Daily Amount: 600 mg. 10/31/22  Yes Rhea Trevizo MD   hydroCHLOROthiazide (HYDRODIURIL) 25 mg tablet  9/3/22  Yes Provider, Historical   metoprolol tartrate (LOPRESSOR) 25 mg tablet Take 25 mg by mouth two (2) times a day. Yes Provider, Historical   potassium 99 mg tablet Take 99 mg by mouth in the morning. Yes Provider, Historical   mecobalamin, vitamin B12, 5,000 mcg TbDi Take 1 Tablet by mouth daily. Yes Provider, Historical   magnesium 250 mg tab Take 1 Tablet by mouth daily. Yes Provider, Historical   cholecalciferol, vitamin D3, 50 mcg (2,000 unit) tab Take 1 Tablet by mouth daily. Yes Provider, Historical   zinc gluconate 30 mg tab Take 1 Tablet by mouth daily. Yes Provider, Historical   calcium/D3/zinc/copper/suzie (CITRACAL-D3 MAXIMUM PLUS PO) Take 1 Tablet by mouth daily. Yes Provider, Historical   lysine (L-LYSINE) 500 mg tab tablet Take 500 mg by mouth daily. Yes Provider, Historical   hydrALAZINE (APRESOLINE) 25 mg tablet Take 1 Tablet by mouth three (3) times daily. Patient taking differently: Take 25 mg by mouth daily.  6/17/22  Yes Vernell Guardado,  omeprazole (PRILOSEC) 40 mg capsule Take 40 mg by mouth daily. Indications: gastroesophageal reflux disease 7/9/18  Yes Provider, Historical   LORazepam (ATIVAN) 1 mg tablet Take 1 mg by mouth every eight (8) hours as needed for Anxiety. 5/25/18  Yes Provider, Historical   atorvastatin (LIPITOR) 10 mg tablet Take 10 mg by mouth daily. 9/4/17  Yes Provider, Historical   erythromycin (ILOTYCIN) ophthalmic ointment  11/18/22 12/28/22  Provider, Historical   potassium chloride SR (K-TAB) 20 mEq tablet  8/24/22 12/28/22  Provider, Historical   diclofenac (VOLTAREN) 1 % gel Apply  to affected area four (4) times daily. Patient not taking: Reported on 11/15/2022 12/28/22 12/28/22  Provider, Historical   IRON, FERROUS SULFATE, PO Take 1 Tablet by mouth daily. dosage: 27 mg  Patient not taking: Reported on 11/15/2022 12/28/22 12/28/22  Provider, Historical   nortriptyline (PAMELOR) 10 mg capsule Take 4 Capsules by mouth nightly. Patient not taking: Reported on 12/28/2022 8/8/22   Elmer Pedroza NP   ibuprofen (MOTRIN) 200 mg tablet Take 200 mg by mouth daily. pt. takes 3 tablets daily  Patient not taking: Reported on 11/15/2022 12/28/22 12/28/22  Provider, Historical   oxyCODONE IR (ROXICODONE) 10 mg tab immediate release tablet Take 10 mg by mouth every six (6) hours as needed for Pain. Patient not taking: Reported on 8/31/2022 12/28/22 12/28/22  Provider, Historical   ferrous gluconate 324 mg (37.5 mg iron) tablet Take 1 Tablet by mouth two (2) times daily (with meals). Patient not taking: No sig reported 6/17/22   Emilia VALENCIA,    docosahexaenoic acid/epa (FISH OIL PO) Take 1,000 mg by mouth daily. Patient not taking: Reported on 8/31/2022 12/28/22 12/28/22  Provider, Historical   naloxone [facility]) 4 mg/actuation nasal spray Use 1 spray intranasally, then discard. Repeat with new spray every 2 min as needed for opioid overdose symptoms, alternating nostrils.   Patient not taking: No sig reported 5/30/22 BAM Cosme   MAGNESIUM OXIDE PO Take 1 Tab by mouth daily. Patient not taking: Reported on 12/28/2022 12/28/22 12/28/22  Provider, Historical     Immunization History   Administered Date(s) Administered    COVID-19, MODERNA BLUE border, Primary or Immunocompromised, (age 18y+), IM, 100 mcg/0.5mL 01/30/2021, 02/27/2021    Pneumococcal Conjugate (PCV-13) 02/07/2018    Pneumococcal Polysaccharide (PPSV-23) 06/01/2012       Review of Systems:  A complete review of systems was performed as stated in the HPI, all others are negative. Objective:    Physical Exam:  /67 (BP 1 Location: Left upper arm, BP Patient Position: Sitting, BP Cuff Size: Adult long)   Pulse 67   Temp 96.8 °F (36 °C) (Temporal)   Resp 16   Ht 5' 5\" (1.651 m)   Wt 62.6 kg (137 lb 14.4 oz)   SpO2 93%   BMI 22.95 kg/m²   Vitals were personally reviewed  Gen: no acute distress, pleasant and cooperative, sitting up in chair, ambulates without difficulty  HEENT: normocephalic/atraumatic, no ocular drainage, EOMI, no scleral icterus, nasal bridge midline, no nasal drainage, poor dentition, no oral lesions  Neck: supple, trachea midline, no JVD, no cervical and supraclavicular adenopathy  CVS: regular rate rhythm, S1/S2, no murmurs/rubs/gallops  Lungs: good air entry B/L, CTABL, no wheezes/rales/rhonchi  Psych: normal memory, thought content, and processing    Labs:   I have reviewed the patient's available labs  Lab Results   Component Value Date/Time    WBC 6.6 06/22/2022 06:28 AM    HGB 9.3 (L) 06/27/2022 06:48 AM    HCT 31.6 (L) 06/27/2022 06:48 AM    PLATELET 098 28/16/1629 06:28 AM    MCV 71.6 (L) 06/22/2022 06:28 AM     Lab Results   Component Value Date/Time    Sodium 139 11/16/2022 02:53 PM    Potassium 4.6 11/16/2022 02:53 PM    Chloride 105 11/16/2022 02:53 PM    CO2 27 11/16/2022 02:53 PM    Anion gap 7 11/16/2022 02:53 PM    Glucose 99 11/16/2022 02:53 PM    BUN 21 (H) 11/16/2022 02:53 PM    Creatinine 0.76 11/16/2022 02:53 PM    BUN/Creatinine ratio 28 (H) 11/16/2022 02:53 PM    GFR est AA >60 06/27/2022 06:48 AM    GFR est non-AA >60 06/27/2022 06:48 AM    Calcium 9.3 11/16/2022 02:53 PM    Bilirubin, total 0.6 06/19/2022 05:30 AM    Alk. phosphatase 378 (H) 06/19/2022 05:30 AM    Protein, total 6.5 06/19/2022 05:30 AM    Albumin 3.1 (L) 06/19/2022 05:30 AM    Globulin 3.4 06/19/2022 05:30 AM    A-G Ratio 0.9 06/19/2022 05:30 AM    ALT (SGPT) 20 06/19/2022 05:30 AM    AST (SGOT) 23 06/19/2022 05:30 AM       Imaging:  I have personally reviewed patient's imaging --no new imaging in the interval      PFTs: I have personally reviewed PFTs from 9/19/2022 personally as follows: Spirometry is suggestive of a restrictive defect without BD response. Lung volumes show mild restriction. Diffusion capacity is severely reduced. TTE:  I have reviewed the patient's TTE results    06/10/22    ECHO ADULT COMPLETE 06/15/2022 6/15/2022    Interpretation Summary    Left Ventricle: Normal left ventricular systolic function with a visually estimated EF of 55 - 60%. Left ventricle size is normal. Increased wall thickness. Findings consistent with mild concentric hypertrophy. Normal wall motion. Normal diastolic function. Tricuspid Valve: The estimated RVSP is 53 mmHg. Pulmonary Arteries: Moderate pulmonary hypertension present. Aorta: Dilated aortic root. Ao Root diameter is 3.8 cm. Dilated ascending aorta. Ao Ascending diameter is 3.6 cm. Signed by: Magda Busby MD on 6/15/2022  9:18 PM      Assessment and Plan:  68 y.o. female with:    Impression:  1. Dyspnea on exertion/shortness of breath: Etiology due to deconditioning and some contribution from cardiomyopathy and pulmonary hypertension. Patient does not appear to have any evidence of reactive airway disease. Patient is a lifelong non-smoker thus does NOT have COPD. Patient has had a 35 pound weight gain over the last year and is very sedentary given cervical stenosis. Underlying nighttime wheezing indicate cardiomyopathy and pulmonary hypertension  2. Pulmonary hypertension: Seen on TTE during admission, RVSP of 53 mmHg. Likely secondary to [facility] group 2 disease given mild LV hypertrophy  3. Chronic cough/wheezing: Occurs now nightly, initially due to episode of bronchitis in February. No evidence of reactive airway disease, no triggers  4. Deconditioning: As noted above  5. Suspect MATT: Given snoring, excessive daytime sleepiness, nocturia, and weight gain    Plan:  -Repeat echocardiogram at next visit to assess for RV/PA pressures  -Advised patient to engage in graded exercise given deconditioning  -Offered polysomnography, patient declined. Patient is not interested in PAP therapy. Avoid sleepy driving  -Weight loss  -Increase activity  -Immunizations reviewed, pneumococcal and COVID vaccinations up-to-date. Patient declines influenza vaccination      Follow-up and Dispositions    Return in about 6 months (around 6/28/2023).        Orders Placed This Encounter    TTSerena Lloyd MD/MPH     Pulmonary, [address] [facility]

## 2023-01-11 ENCOUNTER — HOSPITAL ENCOUNTER (OUTPATIENT)
Dept: INFUSION THERAPY | Age: 78
Discharge: HOME | End: 2023-01-11
Payer: MEDICARE

## 2023-01-11 VITALS
TEMPERATURE: 97.8 F | RESPIRATION RATE: 16 BRPM | SYSTOLIC BLOOD PRESSURE: 122 MMHG | HEART RATE: 67 BPM | OXYGEN SATURATION: 98 % | DIASTOLIC BLOOD PRESSURE: 73 MMHG

## 2023-01-11 LAB
ALBUMIN SERPL-MCNC: 4.1 G/DL (ref 3.4–5)
ALBUMIN/GLOB SERPL: 1.5 (ref 0.8–1.7)
ALP SERPL-CCNC: 210 U/L (ref 45–117)
ALT SERPL-CCNC: 17 U/L (ref 13–56)
ANION GAP SERPL CALC-SCNC: 3 MMOL/L (ref 3–18)
AST SERPL-CCNC: 15 U/L (ref 10–38)
BILIRUB SERPL-MCNC: 0.4 MG/DL (ref 0.2–1)
BUN SERPL-MCNC: 29 MG/DL (ref 7–18)
BUN/CREAT SERPL: 34 (ref 12–20)
CALCIUM SERPL-MCNC: 9.6 MG/DL (ref 8.5–10.1)
CHLORIDE SERPL-SCNC: 107 MMOL/L (ref 100–111)
CO2 SERPL-SCNC: 28 MMOL/L (ref 21–32)
CREAT SERPL-MCNC: 0.86 MG/DL (ref 0.6–1.3)
GLOBULIN SER CALC-MCNC: 2.8 G/DL (ref 2–4)
GLUCOSE SERPL-MCNC: 101 MG/DL (ref 74–99)
MAGNESIUM SERPL-MCNC: 2.4 MG/DL (ref 1.6–2.6)
PHOSPHATE SERPL-MCNC: 4 MG/DL (ref 2.5–4.9)
POTASSIUM SERPL-SCNC: 5.3 MMOL/L (ref 3.5–5.5)
PROT SERPL-MCNC: 6.9 G/DL (ref 6.4–8.2)
SODIUM SERPL-SCNC: 138 MMOL/L (ref 136–145)

## 2023-01-11 PROCEDURE — 84100 ASSAY OF PHOSPHORUS: CPT

## 2023-01-11 PROCEDURE — 83735 ASSAY OF MAGNESIUM: CPT

## 2023-01-11 PROCEDURE — 36415 COLL VENOUS BLD VENIPUNCTURE: CPT

## 2023-01-11 PROCEDURE — 80053 COMPREHEN METABOLIC PANEL: CPT

## 2023-01-11 NOTE — PROGRESS NOTES
[facility] Lab Visit:    Marilin Murdock  [birth year]  [id number]  1139 Pt arrived ambulatory w/o assist. Labs drawn per order via Right AC venipuncture x1 attempt. Pt tolerated well without complaints. Gauze/ coban to site. Pt departed [facility] ambulatory and in no distress at 1435.      Visit Vitals  /73 (BP 1 Location: Left upper arm, BP Patient Position: Sitting)   Pulse 67   Temp 97.8 °F (36.6 °C)   Resp 16   SpO2 98%

## 2023-01-13 ENCOUNTER — HOSPITAL ENCOUNTER (OUTPATIENT)
Dept: INFUSION THERAPY | Age: 78
End: 2023-01-13
Payer: MEDICARE

## 2023-01-13 VITALS
TEMPERATURE: 97.4 F | OXYGEN SATURATION: 100 % | SYSTOLIC BLOOD PRESSURE: 127 MMHG | DIASTOLIC BLOOD PRESSURE: 73 MMHG | HEART RATE: 80 BPM | RESPIRATION RATE: 16 BRPM

## 2023-01-13 DIAGNOSIS — M80.00XA AGE-RELATED OSTEOPOROSIS WITH CURRENT PATHOLOGICAL FRACTURE, INITIAL ENCOUNTER: Primary | ICD-10-CM

## 2023-01-13 PROCEDURE — 96372 THER/PROPH/DIAG INJ SC/IM: CPT

## 2023-01-13 PROCEDURE — 74011250636 HC RX REV CODE- 250/636: Performed by: NURSE PRACTITIONER

## 2023-01-13 RX ORDER — HYDROCORTISONE SODIUM SUCCINATE 100 MG/2ML
100 INJECTION, POWDER, FOR SOLUTION INTRAMUSCULAR; INTRAVENOUS AS NEEDED
OUTPATIENT
Start: 2023-02-05

## 2023-01-13 RX ORDER — DIPHENHYDRAMINE HYDROCHLORIDE 50 MG/ML
25 INJECTION, SOLUTION INTRAMUSCULAR; INTRAVENOUS AS NEEDED
Start: 2023-02-05

## 2023-01-13 RX ORDER — ONDANSETRON 2 MG/ML
8 INJECTION INTRAMUSCULAR; INTRAVENOUS AS NEEDED
OUTPATIENT
Start: 2023-02-05

## 2023-01-13 RX ORDER — ALBUTEROL SULFATE 0.83 MG/ML
2.5 SOLUTION RESPIRATORY (INHALATION) AS NEEDED
Start: 2023-02-05

## 2023-01-13 RX ORDER — ACETAMINOPHEN 325 MG/1
650 TABLET ORAL AS NEEDED
Start: 2023-02-05

## 2023-01-13 RX ORDER — DIPHENHYDRAMINE HYDROCHLORIDE 50 MG/ML
50 INJECTION, SOLUTION INTRAMUSCULAR; INTRAVENOUS AS NEEDED
Start: 2023-02-05

## 2023-01-13 RX ORDER — EPINEPHRINE 1 MG/ML
0.3 INJECTION, SOLUTION, CONCENTRATE INTRAVENOUS AS NEEDED
OUTPATIENT
Start: 2023-02-05

## 2023-01-13 RX ADMIN — ROMOSOZUMAB-AQQG 210 MG: 105 INJECTION, SOLUTION SUBCUTANEOUS at 14:17

## 2023-01-13 NOTE — PROGRESS NOTES
[facility] Progress Note    Date: 2023    Name: Kaitlin Harden    MRN: [id number]         : 1945    Evenity injection    Ms. Tse arrived to [facility] ambulatory at 1405. Ms. Oliver López was assessed and education was provided. Pt reports taking her Vit D as prescribed and states she tolerated first dose of Evenity without any problems. Ms. Moncho Ferguson vitals were reviewed. Visit Vitals  /73 (BP 1 Location: Left upper arm, BP Patient Position: Sitting)   Pulse 80   Temp 97.4 °F (36.3 °C)   Resp 16   SpO2 100%   Breastfeeding No     Labs from 23 were within parameters to proceed with treatment today. Calcium=9.6  Phosphorus=4.0  Magnesium=2.4    Romosozumab-aqqg (Evenity) 210mg total dose was administered as ordered SQ in LEFT upper arm back of arm in separate injections (105 mg in each syringe). Bandaids applied. Pt tolerated injection well and without any complaints. Discharge/ follow-up instructions discussed w/ pt. Pt verbalized understanding. Pt armband removed & shredded. Ms. Oliver López was discharged from [place] [address] in stable condition at [phone]. She is to return on 23 at [id number] for her [place] lab appointment.     Abril Sy RN  2023

## 2023-01-23 ENCOUNTER — OFFICE VISIT (OUTPATIENT)
Dept: ORTHOPEDIC SURGERY | Age: 78
End: 2023-01-23
Payer: MEDICARE

## 2023-01-23 VITALS
BODY MASS INDEX: 22.96 KG/M2 | WEIGHT: 138 LBS | RESPIRATION RATE: 18 BRPM | OXYGEN SATURATION: 96 % | TEMPERATURE: 97.3 F | HEART RATE: 62 BPM

## 2023-01-23 DIAGNOSIS — M48.061 LUMBAR FORAMINAL STENOSIS: Primary | ICD-10-CM

## 2023-01-23 DIAGNOSIS — M62.838 MUSCLE SPASM: ICD-10-CM

## 2023-01-23 DIAGNOSIS — M47.816 LUMBAR FACET ARTHROPATHY: ICD-10-CM

## 2023-01-23 DIAGNOSIS — M43.25 FUSION OF SPINE OF THORACOLUMBAR REGION: ICD-10-CM

## 2023-01-23 DIAGNOSIS — G47.9 SLEEP DISORDER: ICD-10-CM

## 2023-01-23 DIAGNOSIS — M48.062 SPINAL STENOSIS OF LUMBAR REGION WITH NEUROGENIC CLAUDICATION: ICD-10-CM

## 2023-01-23 PROCEDURE — 1101F PT FALLS ASSESS-DOCD LE1/YR: CPT | Performed by: PHYSICAL MEDICINE & REHABILITATION

## 2023-01-23 PROCEDURE — 99214 OFFICE O/P EST MOD 30 MIN: CPT | Performed by: PHYSICAL MEDICINE & REHABILITATION

## 2023-01-23 PROCEDURE — G9717 DOC PT DX DEP/BP F/U NT REQ: HCPCS | Performed by: PHYSICAL MEDICINE & REHABILITATION

## 2023-01-23 PROCEDURE — G8427 DOCREV CUR MEDS BY ELIG CLIN: HCPCS | Performed by: PHYSICAL MEDICINE & REHABILITATION

## 2023-01-23 PROCEDURE — 1090F PRES/ABSN URINE INCON ASSESS: CPT | Performed by: PHYSICAL MEDICINE & REHABILITATION

## 2023-01-23 PROCEDURE — G8536 NO DOC ELDER MAL SCRN: HCPCS | Performed by: PHYSICAL MEDICINE & REHABILITATION

## 2023-01-23 PROCEDURE — 1123F ACP DISCUSS/DSCN MKR DOCD: CPT | Performed by: PHYSICAL MEDICINE & REHABILITATION

## 2023-01-23 PROCEDURE — G8420 CALC BMI NORM PARAMETERS: HCPCS | Performed by: PHYSICAL MEDICINE & REHABILITATION

## 2023-01-23 RX ORDER — MUPIROCIN 20 MG/G
OINTMENT TOPICAL
COMMUNITY
Start: 2023-01-19

## 2023-01-23 RX ORDER — TRAZODONE HYDROCHLORIDE 100 MG/1
100 TABLET ORAL
COMMUNITY
Start: 2023-01-04

## 2023-01-23 RX ORDER — BETAMETHASONE DIPROPIONATE 0.5 MG/G
OINTMENT TOPICAL
COMMUNITY
Start: 2023-01-04

## 2023-01-23 RX ORDER — GABAPENTIN 300 MG/1
CAPSULE ORAL
Qty: 180 CAPSULE | Refills: 1 | Status: SHIPPED | OUTPATIENT
Start: 2023-01-23

## 2023-01-23 NOTE — LETTER
1/23/2023    Patient: Kavita Varghese   YOB: 1945   Date of Visit: 1/23/2023     Chana Fisher MD  [address] [facility] [place]  Via Fax: [phone]    Dear Chana Fisher MD,      Thank you for referring Ms. Burak Whipple to [facility] [facility] for evaluation. My notes for this consultation are attached. If you have questions, please do not hesitate to call me. I look forward to following your patient along with you.       Sincerely,    Janusz Olsen MD no back pain, no gout, no musculoskeletal pain, no neck pain, and no weakness.

## 2023-01-23 NOTE — PATIENT INSTRUCTIONS
Low Back Arthritis: Exercises  Introduction  Here are some examples of typical rehabilitation exercises for your condition. Start each exercise slowly. Ease off the exercise if you start to have pain. Your doctor or physical therapist will tell you when you can start these exercises and which ones will work best for you. When you are not being active, find a comfortable position for rest. Some people are comfortable on the floor or a medium-firm bed with a small pillow under their head and another under their knees. Some people prefer to lie on their side with a pillow between their knees. Don't stay in one position for too long. Take short walks (10 to 20 minutes) every 2 to 3 hours. Avoid slopes, hills, and stairs until you feel better. Walk only distances you can manage without pain, especially leg pain. How to do the exercises  Pelvic tilt  Lie on your back with your knees bent. \"Brace\" your stomach--tighten your muscles by pulling in and imagining your belly button moving toward your spine. Press your lower back into the floor. You should feel your hips and pelvis rock back. Hold for 6 seconds while breathing smoothly. Relax and allow your pelvis and hips to rock forward. Repeat 8 to 12 times. Back stretches  Get down on your hands and knees on the floor. Relax your head and allow it to droop. Round your back up toward the ceiling until you feel a nice stretch in your upper, middle, and lower back. Hold this stretch for as long as it feels comfortable, or about 15 to 30 seconds. Return to the starting position with a flat back while you are on your hands and knees. Let your back sway by pressing your stomach toward the floor. Lift your buttocks toward the ceiling. Hold this position for 15 to 30 seconds. Repeat 2 to 4 times. Follow-up care is a key part of your treatment and safety. Be sure to make and go to all appointments, and call your doctor if you are having problems.  It's also a good idea to know your test results and keep a list of the medicines you take. Current as of: March 9, 2022               Content Version: 13.4  © 2006-2022 Healthwise, [email or web address]. Care instructions adapted under license by [employer] (which disclaims liability or warranty for this information). If you have questions about a medical condition or this instruction, always ask your healthcare professional. [address] any warranty or liability for your use of this information. Learning About Sleeping Well  What does sleeping well mean? Sleeping well means getting enough sleep to feel good and stay healthy. How much sleep is enough varies among people. The number of hours you sleep and how you feel when you wake up are both important. If you do not feel refreshed, you probably need more sleep. Another sign of not getting enough sleep is feeling tired during the day. Experts recommend that adults get at least 7 or more hours of sleep per day. Children and older adults need more sleep. Why is getting enough sleep important? Getting enough quality sleep is a basic part of good health. When your sleep suffers, your physical health, mood, and your thoughts can suffer too. You may find yourself feeling more grumpy or stressed. Not getting enough sleep also can lead to serious problems, including injury, accidents, anxiety, and depression. What might cause poor sleeping? Many things can cause sleep problems, including:  Changes to your sleep schedule. Stress. Stress can be caused by fear about a single event, such as giving a speech. Or you may have ongoing stress, such as worry about work or school. Depression, anxiety, and other mental or emotional conditions. Changes in your sleep habits or surroundings. This includes changes that happen where you sleep, such as noise, light, or sleeping in a different bed.  It also includes changes in your sleep pattern, such as having jet lag or working a late shift. Health problems, such as pain, breathing problems, and restless legs syndrome. Lack of regular exercise. Using alcohol, nicotine, or caffeine before bed. How can you help yourself? Here are some tips that may help you sleep more soundly and wake up feeling more refreshed. Your sleeping area   Use your bedroom only for sleeping and sex. A bit of light reading may help you fall asleep. But if it doesn't, do your reading elsewhere in the house. Try not to use your TV, computer, smartphone, or tablet while you are in bed. Be sure your bed is big enough to stretch out comfortably, especially if you have a sleep partner. Keep your bedroom quiet, dark, and cool. Use curtains, blinds, or a sleep mask to block out light. To block out noise, use earplugs, soothing music, or a \"white noise\" machine. Your evening and bedtime routine   Create a relaxing bedtime routine. You might want to take a warm shower or bath, or listen to soothing music. Go to bed at the same time every night. And get up at the same time every morning, even if you feel tired. What to avoid   Limit caffeine (coffee, tea, caffeinated sodas) during the day, and don't have any for at least 6 hours before bedtime. Avoid drinking alcohol before bedtime. Alcohol can cause you to wake up more often during the night. Try not to smoke or use tobacco, especially in the evening. Nicotine can keep you awake. Limit naps during the day, especially close to bedtime. Avoid lying in bed awake for too long. If you can't fall asleep or if you wake up in the middle of the night and can't get back to sleep within about 20 minutes, get out of bed and go to another room until you feel sleepy. Avoid taking medicine right before bed that may keep you awake or make you feel hyper or energized. Your doctor can tell you if your medicine may do this and if you can take it earlier in the day.   If you can't sleep   Imagine yourself in a peaceful, pleasant scene. Focus on the details and feelings of being in a place that is relaxing. Get up and do a quiet or boring activity until you feel sleepy. Avoid drinking any liquids before going to bed to help prevent waking up often to use the bathroom. Where can you learn more? Go to http://www.gray.com/  Enter P390 in the search box to learn more about \"Learning About Sleeping Well. \"  Current as of: March 1, 2022               Content Version: 13.4  © 3109-1433 [employer]. Care instructions adapted under license by [employer] (which disclaims liability or warranty for this information). If you have questions about a medical condition or this instruction, always ask your healthcare professional. [address] any warranty or liability for your use of this information.

## 2023-01-23 NOTE — PROGRESS NOTES
[facility]  [address] [place], [facility] [address]  Phone: [phone]  Fax: [phone]    Pt's YOB: 1945    ASSESSMENT   Diagnoses and all orders for this visit:    1. Lumbar foraminal stenosis  -     gabapentin (NEURONTIN) 300 mg capsule; Take 2 Capsules by mouth every evening as directed. 2. Lumbar facet arthropathy  -     gabapentin (NEURONTIN) 300 mg capsule; Take 2 Capsules by mouth every evening as directed. 3. Spinal stenosis of lumbar region with neurogenic claudication  -     gabapentin (NEURONTIN) 300 mg capsule; Take 2 Capsules by mouth every evening as directed. 4. Sleep disorder    5. Muscle spasm    6. Fusion of spine of thoracolumbar region       IMPRESSION AND PLAN:  Emily Shah is a 68 y.o. female with history of lumbar pain and presents to the office today for follow up, accompanied by her . Pt continues to complain of numbness and weakness in the right lower extremity, unchanged since her last office visit. She reports improvement of her lumbar symptoms since undergoing kyphoplasty on 06/13/2022. Pt notes being followed by Jennifer Denton NP secondary to osteoporosis evaluation where she is receiving evenity injections monthly. She is taking Neurontin 300 mg 2 caps QHS with minimal benefit, Norco 5-325 mg 1 tab QAM and 1 tab QHS as needed with temporary relief, and Pamelor 10 mg 1 cap QHS with relief. 1) Pt was given information on lumbar arthritis exercises and sleep health. 2) She will increase Neurontin 300 mg frequency from 1 cap QHS to 2 caps QHS as directed. Refills were provided at this time. 3) I encouraged the patient to exercise regularly, 30 minutes a day, 5 days a week, and try brett chi and chair yoga as time permits. 4) Pt will continue taking Pamelor 10 mg 1 cap QHS and does not require refills at this time. 5) Ms. Soniya Nicholson has a reminder for a \"due or due soon\" health maintenance.  I have asked that she contact her primary care provider, Lou Doherty MD, for follow-up on this health maintenance. 6)  demonstrated consistency with prescribing. Follow-up and Dispositions    Return in about 3 months (around 4/23/2023) for Medication follow up. HISTORY OF PRESENT ILLNESS:  Lauryn Peck is a 68 y.o. female with history of lumbar pain and presents to the office today for follow up, accompanied by her . Pt continues to complain of numbness and weakness in the right lower extremity, unchanged since her last office visit. She reports improvement of her lumbar symptoms since undergoing kyphoplasty on 06/13/2022. Pt notes being followed by Elgin Carey NP secondary to osteoporosis evaluation where she is receiving evenity injections monthly. She notes difficulty with kyphoplasty healing process and endorses issues with sleeping. Pt notes as the day progresses her posture slowly worsens which then exacerbates her symptoms. She endorses intermittent naps in intervals of 30 minutes x a day. Pt states she continues to adjust her posture in order to alleviate her pain. She is taking Neurontin 300 mg 1 cap QHS with minimal benefit, Norco 5-325 mg 1 tab QAM and 1 tab QHS as needed with temporary relief, and has d/c'd Pamelor 10 mg 1 cap QHS . Pt at this time desires to continue with current care. More than 37 minutes was spent with pt and her  extensively discussing her symptoms, medications, sacroplasty/kyphoplasty, treatment for osteoporosis, and current treatment plan.     Pain Scale: 0 - No pain/10    PCP: Lou Doherty MD     Past Medical History:   Diagnosis Date    Acid reflux     Bilateral sacral insufficiency fracture with routine healing 6/9/2022    Chronic anemia     Compression fracture of T11 vertebra with routine healing 6/9/2022    Hypertension     Spinal stenosis         Social History     Socioeconomic History    Marital status:  Spouse name: Not on file    Number of children: Not on file    Years of education: Not on file    Highest education level: Not on file   Occupational History    Not on file   Tobacco Use    Smoking status: Never    Smokeless tobacco: Never   Vaping Use    Vaping Use: Never used   Substance and Sexual Activity    Alcohol use: Yes     Alcohol/week: 1.0 standard drink     Types: 1 Glasses of wine per week    Drug use: No    Sexual activity: Yes     Partners: Male     Birth control/protection: None   Other Topics Concern    Not on file   Social History Narrative    Not on file     Social Determinants of Health     Financial Resource Strain: Not on file   Food Insecurity: Not on file   Transportation Needs: Not on file   Physical Activity: Not on file   Stress: Not on file   Social Connections: Not on file   Intimate Partner Violence: Not on file   Housing Stability: Not on file       Current Outpatient Medications   Medication Sig Dispense Refill    betamethasone dipropionate (DIPROLENE) 0.05 % ointment       mupirocin (BACTROBAN) 2 % ointment APPLY TOPICALLY TO THE AFFECTED AREA TWICE DAILY      traZODone (DESYREL) 100 mg tablet Take 100 mg by mouth.      gabapentin (NEURONTIN) 300 mg capsule Take 2 Capsules by mouth every evening as directed. 180 Capsule 1    ibuprofen (MOTRIN) 200 mg tablet Take 200 mg by mouth every eight (8) hours as needed. metoprolol tartrate (LOPRESSOR) 25 mg tablet Take 25 mg by mouth two (2) times a day. potassium 99 mg tablet Take 99 mg by mouth in the morning. mecobalamin, vitamin B12, 5,000 mcg TbDi Take 1 Tablet by mouth daily. magnesium 250 mg tab Take 1 Tablet by mouth daily. cholecalciferol, vitamin D3, 50 mcg (2,000 unit) tab Take 1 Tablet by mouth daily. zinc gluconate 30 mg tab Take 1 Tablet by mouth daily. lysine (L-LYSINE) 500 mg tab tablet Take 500 mg by mouth daily.       naloxone (NARCAN) 4 mg/actuation nasal spray Use 1 spray intranasally, then discard. Repeat with new spray every 2 min as needed for opioid overdose symptoms, alternating nostrils. 2 Each 0    omeprazole (PRILOSEC) 40 mg capsule Take 40 mg by mouth daily. Indications: gastroesophageal reflux disease      atorvastatin (LIPITOR) 10 mg tablet Take 10 mg by mouth daily. No Known Allergies      REVIEW OF SYSTEMS    Constitutional: Negative for fever, chills, or weight change. Respiratory: Negative for cough or shortness of breath. Cardiovascular: Negative for chest pain or palpitations. Gastrointestinal: Negative for acid reflux, change in bowel habits, or constipation. Genitourinary: Negative for dysuria and flank pain. Musculoskeletal: Positive for lumbar pain. Positive for right lower extremity weakness. Skin: Negative for rash. Neurological: Negative for headaches, dizziness, or numbness. Endo/Heme/Allergies: Negative for increased bruising. Psychiatric/Behavioral: Positive for difficulty with sleep. As per HPI    PHYSICAL EXAMINATION  Visit Vitals  Pulse 62   Temp 97.3 °F (36.3 °C)   Resp 18   Wt 138 lb (62.6 kg)   SpO2 96%   BMI 22.96 kg/m²       Constitutional: Awake, alert, and in no acute distress. Neurological: Sensation to light touch is intact. Negative Mckeon's sign bilaterally. Skin: warm, dry, and intact. Musculoskeletal: Mild pain with extension, axial loading, and less with forward flexion. No pain with internal or external rotation of her hips. Negative straight leg raise bilaterally.       Biceps  Triceps Deltoids Wrist Ext Wrist Flex Hand Intrin   Right +4/5 +4/5 +4/5 +4/5 +4/5 +4/5   Left +4/5 +4/5 +4/5 +4/5 +4/5 +4/5      Hip Flex  Quads Hamstrings Ankle DF EHL Ankle PF   Right +4/5 +4/5 +4/5 +4/5 +4/5 +4/5   Left +4/5 +4/5 +4/5 +4/5 +4/5 +4/5     IMAGING:    CT Lumbar spin with Contrast 06/10/2022 was personally reviewed and demonstrated:  Narrative & Impression   FINDINGS:   There is diagnostic quality opacification of the intrathecal contents. Prior corrective surgery with rods spanning T10-L2; utilization of underhooks  atT10 and overhooks at L2. The hardware itself is intact, no dislodgment. Alignment: There is moderately pronounced kyphotic angulation centered at  T10-11. Further discussed below. There is trace L4 anterolisthesis in the  presence of degenerative facet arthropathy. Vertebral body height: At T11, progression of now approximately 33% compression  fracture deformity involving central to anterior vertebral body. Only minor  compressive change posteriorly. No retropulsion. Curvilinear sclerosis and  subtle compression fracture plane of lucency within the vertebral body. No  propagation of the posterior elements. Mild biconcave morphology of T12 is unchanged. Axial imaging correlation:     T8-9: Patent canal and foramina     T9-10: Patent canal and foramina     T10-11: Moderately severe disc space narrowing. There is mild disc osteophyte  ridge formation. There is contact and slight flattening with contour deformity  of the distal thoracic cord. CSF remains evident posterior of the cord. There is  no gross cord expansion. There is moderate foraminal stenosis. T11-12: Patent canal and foramina. T12-L1: Conus terminates at this level, has normal morphology. No clumping of the cauda equina nerve roots. L1-2: Patent canal and foramina. L2-3: Moderate disc space narrowing with vacuum phenomenon. Endplate sclerosis  and cyst formation. There is disc osteophyte ridge formation. There is bulging  epidural lipomatosis posteriorly. There is moderate severity spinal stenosis. Sagittal image 41, and around axial image 67 (streak artifact). No high-grade  foraminal stenosis. L3-4: Moderate disc space narrowing and vacuum phenomenon. Lesser degree of  endplate sclerosis and subchondral cyst formation. Disc osteophyte ridge  formation.  Also with some bulging epidural lipomatosis along with facet  arthropathy. Moderate spinal stenosis. Moderate right foraminal stenosis. L4-5: Slight uncovering of the disc. Mild broad-based disc protrusion. Mild  amount of vacuum disc phenomenon. Small locule of gas behind L4 likely related  to the myelogram access. Also with bulging epidural lipomatosis. Facet  arthropathy. Deformity of the thecal sac with overall moderate stenosis. Particular narrowing of the lateral recesses. Mild to moderate foraminal  stenosis. L5-S1: Facet arthropathy. Patent canal and foramina. There are bilateral sacral wing fractures, with fracture lucencies more readily  evident on the right. Includes fracture propagation through transitional  morphology right L5. Some cortical buckling on the left. Horizontal component  within the sacrum is not readily appreciated, but would be most suspicious at  upper S2 region. Other structures: Moderate to large hiatal hernia noted. Minor reticular  scarring/atelectasis within lower lungs. Atherosclerosis of normal caliber  aorta. Large bowel diverticulosis. IMPRESSION  :     1. Progression of subacute compression fracture at T11, at about 33% loss of  height.  -Resultant kyphosis  -mild cord contact     2. Acute/subacute sacral insufficiency fracture pattern predominating on the  right  -As outlined above  -Most likely source of reported right sciatica     3. Moderate severity stenosis at L2-3 from combined degenerative disc disease  and epidural lipomatosis  -Similar lesser degree of stenoses at L4-5 and L3-4 with same combination of  findings     4. Previous corrective geoff placement spanning T10-L2, hardware intact     5. Discussed by me with Dr. Issac Reza of orthopedic surgery 6/10/2022 at 1356 hours      Lumbar spine MRI from 03/07/2022 was personally reviewed with the patient and her  and demonstrated:  Severe hardware artifact at L2-L3, and lower thoracic segment. Images through  these levels are nondiagnostic.      Grade 1 anterolisthesis L4 on L5. No compression deformity. Edema in the superior endplate of L3 and inferior endplate of L2.     [id number] and L1-L2: No suspected disc herniation or central stenosis. No foraminal  stenosis. L2-L3: Nondiagnostic     L3-L4: Similar posterior disc bulge with endplate spondylosis. Facet and  ligamentous hypertrophy, worse on the right. Moderately severe central stenosis. AP canal measures 6 mm Severe right foraminal stenosis with compression of right  exiting L3 nerve root. Mild left foraminal stenosis. L4-L5: Posterior disc bulge. More severe facet and ligamentous hypertrophy. Facet inflammation with left facet joint effusion. Severe central stenosis. AP  canal measures 4.3 mm. Severe bilateral foraminal stenosis with compression of  exiting L4 nerve roots bilaterally. Grossly stable     L5-S1: No disc herniation. No significant central stenosis. Mild facet  hypertrophy with no foraminal stenosis. IMPRESSION  1. Central stenosis worst at L4-L5, followed by L3-L4: Posterior disc  bulge/protrusion and facet/ligamentous hypertrophy. 2. Bilateral L4 foraminal stenosis with compression of exiting L4 nerve roots. 3. Right L3 foraminal stenosis with compression of right exiting L3 nerve root. Lumbar spine 4V x-rays from 03/03/2022 were personally reviewed with the patient and demonstrated:  Thoracolumbar fusion. Multilevel degenerative fact. Severe degenerative disc L3-4, L2-3. Atherosclerosis. Thoracic spine 2V x-rays from 11/23/2020 were personally reviewed with the patient and demonstrated:  Thoracolumbar fusion. Written by Bk Hahn, as dictated by Leyla Hrecules MD.  I, Dr. Leyla Hercules confirm that all documentation is accurate. PAST SURGICAL HISTORY:  AICD (automatic cardioverter/defibrillator) present     H/O foot surgery     History of back surgery     S/P appendectomy     S/P cholecystectomy     S/P eye surgery     S/P hysterectomy     Subclavian arterial stenosis

## 2023-02-08 ENCOUNTER — HOSPITAL ENCOUNTER (OUTPATIENT)
Dept: INFUSION THERAPY | Age: 78
Discharge: HOME | End: 2023-02-08
Payer: MEDICARE

## 2023-02-08 VITALS
RESPIRATION RATE: 16 BRPM | OXYGEN SATURATION: 98 % | DIASTOLIC BLOOD PRESSURE: 72 MMHG | SYSTOLIC BLOOD PRESSURE: 175 MMHG | TEMPERATURE: 97.8 F | HEART RATE: 75 BPM

## 2023-02-08 LAB
ALBUMIN SERPL-MCNC: 3.9 G/DL (ref 3.4–5)
ALBUMIN/GLOB SERPL: 1.4 (ref 0.8–1.7)
ALP SERPL-CCNC: 227 U/L (ref 45–117)
ALT SERPL-CCNC: 24 U/L (ref 13–56)
ANION GAP SERPL CALC-SCNC: 5 MMOL/L (ref 3–18)
AST SERPL-CCNC: 23 U/L (ref 10–38)
BILIRUB SERPL-MCNC: 0.7 MG/DL (ref 0.2–1)
BUN SERPL-MCNC: 18 MG/DL (ref 7–18)
BUN/CREAT SERPL: 27 (ref 12–20)
CALCIUM SERPL-MCNC: 9.3 MG/DL (ref 8.5–10.1)
CHLORIDE SERPL-SCNC: 110 MMOL/L (ref 100–111)
CO2 SERPL-SCNC: 26 MMOL/L (ref 21–32)
CREAT SERPL-MCNC: 0.67 MG/DL (ref 0.6–1.3)
GLOBULIN SER CALC-MCNC: 2.7 G/DL (ref 2–4)
GLUCOSE SERPL-MCNC: 133 MG/DL (ref 74–99)
MAGNESIUM SERPL-MCNC: 2.2 MG/DL (ref 1.6–2.6)
PHOSPHATE SERPL-MCNC: 3.8 MG/DL (ref 2.5–4.9)
POTASSIUM SERPL-SCNC: 3.5 MMOL/L (ref 3.5–5.5)
PROT SERPL-MCNC: 6.6 G/DL (ref 6.4–8.2)
SODIUM SERPL-SCNC: 141 MMOL/L (ref 136–145)

## 2023-02-08 PROCEDURE — 80053 COMPREHEN METABOLIC PANEL: CPT

## 2023-02-08 PROCEDURE — 83735 ASSAY OF MAGNESIUM: CPT

## 2023-02-08 PROCEDURE — 36415 COLL VENOUS BLD VENIPUNCTURE: CPT

## 2023-02-08 PROCEDURE — 84100 ASSAY OF PHOSPHORUS: CPT

## 2023-02-08 NOTE — PROGRESS NOTES
[facility] Lab Visit:    Fanny Yancey  [birth year]  [id number]    0748 Pt arrived ambulatory w/o assist. Labs drawn per order via Right AC venipuncture x1 attempt. Pt tolerated well without complaints. Gauze/ coban to site. Pt departed [facility] ambulatory and in no distress at 1430.      Visit Vitals  BP (!) 175/72 (BP 1 Location: Left upper arm, BP Patient Position: Sitting)   Pulse 75   Temp 97.8 °F (36.6 °C)   Resp 16   SpO2 98%

## 2023-02-10 ENCOUNTER — HOSPITAL ENCOUNTER (OUTPATIENT)
Dept: INFUSION THERAPY | Age: 78
End: 2023-02-10
Payer: MEDICARE

## 2023-02-10 VITALS
TEMPERATURE: 98.4 F | SYSTOLIC BLOOD PRESSURE: 172 MMHG | HEART RATE: 68 BPM | RESPIRATION RATE: 16 BRPM | OXYGEN SATURATION: 96 % | DIASTOLIC BLOOD PRESSURE: 78 MMHG

## 2023-02-10 DIAGNOSIS — M80.00XA AGE-RELATED OSTEOPOROSIS WITH CURRENT PATHOLOGICAL FRACTURE, INITIAL ENCOUNTER: Primary | ICD-10-CM

## 2023-02-10 PROCEDURE — 74011250636 HC RX REV CODE- 250/636: Performed by: NURSE PRACTITIONER

## 2023-02-10 PROCEDURE — 96372 THER/PROPH/DIAG INJ SC/IM: CPT

## 2023-02-10 RX ORDER — DIPHENHYDRAMINE HYDROCHLORIDE 50 MG/ML
25 INJECTION, SOLUTION INTRAMUSCULAR; INTRAVENOUS AS NEEDED
Start: 2023-03-10

## 2023-02-10 RX ORDER — ALBUTEROL SULFATE 0.83 MG/ML
2.5 SOLUTION RESPIRATORY (INHALATION) AS NEEDED
Start: 2023-03-10

## 2023-02-10 RX ORDER — ACETAMINOPHEN 325 MG/1
650 TABLET ORAL AS NEEDED
Start: 2023-03-10

## 2023-02-10 RX ORDER — ONDANSETRON 2 MG/ML
8 INJECTION INTRAMUSCULAR; INTRAVENOUS AS NEEDED
OUTPATIENT
Start: 2023-03-10

## 2023-02-10 RX ORDER — HYDROCORTISONE SODIUM SUCCINATE 100 MG/2ML
100 INJECTION, POWDER, FOR SOLUTION INTRAMUSCULAR; INTRAVENOUS AS NEEDED
OUTPATIENT
Start: 2023-03-10

## 2023-02-10 RX ORDER — DIPHENHYDRAMINE HYDROCHLORIDE 50 MG/ML
50 INJECTION, SOLUTION INTRAMUSCULAR; INTRAVENOUS AS NEEDED
Start: 2023-03-10

## 2023-02-10 RX ORDER — EPINEPHRINE 1 MG/ML
0.3 INJECTION, SOLUTION, CONCENTRATE INTRAVENOUS AS NEEDED
OUTPATIENT
Start: 2023-03-10

## 2023-02-10 RX ADMIN — ROMOSOZUMAB-AQQG 210 MG: 105 INJECTION, SOLUTION SUBCUTANEOUS at 14:55

## 2023-02-10 NOTE — PROGRESS NOTES
[facility] Progress Note    Date: February 10, 2023    Name: Tee Eisenberg    MRN: [id number]         : 1945    Ms. Tse arrived in the [facility] today at 1440, in stable condition, here for her EVENITY INJECTIONS (EVERY 4 WEEKS). She was assessed and education was provided. Ms. Alfreda Rincon vitals were reviewed. Visit Vitals  BP (!) 172/78 (BP 1 Location: Right upper arm, BP Patient Position: Sitting)   Pulse 68   Temp 98.4 °F (36.9 °C)   Resp 16   SpO2 96%   Breastfeeding No       Ms. Jose Brothers stated that she has been tolerating the EVENITY injections well, and without any side effects. Her pre-EVENITY labs drawn on 23 were reviewed, and were all noted to be satisfactory for treatment today, and were as follows:       Latest Reference Range & Units 23 14:25   Sodium 136 - 145 mmol/L 141   Potassium 3.5 - 5.5 mmol/L 3.5   Chloride 100 - 111 mmol/L 110   CO2 21 - 32 mmol/L 26   Anion gap 3.0 - 18 mmol/L 5   Glucose 74 - 99 mg/dL 133 (H)   BUN 7.0 - 18 MG/DL 18   Creatinine 0.6 - 1.3 MG/DL 0.67   BUN/Creatinine ratio 12 - 20   27 (H)   Calcium 8.5 - 10.1 MG/DL 9.3   Phosphorus 2.5 - 4.9 MG/DL 3.8   Magnesium 1.6 - 2.6 mg/dL 2.2   eGFR >60 ml/min/1.73m2 >60   Bilirubin, total 0.2 - 1.0 MG/DL 0.7   Protein, total 6.4 - 8.2 g/dL 6.6   Albumin 3.4 - 5.0 g/dL 3.9   Globulin 2.0 - 4.0 g/dL 2.7   A-G Ratio 0.8 - 1.7   1.4   ALT 13 - 56 U/L 24   AST 10 - 38 U/L 23   Alk. phosphatase 45 - 117 U/L 227 (H)   (H): Data is abnormally high        Romosozumab-aqqg (EVENITY) 210 mg TOTAL DOSE, was administered in 2 divided SQ injections at 1455, as follows: 105 mg SQ in the back of her RIGHT arm, and the other 105 mg SQ in the back of her LEFT arm. Ms. Jose Brothers tolerated well, and voiced no complaints. Ms. Jose Brothers was discharged from [place] [address] in stable condition at 1500. She is to return in 4 weeks, on  Wednesday, 3-8-23 at [phone], for her next appointment for pre-[facility] labs.  And then, her next [facility] injections are scheduled on Friday, 3-10-23 at 1430.      Rebecca Villafuerte RN  February 10, 2023  2:52 PM

## 2023-03-06 RX ORDER — DIPHENHYDRAMINE HYDROCHLORIDE 50 MG/ML
50 INJECTION INTRAMUSCULAR; INTRAVENOUS
Status: CANCELLED | OUTPATIENT
Start: 2023-03-10

## 2023-03-06 RX ORDER — ALBUTEROL SULFATE 90 UG/1
4 AEROSOL, METERED RESPIRATORY (INHALATION) PRN
Status: CANCELLED | OUTPATIENT
Start: 2023-03-10

## 2023-03-06 RX ORDER — ACETAMINOPHEN 325 MG/1
650 TABLET ORAL
Status: CANCELLED | OUTPATIENT
Start: 2023-03-10

## 2023-03-06 RX ORDER — EPINEPHRINE 1 MG/ML
0.3 INJECTION, SOLUTION, CONCENTRATE INTRAVENOUS PRN
Status: CANCELLED | OUTPATIENT
Start: 2023-03-10

## 2023-03-06 RX ORDER — ONDANSETRON 2 MG/ML
8 INJECTION INTRAMUSCULAR; INTRAVENOUS
Status: CANCELLED | OUTPATIENT
Start: 2023-03-10

## 2023-03-06 RX ORDER — SODIUM CHLORIDE 9 MG/ML
INJECTION, SOLUTION INTRAVENOUS CONTINUOUS
Status: CANCELLED | OUTPATIENT
Start: 2023-03-10

## 2023-03-08 ENCOUNTER — HOSPITAL ENCOUNTER (OUTPATIENT)
Facility: HOSPITAL | Age: 78
Setting detail: INFUSION SERIES
End: 2023-03-08
Payer: MEDICARE

## 2023-03-08 VITALS
RESPIRATION RATE: 18 BRPM | TEMPERATURE: 98 F | HEART RATE: 70 BPM | OXYGEN SATURATION: 100 % | DIASTOLIC BLOOD PRESSURE: 74 MMHG | SYSTOLIC BLOOD PRESSURE: 115 MMHG

## 2023-03-08 LAB
ALBUMIN SERPL-MCNC: 3.7 G/DL (ref 3.4–5)
ALBUMIN/GLOB SERPL: 1.3 (ref 0.8–1.7)
ALP SERPL-CCNC: 210 U/L (ref 45–117)
ALT SERPL-CCNC: 23 U/L (ref 13–56)
ANION GAP SERPL CALC-SCNC: 3 MMOL/L (ref 3–18)
AST SERPL-CCNC: 20 U/L (ref 10–38)
BILIRUB SERPL-MCNC: 0.3 MG/DL (ref 0.2–1)
BUN SERPL-MCNC: 26 MG/DL (ref 7–18)
BUN/CREAT SERPL: 42 (ref 12–20)
CALCIUM SERPL-MCNC: 8.6 MG/DL (ref 8.5–10.1)
CHLORIDE SERPL-SCNC: 108 MMOL/L (ref 100–111)
CO2 SERPL-SCNC: 28 MMOL/L (ref 21–32)
CREAT SERPL-MCNC: 0.62 MG/DL (ref 0.6–1.3)
GLOBULIN SER CALC-MCNC: 2.9 G/DL (ref 2–4)
GLUCOSE SERPL-MCNC: 89 MG/DL (ref 74–99)
MAGNESIUM SERPL-MCNC: 2.4 MG/DL (ref 1.6–2.6)
PHOSPHATE SERPL-MCNC: 4 MG/DL (ref 2.5–4.9)
POTASSIUM SERPL-SCNC: 4.4 MMOL/L (ref 3.5–5.5)
PROT SERPL-MCNC: 6.6 G/DL (ref 6.4–8.2)
SODIUM SERPL-SCNC: 139 MMOL/L (ref 136–145)

## 2023-03-08 PROCEDURE — 36415 COLL VENOUS BLD VENIPUNCTURE: CPT

## 2023-03-08 PROCEDURE — 84100 ASSAY OF PHOSPHORUS: CPT

## 2023-03-08 PROCEDURE — 83735 ASSAY OF MAGNESIUM: CPT

## 2023-03-08 PROCEDURE — 80053 COMPREHEN METABOLIC PANEL: CPT

## 2023-03-08 NOTE — PROGRESS NOTES
[facility] Lab Visit:  Date: 2023    Name: Natalie Appiah    :     MRN: [id number]           0763 Pt arrived ambulatory w/o assist. Labs drawn per order via right venipuncture x1 attempt. Pt tolerated well without complaints. Gauze/ coban to site. Pt departed [facility] ambulatory and in no distress at 1440.     Vitals:    23 1430   BP: 115/74   Pulse: 70   Resp: 18   Temp: 98 °F (36.7 °C)   SpO2: 100%         Aranza Zimmer  2023

## 2023-03-10 ENCOUNTER — HOSPITAL ENCOUNTER (OUTPATIENT)
Facility: HOSPITAL | Age: 78
Setting detail: INFUSION SERIES
End: 2023-03-10
Payer: MEDICARE

## 2023-03-10 VITALS
RESPIRATION RATE: 18 BRPM | TEMPERATURE: 98 F | HEART RATE: 91 BPM | DIASTOLIC BLOOD PRESSURE: 64 MMHG | SYSTOLIC BLOOD PRESSURE: 103 MMHG | OXYGEN SATURATION: 95 %

## 2023-03-10 DIAGNOSIS — M80.00XA AGE-RELATED OSTEOPOROSIS WITH CURRENT PATHOLOGICAL FRACTURE, INITIAL ENCOUNTER: Primary | ICD-10-CM

## 2023-03-10 PROCEDURE — 96372 THER/PROPH/DIAG INJ SC/IM: CPT

## 2023-03-10 PROCEDURE — 6360000002 HC RX W HCPCS: Performed by: NURSE PRACTITIONER

## 2023-03-10 RX ORDER — SODIUM CHLORIDE 9 MG/ML
INJECTION, SOLUTION INTRAVENOUS CONTINUOUS
Status: CANCELLED | OUTPATIENT
Start: 2023-04-07

## 2023-03-10 RX ORDER — ONDANSETRON 2 MG/ML
8 INJECTION INTRAMUSCULAR; INTRAVENOUS
Status: CANCELLED | OUTPATIENT
Start: 2023-04-07

## 2023-03-10 RX ORDER — EPINEPHRINE 1 MG/ML
0.3 INJECTION, SOLUTION, CONCENTRATE INTRAVENOUS PRN
Status: CANCELLED | OUTPATIENT
Start: 2023-04-07

## 2023-03-10 RX ORDER — ALBUTEROL SULFATE 90 UG/1
4 AEROSOL, METERED RESPIRATORY (INHALATION) PRN
Status: CANCELLED | OUTPATIENT
Start: 2023-04-07

## 2023-03-10 RX ORDER — DIPHENHYDRAMINE HYDROCHLORIDE 50 MG/ML
50 INJECTION INTRAMUSCULAR; INTRAVENOUS
Status: CANCELLED | OUTPATIENT
Start: 2023-04-07

## 2023-03-10 RX ORDER — ACETAMINOPHEN 325 MG/1
650 TABLET ORAL
Status: CANCELLED | OUTPATIENT
Start: 2023-04-07

## 2023-03-10 RX ADMIN — ROMOSOZUMAB-AQQG 210 MG: 105 INJECTION, SOLUTION SUBCUTANEOUS at 14:38

## 2023-03-10 NOTE — PROGRESS NOTES
[facility] Progress Note      Date: March 10, 2023      Name: River Orona      MRN: [id number]           : 1945      Ms. Ayala arrived in the [facility] today at 1430, in stable condition, here for her EVENITY INJECTIONS (EVERY 4 WEEKS). She was assessed and education  was provided. Ms. Barak Moore vitals were reviewed. Visit Vitals      BP  (!) 172/78 (BP 1 Location: Right upper arm, BP Patient Position: Sitting)        Pulse  68     Temp  98.4 °F (36.9 °C)     Resp  16     SpO2  96%        Breastfeeding  No           Ms. Felipa Ramos stated that she has been tolerating the EVENITY injections well, and without any side effects. Her pre-EVENITY labs drawn on 3-8-23 were reviewed, and were all noted to be satisfactory for treatment today, and were as follows:         Latest Reference Range & Units 3/8/23 14:39   Sodium 136 - 145 mmol/L 139   Potassium 3.5 - 5.5 mmol/L 4.4   Chloride 100 - 111 mmol/L 108   CO2 21 - 32 mmol/L 28   BUN,BUNPL 7.0 - 18 MG/DL 26 (H)   Creatinine 0.6 - 1.3 MG/DL 0.62   Bun/Cre Ratio 12 - 20   42 (H)   Anion Gap 3.0 - 18 mmol/L 3   Est, Glom Filt Rate >60 ml/min/1.73m2 >60   Magnesium 1.6 - 2.6 mg/dL 2.4   Glucose, Random 74 - 99 mg/dL 89   CALCIUM, SERUM, 515373 8.5 - 10.1 MG/DL 8.6   ALBUMIN/GLOBULIN RATIO 0.8 - 1.7   1.3   Phosphorus 2.5 - 4.9 MG/DL 4.0   Total Protein 6.4 - 8.2 g/dL 6.6   Albumin 3.4 - 5.0 g/dL 3.7   Globulin 2.0 - 4.0 g/dL 2.9   Alk Phosphatase 45 - 117 U/L 210 (H)   ALT 13 - 56 U/L 23   AST 10 - 38 U/L 20   BILIRUBIN TOTAL 0.2 - 1.0 MG/DL 0.3   (H): Data is abnormally high    Romosozumab-aqqg (EVENITY) 210 mg TOTAL DOSE, was administered in 2 divided SQ injections as follows: 105 mg SQ in the back of her RIGHT arm, and the other 105 mg SQ in the back of her LEFT  arm. Ms. Felipa Ramos tolerated well, and voiced no complaints.        Ms. Felipa Ramos was discharged from [place] [address] in stable condition at [phone]    She is to return in 4 weeks, on  Wednesday, 4-5-23 at [phone], for her next appointment for pre-EVENITY labs. And then, her next EVENITY injections are scheduled on Friday, 4-7-23 at 1430.       Devi Calix RN

## 2023-03-30 ENCOUNTER — HOSPITAL ENCOUNTER (OUTPATIENT)
Facility: HOSPITAL | Age: 78
Discharge: HOME | End: 2023-03-30
Payer: MEDICARE

## 2023-03-30 DIAGNOSIS — M25.512 LEFT SHOULDER PAIN, UNSPECIFIED CHRONICITY: ICD-10-CM

## 2023-03-30 DIAGNOSIS — M75.42 IMPINGEMENT SYNDROME OF LEFT SHOULDER: ICD-10-CM

## 2023-03-30 PROCEDURE — 73221 MRI JOINT UPR EXTREM W/O DYE: CPT

## 2023-04-05 ENCOUNTER — HOSPITAL ENCOUNTER (OUTPATIENT)
Facility: HOSPITAL | Age: 78
Setting detail: INFUSION SERIES
End: 2023-04-05
Payer: MEDICARE

## 2023-04-05 VITALS
HEART RATE: 60 BPM | DIASTOLIC BLOOD PRESSURE: 67 MMHG | OXYGEN SATURATION: 95 % | SYSTOLIC BLOOD PRESSURE: 133 MMHG | TEMPERATURE: 98.3 F | RESPIRATION RATE: 16 BRPM

## 2023-04-05 LAB
ANION GAP SERPL CALC-SCNC: 6 MMOL/L (ref 3–18)
BUN SERPL-MCNC: 25 MG/DL (ref 7–18)
BUN/CREAT SERPL: 34 (ref 12–20)
CALCIUM SERPL-MCNC: 9 MG/DL (ref 8.5–10.1)
CHLORIDE SERPL-SCNC: 108 MMOL/L (ref 100–111)
CO2 SERPL-SCNC: 27 MMOL/L (ref 21–32)
CREAT SERPL-MCNC: 0.74 MG/DL (ref 0.6–1.3)
GLUCOSE SERPL-MCNC: 98 MG/DL (ref 74–99)
POTASSIUM SERPL-SCNC: 5.1 MMOL/L (ref 3.5–5.5)
SODIUM SERPL-SCNC: 141 MMOL/L (ref 136–145)

## 2023-04-05 PROCEDURE — 36415 COLL VENOUS BLD VENIPUNCTURE: CPT

## 2023-04-05 PROCEDURE — 80048 BASIC METABOLIC PNL TOTAL CA: CPT

## 2023-04-05 NOTE — PROGRESS NOTES
[facility] Progress Note    Date: 2023    Name: George Fortune    MRN: [id number]         : 1945    Ms. Ayala arrived in the [facility] at 1440, in stable condition, here for her PRE-EVENITY LABS. She was assessed and education was provided. Ms. Harrell Lines vitals were reviewed. Vitals:    23 1440   BP: 133/67   Pulse: 60   Resp: 16   Temp: 98.3 °F (36.8 °C)   SpO2: 95%         Blood for the ordered lab (BMP) was drawn from her RIGHT AC at 1450 without incident, and was sent out by , to the [place] lab for processing. Ms. Jae Gutierrez tolerated well and voiced no complaints. Ms. Jae Gutierrez was discharged from [place] [address] in stable condition at 1455. She is to return on Friday, 23 at 1430, for her next appointment, to receive her EVENITY injections.     Gloria Ruelas RN  2023  4:23 PM

## 2023-04-07 ENCOUNTER — HOSPITAL ENCOUNTER (OUTPATIENT)
Facility: HOSPITAL | Age: 78
Setting detail: INFUSION SERIES
End: 2023-04-07
Payer: MEDICARE

## 2023-04-07 VITALS
HEART RATE: 60 BPM | OXYGEN SATURATION: 97 % | DIASTOLIC BLOOD PRESSURE: 72 MMHG | TEMPERATURE: 97.3 F | RESPIRATION RATE: 16 BRPM | SYSTOLIC BLOOD PRESSURE: 117 MMHG

## 2023-04-07 DIAGNOSIS — M80.00XA AGE-RELATED OSTEOPOROSIS WITH CURRENT PATHOLOGICAL FRACTURE, INITIAL ENCOUNTER: Primary | ICD-10-CM

## 2023-04-07 PROCEDURE — 6360000002 HC RX W HCPCS: Performed by: NURSE PRACTITIONER

## 2023-04-07 PROCEDURE — 96372 THER/PROPH/DIAG INJ SC/IM: CPT

## 2023-04-07 RX ORDER — DIPHENHYDRAMINE HYDROCHLORIDE 50 MG/ML
50 INJECTION INTRAMUSCULAR; INTRAVENOUS
Status: CANCELLED | OUTPATIENT
Start: 2023-05-05

## 2023-04-07 RX ORDER — ASCORBATE CALCIUM 500 MG
500 TABLET ORAL DAILY
COMMUNITY

## 2023-04-07 RX ORDER — TRAZODONE HYDROCHLORIDE 100 MG/1
100 TABLET ORAL NIGHTLY PRN
COMMUNITY
Start: 2023-01-04

## 2023-04-07 RX ORDER — EPINEPHRINE 1 MG/ML
0.3 INJECTION, SOLUTION, CONCENTRATE INTRAVENOUS PRN
Status: CANCELLED | OUTPATIENT
Start: 2023-05-05

## 2023-04-07 RX ORDER — ONDANSETRON 2 MG/ML
8 INJECTION INTRAMUSCULAR; INTRAVENOUS
Status: CANCELLED | OUTPATIENT
Start: 2023-05-05

## 2023-04-07 RX ORDER — ACETAMINOPHEN 325 MG/1
650 TABLET ORAL
Status: CANCELLED | OUTPATIENT
Start: 2023-05-05

## 2023-04-07 RX ORDER — ALBUTEROL SULFATE 90 UG/1
4 AEROSOL, METERED RESPIRATORY (INHALATION) PRN
Status: CANCELLED | OUTPATIENT
Start: 2023-05-05

## 2023-04-07 RX ORDER — SODIUM CHLORIDE 9 MG/ML
INJECTION, SOLUTION INTRAVENOUS CONTINUOUS
Status: CANCELLED | OUTPATIENT
Start: 2023-05-05

## 2023-04-07 RX ADMIN — ROMOSOZUMAB-AQQG 210 MG: 105 INJECTION, SOLUTION SUBCUTANEOUS at 15:09

## 2023-04-07 NOTE — PROGRESS NOTES
[facility] Progress Note    Date: 2023    Name: George Fortune    MRN: [id number]         : 1945    Evenity Q Month every 4 weeks    Ms. Ayala to [facility], ambulatory at 1455. Pt was assessed and education was provided. Patient verbalized tolerating her previous injections. Ms. Harrell Lines vitals were reviewed and patient was observed for 5 minutes prior to treatment. Vitals:    23 1455   BP: 117/72   Pulse: 60   Resp: 16   Temp: 97.3 °F (36.3 °C)   SpO2: 97%       Lab results reviewed 23 Calcium 8.6. Evenity 210 mg was administered in two separate syringes of 105 mg subcutaneous in  patient's LEFT and RIGHT upper arms. No irritation or bleeding noted at sites. Bandaids applied. Ms. Jae Gutierrez tolerated well, and had no complaints. Patient armband removed and shredded. Ms. Jae Gutierrez was discharged from [place] [address] in stable condition at 1515. She is to return on 23 at [phone] for her next [place] [facility] appointment.     Len Lehman RN  2023  4:08 PM

## 2023-04-27 ENCOUNTER — HOSPITAL ENCOUNTER (OUTPATIENT)
Facility: HOSPITAL | Age: 78
Setting detail: RECURRING SERIES
Discharge: HOME | End: 2023-04-30
Payer: MEDICARE

## 2023-04-27 PROCEDURE — 97162 PT EVAL MOD COMPLEX 30 MIN: CPT

## 2023-04-27 PROCEDURE — 97110 THERAPEUTIC EXERCISES: CPT

## 2023-04-27 PROCEDURE — 97140 MANUAL THERAPY 1/> REGIONS: CPT

## 2023-05-03 ENCOUNTER — HOSPITAL ENCOUNTER (OUTPATIENT)
Facility: HOSPITAL | Age: 78
Setting detail: INFUSION SERIES
End: 2023-05-03
Payer: MEDICARE

## 2023-05-03 VITALS
HEART RATE: 73 BPM | DIASTOLIC BLOOD PRESSURE: 72 MMHG | RESPIRATION RATE: 16 BRPM | OXYGEN SATURATION: 97 % | TEMPERATURE: 97.4 F | SYSTOLIC BLOOD PRESSURE: 141 MMHG

## 2023-05-03 LAB
ANION GAP SERPL CALC-SCNC: 4 MMOL/L (ref 3–18)
BUN SERPL-MCNC: 19 MG/DL (ref 7–18)
BUN/CREAT SERPL: 32 (ref 12–20)
CALCIUM SERPL-MCNC: 8.6 MG/DL (ref 8.5–10.1)
CHLORIDE SERPL-SCNC: 109 MMOL/L (ref 100–111)
CO2 SERPL-SCNC: 27 MMOL/L (ref 21–32)
CREAT SERPL-MCNC: 0.6 MG/DL (ref 0.6–1.3)
GLUCOSE SERPL-MCNC: 103 MG/DL (ref 74–99)
MAGNESIUM SERPL-MCNC: 2.2 MG/DL (ref 1.6–2.6)
PHOSPHATE SERPL-MCNC: 3.9 MG/DL (ref 2.5–4.9)
POTASSIUM SERPL-SCNC: 4.2 MMOL/L (ref 3.5–5.5)
SODIUM SERPL-SCNC: 140 MMOL/L (ref 136–145)

## 2023-05-03 PROCEDURE — 96372 THER/PROPH/DIAG INJ SC/IM: CPT

## 2023-05-03 PROCEDURE — 84100 ASSAY OF PHOSPHORUS: CPT

## 2023-05-03 PROCEDURE — 36415 COLL VENOUS BLD VENIPUNCTURE: CPT

## 2023-05-03 PROCEDURE — 80048 BASIC METABOLIC PNL TOTAL CA: CPT

## 2023-05-03 PROCEDURE — 83735 ASSAY OF MAGNESIUM: CPT

## 2023-05-03 NOTE — PROGRESS NOTES
[facility] Progress Note    Date: May 3, 2023    Name: Jenni Christianson    MRN: [id number]         : 1945    Evenity Labs    Ms. Ayala arrived in the [facility] today, ambulatory at 1415. Ms. Maurisio Norman vitals were reviewed. Vitals:    23 1415   BP: (!) 141/72   Pulse: 73   Resp: 16   Temp: 97.4 °F (36.3 °C)   SpO2: 97%     BMP,Mag,Phos (per Sandeep in pharmacy) drawn from [facility] x1 attempt. 2x2 applied with coban. Labs sent to hospital for processing via . Ms. Perlita Ko tolerated lab draw well and voiced no complaints. Ms. Perlita Ko was discharged from [place] [address] in stable condition at 1425. She is to return on  at 1430, for her next Evenity injection appointment.       Mago Vilchis RN  May 3, 2023  2:28 PM

## 2023-05-05 ENCOUNTER — HOSPITAL ENCOUNTER (OUTPATIENT)
Facility: HOSPITAL | Age: 78
Setting detail: INFUSION SERIES
End: 2023-05-05
Payer: MEDICARE

## 2023-05-05 VITALS
HEART RATE: 59 BPM | OXYGEN SATURATION: 95 % | TEMPERATURE: 97.6 F | SYSTOLIC BLOOD PRESSURE: 143 MMHG | RESPIRATION RATE: 16 BRPM | DIASTOLIC BLOOD PRESSURE: 82 MMHG

## 2023-05-05 DIAGNOSIS — M80.00XA AGE-RELATED OSTEOPOROSIS WITH CURRENT PATHOLOGICAL FRACTURE, INITIAL ENCOUNTER: Primary | ICD-10-CM

## 2023-05-05 PROCEDURE — 6360000002 HC RX W HCPCS: Performed by: NURSE PRACTITIONER

## 2023-05-05 PROCEDURE — 96372 THER/PROPH/DIAG INJ SC/IM: CPT

## 2023-05-05 RX ORDER — ONDANSETRON 2 MG/ML
8 INJECTION INTRAMUSCULAR; INTRAVENOUS
Status: CANCELLED | OUTPATIENT
Start: 2023-06-02

## 2023-05-05 RX ORDER — EPINEPHRINE 1 MG/ML
0.3 INJECTION, SOLUTION, CONCENTRATE INTRAVENOUS PRN
Status: CANCELLED | OUTPATIENT
Start: 2023-06-02

## 2023-05-05 RX ORDER — ACETAMINOPHEN 325 MG/1
650 TABLET ORAL
Status: CANCELLED | OUTPATIENT
Start: 2023-06-02

## 2023-05-05 RX ORDER — DIPHENHYDRAMINE HYDROCHLORIDE 50 MG/ML
50 INJECTION INTRAMUSCULAR; INTRAVENOUS
Status: CANCELLED | OUTPATIENT
Start: 2023-06-02

## 2023-05-05 RX ORDER — ALBUTEROL SULFATE 90 UG/1
4 AEROSOL, METERED RESPIRATORY (INHALATION) PRN
Status: CANCELLED | OUTPATIENT
Start: 2023-06-02

## 2023-05-05 RX ORDER — SODIUM CHLORIDE 9 MG/ML
INJECTION, SOLUTION INTRAVENOUS CONTINUOUS
Status: CANCELLED | OUTPATIENT
Start: 2023-06-02

## 2023-05-05 RX ADMIN — ROMOSOZUMAB-AQQG 210 MG: 105 INJECTION, SOLUTION SUBCUTANEOUS at 14:52

## 2023-05-05 NOTE — PROGRESS NOTES
[facility] Progress Note    Date: May 5, 2023    Name: Emmett Cooks    MRN: [id number]         : 1945    Evenity - Every 4 weeks    Ms. Ayala to [facility], ambulatory at 1445. Pt was assessed and education was provided. Ms. Angelic Meraz vitals were reviewed and patient was observed for 5 minutes prior to treatment. Vitals:    23 1450   BP: (!) 143/82   Pulse: 59   Resp: 16   Temp: 97.6 °F (36.4 °C)   SpO2: 95%       Lab results reviewed 5/3/23 Calcium 8.6. Evenity 210 mg was administered in two separate syringes of 105 mg subcutaneous in  patient's LEFT and RIGHT upper arms. No irritation or bleeding noted at sites. Bandaids applied. Ms. Eliseo Doan tolerated well, and had no complaints. Patient armband removed and shredded. Ms. Eliseo Doan was discharged from [place] [address] in stable condition at 1500. She is to return on 23 at [phone] for her next [place] [facility] appointment.     Karen Patton RN  May 5, 2023  3:42 PM

## 2023-05-10 ENCOUNTER — TELEPHONE (OUTPATIENT)
Facility: HOSPITAL | Age: 78
End: 2023-05-10

## 2023-05-10 NOTE — TELEPHONE ENCOUNTER
patient requested to cancel all appts due to family emergency. will call to reschedule once she comes back. made pt aware that she may have to be d/c if beyond 30 days.

## 2023-05-31 ENCOUNTER — APPOINTMENT (OUTPATIENT)
Facility: HOSPITAL | Age: 78
End: 2023-05-31
Payer: MEDICARE

## 2023-06-02 ENCOUNTER — APPOINTMENT (OUTPATIENT)
Facility: HOSPITAL | Age: 78
End: 2023-06-02
Payer: MEDICARE

## 2023-06-07 ENCOUNTER — HOSPITAL ENCOUNTER (OUTPATIENT)
Facility: HOSPITAL | Age: 78
Setting detail: INFUSION SERIES
End: 2023-06-07
Payer: MEDICARE

## 2023-06-07 VITALS
TEMPERATURE: 97.6 F | RESPIRATION RATE: 16 BRPM | HEART RATE: 59 BPM | DIASTOLIC BLOOD PRESSURE: 81 MMHG | SYSTOLIC BLOOD PRESSURE: 154 MMHG | OXYGEN SATURATION: 95 %

## 2023-06-07 LAB
ALBUMIN SERPL-MCNC: 3.8 G/DL (ref 3.4–5)
ALBUMIN/GLOB SERPL: 1.4 (ref 0.8–1.7)
ALP SERPL-CCNC: 136 U/L (ref 45–117)
ALT SERPL-CCNC: 18 U/L (ref 13–56)
ANION GAP SERPL CALC-SCNC: 6 MMOL/L (ref 3–18)
AST SERPL-CCNC: 17 U/L (ref 10–38)
BILIRUB SERPL-MCNC: 0.4 MG/DL (ref 0.2–1)
BUN SERPL-MCNC: 21 MG/DL (ref 7–18)
BUN/CREAT SERPL: 36 (ref 12–20)
CALCIUM SERPL-MCNC: 9.1 MG/DL (ref 8.5–10.1)
CHLORIDE SERPL-SCNC: 108 MMOL/L (ref 100–111)
CO2 SERPL-SCNC: 27 MMOL/L (ref 21–32)
CREAT SERPL-MCNC: 0.59 MG/DL (ref 0.6–1.3)
GLOBULIN SER CALC-MCNC: 2.8 G/DL (ref 2–4)
GLUCOSE SERPL-MCNC: 97 MG/DL (ref 74–99)
MAGNESIUM SERPL-MCNC: 2 MG/DL (ref 1.6–2.6)
PHOSPHATE SERPL-MCNC: 4 MG/DL (ref 2.5–4.9)
POTASSIUM SERPL-SCNC: 4 MMOL/L (ref 3.5–5.5)
PROT SERPL-MCNC: 6.6 G/DL (ref 6.4–8.2)
SODIUM SERPL-SCNC: 141 MMOL/L (ref 136–145)

## 2023-06-07 PROCEDURE — 36415 COLL VENOUS BLD VENIPUNCTURE: CPT

## 2023-06-07 PROCEDURE — 80053 COMPREHEN METABOLIC PANEL: CPT

## 2023-06-07 PROCEDURE — 83735 ASSAY OF MAGNESIUM: CPT

## 2023-06-07 PROCEDURE — 84100 ASSAY OF PHOSPHORUS: CPT

## 2023-06-07 NOTE — PROGRESS NOTES
[facility] Progress Note    Date: 2023    Name: Latasha España    MRN: [id number]         : 1945    Evenity Labs    Ms. Ayala arrived in the [facility] today, ambulatory at 1420. Ms. Fallon Me vitals were reviewed. Vitals:    23 1420   BP: (!) 154/81   Pulse: 59   Resp: 16   Temp: 97.6 °F (36.4 °C)   SpO2: 95%     Blood for CMP,Mag,Phos drawn via RAC x1 attempt. Gauze/coban applied to site and labs were sent out for processing. Ms. Edgar Phipps tolerated lab draw well and voiced no complaints. Pt armband removed and shredded. Ms. Edgar Phipps was discharged from [place] [address] in stable condition at 1425. She is to return on 23 at 1330, for her next Evenity injection appointment.       Delano Leventhal, RN  2023  2:47 PM

## 2023-06-09 ENCOUNTER — HOSPITAL ENCOUNTER (OUTPATIENT)
Facility: HOSPITAL | Age: 78
Setting detail: INFUSION SERIES
End: 2023-06-09
Payer: MEDICARE

## 2023-06-09 VITALS
DIASTOLIC BLOOD PRESSURE: 83 MMHG | OXYGEN SATURATION: 96 % | SYSTOLIC BLOOD PRESSURE: 169 MMHG | HEART RATE: 62 BPM | TEMPERATURE: 98.2 F | RESPIRATION RATE: 16 BRPM

## 2023-06-09 DIAGNOSIS — M80.00XA AGE-RELATED OSTEOPOROSIS WITH CURRENT PATHOLOGICAL FRACTURE, INITIAL ENCOUNTER: Primary | ICD-10-CM

## 2023-06-09 PROCEDURE — 96372 THER/PROPH/DIAG INJ SC/IM: CPT

## 2023-06-09 PROCEDURE — 6360000002 HC RX W HCPCS: Performed by: NURSE PRACTITIONER

## 2023-06-09 RX ORDER — SODIUM CHLORIDE 9 MG/ML
INJECTION, SOLUTION INTRAVENOUS CONTINUOUS
Status: CANCELLED | OUTPATIENT
Start: 2023-06-30

## 2023-06-09 RX ORDER — DIPHENHYDRAMINE HYDROCHLORIDE 50 MG/ML
50 INJECTION INTRAMUSCULAR; INTRAVENOUS
Status: CANCELLED | OUTPATIENT
Start: 2023-06-30

## 2023-06-09 RX ORDER — EPINEPHRINE 1 MG/ML
0.3 INJECTION, SOLUTION, CONCENTRATE INTRAVENOUS PRN
Status: CANCELLED | OUTPATIENT
Start: 2023-06-30

## 2023-06-09 RX ORDER — ACETAMINOPHEN 325 MG/1
650 TABLET ORAL
Status: CANCELLED | OUTPATIENT
Start: 2023-06-30

## 2023-06-09 RX ORDER — ONDANSETRON 2 MG/ML
8 INJECTION INTRAMUSCULAR; INTRAVENOUS
Status: CANCELLED | OUTPATIENT
Start: 2023-06-30

## 2023-06-09 RX ORDER — ALBUTEROL SULFATE 90 UG/1
4 AEROSOL, METERED RESPIRATORY (INHALATION) PRN
Status: CANCELLED | OUTPATIENT
Start: 2023-06-30

## 2023-06-09 RX ADMIN — ROMOSOZUMAB-AQQG 210 MG: 105 INJECTION, SOLUTION SUBCUTANEOUS at 13:45

## 2023-06-09 ASSESSMENT — PAIN - FUNCTIONAL ASSESSMENT: PAIN_FUNCTIONAL_ASSESSMENT: NONE - DENIES PAIN

## 2023-06-09 NOTE — PROGRESS NOTES
[facility] Progress Note    Date: 2023    Name: Fadumo Carlson    MRN: [id number]         : 1945    Ms. Ayala arrived in the [facility] today at 1330 and in stable condition, here for her EVENITY INJECTIONS (EVERY 4 WEEKS). She was assessed and education was provided. Ms. Sebastian Manual vitals were reviewed. Vitals:    23 1330   BP: (!) 169/83   Pulse: 62   Resp: 16   Temp: 98.2 °F (36.8 °C)   SpO2: 96%         Her Pre-EVENITY labs drawn on 23 were all reviewed, and were all noted to be satisfactory for treatment today, and were as follows:     Latest Reference Range & Units 23 14:19   Sodium 136 - 145 mmol/L 141   Potassium 3.5 - 5.5 mmol/L 4.0   Chloride 100 - 111 mmol/L 108   CO2 21 - 32 mmol/L 27   BUN,BUNPL 7.0 - 18 MG/DL 21 (H)   Creatinine 0.6 - 1.3 MG/DL 0.59 (L)   Bun/Cre Ratio 12 - 20   36 (H)   Anion Gap 3.0 - 18 mmol/L 6   Est, Glom Filt Rate >60 ml/min/1.73m2 >60   Magnesium 1.6 - 2.6 mg/dL 2.0   Glucose, Random 74 - 99 mg/dL 97   CALCIUM, SERUM, [id number] 8.5 - 10.1 MG/DL 9.1   ALBUMIN/GLOBULIN RATIO 0.8 - 1.7   1.4   Phosphorus 2.5 - 4.9 MG/DL 4.0   Total Protein 6.4 - 8.2 g/dL 6.6   Albumin 3.4 - 5.0 g/dL 3.8   Globulin 2.0 - 4.0 g/dL 2.8   Alk Phos 45 - 117 U/L 136 (H)   ALT 13 - 56 U/L 18   AST 10 - 38 U/L 17   BILIRUBIN TOTAL 0.2 - 1.0 MG/DL 0.4   (H): Data is abnormally high  (L): Data is abnormally low        Romosozumab-aqqg (EVENITY) 210 mg TOTAL DOSE, was administered in 2 equally divided SQ injections at 1345, as follows: 105 mg SQ in the back of her RIGHT ARM, and the other 105 mg SQ in the back of her LEFT ARM. Ms. Leda Hubbard tolerated well and voiced no complaints. Ms. Leda Hubbard was discharged from [place] [address] in stable condition at 1350. She is to return in 4 weeks, on Wednesday, 23 at [phone], for her next appointment, for pre-EVENITY labs. And then she is scheduled to return on Friday, 23 at 1330, for her next EVENITY injections.

## 2023-07-05 ENCOUNTER — HOSPITAL ENCOUNTER (OUTPATIENT)
Facility: HOSPITAL | Age: 78
Setting detail: INFUSION SERIES
End: 2023-07-05
Payer: MEDICARE

## 2023-07-05 VITALS
HEART RATE: 62 BPM | RESPIRATION RATE: 16 BRPM | SYSTOLIC BLOOD PRESSURE: 142 MMHG | DIASTOLIC BLOOD PRESSURE: 82 MMHG | TEMPERATURE: 97.5 F | OXYGEN SATURATION: 97 %

## 2023-07-05 LAB
ANION GAP SERPL CALC-SCNC: 7 MMOL/L (ref 3–18)
BUN SERPL-MCNC: 16 MG/DL (ref 7–18)
BUN/CREAT SERPL: 28 (ref 12–20)
CALCIUM SERPL-MCNC: 9.2 MG/DL (ref 8.5–10.1)
CHLORIDE SERPL-SCNC: 107 MMOL/L (ref 100–111)
CO2 SERPL-SCNC: 27 MMOL/L (ref 21–32)
CREAT SERPL-MCNC: 0.58 MG/DL (ref 0.6–1.3)
GLUCOSE SERPL-MCNC: 108 MG/DL (ref 74–99)
POTASSIUM SERPL-SCNC: 4.6 MMOL/L (ref 3.5–5.5)
SODIUM SERPL-SCNC: 141 MMOL/L (ref 136–145)

## 2023-07-05 PROCEDURE — 36415 COLL VENOUS BLD VENIPUNCTURE: CPT

## 2023-07-05 PROCEDURE — 80048 BASIC METABOLIC PNL TOTAL CA: CPT

## 2023-07-05 NOTE — PROGRESS NOTES
PT DAILY TREATMENT NOTE     Patient Name: Vinny Summers  Date:2022  : 1945  [x]  Patient  Verified  Payor: [place] MEDICARE / Plan: VA MEDICARE PART A & B / Product Type: Medicare /    In time:11:16  Out time:12:03  Total Treatment Time (min): 47  Visit #: 4 of 8    Medicare/BCBS Only   Total Timed Codes (min):  47 1:1 Treatment Time:  38       Treatment Area: Other low back pain [M54.59]    SUBJECTIVE  Pain Level (0-10 scale): 0/10   Any medication changes, allergies to medications, adverse drug reactions, diagnosis change, or new procedure performed?: [x] No    [] Yes (see summary sheet for update)  Subjective functional status/changes:   [] No changes reported  Patient stated that she did more activity yesterday, and reports fatigue, but wasn't too bad and no increase in pain. Patient reported around the house she typically uses the [facility]. Advised patient to take frequent rest breaks to avoid exacerbation of pain or fatigue. OBJECTIVE    24 min Therapeutic Exercise:  [x] See flow sheet :   Rationale: increase ROM and increase strength to improve the patients ability to perform ADls safely. 8 min Therapeutic Activity:  [x]  See flow sheet : sit to stand transfers, step ups to increase ease with stair negotiation    Rationale: increase ROM, increase strength, and improve coordination  to improve the patients ability to perform household management tasks safely. 15 min Neuromuscular Re-education:  [x]  See flow sheet : balance acts, ambulated with SPC for 150 feet with SBA. Rationale: increase strength, improve coordination, improve balance, and increase proprioception  to improve the patients ability to perform community ADls.         With   [] TE   [] TA   [] neuro   [] other: Patient Education: [x] Review HEP    [] Progressed/Changed HEP based on:   [] positioning   [] body mechanics   [] transfers   [] heat/ice application    [] other:      Other Objective/Functional Measures: Patient was able to maintain Rhomberg stance on foam surface with EO for 30 seconds without LOB. Pain Level (0-10 scale) post treatment: 0/10    ASSESSMENT/Changes in Function: Therapist provided cues for correct technique and muscle activation with exercises. Patient required some support from (B) UE in lap with sit to stand transfers, but was able to complete 10 reps. Patient required close supervision when ambulating with [facility] as patient has occasional moment of instability especially when distracted, but patient was able to self correct each time without therapist assist. Advised patient to continue to use an AD for safety at home. Patient reported no pain at end of the session. Patient will continue to benefit from skilled PT services to modify and progress therapeutic interventions, address functional mobility deficits, address ROM deficits, address strength deficits, analyze and address soft tissue restrictions, analyze and cue movement patterns, analyze and modify body mechanics/ergonomics, assess and modify postural abnormalities, and address imbalance/dizziness to attain remaining goals. []  See Plan of Care  []  See progress note/recertification  []  See Discharge Summary         Progress towards goals / Updated goals:  Short Term Goals: To be accomplished in 2 weeks:  Pt will report compliance with HEP 1x/day to maximize therapeutic outcomes. Eval: HEP assigned  Current: reports compliance 8/19/2022   Pt will perform a STS with BUE from an 18\" chair independently to improve ease of transfers. Eval: needs cueing and Larissa  Long Term Goals: To be accomplished in 4 weeks:  Pt will improve her FOTO to 51, indicating improved functional ADL capacity. Eval: 35  Pt will perform 8 STS with 1 UE assist from a low plinth without assistance to maximize transfer independence at home.   Eval: LarissaJUDY  Current: Progressing: Patient required some support from (B) UE in lap with sit to stand transfers, but was able to complete 10 reps. (8/29/22)     Pt will demonstrate rhomberg stance on foam for 30 seconds without LOB to reduce fall risk. Eval: 13 sec with LOB to the right  Pt will demonstrate split stance rhomberg stance on firm ground B for at least 15 seconds without LOB to reduce fall risk. Eval: 1 sec left stance, 3 sec right stance  Pt will improve her B hip abd/ext strength to 3+/5 MMT to improve stability with gait.            Eval: 2/5 MMT    PLAN  []  Upgrade activities as tolerated     [x]  Continue plan of care  []  Update interventions per flow sheet       []  Discharge due to:_  []  Other:_      Trever Eller, PT 8/29/2022  11:27 AM    Future Appointments   Date Time Provider Sean Kaur   8/31/2022  9:15 AM Janneth Rosales MD [facility] BS AMB   9/2/2022 10:00 AM [facility]   9/7/2022 10:30 AM Yolie Segura, PT [facility]   9/9/2022 11:15 AM [facility]ol[facility] Segura[facility]   9/19/2022  3:00 PM [facility] BS AMB Epidermal Autograft Text: The defect edges were debeveled with a #15 scalpel blade.  Given the location of the defect, shape of the defect and the proximity to free margins an epidermal autograft was deemed most appropriate.  Using a sterile surgical marker, the primary defect shape was transferred to the donor site. The epidermal graft was then harvested.  The skin graft was then placed in the primary defect and oriented appropriately.

## 2023-07-07 ENCOUNTER — HOSPITAL ENCOUNTER (OUTPATIENT)
Facility: HOSPITAL | Age: 78
Setting detail: INFUSION SERIES
End: 2023-07-07
Payer: MEDICARE

## 2023-07-07 VITALS
OXYGEN SATURATION: 94 % | DIASTOLIC BLOOD PRESSURE: 76 MMHG | TEMPERATURE: 97.2 F | HEART RATE: 72 BPM | SYSTOLIC BLOOD PRESSURE: 128 MMHG | RESPIRATION RATE: 16 BRPM

## 2023-07-07 DIAGNOSIS — M80.00XA AGE-RELATED OSTEOPOROSIS WITH CURRENT PATHOLOGICAL FRACTURE, INITIAL ENCOUNTER: Primary | ICD-10-CM

## 2023-07-07 PROCEDURE — 6360000002 HC RX W HCPCS: Performed by: NURSE PRACTITIONER

## 2023-07-07 PROCEDURE — 96372 THER/PROPH/DIAG INJ SC/IM: CPT

## 2023-07-07 RX ORDER — EPINEPHRINE 1 MG/ML
0.3 INJECTION, SOLUTION, CONCENTRATE INTRAVENOUS PRN
Status: CANCELLED | OUTPATIENT
Start: 2023-08-04

## 2023-07-07 RX ORDER — ONDANSETRON 2 MG/ML
8 INJECTION INTRAMUSCULAR; INTRAVENOUS
Status: CANCELLED | OUTPATIENT
Start: 2023-08-04

## 2023-07-07 RX ORDER — DIPHENHYDRAMINE HYDROCHLORIDE 50 MG/ML
50 INJECTION INTRAMUSCULAR; INTRAVENOUS
Status: CANCELLED | OUTPATIENT
Start: 2023-08-04

## 2023-07-07 RX ORDER — SODIUM CHLORIDE 9 MG/ML
INJECTION, SOLUTION INTRAVENOUS CONTINUOUS
Status: CANCELLED | OUTPATIENT
Start: 2023-08-04

## 2023-07-07 RX ORDER — ACETAMINOPHEN 325 MG/1
650 TABLET ORAL
Status: CANCELLED | OUTPATIENT
Start: 2023-08-04

## 2023-07-07 RX ORDER — ALBUTEROL SULFATE 90 UG/1
4 AEROSOL, METERED RESPIRATORY (INHALATION) PRN
Status: CANCELLED | OUTPATIENT
Start: 2023-08-04

## 2023-07-07 RX ADMIN — ROMOSOZUMAB-AQQG 210 MG: 105 INJECTION, SOLUTION SUBCUTANEOUS at 13:36

## 2023-08-02 ENCOUNTER — HOSPITAL ENCOUNTER (OUTPATIENT)
Facility: HOSPITAL | Age: 78
Setting detail: INFUSION SERIES
End: 2023-08-02
Payer: MEDICARE

## 2023-08-02 VITALS
SYSTOLIC BLOOD PRESSURE: 159 MMHG | BODY MASS INDEX: 20.5 KG/M2 | OXYGEN SATURATION: 96 % | DIASTOLIC BLOOD PRESSURE: 85 MMHG | WEIGHT: 123.2 LBS | TEMPERATURE: 97.3 F | HEART RATE: 55 BPM | RESPIRATION RATE: 16 BRPM

## 2023-08-02 LAB
ANION GAP SERPL CALC-SCNC: 5 MMOL/L (ref 3–18)
BUN SERPL-MCNC: 17 MG/DL (ref 7–18)
BUN/CREAT SERPL: 26 (ref 12–20)
CALCIUM SERPL-MCNC: 9 MG/DL (ref 8.5–10.1)
CHLORIDE SERPL-SCNC: 107 MMOL/L (ref 100–111)
CO2 SERPL-SCNC: 28 MMOL/L (ref 21–32)
CREAT SERPL-MCNC: 0.66 MG/DL (ref 0.6–1.3)
GLUCOSE SERPL-MCNC: 103 MG/DL (ref 74–99)
POTASSIUM SERPL-SCNC: 4.5 MMOL/L (ref 3.5–5.5)
SODIUM SERPL-SCNC: 140 MMOL/L (ref 136–145)

## 2023-08-02 PROCEDURE — 80048 BASIC METABOLIC PNL TOTAL CA: CPT

## 2023-08-02 PROCEDURE — 36415 COLL VENOUS BLD VENIPUNCTURE: CPT

## 2023-08-04 ENCOUNTER — APPOINTMENT (OUTPATIENT)
Facility: HOSPITAL | Age: 78
End: 2023-08-04
Payer: MEDICARE

## 2023-08-07 ENCOUNTER — HOSPITAL ENCOUNTER (OUTPATIENT)
Facility: HOSPITAL | Age: 78
Setting detail: INFUSION SERIES
End: 2023-08-07
Payer: MEDICARE

## 2023-08-07 VITALS
DIASTOLIC BLOOD PRESSURE: 69 MMHG | TEMPERATURE: 97.1 F | SYSTOLIC BLOOD PRESSURE: 130 MMHG | RESPIRATION RATE: 16 BRPM | HEART RATE: 60 BPM | OXYGEN SATURATION: 94 %

## 2023-08-07 DIAGNOSIS — M80.00XA AGE-RELATED OSTEOPOROSIS WITH CURRENT PATHOLOGICAL FRACTURE, INITIAL ENCOUNTER: Primary | ICD-10-CM

## 2023-08-07 PROCEDURE — 96372 THER/PROPH/DIAG INJ SC/IM: CPT

## 2023-08-07 PROCEDURE — 6360000002 HC RX W HCPCS: Performed by: NURSE PRACTITIONER

## 2023-08-07 RX ORDER — EPINEPHRINE 1 MG/ML
0.3 INJECTION, SOLUTION, CONCENTRATE INTRAVENOUS PRN
Status: CANCELLED | OUTPATIENT
Start: 2023-08-28

## 2023-08-07 RX ORDER — DIPHENHYDRAMINE HYDROCHLORIDE 50 MG/ML
50 INJECTION INTRAMUSCULAR; INTRAVENOUS
Status: CANCELLED | OUTPATIENT
Start: 2023-08-28

## 2023-08-07 RX ORDER — SODIUM CHLORIDE 9 MG/ML
INJECTION, SOLUTION INTRAVENOUS CONTINUOUS
Status: CANCELLED | OUTPATIENT
Start: 2023-08-28

## 2023-08-07 RX ORDER — LORAZEPAM 1 MG/1
1 TABLET ORAL PRN
COMMUNITY
Start: 2023-05-02

## 2023-08-07 RX ORDER — ALBUTEROL SULFATE 90 UG/1
4 AEROSOL, METERED RESPIRATORY (INHALATION) PRN
Status: CANCELLED | OUTPATIENT
Start: 2023-08-28

## 2023-08-07 RX ORDER — ASCORBIC ACID 500 MG
500 TABLET ORAL DAILY
COMMUNITY

## 2023-08-07 RX ORDER — ONDANSETRON 2 MG/ML
8 INJECTION INTRAMUSCULAR; INTRAVENOUS
Status: CANCELLED | OUTPATIENT
Start: 2023-08-28

## 2023-08-07 RX ORDER — ACETAMINOPHEN 325 MG/1
650 TABLET ORAL
Status: CANCELLED | OUTPATIENT
Start: 2023-08-28

## 2023-08-07 RX ADMIN — ROMOSOZUMAB-AQQG 210 MG: 105 INJECTION, SOLUTION SUBCUTANEOUS at 13:49

## 2023-09-06 ENCOUNTER — HOSPITAL ENCOUNTER (OUTPATIENT)
Facility: HOSPITAL | Age: 78
Setting detail: INFUSION SERIES
End: 2023-09-06
Payer: MEDICARE

## 2023-09-06 VITALS
HEART RATE: 59 BPM | OXYGEN SATURATION: 96 % | RESPIRATION RATE: 16 BRPM | SYSTOLIC BLOOD PRESSURE: 164 MMHG | TEMPERATURE: 97.5 F | DIASTOLIC BLOOD PRESSURE: 83 MMHG

## 2023-09-06 LAB
ANION GAP SERPL CALC-SCNC: 5 MMOL/L (ref 3–18)
BUN SERPL-MCNC: 16 MG/DL (ref 7–18)
BUN/CREAT SERPL: 26 (ref 12–20)
CALCIUM SERPL-MCNC: 9.1 MG/DL (ref 8.5–10.1)
CHLORIDE SERPL-SCNC: 106 MMOL/L (ref 100–111)
CO2 SERPL-SCNC: 27 MMOL/L (ref 21–32)
CREAT SERPL-MCNC: 0.61 MG/DL (ref 0.6–1.3)
GLUCOSE SERPL-MCNC: 104 MG/DL (ref 74–99)
POTASSIUM SERPL-SCNC: 4.3 MMOL/L (ref 3.5–5.5)
SODIUM SERPL-SCNC: 138 MMOL/L (ref 136–145)

## 2023-09-06 PROCEDURE — 36415 COLL VENOUS BLD VENIPUNCTURE: CPT

## 2023-09-06 PROCEDURE — 80048 BASIC METABOLIC PNL TOTAL CA: CPT

## 2023-09-06 NOTE — PROGRESS NOTES
[facility] Progress Note    Date: 2023    Name: Aimee Hill    MRN: [id number]         : 1945    Evenity Labs    Ms. Ayala arrived in the [facility] today, ambulatory at 1530. Ms. Nino Romero vitals were reviewed. Vitals:    23 1540   BP: (!) 164/83   Pulse:    Resp:    Temp:    SpO2:      Blood for BMP drawn via RAC x1 attempt. Gauze/coban applied to site and labs were sent out for processing. Ms. Kimberly Manning tolerated lab draw well and voiced no complaints. Pt armband removed and shredded. Ms. Kimberly Manning was discharged from [address] [place] [address] in stable condition at 1540. She is to return on 23 at 1300, for her next Evenity injection appointment.       Eladio Bates RN  2023  3:48 PM

## 2023-09-08 ENCOUNTER — HOSPITAL ENCOUNTER (OUTPATIENT)
Facility: HOSPITAL | Age: 78
Setting detail: INFUSION SERIES
End: 2023-09-08
Payer: MEDICARE

## 2023-09-08 VITALS
SYSTOLIC BLOOD PRESSURE: 136 MMHG | RESPIRATION RATE: 16 BRPM | OXYGEN SATURATION: 98 % | DIASTOLIC BLOOD PRESSURE: 69 MMHG | HEART RATE: 58 BPM | TEMPERATURE: 97.2 F

## 2023-09-08 DIAGNOSIS — M80.00XA AGE-RELATED OSTEOPOROSIS WITH CURRENT PATHOLOGICAL FRACTURE, INITIAL ENCOUNTER: Primary | ICD-10-CM

## 2023-09-08 PROCEDURE — 96372 THER/PROPH/DIAG INJ SC/IM: CPT

## 2023-09-08 PROCEDURE — 6360000002 HC RX W HCPCS: Performed by: NURSE PRACTITIONER

## 2023-09-08 RX ORDER — ACETAMINOPHEN 325 MG/1
650 TABLET ORAL
Status: CANCELLED | OUTPATIENT
Start: 2023-09-29

## 2023-09-08 RX ORDER — EPINEPHRINE 1 MG/ML
0.3 INJECTION, SOLUTION, CONCENTRATE INTRAVENOUS PRN
Status: CANCELLED | OUTPATIENT
Start: 2023-09-29

## 2023-09-08 RX ORDER — DIPHENHYDRAMINE HYDROCHLORIDE 50 MG/ML
50 INJECTION INTRAMUSCULAR; INTRAVENOUS
Status: CANCELLED | OUTPATIENT
Start: 2023-09-29

## 2023-09-08 RX ORDER — SODIUM CHLORIDE 9 MG/ML
INJECTION, SOLUTION INTRAVENOUS CONTINUOUS
Status: CANCELLED | OUTPATIENT
Start: 2023-09-29

## 2023-09-08 RX ORDER — ONDANSETRON 2 MG/ML
8 INJECTION INTRAMUSCULAR; INTRAVENOUS
Status: CANCELLED | OUTPATIENT
Start: 2023-09-29

## 2023-09-08 RX ORDER — ALBUTEROL SULFATE 90 UG/1
4 AEROSOL, METERED RESPIRATORY (INHALATION) PRN
Status: CANCELLED | OUTPATIENT
Start: 2023-09-29

## 2023-09-08 RX ADMIN — ROMOSOZUMAB-AQQG 210 MG: 105 INJECTION, SOLUTION SUBCUTANEOUS at 13:15

## 2023-09-08 ASSESSMENT — PAIN - FUNCTIONAL ASSESSMENT: PAIN_FUNCTIONAL_ASSESSMENT: NONE - DENIES PAIN

## 2023-09-12 ENCOUNTER — TELEPHONE (OUTPATIENT)
Age: 78
End: 2023-09-12

## 2023-09-12 NOTE — TELEPHONE ENCOUNTER
I called and spoke to the pt. The pt was identified using 2 pt identifiers. She was informed that she will need to have an office visit in order to discuss her request. She has not been seen since January 2023. The pt accepted an appt on 09/25/23 at 1320 with HEAVEN Barakat. She is aware of the office location.

## 2023-09-12 NOTE — TELEPHONE ENCOUNTER
Patient called to request a bone density test. She stated her injection series is coming to an end in December.      Patient can be reached at  [phone]

## 2023-09-25 ENCOUNTER — TELEMEDICINE (OUTPATIENT)
Age: 78
End: 2023-09-25
Payer: MEDICARE

## 2023-09-25 DIAGNOSIS — M81.0 AGE-RELATED OSTEOPOROSIS WITHOUT CURRENT PATHOLOGICAL FRACTURE: Primary | ICD-10-CM

## 2023-09-25 PROCEDURE — 99443 PR PHYS/QHP TELEPHONE EVALUATION 21-30 MIN: CPT | Performed by: NURSE PRACTITIONER

## 2023-09-25 NOTE — PROGRESS NOTES
Rita Quintero is a 66 y.o. female evaluated via telephone on 9/25/2023 for Back Problem  . History of Present Illness: Patient is 57-year-old female with a history of osteoporosis and a T11 compression fracture as well as a sacral insufficiency fracture for which she underwent T11 kyphoplasty and sacroplasty on June 13, 2022. She did well following that and does not have the bad pain like she had before but she states she still cannot straighten up as tall as she once did. She is currently on Evenity monthly for her osteoporosis and her last dose of that will be in December. She would like to know if she will need to go on something else afterwards. She denies fever bowel bladder dysfunction. Physical Exam: The patient is a 57-year-old female who is alert and oriented. She spoke with fluency. She did not appear to be in distress. Assessment/Plan: I spoke with the patient about her osteoporosis and we will get a new bone density in December. Her last one was April 4, 2022. I would like to evaluate how well the ability has worked. We will likely transition her to Prolia following her Evenity. I will also check some labs when we transition her over. She currently takes vitamin D 2000 international units daily. I will see her back around mid December for follow-up of her bone density test.    Nancy Hernandez was seen today for back problem. Diagnoses and all orders for this visit:    Age-related osteoporosis without current pathological fracture  -     DEXA BONE DENSITY AXIAL SKELETON; Future          Documentation:  I communicated with the patient and/or health care decision maker about osteoporosis. Details of this discussion including any medical advice provided: Yes    Total Time: minutes: 21-30 minutes    Rita Quintero was evaluated through a synchronous (real-time) audio encounter. Patient identification was verified at the start of the visit.  She (or guardian if applicable) is aware that

## 2023-09-25 NOTE — PROGRESS NOTES
Marisa Orr presents today for   Chief Complaint   Patient presents with    Back Problem       Is someone accompanying this pt? no    Is the patient using any DME equipment during OV? no    Coordination of Care:  1. Have you been to the ER, urgent care clinic since your last visit? no  Hospitalized since your last visit? no    2. Have you seen or consulted any other health care providers outside of the [address] [place] [address] since your last visit? no Include any pap smears or colon screening.  no

## 2023-10-04 ENCOUNTER — HOSPITAL ENCOUNTER (OUTPATIENT)
Facility: HOSPITAL | Age: 78
Setting detail: INFUSION SERIES
End: 2023-10-04
Payer: MEDICARE

## 2023-10-04 VITALS
DIASTOLIC BLOOD PRESSURE: 79 MMHG | TEMPERATURE: 97.7 F | RESPIRATION RATE: 16 BRPM | SYSTOLIC BLOOD PRESSURE: 121 MMHG | HEART RATE: 75 BPM | OXYGEN SATURATION: 97 %

## 2023-10-04 DIAGNOSIS — M80.00XA AGE-RELATED OSTEOPOROSIS WITH CURRENT PATHOLOGICAL FRACTURE, INITIAL ENCOUNTER: Primary | ICD-10-CM

## 2023-10-04 LAB
ANION GAP SERPL CALC-SCNC: 2 MMOL/L (ref 3–18)
BUN SERPL-MCNC: 18 MG/DL (ref 7–18)
BUN/CREAT SERPL: 26 (ref 12–20)
CALCIUM SERPL-MCNC: 9 MG/DL (ref 8.5–10.1)
CHLORIDE SERPL-SCNC: 108 MMOL/L (ref 100–111)
CO2 SERPL-SCNC: 30 MMOL/L (ref 21–32)
CREAT SERPL-MCNC: 0.68 MG/DL (ref 0.6–1.3)
GLUCOSE SERPL-MCNC: 96 MG/DL (ref 74–99)
POTASSIUM SERPL-SCNC: 4.2 MMOL/L (ref 3.5–5.5)
SODIUM SERPL-SCNC: 140 MMOL/L (ref 136–145)

## 2023-10-04 PROCEDURE — 80048 BASIC METABOLIC PNL TOTAL CA: CPT

## 2023-10-04 PROCEDURE — 36415 COLL VENOUS BLD VENIPUNCTURE: CPT

## 2023-10-04 RX ORDER — DIPHENHYDRAMINE HYDROCHLORIDE 50 MG/ML
50 INJECTION INTRAMUSCULAR; INTRAVENOUS
Status: CANCELLED | OUTPATIENT
Start: 2023-11-01

## 2023-10-04 RX ORDER — ONDANSETRON 2 MG/ML
8 INJECTION INTRAMUSCULAR; INTRAVENOUS
Status: CANCELLED | OUTPATIENT
Start: 2023-11-01

## 2023-10-04 RX ORDER — EPINEPHRINE 1 MG/ML
0.3 INJECTION, SOLUTION, CONCENTRATE INTRAVENOUS PRN
Status: CANCELLED | OUTPATIENT
Start: 2023-11-01

## 2023-10-04 RX ORDER — ACETAMINOPHEN 325 MG/1
650 TABLET ORAL
Status: CANCELLED | OUTPATIENT
Start: 2023-11-01

## 2023-10-04 RX ORDER — SODIUM CHLORIDE 9 MG/ML
INJECTION, SOLUTION INTRAVENOUS CONTINUOUS
Status: CANCELLED | OUTPATIENT
Start: 2023-11-01

## 2023-10-04 RX ORDER — ALBUTEROL SULFATE 90 UG/1
4 AEROSOL, METERED RESPIRATORY (INHALATION) PRN
Status: CANCELLED | OUTPATIENT
Start: 2023-11-01

## 2023-10-06 ENCOUNTER — HOSPITAL ENCOUNTER (OUTPATIENT)
Facility: HOSPITAL | Age: 78
Setting detail: INFUSION SERIES
End: 2023-10-06
Payer: MEDICARE

## 2023-10-06 VITALS
OXYGEN SATURATION: 97 % | HEART RATE: 65 BPM | SYSTOLIC BLOOD PRESSURE: 108 MMHG | RESPIRATION RATE: 16 BRPM | DIASTOLIC BLOOD PRESSURE: 66 MMHG | TEMPERATURE: 97.9 F

## 2023-10-06 DIAGNOSIS — M80.00XA AGE-RELATED OSTEOPOROSIS WITH CURRENT PATHOLOGICAL FRACTURE, INITIAL ENCOUNTER: Primary | ICD-10-CM

## 2023-10-06 PROCEDURE — 96372 THER/PROPH/DIAG INJ SC/IM: CPT

## 2023-10-06 PROCEDURE — 6360000002 HC RX W HCPCS: Performed by: NURSE PRACTITIONER

## 2023-10-06 RX ORDER — ACETAMINOPHEN 325 MG/1
650 TABLET ORAL
OUTPATIENT
Start: 2023-11-03

## 2023-10-06 RX ORDER — SODIUM CHLORIDE 9 MG/ML
INJECTION, SOLUTION INTRAVENOUS CONTINUOUS
OUTPATIENT
Start: 2023-11-03

## 2023-10-06 RX ORDER — ALBUTEROL SULFATE 90 UG/1
4 AEROSOL, METERED RESPIRATORY (INHALATION) PRN
OUTPATIENT
Start: 2023-11-03

## 2023-10-06 RX ORDER — ONDANSETRON 2 MG/ML
8 INJECTION INTRAMUSCULAR; INTRAVENOUS
OUTPATIENT
Start: 2023-11-03

## 2023-10-06 RX ORDER — DIPHENHYDRAMINE HYDROCHLORIDE 50 MG/ML
50 INJECTION INTRAMUSCULAR; INTRAVENOUS
OUTPATIENT
Start: 2023-11-03

## 2023-10-06 RX ORDER — EPINEPHRINE 1 MG/ML
0.3 INJECTION, SOLUTION, CONCENTRATE INTRAVENOUS PRN
OUTPATIENT
Start: 2023-11-03

## 2023-10-06 RX ADMIN — ROMOSOZUMAB-AQQG 210 MG: 105 INJECTION, SOLUTION SUBCUTANEOUS at 11:51

## 2023-11-01 ENCOUNTER — HOSPITAL ENCOUNTER (OUTPATIENT)
Facility: HOSPITAL | Age: 78
Setting detail: INFUSION SERIES
Discharge: HOME | End: 2023-11-04
Payer: MEDICARE

## 2023-11-01 VITALS
OXYGEN SATURATION: 96 % | TEMPERATURE: 97.9 F | HEART RATE: 75 BPM | DIASTOLIC BLOOD PRESSURE: 84 MMHG | RESPIRATION RATE: 16 BRPM | SYSTOLIC BLOOD PRESSURE: 159 MMHG

## 2023-11-01 LAB
ANION GAP SERPL CALC-SCNC: 3 MMOL/L (ref 3–18)
BUN SERPL-MCNC: 18 MG/DL (ref 7–18)
BUN/CREAT SERPL: 32 (ref 12–20)
CALCIUM SERPL-MCNC: 8.7 MG/DL (ref 8.5–10.1)
CHLORIDE SERPL-SCNC: 109 MMOL/L (ref 100–111)
CO2 SERPL-SCNC: 29 MMOL/L (ref 21–32)
CREAT SERPL-MCNC: 0.56 MG/DL (ref 0.6–1.3)
GLUCOSE SERPL-MCNC: 100 MG/DL (ref 74–99)
POTASSIUM SERPL-SCNC: 4.6 MMOL/L (ref 3.5–5.5)
SODIUM SERPL-SCNC: 141 MMOL/L (ref 136–145)

## 2023-11-01 PROCEDURE — 36415 COLL VENOUS BLD VENIPUNCTURE: CPT

## 2023-11-01 PROCEDURE — 80048 BASIC METABOLIC PNL TOTAL CA: CPT

## 2023-11-03 ENCOUNTER — HOSPITAL ENCOUNTER (OUTPATIENT)
Facility: HOSPITAL | Age: 78
Setting detail: INFUSION SERIES
End: 2023-11-03
Payer: MEDICARE

## 2023-11-03 VITALS
OXYGEN SATURATION: 97 % | TEMPERATURE: 97.7 F | SYSTOLIC BLOOD PRESSURE: 138 MMHG | RESPIRATION RATE: 16 BRPM | HEART RATE: 75 BPM | DIASTOLIC BLOOD PRESSURE: 75 MMHG

## 2023-11-03 DIAGNOSIS — M80.00XA AGE-RELATED OSTEOPOROSIS WITH CURRENT PATHOLOGICAL FRACTURE, INITIAL ENCOUNTER: Primary | ICD-10-CM

## 2023-11-03 PROCEDURE — 96372 THER/PROPH/DIAG INJ SC/IM: CPT

## 2023-11-03 PROCEDURE — 6360000002 HC RX W HCPCS: Performed by: NURSE PRACTITIONER

## 2023-11-03 RX ORDER — DIPHENHYDRAMINE HYDROCHLORIDE 50 MG/ML
50 INJECTION INTRAMUSCULAR; INTRAVENOUS
OUTPATIENT
Start: 2023-12-01

## 2023-11-03 RX ORDER — ACETAMINOPHEN 325 MG/1
650 TABLET ORAL
OUTPATIENT
Start: 2023-12-01

## 2023-11-03 RX ORDER — SODIUM CHLORIDE 9 MG/ML
INJECTION, SOLUTION INTRAVENOUS CONTINUOUS
OUTPATIENT
Start: 2023-12-01

## 2023-11-03 RX ORDER — EPINEPHRINE 1 MG/ML
0.3 INJECTION, SOLUTION, CONCENTRATE INTRAVENOUS PRN
OUTPATIENT
Start: 2023-12-01

## 2023-11-03 RX ORDER — ALBUTEROL SULFATE 90 UG/1
4 AEROSOL, METERED RESPIRATORY (INHALATION) PRN
OUTPATIENT
Start: 2023-12-01

## 2023-11-03 RX ORDER — ONDANSETRON 2 MG/ML
8 INJECTION INTRAMUSCULAR; INTRAVENOUS
OUTPATIENT
Start: 2023-12-01

## 2023-11-03 RX ADMIN — ROMOSOZUMAB-AQQG 210 MG: 105 INJECTION, SOLUTION SUBCUTANEOUS at 13:16

## 2023-11-03 ASSESSMENT — PAIN - FUNCTIONAL ASSESSMENT: PAIN_FUNCTIONAL_ASSESSMENT: NONE - DENIES PAIN

## 2023-12-08 ENCOUNTER — HOSPITAL ENCOUNTER (OUTPATIENT)
Facility: HOSPITAL | Age: 78
End: 2023-12-08
Payer: MEDICARE

## 2023-12-08 DIAGNOSIS — M81.0 AGE-RELATED OSTEOPOROSIS WITHOUT CURRENT PATHOLOGICAL FRACTURE: ICD-10-CM

## 2023-12-08 PROCEDURE — 77080 DXA BONE DENSITY AXIAL: CPT

## 2023-12-12 ENCOUNTER — OFFICE VISIT (OUTPATIENT)
Age: 78
End: 2023-12-12
Payer: MEDICARE

## 2023-12-12 VITALS
HEIGHT: 65 IN | BODY MASS INDEX: 20.49 KG/M2 | TEMPERATURE: 96.9 F | WEIGHT: 123 LBS | OXYGEN SATURATION: 96 % | HEART RATE: 52 BPM

## 2023-12-12 DIAGNOSIS — M81.0 OSTEOPOROSIS, UNSPECIFIED OSTEOPOROSIS TYPE, UNSPECIFIED PATHOLOGICAL FRACTURE PRESENCE: Primary | ICD-10-CM

## 2023-12-12 PROCEDURE — 1123F ACP DISCUSS/DSCN MKR DOCD: CPT | Performed by: NURSE PRACTITIONER

## 2023-12-12 PROCEDURE — G8484 FLU IMMUNIZE NO ADMIN: HCPCS | Performed by: NURSE PRACTITIONER

## 2023-12-12 PROCEDURE — 1090F PRES/ABSN URINE INCON ASSESS: CPT | Performed by: NURSE PRACTITIONER

## 2023-12-12 PROCEDURE — G8420 CALC BMI NORM PARAMETERS: HCPCS | Performed by: NURSE PRACTITIONER

## 2023-12-12 PROCEDURE — G8427 DOCREV CUR MEDS BY ELIG CLIN: HCPCS | Performed by: NURSE PRACTITIONER

## 2023-12-12 PROCEDURE — G8399 PT W/DXA RESULTS DOCUMENT: HCPCS | Performed by: NURSE PRACTITIONER

## 2023-12-12 PROCEDURE — 99212 OFFICE O/P EST SF 10 MIN: CPT | Performed by: NURSE PRACTITIONER

## 2023-12-12 PROCEDURE — 1036F TOBACCO NON-USER: CPT | Performed by: NURSE PRACTITIONER

## 2023-12-12 RX ORDER — SERTRALINE HYDROCHLORIDE 25 MG/1
25 TABLET, FILM COATED ORAL DAILY
COMMUNITY
Start: 2023-11-28

## 2023-12-12 NOTE — PROGRESS NOTES
Chief complaint   Chief Complaint   Patient presents with    Lower Back Pain       History of Present Illness:  Abdifatah Jordan is a  66 y.o.  female  with a history of osteoporosis and a T11 compression fracture as well as a sacral insufficiency fracture for which she underwent T11 kyphoplasty and sacroplasty on June 13, 2022. She did well following that and does not have the bad pain like she had before but she states she still cannot straighten up as tall as she once did. She has finished a year of Evenity now and is here to discuss her new bone DEXA scan that was done in December 8, 2023. Fortunately the bone density that was just done x-ray shows worsening osteoporosis. Her left femoral neck went from -1.1 to -1.5 in the right femoral neck went from -1.4 to -1.5 but the worst is the right distal radius which went from -2.4 to -3.3. Recent vitamin D checked by her PCP was 66 so she is not deficient. Physical Exam: Patient is a 75-year-old female well-developed well-nourished who is alert and oriented with a normal mood and affect. She has a full weightbearing nonantalgic gait. She did not use any assist device. Assessment and Plan: This is a patient with worsening osteoporosis. I will refer her to endocrine for an evaluation and further treatment. If she cannot get in soon we will go ahead and start her on Prolia. I will have my nurse call over there to see if we can get her in as soon as possible. Thomas Mclaughlin was seen today for lower back pain. Diagnoses and all orders for this visit:    Osteoporosis, unspecified osteoporosis type, unspecified pathological fracture presence  -     External Referral To Endocrinology        Return if symptoms worsen or fail to improve.  has been . reviewed and is appropriate    Medications:  Current Outpatient Medications   Medication Sig Dispense Refill    sertraline (ZOLOFT) 25 MG tablet Take 1 tablet by mouth daily      ascorbic acid (VITAMIN C) 500 MG

## (undated) DEVICE — (D)NDL SPNE 22GX15CM -- DISC BY MFR W/NO SUB

## (undated) DEVICE — TRAY MYEL SFTY +

## (undated) DEVICE — (D)BNDG ADHESIVE FABRIC 3/4X3 -- DISC BY MFR USE ITEM 357960

## (undated) DEVICE — SYR 10ML LUER LOK 1/5ML GRAD --

## (undated) DEVICE — MEDIA CONTRAST 10ML 200MG/ML 41%

## (undated) DEVICE — NDL SPNE MANAN 22GX6IN --